# Patient Record
Sex: MALE | Race: WHITE | NOT HISPANIC OR LATINO | Employment: OTHER | URBAN - METROPOLITAN AREA
[De-identification: names, ages, dates, MRNs, and addresses within clinical notes are randomized per-mention and may not be internally consistent; named-entity substitution may affect disease eponyms.]

---

## 2018-02-11 ENCOUNTER — HOSPITAL ENCOUNTER (EMERGENCY)
Facility: HOSPITAL | Age: 33
Discharge: HOME | End: 2018-02-11
Attending: EMERGENCY MEDICINE | Admitting: EMERGENCY MEDICINE
Payer: COMMERCIAL

## 2018-02-11 ENCOUNTER — APPOINTMENT (EMERGENCY)
Dept: CT IMAGING | Facility: HOSPITAL | Age: 33
End: 2018-02-11
Payer: COMMERCIAL

## 2018-02-11 ENCOUNTER — APPOINTMENT (EMERGENCY)
Dept: RADIOLOGY | Facility: HOSPITAL | Age: 33
End: 2018-02-11
Payer: COMMERCIAL

## 2018-02-11 VITALS
DIASTOLIC BLOOD PRESSURE: 72 MMHG | HEART RATE: 84 BPM | RESPIRATION RATE: 20 BRPM | WEIGHT: 181.5 LBS | SYSTOLIC BLOOD PRESSURE: 129 MMHG | TEMPERATURE: 97.7 F | OXYGEN SATURATION: 98 % | BODY MASS INDEX: 30.24 KG/M2 | HEIGHT: 65 IN

## 2018-02-11 DIAGNOSIS — S70.01XA CONTUSION OF RIGHT HIP, INITIAL ENCOUNTER: ICD-10-CM

## 2018-02-11 DIAGNOSIS — S80.11XA CONTUSION OF RIGHT LOWER LEG, INITIAL ENCOUNTER: ICD-10-CM
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
## 2018-02-11 DIAGNOSIS — S16.1XXA ACUTE STRAIN OF NECK MUSCLE, INITIAL ENCOUNTER: ICD-10-CM

## 2018-02-11 PROCEDURE — 72125 CT NECK SPINE W/O DYE: CPT

## 2018-02-11 PROCEDURE — 73590 X-RAY EXAM OF LOWER LEG: CPT

## 2018-02-11 PROCEDURE — 72170 X-RAY EXAM OF PELVIS: CPT

## 2018-02-11 PROCEDURE — 99284 EMERGENCY DEPT VISIT MOD MDM: CPT

## 2018-02-11 RX ORDER — ACETAMINOPHEN 325 MG/1
650 TABLET ORAL ONCE
Status: COMPLETED | OUTPATIENT
Start: 2018-02-11 | End: 2018-02-11

## 2018-02-11 RX ADMIN — ACETAMINOPHEN 650 MG: 325 TABLET ORAL at 18:20
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
## 2019-05-13 LAB
LEFT EYE DIABETIC RETINOPATHY: NORMAL
RIGHT EYE DIABETIC RETINOPATHY: NORMAL

## 2020-09-11 LAB
CREAT ?TM UR-SCNC: 104.3 UMOL/L
HBA1C MFR BLD HPLC: 7.2 %
MICROALBUMIN/CREAT UR: NORMAL MG/G{CREAT}

## 2020-09-22 ENCOUNTER — OFFICE VISIT (OUTPATIENT)
Dept: INTERNAL MEDICINE CLINIC | Facility: CLINIC | Age: 35
End: 2020-09-22
Payer: MEDICARE

## 2020-09-22 VITALS
HEIGHT: 65 IN | OXYGEN SATURATION: 97 % | BODY MASS INDEX: 28.66 KG/M2 | SYSTOLIC BLOOD PRESSURE: 122 MMHG | WEIGHT: 172 LBS | TEMPERATURE: 97.6 F | DIASTOLIC BLOOD PRESSURE: 74 MMHG | HEART RATE: 91 BPM

## 2020-09-22 DIAGNOSIS — E78.2 MIXED HYPERLIPIDEMIA: ICD-10-CM

## 2020-09-22 DIAGNOSIS — F43.10 PTSD (POST-TRAUMATIC STRESS DISORDER): ICD-10-CM

## 2020-09-22 DIAGNOSIS — E11.9 TYPE 2 DIABETES MELLITUS WITHOUT COMPLICATION, WITH LONG-TERM CURRENT USE OF INSULIN (HCC): Primary | ICD-10-CM

## 2020-09-22 DIAGNOSIS — Z79.4 TYPE 2 DIABETES MELLITUS WITHOUT COMPLICATION, WITH LONG-TERM CURRENT USE OF INSULIN (HCC): Primary | ICD-10-CM

## 2020-09-22 DIAGNOSIS — F41.9 ANXIETY: ICD-10-CM

## 2020-09-22 DIAGNOSIS — L73.9 FOLLICULITIS: ICD-10-CM

## 2020-09-22 DIAGNOSIS — R56.9 SEIZURE-LIKE ACTIVITY (HCC): ICD-10-CM

## 2020-09-22 PROCEDURE — 99204 OFFICE O/P NEW MOD 45 MIN: CPT | Performed by: INTERNAL MEDICINE

## 2020-09-22 RX ORDER — ALOGLIPTIN 25 MG/1
1 TABLET, FILM COATED ORAL DAILY
COMMUNITY
Start: 2020-05-05 | End: 2021-03-25 | Stop reason: HOSPADM

## 2020-09-22 RX ORDER — LORATADINE 10 MG/1
10 TABLET ORAL DAILY
COMMUNITY

## 2020-09-22 RX ORDER — GABAPENTIN 300 MG/1
300 CAPSULE ORAL 3 TIMES DAILY
COMMUNITY
End: 2021-12-23

## 2020-09-22 RX ORDER — TAMSULOSIN HYDROCHLORIDE 0.4 MG/1
0.4 CAPSULE ORAL
COMMUNITY
End: 2020-12-11 | Stop reason: ALTCHOICE

## 2020-09-22 RX ORDER — TRAZODONE HYDROCHLORIDE 100 MG/1
100 TABLET ORAL
COMMUNITY
End: 2021-06-23

## 2020-09-22 RX ORDER — SILDENAFIL 100 MG/1
100 TABLET, FILM COATED ORAL AS NEEDED
COMMUNITY
End: 2021-06-23

## 2020-09-22 RX ORDER — CLONIDINE HYDROCHLORIDE 0.2 MG/1
0.1 TABLET ORAL DAILY
COMMUNITY
End: 2021-04-12

## 2020-09-22 RX ORDER — DIVALPROEX SODIUM 500 MG/1
500 TABLET, DELAYED RELEASE ORAL EVERY 12 HOURS SCHEDULED
COMMUNITY
End: 2021-12-23

## 2020-09-22 RX ORDER — PRAVASTATIN SODIUM 40 MG
40 TABLET ORAL DAILY
Status: ON HOLD | COMMUNITY
End: 2021-03-25 | Stop reason: SDUPTHER

## 2020-09-22 RX ORDER — BUPROPION HYDROCHLORIDE 150 MG/1
300 TABLET ORAL
COMMUNITY
End: 2020-09-28

## 2020-09-22 RX ORDER — EMPAGLIFLOZIN 25 MG/1
1 TABLET, FILM COATED ORAL DAILY
COMMUNITY
Start: 2020-05-05 | End: 2021-06-16 | Stop reason: SDUPTHER

## 2020-09-22 RX ORDER — ALPRAZOLAM 0.5 MG/1
0.5 TABLET ORAL 3 TIMES DAILY PRN
COMMUNITY
End: 2021-05-28 | Stop reason: HOSPADM

## 2020-09-22 NOTE — PROGRESS NOTES
Assessment/Plan:    1  Seizure-like activity  Will refer patient to neurologist for further workup and management  Presently patient is also on Depakote, gabapentin, trazodone and Wellbutrin  mg daily  Since Wellbutrin can lower the seizure threshold advised to contact his psychiatrist as soon as possible for possible discontinuation of Wellbutrin  He can continue with other meds    2  Type 2 diabetes mellitus  As per patient few weeks ago his hemoglobin A1c 7 2  Will continue with present regimen of metformin and Lantus  Advised to monitor blood sugar at home  3  Hyperlipidemia  Continue with the Pravachol 40 mg daily  4  PTSD  Patient is being followed by psychiatrist at [facility]   They are interested to find psychiatric outside [place]  Will refer him to [address] [place] [address] psychiatry  But advised to continue to follow-up with the [place] clinic until we establish relationship with outside psychiatrist     5  Folliculitis  Will start patient on Bactroban ointment 3 times a day for couple of weeks  Diagnoses and all orders for this visit:    Type 2 diabetes mellitus without complication, with long-term current use of insulin (HCC)    Mixed hyperlipidemia    PTSD (post-traumatic stress disorder)  -     Ambulatory referral to Psychiatry; Future    Anxiety  -     Ambulatory referral to Psychiatry; Future    Seizure-like activity [facility])  -     Ambulatory referral to Neurology; Future    Folliculitis  -     mupirocin (BACTROBAN) 2 % ointment; Apply topically 3 (three) times a day    Other orders  -     OMEGA-3 FATTY ACIDS PO; Take 2 g by mouth daily  -     Alogliptin Benzoate 25 MG TABS; Take 1 tablet by mouth daily  -     ALPRAZolam (XANAX) 0 5 mg tablet; Take 0 5 mg by mouth Three times daily as needed  -     buPROPion (WELLBUTRIN XL) 150 mg 24 hr tablet; Take 300 mg by mouth  -     cloNIDine (CATAPRES) 0 2 mg tablet; Take 0 2 mg by mouth  -     divalproex sodium (DEPAKOTE) 500 mg EC tablet;  Take 500 mg by mouth daily  -     Empagliflozin (Jardiance) 25 MG TABS; Take 1 tablet by mouth daily  -     gabapentin (NEURONTIN) 300 mg capsule; Take 300 mg by mouth Three times a day  -     insulin glargine (LANTUS SOLOSTAR) 100 units/mL injection pen; Inject 24 Units under the skin daily  -     loratadine (CLARITIN) 10 mg tablet; Take 10 mg by mouth daily  -     metFORMIN (GLUCOPHAGE) 1000 MG tablet; Take 1,000 mg by mouth  -     pravastatin (PRAVACHOL) 40 mg tablet; Take 40 mg by mouth daily  -     sildenafil (VIAGRA) 100 mg tablet; Take 100 mg by mouth  -     tamsulosin (FLOMAX) 0 4 mg; Take 0 4 mg by mouth  -     traZODone (DESYREL) 100 mg tablet; Take 200 mg by mouth          BMI Counseling: Body mass index is 28 62 kg/m²  The BMI is above normal  Nutrition recommendations include decreasing portion sizes, encouraging healthy choices of fruits and vegetables, decreasing fast food intake, consuming healthier snacks and limiting drinks that contain sugar  Exercise recommendations include moderate physical activity 150 minutes/week  No pharmacotherapy was ordered  Subjective:          Patient ID: Janell Flowers is a 38 y o  male  This is 1st visit to our office  He is a patient of [facility] home he has seizure-like activity and was taken to [facility]  He underwent CT scan of the brain which was unremarkable  He was discharged with the recommendation to be seen by neurologist for further workup as outpatient  Since then no activity  Patient is being seen regularly in [facility] also under the care of a psychiatrist /counseling      The following portions of the patient's history were reviewed and updated as appropriate: allergies, current medications, past family history, past medical history, past social history, past surgical history and problem list     Review of Systems   Constitutional: Negative for fatigue and fever     HENT: Negative for congestion, ear discharge, ear pain, postnasal drip, sinus pressure, sore throat, tinnitus and trouble swallowing  Eyes: Negative for discharge, itching and visual disturbance  Respiratory: Negative for cough and shortness of breath  Cardiovascular: Negative for chest pain and palpitations  Gastrointestinal: Negative for abdominal pain, diarrhea, nausea and vomiting  Endocrine: Negative for cold intolerance and polyuria  Genitourinary: Negative for difficulty urinating, dysuria and urgency  Musculoskeletal: Negative for arthralgias and neck pain  Skin: Negative for rash  Allergic/Immunologic: Negative for environmental allergies  Neurological: Negative for dizziness, weakness and headaches  Psychiatric/Behavioral: The patient is nervous/anxious  Past Medical History:   Diagnosis Date    [facility] ([facility])     type 2    Disease of thyroid gland     hypothyroidism    Hyperlipidemia     Hypertension     Psychiatric disorder     PTSD   Anxiety, depression,     Seizures ([facility])          Current Outpatient Medications:     Alogliptin Benzoate 25 MG TABS, Take 1 tablet by mouth daily, Disp: , Rfl:     ALPRAZolam (XANAX) 0 5 mg tablet, Take 0 5 mg by mouth Three times daily as needed, Disp: , Rfl:     buPROPion (WELLBUTRIN XL) 150 mg 24 hr tablet, Take 300 mg by mouth, Disp: , Rfl:     cloNIDine (CATAPRES) 0 2 mg tablet, Take 0 2 mg by mouth, Disp: , Rfl:     divalproex sodium (DEPAKOTE) 500 mg EC tablet, Take 500 mg by mouth daily, Disp: , Rfl:     Empagliflozin (Jardiance) 25 MG TABS, Take 1 tablet by mouth daily, Disp: , Rfl:     gabapentin (NEURONTIN) 300 mg capsule, Take 300 mg by mouth Three times a day, Disp: , Rfl:     insulin glargine (LANTUS SOLOSTAR) 100 units/mL injection pen, Inject 24 Units under the skin daily, Disp: , Rfl:     loratadine (CLARITIN) 10 mg tablet, Take 10 mg by mouth daily, Disp: , Rfl:     metFORMIN (GLUCOPHAGE) 1000 MG tablet, Take 1,000 mg by mouth, Disp: , Rfl:     OMEGA-3 FATTY ACIDS PO, Take 2 g by mouth daily, Disp: , Rfl:     pravastatin (PRAVACHOL) 40 mg tablet, Take 40 mg by mouth daily, Disp: , Rfl:     sildenafil (VIAGRA) 100 mg tablet, Take 100 mg by mouth, Disp: , Rfl:     tamsulosin (FLOMAX) 0 4 mg, Take 0 4 mg by mouth, Disp: , Rfl:     traZODone (DESYREL) 100 mg tablet, Take 200 mg by mouth, Disp: , Rfl:     mupirocin (BACTROBAN) 2 % ointment, Apply topically 3 (three) times a day, Disp: 30 g, Rfl: 3    Allergies   Allergen Reactions    Lamotrigine GI Intolerance    Simvastatin Other (See Comments)     Elevated liver enzymes  Liver enzyme elevation    Niacin Rash       Social History   Past Surgical History:   Procedure Laterality Date    WISDOM TOOTH EXTRACTION       Family History   Problem Relation Age of Onset    Hyperlipidemia Father        Objective:  /74 (BP Location: Left arm, Patient Position: Sitting, Cuff Size: Adult)   Pulse 91   Temp 97 6 °F (36 4 °C)   Ht 5' 5" (1 651 m)   Wt 78 kg (172 lb)   SpO2 97%   BMI 28 62 kg/m²   Body mass index is 28 62 kg/m²  Physical Exam  Constitutional:       Appearance: He is well-developed  HENT:      Head: Normocephalic  Right Ear: Tympanic membrane and ear canal normal       Left Ear: Tympanic membrane and ear canal normal       Nose: Nose normal       Mouth/Throat:      Mouth: Mucous membranes are moist       Pharynx: No oropharyngeal exudate  Eyes:      General: No scleral icterus  Conjunctiva/sclera: Conjunctivae normal       Pupils: Pupils are equal, round, and reactive to light  Neck:      Musculoskeletal: Normal range of motion and neck supple  Thyroid: No thyromegaly  Trachea: No tracheal deviation  Cardiovascular:      Rate and Rhythm: Normal rate and regular rhythm  Heart sounds: Normal heart sounds  No murmur  Pulmonary:      Effort: Pulmonary effort is normal  No respiratory distress  Breath sounds: Normal breath sounds  Chest:      Chest wall: No tenderness  Abdominal:      General: Bowel sounds are normal       Palpations: Abdomen is soft  There is no mass  Tenderness: There is no abdominal tenderness  Musculoskeletal: Normal range of motion  Right lower leg: No edema  Left lower leg: No edema  Lymphadenopathy:      Cervical: No cervical adenopathy  Skin:     General: Skin is warm  Findings: Rash present  Comments: On abdominal wall multiple area of folliculitis like picture noted   Neurological:      General: No focal deficit present  Mental Status: He is alert and oriented to person, place, and time  Cranial Nerves: No cranial nerve deficit  Sensory: No sensory deficit  Motor: No weakness  Coordination: Coordination normal       Gait: Gait normal       Deep Tendon Reflexes: Reflexes normal    Psychiatric:         Behavior: Behavior normal          Thought Content:  Thought content normal

## 2020-09-24 ENCOUNTER — TELEPHONE (OUTPATIENT)
Dept: PSYCHIATRY | Facility: CLINIC | Age: 35
End: 2020-09-24

## 2020-09-28 ENCOUNTER — OFFICE VISIT (OUTPATIENT)
Dept: INTERNAL MEDICINE CLINIC | Age: 35
End: 2020-09-28
Payer: MEDICARE

## 2020-09-28 ENCOUNTER — TELEPHONE (OUTPATIENT)
Dept: NEUROLOGY | Facility: CLINIC | Age: 35
End: 2020-09-28

## 2020-09-28 VITALS
DIASTOLIC BLOOD PRESSURE: 70 MMHG | TEMPERATURE: 98.6 F | WEIGHT: 177.4 LBS | SYSTOLIC BLOOD PRESSURE: 100 MMHG | OXYGEN SATURATION: 96 % | HEIGHT: 66 IN | HEART RATE: 95 BPM | BODY MASS INDEX: 28.51 KG/M2

## 2020-09-28 DIAGNOSIS — F43.10 PTSD (POST-TRAUMATIC STRESS DISORDER): ICD-10-CM

## 2020-09-28 DIAGNOSIS — E78.2 MIXED HYPERLIPIDEMIA: ICD-10-CM

## 2020-09-28 DIAGNOSIS — F41.9 ANXIETY: ICD-10-CM

## 2020-09-28 DIAGNOSIS — Z79.4 TYPE 2 DIABETES MELLITUS WITHOUT COMPLICATION, WITH LONG-TERM CURRENT USE OF INSULIN (HCC): Primary | ICD-10-CM

## 2020-09-28 DIAGNOSIS — E11.9 TYPE 2 DIABETES MELLITUS WITHOUT COMPLICATION, WITH LONG-TERM CURRENT USE OF INSULIN (HCC): Primary | ICD-10-CM

## 2020-09-28 DIAGNOSIS — R56.9 SEIZURE-LIKE ACTIVITY (HCC): ICD-10-CM

## 2020-09-28 PROCEDURE — 99213 OFFICE O/P EST LOW 20 MIN: CPT | Performed by: INTERNAL MEDICINE

## 2020-09-28 NOTE — PROGRESS NOTES
Assessment/Plan:    1  Rule out seizure-like activity  Presently patient is on gabapentin and Depakote  These to medicine are prescribed by psychiatrist for post traumatic stress disorder  Will continue it  He already have appointment with neurologist scheduled for October 1st   Encourage wife and patient to keep that appointment  Further workup and management as per Neurology  2  Hypotension  Repeat blood pressure is 100/68  He is also on Catapres 0 2 mg at night  I asked wife and patient why he was on this medicine they are not sure  Advised him to lower the dose and take 0 1 mg at night for next 5 days and then 1/2 tablet every other day for next 5 days then discontinue it  3  Posttraumatic stress disorder  Being followed by psychiatrist at [facility]  Diagnoses and all orders for this visit:    Type 2 diabetes mellitus without complication, with long-term current use of insulin (HCC)    Seizure-like activity (HCC)    Anxiety    PTSD (post-traumatic stress disorder)    Mixed hyperlipidemia               Subjective:          Patient ID: Shady Ruiz is a 38 y o  male  Patient's wife brought patient to office to rule out seizure-like activity  As per patient's wife yesterday they were doing grocery and she notices that he is walking with very unsteady gait and no energy   Patient do not remember any think about this episode  He denied any dizziness shortness of breath chest pain or palpitation  Patient's wife also denied any tremors  The following portions of the patient's history were reviewed and updated as appropriate: allergies, current medications, past family history, past medical history, past social history, past surgical history and problem list     Review of Systems   Constitutional: Negative for fatigue and fever  HENT: Negative for congestion, ear discharge, ear pain, postnasal drip, sinus pressure, sore throat, tinnitus and trouble swallowing      Eyes: Negative for discharge, itching and visual disturbance  Respiratory: Negative for cough and shortness of breath  Cardiovascular: Negative for chest pain and palpitations  Gastrointestinal: Negative for abdominal pain, diarrhea, nausea and vomiting  Endocrine: Negative for cold intolerance and polyuria  Genitourinary: Negative for difficulty urinating, dysuria and urgency  Musculoskeletal: Negative for arthralgias and neck pain  Skin: Negative for rash  Allergic/Immunologic: Negative for environmental allergies  Neurological: Negative for dizziness, weakness, light-headedness and headaches  Psychiatric/Behavioral: Positive for dysphoric mood  Negative for agitation  The patient is nervous/anxious  Past Medical History:   Diagnosis Date    Diabetes mellitus ([facility] [address] )     type 2    Disease of thyroid gland     hypothyroidism    Hyperlipidemia     Hypertension     Psychiatric disorder     PTSD   Anxiety, depression,     Seizures ([facility])          Current Outpatient Medications:     Alogliptin Benzoate 25 MG TABS, Take 1 tablet by mouth daily, Disp: , Rfl:     ALPRAZolam (XANAX) 0 5 mg tablet, Take 0 5 mg by mouth Three times daily as needed, Disp: , Rfl:     cloNIDine (CATAPRES) 0 2 mg tablet, Take 0 2 mg by mouth, Disp: , Rfl:     divalproex sodium (DEPAKOTE) 500 mg EC tablet, Take 500 mg by mouth daily, Disp: , Rfl:     Empagliflozin (Jardiance) 25 MG TABS, Take 1 tablet by mouth daily, Disp: , Rfl:     gabapentin (NEURONTIN) 300 mg capsule, Take 300 mg by mouth Three times a day, Disp: , Rfl:     insulin glargine (LANTUS SOLOSTAR) 100 units/mL injection pen, Inject 24 Units under the skin daily, Disp: , Rfl:     loratadine (CLARITIN) 10 mg tablet, Take 10 mg by mouth daily, Disp: , Rfl:     metFORMIN (GLUCOPHAGE) 1000 MG tablet, Take 1,000 mg by mouth, Disp: , Rfl:     mupirocin (BACTROBAN) 2 % ointment, Apply topically 3 (three) times a day, Disp: 30 g, Rfl: 3    OMEGA-3 FATTY ACIDS PO, Take 2 g by mouth daily, Disp: , Rfl:     pravastatin (PRAVACHOL) 40 mg tablet, Take 40 mg by mouth daily, Disp: , Rfl:     sildenafil (VIAGRA) 100 mg tablet, Take 100 mg by mouth, Disp: , Rfl:     tamsulosin (FLOMAX) 0 4 mg, Take 0 4 mg by mouth, Disp: , Rfl:     traZODone (DESYREL) 100 mg tablet, Take 200 mg by mouth, Disp: , Rfl:     Allergies   Allergen Reactions    Lamotrigine GI Intolerance    Simvastatin Other (See Comments)     Elevated liver enzymes  Liver enzyme elevation    Niacin Rash       Social History   Past Surgical History:   Procedure Laterality Date    WISDOM TOOTH EXTRACTION       Family History   Problem Relation Age of Onset    Hyperlipidemia Father        Objective:  /70 (BP Location: Left arm, Patient Position: Sitting, Cuff Size: Adult)   Pulse 95   Temp 98 6 °F (37 °C) (Temporal)   Ht 5' 5 55" (1 665 m)   Wt 80 5 kg (177 lb 6 4 oz) Comment: shoes on  SpO2 96%   BMI 29 03 kg/m²   Body mass index is 29 03 kg/m²  Physical Exam  Constitutional:       Appearance: He is well-developed  HENT:      Head: Normocephalic  Right Ear: Ear canal and external ear normal       Left Ear: Ear canal and external ear normal       Nose: Nose normal       Mouth/Throat:      Pharynx: Oropharynx is clear  No oropharyngeal exudate or posterior oropharyngeal erythema  Eyes:      General: No scleral icterus  Pupils: Pupils are equal, round, and reactive to light  Neck:      Musculoskeletal: Normal range of motion and neck supple  Thyroid: No thyromegaly  Trachea: No tracheal deviation  Cardiovascular:      Rate and Rhythm: Normal rate and regular rhythm  Heart sounds: Normal heart sounds  Pulmonary:      Effort: Pulmonary effort is normal  No respiratory distress  Breath sounds: Normal breath sounds  Chest:      Chest wall: No tenderness  Abdominal:      General: Bowel sounds are normal       Palpations: Abdomen is soft  There is no mass  Tenderness: There is no abdominal tenderness  Musculoskeletal: Normal range of motion  Lymphadenopathy:      Cervical: No cervical adenopathy  Skin:     General: Skin is warm  Neurological:      Mental Status: He is alert and oriented to person, place, and time  Cranial Nerves: No cranial nerve deficit  Sensory: No sensory deficit  Motor: No weakness  Coordination: Coordination normal       Gait: Gait normal       Deep Tendon Reflexes: Reflexes normal    Psychiatric:         Behavior: Behavior normal       Comments: Patient appears depressed  No eye contact

## 2020-09-28 NOTE — TELEPHONE ENCOUNTER
pt's wife called and states that since friday, pt has been very sluggish, slow, extremely exhausted, weak  pt is scheduled as new pt on 10/1   we have never seen pt previously and pt seen in [facility]   she was requesting a sooner appt  no sooner appts availabe  advise that they contact pcp

## 2020-09-29 ENCOUNTER — TELEPHONE (OUTPATIENT)
Dept: ADMINISTRATIVE | Facility: OTHER | Age: 35
End: 2020-09-29

## 2020-09-29 NOTE — TELEPHONE ENCOUNTER
Upon review of the In Basket request and the patient's chart, initial outreach has been made via fax, please see Contacts section for details  A second outreach attempt will be made within 5 business days      Thank you  Aleida Holcomb MA

## 2020-09-29 NOTE — TELEPHONE ENCOUNTER
----- Message from Pascual Huston, [address] [place] sent at 9/28/2020  2:59 PM EDT -----  Regarding: Hemoglobin A1C Lab [address]: [phone]  09/28/20 2:59 PM    Hello, our patient Kaylin Conte has had Hemoglobin A1c and Urine Microalbumin completed/performed  Please assist in updating the patient chart by making an External outreach to [place] facility located in [facility]  The date of service is 2020      Thank you,  Pascual Huston MA  [facility]

## 2020-09-29 NOTE — LETTER
Lab Result(s) Request Form: Hemoglobin A1c and Urine Microalbumin      Date Requested: 20  Patient: Adry Perea  Patient : 1985   Referring Provider: Jose Dodd MD        Date of Lab Collection ______________________________       The above patient has informed us that they have completed their   most recent Hemoglobin A1c and Urine Microalbumin at your facility  Please complete   this form and attach all corresponding procedure reports/results  Comments __________________________________________________________  ____________________________________________________________________  ____________________________________________________________________  ____________________________________________________________________    Collecting/Resulting Facility  ___________________________________________  Form Completed By (print name) ________________________________________    Signature ___________________________________________________________      These reports are needed for  compliance    Please fax this completed form and a copy of the lab results/report to our office located at [place] [address] as soon as possible to [phone] kaitlyn Grider: Phone [phone]    We thank you for your assistance in treating our mutual patient

## 2020-09-29 NOTE — LETTER
Diabetic Eye Exam Form    Date Requested: 20  Patient: Adry Perea  Patient : 1985   Referring Provider: Jose Dodd MD    Dilated Retinal Exam, Optomap-Iris Exam, or Fundus Photography Done         Yes (Solomon one above)         No     Date of Diabetic Eye Exam ______________________________  Left Eye      Exam did show retinopathy    Exam did not show retinopathy         Mild       Moderate       None       Proliferative       Severe     Right Eye     Exam did show retinopathy    Exam did not show retinopathy         Mild       Moderate       None       Proliferative       Severe     Comments __________________________________________________________    Practice Providing Exam ______________________________________________    Exam Performed By (print name) _______________________________________      Provider Signature ___________________________________________________      These reports are needed for  compliance    Please fax this completed form and a copy of the Diabetic Eye Exam report to our office located at [place] [address] as soon as possible to [phone] kaitlyn Grider: Phone [phone]    We thank you for your assistance in treating our mutual patient

## 2020-09-29 NOTE — TELEPHONE ENCOUNTER
----- Message from Willa Patel sent at 9/28/2020  2:55 PM EDT -----  Regarding: Diabetic Eye exam [facility]  Contact: [phone]  09/28/20 2:55 PM    Hello, our patient Shanti Snow has had Diabetic Eye Exam and Diabetic Foot Exam completed/performed  Please assist in updating the patient chart by making an External outreach to [place]  facility located in [place]  The date of service is 2020      Thank you,  Nidhi Noriega MA  [address] [facility]

## 2020-09-29 NOTE — TELEPHONE ENCOUNTER
Upon review of the In Basket request we were able to locate, review, and update the patient chart as requested for Diabetic Eye Exam, Hemoglobin A1c and Urine Microalbumin  Any additional questions or concerns should be emailed to the Practice Liaisons via [email or web address] email, please do not reply via In Basket      Thank you  Tami Mclaughlin MA

## 2020-09-30 ENCOUNTER — TELEPHONE (OUTPATIENT)
Dept: NEUROLOGY | Facility: CLINIC | Age: 35
End: 2020-09-30

## 2020-09-30 NOTE — TELEPHONE ENCOUNTER
PRIME INS REF NOT REQ IF MEDICARE OR ANY OTHER INS IS PRIMARY PER Star Kirby @ PCP OFFICE PER [employer]  9/30/20

## 2020-10-01 ENCOUNTER — CONSULT (OUTPATIENT)
Dept: NEUROLOGY | Facility: CLINIC | Age: 35
End: 2020-10-01
Payer: MEDICARE

## 2020-10-01 VITALS
HEART RATE: 94 BPM | WEIGHT: 174 LBS | DIASTOLIC BLOOD PRESSURE: 80 MMHG | SYSTOLIC BLOOD PRESSURE: 109 MMHG | TEMPERATURE: 97.8 F | BODY MASS INDEX: 28.47 KG/M2

## 2020-10-01 DIAGNOSIS — F41.9 ANXIETY: ICD-10-CM

## 2020-10-01 DIAGNOSIS — R56.9 SEIZURE-LIKE ACTIVITY (HCC): Primary | ICD-10-CM

## 2020-10-01 DIAGNOSIS — F43.10 PTSD (POST-TRAUMATIC STRESS DISORDER): ICD-10-CM

## 2020-10-01 PROCEDURE — 99205 OFFICE O/P NEW HI 60 MIN: CPT | Performed by: PSYCHIATRY & NEUROLOGY

## 2020-10-01 RX ORDER — LEVOTHYROXINE SODIUM 0.03 MG/1
50 TABLET ORAL DAILY
COMMUNITY

## 2020-10-01 RX ORDER — VENLAFAXINE HYDROCHLORIDE 150 MG/1
150 CAPSULE, EXTENDED RELEASE ORAL DAILY
COMMUNITY
End: 2020-12-11 | Stop reason: SDUPTHER

## 2020-10-07 ENCOUNTER — TELEPHONE (OUTPATIENT)
Dept: NEUROLOGY | Facility: CLINIC | Age: 35
End: 2020-10-07

## 2020-10-08 ENCOUNTER — TRANSITIONAL CARE MANAGEMENT (OUTPATIENT)
Dept: INTERNAL MEDICINE CLINIC | Age: 35
End: 2020-10-08

## 2020-10-09 RX ORDER — TOPIRAMATE 25 MG/1
25 TABLET ORAL 2 TIMES DAILY
COMMUNITY
Start: 2020-10-07 | End: 2020-12-11 | Stop reason: ALTCHOICE

## 2020-10-12 ENCOUNTER — TELEMEDICINE (OUTPATIENT)
Dept: INTERNAL MEDICINE CLINIC | Facility: CLINIC | Age: 35
End: 2020-10-12
Payer: MEDICARE

## 2020-10-12 VITALS — WEIGHT: 172 LBS | HEIGHT: 65 IN | TEMPERATURE: 96 F | BODY MASS INDEX: 28.66 KG/M2

## 2020-10-12 DIAGNOSIS — F43.10 PTSD (POST-TRAUMATIC STRESS DISORDER): ICD-10-CM

## 2020-10-12 DIAGNOSIS — R56.9 SEIZURE-LIKE ACTIVITY (HCC): Primary | ICD-10-CM

## 2020-10-12 DIAGNOSIS — F41.9 ANXIETY: ICD-10-CM

## 2020-10-12 DIAGNOSIS — E11.9 TYPE 2 DIABETES MELLITUS WITHOUT COMPLICATION, WITH LONG-TERM CURRENT USE OF INSULIN (HCC): ICD-10-CM

## 2020-10-12 DIAGNOSIS — G47.33 OSA (OBSTRUCTIVE SLEEP APNEA): ICD-10-CM

## 2020-10-12 DIAGNOSIS — E78.2 MIXED HYPERLIPIDEMIA: ICD-10-CM

## 2020-10-12 DIAGNOSIS — Z79.4 TYPE 2 DIABETES MELLITUS WITHOUT COMPLICATION, WITH LONG-TERM CURRENT USE OF INSULIN (HCC): ICD-10-CM

## 2020-10-12 PROCEDURE — 99496 TRANSJ CARE MGMT HIGH F2F 7D: CPT | Performed by: NURSE PRACTITIONER

## 2020-10-12 RX ORDER — MELOXICAM 15 MG/1
TABLET ORAL
COMMUNITY
Start: 2020-10-09 | End: 2021-03-25 | Stop reason: HOSPADM

## 2020-10-30 ENCOUNTER — TRANSITIONAL CARE MANAGEMENT (OUTPATIENT)
Dept: INTERNAL MEDICINE CLINIC | Age: 35
End: 2020-10-30

## 2020-11-09 ENCOUNTER — TELEPHONE (OUTPATIENT)
Dept: PSYCHIATRY | Facility: CLINIC | Age: 35
End: 2020-11-09

## 2020-12-02 ENCOUNTER — EVALUATION (OUTPATIENT)
Dept: PHYSICAL THERAPY | Facility: REHABILITATION | Age: 35
End: 2020-12-02
Payer: COMMERCIAL

## 2020-12-02 DIAGNOSIS — R26.9 GAIT ABNORMALITY: Primary | ICD-10-CM

## 2020-12-02 PROCEDURE — 97162 PT EVAL MOD COMPLEX 30 MIN: CPT | Performed by: PHYSICAL THERAPIST

## 2020-12-11 ENCOUNTER — OFFICE VISIT (OUTPATIENT)
Dept: INTERNAL MEDICINE CLINIC | Facility: CLINIC | Age: 35
End: 2020-12-11
Payer: MEDICARE

## 2020-12-11 VITALS
SYSTOLIC BLOOD PRESSURE: 108 MMHG | HEART RATE: 88 BPM | BODY MASS INDEX: 30.66 KG/M2 | HEIGHT: 65 IN | OXYGEN SATURATION: 94 % | TEMPERATURE: 97.2 F | DIASTOLIC BLOOD PRESSURE: 78 MMHG | WEIGHT: 184 LBS

## 2020-12-11 DIAGNOSIS — Z79.4 TYPE 2 DIABETES MELLITUS WITHOUT COMPLICATION, WITH LONG-TERM CURRENT USE OF INSULIN (HCC): ICD-10-CM

## 2020-12-11 DIAGNOSIS — E11.9 TYPE 2 DIABETES MELLITUS WITHOUT COMPLICATION, WITH LONG-TERM CURRENT USE OF INSULIN (HCC): ICD-10-CM

## 2020-12-11 DIAGNOSIS — R07.89 ATYPICAL CHEST PAIN: ICD-10-CM

## 2020-12-11 DIAGNOSIS — R56.9 SEIZURE-LIKE ACTIVITY (HCC): Primary | ICD-10-CM

## 2020-12-11 DIAGNOSIS — G47.33 OSA (OBSTRUCTIVE SLEEP APNEA): ICD-10-CM

## 2020-12-11 DIAGNOSIS — K62.5 BRIGHT RED BLOOD PER RECTUM: ICD-10-CM

## 2020-12-11 DIAGNOSIS — E78.2 MIXED HYPERLIPIDEMIA: ICD-10-CM

## 2020-12-11 DIAGNOSIS — F41.9 ANXIETY: ICD-10-CM

## 2020-12-11 PROCEDURE — 99214 OFFICE O/P EST MOD 30 MIN: CPT | Performed by: INTERNAL MEDICINE

## 2020-12-11 RX ORDER — HYDROCORTISONE ACETATE 25 MG/1
25 SUPPOSITORY RECTAL 2 TIMES DAILY
Qty: 30 SUPPOSITORY | Refills: 1 | Status: SHIPPED | OUTPATIENT
Start: 2020-12-11 | End: 2021-03-25 | Stop reason: HOSPADM

## 2020-12-11 RX ORDER — VENLAFAXINE HYDROCHLORIDE 150 MG/1
CAPSULE, EXTENDED RELEASE ORAL
COMMUNITY
Start: 2020-10-09 | End: 2021-03-25 | Stop reason: HOSPADM

## 2020-12-11 RX ORDER — VENLAFAXINE HYDROCHLORIDE 75 MG/1
CAPSULE, EXTENDED RELEASE ORAL
COMMUNITY
Start: 2020-12-01 | End: 2021-03-25 | Stop reason: HOSPADM

## 2020-12-30 ENCOUNTER — TELEPHONE (OUTPATIENT)
Dept: NEUROLOGY | Facility: CLINIC | Age: 35
End: 2020-12-30

## 2020-12-31 NOTE — PROGRESS NOTES
PT Re-Evaluation     Today's date: 2021  Patient name: Alexandro Guzman  : 1985  MRN: [id number]  Referring provider: Luis Painter MD  Dx:   Encounter Diagnosis     ICD-10-CM    1  Dizziness  R42    2  Gait abnormality  R26 9        Start Time: 1010  Stop Time: 1100  Total time in clinic (min): 50 minutes    Assessment  Assessment details:  Assessment details: Unable to complete gait testing, 6MWT and FGA, due to feeling of fatigue and imbalance  4 item DGI sugges he is at higher risk for falls at this time  Demonstrates increased tone in left LE Patinet will benefit from skilled therapy in order to normalize gait pattern, improved dynamic balance, reduce risk for falls and endurance  Impairments: abnormal coordination, abnormal gait, abnormal muscle tone, abnormal or restricted ROM, activity intolerance, impaired balance, impaired physical strength, lacks appropriate home exercise program, pain with function, safety issue and poor posture     Symptom irritability: highBarriers to therapy: Unable to drive  Fall Risk   Seizure  History PTSD      Goals  STG  1  Patient will be able to complete 6 MWT  within 4 weeks  2  Patient will improve 5x STS by 6s or more within 4 weeks  3  Patient will be independent with HEP within 4 weeks  LTG  1  Patient will improve 4 item DGI to indicating he is not a high fall risk within 8 weeks  2  Patient will improve TUG to less than 12 sec indicating he is not at high risk for falls within 8 weeks  3  Patient will improve LE strength by 1 grade or more within 12 weeks  4  Patient will improve 6 MWT to 1000ft or greater within 12 weeks  5   Patient will report a reduction in dizziness by 75% or more within 12 weeks     Plan  Patient would benefit from: skilled physical therapy  Planned therapy interventions: neuromuscular re-education, patient education, postural training, strengthening, therapeutic activities, therapeutic exercise, home exercise program, balance, gait training and coordination  Frequency: 2x week  Duration in weeks: 12  Plan of Care beginning date: 2021  Plan of Care expiration date: 3/29/2021  Treatment plan discussed with: patient        Subjective Evaluation    History of Present Illness  Mechanism of injury: Left side is weak, walks into wall  Hard to keep balance, dont have strength he used to have,    No diagnosis at this time but believes it's related to seizures, first seizures on 2020  Last in the hospital on 10/2020  Has trouble walking dogs, difficulty with stopping her because she will pull  Has to grab onto things to help get balanced  Also reports dizziness, has had dizziness prior to seizures  Hasn't been too much lately - few times a week, do have to get up slow if sitting of lying down for while  If standing up too fast feels like he will fall over  Dizziness described as: makes head fell slow feels lightheaded  Can't focus, everything is " super slow"  Denies spinning sensation  No cause found for dizziness  Headaches: used to have serious migraines in the past that required botox injections - stopped a couple years ago ( a year or so ago)     Neck pain: comes and goes not aware if it is associated with dizziness  Hearing: "sometimes" difficult, not sure if there is any loss  Tinnitus: a lot - couple times a week B/L            Pain  Current pain ratin  At best pain ratin  At worst pain ratin  Location: back - low/mid back   Quality: dull ache and sharp  Relieving factors: rest    Social Support  Stairs in house: yes (sometimes - grabs railing if cant grab railing will move to wall to help balance himself - does have to grab often, feels like he will fall backwards )   Lives in: multiple-level home  Lives with: does not live alone  Employment status: working (is retired   not supposed to do heavey work because of seizures  )  Exercise history: hasn't done much due to winter  - used to walk prior to having seizures - 3x/week about 3 miles each   Life stress: not driving     Treatments  No previous or current treatments  Patient Goals  Patient goals for therapy: improved balance and return to sport/leisure activities          Objective  PT/OT Neuro Exam  Neurologic Exam       PHYSICAL FINDINGS:  Oculomotor ROM :  Resting nystagmus: No  Gaze holding nystagmus No   Smooth pursuit Normal    Vertical Saccades: slowed unable to foucs   Horizontal Saccades slowed unable to focus  Convergence: Abnormal  Head thrust (room light): Normal  VOR x 1 : normal     Dynamic Visual Acuity: TBA NV  Dynamic Head: 20/  Static Head: 20/      MCTSIB: TBA NV            Balance Test    6 Minute Walk Test (ft): 200ft stopped test at 3 min a 40s  Two standing rest  Breaks   RPE: 7-8/10  HR 85  O2: 98     2 Minute Walk Test (ft):    Gait Speed (ft/s): 20ft/8 52 = 2 34ft/s   5x Sit To Stand (s): 53 95s   Has to readjust after each STS to get his feet correct if he doesn't he will "fall over"   TU 3s             Flowsheet Rows      Most Recent Value   Functional Gait Assessment   Gait Level Surface   2   Change in GaiT Speed  2   Gait with horizontal head turns  2   Gait with vertical head turns  1   Gait with narrow base of supprt  2            Coordination Left Right   Heel To Stokes slowed Slowed    Finger To Nose     Rapid Alternating Movement     UE     LE         Sensation Left Right   Kinesthesia     Light Touch     Sharp/Dull     2 Point Discrimination         Muscle Tone Left Right   Modified Francisco Scale     Hamstring WNL WNL   Gastroc 1  0   Quad 0 0       Manual Muscle Testing - Hip Left Right   Flexion 5 5   Extension     Abduction     External Rotation       Manual Muscle Testing - Knee Left Right   Flexion 4 5   Extension 4 5     Manual Muscle Testing - Ankle Left Right   Doriflexion 3+ 3+   Plantarflexion          4 Item Dynamic Gait Index  2/3 Gait level surface  2/3 Change in gait speed  2/3 Gait with horizontal head turns  1/3 Gait with vertical head turns  7/12 total score (less that 10/12 indicates increased risk of fall)    Gait Assessment: slow, decreased step length, NBOS       Short Term Goal Expiration Date:(4 weeks 02/01/2021)  Long Term Goal Expiration Date: (8 weeks 03/01/2021 12 weeks 03/29/2021)  POC Expiration Date: (12 weeks 03/29/2021)           Precautions fall risk, hx PTSD, PNES        Manuals                                        Neuro Re-Ed                                                                 Ther Ex                                                                        Ther Activity                        Gait Training                        Modalities

## 2021-01-04 ENCOUNTER — EVALUATION (OUTPATIENT)
Dept: PHYSICAL THERAPY | Facility: CLINIC | Age: 36
End: 2021-01-04
Payer: MEDICARE

## 2021-01-04 DIAGNOSIS — R42 DIZZINESS: Primary | ICD-10-CM

## 2021-01-04 DIAGNOSIS — R26.9 GAIT ABNORMALITY: ICD-10-CM

## 2021-01-04 PROCEDURE — 97116 GAIT TRAINING THERAPY: CPT | Performed by: PHYSICAL THERAPIST

## 2021-01-04 PROCEDURE — 97164 PT RE-EVAL EST PLAN CARE: CPT | Performed by: PHYSICAL THERAPIST

## 2021-01-07 ENCOUNTER — OFFICE VISIT (OUTPATIENT)
Dept: PHYSICAL THERAPY | Facility: CLINIC | Age: 36
End: 2021-01-07
Payer: MEDICARE

## 2021-01-07 DIAGNOSIS — R26.9 GAIT ABNORMALITY: ICD-10-CM

## 2021-01-07 DIAGNOSIS — R42 DIZZINESS: Primary | ICD-10-CM

## 2021-01-07 PROCEDURE — 97116 GAIT TRAINING THERAPY: CPT | Performed by: PHYSICAL THERAPIST

## 2021-01-07 PROCEDURE — 97112 NEUROMUSCULAR REEDUCATION: CPT | Performed by: PHYSICAL THERAPIST

## 2021-01-07 NOTE — PROGRESS NOTES
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  Demonstrated good gait pattern with focus on walking on treadmill  Difficulty with gaze stability, requiring cues to slow head movents down, reports increased dizziness  In the middle of vertical VOR patient had to stop due to increase symptoms and headache, noting he felt like "Things" could happen, noting this is how he felt when he had his last seizure  Patient was brought water, assisted to mat table with wheelchair and was transferred with CS x 2 to supine positions  Patient was observed/monitored for the rest of the session  Patients wife contacted and was present for end of session  She mentions he has moments where he "freezes and Zones out" with increased shaking  Patient able to sit up on edge of mat table independently, upon sitting up it was noted that shaking reduced, patient with improved tolerance to upright position  Eyes appeared dialated B/L    Patient and wife concerned with diagnosis and treatment course recommended by previous neurologist, would like to follow up with [facility] neurologist for second opinion  PT to help coordinate this  They are agreeable for continuing with therapy, would like to assess his back for possibility of pinched nerve  Plan: Continue per plan of care  increase to patient tolerance        Short Term Goal Expiration Date:(4 weeks)  Long Term Goal Expiration Date: (8 weeks 12 weeks)  POC Expiration Date: (812 weeks)           Precautions: Fall risk, history of seizures        Manuals 01/07                                       Neuro Re-Ed         VOR x 1  Seated plain   H 30"  D 6-7/10  V 30"  Discontinue at this time      Walking Ht/HN          360 turns        Side stepping        Backwards walking                         Ther Ex        STS        Step ups                                                         Ther Activity                        Gait Training        treadmill Focus on step length and heel toe pattern     1 8 mph  4 min  4min 15"  (at 3:40" felt like leg was getting weak and going numb)     Total: min                Modalities

## 2021-01-11 ENCOUNTER — OFFICE VISIT (OUTPATIENT)
Dept: CARDIOLOGY CLINIC | Facility: CLINIC | Age: 36
End: 2021-01-11
Payer: MEDICARE

## 2021-01-11 VITALS
HEART RATE: 98 BPM | BODY MASS INDEX: 30.32 KG/M2 | SYSTOLIC BLOOD PRESSURE: 108 MMHG | WEIGHT: 182 LBS | HEIGHT: 65 IN | OXYGEN SATURATION: 99 % | DIASTOLIC BLOOD PRESSURE: 76 MMHG

## 2021-01-11 DIAGNOSIS — R07.9 CHEST PAIN, UNSPECIFIED TYPE: Primary | ICD-10-CM

## 2021-01-11 DIAGNOSIS — R00.2 PALPITATION: ICD-10-CM

## 2021-01-11 PROCEDURE — 93000 ELECTROCARDIOGRAM COMPLETE: CPT | Performed by: INTERNAL MEDICINE

## 2021-01-11 PROCEDURE — 99204 OFFICE O/P NEW MOD 45 MIN: CPT | Performed by: INTERNAL MEDICINE

## 2021-01-11 NOTE — PATIENT INSTRUCTIONS
The patient will be scheduled for 1 week Holter monitor and he is already scheduled for exercise echocardiogram   The patient will continue his present medications for now

## 2021-01-11 NOTE — PROGRESS NOTES
Assessment/Plan:    Palpitation   The patient describes symptoms of palpitation and racing sensation in the chest   I will arrange for the patient to have a one-week Holter monitor  Chest pain    The patient describes the atypical chest pain This could be musculoskeletal in origin however the possibility of angina cannot be totally excluded  The patient is already scheduled for exercise echocardiogram        Diagnoses and all orders for this visit:    Chest pain, unspecified type  -     POCT ECG  -     AMB extended holter monitor; Future    Palpitation  -     AMB extended holter monitor; Future          Subjective:   Chest pain with palpitation and shortness of breath  Patient ID: Bere Skinner is a 38 y o  male  Patient presented to this office for the purpose of cardiac evaluation and consultation  The patient has been complaining of recurrent episodes chest pain since September of 2020  Chest pain is described as being sharp in nature and located on the left side of the chest   This is often associated with numbness in the left shoulder  At times it is associated with symptoms of palpitation and racing sensation in the chest   Pain typically lasts for 5-20 minutes and sometimes longer  It is not related to exertion  It resolves spontaneously  Patient also has some associated symptoms of shortness of breath well  The patient describes symptoms of palpitation racing sensation which is not necessarily related to chest pain  It is at times associated with lightheadedness but the patient has never experienced syncope  He denies any leg edema  The patient was diagnosed with seizure disorder that was considered to be related to stress  For the most part his seizure has been stable since October 2020  Patient is been treated for  Hyperlipidemia and diabetes mellitus  He has also been treated for PTSD  The patient quit drinking alcohol 13 years ago  He never smoked    His family history significant for his grandfather and great grandfather having early heart attacks  His father has no heart disease at the age of 61  The following portions of the patient's history were reviewed and updated as appropriate: allergies, current medications, past family history, past medical history, past social history, past surgical history and problem list     Review of Systems   Constitutional: Positive for fatigue  Respiratory: Positive for shortness of breath  Negative for apnea, cough, chest tightness and wheezing  Cardiovascular: Positive for chest pain and palpitations  Negative for leg swelling  Gastrointestinal: Negative for abdominal pain  Neurological: Positive for dizziness and light-headedness  Psychiatric/Behavioral: The patient is nervous/anxious  Objective:  Stable cardiac-wise  /76 (BP Location: Left arm, Patient Position: Sitting, Cuff Size: Standard)   Pulse 98   Ht 5' 5" (1 651 m)   Wt 82 6 kg (182 lb)   SpO2 99%   BMI 30 29 kg/m²          Physical Exam  Vitals signs reviewed  Constitutional:       General: He is not in acute distress  Appearance: He is well-developed  He is not diaphoretic  HENT:      Head: Normocephalic  Eyes:      Pupils: Pupils are equal, round, and reactive to light  Neck:      Musculoskeletal: Normal range of motion  Thyroid: No thyromegaly  Vascular: No JVD  Cardiovascular:      Rate and Rhythm: Normal rate and regular rhythm  Heart sounds: S1 normal and S2 normal  No murmur  No systolic murmur  No friction rub  No gallop  Pulmonary:      Effort: Pulmonary effort is normal  No respiratory distress  Breath sounds: Normal breath sounds  No wheezing or rales  Chest:      Chest wall: No tenderness  Abdominal:      Palpations: Abdomen is soft  Musculoskeletal: Normal range of motion  General: No tenderness or deformity  Right lower leg: No edema  Left lower leg: No edema  Skin:     General: Skin is warm and dry  Neurological:      Mental Status: He is alert and oriented to person, place, and time     Psychiatric:         Mood and Affect: Mood normal          Behavior: Behavior normal

## 2021-01-11 NOTE — LETTER
January 11, 2021     Argentina Vasquez MD  [address] [place] [address]    Patient: Selvin Neumann   YOB: 1985   Date of Visit: 1/11/2021       Dear Dr Fontana Scale: Thank you for referring Kristen Gomez to me for evaluation  Below are my notes for this consultation  If you have questions, please do not hesitate to call me  I look forward to following your patient along with you  Sincerely,        Dom Moya MD        CC: No Recipients  Dom Moya MD  1/11/2021  3:34 PM  Sign when Signing Visit  Assessment/Plan:    Palpitation   The patient describes symptoms of palpitation and racing sensation in the chest   I will arrange for the patient to have a one-week Holter monitor  Chest pain    The patient describes the atypical chest pain This could be musculoskeletal in origin however the possibility of angina cannot be totally excluded  The patient is already scheduled for exercise echocardiogram        Diagnoses and all orders for this visit:    Chest pain, unspecified type  -     POCT ECG  -     AMB extended holter monitor; Future    Palpitation  -     AMB extended holter monitor; Future          Subjective:   Chest pain with palpitation and shortness of breath  Patient ID: Selvin Neumann is a 38 y o  male  Patient presented to this office for the purpose of cardiac evaluation and consultation  The patient has been complaining of recurrent episodes chest pain since September of 2020  Chest pain is described as being sharp in nature and located on the left side of the chest   This is often associated with numbness in the left shoulder  At times it is associated with symptoms of palpitation and racing sensation in the chest   Pain typically lasts for 5-20 minutes and sometimes longer  It is not related to exertion  It resolves spontaneously  Patient also has some associated symptoms of shortness of breath well    The patient describes symptoms of palpitation racing sensation which is not necessarily related to chest pain  It is at times associated with lightheadedness but the patient has never experienced syncope  He denies any leg edema  The patient was diagnosed with seizure disorder that was considered to be related to stress  For the most part his seizure has been stable since October 2020  Patient is been treated for  Hyperlipidemia and diabetes mellitus  He has also been treated for PTSD  The patient quit drinking alcohol 13 years ago  He never smoked  His family history significant for his grandfather and great grandfather having early heart attacks  His father has no heart disease at the age of 61  The following portions of the patient's history were reviewed and updated as appropriate: allergies, current medications, past family history, past medical history, past social history, past surgical history and problem list     Review of Systems   Constitutional: Positive for fatigue  Respiratory: Positive for shortness of breath  Negative for apnea, cough, chest tightness and wheezing  Cardiovascular: Positive for chest pain and palpitations  Negative for leg swelling  Gastrointestinal: Negative for abdominal pain  Neurological: Positive for dizziness and light-headedness  Psychiatric/Behavioral: The patient is nervous/anxious  Objective:  Stable cardiac-wise  /76 (BP Location: Left arm, Patient Position: Sitting, Cuff Size: Standard)   Pulse 98   Ht 5' 5" (1 651 m)   Wt 82 6 kg (182 lb)   SpO2 99%   BMI 30 29 kg/m²          Physical Exam  Vitals signs reviewed  Constitutional:       General: He is not in acute distress  Appearance: He is well-developed  He is not diaphoretic  HENT:      Head: Normocephalic  Eyes:      Pupils: Pupils are equal, round, and reactive to light  Neck:      Musculoskeletal: Normal range of motion  Thyroid: No thyromegaly  Vascular: No JVD     Cardiovascular:      Rate and Rhythm: Normal rate and regular rhythm  Heart sounds: S1 normal and S2 normal  No murmur  No systolic murmur  No friction rub  No gallop  Pulmonary:      Effort: Pulmonary effort is normal  No respiratory distress  Breath sounds: Normal breath sounds  No wheezing or rales  Chest:      Chest wall: No tenderness  Abdominal:      Palpations: Abdomen is soft  Musculoskeletal: Normal range of motion  General: No tenderness or deformity  Right lower leg: No edema  Left lower leg: No edema  Skin:     General: Skin is warm and dry  Neurological:      Mental Status: He is alert and oriented to person, place, and time     Psychiatric:         Mood and Affect: Mood normal          Behavior: Behavior normal

## 2021-01-11 NOTE — ASSESSMENT & PLAN NOTE
The patient describes symptoms of palpitation and racing sensation in the chest   I will arrange for the patient to have a one-week Holter monitor

## 2021-01-11 NOTE — ASSESSMENT & PLAN NOTE
The patient describes the atypical chest pain This could be musculoskeletal in origin however the possibility of angina cannot be totally excluded    The patient is already scheduled for exercise echocardiogram

## 2021-01-12 ENCOUNTER — OFFICE VISIT (OUTPATIENT)
Dept: PHYSICAL THERAPY | Facility: CLINIC | Age: 36
End: 2021-01-12
Payer: MEDICARE

## 2021-01-12 ENCOUNTER — OFFICE VISIT (OUTPATIENT)
Dept: INTERNAL MEDICINE CLINIC | Facility: CLINIC | Age: 36
End: 2021-01-12
Payer: MEDICARE

## 2021-01-12 VITALS
OXYGEN SATURATION: 96 % | DIASTOLIC BLOOD PRESSURE: 74 MMHG | SYSTOLIC BLOOD PRESSURE: 112 MMHG | HEIGHT: 65 IN | TEMPERATURE: 98.1 F | BODY MASS INDEX: 29.99 KG/M2 | HEART RATE: 89 BPM | RESPIRATION RATE: 18 BRPM | WEIGHT: 180 LBS

## 2021-01-12 DIAGNOSIS — R56.9 SEIZURE-LIKE ACTIVITY (HCC): Primary | ICD-10-CM

## 2021-01-12 DIAGNOSIS — E11.9 TYPE 2 DIABETES MELLITUS WITHOUT COMPLICATION, WITH LONG-TERM CURRENT USE OF INSULIN (HCC): ICD-10-CM

## 2021-01-12 DIAGNOSIS — L30.9 DERMATITIS: ICD-10-CM

## 2021-01-12 DIAGNOSIS — R42 DIZZINESS: Primary | ICD-10-CM

## 2021-01-12 DIAGNOSIS — Z79.4 TYPE 2 DIABETES MELLITUS WITHOUT COMPLICATION, WITH LONG-TERM CURRENT USE OF INSULIN (HCC): ICD-10-CM

## 2021-01-12 DIAGNOSIS — R53.82 CHRONIC FATIGUE: ICD-10-CM

## 2021-01-12 DIAGNOSIS — E78.2 MIXED HYPERLIPIDEMIA: ICD-10-CM

## 2021-01-12 DIAGNOSIS — R26.9 GAIT ABNORMALITY: ICD-10-CM

## 2021-01-12 PROCEDURE — 97110 THERAPEUTIC EXERCISES: CPT | Performed by: PHYSICAL THERAPIST

## 2021-01-12 PROCEDURE — 99214 OFFICE O/P EST MOD 30 MIN: CPT | Performed by: INTERNAL MEDICINE

## 2021-01-12 PROCEDURE — 97112 NEUROMUSCULAR REEDUCATION: CPT | Performed by: PHYSICAL THERAPIST

## 2021-01-12 PROCEDURE — 97150 GROUP THERAPEUTIC PROCEDURES: CPT | Performed by: PHYSICAL THERAPIST

## 2021-01-12 RX ORDER — CLOBETASOL PROPIONATE 0.5 MG/G
CREAM TOPICAL 2 TIMES DAILY
Qty: 60 G | Refills: 0 | Status: SHIPPED | OUTPATIENT
Start: 2021-01-12 | End: 2021-03-25 | Stop reason: HOSPADM

## 2021-01-12 NOTE — PROGRESS NOTES
Daily Note     Today's date: 2021  Patient name: Sergei Bingham  : 1985  MRN: [id number]  Referring provider: Hilda Gruber MD  Dx:   Encounter Diagnosis     ICD-10-CM    1  Dizziness  R42    2  Gait abnormality  R26 9                   Subjective:Has had a had time recovering after last visit, feeling tired and with low energey  Was able to do some house work, but then was fatigued for the rest of the day  Remains with complaints of back pain  Back pain 8/10  Dizziness: hasn't had a lot today - noted he was moving around a lot today   Saw cardiologist - per patient he noted an irregular heartbeat - is to get set up with a heart monitor  Has follow up with [facility] neurology 2021  Needs to cancel  appointment due to transportation issues  Objective: See treatment diary below  4:28pm   BP supine: 128/78  99 o2  HR 99    Upon sittin:35p symptoms of lightheadedness  BP:126/81    seated 4:42p  /88    Grouped from 04:05 - 04:15 = 10 min  Remainder or session 1:1w/PT  Assessment: increased left foot pain with repeated left KTC, although still painful, less back pain noted with prone on elbows  Pressure in forehead after supine head turns  , after second round feels with headache and nausea  Mid session patinet reports tightness in chest on right side, lessening with rest  Decreased tolerance to LTR with repeated rest noting he was unable to complete last 4 through full range of motion  Minimal tolerance to PT today  Advised patient to move within tolerance, but to keep up with exercises as able  Plan: Continue per plan of care  increase as patient tolerance        Short Term Goal Expiration Date:(4 weeks)  Long Term Goal Expiration Date: (8 weeks 12 weeks)  POC Expiration Date: (812 weeks)           Precautions: Fall risk, history of seizures        Manuals                                       Neuro Re-Ed         VOR x 1  Seated plain   H 30"  D 6-7/10  V 30"  Discontinue at this time      Walking Ht/HN          360 turns        Side stepping        Backwards walking         habituation  Supine  head turns 30" x 2     Seated HT 30"x   Seated HN  30"              Ther Ex        STS        Step ups         supine  Alt KTC  15"  5x ea    LTR:to fatigue  02 99%  HR 88  BP: 129/78      prone  On elbows   5" hold 10x                                       Ther Activity                        Gait Training        treadmill Focus on step length and heel toe pattern     1 8 mph  4 min  4min 15"  (at 3:40" felt like leg was getting weak and going numb)     Total: min                Modalities

## 2021-01-12 NOTE — PROGRESS NOTES
Assessment/Plan:    1  Seizure-like activity  Patient wanted referral for neurology for 2nd opinion  Presently he is under the care of [facility]  Will refer to [facility] for 2nd opinion    2  Dermatitis over anterior abdomen wall  Will start patient on clobetasol cream to be applied locally for 2 weeks    3  Arthralgia rule out rheumatoid arthritis  Will request rheumatology workup    4  Type 2 diabetes mellitus  Will continue with present regimen  Will request hemoglobin A1c before next visit      Diagnoses and all orders for this visit:    Seizure-like activity [facility])  -     Ambulatory referral to Neurology; Future    Mixed hyperlipidemia    Type 2 diabetes mellitus without complication, with long-term current use of insulin ([facility])  -     CBC; Future  -     Comprehensive metabolic panel; Future  -     Hemoglobin A1C; Future    Dermatitis  -     clobetasol (TEMOVATE) 0 05 % cream; Apply topically 2 (two) times a day    Chronic fatigue  -     C-reactive protein; Future  -     Cyclic citrul peptide antibody, IgG; Future  -     RF Screen w/ Reflex to Titer; Future          BMI Counseling: Body mass index is 29 95 kg/m²  The BMI is above normal  Nutrition recommendations include decreasing portion sizes, encouraging healthy choices of fruits and vegetables, decreasing fast food intake, consuming healthier snacks and limiting drinks that contain sugar  Exercise recommendations include vigorous physical activity 75 minutes/week  No pharmacotherapy was ordered  Subjective:          Patient ID: Rachel Robison is a 38 y o  male  Patient is here in the office for request referral to neurology for 2nd opinion  Also complaining of rash over anterior abdominal wall        The following portions of the patient's history were reviewed and updated as appropriate: allergies, current medications, past family history, past medical history, past social history, past surgical history and problem list     Review of Systems   Constitutional: Negative for fatigue and fever  HENT: Negative for congestion, ear discharge, ear pain, postnasal drip, sinus pressure, sore throat, tinnitus and trouble swallowing  Eyes: Negative for discharge, itching and visual disturbance  Respiratory: Negative for cough and shortness of breath  Cardiovascular: Negative for chest pain and palpitations  Gastrointestinal: Negative for abdominal pain, diarrhea, nausea and vomiting  Endocrine: Negative for cold intolerance and polyuria  Genitourinary: Negative for difficulty urinating, dysuria and urgency  Musculoskeletal: Positive for arthralgias  Negative for neck pain  Skin: Negative for rash  Allergic/Immunologic: Negative for environmental allergies  Neurological: Negative for dizziness, weakness and headaches  Psychiatric/Behavioral: Negative for agitation and behavioral problems  The patient is not nervous/anxious  Past Medical History:   Diagnosis Date    Diabetes mellitus ([facility] [address] )     type 2    Disease of thyroid gland     hypothyroidism    Hyperlipidemia     Hypertension     Psychiatric disorder     PTSD   Anxiety, depression,     Seizures ([facility])          Current Outpatient Medications:     Alogliptin Benzoate 25 MG TABS, Take 1 tablet by mouth daily, Disp: , Rfl:     ALPRAZolam (XANAX) 0 5 mg tablet, Take 0 5 mg by mouth Three times daily as needed, Disp: , Rfl:     cloNIDine (CATAPRES) 0 2 mg tablet, Take 0 1 mg by mouth daily , Disp: , Rfl:     divalproex sodium (DEPAKOTE) 500 mg EC tablet, Take 500 mg by mouth every 12 (twelve) hours , Disp: , Rfl:     Empagliflozin (Jardiance) 25 MG TABS, Take 1 tablet by mouth daily, Disp: , Rfl:     gabapentin (NEURONTIN) 300 mg capsule, Take 300 mg by mouth Three times a day, Disp: , Rfl:     insulin glargine (LANTUS SOLOSTAR) 100 units/mL injection pen, Inject 24 Units under the skin daily, Disp: , Rfl:     levothyroxine 25 mcg tablet, Take 25 mcg by mouth daily, Disp: , Rfl:     loratadine (CLARITIN) 10 mg tablet, Take 10 mg by mouth daily, Disp: , Rfl:     meloxicam (MOBIC) 15 mg tablet, , Disp: , Rfl:     metFORMIN (GLUCOPHAGE) 1000 MG tablet, Take 1,000 mg by mouth daily , Disp: , Rfl:     mupirocin (BACTROBAN) 2 % ointment, Apply topically 3 (three) times a day, Disp: 30 g, Rfl: 3    OMEGA-3 FATTY ACIDS PO, Take 2 g by mouth daily, Disp: , Rfl:     pravastatin (PRAVACHOL) 40 mg tablet, Take 40 mg by mouth daily, Disp: , Rfl:     sildenafil (VIAGRA) 100 mg tablet, Take 100 mg by mouth as needed for erectile dysfunction , Disp: , Rfl:     traZODone (DESYREL) 100 mg tablet, Take 200 mg by mouth, Disp: , Rfl:     venlafaxine (EFFEXOR-XR) 150 mg 24 hr capsule, TAKE 1 CAPSULE BY MOUTH EVERY MORNING FOR MOOD, Disp: , Rfl:     venlafaxine (EFFEXOR-XR) 75 mg 24 hr capsule, TAKE 1 CAPSULE BY MOUTH ONCE DAILY IN ADDITION TO A 150MG CAPSULE FOR A TOTAL DAILY DOSE OF 225MG DAILY, Disp: , Rfl:     clobetasol (TEMOVATE) 0 05 % cream, Apply topically 2 (two) times a day, Disp: 60 g, Rfl: 0    hydrocortisone (ANUSOL-HC) 25 mg suppository, Insert 1 suppository (25 mg total) into the rectum 2 (two) times a day (Patient not taking: Reported on 1/11/2021), Disp: 30 suppository, Rfl: 1    Allergies   Allergen Reactions    Lamotrigine GI Intolerance    Simvastatin Other (See Comments)     Elevated liver enzymes  Liver enzyme elevation    Niacin Rash       Social History   Past Surgical History:   Procedure Laterality Date    WISDOM TOOTH EXTRACTION       Family History   Problem Relation Age of Onset    Hyperlipidemia Father     Heart attack Paternal Grandfather        Objective:  /74 (BP Location: Left arm, Patient Position: Sitting, Cuff Size: Standard)   Pulse 89   Temp 98 1 °F (36 7 °C) (Tympanic)   Resp 18   Ht 5' 5" (1 651 m)   Wt 81 6 kg (180 lb)   SpO2 96%   BMI 29 95 kg/m²   Body mass index is 29 95 kg/m²  Physical Exam  Constitutional:       Appearance: He is well-developed  HENT:      Head: Normocephalic  Right Ear: External ear normal       Left Ear: External ear normal    Eyes:      General: No scleral icterus  Pupils: Pupils are equal, round, and reactive to light  Neck:      Musculoskeletal: Normal range of motion and neck supple  Thyroid: No thyromegaly  Trachea: No tracheal deviation  Cardiovascular:      Rate and Rhythm: Normal rate and regular rhythm  Heart sounds: Normal heart sounds  Pulmonary:      Effort: Pulmonary effort is normal  No respiratory distress  Breath sounds: Normal breath sounds  Chest:      Chest wall: No tenderness  Abdominal:      General: Bowel sounds are normal       Palpations: Abdomen is soft  There is no mass  Tenderness: There is no abdominal tenderness  Musculoskeletal: Normal range of motion  Right lower leg: No edema  Left lower leg: No edema  Lymphadenopathy:      Cervical: No cervical adenopathy  Skin:     General: Skin is warm  Findings: Rash present  Neurological:      Mental Status: He is alert and oriented to person, place, and time  Cranial Nerves: No cranial nerve deficit     Psychiatric:         Mood and Affect: Mood normal          Behavior: Behavior normal

## 2021-01-14 ENCOUNTER — APPOINTMENT (OUTPATIENT)
Dept: PHYSICAL THERAPY | Facility: CLINIC | Age: 36
End: 2021-01-14
Payer: MEDICARE

## 2021-01-18 ENCOUNTER — TELEPHONE (OUTPATIENT)
Dept: NON INVASIVE DIAGNOSTICS | Facility: CLINIC | Age: 36
End: 2021-01-18

## 2021-01-18 ENCOUNTER — HOSPITAL ENCOUNTER (OUTPATIENT)
Dept: NON INVASIVE DIAGNOSTICS | Facility: CLINIC | Age: 36
Discharge: HOME | End: 2021-01-18
Payer: MEDICARE

## 2021-01-18 DIAGNOSIS — R07.89 ATYPICAL CHEST PAIN: ICD-10-CM

## 2021-01-18 PROCEDURE — 93350 STRESS TTE ONLY: CPT

## 2021-01-18 PROCEDURE — 93351 STRESS TTE COMPLETE: CPT | Performed by: INTERNAL MEDICINE

## 2021-01-18 NOTE — PROGRESS NOTES
Daily Note     Today's date: 2021  Patient name: Nikko Long  : 1985  MRN: [id number]  Referring provider: Raheem Ordoñez MD  Dx:   Encounter Diagnosis     ICD-10-CM    1  Dizziness  R42    2  Gait abnormality  R26 9                   Subjective:Started wearing halter monitor last night, was told not to do any strenuous activity  Will be taking halter monirot off next Tuesday  Was somewhat complaint with Hep noting he was busy and didn't get to do them as much as he wanted  Objective: See treatment diary below      Assessment: better tolerance with supine exercises performed exercises to tolerance  Pressure above eyes noted after sit to stand  Most exercise performed to patient tolerance, require seated rest breaks after each  One instance of "heart pain" requiring him to turn on his heart monitor- able to check rhythm and noted he was okay  Noted increased back pain with walking declined another round as he didn't want his back to flair up more  Reviewed proper gait pattern with patient and advised continued practice at home  Plan: Continue per plan of care  increase as patient tolerance        Short Term Goal Expiration Date:(4 weeks)  Long Term Goal Expiration Date: (8 weeks 12 weeks)  POC Expiration Date: (812 weeks)           Precautions: Fall risk, history of seizures        Manuals                                      Neuro Re-Ed         VOR x 1  Seated plain   H 30"  D 6-7/10  V 30"  Discontinue at this time      Walking Ht/HN          360 turns        Side stepping        Backwards walking         habituation  Supine  head turns 30" x 2     Seated HT 30"x   Seated HN  30" Seated   Seated HT 10x ea  Seated HN  10xea              Ther Ex        STS        Step ups         supine  Alt KTC  15"  5x ea    LTR:to fatigue  02 99%  HR 88  BP: 129/78 Alt KTC  15"  5x ea    LTR to fatigue    Bridges x 7     prone  On elbows   5" hold 10x  On elbows   5" hold 5x Ther Activity        STS   6x              Gait Training        treadmill Focus on step length and heel toe pattern     1 8 mph  4 min  4min 15"  (at 3:40" felt like leg was getting weak and going numb)     Total: min   Focus on step length and heel toe pattern   SSS is slow  x40ft  Faster speed   40ft x 2              Modalities

## 2021-01-19 ENCOUNTER — OFFICE VISIT (OUTPATIENT)
Dept: PHYSICAL THERAPY | Facility: CLINIC | Age: 36
End: 2021-01-19
Payer: MEDICARE

## 2021-01-19 DIAGNOSIS — R26.9 GAIT ABNORMALITY: ICD-10-CM

## 2021-01-19 DIAGNOSIS — R42 DIZZINESS: Primary | ICD-10-CM

## 2021-01-19 LAB
CHEST PAIN STATEMENT: NORMAL
MAX DIASTOLIC BP: 76 MMHG
MAX HEART RATE: 125 BPM
MAX PREDICTED HEART RATE: 184 BPM
MAX. SYSTOLIC BP: 110 MMHG
PROTOCOL NAME: NORMAL
REASON FOR TERMINATION: NORMAL
TARGET HR FORMULA: NORMAL
TEST INDICATION: NORMAL
TIME IN EXERCISE PHASE: NORMAL

## 2021-01-19 PROCEDURE — 97112 NEUROMUSCULAR REEDUCATION: CPT | Performed by: PHYSICAL THERAPIST

## 2021-01-19 PROCEDURE — 97110 THERAPEUTIC EXERCISES: CPT | Performed by: PHYSICAL THERAPIST

## 2021-01-19 PROCEDURE — 97116 GAIT TRAINING THERAPY: CPT | Performed by: PHYSICAL THERAPIST

## 2021-01-20 ENCOUNTER — OFFICE VISIT (OUTPATIENT)
Dept: NEUROLOGY | Facility: CLINIC | Age: 36
End: 2021-01-20
Payer: MEDICARE

## 2021-01-20 VITALS
DIASTOLIC BLOOD PRESSURE: 80 MMHG | SYSTOLIC BLOOD PRESSURE: 120 MMHG | HEIGHT: 65 IN | BODY MASS INDEX: 30.16 KG/M2 | WEIGHT: 181 LBS | HEART RATE: 90 BPM

## 2021-01-20 DIAGNOSIS — F43.10 PTSD (POST-TRAUMATIC STRESS DISORDER): ICD-10-CM

## 2021-01-20 DIAGNOSIS — F44.5 PSYCHOGENIC NONEPILEPTIC SEIZURE: Primary | ICD-10-CM

## 2021-01-20 PROCEDURE — 99214 OFFICE O/P EST MOD 30 MIN: CPT | Performed by: PSYCHIATRY & NEUROLOGY

## 2021-01-20 NOTE — PROGRESS NOTES
[place] [address] [facility]  Follow Up Visit    Impression/Plan    Mr Christian Paul is a 38 y o    with PTSD, anxiety and psychogenic nonepileptic spells  Diagnosis confirmed in Epilepsy Monitoring Unit in 10/2020 at [facility]  Discussed diagnosis in detail, including potential drivers (PTSD, anxiety)  I encouraged more frequent visits with a mental health counselor  Consider mindfulness/meditation with guidance from a professional (in setting of prior trauma)  Patient Instructions   1  Send us contact information for your counselors  2  Return in about 3 months  Diagnoses and all orders for this visit:    Psychogenic nonepileptic seizure    PTSD (post-traumatic stress disorder)        Martha Donahue is returning to the [place] [address] [facility] for follow up  Intake visit was 10/1/2020  Also followed by [address] [facility] neurology, but wishes to transition care to  today  Interval Events:   [facility] and multiple psychogenic nonepileptic events captured on VEEG in 10/2020  Interictal EEG normal  Head shaking, inability to speak, but retained ability to follow commands  There were five events  One was lasted 19 minutes (off and on) and involved shaking of the head/arms/legs and a period of unresponsiveness  Continues to have events  Counselor: Belén Levin at the [place] [facility], usually once per month  Also has a counselor at [facility], usually once per month  He is supposed to get setup with psychiatry at [facility], previously saw Dr Lisa Méndez  Objective    /80 (BP Location: Left arm, Patient Position: Sitting, Cuff Size: Large)   Pulse 90   Ht 5' 5" (1 651 m)   Wt 82 1 kg (181 lb)   BMI 30 12 kg/m²      General Exam  No acute distress  Neurologic Exam  Mental Status:  Alert and oriented x 3  Language: normal fluency and comprehension  Cranial Nerves:   No dysarthria  Gait: Normal casual gait         ROS:    Review of Systems   Constitutional: Positive for fatigue  Negative for appetite change and fever  HENT: Negative  Negative for hearing loss, tinnitus, trouble swallowing and voice change  Eyes: Positive for visual disturbance  Negative for photophobia and pain  Respiratory: Negative  Negative for shortness of breath  Cardiovascular: Negative  Negative for palpitations  Gastrointestinal: Positive for nausea  Negative for vomiting  Endocrine: Negative  Negative for cold intolerance  Genitourinary: Negative  Negative for dysuria, frequency and urgency  Musculoskeletal: Negative  Negative for myalgias and neck pain  Skin: Negative  Negative for rash  Neurological: Positive for dizziness, weakness (Bodily), light-headedness and numbness (Left side)  Negative for tremors, seizures, syncope, facial asymmetry, speech difficulty and headaches  Hematological: Negative  Does not bruise/bleed easily  Psychiatric/Behavioral: Positive for confusion and sleep disturbance  Negative for hallucinations  Total time spent on day of encounter: 35 minutes  The time was spent reviewing testing, obtaining/reviewing history, performing an exam, counseling/educating, ordering medication/tests/procedures, referring/communicating with other healthcare providers, documenting clinical information in the EMR, independently interpreting EEG/imaging results, and/ or coordinating care

## 2021-01-21 ENCOUNTER — TELEPHONE (OUTPATIENT)
Dept: NEUROLOGY | Facility: CLINIC | Age: 36
End: 2021-01-21

## 2021-01-21 ENCOUNTER — OFFICE VISIT (OUTPATIENT)
Dept: PHYSICAL THERAPY | Facility: CLINIC | Age: 36
End: 2021-01-21
Payer: MEDICARE

## 2021-01-21 DIAGNOSIS — R42 DIZZINESS: Primary | ICD-10-CM

## 2021-01-21 DIAGNOSIS — R26.9 GAIT ABNORMALITY: ICD-10-CM

## 2021-01-21 PROCEDURE — 97110 THERAPEUTIC EXERCISES: CPT | Performed by: PHYSICAL THERAPIST

## 2021-01-21 PROCEDURE — 97112 NEUROMUSCULAR REEDUCATION: CPT | Performed by: PHYSICAL THERAPIST

## 2021-01-21 NOTE — TELEPHONE ENCOUNTER
Pt called and states that he just found out today when he was getting his records from [facility] that he had a heart attack when he was in the hospital  He was told that he had abnormal EKG  He would like to discuss this w/ Dr Gene Toledo  Advised pt to call his cardiology  Wife Reji Sanchez got on the phone and states that they were talking w/ Dr Gene Toledo yesterday about other issues  States that pt's current issues might be related to his heart, not neurological  Pt would like to speak directly w/ Dr Gene Toledo  Pt's number is [phone]     Dr Gene Toledo, do you want me to schedule virtual telephone appt?

## 2021-01-21 NOTE — PROGRESS NOTES
Daily Note     Today's date: 2021  Patient name: Alona Alexander  : 1985  MRN: [id number]  Referring provider: Jovana Oconnell MD  Dx:   Encounter Diagnosis     ICD-10-CM    1  Dizziness  R42    2  Gait abnormality  R26 9                   Subjective: hasn't been doing much exercise at home as he ha been helping his kids with school work  Followed up with neurologist who continues to suggest dx of PNES  Objective: See treatment diary below      Assessment: tried the bike at low resistance to try and increase tolerance to activities  Patient unable to completed 10 minutes noting he was afraid he would set off another seizure  Required supine rest breaks, no issues with Heart rate or BP  patient requested to end session early due to decreased tolerance  educated patient in importance of habituation exercises and deep breathing activities to help reduce anxiety and dizziness  Advised patient that he can work through some symptoms and should vs avoiding activity altogether, patient verbalized understanding  Plan: Continue per plan of care  increase as patient tolerance          Short Term Goal Expiration Date:(4 weeks)  Long Term Goal Expiration Date: (8 weeks 12 weeks)  POC Expiration Date: (812 weeks)           Precautions: Fall risk, history of seizures        Manuals                                     Neuro Re-Ed         VOR x 1  Seated plain   H 30"  D 6-7/10  V 30"  Discontinue at this time      Walking Ht/HN          360 turns        Side stepping        Backwards walking         habituation  Supine  head turns 30" x 2     Seated HT 30"x   Seated HN  30" Seated   Seated HT 10x ea  Seated HN  10xea  Seated   Seated HT 10x ea  Seated HN  10xea             Ther Ex        STS        Step ups         supine  Alt KTC  15"  5x ea    LTR:to fatigue  02 99%  HR 88  BP: 129/78 Alt KTC  15"  5x ea    LTR to fatigue    Bridges x 7     prone  On elbows   5" hold 10x  On elbows   5" hold 5x      Bike    L1   72 miles  Hr 113  O2 08%  Rest break  HA 8-/10 on right side     3 min  HA 7/10  SOB and light headed  Left side   90BPM  O2: 98%                              Ther Activity        STS   6x              Gait Training        treadmill Focus on step length and heel toe pattern     1 8 mph  4 min  4min 15"  (at 3:40" felt like leg was getting weak and going numb)     Total: min   Focus on step length and heel toe pattern   SSS is slow  x40ft  Faster speed   40ft x 2              Modalities

## 2021-01-26 ENCOUNTER — APPOINTMENT (OUTPATIENT)
Dept: PHYSICAL THERAPY | Facility: CLINIC | Age: 36
End: 2021-01-26
Payer: MEDICARE

## 2021-01-28 ENCOUNTER — OFFICE VISIT (OUTPATIENT)
Dept: PHYSICAL THERAPY | Facility: CLINIC | Age: 36
End: 2021-01-28
Payer: MEDICARE

## 2021-01-28 DIAGNOSIS — R42 DIZZINESS: Primary | ICD-10-CM

## 2021-01-28 DIAGNOSIS — R26.9 GAIT ABNORMALITY: ICD-10-CM

## 2021-01-28 PROCEDURE — 97112 NEUROMUSCULAR REEDUCATION: CPT | Performed by: PHYSICAL THERAPIST

## 2021-01-28 PROCEDURE — 97110 THERAPEUTIC EXERCISES: CPT | Performed by: PHYSICAL THERAPIST

## 2021-01-28 NOTE — PROGRESS NOTES
Daily Note     Today's date: 2021  Patient name: Rufino Oh  : 1985  MRN: [id number]  Referring provider: Zoraida Khan MD  Dx:   Encounter Diagnosis     ICD-10-CM    1  Dizziness  R42    2  Gait abnormality  R26 9        Start Time: 1000          Subjective: cancelled other appointment this week due to snow  Reprots having a few days of not feeling well, was exhausted and being sore  Was on his for a long time/couple of hours cutting wood for his wife for crafting  Questions what and infarct is, noted he found in records he had one, but doesn't know if he was treated for it  Reports he brought it up to his [facility] doctors per patient are noting he did not have one - noting now he cannot find it in his [facility] records  No longer wearing his heart monitor, sent it back, will be expectiing results in a few weeks  Objective: See treatment diary below      Assessment: provided patient with basic education on what an infarction, but not how it pertains to his current situation as I did not have enough information to make that assessment  Pressure reported on right side of head after head turns, not a full headache  Notes tropuble with multi-tasking with head nods - noting his wife is noticing this mor at home as well  Still requires seated rest breaks, but Overall tolerated more activity this session  Plan: Continue per plan of care  increase as patient tolerance          Short Term Goal Expiration Date:(4 weeks 2021)  Long Term Goal Expiration Date: (8 weeks 2021 12 weeks)  POC Expiration Date: (12 weeks)           Precautions: Fall risk, history of seizures        Manuals                                    Neuro Re-Ed         VOR x 1  Seated plain   H 30"  D 6-7/10  V 30"  Discontinue at this time      Walking Ht/HN       // bars   4 laps each    360 turns        Side stepping     // bars  4 laps    Backwards walking      // bars   4 laps habituation  Supine  head turns 30" x 2     Seated HT 30"x   Seated HN  30" Seated   Seated HT 10x ea  Seated HN  10xea  Seated   Seated HT 10x ea  Seated HN  10xea             Ther Ex        STS        Step ups         supine  Alt KTC  15"  5x ea    LTR:to fatigue  02 99%  HR 88  BP: 129/78 Alt KTC  15"  5x ea    LTR to fatigue    Bridges x 7     prone  On elbows   5" hold 10x  On elbows   5" hold 5x      Bike    L1   72 miles  Hr 113  O2 08%  Rest break  HA 8-/10 on right side     3 min  HA 7/10  SOB and light headed  Left side   90BPM  O2: 98%   L1  5 min  seaed rest    5 min  Stopped due to L hip pain                           Ther Activity        STS   6x              Gait Training        treadmill Focus on step length and heel toe pattern     1 8 mph  4 min  4min 15"  (at 3:40" felt like leg was getting weak and going numb)     Total: min   Focus on step length and heel toe pattern   SSS is slow  x40ft  Faster speed   40ft x 2              Modalities

## 2021-01-28 NOTE — PROGRESS NOTES
[facility] - Outpatient Consultation  Mari Donahue 38 y o  male MRN: [id number]  Encounter: [id number]      Assessment and Plan    1  Rectal bleeding       ______________________________________________________________________    History of Present Illness  Mari Donahue is a 38 y o  male here for consultation of        Review of Systems      Past Medical History  Past Medical History:   Diagnosis Date    Diabetes mellitus ([address] )     type 2    Disease of thyroid gland     hypothyroidism    Hyperlipidemia     Hypertension     Psychiatric disorder     PTSD   Anxiety, depression,     Seizures (HCC)        Past Social history  Past Surgical History:   Procedure Laterality Date    WISDOM TOOTH EXTRACTION       Social History     Socioeconomic History    Marital status: /Civil Union     Spouse name: Not on file    Number of children: Not on file    Years of education: Not on file    Highest education level: Not on file   Occupational History    Not on file   Social Needs    Financial resource strain: Not on file    Food insecurity     Worry: Not on file     Inability: Not on file   [employer] needs     Medical: Not on file     Non-medical: Not on file   Tobacco Use    Smoking status: Never Smoker    Smokeless tobacco: Never Used   Substance and Sexual Activity    Alcohol use: No    Drug use: No    Sexual activity: Not Currently   Lifestyle    Physical activity     Days per week: Not on file     Minutes per session: Not on file    Stress: Not on file   Relationships    Social connections     Talks on phone: Not on file     Gets together: Not on file     Attends Restoration service: Not on file     Active member of club or organization: Not on file     Attends meetings of clubs or organizations: Not on file     Relationship status: Not on file    Intimate partner violence     Fear of current or ex partner: Not on file     Emotionally abused: Not on file Physically abused: Not on file     Forced sexual activity: Not on file   Other Topics Concern    Not on file   Social History Narrative    Not on file     Social History     Substance and Sexual Activity   Alcohol Use No     Social History     Substance and Sexual Activity   Drug Use No     Social History     Tobacco Use   Smoking Status Never Smoker   Smokeless Tobacco Never Used       Past Family History  Family History   Problem Relation Age of Onset    Hyperlipidemia Father     Heart attack Paternal Grandfather        Current Medications  Current Outpatient Medications   Medication Sig Dispense Refill    Alogliptin Benzoate 25 MG TABS Take 1 tablet by mouth daily      ALPRAZolam (XANAX) 0 5 mg tablet Take 0 5 mg by mouth Three times daily as needed      clobetasol (TEMOVATE) 0 05 % cream Apply topically 2 (two) times a day 60 g 0    cloNIDine (CATAPRES) 0 2 mg tablet Take 0 1 mg by mouth daily       divalproex sodium (DEPAKOTE) 500 mg EC tablet Take 500 mg by mouth every 12 (twelve) hours       Empagliflozin (Jardiance) 25 MG TABS Take 1 tablet by mouth daily      gabapentin (NEURONTIN) 300 mg capsule Take 300 mg by mouth Three times a day      hydrocortisone (ANUSOL-HC) 25 mg suppository Insert 1 suppository (25 mg total) into the rectum 2 (two) times a day (Patient not taking: Reported on 1/11/2021) 30 suppository 1    insulin glargine (LANTUS SOLOSTAR) 100 units/mL injection pen Inject 28 Units under the skin daily       levothyroxine 25 mcg tablet Take 25 mcg by mouth daily      loratadine (CLARITIN) 10 mg tablet Take 10 mg by mouth daily      meloxicam (MOBIC) 15 mg tablet       metFORMIN (GLUCOPHAGE) 1000 MG tablet Take 1,000 mg by mouth daily       mupirocin (BACTROBAN) 2 % ointment Apply topically 3 (three) times a day (Patient not taking: Reported on 1/20/2021) 30 g 3    OMEGA-3 FATTY ACIDS PO Take 2 g by mouth daily      pravastatin (PRAVACHOL) 40 mg tablet Take 40 mg by mouth daily  sildenafil (VIAGRA) 100 mg tablet Take 100 mg by mouth as needed for erectile dysfunction       traZODone (DESYREL) 100 mg tablet Take 200 mg by mouth      venlafaxine (EFFEXOR-XR) 150 mg 24 hr capsule TAKE 1 CAPSULE BY MOUTH EVERY MORNING FOR MOOD      venlafaxine (EFFEXOR-XR) 75 mg 24 hr capsule TAKE 1 CAPSULE BY MOUTH ONCE DAILY IN ADDITION TO A 150MG CAPSULE FOR A TOTAL DAILY DOSE OF 225MG DAILY       No current facility-administered medications for this visit  Allergies  Allergies   Allergen Reactions    Lamotrigine GI Intolerance    Simvastatin Other (See Comments)     Elevated liver enzymes  Liver enzyme elevation    Niacin Rash         The following portions of the patient's history were reviewed and updated as appropriate: allergies, current medications, past medical history, past social history, past surgical history and problem list       Vitals  There were no vitals filed for this visit  Physical Exam  Constitutional   General appearance: Patient is seated and in no acute distress, well appearing and well nourished  Head and Face   Head and face: Normal     Eyes   Conjunctiva and lids: No erythema, swelling or discharge  Anicteric  Ears, Nose, Mouth, and Throat   Hearing: Normal     Neck: Supple, trachea midline  Pulmonary   Respiratory effort: No increased work of breathing or signs of respiratory distress  Lungs: Clear to ascultation, no wheezes, rhonchi, or rales  Cardiovascular   Heart: Regular rate and rhythm, no murmurs gallops or rubs   Examination of extremities for edema and/or varicosities: Normal     Abdomen   Abdomen: Soft, non-tender, no masses, no organomegaly  Normal bowel sounds  Genitourinary   Digital rectal exam: ***  Musculoskeletal   Gait and station: ***   Skin   Skin and subcutaneous tissue: Warm, dry, and intact  No visible jaundice, lesions or rashes    Psychiatric   Judgment and insight: Normal  Recent and remote memory:  Normal  Mood and affect: Normal  Lymph nodes  No cervical lymphadenopathy     Results  No visits with results within 1 Day(s) from this visit  Latest known visit with results is:   Hospital Outpatient Visit on 01/18/2021   Component Date Value    Protocol Name 01/18/2021 King Prasad Time In Exercise Phase 01/18/2021 00:01:47     MAX  SYSTOLIC BP 96/41/3891 560     Max Diastolic Bp 20/63/8925 76     Max Heart Rate 01/18/2021 125     Max Predicted Heart Rate 01/18/2021 184     Reason for Termination 01/18/2021 Chest discomfort, legs weakness     Test Indication 01/18/2021 Chest Discomfort     Target Hr Formular 01/18/2021 (220 - Age)*100%     Chest Pain Statement 01/18/2021 non-limiting        Radiology Results  No results found  Orders  No orders of the defined types were placed in this encounter

## 2021-01-28 NOTE — PROGRESS NOTES
Daily Note     Today's date: 2021  Patient name: Lauri Cox  : 1985  MRN: [id number]  Referring provider: Keven Rowan MD  Dx:   Encounter Diagnosis     ICD-10-CM    1  Dizziness  R42    2  Gait abnormality  R26 9                   Subjective: ***      Objective: See treatment diary below      PHYSICAL FINDINGS:  Oculomotor ROM :  Resting nystagmus: No  Gaze holding nystagmus No   Smooth pursuit Normal    Vertical Saccades: slowed unable to foucs   Horizontal Saccades slowed unable to focus  Convergence: Abnormal  Head thrust (room light): Normal  VOR x 1 : normal     Dynamic Visual Acuity: TBA NV  Dynamic Head: 20/  Static Head: 20/      MCTSIB: TBA NV            Balance Test    6 Minute Walk Test (ft): 200ft stopped test at 3 min a 40s  Two standing rest  Breaks   RPE: 7-8/10  HR 85  O2: 98     2 Minute Walk Test (ft):    Gait Speed (ft/s): 20ft/8 52 = 2 34ft/s   5x Sit To Stand (s): 53 95s   Has to readjust after each STS to get his feet correct if he doesn't he will "fall over"   TU 3s       Assessment: Tolerated treatment {Tolerated treatment :[id number]}  Patient {assessment:}  Goals  STG  1  Patient will be able to complete 6 MWT  within 4 weeks  2  Patient will improve 5x STS by 6s or more within 4 weeks  3  Patient will be independent with HEP within 4 weeks  LTG  1  Patient will improve 4 item DGI to indicating he is not a high fall risk within 8 weeks  2  Patient will improve TUG to less than 12 sec indicating he is not at high risk for falls within 8 weeks  3  Patient will improve LE strength by 1 grade or more within 12 weeks  4  Patient will improve 6 MWT to 1000ft or greater within 12 weeks  5   Patient will report a reduction in dizziness by 75% or more within 12 weeks     Plan: {PLAN:[id number]}     Short Term Goal Expiration Date:(4 weeks 2021)  Long Term Goal Expiration Date: (8 weeks 2021 12 weeks)  POC Expiration Date: (12 weeks) Precautions: Fall risk, history of seizures        Manuals 01/07 01/12 01/19 01/21 01/28                                   Neuro Re-Ed         VOR x 1  Seated plain   H 30"  D 6-7/10  V 30"  Discontinue at this time      Walking Ht/HN          360 turns        Side stepping     // bars  4 laps    Backwards walking      // bars   4 laps    habituation  Supine  head turns 30" x 2     Seated HT 30"x   Seated HN  30" Seated   Seated HT 10x ea  Seated HN  10xea  Seated   Seated HT 10x ea  Seated HN  10xea             Ther Ex        STS        Step ups         supine  Alt KTC  15"  5x ea    LTR:to fatigue  02 99%  HR 88  BP: 129/78 Alt KTC  15"  5x ea    LTR to fatigue    Bridges x 7     prone  On elbows   5" hold 10x  On elbows   5" hold 5x      Bike    L1   72 miles  Hr 113  O2 08%  Rest break  HA 8-/10 on right side     3 min  HA 7/10  SOB and light headed  Left side   90BPM  O2: 98%   L1  5 min  seaed rest    5 min  Stopped due to L hip pain                           Ther Activity        STS   6x              Gait Training        treadmill Focus on step length and heel toe pattern     1 8 mph  4 min  4min 15"  (at 3:40" felt like leg was getting weak and going numb)     Total: min   Focus on step length and heel toe pattern   SSS is slow  x40ft  Faster speed   40ft x 2              Modalities

## 2021-02-01 ENCOUNTER — OFFICE VISIT (OUTPATIENT)
Dept: GASTROENTEROLOGY | Facility: CLINIC | Age: 36
End: 2021-02-01
Payer: MEDICARE

## 2021-02-01 ENCOUNTER — APPOINTMENT (OUTPATIENT)
Dept: PHYSICAL THERAPY | Facility: CLINIC | Age: 36
End: 2021-02-01
Payer: MEDICARE

## 2021-02-01 DIAGNOSIS — K21.9 CHRONIC GERD: ICD-10-CM

## 2021-02-01 DIAGNOSIS — R13.12 OROPHARYNGEAL DYSPHAGIA: ICD-10-CM

## 2021-02-01 DIAGNOSIS — K62.5 BRIGHT RED BLOOD PER RECTUM: ICD-10-CM

## 2021-02-01 DIAGNOSIS — R10.84 GENERALIZED ABDOMINAL PAIN: ICD-10-CM

## 2021-02-01 DIAGNOSIS — K21.9 GASTROESOPHAGEAL REFLUX DISEASE WITHOUT ESOPHAGITIS: ICD-10-CM

## 2021-02-01 DIAGNOSIS — R19.7 DIARRHEA, UNSPECIFIED TYPE: Primary | ICD-10-CM

## 2021-02-01 PROCEDURE — 99202 OFFICE O/P NEW SF 15 MIN: CPT | Performed by: PHYSICIAN ASSISTANT

## 2021-02-01 RX ORDER — DICYCLOMINE HCL 20 MG
20 TABLET ORAL EVERY 6 HOURS PRN
Qty: 45 TABLET | Refills: 2 | Status: SHIPPED | OUTPATIENT
Start: 2021-02-01 | End: 2021-04-12

## 2021-02-01 RX ORDER — OMEPRAZOLE 40 MG/1
CAPSULE, DELAYED RELEASE ORAL
Qty: 30 CAPSULE | Refills: 3 | Status: SHIPPED | OUTPATIENT
Start: 2021-02-01 | End: 2021-11-04

## 2021-02-01 NOTE — PROGRESS NOTES
Virtual Regular Visit      Assessment/Plan:    1  GERD  2  Dysphagia  The patient has daily acid reflux and complaints of both solid food and pill dysphagia  He did undergo upper endoscopy per his report 2016 which was normal aside for "a disease that can cause cancer"  Is on no current therapy  -  Will start omeprazole 40 mg daily  -  Recommend acid reflux precautions  -  Also recommend upper endoscopy to evaluate his GERD and the etiology of his dysphagia, the patient is agreeable to proceeding, procedure reviewed in detail     3  Generalized abdominal pain  4  Loose stools  5  Rectal bleeding   The patient has had generalized abdominal pain for 5+ years  He states this worsened over the last 2 years  Associated with this he has approximately 2 loose/watery stools daily as well as intermittent rectal bleeding  He will see blood with wiping as well as blood in the toilet bowl  He did undergo colonoscopy in 2016 which per his report was normal   -  discussed obtaining infectious stool studies, celiac serologies, an H pylori stool antigen, pancreatic elstase, fecal calprotectin and colonoscopy, the patient is agreeable, discussed colonoscopy in detail   - will start bentyl as needed for abdominal pain, if infectious studies negative and start imodium     FU after EGD and colonoscopy       Problem List Items Addressed This Visit        Digestive    Bright red blood per rectum               Reason for visit is No chief complaint on file  Encounter provider Dania Chen PA-C    Provider located at [address] [place] [id number] [phone]      Recent Visits  No visits were found meeting these conditions     Showing recent visits within past 7 days and meeting all other requirements     Today's Visits  Date Type Provider Dept   02/01/21 Office Visit Dania Chen PA-C [facility]   Showing today's visits and meeting all other requirements     Future Appointments  No visits were found meeting these conditions  Showing future appointments within next 150 days and meeting all other requirements        The patient was identified by name and date of birth  Lauri Cox was informed that this is a telemedicine visit and that the visit is being conducted through [facility] and patient was informed that this is not a secure, HIPAA-compliant platform  He agrees to proceed     My office door was closed  No one else was in the room  He acknowledged consent and understanding of privacy and security of the video platform  The patient has agreed to participate and understands they can discontinue the visit at any time  Patient is aware this is a billable service  Subjective  Lauri Cox is a 38 y o  male  here for consultation  The patient has multiple GI complaints  He states that he has had generalized abdominal pain for over 5 years  This has worsened over the past 2 years  The pain is sharp in nature and occurs at random  He denies any bloating but he does have a lot of belching and flatulence  He will have a bowel movement approximately 2 times per day  This is loose and on occasion also all water  He admits to having rectal bleeding on and off for several years  He will see blood not only with wiping but also in the toilet bowl  In addition to this he states he has daily acid reflux  He admits to solid food dysphagia as well as pill dysphagia  He was evaluated in the past at [facility]  He believes he underwent upper endoscopy and colonoscopy in 2016  To his knowledge testing was normal " a disease that can cause cancer" on his upper endoscopy  He is on no current medications for his complaints  No records to review        Past Medical History:   Diagnosis Date    Diabetes mellitus ([facility] [address] )     type 2    Disease of thyroid gland     hypothyroidism    Hyperlipidemia     Hypertension     Psychiatric disorder     PTSD   Anxiety, depression,     Seizures [address] [address] )        Past Surgical History:   Procedure Laterality Date    WISDOM TOOTH EXTRACTION         Current Outpatient Medications   Medication Sig Dispense Refill    Alogliptin Benzoate 25 MG TABS Take 1 tablet by mouth daily      ALPRAZolam (XANAX) 0 5 mg tablet Take 0 5 mg by mouth Three times daily as needed      clobetasol (TEMOVATE) 0 05 % cream Apply topically 2 (two) times a day 60 g 0    cloNIDine (CATAPRES) 0 2 mg tablet Take 0 1 mg by mouth daily       divalproex sodium (DEPAKOTE) 500 mg EC tablet Take 500 mg by mouth every 12 (twelve) hours       Empagliflozin (Jardiance) 25 MG TABS Take 1 tablet by mouth daily      gabapentin (NEURONTIN) 300 mg capsule Take 300 mg by mouth Three times a day      hydrocortisone (ANUSOL-HC) 25 mg suppository Insert 1 suppository (25 mg total) into the rectum 2 (two) times a day (Patient not taking: Reported on 1/11/2021) 30 suppository 1    insulin glargine (LANTUS SOLOSTAR) 100 units/mL injection pen Inject 28 Units under the skin daily       levothyroxine 25 mcg tablet Take 25 mcg by mouth daily      loratadine (CLARITIN) 10 mg tablet Take 10 mg by mouth daily      meloxicam (MOBIC) 15 mg tablet       metFORMIN (GLUCOPHAGE) 1000 MG tablet Take 1,000 mg by mouth daily       mupirocin (BACTROBAN) 2 % ointment Apply topically 3 (three) times a day (Patient not taking: Reported on 1/20/2021) 30 g 3    OMEGA-3 FATTY ACIDS PO Take 2 g by mouth daily      pravastatin (PRAVACHOL) 40 mg tablet Take 40 mg by mouth daily      sildenafil (VIAGRA) 100 mg tablet Take 100 mg by mouth as needed for erectile dysfunction       traZODone (DESYREL) 100 mg tablet Take 200 mg by mouth      venlafaxine (EFFEXOR-XR) 150 mg 24 hr capsule TAKE 1 CAPSULE BY MOUTH EVERY MORNING FOR MOOD      venlafaxine (EFFEXOR-XR) 75 mg 24 hr capsule TAKE 1 CAPSULE BY MOUTH ONCE DAILY IN ADDITION TO A 150MG CAPSULE FOR A TOTAL DAILY DOSE OF 225MG DAILY       No current facility-administered medications for this visit  Allergies   Allergen Reactions    Lamotrigine GI Intolerance    Simvastatin Other (See Comments)     Elevated liver enzymes  Liver enzyme elevation    Niacin Rash       Review of Systems   Constitutional: Negative for activity change, appetite change, chills, fatigue, fever and unexpected weight change  HENT: Positive for trouble swallowing  Negative for sore throat  Respiratory: Negative for cough and choking  Gastrointestinal: Positive for abdominal pain, blood in stool, diarrhea and rectal pain  Negative for abdominal distention, anal bleeding, constipation, nausea and vomiting  Musculoskeletal: Negative for gait problem  Neurological: Negative for syncope  Psychiatric/Behavioral: Negative for confusion  Video Exam    There were no vitals filed for this visit  Physical Exam  Constitutional:       General: He is not in acute distress  Appearance: Normal appearance  He is normal weight  He is not ill-appearing, toxic-appearing or diaphoretic  HENT:      Head: Normocephalic and atraumatic  Nose: Nose normal  No congestion or rhinorrhea  Eyes:      General:         Right eye: No discharge  Left eye: No discharge  Neck:      Musculoskeletal: Normal range of motion  Pulmonary:      Effort: Pulmonary effort is normal    Skin:     Coloration: Skin is not jaundiced or pale  Findings: No erythema or rash  Neurological:      Mental Status: He is alert  Psychiatric:         Mood and Affect: Mood normal          Behavior: Behavior normal          Thought Content:  Thought content normal          Judgment: Judgment normal           I spent 30 minutes with patient today in which greater than 50% of the time was spent in counseling/coordination of care regarding multiple GI complaints      VIRTUAL VISIT [address] acknowledges that he has consented to an online visit or consultation  He understands that the online visit is based solely on information provided by him, and that, in the absence of a face-to-face physical evaluation by the physician, the diagnosis he receives is both limited and provisional in terms of accuracy and completeness  This is not intended to replace a full medical face-to-face evaluation by the physician  Selvin Neumann understands and accepts these terms

## 2021-02-02 ENCOUNTER — APPOINTMENT (OUTPATIENT)
Dept: PHYSICAL THERAPY | Facility: CLINIC | Age: 36
End: 2021-02-02
Payer: MEDICARE

## 2021-02-03 ENCOUNTER — CLINICAL SUPPORT (OUTPATIENT)
Dept: CARDIOLOGY CLINIC | Facility: CLINIC | Age: 36
End: 2021-02-03
Payer: MEDICARE

## 2021-02-03 ENCOUNTER — TELEPHONE (OUTPATIENT)
Dept: CARDIOLOGY CLINIC | Facility: CLINIC | Age: 36
End: 2021-02-03

## 2021-02-03 DIAGNOSIS — R00.2 PALPITATION: ICD-10-CM

## 2021-02-03 DIAGNOSIS — R07.9 CHEST PAIN, UNSPECIFIED TYPE: ICD-10-CM

## 2021-02-03 PROCEDURE — 93244 EXT ECG>48HR<7D REV&INTERPJ: CPT

## 2021-02-04 ENCOUNTER — TELEMEDICINE (OUTPATIENT)
Dept: NEUROLOGY | Facility: CLINIC | Age: 36
End: 2021-02-04
Payer: MEDICARE

## 2021-02-04 ENCOUNTER — EVALUATION (OUTPATIENT)
Dept: PHYSICAL THERAPY | Facility: CLINIC | Age: 36
End: 2021-02-04
Payer: MEDICARE

## 2021-02-04 DIAGNOSIS — F43.10 PTSD (POST-TRAUMATIC STRESS DISORDER): ICD-10-CM

## 2021-02-04 DIAGNOSIS — R00.2 PALPITATION: Primary | ICD-10-CM

## 2021-02-04 DIAGNOSIS — F44.5 PSYCHOGENIC NONEPILEPTIC SEIZURE: Primary | ICD-10-CM

## 2021-02-04 DIAGNOSIS — R00.2 PALPITATION: ICD-10-CM

## 2021-02-04 DIAGNOSIS — F41.9 ANXIETY: ICD-10-CM

## 2021-02-04 DIAGNOSIS — R42 DIZZINESS: Primary | ICD-10-CM

## 2021-02-04 DIAGNOSIS — R26.9 GAIT ABNORMALITY: ICD-10-CM

## 2021-02-04 PROCEDURE — 99213 OFFICE O/P EST LOW 20 MIN: CPT | Performed by: PSYCHIATRY & NEUROLOGY

## 2021-02-04 PROCEDURE — 97112 NEUROMUSCULAR REEDUCATION: CPT | Performed by: PHYSICAL THERAPIST

## 2021-02-04 RX ORDER — METOPROLOL SUCCINATE 50 MG/1
50 TABLET, EXTENDED RELEASE ORAL DAILY
Qty: 30 TABLET | Refills: 6 | Status: SHIPPED | OUTPATIENT
Start: 2021-02-04 | End: 2021-03-25 | Stop reason: HOSPADM

## 2021-02-04 NOTE — PROGRESS NOTES
Virtual Regular Visit      Assessment/Plan:    Problem List Items Addressed This Visit        Other    Anxiety    PTSD (post-traumatic stress disorder)    Palpitation      Other Visit Diagnoses     Psychogenic nonepileptic seizure    -  Primary               Reason for visit is   Chief Complaint   Patient presents with    Virtual Regular Visit        Encounter provider Parker Berkowitz MD    Provider located at [address] [place] [id number] [phone]      Recent Visits  No visits were found meeting these conditions  Showing recent visits within past 7 days and meeting all other requirements     Future Appointments  No visits were found meeting these conditions  Showing future appointments within next 150 days and meeting all other requirements        The patient was identified by name and date of birth  Primitivo Parada was informed that this is a telemedicine visit and that the visit is being conducted through [employer] and patient was informed that this is a secure, HIPAA-compliant platform  He agrees to proceed     My office door was closed  No one else was in the room  He acknowledged consent and understanding of privacy and security of the video platform  The patient has agreed to participate and understands they can discontinue the visit at any time  Patient is aware this is a billable service  [address] [facility]  Follow Up Visit    Impression/Plan    Mr Jm Sibley is a 38 y o  male [facility]  with PTSD, anxiety and psychogenic nonepileptic spells  Diagnosis confirmed in Epilepsy Monitoring Unit in 10/2020 at [facility]  We have discussed the diagnosis in detail, including potential drivers (PTSD, anxiety)  We discussed that his nonepileptic spells are not related to the ECG findings or a heart problem  He has been evaluated by cardiology  Patient Instructions   1   Continue with regular mental health visits  2  Return as scheduled  Diagnoses and all orders for this visit:    Psychogenic nonepileptic seizure    Anxiety    PTSD (post-traumatic stress disorder)    Palpitation        Subjective    Elie Jean Baptiste is returning to the [address] [facility] for follow up  Interval Events:   Non epileptic spells have been controlled  This visit setup to discuss whether cardiac problems for which he has recently seen cardiology may be related to his events  They note that an EKG in 10/2020 with a read that listed possible inferior infarct  I see 2 other EKGs at that time that were read as unremarkable  He has since seen Cardiology in January  That visit was for palpitations and chest pain  Workup has included Holter monitor and exercise TTE  His nonepileptic events have not been associated with chest pain or palpitations  He notes continued fatigue and trouble with motor function and sensation on the left since his September seizure  His neurological exams have been normal        Objective    There were no vitals taken for this visit  General Exam  No acute distress  Neurologic Exam  Mental Status:  Alert and oriented x 3  Language: normal fluency and comprehension  Cranial Nerves: Face symmetric  No dysarthria  Review of Systems   Constitutional: Negative  Negative for appetite change and fever  HENT: Negative  Negative for hearing loss, tinnitus, trouble swallowing and voice change  Eyes: Negative  Negative for photophobia and pain  Respiratory: Negative  Negative for shortness of breath  Cardiovascular: Negative  Negative for palpitations  Gastrointestinal: Negative  Negative for nausea and vomiting  Endocrine: Negative  Negative for cold intolerance  Genitourinary: Negative  Negative for dysuria, frequency and urgency  Musculoskeletal: Negative  Negative for myalgias and neck pain  Skin: Negative  Negative for rash  Neurological: Positive for dizziness, weakness (Left side), light-headedness and numbness (Left side)  Negative for tremors, seizures, syncope, facial asymmetry, speech difficulty and headaches  Hematological: Negative  Does not bruise/bleed easily  Psychiatric/Behavioral: Negative  Negative for confusion, hallucinations and sleep disturbance  ROS reviewed and updated as appropriate  I spent 16 minutes directly with the patient during this visit      VIRTUAL VISIT [address] acknowledges that he has consented to an online visit or consultation  He understands that the online visit is based solely on information provided by him, and that, in the absence of a face-to-face physical evaluation by the physician, the diagnosis he receives is both limited and provisional in terms of accuracy and completeness  This is not intended to replace a full medical face-to-face evaluation by the physician  Primitivo Parada understands and accepts these terms

## 2021-02-04 NOTE — PROGRESS NOTES
Progress Update     Today's date: 2021  Patient name: Ileana Laura  : 1985  MRN: [id number]  Referring provider: Tracy Rivero MD  Dx: No diagnosis found  Assessment: progress updated completed today, patient authorization about to   Patient demonstrates excellent improvements in endurance and LE strength per 6 MWT test and 5x STS  Was able to completed all testing today, with improved gait pattern  Patient has been complaint with carrying over techniques and keeping up with HEP  Responding well to habituation techniques  Remains with hesitation in performing balance activities  Patient will continue to benefit from skilled therapy to further reduce dizziness, improve gait and balance and achieve long term goals  Challenged with static balance exercises required seated rest breaks with all activities requiring head movements  Goals  STG  1  Patient will be able to complete 6 MWT  within 4 weeks - MET  2  Patient will improve 5x STS by 6s or more within 4 weeks - MET  3  Patient will be independent with HEP within 4 weeks - MET   LTG  1  Patient will improve 4 item DGI to indicating he is not a high fall risk within 8 weeks  2  Patient will improve TUG to less than 12 sec indicating he is not at high risk for falls within 8 weeks  3  Patient will improve LE strength by 1 grade or more within 12 weeks  4  Patient will improve 6 MWT to 1000ft or greater within 12 weeks  5   Patient will report a reduction in dizziness by 75% or more within 12 weeks     Plan  Patient would benefit from: skilled physical therapy  Planned therapy interventions: neuromuscular re-education, patient education, postural training, strengthening, therapeutic activities, therapeutic exercise, home exercise program, balance, gait training and coordination  Frequency: 2x week  Duration in weeks: 12  Plan of Care beginning date: 2021  Plan of Care expiration date: 3/29/2021    Treatment plan discussed with: patient    Subjective:spoke with cardiologist is being put on a beta blocker for heart palpitations to prevent them from happening, not ure if its physiological or anxiety related  Spoke with Dr Tanvi Quezaad, said he is doing well that he sees improvements and he is happy with how things are going  Feels like he is making improvements physically  Still working on PNES  Reports he is still tried a lot  Per patient neurologist didn't see any nerve damage  Has a very hard time picking up small stuff, before he never had that issue  weaning himself off of veniflaxin at night, feels it was too much for him at night (overall in general), still taking it during the day         Objective: See treatment diary below    Balance Test    6 Minute Walk Test (ft): 200ft stopped test at 3 min a 40s  Two standing rest  Breaks   RPE: 7-8/10  HR 85  O2: 98    2021:760ft  HR post:111  O2: 95%  RPE at start 4/10  RPE at end 8/10     2 Minute Walk Test (ft):    Gait Speed (ft/s): 20ft/8 52 = 2 34ft/s  20ft/6 38s = 3 1ft/s   5x Sit To Stand (s): 53 95s   Has to readjust after each STS to get his feet correct if he doesn't he will "fall over"    2021: 37 69s w/o UE     TU 3s              Short Term Goal Expiration Date:(4 weeks 2021)  Long Term Goal Expiration Date: (8 weeks 2021 12 weeks)  POC Expiration Date: (12 weeks)           Precautions: Fall risk, history of seizures        Manuals 2021                                   Neuro Re-Ed         VOR x 1         Walking Ht/HN   // bars  2 laps ea        360 turns        Side stepping     // bars  4 laps    Backwards walking  // bars 4 laps     // bars   4 laps    habituation    Seated   Seated HT 10x ea  Seated HN  10xea     Static balance  Foam  FTEO 30"  FTEC   Trial 1 unable  FAEC 30"x 2  FTEC 30" x 1     FTEO   HT 30" x 2  HN 30"x2       Ther Ex        STS        Step ups         supine        prone        Bike L1 x 5 min unable to tolerate further    L1   72 miles  Hr 113  O2 08%  Rest break  HA 8-/10 on right side     3 min  HA 7/10  SOB and light headed  Left side   90BPM  O2: 98%   L1  5 min  seaed rest    5 min  Stopped due to L hip pain                           Ther Activity        STS                Gait Training        treadmill                Modalities

## 2021-02-05 ENCOUNTER — OFFICE VISIT (OUTPATIENT)
Dept: PHYSICAL THERAPY | Facility: CLINIC | Age: 36
End: 2021-02-05
Payer: MEDICARE

## 2021-02-05 DIAGNOSIS — R26.9 GAIT ABNORMALITY: ICD-10-CM

## 2021-02-05 DIAGNOSIS — R42 DIZZINESS: Primary | ICD-10-CM

## 2021-02-05 PROCEDURE — 97112 NEUROMUSCULAR REEDUCATION: CPT | Performed by: PHYSICAL THERAPIST

## 2021-02-05 PROCEDURE — 97110 THERAPEUTIC EXERCISES: CPT | Performed by: PHYSICAL THERAPIST

## 2021-02-05 NOTE — PROGRESS NOTES
Daily Note     Today's date: 2021  Patient name: Marivel Abbott  : 1985  MRN: [id number]  Referring provider: Rolo Berry MD  Dx:   Encounter Diagnosis     ICD-10-CM    1  Dizziness  R42    2  Gait abnormality  R26 9                   Subjective: reports he didn't well yesterday after PT session  Feeling ok this morning  Objective: See treatment diary below      Assessment:  Stopped bike after 5 minute due to increasing left hip pain  reports hip pain is there, has had it since he was in the [employer] 11 years ago  Hip pain resolved with walking and dynamic activities  perormed dynamic activities slowly with frequent rests due to onset of symptoms  Patient motivatd to continue working in therapy  Plan: Continue per plan of care        Short Term Goal Expiration Date:(4 weeks 2021)  Long Term Goal Expiration Date: (8 weeks 2021 12 weeks)  POC Expiration Date: (12 weeks)           Precautions: Fall risk, history of seizures        Manuals 2021                                   Neuro Re-Ed         VOR x 1         Walking Ht/HN   // bars  2 laps ea  HT // bars  5 laps    Hn hallway   40ft x 4      360 turns  180* turns  40ft ea       Side stepping  10ft // bars  pTB  6 laps    // bars  4 laps    Backwards walking  // bars 4 laps  // bars 6 laps    // bars   4 laps    habituation  Seated focusing on objective to reduce dizziness   Seated   Seated HT 10x ea  Seated HN  10xea     Static balance  Foam  FTEO 30"  FTEC   Trial 1 unable  FAEC 30"x 2  FTEC 30" x 1     FTEO   HT 30" x 2  HN 30"x2       Ther Ex        STS        Step ups         supine        prone        Bike L1 x 5 min unable to tolerate further  L1 x 5 min  L1   72 miles  Hr 113  O2 08%  Rest break  HA 8-/10 on right side     3 min  HA 7/10  SOB and light headed  Left side   90BPM  O2: 98%   L1  5 min  seaed rest    5 min  Stopped due to L hip pain                           Ther Activity        STS Gait Training        treadmill                Modalities

## 2021-02-09 ENCOUNTER — OFFICE VISIT (OUTPATIENT)
Dept: INTERNAL MEDICINE CLINIC | Age: 36
End: 2021-02-09
Payer: MEDICARE

## 2021-02-09 ENCOUNTER — APPOINTMENT (OUTPATIENT)
Dept: PHYSICAL THERAPY | Facility: CLINIC | Age: 36
End: 2021-02-09
Payer: MEDICARE

## 2021-02-09 VITALS
OXYGEN SATURATION: 96 % | BODY MASS INDEX: 28.61 KG/M2 | WEIGHT: 178 LBS | DIASTOLIC BLOOD PRESSURE: 58 MMHG | HEART RATE: 84 BPM | SYSTOLIC BLOOD PRESSURE: 110 MMHG | HEIGHT: 66 IN | TEMPERATURE: 98 F

## 2021-02-09 DIAGNOSIS — K21.9 CHRONIC GERD: Primary | ICD-10-CM

## 2021-02-09 DIAGNOSIS — R21 SKIN RASH: ICD-10-CM

## 2021-02-09 DIAGNOSIS — E11.9 TYPE 2 DIABETES MELLITUS WITHOUT COMPLICATION, WITH LONG-TERM CURRENT USE OF INSULIN (HCC): ICD-10-CM

## 2021-02-09 DIAGNOSIS — G47.33 OSA (OBSTRUCTIVE SLEEP APNEA): ICD-10-CM

## 2021-02-09 DIAGNOSIS — E78.2 MIXED HYPERLIPIDEMIA: ICD-10-CM

## 2021-02-09 DIAGNOSIS — Z79.4 TYPE 2 DIABETES MELLITUS WITHOUT COMPLICATION, WITH LONG-TERM CURRENT USE OF INSULIN (HCC): ICD-10-CM

## 2021-02-09 PROCEDURE — 99214 OFFICE O/P EST MOD 30 MIN: CPT | Performed by: INTERNAL MEDICINE

## 2021-02-09 NOTE — PROGRESS NOTES
Assessment/Plan:  1  Skin rash  No response to topical steroid  Will refer patient to dermatologist for further evaluation and management  We need skin biopsy  2  Obstructive sleep apnea  Patient complains that CPAP machine is malfunctioning  Will refer patient to sleep specialist for further management    3  Type 2 diabetes mellitus  As per patient home blood sugar readings are in acceptable range  Will continue with present dose  He is due for hemoglobin A1c prior to next visit      4  Hypothyroidism  Continue with present dose of levothyroxine 25 mcg daily      5  PTSD  Being managed by psychiatrist       Diagnoses and all orders for this visit:    Chronic GERD    Type 2 diabetes mellitus without complication, with long-term current use of insulin (HCC)    ENRIKE (obstructive sleep apnea)  -     Ambulatory referral to Sleep Medicine; Future    Mixed hyperlipidemia    Skin rash  -     Ambulatory referral to Dermatology; Future               Subjective:          Patient ID: Ileana Laura is a 38 y o  male  Still complaining of rash over back of the neck and lower abdomen  No relief with prescription steroid cream     Also complaining of that CPAP machine is not working correctly  About 10 years ago that was prescribed by [facility]       The following portions of the patient's history were reviewed and updated as appropriate: allergies, current medications, past family history, past medical history, past social history, past surgical history and problem list     Review of Systems   Constitutional: Positive for fatigue  Negative for fever  HENT: Negative for congestion, ear discharge, ear pain, postnasal drip, sinus pressure, sore throat, tinnitus and trouble swallowing  Eyes: Negative for discharge, itching and visual disturbance  Respiratory: Negative for cough and shortness of breath  Cardiovascular: Negative for chest pain and palpitations     Gastrointestinal: Negative for abdominal pain, diarrhea, nausea and vomiting  Endocrine: Negative for cold intolerance and polyuria  Genitourinary: Negative for difficulty urinating, dysuria and urgency  Musculoskeletal: Negative for arthralgias and neck pain  Skin: Positive for rash  Allergic/Immunologic: Negative for environmental allergies  Neurological: Negative for dizziness, weakness and headaches  Psychiatric/Behavioral: The patient is not nervous/anxious  Past Medical History:   Diagnosis Date    Diabetes mellitus ([facility] [address] )     type 2    Disease of thyroid gland     hypothyroidism    Hyperlipidemia     Hypertension     Psychiatric disorder     PTSD   Anxiety, depression,     Seizures (HCC)          Current Outpatient Medications:     Alogliptin Benzoate 25 MG TABS, Take 1 tablet by mouth daily, Disp: , Rfl:     ALPRAZolam (XANAX) 0 5 mg tablet, Take 0 5 mg by mouth Three times daily as needed, Disp: , Rfl:     clobetasol (TEMOVATE) 0 05 % cream, Apply topically 2 (two) times a day, Disp: 60 g, Rfl: 0    dicyclomine (BENTYL) 20 mg tablet, Take 1 tablet (20 mg total) by mouth every 6 (six) hours as needed (abdominal pain), Disp: 45 tablet, Rfl: 2    divalproex sodium (DEPAKOTE) 500 mg EC tablet, Take 500 mg by mouth every 12 (twelve) hours , Disp: , Rfl:     Empagliflozin (Jardiance) 25 MG TABS, Take 1 tablet by mouth daily, Disp: , Rfl:     gabapentin (NEURONTIN) 300 mg capsule, Take 300 mg by mouth Three times a day, Disp: , Rfl:     insulin glargine (LANTUS SOLOSTAR) 100 units/mL injection pen, Inject 28 Units under the skin daily , Disp: , Rfl:     levothyroxine 25 mcg tablet, Take 25 mcg by mouth daily, Disp: , Rfl:     loratadine (CLARITIN) 10 mg tablet, Take 10 mg by mouth daily, Disp: , Rfl:     metFORMIN (GLUCOPHAGE) 1000 MG tablet, Take 1,000 mg by mouth daily , Disp: , Rfl:     metoprolol succinate (TOPROL-XL) 50 mg 24 hr tablet, Take 1 tablet (50 mg total) by mouth daily, Disp: 30 tablet, Rfl: 6    OMEGA-3 FATTY ACIDS PO, Take 2 g by mouth daily, Disp: , Rfl:     omeprazole (PriLOSEC) 40 MG capsule, Take 1 tablet 30-60 minutes prior to breakfast, Disp: 30 capsule, Rfl: 3    pravastatin (PRAVACHOL) 40 mg tablet, Take 40 mg by mouth daily, Disp: , Rfl:     sildenafil (VIAGRA) 100 mg tablet, Take 100 mg by mouth as needed for erectile dysfunction , Disp: , Rfl:     traZODone (DESYREL) 100 mg tablet, Take 200 mg by mouth, Disp: , Rfl:     venlafaxine (EFFEXOR-XR) 150 mg 24 hr capsule, TAKE 1 CAPSULE BY MOUTH EVERY MORNING FOR MOOD, Disp: , Rfl:     cloNIDine (CATAPRES) 0 2 mg tablet, Take 0 1 mg by mouth daily , Disp: , Rfl:     hydrocortisone (ANUSOL-HC) 25 mg suppository, Insert 1 suppository (25 mg total) into the rectum 2 (two) times a day (Patient not taking: Reported on 1/11/2021), Disp: 30 suppository, Rfl: 1    meloxicam (MOBIC) 15 mg tablet, , Disp: , Rfl:     mupirocin (BACTROBAN) 2 % ointment, Apply topically 3 (three) times a day (Patient not taking: Reported on 1/20/2021), Disp: 30 g, Rfl: 3    venlafaxine (EFFEXOR-XR) 75 mg 24 hr capsule, TAKE 1 CAPSULE BY MOUTH ONCE DAILY IN ADDITION TO A 150MG CAPSULE FOR A TOTAL DAILY DOSE OF 225MG DAILY, Disp: , Rfl:     Allergies   Allergen Reactions    Lamotrigine GI Intolerance    Simvastatin Other (See Comments)     Elevated liver enzymes  Liver enzyme elevation    Niacin Rash       Social History   Past Surgical History:   Procedure Laterality Date    WISDOM TOOTH EXTRACTION       Family History   Problem Relation Age of Onset    Hyperlipidemia Father     Heart attack Paternal Grandfather        Objective:  /58 (BP Location: Left arm, Patient Position: Sitting, Cuff Size: Standard)   Pulse 84   Temp 98 °F (36 7 °C) (Temporal)   Ht 5' 5 75" (1 67 m) Comment: shoes on  Wt 80 7 kg (178 lb) Comment: shoes on  SpO2 96%   BMI 28 95 kg/m²   Body mass index is 28 95 kg/m²       Physical Exam  Constitutional:       Appearance: He is well-developed  HENT:      Head: Normocephalic  Right Ear: External ear normal       Left Ear: External ear normal    Eyes:      General: No scleral icterus  Pupils: Pupils are equal, round, and reactive to light  Neck:      Musculoskeletal: Normal range of motion and neck supple  Thyroid: No thyromegaly  Trachea: No tracheal deviation  Cardiovascular:      Rate and Rhythm: Normal rate and regular rhythm  Heart sounds: Normal heart sounds  Pulmonary:      Effort: Pulmonary effort is normal  No respiratory distress  Breath sounds: Normal breath sounds  Chest:      Chest wall: No tenderness  Abdominal:      General: Bowel sounds are normal       Palpations: Abdomen is soft  There is no mass  Tenderness: There is no abdominal tenderness  Musculoskeletal: Normal range of motion  Right lower leg: No edema  Left lower leg: No edema  Lymphadenopathy:      Cervical: No cervical adenopathy  Skin:     General: Skin is warm  Findings: Rash present  Comments: Rash over anterior abdomen back of the neck noted  Neurological:      Mental Status: He is alert  Cranial Nerves: No cranial nerve deficit     Psychiatric:         Mood and Affect: Mood normal

## 2021-02-11 ENCOUNTER — APPOINTMENT (OUTPATIENT)
Dept: PHYSICAL THERAPY | Facility: CLINIC | Age: 36
End: 2021-02-11
Payer: MEDICARE

## 2021-02-12 ENCOUNTER — OFFICE VISIT (OUTPATIENT)
Dept: PHYSICAL THERAPY | Facility: CLINIC | Age: 36
End: 2021-02-12
Payer: MEDICARE

## 2021-02-12 DIAGNOSIS — R42 DIZZINESS: Primary | ICD-10-CM

## 2021-02-12 DIAGNOSIS — R26.9 GAIT ABNORMALITY: ICD-10-CM

## 2021-02-12 PROCEDURE — 97110 THERAPEUTIC EXERCISES: CPT | Performed by: PHYSICAL THERAPIST

## 2021-02-12 PROCEDURE — 97112 NEUROMUSCULAR REEDUCATION: CPT | Performed by: PHYSICAL THERAPIST

## 2021-02-12 NOTE — PROGRESS NOTES
Daily Note     Today's date: 2021  Patient name: Immanuel Canada  : 1985  MRN: [id number]  Referring provider: Sky Nnues MD  Dx:   Encounter Diagnosis     ICD-10-CM    1  Dizziness  R42    2  Gait abnormality  R26 9                   Subjective: feeling better today, missed last sessions due to snow and migraines  Objective: See treatment diary below      Assessment:  Increased time spent on the bike today with good response  More slow performance with walking and head movements require increased rest breaks this session  Challenged with head nod activities  Notes his head hearts today with activities that typically don't bother him  Plan: Continue per plan of care        Short Term Goal Expiration Date:(4 weeks 2021)  Long Term Goal Expiration Date: (8 weeks 2021 12 weeks)  POC Expiration Date: (12 weeks)           Precautions: Fall risk, history of seizures        Manuals 2021                                     Neuro Re-Ed         VOR x 1         Walking Ht/HN   // bars  2 laps ea  HT // bars  5 laps    Hn hallway   40ft x 4 // bars  3 laps ea     360 turns  180* turns  40ft ea  180* turns  40ft ea      Side stepping  10ft // bars  pTB  6 laps       Backwards walking  // bars 4 laps  // bars 6 laps  Hallway   300ft     habituation  Seated focusing on objective to reduce dizziness       Static balance  Foam  FTEO 30"  FTEC   Trial 1 unable  FAEC 30"x 2  FTEC 30" x 1     FTEO   HT 30" x 2  HN 30"x2  airex  FTEO 30"  HT 30  HN 30"       Ther Ex        STS        Step ups         supine        prone        Bike L1 x 5 min unable to tolerate further  L1 x 5 min L1 x 11 min                             Ther Activity        STS                Gait Training        treadmill        overground   Heel toe gait pattern    200ft     Modalities

## 2021-02-16 ENCOUNTER — OFFICE VISIT (OUTPATIENT)
Dept: PHYSICAL THERAPY | Facility: CLINIC | Age: 36
End: 2021-02-16
Payer: MEDICARE

## 2021-02-16 DIAGNOSIS — R42 DIZZINESS: Primary | ICD-10-CM

## 2021-02-16 DIAGNOSIS — R26.9 GAIT ABNORMALITY: ICD-10-CM

## 2021-02-16 PROCEDURE — 97150 GROUP THERAPEUTIC PROCEDURES: CPT | Performed by: PHYSICAL THERAPIST

## 2021-02-16 PROCEDURE — 97112 NEUROMUSCULAR REEDUCATION: CPT | Performed by: PHYSICAL THERAPIST

## 2021-02-16 NOTE — PROGRESS NOTES
Daily Note     Today's date: 2021  Patient name: Valerio Tavarez  : 1985  MRN: [id number]  Referring provider: Henny Yang MD  Dx:   Encounter Diagnosis     ICD-10-CM    1  Dizziness  R42    2  Gait abnormality  R26 9        Start Time: 1100  Stop Time: 1200  Total time in clinic (min): 60 minutes    Subjective: feeling good today at start of session, denied dizziness and Headache, fatigued noted in legs post use of bike   Objective: See treatment diary below  Self directed PT: 11:00-11:25,  25 min total   1:1 w/PT : 11:35- 12:00 25 min  Group:11:25-11:35 - 10 min    Assessment:  Improved endurance noted, patient better able to tolerate session today  Challenged with added activity at end of session  Required fewer seated rest breaks  Demonstrates improved gait pattern and decreased path deviations  Making nice progress at this time  Tolerates dual tasking well  Plan: Continue per plan of care        Short Term Goal Expiration Date:(4 weeks 2021)  Long Term Goal Expiration Date: (8 weeks 2021 12 weeks)  POC Expiration Date: (12 weeks)           Precautions: Fall risk, history of seizures        Manuals [id number]                                     Neuro Re-Ed         VOR x 1         Walking Ht/HN   // bars  2 laps ea  HT // bars  5 laps    Hn hallway   40ft x 4 // bars  3 laps ea Hallway (40ft)  3 laps ea    360 turns  180* turns  40ft ea  180* turns  40ft ea  Hallway (40ft)  1 laps ea    Side stepping  10ft // bars  pTB  6 laps   With ball toss   15ft x 4 ea    Fwd back walking  With ball toss   15ft x 4 ea     Backwards walking  // bars 4 laps  // bars 6 laps  Hallway   300ft Hallway (40ft)  3 laps ea    habituation  Seated focusing on objective to reduce dizziness       Static balance  Foam  FTEO 30"  FTEC   Trial 1 unable  FAEC 30"x 2  FTEC 30" x 1     FTEO   HT 30" x 2  HN 30"x2  airex  FTEO 30"  HT 30  HN 30"       Ther Ex        STS        Step ups supine        prone        Bike L1 x 5 min unable to tolerate further  L1 x 5 min L1 x 11 min L1 10 min                            Ther Activity        STS                Gait Training        treadmill        overground   Heel toe gait pattern    200ft Heel toe gait pattern    400ft    Modalities

## 2021-02-18 ENCOUNTER — APPOINTMENT (OUTPATIENT)
Dept: PHYSICAL THERAPY | Facility: CLINIC | Age: 36
End: 2021-02-18
Payer: MEDICARE

## 2021-02-19 ENCOUNTER — APPOINTMENT (OUTPATIENT)
Dept: PHYSICAL THERAPY | Facility: CLINIC | Age: 36
End: 2021-02-19
Payer: MEDICARE

## 2021-02-23 ENCOUNTER — OFFICE VISIT (OUTPATIENT)
Dept: PHYSICAL THERAPY | Facility: CLINIC | Age: 36
End: 2021-02-23
Payer: MEDICARE

## 2021-02-23 DIAGNOSIS — R26.9 GAIT ABNORMALITY: ICD-10-CM

## 2021-02-23 DIAGNOSIS — R42 DIZZINESS: Primary | ICD-10-CM

## 2021-02-23 PROCEDURE — 97110 THERAPEUTIC EXERCISES: CPT | Performed by: PHYSICAL THERAPIST

## 2021-02-23 PROCEDURE — 97150 GROUP THERAPEUTIC PROCEDURES: CPT | Performed by: PHYSICAL THERAPIST

## 2021-02-23 PROCEDURE — 97112 NEUROMUSCULAR REEDUCATION: CPT | Performed by: PHYSICAL THERAPIST

## 2021-02-23 NOTE — PROGRESS NOTES
Daily Note     Today's date: 2021  Patient name: Nikko Long  : 1985  MRN: [id number]  Referring provider: Raheem Ordoñez MD  Dx:   Encounter Diagnosis     ICD-10-CM    1  Dizziness  R42    2  Gait abnormality  R26 9                   Subjective: missed a few session due to snow  Reports his whole body has been hurting, has not done shoveling or other physical activity to bring on pain  Few instances of headache unlike others he has had, more pressure coming "out of no where"  Denies symptoms of HA prior to session today  Objective: See treatment diary below  1:1 w/PT : 11:20 - 12:20 60 min  Group: 12:20-12:30 10 min    Assessment:  Slow to progress through activities today compared to last session, but continues to tolerate more  Lightheadedness reported post bike and dynamic balance exercises  Plan: Continue per plan of care        Short Term Goal Expiration Date:(4 weeks 2021)  Long Term Goal Expiration Date: (8 weeks 2021 12 weeks)  POC Expiration Date: (12 weeks)           Precautions: Fall risk, history of seizures        Manuals 2021                                   Neuro Re-Ed         hurdels     Low no UE   Hallway   Fwd 3 laps  Lat 4 laps ea    Walking Ht/HN   // bars  2 laps ea  HT // bars  5 laps    Hn hallway   40ft x 4 // bars  3 laps ea Hallway (40ft)  3 laps ea Hallway (40ft)  2 laps ea   360 turns  180* turns  40ft ea  180* turns  40ft ea  Hallway (40ft)  1 laps ea Hallway (40ft)  3 laps ea   Side stepping  10ft // bars  pTB  6 laps   With ball toss   15ft x 4 ea    Fwd back walking  With ball toss   15ft x 4 ea     Backwards walking  // bars 4 laps  // bars 6 laps  Hallway   300ft Hallway (40ft)  3 laps ea Hallway (40ft)  4 laps ea  91 BPM   habituation  Seated focusing on objective to reduce dizziness       Static balance  Foam  FTEO 30"  FTEC   Trial 1 unable  FAEC 30"x 2  FTEC 30" x 1     FTEO   HT 30" x 2  HN 30"x2  airex  FTEO 30"  HT 30  HN 30"       Ther Ex        STS        Step ups      8inc  No UE  Fwd 20x  Lat 20x     Hr post 89 BpM   supine        prone        Bike L1 x 5 min unable to tolerate further  L1 x 5 min L1 x 11 min L1 10 min L1 x 10 min  Hr 98Bpm post                           Ther Activity        STS                Gait Training        treadmill        overground   Heel toe gait pattern    200ft Heel toe gait pattern    400ft    Modalities

## 2021-02-25 ENCOUNTER — OFFICE VISIT (OUTPATIENT)
Dept: PHYSICAL THERAPY | Facility: CLINIC | Age: 36
End: 2021-02-25
Payer: MEDICARE

## 2021-02-25 DIAGNOSIS — R42 DIZZINESS: Primary | ICD-10-CM

## 2021-02-25 DIAGNOSIS — R26.9 GAIT ABNORMALITY: ICD-10-CM

## 2021-02-25 PROCEDURE — 97112 NEUROMUSCULAR REEDUCATION: CPT | Performed by: PHYSICAL THERAPIST

## 2021-02-25 PROCEDURE — 97150 GROUP THERAPEUTIC PROCEDURES: CPT | Performed by: PHYSICAL THERAPIST

## 2021-02-25 PROCEDURE — 97110 THERAPEUTIC EXERCISES: CPT | Performed by: PHYSICAL THERAPIST

## 2021-02-25 NOTE — PROGRESS NOTES
Daily Note     Today's date: 2021  Patient name: Alexandro Guzman  : 1985  MRN: [id number]  Referring provider: Luis Painter MD  Dx:   Encounter Diagnosis     ICD-10-CM    1  Dizziness  R42    2  Gait abnormality  R26 9                   Subjective: Repots he has had a lot gong on, medically and otherwise at home  Arrived to pt 15 minutes late due to personal reason  Objective: See treatment diary below  1:1 w/PT 3:00-3:30 = 30 min  Grouped 3:30-3:45 = 15 min   Self directed 3:45-3:55 = 10 min  Assessment:  Took rest from treadmill, noting stressful situation at home and became tearful  Able to continue with remainder of session with good tolerance  Plan: Continue per plan of care  Short Term Goal Expiration Date:(4 weeks 2021)  Long Term Goal Expiration Date: (8 weeks 2021 12 weeks)  POC Expiration Date: (12 weeks)           Precautions: Fall risk, history of seizures        Manuals                                    Neuro Re-Ed         [facility]     Low no UE   Hallway   Fwd 3 laps  Lat 4 laps ea    Walking Ht/HN   Hallway (40ft)  3 laps ea   Hallway (40ft)  3 laps ea Hallway (40ft)  2 laps ea   360 turns    Hallway (40ft)  1 laps ea Hallway (40ft)  3 laps ea   Side stepping With ball toss   15ft x 4 ea    Fwd back walking  With ball toss   15ft x 4 ea     Naming letters on ball        With ball toss   15ft x 4 ea    Fwd back walking  With ball toss   15ft x 4 ea     Backwards walking     Hallway (40ft)  3 laps ea Hallway (40ft)  4 laps ea  91 BPM   habituation        Static balance         Ther Ex        STS        Step ups  8inch step no UE  Fwd 20x  Lat 10x     8inc  No UE  Fwd 20x  Lat 20x     Hr post 89 BpM   supine        prone        Bike    L1 10 min L1 x 10 min  Hr 98Bpm post                           Ther Activity        STS                Gait Training        treadmill Focus on heel toe gait pattern  1  7mph  5 min x 2       overground    Heel toe gait pattern    400ft    Modalities

## 2021-03-09 ENCOUNTER — TELEMEDICINE (OUTPATIENT)
Dept: INTERNAL MEDICINE CLINIC | Facility: CLINIC | Age: 36
End: 2021-03-09
Payer: MEDICARE

## 2021-03-09 VITALS — BODY MASS INDEX: 29.66 KG/M2 | WEIGHT: 178 LBS | HEIGHT: 65 IN

## 2021-03-09 DIAGNOSIS — Z20.822 EXPOSURE TO COVID-19 VIRUS: ICD-10-CM

## 2021-03-09 DIAGNOSIS — B34.9 VIRAL INFECTION, UNSPECIFIED: ICD-10-CM

## 2021-03-09 PROCEDURE — 99214 OFFICE O/P EST MOD 30 MIN: CPT | Performed by: NURSE PRACTITIONER

## 2021-03-09 PROCEDURE — U0005 INFEC AGEN DETEC AMPLI PROBE: HCPCS | Performed by: NURSE PRACTITIONER

## 2021-03-09 PROCEDURE — U0003 INFECTIOUS AGENT DETECTION BY NUCLEIC ACID (DNA OR RNA); SEVERE ACUTE RESPIRATORY SYNDROME CORONAVIRUS 2 (SARS-COV-2) (CORONAVIRUS DISEASE [COVID-19]), AMPLIFIED PROBE TECHNIQUE, MAKING USE OF HIGH THROUGHPUT TECHNOLOGIES AS DESCRIBED BY CMS-2020-01-R: HCPCS | Performed by: NURSE PRACTITIONER

## 2021-03-09 NOTE — PATIENT INSTRUCTIONS

## 2021-03-09 NOTE — PROGRESS NOTES
COVID-19 Virtual Visit     Assessment/Plan:    Problem List Items Addressed This Visit     None      Visit Diagnoses     Exposure to COVID-19 virus        Relevant Orders    Novel Coronavirus (Covid-19),PCR [facility] - Collected at [facility]    Viral infection, unspecified        Relevant Orders    Novel Coronavirus (Covid-19),PCR [facility] - Collected at [facility]         Disposition:     I referred patient to one of our centralized sites for a COVID-19 swab  Will send patient for covid testing  Hygiene and quarantine measures reviewed  Symptomatic care discussed  Reviewed red flag signs to call the office with or go to the Emergency Department with  Patient verbalizes understanding  I have spent 11 minutes directly with the patient  Greater than 50% of this time was spent in counseling/coordination of care regarding: diagnostic results, prognosis, instructions for management and patient and family education  Encounter provider IGNACIO Hart    Provider located at [address] [place] [id number]    Recent Visits  No visits were found meeting these conditions  Showing recent visits within past 7 days and meeting all other requirements     Today's Visits  Date Type Provider Dept   03/09/21 Telemedicine IGNACIO Hart [facility]   Showing today's visits and meeting all other requirements     Future Appointments  No visits were found meeting these conditions  Showing future appointments within next 150 days and meeting all other requirements      This virtual check-in was done via [employer] and patient was informed that this is not a secure, HIPAA-compliant platform  He agrees to proceed      Patient agrees to participate in a virtual check in via telephone or video visit instead of presenting to the office to address urgent/immediate medical needs  Patient is aware this is a billable service  After connecting through [facility], the patient was identified by name and date of birth  Elie Jean Baptiste was informed that this was a telemedicine visit and that the exam was being conducted confidentially over secure lines  My office door was closed  No one else was in the room  Elie Jean Baptiste acknowledged consent and understanding of privacy and security of the telemedicine visit  I informed the patient that I have reviewed his record in Epic and presented the opportunity for him to ask any questions regarding the visit today  The patient agreed to participate  Subjective:   Elie Jean Baptiste is a 38 y o  male who is concerned about COVID-19  Patient's symptoms include fatigue, nasal congestion, rhinorrhea, sore throat, cough and chest tightness (mild)  Patient denies fever, chills, anosmia, loss of taste, shortness of breath, abdominal pain, nausea, vomiting, diarrhea, myalgias and headaches  Date of symptom onset: 3/7/2021  Date of exposure: 3/6/2021    Exposure:   Contact with a person who is under investigation (PUI) for or who is positive for COVID-19 within the last 14 days?: Yes    Hospitalized recently for fever and/or lower respiratory symptoms?: No      Currently a healthcare worker that is involved in direct patient care?: No      Works in a special setting where the risk of COVID-19 transmission may be high? (this may include long-term care, correctional and shelter facilities; homeless shelters; assisted-living facilities and group homes ): No      Resident in a special setting where the risk of COVID-19 transmission may be high? (this may include long-term care, correctional and shelter facilities; homeless shelters; assisted-living facilities and group homes ): No      Patient's wife tested positive for COVID today, he has similar symptoms to the ones she is experiencing       No results found for: SARSCOV2, Madiha Elmore  Past Medical History:   Diagnosis Date    Diabetes mellitus ([facility] [address] )     type 2    Disease of thyroid gland     hypothyroidism    Hyperlipidemia     Hypertension     Psychiatric disorder     PTSD   Anxiety, depression,      Past Surgical History:   Procedure Laterality Date    WISDOM TOOTH EXTRACTION       Current Outpatient Medications   Medication Sig Dispense Refill    Alogliptin Benzoate 25 MG TABS Take 1 tablet by mouth daily      ALPRAZolam (XANAX) 0 5 mg tablet Take 0 5 mg by mouth Three times daily as needed      clobetasol (TEMOVATE) 0 05 % cream Apply topically 2 (two) times a day 60 g 0    cloNIDine (CATAPRES) 0 2 mg tablet Take 0 1 mg by mouth daily       dicyclomine (BENTYL) 20 mg tablet Take 1 tablet (20 mg total) by mouth every 6 (six) hours as needed (abdominal pain) 45 tablet 2    divalproex sodium (DEPAKOTE) 500 mg EC tablet Take 500 mg by mouth every 12 (twelve) hours       Empagliflozin (Jardiance) 25 MG TABS Take 1 tablet by mouth daily      gabapentin (NEURONTIN) 300 mg capsule Take 300 mg by mouth Three times a day      hydrocortisone (ANUSOL-HC) 25 mg suppository Insert 1 suppository (25 mg total) into the rectum 2 (two) times a day 30 suppository 1    insulin glargine (LANTUS SOLOSTAR) 100 units/mL injection pen Inject 28 Units under the skin daily       levothyroxine 25 mcg tablet Take 25 mcg by mouth daily      loratadine (CLARITIN) 10 mg tablet Take 10 mg by mouth daily      meloxicam (MOBIC) 15 mg tablet       metFORMIN (GLUCOPHAGE) 1000 MG tablet Take 1,000 mg by mouth daily       metoprolol succinate (TOPROL-XL) 50 mg 24 hr tablet Take 1 tablet (50 mg total) by mouth daily 30 tablet 6    mupirocin (BACTROBAN) 2 % ointment Apply topically 3 (three) times a day 30 g 3    OMEGA-3 FATTY ACIDS PO Take 2 g by mouth daily      omeprazole (PriLOSEC) 40 MG capsule Take 1 tablet 30-60 minutes prior to breakfast 30 capsule 3    pravastatin (PRAVACHOL) 40 mg tablet Take 40 mg by mouth daily      sertraline (ZOLOFT) 50 mg tablet       sildenafil (VIAGRA) 100 mg tablet Take 100 mg by mouth as needed for erectile dysfunction       traZODone (DESYREL) 100 mg tablet Take 200 mg by mouth      venlafaxine (EFFEXOR-XR) 75 mg 24 hr capsule TAKE 1 CAPSULE BY MOUTH ONCE DAILY IN ADDITION TO A 150MG CAPSULE FOR A TOTAL DAILY DOSE OF 225MG DAILY      venlafaxine (EFFEXOR-XR) 150 mg 24 hr capsule TAKE 1 CAPSULE BY MOUTH EVERY MORNING FOR MOOD       No current facility-administered medications for this visit  Allergies   Allergen Reactions    Lamotrigine GI Intolerance    Simvastatin Other (See Comments)     Elevated liver enzymes  Liver enzyme elevation    Niacin Rash       Review of Systems   Constitutional: Positive for fatigue  Negative for chills and fever  HENT: Positive for congestion, rhinorrhea and sore throat  Negative for trouble swallowing  Respiratory: Positive for cough and chest tightness (mild)  Negative for shortness of breath  Cardiovascular: Negative for chest pain, palpitations and leg swelling  Gastrointestinal: Negative for abdominal pain, diarrhea, nausea and vomiting  Musculoskeletal: Negative for myalgias  Skin: Negative for rash  Neurological: Negative for dizziness and headaches  Psychiatric/Behavioral: Negative for sleep disturbance  Objective:    Vitals:    03/09/21 1639   Weight: 80 7 kg (178 lb)   Height: 5' 5" (1 651 m)       Physical Exam  Constitutional:       General: He is not in acute distress  Appearance: Normal appearance  He is not ill-appearing  HENT:      Head: Normocephalic and atraumatic  Eyes:      General: No scleral icterus  Neck:      Musculoskeletal: Normal range of motion  Pulmonary:      Effort: Pulmonary effort is normal  No respiratory distress (able to speak in full sentences without signs of dyspnea)  Abdominal:      Tenderness:  There is no abdominal tenderness  Skin:     Findings: No erythema  Neurological:      Mental Status: He is alert and oriented to person, place, and time  Psychiatric:         Mood and Affect: Mood normal          Behavior: Behavior normal        VIRTUAL VISIT DISCLAIMER    Lauri Cox acknowledges that he has consented to an online visit or consultation  He understands that the online visit is based solely on information provided by him, and that, in the absence of a face-to-face physical evaluation by the physician, the diagnosis he receives is both limited and provisional in terms of accuracy and completeness  This is not intended to replace a full medical face-to-face evaluation by the physician  Lauri Cox understands and accepts these terms

## 2021-03-10 LAB — SARS-COV-2 RNA RESP QL NAA+PROBE: POSITIVE

## 2021-03-11 ENCOUNTER — TELEPHONE (OUTPATIENT)
Dept: PHYSICAL THERAPY | Facility: CLINIC | Age: 36
End: 2021-03-11

## 2021-03-11 NOTE — TELEPHONE ENCOUNTER
Spoke with patient regarding scheduling more PT appointment  Patient noted he and his family tested positive for COVID and will call back when he is done with quarantine to scheduled more PT visits

## 2021-03-17 ENCOUNTER — APPOINTMENT (EMERGENCY)
Dept: RADIOLOGY | Facility: HOSPITAL | Age: 36
DRG: 871 | End: 2021-03-17
Payer: MEDICARE

## 2021-03-17 ENCOUNTER — HOSPITAL ENCOUNTER (INPATIENT)
Facility: HOSPITAL | Age: 36
LOS: 8 days | Discharge: HOME HEALTH | DRG: 871 | End: 2021-03-25
Attending: EMERGENCY MEDICINE | Admitting: INTERNAL MEDICINE
Payer: MEDICARE

## 2021-03-17 DIAGNOSIS — Z79.4 TYPE 2 DIABETES MELLITUS WITHOUT COMPLICATION, WITH LONG-TERM CURRENT USE OF INSULIN (HCC): ICD-10-CM

## 2021-03-17 DIAGNOSIS — U07.1 COVID-19: ICD-10-CM

## 2021-03-17 DIAGNOSIS — A41.9 SEPSIS (HCC): ICD-10-CM

## 2021-03-17 DIAGNOSIS — J12.82 PNEUMONIA DUE TO COVID-19 VIRUS: Primary | ICD-10-CM

## 2021-03-17 DIAGNOSIS — E11.9 TYPE 2 DIABETES MELLITUS WITHOUT COMPLICATION, WITH LONG-TERM CURRENT USE OF INSULIN (HCC): ICD-10-CM

## 2021-03-17 DIAGNOSIS — F43.10 PTSD (POST-TRAUMATIC STRESS DISORDER): ICD-10-CM

## 2021-03-17 DIAGNOSIS — U07.1 PNEUMONIA DUE TO COVID-19 VIRUS: Primary | ICD-10-CM

## 2021-03-17 PROBLEM — R79.89 ELEVATED LACTIC ACID LEVEL: Status: ACTIVE | Noted: 2021-03-17

## 2021-03-17 PROBLEM — E87.2 HIGH ANION GAP METABOLIC ACIDOSIS: Status: ACTIVE | Noted: 2021-03-17

## 2021-03-17 PROBLEM — E87.29 HIGH ANION GAP METABOLIC ACIDOSIS: Status: ACTIVE | Noted: 2021-03-17

## 2021-03-17 PROBLEM — R00.0 TACHYCARDIA: Status: ACTIVE | Noted: 2021-03-17

## 2021-03-17 PROBLEM — J96.01 ACUTE RESPIRATORY FAILURE WITH HYPOXIA (HCC): Status: ACTIVE | Noted: 2021-03-17

## 2021-03-17 LAB
ALBUMIN SERPL BCP-MCNC: 2.7 G/DL (ref 3.5–5)
ALP SERPL-CCNC: 109 U/L (ref 46–116)
ALT SERPL W P-5'-P-CCNC: 136 U/L (ref 12–78)
ANION GAP SERPL CALCULATED.3IONS-SCNC: 10 MMOL/L (ref 4–13)
ANION GAP SERPL CALCULATED.3IONS-SCNC: 15 MMOL/L (ref 4–13)
APTT PPP: 28 SECONDS (ref 23–37)
AST SERPL W P-5'-P-CCNC: 191 U/L (ref 5–45)
ATRIAL RATE: 108 BPM
BACTERIA UR QL AUTO: NORMAL /HPF
BASE EX.OXY STD BLDV CALC-SCNC: 32.8 % (ref 60–80)
BASE EXCESS BLDV CALC-SCNC: -4.3 MMOL/L
BASOPHILS # BLD MANUAL: 0 THOUSAND/UL (ref 0–0.1)
BASOPHILS NFR MAR MANUAL: 0 % (ref 0–1)
BETA-HYDROXYBUTYRATE: 2.4 MMOL/L
BILIRUB SERPL-MCNC: 0.53 MG/DL (ref 0.2–1)
BILIRUB UR QL STRIP: ABNORMAL
BUN SERPL-MCNC: 20 MG/DL (ref 5–25)
BUN SERPL-MCNC: 27 MG/DL (ref 5–25)
CALCIUM ALBUM COR SERPL-MCNC: 10.3 MG/DL (ref 8.3–10.1)
CALCIUM SERPL-MCNC: 8 MG/DL (ref 8.3–10.1)
CALCIUM SERPL-MCNC: 9.3 MG/DL (ref 8.3–10.1)
CHLORIDE SERPL-SCNC: 103 MMOL/L (ref 100–108)
CHLORIDE SERPL-SCNC: 97 MMOL/L (ref 100–108)
CLARITY UR: CLEAR
CO2 SERPL-SCNC: 21 MMOL/L (ref 21–32)
CO2 SERPL-SCNC: 22 MMOL/L (ref 21–32)
COLOR UR: YELLOW
CREAT SERPL-MCNC: 1 MG/DL (ref 0.6–1.3)
CREAT SERPL-MCNC: 1.33 MG/DL (ref 0.6–1.3)
D DIMER PPP FEU-MCNC: <0.27 UG/ML FEU
EOSINOPHIL # BLD MANUAL: 0 THOUSAND/UL (ref 0–0.4)
EOSINOPHIL NFR BLD MANUAL: 0 % (ref 0–6)
ERYTHROCYTE [DISTWIDTH] IN BLOOD BY AUTOMATED COUNT: 13.6 % (ref 11.6–15.1)
GFR SERPL CREATININE-BSD FRML MDRD: 68 ML/MIN/1.73SQ M
GFR SERPL CREATININE-BSD FRML MDRD: 96 ML/MIN/1.73SQ M
GLUCOSE SERPL-MCNC: 113 MG/DL (ref 65–140)
GLUCOSE SERPL-MCNC: 168 MG/DL (ref 65–140)
GLUCOSE SERPL-MCNC: 177 MG/DL (ref 65–140)
GLUCOSE SERPL-MCNC: 202 MG/DL (ref 65–140)
GLUCOSE SERPL-MCNC: 216 MG/DL (ref 65–140)
GLUCOSE UR STRIP-MCNC: ABNORMAL MG/DL
HCO3 BLDV-SCNC: 21.2 MMOL/L (ref 24–30)
HCT VFR BLD AUTO: 44.1 % (ref 36.5–49.3)
HGB BLD-MCNC: 14.9 G/DL (ref 12–17)
HGB UR QL STRIP.AUTO: NEGATIVE
INR PPP: 0.9 (ref 0.84–1.19)
KETONES UR STRIP-MCNC: ABNORMAL MG/DL
LACTATE SERPL-SCNC: 1 MMOL/L (ref 0.5–2)
LACTATE SERPL-SCNC: 2.4 MMOL/L (ref 0.5–2)
LEUKOCYTE ESTERASE UR QL STRIP: NEGATIVE
LYMPHOCYTES # BLD AUTO: 0.67 THOUSAND/UL (ref 0.6–4.47)
LYMPHOCYTES # BLD AUTO: 9 % (ref 14–44)
MCH RBC QN AUTO: 29.5 PG (ref 26.8–34.3)
MCHC RBC AUTO-ENTMCNC: 33.8 G/DL (ref 31.4–37.4)
MCV RBC AUTO: 87 FL (ref 82–98)
MONOCYTES # BLD AUTO: 1.12 THOUSAND/UL (ref 0–1.22)
MONOCYTES NFR BLD: 15 % (ref 4–12)
MYELOCYTES NFR BLD MANUAL: 1 % (ref 0–1)
NEUTROPHILS # BLD MANUAL: 5.58 THOUSAND/UL (ref 1.85–7.62)
NEUTS BAND NFR BLD MANUAL: 10 % (ref 0–8)
NEUTS SEG NFR BLD AUTO: 65 % (ref 43–75)
NITRITE UR QL STRIP: NEGATIVE
NON-SQ EPI CELLS URNS QL MICRO: NORMAL /HPF
NRBC BLD AUTO-RTO: 0 /100 WBCS
O2 CT BLDV-SCNC: 6.6 ML/DL
P AXIS: 53 DEGREES
PCO2 BLDV: 40.8 MM HG (ref 42–50)
PH BLDV: 7.33 [PH] (ref 7.3–7.4)
PH UR STRIP.AUTO: 6 [PH]
PLATELET # BLD AUTO: 206 THOUSANDS/UL (ref 149–390)
PLATELET BLD QL SMEAR: ADEQUATE
PMV BLD AUTO: 9.6 FL (ref 8.9–12.7)
PO2 BLDV: 19.9 MM HG (ref 35–45)
POTASSIUM SERPL-SCNC: 4.3 MMOL/L (ref 3.5–5.3)
POTASSIUM SERPL-SCNC: 4.3 MMOL/L (ref 3.5–5.3)
PR INTERVAL: 128 MS
PROCALCITONIN SERPL-MCNC: 3.17 NG/ML
PROT SERPL-MCNC: 8 G/DL (ref 6.4–8.2)
PROT UR STRIP-MCNC: ABNORMAL MG/DL
PROTHROMBIN TIME: 12.3 SECONDS (ref 11.6–14.5)
QRS AXIS: 37 DEGREES
QRSD INTERVAL: 84 MS
QT INTERVAL: 328 MS
QTC INTERVAL: 439 MS
RBC # BLD AUTO: 5.05 MILLION/UL (ref 3.88–5.62)
RBC #/AREA URNS AUTO: NORMAL /HPF
SODIUM SERPL-SCNC: 134 MMOL/L (ref 136–145)
SODIUM SERPL-SCNC: 134 MMOL/L (ref 136–145)
SP GR UR STRIP.AUTO: 1.01 (ref 1–1.03)
T WAVE AXIS: 37 DEGREES
TOTAL CELLS COUNTED SPEC: 100
TROPONIN I SERPL-MCNC: <0.02 NG/ML
UROBILINOGEN UR QL STRIP.AUTO: 0.2 E.U./DL
VENTRICULAR RATE: 108 BPM
WBC # BLD AUTO: 7.44 THOUSAND/UL (ref 4.31–10.16)
WBC #/AREA URNS AUTO: NORMAL /HPF

## 2021-03-17 PROCEDURE — 85007 BL SMEAR W/DIFF WBC COUNT: CPT | Performed by: EMERGENCY MEDICINE

## 2021-03-17 PROCEDURE — 84145 PROCALCITONIN (PCT): CPT | Performed by: EMERGENCY MEDICINE

## 2021-03-17 PROCEDURE — 36415 COLL VENOUS BLD VENIPUNCTURE: CPT | Performed by: EMERGENCY MEDICINE

## 2021-03-17 PROCEDURE — 71045 X-RAY EXAM CHEST 1 VIEW: CPT

## 2021-03-17 PROCEDURE — 82805 BLOOD GASES W/O2 SATURATION: CPT | Performed by: EMERGENCY MEDICINE

## 2021-03-17 PROCEDURE — 85730 THROMBOPLASTIN TIME PARTIAL: CPT | Performed by: EMERGENCY MEDICINE

## 2021-03-17 PROCEDURE — 82728 ASSAY OF FERRITIN: CPT | Performed by: INTERNAL MEDICINE

## 2021-03-17 PROCEDURE — 83605 ASSAY OF LACTIC ACID: CPT | Performed by: EMERGENCY MEDICINE

## 2021-03-17 PROCEDURE — 80048 BASIC METABOLIC PNL TOTAL CA: CPT | Performed by: INTERNAL MEDICINE

## 2021-03-17 PROCEDURE — 96365 THER/PROPH/DIAG IV INF INIT: CPT

## 2021-03-17 PROCEDURE — 99223 1ST HOSP IP/OBS HIGH 75: CPT | Performed by: INTERNAL MEDICINE

## 2021-03-17 PROCEDURE — 99285 EMERGENCY DEPT VISIT HI MDM: CPT | Performed by: EMERGENCY MEDICINE

## 2021-03-17 PROCEDURE — 93010 ELECTROCARDIOGRAM REPORT: CPT | Performed by: INTERNAL MEDICINE

## 2021-03-17 PROCEDURE — 85610 PROTHROMBIN TIME: CPT | Performed by: EMERGENCY MEDICINE

## 2021-03-17 PROCEDURE — 85379 FIBRIN DEGRADATION QUANT: CPT | Performed by: INTERNAL MEDICINE

## 2021-03-17 PROCEDURE — 99285 EMERGENCY DEPT VISIT HI MDM: CPT

## 2021-03-17 PROCEDURE — 96375 TX/PRO/DX INJ NEW DRUG ADDON: CPT

## 2021-03-17 PROCEDURE — 93005 ELECTROCARDIOGRAM TRACING: CPT | Performed by: INTERNAL MEDICINE

## 2021-03-17 PROCEDURE — 84484 ASSAY OF TROPONIN QUANT: CPT | Performed by: EMERGENCY MEDICINE

## 2021-03-17 PROCEDURE — 82010 KETONE BODYS QUAN: CPT | Performed by: EMERGENCY MEDICINE

## 2021-03-17 PROCEDURE — 85027 COMPLETE CBC AUTOMATED: CPT | Performed by: EMERGENCY MEDICINE

## 2021-03-17 PROCEDURE — 82948 REAGENT STRIP/BLOOD GLUCOSE: CPT

## 2021-03-17 PROCEDURE — 80053 COMPREHEN METABOLIC PANEL: CPT | Performed by: EMERGENCY MEDICINE

## 2021-03-17 PROCEDURE — 93005 ELECTROCARDIOGRAM TRACING: CPT

## 2021-03-17 PROCEDURE — 96361 HYDRATE IV INFUSION ADD-ON: CPT

## 2021-03-17 PROCEDURE — 96374 THER/PROPH/DIAG INJ IV PUSH: CPT

## 2021-03-17 PROCEDURE — 81001 URINALYSIS AUTO W/SCOPE: CPT | Performed by: INTERNAL MEDICINE

## 2021-03-17 PROCEDURE — 87040 BLOOD CULTURE FOR BACTERIA: CPT | Performed by: EMERGENCY MEDICINE

## 2021-03-17 RX ORDER — ATORVASTATIN CALCIUM 40 MG/1
40 TABLET, FILM COATED ORAL
Status: DISCONTINUED | OUTPATIENT
Start: 2021-03-17 | End: 2021-03-25 | Stop reason: HOSPADM

## 2021-03-17 RX ORDER — VENLAFAXINE HYDROCHLORIDE 37.5 MG/1
37.5 CAPSULE, EXTENDED RELEASE ORAL DAILY
Status: DISCONTINUED | OUTPATIENT
Start: 2021-03-18 | End: 2021-03-24

## 2021-03-17 RX ORDER — PRAVASTATIN SODIUM 40 MG
40 TABLET ORAL DAILY
Status: DISCONTINUED | OUTPATIENT
Start: 2021-03-18 | End: 2021-03-17

## 2021-03-17 RX ORDER — LEVOTHYROXINE SODIUM 0.03 MG/1
25 TABLET ORAL
Status: DISCONTINUED | OUTPATIENT
Start: 2021-03-18 | End: 2021-03-25 | Stop reason: HOSPADM

## 2021-03-17 RX ORDER — TRAZODONE HYDROCHLORIDE 100 MG/1
100 TABLET ORAL
Status: DISCONTINUED | OUTPATIENT
Start: 2021-03-17 | End: 2021-03-25 | Stop reason: HOSPADM

## 2021-03-17 RX ORDER — INSULIN GLARGINE 100 [IU]/ML
28 INJECTION, SOLUTION SUBCUTANEOUS
Status: DISCONTINUED | OUTPATIENT
Start: 2021-03-17 | End: 2021-03-19

## 2021-03-17 RX ORDER — FAMOTIDINE 20 MG/1
20 TABLET, FILM COATED ORAL 2 TIMES DAILY
Status: DISCONTINUED | OUTPATIENT
Start: 2021-03-17 | End: 2021-03-25 | Stop reason: HOSPADM

## 2021-03-17 RX ORDER — DEXAMETHASONE SODIUM PHOSPHATE 4 MG/ML
6 INJECTION, SOLUTION INTRA-ARTICULAR; INTRALESIONAL; INTRAMUSCULAR; INTRAVENOUS; SOFT TISSUE EVERY 24 HOURS
Status: DISCONTINUED | OUTPATIENT
Start: 2021-03-17 | End: 2021-03-18

## 2021-03-17 RX ORDER — ZINC SULFATE 50(220)MG
220 CAPSULE ORAL DAILY
Status: COMPLETED | OUTPATIENT
Start: 2021-03-18 | End: 2021-03-24

## 2021-03-17 RX ORDER — ALPRAZOLAM 0.5 MG/1
0.5 TABLET ORAL 3 TIMES DAILY PRN
Status: DISCONTINUED | OUTPATIENT
Start: 2021-03-17 | End: 2021-03-25 | Stop reason: HOSPADM

## 2021-03-17 RX ORDER — MULTIVITAMIN/IRON/FOLIC ACID 18MG-0.4MG
1 TABLET ORAL DAILY
Status: DISCONTINUED | OUTPATIENT
Start: 2021-03-25 | End: 2021-03-25 | Stop reason: HOSPADM

## 2021-03-17 RX ORDER — DOXYCYCLINE HYCLATE 100 MG/1
100 CAPSULE ORAL EVERY 12 HOURS
Status: DISCONTINUED | OUTPATIENT
Start: 2021-03-17 | End: 2021-03-25 | Stop reason: HOSPADM

## 2021-03-17 RX ORDER — LORATADINE 10 MG/1
10 TABLET ORAL DAILY
Status: DISCONTINUED | OUTPATIENT
Start: 2021-03-18 | End: 2021-03-25 | Stop reason: HOSPADM

## 2021-03-17 RX ORDER — ASCORBIC ACID 500 MG
1000 TABLET ORAL EVERY 12 HOURS SCHEDULED
Status: COMPLETED | OUTPATIENT
Start: 2021-03-17 | End: 2021-03-24

## 2021-03-17 RX ORDER — DICYCLOMINE HCL 20 MG
20 TABLET ORAL EVERY 6 HOURS PRN
Status: DISCONTINUED | OUTPATIENT
Start: 2021-03-17 | End: 2021-03-25 | Stop reason: HOSPADM

## 2021-03-17 RX ORDER — HEPARIN SODIUM 10000 [USP'U]/100ML
3-30 INJECTION, SOLUTION INTRAVENOUS
Status: DISCONTINUED | OUTPATIENT
Start: 2021-03-17 | End: 2021-03-19

## 2021-03-17 RX ORDER — METOPROLOL SUCCINATE 50 MG/1
50 TABLET, EXTENDED RELEASE ORAL DAILY
Status: DISCONTINUED | OUTPATIENT
Start: 2021-03-18 | End: 2021-03-21

## 2021-03-17 RX ORDER — HEPARIN SODIUM 5000 [USP'U]/ML
5000 INJECTION, SOLUTION INTRAVENOUS; SUBCUTANEOUS EVERY 8 HOURS SCHEDULED
Status: DISCONTINUED | OUTPATIENT
Start: 2021-03-17 | End: 2021-03-17

## 2021-03-17 RX ORDER — DIVALPROEX SODIUM 500 MG/1
500 TABLET, DELAYED RELEASE ORAL EVERY 12 HOURS SCHEDULED
Status: DISCONTINUED | OUTPATIENT
Start: 2021-03-17 | End: 2021-03-25 | Stop reason: HOSPADM

## 2021-03-17 RX ORDER — GABAPENTIN 300 MG/1
300 CAPSULE ORAL 3 TIMES DAILY
Status: DISCONTINUED | OUTPATIENT
Start: 2021-03-17 | End: 2021-03-25 | Stop reason: HOSPADM

## 2021-03-17 RX ORDER — ACETAMINOPHEN 325 MG/1
650 TABLET ORAL ONCE
Status: COMPLETED | OUTPATIENT
Start: 2021-03-17 | End: 2021-03-17

## 2021-03-17 RX ORDER — CLONIDINE HYDROCHLORIDE 0.1 MG/1
0.1 TABLET ORAL DAILY
Status: DISCONTINUED | OUTPATIENT
Start: 2021-03-18 | End: 2021-03-25 | Stop reason: HOSPADM

## 2021-03-17 RX ORDER — PANTOPRAZOLE SODIUM 40 MG/1
40 TABLET, DELAYED RELEASE ORAL
Status: DISCONTINUED | OUTPATIENT
Start: 2021-03-18 | End: 2021-03-25 | Stop reason: HOSPADM

## 2021-03-17 RX ORDER — ONDANSETRON 2 MG/ML
4 INJECTION INTRAMUSCULAR; INTRAVENOUS ONCE
Status: COMPLETED | OUTPATIENT
Start: 2021-03-17 | End: 2021-03-17

## 2021-03-17 RX ORDER — ACETAMINOPHEN 325 MG/1
650 TABLET ORAL EVERY 6 HOURS PRN
Status: DISCONTINUED | OUTPATIENT
Start: 2021-03-17 | End: 2021-03-25 | Stop reason: HOSPADM

## 2021-03-17 RX ORDER — MELATONIN
2000 DAILY
Status: DISCONTINUED | OUTPATIENT
Start: 2021-03-18 | End: 2021-03-25 | Stop reason: HOSPADM

## 2021-03-17 RX ADMIN — TRAZODONE HYDROCHLORIDE 100 MG: 100 TABLET ORAL at 22:14

## 2021-03-17 RX ADMIN — INSULIN GLARGINE 28 UNITS: 100 INJECTION, SOLUTION SUBCUTANEOUS at 22:14

## 2021-03-17 RX ADMIN — FAMOTIDINE 20 MG: 20 TABLET ORAL at 17:26

## 2021-03-17 RX ADMIN — DEXAMETHASONE SODIUM PHOSPHATE 6 MG: 4 INJECTION INTRA-ARTICULAR; INTRALESIONAL; INTRAMUSCULAR; INTRAVENOUS; SOFT TISSUE at 17:27

## 2021-03-17 RX ADMIN — AZITHROMYCIN MONOHYDRATE 500 MG: 500 INJECTION, POWDER, LYOPHILIZED, FOR SOLUTION INTRAVENOUS at 13:09

## 2021-03-17 RX ADMIN — CEFTRIAXONE SODIUM 1000 MG: 10 INJECTION, POWDER, FOR SOLUTION INTRAVENOUS at 12:58

## 2021-03-17 RX ADMIN — ONDANSETRON 4 MG: 2 INJECTION INTRAMUSCULAR; INTRAVENOUS at 11:58

## 2021-03-17 RX ADMIN — ACETAMINOPHEN 650 MG: 325 TABLET, FILM COATED ORAL at 11:58

## 2021-03-17 RX ADMIN — ACETAMINOPHEN 650 MG: 325 TABLET, FILM COATED ORAL at 18:31

## 2021-03-17 RX ADMIN — OXYCODONE HYDROCHLORIDE AND ACETAMINOPHEN 1000 MG: 500 TABLET ORAL at 22:14

## 2021-03-17 RX ADMIN — SODIUM CHLORIDE 1000 ML: 0.9 INJECTION, SOLUTION INTRAVENOUS at 11:59

## 2021-03-17 RX ADMIN — DOXYCYCLINE 100 MG: 100 CAPSULE ORAL at 22:14

## 2021-03-17 RX ADMIN — SODIUM CHLORIDE 1000 ML: 0.9 INJECTION, SOLUTION INTRAVENOUS at 17:37

## 2021-03-17 RX ADMIN — DIVALPROEX SODIUM 500 MG: 500 TABLET, DELAYED RELEASE ORAL at 22:14

## 2021-03-17 RX ADMIN — ATORVASTATIN CALCIUM 40 MG: 40 TABLET, FILM COATED ORAL at 22:14

## 2021-03-17 RX ADMIN — HEPARIN SODIUM 18 UNITS/KG/HR: 10000 INJECTION, SOLUTION INTRAVENOUS at 18:29

## 2021-03-17 RX ADMIN — GABAPENTIN 300 MG: 300 CAPSULE ORAL at 22:14

## 2021-03-17 RX ADMIN — INSULIN LISPRO 1 UNITS: 100 INJECTION, SOLUTION INTRAVENOUS; SUBCUTANEOUS at 23:05

## 2021-03-17 NOTE — ED PROVIDER NOTES
History  Chief Complaint   Patient presents with    Shortness of Breath     brought from home by ems for worsening covid s/s  covid + 19      58-year-old male presents emergency department by EMS for evaluation of worsening COVID-19 symptoms  Patient states that he tested positive on March 9th  His wife also tested positive and is recovering  Patient states since the onset of the illness he has felt short of breath  He has had difficulty eating and drinking due to nausea with vomiting, generalized abdominal pain and severe diarrhea  Patient has a nonproductive cough  He feels that he cannot get enough air  EMS reported the patient had oxygen saturation of 77% on room air  Patient does not smoke  He has a history of diabetes but admits to not taking his medication regularly due to not tolerating a normal diet  Patient appears very anxious  I was able to titrate his oxygen down to 2 L and he maintain oxygen of 91 to 92%  History provided by:  Patient and medical records   used: No    Shortness of Breath  Severity:  Severe  Onset quality:  Gradual  Duration:  8 days  Timing:  Constant  Progression:  Worsening  Chronicity:  New  Context: URI    Context: not activity    Relieved by:  Nothing  Worsened by: Activity, coughing, exertion and movement  Ineffective treatments:  Rest and lying down  Associated symptoms: abdominal pain, chest pain, cough, fever, headaches and vomiting    Associated symptoms: no sore throat, no sputum production and no wheezing    Risk factors: no recent surgery        Prior to Admission Medications   Prescriptions Last Dose Informant Patient Reported? Taking?    ALPRAZolam (XANAX) 0 5 mg tablet Past Month at Unknown time Self Yes Yes   Sig: Take 0 5 mg by mouth Three times daily as needed   Alogliptin Benzoate 25 MG TABS Past Week at Unknown time Self Yes Yes   Sig: Take 1 tablet by mouth daily   Empagliflozin (Jardiance) 25 MG TABS 3/16/2021 at Unknown time Self Yes Yes   Sig: Take 1 tablet by mouth daily   OMEGA-3 FATTY ACIDS PO 3/16/2021 at Unknown time Self Yes Yes   Sig: Take 2 g by mouth daily   cloNIDine (CATAPRES) 0 2 mg tablet Not Taking at Unknown time Self Yes No   Sig: Take 0 1 mg by mouth daily    clobetasol (TEMOVATE) 0 05 % cream Not Taking at Unknown time  No No   Sig: Apply topically 2 (two) times a day   Patient not taking: Reported on 3/17/2021   dicyclomine (BENTYL) 20 mg tablet Not Taking at Unknown time  No No   Sig: Take 1 tablet (20 mg total) by mouth every 6 (six) hours as needed (abdominal pain)   Patient not taking: Reported on 3/17/2021   divalproex sodium (DEPAKOTE) 500 mg EC tablet 3/16/2021 at Unknown time Self Yes Yes   Sig: Take 500 mg by mouth every 12 (twelve) hours    gabapentin (NEURONTIN) 300 mg capsule 3/16/2021 at Unknown time Self Yes Yes   Sig: Take 300 mg by mouth Three times a day   hydrocortisone (ANUSOL-HC) 25 mg suppository Not Taking at Unknown time Self No No   Sig: Insert 1 suppository (25 mg total) into the rectum 2 (two) times a day   Patient not taking: Reported on 3/17/2021   insulin glargine (LANTUS SOLOSTAR) 100 units/mL injection pen 3/16/2021 at Unknown time Self Yes Yes   Sig: Inject 28 Units under the skin daily    levothyroxine 25 mcg tablet 3/16/2021 at Unknown time Self Yes Yes   Sig: Take 25 mcg by mouth daily   loratadine (CLARITIN) 10 mg tablet More than a month at Unknown time Self Yes No   Sig: Take 10 mg by mouth daily   meloxicam (MOBIC) 15 mg tablet Not Taking at Unknown time Self Yes No   metFORMIN (GLUCOPHAGE) 1000 MG tablet Not Taking at Unknown time Self Yes No   Sig: Take 1,000 mg by mouth daily    metoprolol succinate (TOPROL-XL) 50 mg 24 hr tablet 3/16/2021 at Unknown time  No Yes   Sig: Take 1 tablet (50 mg total) by mouth daily   mupirocin (BACTROBAN) 2 % ointment Not Taking at Unknown time Self No No   Sig: Apply topically 3 (three) times a day   Patient not taking: Reported on 3/17/2021 omeprazole (PriLOSEC) 40 MG capsule 3/16/2021 at Unknown time  No Yes   Sig: Take 1 tablet 30-60 minutes prior to breakfast   pravastatin (PRAVACHOL) 40 mg tablet 3/16/2021 at Unknown time Self Yes Yes   Sig: Take 40 mg by mouth daily   sertraline (ZOLOFT) 50 mg tablet Not Taking at Unknown time  Yes No   sildenafil (VIAGRA) 100 mg tablet  Self Yes No   Sig: Take 100 mg by mouth as needed for erectile dysfunction    traZODone (DESYREL) 100 mg tablet 3/16/2021 at Unknown time Self Yes Yes   Sig: Take 200 mg by mouth   venlafaxine (EFFEXOR-XR) 150 mg 24 hr capsule Not Taking at Unknown time Self Yes No   Sig: TAKE 1 CAPSULE BY MOUTH EVERY MORNING FOR MOOD   venlafaxine (EFFEXOR-XR) 75 mg 24 hr capsule 3/16/2021 at Unknown time Self Yes Yes   Sig: TAKE 1 CAPSULE BY MOUTH ONCE DAILY IN ADDITION TO A 150MG CAPSULE FOR A TOTAL DAILY DOSE OF 225MG DAILY      Facility-Administered Medications: None       Past Medical History:   Diagnosis Date    Diabetes mellitus ([facility] [address] )     type 2    Disease of thyroid gland     hypothyroidism    Hyperlipidemia     Hypertension     Psychiatric disorder     PTSD  Anxiety, depression,        Past Surgical History:   Procedure Laterality Date    WISDOM TOOTH EXTRACTION         Family History   Problem Relation Age of Onset    Hyperlipidemia Father     Heart attack Paternal Grandfather      I have reviewed and agree with the history as documented  E-Cigarette/Vaping    E-Cigarette Use Never User      E-Cigarette/Vaping Substances    Nicotine No     THC No     CBD No     Flavoring No     Other No      Social History     Tobacco Use    Smoking status: Never Smoker    Smokeless tobacco: Never Used   Substance Use Topics    Alcohol use: Not Currently    Drug use: No       Review of Systems   Constitutional: Positive for activity change, appetite change, chills, fatigue and fever  HENT: Negative for sore throat      Respiratory: Positive for cough, chest tightness and shortness of breath  Negative for sputum production and wheezing  Cardiovascular: Positive for chest pain  Gastrointestinal: Positive for abdominal pain, diarrhea, nausea and vomiting  Musculoskeletal: Negative for back pain  Neurological: Positive for seizures (Chronic nonepileptic seizures) and headaches  Psychiatric/Behavioral: The patient is nervous/anxious  All other systems reviewed and are negative  Physical Exam  Physical Exam  Vitals signs reviewed  Constitutional:       General: He is in acute distress  Appearance: Normal appearance  He is well-developed  He is ill-appearing  He is not toxic-appearing or diaphoretic  HENT:      Head: Normocephalic and atraumatic  Eyes:      Conjunctiva/sclera: Conjunctivae normal       Pupils: Pupils are equal, round, and reactive to light  Neck:      Musculoskeletal: Normal range of motion and neck supple  Cardiovascular:      Rate and Rhythm: Regular rhythm  Tachycardia present  Heart sounds: Normal heart sounds  Pulmonary:      Effort: Pulmonary effort is normal  Tachypnea present  No accessory muscle usage or respiratory distress  Breath sounds: Rhonchi present  No wheezing  Chest:      Chest wall: No tenderness  Abdominal:      General: Bowel sounds are normal  There is no distension  Palpations: Abdomen is soft  Tenderness: There is generalized abdominal tenderness  There is no guarding or rebound  Musculoskeletal: Normal range of motion  General: No tenderness or deformity  Right lower leg: He exhibits no tenderness  No edema  Left lower leg: He exhibits no tenderness  No edema  Lymphadenopathy:      Cervical: No cervical adenopathy  Skin:     General: Skin is warm and dry  Capillary Refill: Capillary refill takes less than 2 seconds  Neurological:      General: No focal deficit present  Mental Status: He is alert and oriented to person, place, and time  Coordination: Coordination normal       Deep Tendon Reflexes: Reflexes are normal and symmetric  Psychiatric:         Behavior: Behavior normal          Thought Content:  Thought content normal          Judgment: Judgment normal          Vital Signs  ED Triage Vitals   Temperature Pulse Respirations Blood Pressure SpO2   03/17/21 1128 03/17/21 1128 03/17/21 1128 03/17/21 1128 03/17/21 1128   100 4 °F (38 °C) (!) 108 20 136/76 99 %      Temp Source Heart Rate Source Patient Position - Orthostatic VS BP Location FiO2 (%)   03/17/21 1128 03/17/21 1128 03/17/21 1128 03/17/21 1128 --   Oral Monitor Sitting Right arm       Pain Score       03/17/21 1135       9           Vitals:    03/17/21 1300 03/17/21 1330 03/17/21 1422 03/17/21 1451   BP: 120/68 117/65 116/69 119/68   Pulse: 92 92 87 81   Patient Position - Orthostatic VS:    Lying         Visual Acuity      ED Medications  Medications   heparin (porcine) subcutaneous injection 5,000 Units (has no administration in time range)   cloNIDine (CATAPRES) tablet 0 1 mg (has no administration in time range)   divalproex sodium (DEPAKOTE) EC tablet 500 mg (has no administration in time range)   Empagliflozin TABS 25 mg (has no administration in time range)   gabapentin (NEURONTIN) capsule 300 mg (has no administration in time range)   insulin glargine (LANTUS) subcutaneous injection 28 Units 0 28 mL (has no administration in time range)   levothyroxine tablet 25 mcg (has no administration in time range)   loratadine (CLARITIN) tablet 10 mg (has no administration in time range)   metoprolol succinate (TOPROL-XL) 24 hr tablet 50 mg (has no administration in time range)   pravastatin (PRAVACHOL) tablet 40 mg (has no administration in time range)   ALPRAZolam (XANAX) tablet 0 5 mg (has no administration in time range)   dicyclomine (BENTYL) tablet 20 mg (has no administration in time range)   pantoprazole (PROTONIX) EC tablet 40 mg (has no administration in time range) traZODone (DESYREL) tablet 100 mg (has no administration in time range)   venlafaxine (EFFEXOR-XR) 24 hr capsule 37 5 mg (has no administration in time range)   heparin (porcine) subcutaneous injection 5,000 Units (has no administration in time range)   cholecalciferol (VITAMIN D3) tablet 2,000 Units (has no administration in time range)   cefTRIAXone (ROCEPHIN) 1,000 mg in dextrose 5 % 50 mL IVPB (has no administration in time range)   doxycycline hyclate (VIBRAMYCIN) capsule 100 mg (has no administration in time range)   ascorbic acid (VITAMIN C) tablet 1,000 mg (has no administration in time range)   zinc sulfate (ZINCATE) capsule 220 mg (has no administration in time range)     Followed by   multivitamin-minerals (CENTRUM ADULTS) tablet 1 tablet (has no administration in time range)   atorvastatin (LIPITOR) tablet 40 mg (has no administration in time range)   dexamethasone (DECADRON) injection 6 mg (has no administration in time range)   famotidine (PEPCID) tablet 20 mg (has no administration in time range)   enoxaparin (LOVENOX) subcutaneous injection 30 mg (has no administration in time range)   sodium chloride 0 9 % bolus 1,000 mL (has no administration in time range)   sodium chloride 0 9 % bolus 1,000 mL (0 mL Intravenous Stopped 3/17/21 1308)   ondansetron (ZOFRAN) injection 4 mg (4 mg Intravenous Given 3/17/21 1158)   acetaminophen (TYLENOL) tablet 650 mg (650 mg Oral Given 3/17/21 1158)   ceftriaxone (ROCEPHIN) 1 g/50 mL in dextrose IVPB (0 mg Intravenous Stopped 3/17/21 1310)   azithromycin (ZITHROMAX) 500 mg in sodium chloride 0 9% 250mL IVPB 500 mg (0 mg Intravenous Stopped 3/17/21 1421)       Diagnostic Studies  Results Reviewed     Procedure Component Value Units Date/Time    Blood culture #1 [[id number]] Collected: 03/17/21 1134    Lab Status: Preliminary result Specimen: Blood from Arm, Right Updated: 03/17/21 1601     Blood Culture Received in Microbiology Lab  Culture in Progress      Blood culture #2 [[id number]] Collected: 03/17/21 1145    Lab Status: Preliminary result Specimen: Blood from Arm, Right Updated: 03/17/21 1601     Blood Culture Received in Microbiology Lab  Culture in Progress  Procalcitonin with AM Reflex [[id number]]  (Abnormal) Collected: 03/17/21 1134    Lab Status: Final result Specimen: Blood from Arm, Right Updated: 03/17/21 1540     Procalcitonin 3 17 ng/ml     Lactic acid 2 Hours [[id number]]  (Normal) Collected: 03/17/21 1420    Lab Status: Final result Specimen: Blood from Arm, Right Updated: 03/17/21 1452     LACTIC ACID 1 0 mmol/L     Narrative:      Result may be elevated if tourniquet was used during collection  Manual Differential(PHLEBS Do Not Order) [[id number]]  (Abnormal) Collected: 03/17/21 1134    Lab Status: Final result Specimen: Blood from Arm, Right Updated: 03/17/21 1247     Segmented % 65 %      Bands % 10 %      Lymphocytes % 9 %      Monocytes % 15 %      Eosinophils, % 0 %      Basophils % 0 %      Myelocytes % 1 %      Absolute Neutrophils 5 58 Thousand/uL      Lymphocytes Absolute 0 67 Thousand/uL      Monocytes Absolute 1 12 Thousand/uL      Eosinophils Absolute 0 00 Thousand/uL      Basophils Absolute 0 00 Thousand/uL      Total Counted 100     Platelet Estimate Adequate    CBC and differential [[id number]] Collected: 03/17/21 1134    Lab Status: Final result Specimen: Blood from Arm, Right Updated: 03/17/21 1246     WBC 7 44 Thousand/uL      RBC 5 05 Million/uL      Hemoglobin 14 9 g/dL      Hematocrit 44 1 %      MCV 87 fL      MCH 29 5 pg      MCHC 33 8 g/dL      RDW 13 6 %      MPV 9 6 fL      Platelets 227 Thousands/uL      nRBC 0 /100 WBCs     Narrative: This is an appended report  These results have been appended to a previously verified report      Lactic acid [[id number]]  (Abnormal) Collected: 03/17/21 1134    Lab Status: Final result Specimen: Blood from Arm, Right Updated: 03/17/21 1229     LACTIC ACID 2 4 mmol/L     Narrative: Result may be elevated if tourniquet was used during collection      Troponin I [[id number]]  (Normal) Collected: 03/17/21 1207    Lab Status: Final result Specimen: Blood from Arm, Right Updated: 03/17/21 1229     Troponin I <0 02 ng/mL     Beta Hydroxybutyrate [[id number]]  (Abnormal) Collected: 03/17/21 1207    Lab Status: Final result Specimen: Blood from Arm, Right Updated: 03/17/21 1218     BETA-HYDROXYBUTYRATE 2 4 mmol/L     Comprehensive metabolic panel [[id number]]  (Abnormal) Collected: 03/17/21 1134    Lab Status: Final result Specimen: Blood from Arm, Right Updated: 03/17/21 1213     Sodium 134 mmol/L      Potassium 4 3 mmol/L      Chloride 97 mmol/L      CO2 22 mmol/L      ANION GAP 15 mmol/L      BUN 27 mg/dL      Creatinine 1 33 mg/dL      Glucose 168 mg/dL      Calcium 9 3 mg/dL      Corrected Calcium 10 3 mg/dL       U/L       U/L      Alkaline Phosphatase 109 U/L      Total Protein 8 0 g/dL      Albumin 2 7 g/dL      Total Bilirubin 0 53 mg/dL      eGFR 68 ml/min/1 73sq m     Narrative:      [place] guidelines for Chronic Kidney Disease (CKD):     Stage 1 with normal or high GFR (GFR > 90 mL/min/1 73 square meters)    Stage 2 Mild CKD (GFR = 60-89 mL/min/1 73 square meters)    Stage 3A Moderate CKD (GFR = 45-59 mL/min/1 73 square meters)    Stage 3B Moderate CKD (GFR = 30-44 mL/min/1 73 square meters)    Stage 4 Severe CKD (GFR = 15-29 mL/min/1 73 square meters)    Stage 5 End Stage CKD (GFR <15 mL/min/1 73 square meters)  Note: GFR calculation is accurate only with a steady state creatinine    APTT [[id number]]  (Normal) Collected: 03/17/21 1134    Lab Status: Final result Specimen: Blood from Arm, Right Updated: 03/17/21 1208     PTT 28 seconds     Protime-INR [[id number]]  (Normal) Collected: 03/17/21 1134    Lab Status: Final result Specimen: Blood from Arm, Right Updated: 03/17/21 1208     Protime 12 3 seconds      INR 0 90    Blood gas, venous [[id number]]  (Abnormal) Collected: 03/17/21 1134    Lab Status: Final result Specimen: Blood from Arm, Right Updated: 03/17/21 1154     pH, [place] 7 334     pCO2, [place] 40 8 mm Hg      pO2, [place] 19 9 mm Hg      HCO3, [place] 21 2 mmol/L      Base Excess, [place] -4 3 mmol/L      O2 Content, [place] 6 6 ml/dL      O2 HGB, VENOUS 32 8 %     UA w Reflex to Microscopic w Reflex to Culture [[id number]]     Lab Status: No result Specimen: Urine                  XR chest 1 view portable   ED Interpretation by Jovanny Nayak DO (03/17 1250)   Bilateral infiltrates      Final Result by Charly Soto MD (03/17 1345)      Suspected multifocal pneumonia, likely Covid related                  Workstation performed: [id number]                    Procedures  ECG 12 Lead Documentation Only    Date/Time: 3/17/2021 4:34 PM  Performed by: Jovanny Nayak DO  Authorized by: Jovanny Nayak DO     Indications / Diagnosis:  Covid  ECG reviewed by me, the ED Provider: yes    Patient location:  ED  Previous ECG:     Previous ECG:  Unavailable  Interpretation:     Interpretation: non-specific    Quality:     Tracing quality:  Limited by artifact  Rate:     ECG rate:  108    ECG rate assessment: tachycardic    Rhythm:     Rhythm: sinus tachycardia    Ectopy:     Ectopy: none    QRS:     QRS axis:  Normal  Conduction:     Conduction: normal    ST segments:     ST segments:  Normal             ED Course                         Initial Sepsis Screening     Row Name 03/17/21 1600 03/17/21 1300             Is the patient's history suggestive of a new or worsening infection? (!) Yes (Proceed)  -EP  (!) Yes (Proceed)  -AW       Suspected source of infection  --  pneumonia  -AW       Are two or more of the following signs & symptoms of infection both present and new to the patient?   (!) Yes (Proceed)  -EP  (!) Yes (Proceed)  -AW       Indicate SIRS criteria  --  Tachycardia > 90 bpm;Tachypnea > 20 resp per min  -AW       If the answer is yes to both questions, suspicion of sepsis is present  --  --       If severe sepsis is present AND tissue hypoperfusion perists in the hour after fluid resuscitation or lactate > 4, the patient meets criteria for SEPTIC SHOCK  --  --       Are any of the following organ dysfunction criteria present within 6 hours of suspected infection and SIRS criteria that are NOT considered to be chronic conditions?   (!) Yes  -EP  No  -AW       Organ dysfunction  --  --       Date of presentation of severe sepsis  --  --       Time of presentation of severe sepsis  --  --       Tissue hypoperfusion persists in the hour after crystalloid fluid administration, evidenced, by either:  --  --       Was hypotension present within one hour of the conclusion of crystalloid fluid administration?  --  --       Date of presentation of septic shock  --  --       Time of presentation of septic shock  --  --         User Key  (r) = Recorded By, (t) = Taken By, (c) = Cosigned By    [address] Name Provider Liz Longoria MD Physician    Arnoldo Prasad,  Physician                        MDM  Number of Diagnoses or Management Options  Pneumonia due to COVID-19 virus: new and requires workup  Sepsis [facility]): new and requires workup     Amount and/or Complexity of Data Reviewed  Clinical lab tests: ordered and reviewed  Tests in the radiology section of CPT®: ordered and reviewed  Tests in the medicine section of CPT®: ordered and reviewed  Decide to obtain previous medical records or to obtain history from someone other than the patient: yes  Discuss the patient with other providers: yes  Independent visualization of images, tracings, or specimens: yes    Patient Progress  Patient progress: stable      Disposition  Final diagnoses:   Pneumonia due to COVID-19 virus   Sepsis ([facility] [address] )     Time reflects when diagnosis was documented in both MDM as applicable and the Disposition within this note     Time User Action Codes Description Comment    3/17/2021  1:34 PM Jadyn Singleton Add [U07 1,  J12 82] Pneumonia due to COVID-19 virus     3/17/2021  1:34 PM Jadyn Singleton Add [A41 9] Sepsis [facility])       ED Disposition     ED Disposition Condition Date/Time Comment    Admit Stable Wed Mar 17, 2021  1:34 PM Case was discussed with Dr Marlon Borges and the patient's admission status was agreed to be Admission Status: inpatient status to the service of Dr Marlon Borges           Follow-up Information    None         Current Discharge Medication List      CONTINUE these medications which have NOT CHANGED    Details   Alogliptin Benzoate 25 MG TABS Take 1 tablet by mouth daily      ALPRAZolam (XANAX) 0 5 mg tablet Take 0 5 mg by mouth Three times daily as needed      divalproex sodium (DEPAKOTE) 500 mg EC tablet Take 500 mg by mouth every 12 (twelve) hours       Empagliflozin (Jardiance) 25 MG TABS Take 1 tablet by mouth daily      gabapentin (NEURONTIN) 300 mg capsule Take 300 mg by mouth Three times a day      insulin glargine (LANTUS SOLOSTAR) 100 units/mL injection pen Inject 28 Units under the skin daily       levothyroxine 25 mcg tablet Take 25 mcg by mouth daily      metoprolol succinate (TOPROL-XL) 50 mg 24 hr tablet Take 1 tablet (50 mg total) by mouth daily  Qty: 30 tablet, Refills: 6    Associated Diagnoses: Palpitation      OMEGA-3 FATTY ACIDS PO Take 2 g by mouth daily      omeprazole (PriLOSEC) 40 MG capsule Take 1 tablet 30-60 minutes prior to breakfast  Qty: 30 capsule, Refills: 3    Associated Diagnoses: Gastroesophageal reflux disease without esophagitis      pravastatin (PRAVACHOL) 40 mg tablet Take 40 mg by mouth daily      traZODone (DESYREL) 100 mg tablet Take 200 mg by mouth      !! venlafaxine (EFFEXOR-XR) 75 mg 24 hr capsule TAKE 1 CAPSULE BY MOUTH ONCE DAILY IN ADDITION TO A 150MG CAPSULE FOR A TOTAL DAILY DOSE OF 225MG DAILY      clobetasol (TEMOVATE) 0 05 % cream Apply topically 2 (two) times a day  Qty: 60 g, Refills: 0    Associated Diagnoses: Dermatitis      cloNIDine (CATAPRES) 0 2 mg tablet Take 0 1 mg by mouth daily       dicyclomine (BENTYL) 20 mg tablet Take 1 tablet (20 mg total) by mouth every 6 (six) hours as needed (abdominal pain)  Qty: 45 tablet, Refills: 2    Associated Diagnoses: Generalized abdominal pain      hydrocortisone (ANUSOL-HC) 25 mg suppository Insert 1 suppository (25 mg total) into the rectum 2 (two) times a day  Qty: 30 suppository, Refills: 1    Associated Diagnoses: Bright red blood per rectum      loratadine (CLARITIN) 10 mg tablet Take 10 mg by mouth daily      meloxicam (MOBIC) 15 mg tablet       metFORMIN (GLUCOPHAGE) 1000 MG tablet Take 1,000 mg by mouth daily       mupirocin (BACTROBAN) 2 % ointment Apply topically 3 (three) times a day  Qty: 30 g, Refills: 3    Associated Diagnoses: Folliculitis      sertraline (ZOLOFT) 50 mg tablet       sildenafil (VIAGRA) 100 mg tablet Take 100 mg by mouth as needed for erectile dysfunction       !! venlafaxine (EFFEXOR-XR) 150 mg 24 hr capsule TAKE 1 CAPSULE BY MOUTH EVERY MORNING FOR MOOD       ! ! - Potential duplicate medications found  Please discuss with provider  No discharge procedures on file      PDMP Review     None          ED Provider  Electronically Signed by           Loren Chand DO  03/17/21 1211

## 2021-03-17 NOTE — ASSESSMENT & PLAN NOTE
Secondary to COVID-19 versus questionable PE  Will check D-dimer  Positive may need CT scan  Will repeat BMP and aggressively hydrate

## 2021-03-17 NOTE — PLAN OF CARE
Problem: Nutrition/Hydration-ADULT  Goal: Nutrient/Hydration intake appropriate for improving, restoring or maintaining nutritional needs  Description: Monitor and assess patient's nutrition/hydration status for malnutrition  Collaborate with interdisciplinary team and initiate plan and interventions as ordered  Monitor patient's weight and dietary intake as ordered or per policy  Utilize nutrition screening tool and intervene as necessary  Determine patient's food preferences and provide high-protein, high-caloric foods as appropriate       INTERVENTIONS:  - Monitor oral intake, urinary output, labs, and treatment plans  - Assess nutrition and hydration status and recommend course of action  - Evaluate amount of meals eaten  - Assist patient with eating if necessary   - Allow adequate time for meals  - Recommend/ encourage appropriate diets, oral nutritional supplements, and vitamin/mineral supplements  - Order, calculate, and assess calorie counts as needed  - Recommend, monitor, and adjust tube feedings and TPN/PPN based on assessed needs  - Assess need for intravenous fluids  - Provide specific nutrition/hydration education as appropriate  - Include patient/family/caregiver in decisions related to nutrition  Outcome: Progressing     Problem: PAIN - ADULT  Goal: Verbalizes/displays adequate comfort level or baseline comfort level  Description: Interventions:  - Encourage patient to monitor pain and request assistance  - Assess pain using appropriate pain scale  - Administer analgesics based on type and severity of pain and evaluate response  - Implement non-pharmacological measures as appropriate and evaluate response  - Consider cultural and social influences on pain and pain management  - Notify physician/advanced practitioner if interventions unsuccessful or patient reports new pain  Outcome: Progressing     Problem: INFECTION - ADULT  Goal: Absence or prevention of progression during hospitalization  Description: INTERVENTIONS:  - Assess and monitor for signs and symptoms of infection  - Monitor lab/diagnostic results  - Monitor all insertion sites, i e  indwelling lines, tubes, and drains  - [place] appropriate cooling/warming therapies per order  - Administer medications as ordered  - Instruct and encourage patient and family to use good hand hygiene technique  - Identify and instruct in appropriate isolation precautions for identified infection/condition  Outcome: Progressing     Problem: SAFETY ADULT  Goal: Patient will remain free of falls  Description: INTERVENTIONS:  - Assess patient frequently for physical needs  -  Identify cognitive and physical deficits and behaviors that affect risk of falls    -  [place] fall precautions as indicated by assessment   - Educate patient/family on patient safety including physical limitations  - Instruct patient to call for assistance with activity based on assessment  - Modify environment to reduce risk of injury  - Consider OT/PT consult to assist with strengthening/mobility  Outcome: Progressing  Goal: Maintain or return to baseline ADL function  Description: INTERVENTIONS:  -  Assess patient's ability to carry out ADLs; assess patient's baseline for ADL function and identify physical deficits which impact ability to perform ADLs (bathing, care of mouth/teeth, toileting, grooming, dressing, etc )  - Assess/evaluate cause of self-care deficits   - Assess range of motion  - Assess patient's mobility; develop plan if impaired  - Assess patient's need for assistive devices and provide as appropriate  - Encourage maximum independence but intervene and supervise when necessary  - Involve family in performance of ADLs  - Assess for home care needs following discharge   - Consider OT consult to assist with ADL evaluation and planning for discharge  - Provide patient education as appropriate  Outcome: Progressing  Goal: Maintain or return mobility status to optimal level  Description: INTERVENTIONS:  - Assess patient's baseline mobility status (ambulation, transfers, stairs, etc )    - Identify cognitive and physical deficits and behaviors that affect mobility  - Identify mobility aids required to assist with transfers and/or ambulation (gait belt, sit-to-stand, lift, walker, cane, etc )  - [place] fall precautions as indicated by assessment  - Record patient progress and toleration of activity level on Mobility SBAR; progress patient to next Phase/Stage  - Instruct patient to call for assistance with activity based on assessment  - Consider rehabilitation consult to assist with strengthening/weightbearing, etc   Outcome: Progressing     Problem: DISCHARGE PLANNING  Goal: Discharge to home or other facility with appropriate resources  Description: INTERVENTIONS:  - Identify barriers to discharge w/patient and caregiver  - Arrange for needed discharge resources and transportation as appropriate  - Identify discharge learning needs (meds, wound care, etc )  - Arrange for interpretive services to assist at discharge as needed  - Refer to Case Management Department for coordinating discharge planning if the patient needs post-hospital services based on physician/advanced practitioner order or complex needs related to functional status, cognitive ability, or social support system  Outcome: Progressing     Problem: Knowledge Deficit  Goal: Patient/family/caregiver demonstrates understanding of disease process, treatment plan, medications, and discharge instructions  Description: Complete learning assessment and assess knowledge base    Interventions:  - Provide teaching at level of understanding  - Provide teaching via preferred learning methods  Outcome: Progressing     Problem: RESPIRATORY - ADULT  Goal: Achieves optimal ventilation and oxygenation  Description: INTERVENTIONS:  - Assess for changes in respiratory status  - Assess for changes in mentation and behavior  - Position to facilitate oxygenation and minimize respiratory effort  - Oxygen administered by appropriate delivery if ordered  - Encourage broncho-pulmonary hygiene including cough, deep breathe, Incentive Spirometry  - Assess and instruct to report SOB or any respiratory difficulty  - Respiratory Therapy support as indicated  Outcome: Progressing

## 2021-03-17 NOTE — H&P
[address]  [facility]  1985, 38 y o  male MRN: [id number]  Unit/Bed#: S -01 Encounter: [id number]  Primary Care Provider: Parminder Cook MD   Date and time admitted to hospital: 3/17/2021 11:24 AM    Tachycardia  Assessment & Plan  In the setting of sepsis  Aggressively hydrate  Continue to monitor  High anion gap metabolic acidosis  Assessment & Plan  In the setting of high lactic acidosis and possible OMNTSE  Will recheck bmp after fluid bolus  Also in the setting of T2DM - will check q2 BS  Restart home insulin  Elevated lactic acid level  Assessment & Plan  In the setting of sepsis will give normal saline 1 L and recheck q2h       Sepsis [facility])  Assessment & Plan  Secondary to covid 19 with ? Bacterial supra infection  On CAP abx  COVID-19  Assessment & Plan  COVID-19 positive on the 9th of March  Now admitted worsening shortness of breath and hypoxia  Will start on moderate pathway  Discussed remdesivir - patient would not want it at this time   Consult Pulmonology for further recs given difficult dynamic with patient  He does have PTSD- will consult psych  Acute respiratory failure with hypoxia (HCC)  Assessment & Plan  Secondary to COVID-19 versus questionable PE  Will check D-dimer  Positive may need CT scan  Will repeat BMP and aggressively hydrate      VTE Prophylaxis: Heparin  / sequential compression device   Code Status: Full Code  POLST: There is no POLST form on file for this patient (pre-hospital)  Discussion with family: Discussed with patient  Anticipated Length of Stay:  Patient will be admitted on an Inpatient basis with an anticipated length of stay of  More than 2 midnights  Justification for Hospital Stay: covid 19 pneumonia  Total Time for Visit, including Counseling / Coordination of Care: 45 minutes  Greater than 50% of this total time spent on direct patient counseling and coordination of care      Chief Complaint:     Shortness of breath and chest pain  History of Present Illness:    Abner Stoner is a 38 y o  male , , and PTSD, recently diagnosed with COVID and on March 9 who presents with worsening chest pain and shortness of breath  He was on 6 L initially and now on 4L  He did not have a dimer done in the ED and we will order this  CXR shows multifocal pneumonia  Patient also has hyperglycemia in the setting of diabetes as well as anion gap metabolic acidosis with elevated lactic acid  Will continue to bolus and recheck BMP  For now will start steroids given hypoxia, patient is directly not within the window to treat with remdesivir however given his hypoxia this may still be an option  As of now the patient is refusing that regardless  Finally given his anxiety will consult Psychiatry as I suspect this will play a part in his recovery as well        Review of Systems:    Review of Systems   Constitutional: Negative  Negative for activity change, appetite change, chills, diaphoresis and fever  HENT: Negative  Eyes: Negative  Respiratory: Positive for chest tightness and shortness of breath  Cardiovascular: Positive for chest pain  Gastrointestinal: Negative  Endocrine: Negative  Genitourinary: Negative  Musculoskeletal: Negative  Skin: Negative  Allergic/Immunologic: Negative  Neurological: Negative  Hematological: Negative  Psychiatric/Behavioral: Negative  Past Medical and Surgical History:     Past Medical History:   Diagnosis Date    Diabetes mellitus ([address] )     type 2    Disease of thyroid gland     hypothyroidism    Hyperlipidemia     Hypertension     Psychiatric disorder     PTSD  Anxiety, depression,        Past Surgical History:   Procedure Laterality Date    WISDOM TOOTH EXTRACTION         Meds/Allergies:    Prior to Admission medications    Medication Sig Start Date End Date Taking?  Authorizing Provider   Alogliptin Benzoate 25 MG TABS Take 1 tablet by mouth daily 5/5/20  Yes Historical Provider, MD   ALPRAZolam Lay ) 0 5 mg tablet Take 0 5 mg by mouth Three times daily as needed   Yes Historical Provider, MD   divalproex sodium (DEPAKOTE) 500 mg EC tablet Take 500 mg by mouth every 12 (twelve) hours    Yes Historical Provider, MD   Empagliflozin (Jardiance) 25 MG TABS Take 1 tablet by mouth daily 5/5/20  Yes Historical Provider, MD   gabapentin (NEURONTIN) 300 mg capsule Take 300 mg by mouth Three times a day   Yes Historical Provider, MD   insulin glargine (LANTUS SOLOSTAR) 100 units/mL injection pen Inject 28 Units under the skin daily    Yes Historical Provider, MD   levothyroxine 25 mcg tablet Take 25 mcg by mouth daily   Yes Historical Provider, MD   metoprolol succinate (TOPROL-XL) 50 mg 24 hr tablet Take 1 tablet (50 mg total) by mouth daily 2/4/21  Yes Dillon Ruvalcaba MD   OMEGA-3 FATTY ACIDS PO Take 2 g by mouth daily   Yes Historical Provider, MD   omeprazole (PriLOSEC) 40 MG capsule Take 1 tablet 30-60 minutes prior to breakfast 2/1/21  Yes Florida Gonzalez PA-C   pravastatin (PRAVACHOL) 40 mg tablet Take 40 mg by mouth daily   Yes Historical Provider, MD   traZODone (DESYREL) 100 mg tablet Take 200 mg by mouth   Yes Historical Provider, MD   venlafaxine (EFFEXOR-XR) 75 mg 24 hr capsule TAKE 1 CAPSULE BY MOUTH ONCE DAILY IN ADDITION TO A 150MG CAPSULE FOR A TOTAL DAILY DOSE OF 225MG DAILY 12/1/20  Yes Historical Provider, MD   clobetasol (TEMOVATE) 0 05 % cream Apply topically 2 (two) times a day  Patient not taking: Reported on 3/17/2021 1/12/21   Alda Lopez MD   cloNIDine (CATAPRES) 0 2 mg tablet Take 0 1 mg by mouth daily     Historical Provider, MD   dicyclomine (BENTYL) 20 mg tablet Take 1 tablet (20 mg total) by mouth every 6 (six) hours as needed (abdominal pain)  Patient not taking: Reported on 3/17/2021 2/1/21   Florida Gonzalez PA-C   hydrocortisone (ANUSOL-HC) 25 mg suppository Insert 1 suppository (25 mg total) into the rectum 2 (two) times a day  Patient not taking: Reported on 3/17/2021 12/11/20   Dariusz Rosas MD   loratadine (CLARITIN) 10 mg tablet Take 10 mg by mouth daily    Historical Provider, MD   meloxicam (MOBIC) 15 mg tablet  10/9/20   Historical Provider, MD   metFORMIN (GLUCOPHAGE) 1000 MG tablet Take 1,000 mg by mouth daily     Historical Provider, MD   mupirocin (BACTROBAN) 2 % ointment Apply topically 3 (three) times a day  Patient not taking: Reported on 3/17/2021 9/22/20   Dariusz Rosas MD   sertraline (ZOLOFT) 50 mg tablet  2/23/21   Historical Provider, MD   sildenafil (VIAGRA) 100 mg tablet Take 100 mg by mouth as needed for erectile dysfunction     Historical Provider, MD   venlafaxine (EFFEXOR-XR) 150 mg 24 hr capsule TAKE 1 CAPSULE BY MOUTH EVERY MORNING FOR MOOD 10/9/20   Historical Provider, MD     I have reviewed home medications with patient personally  Allergies: Allergies   Allergen Reactions    Lamotrigine GI Intolerance    Simvastatin Other (See Comments)     Elevated liver enzymes  Liver enzyme elevation    Niacin Rash       Social History:     Marital Status: /Civil Union   Occupation:  [employer]     Patient Pre-hospital Living Situation:  Lives with family  Patient Pre-hospital Level of Mobility:  Fully mobile  Patient Pre-hospital Diet Restrictions:  None  Substance Use History:   Social History     Substance and Sexual Activity   Alcohol Use Not Currently     Social History     Tobacco Use   Smoking Status Never Smoker   Smokeless Tobacco Never Used     Social History     Substance and Sexual Activity   Drug Use No       Family History:    Family History   Problem Relation Age of Onset    Hyperlipidemia Father     Heart attack Paternal Grandfather        Physical Exam:     Vitals:   Blood Pressure: 119/68 (03/17/21 1451)  Pulse: 81 (03/17/21 1451)  Temperature: 99 3 °F (37 4 °C) (03/17/21 1422)  Temp Source: Oral (03/17/21 1422)  Respirations: 20 (03/17/21 1451)  SpO2: 94 % (03/17/21 1451)    Physical Exam  Constitutional:       General: He is not in acute distress  Appearance: He is not ill-appearing, toxic-appearing or diaphoretic  HENT:      Head: Normocephalic  Eyes:      Pupils: Pupils are equal, round, and reactive to light  Cardiovascular:      Rate and Rhythm: Normal rate  Pulmonary:      Effort: Respiratory distress present  Breath sounds: No stridor  No wheezing, rhonchi or rales  Chest:      Chest wall: No tenderness  Abdominal:      General: Abdomen is flat  There is no distension  Palpations: There is no mass  Tenderness: There is no abdominal tenderness  There is no right CVA tenderness, left CVA tenderness, guarding or rebound  Hernia: No hernia is present  Musculoskeletal:         General: No swelling, tenderness, deformity or signs of injury  Right lower leg: No edema  Left lower leg: No edema  Skin:     Capillary Refill: Capillary refill takes less than 2 seconds  Coloration: Skin is pale  Skin is not jaundiced  Findings: No bruising  Neurological:      General: No focal deficit present  Mental Status: He is alert  Cranial Nerves: No cranial nerve deficit  Sensory: No sensory deficit  Motor: No weakness  Coordination: Coordination normal       Gait: Gait normal       Deep Tendon Reflexes: Reflexes normal    Psychiatric:         Mood and Affect: Mood normal       Comments: Anxious  Additional Data:     Lab Results: I have personally reviewed pertinent reports        Results from last 7 days   Lab Units 03/17/21  1134   WBC Thousand/uL 7 44   HEMOGLOBIN g/dL 14 9   HEMATOCRIT % 44 1   PLATELETS Thousands/uL 206   BANDS PCT % 10*   LYMPHO PCT % 9*   MONO PCT % 15*   EOS PCT % 0     Results from last 7 days   Lab Units 03/17/21  1134   SODIUM mmol/L 134*   POTASSIUM mmol/L 4 3   CHLORIDE mmol/L 97*   CO2 mmol/L 22   BUN mg/dL 27*   CREATININE mg/dL 1 33*   ANION GAP mmol/L 15*   CALCIUM mg/dL 9 3   ALBUMIN g/dL 2 7*   TOTAL BILIRUBIN mg/dL 0 53   ALK PHOS U/L 109   ALT U/L 136*   AST U/L 191*   GLUCOSE RANDOM mg/dL 168*     Results from last 7 days   Lab Units 03/17/21  1134   INR  0 90             Results from last 7 days   Lab Units 03/17/21  1420 03/17/21  1134   LACTIC ACID mmol/L 1 0 2 4*   PROCALCITONIN ng/ml  --  3 17*       Imaging: I have personally reviewed pertinent reports  XR chest 1 view portable   ED Interpretation by Vicky Loaiza DO (03/17 1250)   Bilateral infiltrates      Final Result by Melissa Marie MD (03/17 1260)      Suspected multifocal pneumonia, likely Covid related                  Workstation performed: [id number]             EKG, Pathology, and Other Studies Reviewed on Admission:   · EKG:  None on the chart    Allscripts / Epic Records Reviewed: Yes     ** Please Note: This note has been constructed using a voice recognition system   **

## 2021-03-17 NOTE — ASSESSMENT & PLAN NOTE
In the setting of high lactic acidosis and possible MONTSE  Will recheck bmp after fluid bolus  Also in the setting of T2DM - will check q2 BS  Restart home insulin

## 2021-03-17 NOTE — ASSESSMENT & PLAN NOTE
COVID-19 positive on the 9th of March  Now admitted worsening shortness of breath and hypoxia  Will start on moderate pathway  Discussed remdesivir - patient would not want it at this time   Consult Pulmonology for further recs given difficult dynamic with patient  He does have PTSD- will consult psych

## 2021-03-18 PROBLEM — R74.01 TRANSAMINITIS: Status: ACTIVE | Noted: 2021-03-18

## 2021-03-18 LAB
ALBUMIN SERPL BCP-MCNC: 2.1 G/DL (ref 3.5–5)
ALP SERPL-CCNC: 129 U/L (ref 46–116)
ALT SERPL W P-5'-P-CCNC: 260 U/L (ref 12–78)
ANION GAP SERPL CALCULATED.3IONS-SCNC: 12 MMOL/L (ref 4–13)
AST SERPL W P-5'-P-CCNC: 382 U/L (ref 5–45)
ATRIAL RATE: 103 BPM
BASOPHILS # BLD MANUAL: 0 THOUSAND/UL (ref 0–0.1)
BASOPHILS NFR MAR MANUAL: 0 % (ref 0–1)
BILIRUB SERPL-MCNC: 0.34 MG/DL (ref 0.2–1)
BUN SERPL-MCNC: 20 MG/DL (ref 5–25)
CALCIUM ALBUM COR SERPL-MCNC: 9.9 MG/DL (ref 8.3–10.1)
CALCIUM SERPL-MCNC: 8.4 MG/DL (ref 8.3–10.1)
CHLORIDE SERPL-SCNC: 105 MMOL/L (ref 100–108)
CK MB SERPL-MCNC: 0.7 NG/ML (ref 0–5)
CK MB SERPL-MCNC: <1 % (ref 0–2.5)
CK SERPL-CCNC: 264 U/L (ref 39–308)
CO2 SERPL-SCNC: 20 MMOL/L (ref 21–32)
CREAT SERPL-MCNC: 1.01 MG/DL (ref 0.6–1.3)
CRP SERPL QL: 250.1 MG/L
D DIMER PPP FEU-MCNC: 1.17 UG/ML FEU
EOSINOPHIL # BLD MANUAL: 0 THOUSAND/UL (ref 0–0.4)
EOSINOPHIL NFR BLD MANUAL: 0 % (ref 0–6)
ERYTHROCYTE [DISTWIDTH] IN BLOOD BY AUTOMATED COUNT: 13.3 % (ref 11.6–15.1)
FERRITIN SERPL-MCNC: 1064 NG/ML (ref 8–388)
FERRITIN SERPL-MCNC: 1578 NG/ML (ref 8–388)
GFR SERPL CREATININE-BSD FRML MDRD: 95 ML/MIN/1.73SQ M
GLUCOSE SERPL-MCNC: 176 MG/DL (ref 65–140)
GLUCOSE SERPL-MCNC: 184 MG/DL (ref 65–140)
GLUCOSE SERPL-MCNC: 193 MG/DL (ref 65–140)
GLUCOSE SERPL-MCNC: 195 MG/DL (ref 65–140)
GLUCOSE SERPL-MCNC: 227 MG/DL (ref 65–140)
GLUCOSE SERPL-MCNC: 250 MG/DL (ref 65–140)
GLUCOSE SERPL-MCNC: 273 MG/DL (ref 65–140)
GLUCOSE SERPL-MCNC: 280 MG/DL (ref 65–140)
GLUCOSE SERPL-MCNC: 299 MG/DL (ref 65–140)
HCT VFR BLD AUTO: 37.1 % (ref 36.5–49.3)
HGB BLD-MCNC: 12.4 G/DL (ref 12–17)
LYMPHOCYTES # BLD AUTO: 0.52 THOUSAND/UL (ref 0.6–4.47)
LYMPHOCYTES # BLD AUTO: 9 % (ref 14–44)
MCH RBC QN AUTO: 29.1 PG (ref 26.8–34.3)
MCHC RBC AUTO-ENTMCNC: 33.4 G/DL (ref 31.4–37.4)
MCV RBC AUTO: 87 FL (ref 82–98)
METAMYELOCYTES NFR BLD MANUAL: 2 % (ref 0–1)
MONOCYTES # BLD AUTO: 0.35 THOUSAND/UL (ref 0–1.22)
MONOCYTES NFR BLD: 6 % (ref 4–12)
NEUTROPHILS # BLD MANUAL: 4.81 THOUSAND/UL (ref 1.85–7.62)
NEUTS BAND NFR BLD MANUAL: 9 % (ref 0–8)
NEUTS SEG NFR BLD AUTO: 74 % (ref 43–75)
NRBC BLD AUTO-RTO: 0 /100 WBCS
NT-PROBNP SERPL-MCNC: 337 PG/ML
P AXIS: 45 DEGREES
PLATELET # BLD AUTO: 237 THOUSANDS/UL (ref 149–390)
PLATELET BLD QL SMEAR: ADEQUATE
PMV BLD AUTO: 9.3 FL (ref 8.9–12.7)
POTASSIUM SERPL-SCNC: 4.2 MMOL/L (ref 3.5–5.3)
PR INTERVAL: 134 MS
PROCALCITONIN SERPL-MCNC: 1.75 NG/ML
PROT SERPL-MCNC: 6.5 G/DL (ref 6.4–8.2)
QRS AXIS: 22 DEGREES
QRSD INTERVAL: 88 MS
QT INTERVAL: 338 MS
QTC INTERVAL: 442 MS
RBC # BLD AUTO: 4.26 MILLION/UL (ref 3.88–5.62)
RBC MORPH BLD: NORMAL
SODIUM SERPL-SCNC: 137 MMOL/L (ref 136–145)
T WAVE AXIS: 24 DEGREES
TOTAL CELLS COUNTED SPEC: 100
TROPONIN I SERPL-MCNC: <0.02 NG/ML
VENTRICULAR RATE: 103 BPM
WBC # BLD AUTO: 5.8 THOUSAND/UL (ref 4.31–10.16)

## 2021-03-18 PROCEDURE — XW033E5 INTRODUCTION OF REMDESIVIR ANTI-INFECTIVE INTO PERIPHERAL VEIN, PERCUTANEOUS APPROACH, NEW TECHNOLOGY GROUP 5: ICD-10-PCS | Performed by: INTERNAL MEDICINE

## 2021-03-18 PROCEDURE — 94660 CPAP INITIATION&MGMT: CPT

## 2021-03-18 PROCEDURE — 82948 REAGENT STRIP/BLOOD GLUCOSE: CPT

## 2021-03-18 PROCEDURE — 94760 N-INVAS EAR/PLS OXIMETRY 1: CPT

## 2021-03-18 PROCEDURE — 84145 PROCALCITONIN (PCT): CPT | Performed by: INTERNAL MEDICINE

## 2021-03-18 PROCEDURE — 82553 CREATINE MB FRACTION: CPT | Performed by: INTERNAL MEDICINE

## 2021-03-18 PROCEDURE — 93010 ELECTROCARDIOGRAM REPORT: CPT | Performed by: INTERNAL MEDICINE

## 2021-03-18 PROCEDURE — 82550 ASSAY OF CK (CPK): CPT | Performed by: INTERNAL MEDICINE

## 2021-03-18 PROCEDURE — 86140 C-REACTIVE PROTEIN: CPT | Performed by: INTERNAL MEDICINE

## 2021-03-18 PROCEDURE — 82728 ASSAY OF FERRITIN: CPT | Performed by: INTERNAL MEDICINE

## 2021-03-18 PROCEDURE — 85379 FIBRIN DEGRADATION QUANT: CPT | Performed by: INTERNAL MEDICINE

## 2021-03-18 PROCEDURE — XW033H5 INTRODUCTION OF TOCILIZUMAB INTO PERIPHERAL VEIN, PERCUTANEOUS APPROACH, NEW TECHNOLOGY GROUP 5: ICD-10-PCS | Performed by: INTERNAL MEDICINE

## 2021-03-18 PROCEDURE — 99223 1ST HOSP IP/OBS HIGH 75: CPT | Performed by: INTERNAL MEDICINE

## 2021-03-18 PROCEDURE — 80053 COMPREHEN METABOLIC PANEL: CPT | Performed by: INTERNAL MEDICINE

## 2021-03-18 PROCEDURE — 85027 COMPLETE CBC AUTOMATED: CPT | Performed by: INTERNAL MEDICINE

## 2021-03-18 PROCEDURE — 84484 ASSAY OF TROPONIN QUANT: CPT | Performed by: INTERNAL MEDICINE

## 2021-03-18 PROCEDURE — 83880 ASSAY OF NATRIURETIC PEPTIDE: CPT | Performed by: INTERNAL MEDICINE

## 2021-03-18 PROCEDURE — 85007 BL SMEAR W/DIFF WBC COUNT: CPT | Performed by: INTERNAL MEDICINE

## 2021-03-18 PROCEDURE — 94762 N-INVAS EAR/PLS OXIMTRY CONT: CPT

## 2021-03-18 PROCEDURE — 99233 SBSQ HOSP IP/OBS HIGH 50: CPT | Performed by: INTERNAL MEDICINE

## 2021-03-18 RX ORDER — DEXAMETHASONE SODIUM PHOSPHATE 4 MG/ML
0.1 INJECTION, SOLUTION INTRA-ARTICULAR; INTRALESIONAL; INTRAMUSCULAR; INTRAVENOUS; SOFT TISSUE EVERY 12 HOURS SCHEDULED
Status: COMPLETED | OUTPATIENT
Start: 2021-03-18 | End: 2021-03-24

## 2021-03-18 RX ORDER — POLYETHYLENE GLYCOL 3350 17 G/17G
17 POWDER, FOR SOLUTION ORAL DAILY
Status: DISCONTINUED | OUTPATIENT
Start: 2021-03-19 | End: 2021-03-18

## 2021-03-18 RX ORDER — SENNOSIDES 8.6 MG
1 TABLET ORAL 2 TIMES DAILY
Status: DISCONTINUED | OUTPATIENT
Start: 2021-03-18 | End: 2021-03-18

## 2021-03-18 RX ORDER — POLYETHYLENE GLYCOL 3350 17 G/17G
17 POWDER, FOR SOLUTION ORAL DAILY PRN
Status: DISCONTINUED | OUTPATIENT
Start: 2021-03-18 | End: 2021-03-25 | Stop reason: HOSPADM

## 2021-03-18 RX ORDER — AMOXICILLIN 250 MG
2 CAPSULE ORAL 2 TIMES DAILY
Status: DISCONTINUED | OUTPATIENT
Start: 2021-03-18 | End: 2021-03-18

## 2021-03-18 RX ADMIN — INSULIN LISPRO 2 UNITS: 100 INJECTION, SOLUTION INTRAVENOUS; SUBCUTANEOUS at 18:46

## 2021-03-18 RX ADMIN — DEXAMETHASONE SODIUM PHOSPHATE 8.08 MG: 4 INJECTION INTRA-ARTICULAR; INTRALESIONAL; INTRAMUSCULAR; INTRAVENOUS; SOFT TISSUE at 08:30

## 2021-03-18 RX ADMIN — DIVALPROEX SODIUM 500 MG: 500 TABLET, DELAYED RELEASE ORAL at 08:28

## 2021-03-18 RX ADMIN — TOCILIZUMAB 600 MG: 20 INJECTION, SOLUTION, CONCENTRATE INTRAVENOUS at 10:53

## 2021-03-18 RX ADMIN — FAMOTIDINE 20 MG: 20 TABLET ORAL at 17:57

## 2021-03-18 RX ADMIN — DOXYCYCLINE 100 MG: 100 CAPSULE ORAL at 20:10

## 2021-03-18 RX ADMIN — FAMOTIDINE 20 MG: 20 TABLET ORAL at 08:28

## 2021-03-18 RX ADMIN — ATORVASTATIN CALCIUM 40 MG: 40 TABLET, FILM COATED ORAL at 21:47

## 2021-03-18 RX ADMIN — VENLAFAXINE HYDROCHLORIDE 37.5 MG: 37.5 CAPSULE, EXTENDED RELEASE ORAL at 08:28

## 2021-03-18 RX ADMIN — GABAPENTIN 300 MG: 300 CAPSULE ORAL at 20:09

## 2021-03-18 RX ADMIN — GABAPENTIN 300 MG: 300 CAPSULE ORAL at 17:56

## 2021-03-18 RX ADMIN — DOXYCYCLINE 100 MG: 100 CAPSULE ORAL at 08:28

## 2021-03-18 RX ADMIN — DIVALPROEX SODIUM 500 MG: 500 TABLET, DELAYED RELEASE ORAL at 20:10

## 2021-03-18 RX ADMIN — REMDESIVIR 200 MG: 100 INJECTION, POWDER, LYOPHILIZED, FOR SOLUTION INTRAVENOUS at 09:28

## 2021-03-18 RX ADMIN — METOPROLOL SUCCINATE 50 MG: 50 TABLET, EXTENDED RELEASE ORAL at 08:28

## 2021-03-18 RX ADMIN — INSULIN LISPRO 1 UNITS: 100 INJECTION, SOLUTION INTRAVENOUS; SUBCUTANEOUS at 09:27

## 2021-03-18 RX ADMIN — PANTOPRAZOLE SODIUM 40 MG: 40 TABLET, DELAYED RELEASE ORAL at 05:25

## 2021-03-18 RX ADMIN — CEFTRIAXONE 1000 MG: 1 INJECTION, POWDER, FOR SOLUTION INTRAMUSCULAR; INTRAVENOUS at 08:30

## 2021-03-18 RX ADMIN — LORATADINE 10 MG: 10 TABLET ORAL at 08:29

## 2021-03-18 RX ADMIN — ZINC SULFATE 220 MG (50 MG) CAPSULE 220 MG: CAPSULE at 08:28

## 2021-03-18 RX ADMIN — OXYCODONE HYDROCHLORIDE AND ACETAMINOPHEN 1000 MG: 500 TABLET ORAL at 08:28

## 2021-03-18 RX ADMIN — GABAPENTIN 300 MG: 300 CAPSULE ORAL at 08:28

## 2021-03-18 RX ADMIN — INSULIN LISPRO 2 UNITS: 100 INJECTION, SOLUTION INTRAVENOUS; SUBCUTANEOUS at 21:53

## 2021-03-18 RX ADMIN — DEXAMETHASONE SODIUM PHOSPHATE 8.08 MG: 4 INJECTION INTRA-ARTICULAR; INTRALESIONAL; INTRAMUSCULAR; INTRAVENOUS; SOFT TISSUE at 20:10

## 2021-03-18 RX ADMIN — INSULIN LISPRO 2 UNITS: 100 INJECTION, SOLUTION INTRAVENOUS; SUBCUTANEOUS at 12:54

## 2021-03-18 RX ADMIN — TRAZODONE HYDROCHLORIDE 100 MG: 100 TABLET ORAL at 21:47

## 2021-03-18 RX ADMIN — Medication 2000 UNITS: at 08:28

## 2021-03-18 RX ADMIN — INSULIN GLARGINE 28 UNITS: 100 INJECTION, SOLUTION SUBCUTANEOUS at 21:53

## 2021-03-18 RX ADMIN — CLONIDINE HYDROCHLORIDE 0.1 MG: 0.1 TABLET ORAL at 08:28

## 2021-03-18 RX ADMIN — OXYCODONE HYDROCHLORIDE AND ACETAMINOPHEN 1000 MG: 500 TABLET ORAL at 20:15

## 2021-03-18 RX ADMIN — LEVOTHYROXINE SODIUM 25 MCG: 25 TABLET ORAL at 05:25

## 2021-03-18 NOTE — PROGRESS NOTES
[facility]  Progress Note - Marivel Sober 1985, 38 y o  male MRN: [id number]  Unit/Bed#: S -[facility] Encounter: [id number]  Primary Care Provider: Geovanny Garza MD   Date and time admitted to hospital: 3/17/2021 11:24 AM    * Acute respiratory failure with hypoxia [facility])  Assessment & Plan  Secondary to COVID-19 pneumonia  Elevated d-dimer, on therapeutic a/c tx with heparin gtt  Elevation likely secondary to hypercoag state associated with COVID pna  Cont oxygen supplementation and wean as clinically improves  Remains on mid flow  Pulmonary following    COVID-19  Assessment & Plan  COVID-19 pneumonia with superimposed bacterial infection given elevated procalcitonin  COVID positive on March 9  Pulmonary following, appreciate input  Initiated on remdesivir (patient now agreeable), Actemra x 1, high-dose dexamethasone  Antibiotic treatment with ceftriaxone and doxycycline,  Heparin drip given elevated D-dimer  Continue to trend inflammatory markers  Continue mid flow and wean as tolerated  Low threshold for ICU transfer  Presently appears to be stable      Sepsis [facility])  Assessment & Plan  Severe sepsis secondary to COVID-19 pneumonia and possible superimposed bacterial infection   Continue pathway for COVID-19 treatment  Continue antibiotics  Follow-up blood culture  Resolved lactic acidosis    Type 2 diabetes mellitus without complication, with long-term current use of insulin [facility])  Assessment & Plan  Lab Results   Component Value Date    HGBA1C 7 8 (H) 10/27/2020       Recent Labs     03/18/21  0758 03/18/21  1052 03/18/21  1252 03/18/21  1636   POCGLU 176* 250* 273* 299*       Blood Sugar Average: Last 72 hrs:  (P) 209 6   Continue insulin regimen; close monitor given on high-dose dexamethasone    Transaminitis  Assessment & Plan  Pattern consistent with alcohol use    Close monitor as on remdesivir  Daily CMP    High anion gap metabolic acidosis  Assessment & Plan  In the setting of high lactic acidosis and possible MONTSE  Resolved    Elevated lactic acid level  Assessment & Plan  Resolved    ENRIKE (obstructive sleep apnea)  Assessment & Plan  Per patient chronically on CPAP, okay per Pulmonary to hold for now     PTSD (post-traumatic stress disorder)  Assessment & Plan  Continue psych medications    VTE Pharmacologic Prophylaxis:   Pharmacologic: Heparin Drip  Mechanical VTE Prophylaxis in Place: Yes    Patient Centered Rounds: I have performed bedside rounds with nursing staff today  Discussions with Specialists or Other Care Team Provider:     Education and Discussions with Family / Patient:  Patient    Time Spent for Care: 30 minutes  More than 50% of total time spent on counseling and coordination of care as described above  Current Length of Stay: 1 day(s)    Current Patient Status: Inpatient   Certification Statement: The patient will continue to require additional inpatient hospital stay due to COVID-19 pneumonia, acute hypoxic respiratory failure    Discharge Plan:     Code Status: Level 1 - Full Code      Subjective:   Patient sitting up in bed comfortably  No sign of respiratory distress  Remains on high-flow went up as high as 15 L now trended down to 14  He reports of improving dyspnea  Afebrile, nontoxic appearing    Objective:     Vitals:   Temp (24hrs), Av 6 °F (37 °C), Min:98 4 °F (36 9 °C), Max:98 8 °F (37 1 °C)    Temp:  [98 4 °F (36 9 °C)-98 8 °F (37 1 °C)] 98 8 °F (37 1 °C)  HR:  [74-78] 74  Resp:  [18] 18  BP: (112-123)/(65-76) 122/76  SpO2:  [89 %-94 %] 90 %  Body mass index is 29 62 kg/m²  Input and Output Summary (last 24 hours): Intake/Output Summary (Last 24 hours) at 3/18/2021 1920  Last data filed at 3/18/2021 1400  Gross per 24 hour   Intake 540 ml   Output 950 ml   Net -410 ml       Physical Exam:     Physical Exam  Constitutional:       Appearance: Normal appearance     Neck:      Musculoskeletal: Normal range of motion and neck supple  Cardiovascular:      Rate and Rhythm: Normal rate and regular rhythm  Pulses: Normal pulses  Heart sounds: Normal heart sounds  No murmur  No gallop  Pulmonary:      Effort: Pulmonary effort is normal  No respiratory distress  Breath sounds: No wheezing or rales  Comments: Diminished breath sounds  Abdominal:      General: Abdomen is flat  Bowel sounds are normal  There is no distension  Palpations: Abdomen is soft  Tenderness: There is no abdominal tenderness  There is no guarding  Musculoskeletal: Normal range of motion  Skin:     General: Skin is warm and dry  Neurological:      General: No focal deficit present  Mental Status: He is alert and oriented to person, place, and time  Mental status is at baseline  Cranial Nerves: No cranial nerve deficit  Motor: No weakness             Additional Data:     Labs:    Results from last 7 days   Lab Units 03/18/21  0537   WBC Thousand/uL 5 80   HEMOGLOBIN g/dL 12 4   HEMATOCRIT % 37 1   PLATELETS Thousands/uL 237   BANDS PCT % 9*   LYMPHO PCT % 9*   MONO PCT % 6   EOS PCT % 0     Results from last 7 days   Lab Units 03/18/21  0537   SODIUM mmol/L 137   POTASSIUM mmol/L 4 2   CHLORIDE mmol/L 105   CO2 mmol/L 20*   BUN mg/dL 20   CREATININE mg/dL 1 01   ANION GAP mmol/L 12   CALCIUM mg/dL 8 4   ALBUMIN g/dL 2 1*   TOTAL BILIRUBIN mg/dL 0 34   ALK PHOS U/L 129*   ALT U/L 260*   AST U/L 382*   GLUCOSE RANDOM mg/dL 193*     Results from last 7 days   Lab Units 03/17/21  1134   INR  0 90     Results from last 7 days   Lab Units 03/18/21  1636 03/18/21  1252 03/18/21  1052 03/18/21  0758 03/18/21  0527 03/18/21  0240 03/18/21  0015 03/17/21  2146 03/17/21  2014 03/17/21  1807   POC GLUCOSE mg/dl 299* 273* 250* 176* 184* 195* 227* 202* 177* 113         Results from last 7 days   Lab Units 03/18/21  0537 03/17/21  1420 03/17/21  1134   LACTIC ACID mmol/L  --  1 0 2 4*   PROCALCITONIN ng/ml 1 75*  --  3 17*           * I Have Reviewed All Lab Data Listed Above  * Additional Pertinent Lab Tests Reviewed: All Labs Within Last 24 Hours Reviewed    Imaging:    Imaging Reports Reviewed Today Include:   Imaging Personally Reviewed by Myself Includes:      Recent Cultures (last 7 days):     Results from last 7 days   Lab Units 03/17/21  1145 03/17/21  1134   BLOOD CULTURE  No Growth at 24 hrs  No Growth at 24 hrs         Last 24 Hours Medication List:   Current Facility-Administered Medications   Medication Dose Route Frequency Provider Last Rate    acetaminophen  650 mg Oral Q6H PRN Luis Billy MD      ALPRAZolam  0 5 mg Oral TID PRN Luis Billy MD      ascorbic acid  1,000 mg Oral Q12H [address] Luis Billy MD      atorvastatin  40 mg Oral HS Luis Billy MD      cefTRIAXone  1,000 mg Intravenous Q24H Luis Billy MD Stopped (03/18/21 1201)    cholecalciferol  2,000 Units Oral Daily Luis Billy MD      cloNIDine  0 1 mg Oral Daily Luis Billy MD      dexamethasone  0 1 mg/kg Intravenous Q12H [address] IGNACIO Gross      dicyclomine  20 mg Oral Q6H PRN Luis Billy MD      divalproex sodium  500 mg Oral Q12H [address] Luis Billy MD      doxycycline hyclate  100 mg Oral Q12H Luis Billy MD      famotidine  20 mg Oral BID Luis Billy MD      gabapentin  300 mg Oral TID Luis Billy MD      heparin (porcine)  3-30 Units/kg/hr (Order-Specific) Intravenous Titrated Luis Billy MD Stopped (03/17/21 1927)    insulin glargine  28 Units Subcutaneous HS Luis Billy MD      insulin lispro  1-5 Units Subcutaneous TID Wendy Monroy MD      insulin lispro  1-5 Units Subcutaneous HS Luis Billy MD      levothyroxine  25 mcg Oral Early Morning Luis Billy MD      loratadine  10 mg Oral Daily Luis Billy MD      metoprolol succinate  50 mg Oral Daily Luis Billy MD      zinc sulfate  220 mg Oral Daily Luis Billy MD      Followed by   Mihaela Barton ON 3/25/2021] multivitamin-minerals  1 tablet Oral Daily Carola Hdz MD      pantoprazole  40 mg Oral Early Morning Carola Hdz MD      [START ON 3/19/2021] remdesivir  100 mg Intravenous Q24H IGNACIO Viera      traZODone  100 mg Oral HS Carola Hdz MD      venlafaxine  37 5 mg Oral Daily Carola Hdz MD          Today, Patient Was Seen By: Shai Monsalve DO    ** Please Note: Dictation voice to text software may have been used in the creation of this document   **

## 2021-03-18 NOTE — ASSESSMENT & PLAN NOTE
COVID-19 pneumonia with superimposed bacterial infection given elevated procalcitonin  COVID positive on March 9  Pulmonary following, appreciate input  Initiated on remdesivir (patient now agreeable), Actemra x 1, high-dose dexamethasone  Antibiotic treatment with ceftriaxone and doxycycline,  Heparin drip given elevated D-dimer  Continue to trend inflammatory markers  Continue mid flow and wean as tolerated  Low threshold for ICU transfer    Presently appears to be stable

## 2021-03-18 NOTE — PROGRESS NOTES
PT reports eating fruit cup, 1/2 english muffin and some apple juice at B this AM  Reports decreased intake compared to baseline  No signficant wt changes per chart review  Noting elevated POC BS levels, on dexamethasone  Will order CCD 2  Pt agreeable to supplementation, will add glucerna TID

## 2021-03-18 NOTE — QUICK NOTE
patient O2 demand increasing overnight to 11L  Patient does wear cpap at night but currently refusing to wear it as he feels he is suffocating  Patient upgraded to [address]reto 53  Currently receiving mod pathway treatment  Monitor O2 demands closely

## 2021-03-18 NOTE — ASSESSMENT & PLAN NOTE
Lab Results   Component Value Date    HGBA1C 7 8 (H) 10/27/2020       Recent Labs     03/18/21  0758 03/18/21  1052 03/18/21  1252 03/18/21  1636   POCGLU 176* 250* 273* 299*       Blood Sugar Average: Last 72 hrs:  (P) 209 6   Continue insulin regimen; close monitor given on high-dose dexamethasone

## 2021-03-18 NOTE — PLAN OF CARE
Problem: Nutrition/Hydration-ADULT  Goal: Nutrient/Hydration intake appropriate for improving, restoring or maintaining nutritional needs  Description: Monitor and assess patient's nutrition/hydration status for malnutrition  Collaborate with interdisciplinary team and initiate plan and interventions as ordered  Monitor patient's weight and dietary intake as ordered or per policy  Utilize nutrition screening tool and intervene as necessary  Determine patient's food preferences and provide high-protein, high-caloric foods as appropriate       INTERVENTIONS:  - Monitor oral intake, urinary output, labs, and treatment plans  - Assess nutrition and hydration status and recommend course of action  - Evaluate amount of meals eaten  - Assist patient with eating if necessary   - Allow adequate time for meals  - Recommend/ encourage appropriate diets, oral nutritional supplements, and vitamin/mineral supplements  - Order, calculate, and assess calorie counts as needed  - Recommend, monitor, and adjust tube feedings and TPN/PPN based on assessed needs  - Assess need for intravenous fluids  - Provide specific nutrition/hydration education as appropriate  - Include patient/family/caregiver in decisions related to nutrition  Outcome: Progressing     Problem: PAIN - ADULT  Goal: Verbalizes/displays adequate comfort level or baseline comfort level  Description: Interventions:  - Encourage patient to monitor pain and request assistance  - Assess pain using appropriate pain scale  - Administer analgesics based on type and severity of pain and evaluate response  - Implement non-pharmacological measures as appropriate and evaluate response  - Consider cultural and social influences on pain and pain management  - Notify physician/advanced practitioner if interventions unsuccessful or patient reports new pain  Outcome: Progressing     Problem: INFECTION - ADULT  Goal: Absence or prevention of progression during hospitalization  Description: INTERVENTIONS:  - Assess and monitor for signs and symptoms of infection  - Monitor lab/diagnostic results  - Monitor all insertion sites, i e  indwelling lines, tubes, and drains  - [place] appropriate cooling/warming therapies per order  - Administer medications as ordered  - Instruct and encourage patient and family to use good hand hygiene technique  - Identify and instruct in appropriate isolation precautions for identified infection/condition  Outcome: Progressing     Problem: SAFETY ADULT  Goal: Patient will remain free of falls  Description: INTERVENTIONS:  - Assess patient frequently for physical needs  -  Identify cognitive and physical deficits and behaviors that affect risk of falls    -  [place] fall precautions as indicated by assessment   - Educate patient/family on patient safety including physical limitations  - Instruct patient to call for assistance with activity based on assessment  - Modify environment to reduce risk of injury  - Consider OT/PT consult to assist with strengthening/mobility  Outcome: Progressing  Goal: Maintain or return to baseline ADL function  Description: INTERVENTIONS:  -  Assess patient's ability to carry out ADLs; assess patient's baseline for ADL function and identify physical deficits which impact ability to perform ADLs (bathing, care of mouth/teeth, toileting, grooming, dressing, etc )  - Assess/evaluate cause of self-care deficits   - Assess range of motion  - Assess patient's mobility; develop plan if impaired  - Assess patient's need for assistive devices and provide as appropriate  - Encourage maximum independence but intervene and supervise when necessary  - Involve family in performance of ADLs  - Assess for home care needs following discharge   - Consider OT consult to assist with ADL evaluation and planning for discharge  - Provide patient education as appropriate  Outcome: Progressing  Goal: Maintain or return mobility status to optimal level  Description: INTERVENTIONS:  - Assess patient's baseline mobility status (ambulation, transfers, stairs, etc )    - Identify cognitive and physical deficits and behaviors that affect mobility  - Identify mobility aids required to assist with transfers and/or ambulation (gait belt, sit-to-stand, lift, walker, cane, etc )  - [place] fall precautions as indicated by assessment  - Record patient progress and toleration of activity level on Mobility SBAR; progress patient to next Phase/Stage  - Instruct patient to call for assistance with activity based on assessment  - Consider rehabilitation consult to assist with strengthening/weightbearing, etc   Outcome: Progressing     Problem: DISCHARGE PLANNING  Goal: Discharge to home or other facility with appropriate resources  Description: INTERVENTIONS:  - Identify barriers to discharge w/patient and caregiver  - Arrange for needed discharge resources and transportation as appropriate  - Identify discharge learning needs (meds, wound care, etc )  - Arrange for interpretive services to assist at discharge as needed  - Refer to Case Management Department for coordinating discharge planning if the patient needs post-hospital services based on physician/advanced practitioner order or complex needs related to functional status, cognitive ability, or social support system  Outcome: Progressing     Problem: Knowledge Deficit  Goal: Patient/family/caregiver demonstrates understanding of disease process, treatment plan, medications, and discharge instructions  Description: Complete learning assessment and assess knowledge base    Interventions:  - Provide teaching at level of understanding  - Provide teaching via preferred learning methods  Outcome: Progressing     Problem: RESPIRATORY - ADULT  Goal: Achieves optimal ventilation and oxygenation  Description: INTERVENTIONS:  - Assess for changes in respiratory status  - Assess for changes in mentation and behavior  - Position to facilitate oxygenation and minimize respiratory effort  - Oxygen administered by appropriate delivery if ordered  - Encourage broncho-pulmonary hygiene including cough, deep breathe, Incentive Spirometry  - Assess and instruct to report SOB or any respiratory difficulty  - Respiratory Therapy support as indicated  Outcome: Progressing

## 2021-03-18 NOTE — ASSESSMENT & PLAN NOTE
Severe sepsis secondary to COVID-19 pneumonia and possible superimposed bacterial infection   Continue pathway for COVID-19 treatment  Continue antibiotics  Follow-up blood culture  Resolved lactic acidosis

## 2021-03-18 NOTE — ASSESSMENT & PLAN NOTE
Secondary to COVID-19 pneumonia  Elevated d-dimer, on therapeutic a/c tx with heparin gtt  Elevation likely secondary to hypercoag state associated with COVID pna  Cont oxygen supplementation and wean as clinically improves  Remains on mid flow    Pulmonary following

## 2021-03-18 NOTE — CONSULTS
Consultation - Pulmonary Medicine   Dariusz Palomino 38 y o  male MRN: [id number]  Unit/Bed#: S -01 Encounter: [id number]      Assessment/Plan:    1  Acute hypoxic respiratory failure likely secondary to COVID-19       -  patient now requiring 15L mid flow-90%, patient does not wear home O2       -  will continue to maintain saturations greater than 89%       -  pulmonary toileting:  Deep breathing cough, OOB as tolerated, IS Q 1 hr    2  COVID-19- 3/9/2021 with suspected bacterial superinfection/aspiration       -  Day #2 - Decadron       -  D-dimer- 1  17- heparin GTT       -  procalcitonin 3 17, repeat pending- continue day #2 doxycycline/Rocephin       -  Ferritin- 1064,          -  patient reports vomiting in upwards of 15 x in the last 24 hour, concern for aspiration    Will increase Decadron 0 1 mg/kg, will start remdesivir, CRP is elevated will start Actemra, patient denies constipation and reports last bowel movement was yesterday, will start bowel regimen, patient status remains guarded- ICU made aware    3  ENRIKE       -  patient reports CPAP therapy at home unclear on settings       -  will hold off on [address] for now    4  Transaminitis       -   Suspicious for  Alcohol  Induced       -   To obtain better social history once patient medically stable  From a respiratory standpoint          History of Present Illness   Physician Requesting Consult: Callum Seals DO  Reason for Consult / Principal Problem:  Hypoxic respiratory failure  Hx and PE limited by:  Nothing  Chief Complaint: "I feel terrible  HPI: Dariusz Palomino is a 38 y o   male who presented to [address] [place] [address] with complaints of shortness of breath  Patient has past medical history of hypertension, diabetes, hyperlipidemia, and PTSD  Patient reports that he began feeling symptoms of shortness of breath and tested positive on 3/9/2021  Patient reports that his overall respiratory status has declined since being tested  Patient reports in last 24 hour that he began developing significant nausea and vomiting with generalized abdominal pain  Patient reports his shortness of breath became so significant he was unable to ambulate without significant dyspnea  Patient reports upon EMS arrival he was noted to be 77% on room air  Upon ED admission patient was placed on 2 L nasal cannula eventually hypoxia began to increase in severity now requiring 15 L  Patient was initiated on COVID-19 moderate pathway  Pulmonary was consulted for acute hypoxic respiratory failure  Upon examination patient appeared extremely ill and had a difficult time speaking without significant coughing bouts and feeling nauseous  Patient reports significant feelings of shortness of breath and dyspnea  Patient reports it is difficult for him to lie on either side or self prone  Patient currently denies any fever, chills, hemoptysis, headaches, night sweats, pleuritic chest pain, or palpitations  From a pulmonary standpoint, patient does not have a pulmonologist   Patient has been a lifelong nonsmoker and denies ever being diagnosed with COPD  Patient does report being diagnosed with asthma as a child however it resolved in his teenage years  Patient is currently not maintained on any inhaled or oxygen therapies  Patient does report history of GERD in which he is maintained on Prilosec  Patient does report history of seasonal allergies in which she is maintained on Claritin  Patient is a retired [employer]  Patient does report being diagnosed with ENRIKE approximately 10 years ago  Patient is currently maintained on CPAP therapy at home  Patient does report having 1 dog  Patient denies any acute exposures to dust, mold, asbestos, or silica      Inpatient consult to Pulmonology  Consult performed by: IGNACIO Michelle  Consult ordered by: Vidhi Bledsoe MD          Review of Systems   Constitutional: Positive for activity change, appetite change, chills, fatigue and fever  HENT: Negative for congestion and nosebleeds  Respiratory: Positive for cough and shortness of breath  Negative for apnea, choking, chest tightness, wheezing and stridor  Cardiovascular: Negative for chest pain, palpitations and leg swelling  Gastrointestinal: Positive for diarrhea and nausea  Negative for abdominal distention and abdominal pain  Genitourinary: Negative for difficulty urinating and dysuria  Musculoskeletal: Negative for arthralgias and back pain  Skin: Negative for color change and pallor  Neurological: Negative for dizziness and facial asymmetry  Psychiatric/Behavioral: Negative for agitation and behavioral problems  Historical Information   Past Medical History:   Diagnosis Date    Diabetes mellitus ([facility] [place] [address] )     type 2    Disease of thyroid gland     hypothyroidism    Hyperlipidemia     Hypertension     Psychiatric disorder     PTSD   Anxiety, depression,      Past Surgical History:   Procedure Laterality Date    WISDOM TOOTH EXTRACTION       Social History   Social History     Substance and Sexual Activity   Alcohol Use Not Currently     Social History     Substance and Sexual Activity   Drug Use No     Social History     Tobacco Use   Smoking Status Never Smoker   Smokeless Tobacco Never Used     E-Cigarette/Vaping    E-Cigarette Use Never User      E-Cigarette/Vaping Substances    Nicotine No     THC No     CBD No     Flavoring No     Other No      Occupational History: [employer]    Family History:   Family History   Problem Relation Age of Onset    Hyperlipidemia Father     Heart attack Paternal Grandfather        Meds/Allergies   pertinent pulmonary meds have been reviewed    Allergies   Allergen Reactions    Lamotrigine GI Intolerance    Simvastatin Other (See Comments)     Elevated liver enzymes  Liver enzyme elevation    Niacin Rash       Objective   Vitals: Blood pressure 112/72, pulse 78, temperature 98 4 °F (36 9 °C), temperature source Oral, resp  rate 20, SpO2 90 %  15L,There is no height or weight on file to calculate BMI  Intake/Output Summary (Last 24 hours) at 3/18/2021 0740  Last data filed at 3/17/2021 2200  Gross per 24 hour   Intake 1780 ml   Output --   Net 1780 ml     Invasive Devices     Peripheral Intravenous Line            Peripheral IV 03/17/21 Left Antecubital less than 1 day    Peripheral IV 03/17/21 Right Antecubital less than 1 day                Physical Exam  Constitutional:       General: He is not in acute distress  Appearance: He is well-developed and normal weight  He is not ill-appearing  HENT:      Head: Normocephalic and atraumatic  Mouth/Throat:      Mouth: Mucous membranes are moist    Neck:      Musculoskeletal: Normal range of motion and neck supple  Thyroid: No thyromegaly  Cardiovascular:      Rate and Rhythm: Normal rate and regular rhythm  No extrasystoles are present  Pulses: Normal pulses  No decreased pulses  Heart sounds: Normal heart sounds  Pulmonary:      Effort: Tachypnea, accessory muscle usage and respiratory distress present  No bradypnea  Breath sounds: Decreased breath sounds present  No wheezing, rhonchi or rales  Comments: Difficultly diminished breath sounds with poor effort  Chest:      Chest wall: No mass, deformity, tenderness, crepitus or edema  There is no dullness to percussion  Abdominal:      General: Bowel sounds are normal       Palpations: Abdomen is soft  Musculoskeletal: Normal range of motion  Right lower leg: No edema  Left lower leg: No edema  Lymphadenopathy:      Cervical: No cervical adenopathy  Skin:     General: Skin is warm and dry  Coloration: Skin is not cyanotic or pale  Neurological:      General: No focal deficit present  Mental Status: He is alert and oriented to person, place, and time  Psychiatric:         Mood and Affect: Mood normal  Mood is not anxious           Behavior: Behavior normal  Behavior is not agitated  Lab Results:   I have personally reviewed pertinent lab results CBC:   Lab Results   Component Value Date    WBC 5 80 03/18/2021    HGB 12 4 03/18/2021    HCT 37 1 03/18/2021    MCV 87 03/18/2021     03/18/2021    MCH 29 1 03/18/2021    MCHC 33 4 03/18/2021    RDW 13 3 03/18/2021    MPV 9 3 03/18/2021    NRBC 0 03/18/2021   , CMP:   Lab Results   Component Value Date    SODIUM 137 03/18/2021    K 4 2 03/18/2021     03/18/2021    CO2 20 (L) 03/18/2021    BUN 20 03/18/2021    CREATININE 1 01 03/18/2021    CALCIUM 8 4 03/18/2021     (H) 03/18/2021     (H) 03/18/2021    ALKPHOS 129 (H) 03/18/2021    EGFR 95 03/18/2021   , PT/INR:   Lab Results   Component Value Date    INR 0 90 03/17/2021   , Troponin:   Lab Results   Component Value Date    TROPONINI <0 02 03/18/2021     Imaging Studies: I have personally reviewed pertinent films in PACS     CXR 3/17/2021- multi focal pneumonia       EKG, Pathology, and Other Studies: I have personally reviewed pertinent films in PACS     Echo stress test 1/18/2021- suboptimal test given limited exertion EKG was negative for ischemia no regional wall abnormalities       Pulmonary Results (PFTs, PSG): I have personally reviewed pertinent films in PACS     No PFTs recorded       VTE Prophylaxis: Sequential compression device (Venodyne)     Code Status: Level 1 - Full Code    None    Portions of the record may have been created with voice recognition software  Occasional wrong word or "sound a like" substitutions may have occurred due to the inherent limitations of voice recognition software  Read the chart carefully and recognize, using context, where substitutions have occurred

## 2021-03-19 PROBLEM — O36.8390 BRADYCARDIC BASELINE FETAL HEART RATE: Status: ACTIVE | Noted: 2021-03-19

## 2021-03-19 LAB
ALBUMIN SERPL BCP-MCNC: 2.1 G/DL (ref 3.5–5)
ALP SERPL-CCNC: 159 U/L (ref 46–116)
ALT SERPL W P-5'-P-CCNC: 594 U/L (ref 12–78)
ANION GAP SERPL CALCULATED.3IONS-SCNC: 13 MMOL/L (ref 4–13)
APTT PPP: 172 SECONDS (ref 23–37)
AST SERPL W P-5'-P-CCNC: 498 U/L (ref 5–45)
BILIRUB SERPL-MCNC: 0.32 MG/DL (ref 0.2–1)
BUN SERPL-MCNC: 30 MG/DL (ref 5–25)
CALCIUM ALBUM COR SERPL-MCNC: 10.4 MG/DL (ref 8.3–10.1)
CALCIUM SERPL-MCNC: 8.9 MG/DL (ref 8.3–10.1)
CHLORIDE SERPL-SCNC: 108 MMOL/L (ref 100–108)
CO2 SERPL-SCNC: 21 MMOL/L (ref 21–32)
CREAT SERPL-MCNC: 0.95 MG/DL (ref 0.6–1.3)
D DIMER PPP FEU-MCNC: 2.53 UG/ML FEU
ERYTHROCYTE [DISTWIDTH] IN BLOOD BY AUTOMATED COUNT: 13.3 % (ref 11.6–15.1)
ERYTHROCYTE [DISTWIDTH] IN BLOOD BY AUTOMATED COUNT: 13.5 % (ref 11.6–15.1)
GFR SERPL CREATININE-BSD FRML MDRD: 103 ML/MIN/1.73SQ M
GLUCOSE SERPL-MCNC: 240 MG/DL (ref 65–140)
GLUCOSE SERPL-MCNC: 252 MG/DL (ref 65–140)
GLUCOSE SERPL-MCNC: 269 MG/DL (ref 65–140)
GLUCOSE SERPL-MCNC: 274 MG/DL (ref 65–140)
GLUCOSE SERPL-MCNC: 296 MG/DL (ref 65–140)
GLUCOSE SERPL-MCNC: 298 MG/DL (ref 65–140)
GLUCOSE SERPL-MCNC: 333 MG/DL (ref 65–140)
HCT VFR BLD AUTO: 37.6 % (ref 36.5–49.3)
HCT VFR BLD AUTO: 39 % (ref 36.5–49.3)
HGB BLD-MCNC: 12.5 G/DL (ref 12–17)
HGB BLD-MCNC: 12.9 G/DL (ref 12–17)
INR PPP: 1.13 (ref 0.84–1.19)
MAGNESIUM SERPL-MCNC: 2.2 MG/DL (ref 1.6–2.6)
MCH RBC QN AUTO: 29.1 PG (ref 26.8–34.3)
MCH RBC QN AUTO: 29.6 PG (ref 26.8–34.3)
MCHC RBC AUTO-ENTMCNC: 33.1 G/DL (ref 31.4–37.4)
MCHC RBC AUTO-ENTMCNC: 33.2 G/DL (ref 31.4–37.4)
MCV RBC AUTO: 87 FL (ref 82–98)
MCV RBC AUTO: 89 FL (ref 82–98)
PLATELET # BLD AUTO: 269 THOUSANDS/UL (ref 149–390)
PLATELET # BLD AUTO: 296 THOUSANDS/UL (ref 149–390)
PMV BLD AUTO: 9 FL (ref 8.9–12.7)
PMV BLD AUTO: 9.1 FL (ref 8.9–12.7)
POTASSIUM SERPL-SCNC: 4.5 MMOL/L (ref 3.5–5.3)
PROCALCITONIN SERPL-MCNC: 0.99 NG/ML
PROT SERPL-MCNC: 6.4 G/DL (ref 6.4–8.2)
PROTHROMBIN TIME: 14.6 SECONDS (ref 11.6–14.5)
RBC # BLD AUTO: 4.3 MILLION/UL (ref 3.88–5.62)
RBC # BLD AUTO: 4.36 MILLION/UL (ref 3.88–5.62)
SODIUM SERPL-SCNC: 142 MMOL/L (ref 136–145)
TSH SERPL DL<=0.05 MIU/L-ACNC: 1.75 UIU/ML (ref 0.36–3.74)
WBC # BLD AUTO: 5.58 THOUSAND/UL (ref 4.31–10.16)
WBC # BLD AUTO: 8.24 THOUSAND/UL (ref 4.31–10.16)

## 2021-03-19 PROCEDURE — 99233 SBSQ HOSP IP/OBS HIGH 50: CPT | Performed by: INTERNAL MEDICINE

## 2021-03-19 PROCEDURE — 83735 ASSAY OF MAGNESIUM: CPT | Performed by: INTERNAL MEDICINE

## 2021-03-19 PROCEDURE — 94760 N-INVAS EAR/PLS OXIMETRY 1: CPT

## 2021-03-19 PROCEDURE — 85027 COMPLETE CBC AUTOMATED: CPT | Performed by: INTERNAL MEDICINE

## 2021-03-19 PROCEDURE — 94762 N-INVAS EAR/PLS OXIMTRY CONT: CPT

## 2021-03-19 PROCEDURE — 85379 FIBRIN DEGRADATION QUANT: CPT | Performed by: INTERNAL MEDICINE

## 2021-03-19 PROCEDURE — 82948 REAGENT STRIP/BLOOD GLUCOSE: CPT

## 2021-03-19 PROCEDURE — 87340 HEPATITIS B SURFACE AG IA: CPT | Performed by: NURSE PRACTITIONER

## 2021-03-19 PROCEDURE — 94660 CPAP INITIATION&MGMT: CPT

## 2021-03-19 PROCEDURE — 84443 ASSAY THYROID STIM HORMONE: CPT | Performed by: INTERNAL MEDICINE

## 2021-03-19 PROCEDURE — 86705 HEP B CORE ANTIBODY IGM: CPT | Performed by: NURSE PRACTITIONER

## 2021-03-19 PROCEDURE — 80053 COMPREHEN METABOLIC PANEL: CPT | Performed by: INTERNAL MEDICINE

## 2021-03-19 PROCEDURE — 86803 HEPATITIS C AB TEST: CPT | Performed by: NURSE PRACTITIONER

## 2021-03-19 PROCEDURE — 84145 PROCALCITONIN (PCT): CPT | Performed by: INTERNAL MEDICINE

## 2021-03-19 PROCEDURE — 99232 SBSQ HOSP IP/OBS MODERATE 35: CPT | Performed by: INTERNAL MEDICINE

## 2021-03-19 PROCEDURE — 85730 THROMBOPLASTIN TIME PARTIAL: CPT | Performed by: INTERNAL MEDICINE

## 2021-03-19 PROCEDURE — 85610 PROTHROMBIN TIME: CPT | Performed by: INTERNAL MEDICINE

## 2021-03-19 PROCEDURE — 86704 HEP B CORE ANTIBODY TOTAL: CPT | Performed by: NURSE PRACTITIONER

## 2021-03-19 RX ORDER — HEPARIN SODIUM 1000 [USP'U]/ML
6000 INJECTION, SOLUTION INTRAVENOUS; SUBCUTANEOUS ONCE
Status: COMPLETED | OUTPATIENT
Start: 2021-03-19 | End: 2021-03-19

## 2021-03-19 RX ORDER — HEPARIN SODIUM 10000 [USP'U]/100ML
3-30 INJECTION, SOLUTION INTRAVENOUS
Status: DISCONTINUED | OUTPATIENT
Start: 2021-03-19 | End: 2021-03-20

## 2021-03-19 RX ORDER — HEPARIN SODIUM 1000 [USP'U]/ML
6000 INJECTION, SOLUTION INTRAVENOUS; SUBCUTANEOUS
Status: DISCONTINUED | OUTPATIENT
Start: 2021-03-19 | End: 2021-03-20

## 2021-03-19 RX ORDER — HEPARIN SODIUM 1000 [USP'U]/ML
3000 INJECTION, SOLUTION INTRAVENOUS; SUBCUTANEOUS
Status: DISCONTINUED | OUTPATIENT
Start: 2021-03-19 | End: 2021-03-20

## 2021-03-19 RX ORDER — INSULIN GLARGINE 100 [IU]/ML
35 INJECTION, SOLUTION SUBCUTANEOUS
Status: DISCONTINUED | OUTPATIENT
Start: 2021-03-19 | End: 2021-03-20

## 2021-03-19 RX ADMIN — DEXAMETHASONE SODIUM PHOSPHATE 8.08 MG: 4 INJECTION INTRA-ARTICULAR; INTRALESIONAL; INTRAMUSCULAR; INTRAVENOUS; SOFT TISSUE at 21:36

## 2021-03-19 RX ADMIN — INSULIN LISPRO 10 UNITS: 100 INJECTION, SOLUTION INTRAVENOUS; SUBCUTANEOUS at 17:03

## 2021-03-19 RX ADMIN — GABAPENTIN 300 MG: 300 CAPSULE ORAL at 17:01

## 2021-03-19 RX ADMIN — TRAZODONE HYDROCHLORIDE 100 MG: 100 TABLET ORAL at 21:34

## 2021-03-19 RX ADMIN — CEFTRIAXONE 1000 MG: 1 INJECTION, POWDER, FOR SOLUTION INTRAMUSCULAR; INTRAVENOUS at 10:32

## 2021-03-19 RX ADMIN — DOXYCYCLINE 100 MG: 100 CAPSULE ORAL at 09:28

## 2021-03-19 RX ADMIN — INSULIN LISPRO 2 UNITS: 100 INJECTION, SOLUTION INTRAVENOUS; SUBCUTANEOUS at 21:41

## 2021-03-19 RX ADMIN — ATORVASTATIN CALCIUM 40 MG: 40 TABLET, FILM COATED ORAL at 21:34

## 2021-03-19 RX ADMIN — PANTOPRAZOLE SODIUM 40 MG: 40 TABLET, DELAYED RELEASE ORAL at 06:11

## 2021-03-19 RX ADMIN — REMDESIVIR 100 MG: 100 INJECTION, POWDER, LYOPHILIZED, FOR SOLUTION INTRAVENOUS at 10:32

## 2021-03-19 RX ADMIN — INSULIN LISPRO 2 UNITS: 100 INJECTION, SOLUTION INTRAVENOUS; SUBCUTANEOUS at 10:33

## 2021-03-19 RX ADMIN — GABAPENTIN 300 MG: 300 CAPSULE ORAL at 21:34

## 2021-03-19 RX ADMIN — Medication 2000 UNITS: at 09:25

## 2021-03-19 RX ADMIN — DIVALPROEX SODIUM 500 MG: 500 TABLET, DELAYED RELEASE ORAL at 09:25

## 2021-03-19 RX ADMIN — DEXAMETHASONE SODIUM PHOSPHATE 8.08 MG: 4 INJECTION INTRA-ARTICULAR; INTRALESIONAL; INTRAMUSCULAR; INTRAVENOUS; SOFT TISSUE at 09:26

## 2021-03-19 RX ADMIN — INSULIN LISPRO 6 UNITS: 100 INJECTION, SOLUTION INTRAVENOUS; SUBCUTANEOUS at 12:56

## 2021-03-19 RX ADMIN — HEPARIN SODIUM 6000 UNITS: 1000 INJECTION INTRAVENOUS; SUBCUTANEOUS at 15:33

## 2021-03-19 RX ADMIN — OXYCODONE HYDROCHLORIDE AND ACETAMINOPHEN 1000 MG: 500 TABLET ORAL at 21:34

## 2021-03-19 RX ADMIN — INSULIN LISPRO 10 UNITS: 100 INJECTION, SOLUTION INTRAVENOUS; SUBCUTANEOUS at 12:56

## 2021-03-19 RX ADMIN — DIVALPROEX SODIUM 500 MG: 500 TABLET, DELAYED RELEASE ORAL at 21:34

## 2021-03-19 RX ADMIN — VENLAFAXINE HYDROCHLORIDE 37.5 MG: 37.5 CAPSULE, EXTENDED RELEASE ORAL at 09:28

## 2021-03-19 RX ADMIN — INSULIN LISPRO 8 UNITS: 100 INJECTION, SOLUTION INTRAVENOUS; SUBCUTANEOUS at 17:02

## 2021-03-19 RX ADMIN — HEPARIN SODIUM 18 UNITS/KG/HR: 10000 INJECTION, SOLUTION INTRAVENOUS at 15:26

## 2021-03-19 RX ADMIN — GABAPENTIN 300 MG: 300 CAPSULE ORAL at 09:28

## 2021-03-19 RX ADMIN — DOXYCYCLINE 100 MG: 100 CAPSULE ORAL at 21:34

## 2021-03-19 RX ADMIN — INSULIN GLARGINE 35 UNITS: 100 INJECTION, SOLUTION SUBCUTANEOUS at 21:36

## 2021-03-19 RX ADMIN — LORATADINE 10 MG: 10 TABLET ORAL at 09:27

## 2021-03-19 RX ADMIN — ZINC SULFATE 220 MG (50 MG) CAPSULE 220 MG: CAPSULE at 09:28

## 2021-03-19 RX ADMIN — FAMOTIDINE 20 MG: 20 TABLET ORAL at 17:01

## 2021-03-19 RX ADMIN — LEVOTHYROXINE SODIUM 25 MCG: 25 TABLET ORAL at 06:11

## 2021-03-19 RX ADMIN — FAMOTIDINE 20 MG: 20 TABLET ORAL at 09:25

## 2021-03-19 RX ADMIN — OXYCODONE HYDROCHLORIDE AND ACETAMINOPHEN 1000 MG: 500 TABLET ORAL at 09:25

## 2021-03-19 NOTE — PROGRESS NOTES
[facility]  Progress Note - Abner Stoner 1985, 38 y o  male MRN: [id number]  Unit/Bed#: S -01 Encounter: [id number]  Primary Care Provider: Ainsley Abbott MD   Date and time admitted to hospital: 3/17/2021 11:24 AM    * Acute respiratory failure with hypoxia [facility])  Assessment & Plan  Secondary to COVID-19 pneumonia  Elevated d-dimer, on therapeutic a/c tx with heparin gtt  Elevation likely secondary to hypercoag state associated with COVID pna  Cont oxygen supplementation and wean as clinically improves  Remains on mid flow  Pulmonary following    COVID-19  Assessment & Plan  COVID-19 pneumonia with superimposed bacterial infection given elevated procalcitonin  COVID positive on March 9  Pulmonary following, appreciate input  Cont remdesivir day 2/5, high-dose dexamethasone day 3/10  Received Actemra x 1 (3/18)  Antibiotic treatment with ceftriaxone and doxycycline as per pulm recommending total of 5 days  Heparin drip given elevated D-dimer  Continue to trend inflammatory markers  Continue mid flow and wean as tolerated  Low threshold for ICU transfer  Presently appears to be stable      Sepsis [facility])  Assessment & Plan  Severe sepsis secondary to COVID-19 pneumonia and possible superimposed bacterial infection   Continue pathway for COVID-19 treatment  Continue antibiotics  Blood neg x 24 hrs  Resolved lactic acidosis    Type 2 diabetes mellitus without complication, with long-term current use of insulin [facility])  Assessment & Plan  Lab Results   Component Value Date    HGBA1C 7 8 (H) 10/27/2020       Recent Labs     03/18/21  2152 03/19/21  0002 03/19/21  0811 03/19/21  1030   POCGLU 280* 269* 274* 252*       Blood Sugar Average: Last 72 hrs:  (P) 226 5   On high-dose dexamethasone thus titrate up lantus to 35U qhs, start on mealtime insulin coverage   Increase scale to 2-12  Close monitor    Bradycardic baseline fetal heart rate  Assessment & Plan  Asymptomatic   Holding parameters placed on metoprolol  Hx of hypothyroidism, check tsh    Transaminitis  Assessment & Plan  Close monitor as on remdesivir  Daily CMP    ENRIKE (obstructive sleep apnea)  Assessment & Plan  Cont cpap use    PTSD (post-traumatic stress disorder)  Assessment & Plan  Continue psych medications    VTE Pharmacologic Prophylaxis:   Pharmacologic: Heparin Drip  Mechanical VTE Prophylaxis in Place: Yes    Patient Centered Rounds: I have performed bedside rounds with nursing staff today  Discussions with Specialists or Other Care Team Provider:     Education and Discussions with Family / Patient: Patient, Odilia Mcfarlane his wife [phone] and dad to update    Time Spent for Care: 30 minutes  More than 50% of total time spent on counseling and coordination of care as described above  Current Length of Stay: 2 day(s)    Current Patient Status: Inpatient   Certification Statement: The patient will continue to require additional inpatient hospital stay due to Sepsis, covid pna, acute hypoxic resp failure    Discharge Plan:     Code Status: Level 1 - Full Code      Subjective:  Patient seen and examined at bedside   He reports of improved dyspnea  Remains on mid flow now titrated down to 14 L  Bradycardic though asymptomatic   Objective:     Vitals:   Temp (24hrs), Av 8 °F (36 6 °C), Min:97 8 °F (36 6 °C), Max:97 9 °F (36 6 °C)    Temp:  [97 8 °F (36 6 °C)-97 9 °F (36 6 °C)] 97 9 °F (36 6 °C)  HR:  [45-67] 54  Resp:  [16-22] 20  BP: (109-123)/(65-76) 113/67  SpO2:  [90 %-94 %] 90 %  Body mass index is 28 73 kg/m²  Input and Output Summary (last 24 hours): Intake/Output Summary (Last 24 hours) at 3/19/2021 1216  Last data filed at 3/18/2021 1400  Gross per 24 hour   Intake 300 ml   Output 0 ml   Net 300 ml       Physical Exam:     Physical Exam  Constitutional:       Appearance: Normal appearance  Neck:      Musculoskeletal: Normal range of motion and neck supple     Cardiovascular:      Rate and Rhythm: Regular rhythm  Bradycardia present  Pulses: Normal pulses  Heart sounds: Normal heart sounds  No murmur  No gallop  Pulmonary:      Effort: Pulmonary effort is normal  No respiratory distress  Breath sounds: No wheezing or rales  Comments: Diminished breath sounds  Abdominal:      General: Abdomen is flat  Bowel sounds are normal  There is no distension  Palpations: Abdomen is soft  Tenderness: There is no abdominal tenderness  There is no guarding  Musculoskeletal: Normal range of motion  Right lower leg: No edema  Left lower leg: No edema  Skin:     General: Skin is warm and dry  Neurological:      General: No focal deficit present  Mental Status: He is alert and oriented to person, place, and time  Mental status is at baseline  Cranial Nerves: No cranial nerve deficit  Motor: No weakness           Additional Data:     Labs:    Results from last 7 days   Lab Units 03/19/21  0602 03/18/21  0537   WBC Thousand/uL 5 58 5 80   HEMOGLOBIN g/dL 12 5 12 4   HEMATOCRIT % 37 6 37 1   PLATELETS Thousands/uL 269 237   BANDS PCT %  --  9*   LYMPHO PCT %  --  9*   MONO PCT %  --  6   EOS PCT %  --  0     Results from last 7 days   Lab Units 03/19/21  0602   SODIUM mmol/L 142   POTASSIUM mmol/L 4 5   CHLORIDE mmol/L 108   CO2 mmol/L 21   BUN mg/dL 30*   CREATININE mg/dL 0 95   ANION GAP mmol/L 13   CALCIUM mg/dL 8 9   ALBUMIN g/dL 2 1*   TOTAL BILIRUBIN mg/dL 0 32   ALK PHOS U/L 159*   ALT U/L 594*   AST U/L 498*   GLUCOSE RANDOM mg/dL 298*     Results from last 7 days   Lab Units 03/17/21  1134   INR  0 90     Results from last 7 days   Lab Units 03/19/21  1030 03/19/21  0811 03/19/21  0002 03/18/21  2152 03/18/21  1636 03/18/21  1252 03/18/21  1052 03/18/21  0758 03/18/21  0527 03/18/21  0240 03/18/21  0015 03/17/21  2146   POC GLUCOSE mg/dl 252* 274* 269* 280* 299* 273* 250* 176* 184* 195* 227* 202*         Results from last 7 days   Lab Units 03/19/21  0602 03/18/21  0537 03/17/21  1420 03/17/21  1134   LACTIC ACID mmol/L  --   --  1 0 2 4*   PROCALCITONIN ng/ml 0 99* 1 75*  --  3 17*           * I Have Reviewed All Lab Data Listed Above  * Additional Pertinent Lab Tests Reviewed: All Labs Within Last 24 Hours Reviewed    Imaging:    Imaging Reports Reviewed Today Include:   Imaging Personally Reviewed by Myself Includes:      Recent Cultures (last 7 days):     Results from last 7 days   Lab Units 03/17/21  1145 03/17/21  1134   BLOOD CULTURE  No Growth at 24 hrs  No Growth at 24 hrs         Last 24 Hours Medication List:   Current Facility-Administered Medications   Medication Dose Route Frequency Provider Last Rate    acetaminophen  650 mg Oral Q6H PRN Joey Camacho MD      ALPRAZolam  0 5 mg Oral TID PRN Joey Camacho MD      ascorbic acid  1,000 mg Oral Q12H [address] Joey Camacho MD      atorvastatin  40 mg Oral HS Joey Camacho MD      cefTRIAXone  1,000 mg Intravenous Q24H Joey Camacho MD 1,000 mg (03/19/21 1032)    cholecalciferol  2,000 Units Oral Daily Joey Camacho MD      cloNIDine  0 1 mg Oral Daily Joey Camacho MD      dexamethasone  0 1 mg/kg Intravenous Q12H [address] Dorice Bilis, CRNP      dicyclomine  20 mg Oral Q6H PRN Joey Camacho MD      divalproex sodium  500 mg Oral Q12H [address] Joey Camacho MD      doxycycline hyclate  100 mg Oral Q12H Joey Camacho MD      famotidine  20 mg Oral BID Joey Camacho MD      gabapentin  300 mg Oral TID Joey Camacho MD      heparin (porcine)  3-30 Units/kg/hr (Order-Specific) Intravenous Titrated Joey Camacho MD Stopped (03/17/21 1927)    insulin glargine  35 Units Subcutaneous HS Cordella Savin, DO      insulin lispro  1-5 Units Subcutaneous HS Cordella Savin, DO      insulin lispro  10 Units Subcutaneous TID With Meals Cordella Savin, DO      insulin lispro  2-12 Units Subcutaneous TID AC Cordella Savin, DO      levothyroxine  25 mcg Oral Early Morning [employer] Lesli Pimentel MD      loratadine  10 mg Oral Daily Sheila Pisano MD      metoprolol succinate  50 mg Oral Daily Sheila Pisano MD      zinc sulfate  220 mg Oral Daily Sheila Pisano MD      Followed by   Lizzy Nix ON 3/25/2021] multivitamin-minerals  1 tablet Oral Daily Sheila Pisano MD      pantoprazole  40 mg Oral Early Morning Sheila Pisano MD      polyethylene glycol  17 g Oral Daily PRN Andrade Carreno DO      remdesivir  100 mg Intravenous Q24H IGNACIO Pizano      traZODone  100 mg Oral HS Sheila Pisano MD      venlafaxine  37 5 mg Oral Daily Sheila Pisano MD          Today, Patient Was Seen By: Andrade Carreno DO    ** Please Note: Dictation voice to text software may have been used in the creation of this document   **

## 2021-03-19 NOTE — ASSESSMENT & PLAN NOTE
Severe sepsis secondary to COVID-19 pneumonia and possible superimposed bacterial infection   Continue pathway for COVID-19 treatment  Continue antibiotics  Blood neg x 24 hrs  Resolved lactic acidosis

## 2021-03-19 NOTE — ASSESSMENT & PLAN NOTE
COVID-19 pneumonia with superimposed bacterial infection given elevated procalcitonin  COVID positive on March 9  Pulmonary following, appreciate input  Cont remdesivir day 2/5, high-dose dexamethasone day 3/10  Received Actemra x 1 (3/18)  Antibiotic treatment with ceftriaxone and doxycycline as per pulm recommending total of 5 days  Heparin drip given elevated D-dimer  Continue to trend inflammatory markers  Continue mid flow and wean as tolerated  Low threshold for ICU transfer    Presently appears to be stable

## 2021-03-19 NOTE — PROGRESS NOTES
Progress Note - Pulmonary   Jeni Shone 38 y o  male MRN: [id number]  Unit/Bed#: S -01 Encounter: [id number]    Assessment/Plan:    Acute hypoxic respiratory failure due to abnormal CXR consistent with COVID-19 pneumonia and superimposed bacterial pneumonia with sepsis POA   Titrate oxygen to maintain saturations greater than or equal to 89%  Continue COVID-19 severe pathway as below  Diagnosed 03/09/2021   Continue vitamin D3, vitamin-C, and zinc    Continue atorvastatin   Continue famotidine   Dexamethasone: Continue 1 mg/kd IV BID   Remdesivir: Complete 5 day course   Antibiotics: Continue doxycycline and ceftriaxone to complete at least 5 day course  Monitor procalcitonin/WBCs/temperatures   Convalescent plasma: Unlikely to provide benefit given timing of onset of symptoms   Actemra: Received 03/18/2021   Anticoagulation/DVT prophylaxis: Heparin gtt   Monitor inflammatory markers and D-dimer   Activity, self-proning, and incentive spirometry as able  ENRIKE with obesity   CPAP therapy used at home - settings unclear  Continue CPAP at 12 cm H2O at HS and as needed  Type 2 DM with hyperglycemia, suspect due to acute illness/steroid use   Management per primary team     Outpatient follow-up pending course  D/W nursing  Chief Complaint:    "I was really sleeping "    Subjective:    Polo Zavala was sleeping in bed on CPAP upon my arrival  He reports that he was sleeping very well, but feels his breathing is about the same as yesterday  He denies chest pain  No significant cough at present  Objective:    Vitals: Blood pressure 109/66, pulse (!) 51, temperature 97 8 °F (36 6 °C), temperature source Oral, resp  rate 22, height 5' 5" (1 651 m), SpO2 91 %  CPAP 12 cm H2O with 15L oxygen,Body mass index is 29 62 kg/m²        Intake/Output Summary (Last 24 hours) at 3/19/2021 0724  Last data filed at 3/18/2021 1400  Gross per 24 hour   Intake 300 ml   Output 950 ml   Net -650 ml Invasive Devices     Peripheral Intravenous Line            Peripheral IV 03/17/21 Left Antecubital 1 day    Peripheral IV 03/17/21 Right Antecubital 1 day                Physical Exam:     Physical Exam  Vitals signs reviewed  Constitutional:       General: He is not in acute distress  Appearance: He is well-developed  He is not toxic-appearing or diaphoretic  Interventions: Face mask in place  HENT:      Head: Normocephalic and atraumatic  Eyes:      General: No scleral icterus  Neck:      Musculoskeletal: Neck supple  Vascular: No JVD  Trachea: No tracheal deviation  Cardiovascular:      Rate and Rhythm: Normal rate and regular rhythm  Heart sounds: S1 normal and S2 normal  No murmur  No friction rub  No gallop  Pulmonary:      Effort: Pulmonary effort is normal  No tachypnea, accessory muscle usage or respiratory distress  Breath sounds: Normal breath sounds  No stridor  No decreased breath sounds, wheezing, rhonchi or rales  Chest:      Chest wall: No tenderness  Abdominal:      General: Bowel sounds are normal  There is no distension  Palpations: Abdomen is soft  Tenderness: There is no abdominal tenderness  There is no guarding or rebound  Musculoskeletal:         General: No tenderness  Skin:     General: Skin is warm and dry  Findings: No rash  Neurological:      Mental Status: He is alert and oriented to person, place, and time  GCS: GCS eye subscore is 4  GCS verbal subscore is 5  GCS motor subscore is 6  Psychiatric:         Speech: Speech normal          Behavior: Behavior normal  Behavior is cooperative         Labs:   CBC:   Lab Results   Component Value Date    WBC 5 58 03/19/2021    HGB 12 5 03/19/2021    HCT 37 6 03/19/2021    MCV 87 03/19/2021     03/19/2021    MCH 29 1 03/19/2021    MCHC 33 2 03/19/2021    RDW 13 5 03/19/2021    MPV 9 0 03/19/2021   , CMP:   Lab Results   Component Value Date    SODIUM 142 03/19/2021    K 4 5 03/19/2021     03/19/2021    CO2 21 03/19/2021    BUN 30 (H) 03/19/2021    CREATININE 0 95 03/19/2021    CALCIUM 8 9 03/19/2021     (H) 03/19/2021     (H) 03/19/2021    ALKPHOS 159 (H) 03/19/2021    EGFR 103 03/19/2021     Chronic hepatitis panel: Pending    D-Dimer: 2 53, prior 1 17    Procalcitonin: Pending today, prior 1 75    Ferritin: 1578 yesterday    CRP: 250 1 yesterday    Imaging and other studies: None new

## 2021-03-19 NOTE — ASSESSMENT & PLAN NOTE
Lab Results   Component Value Date    HGBA1C 7 8 (H) 10/27/2020       Recent Labs     03/18/21  2152 03/19/21  0002 03/19/21  0811 03/19/21  1030   POCGLU 280* 269* 274* 252*       Blood Sugar Average: Last 72 hrs:  (P) 226 5   On high-dose dexamethasone thus titrate up lantus to 35U qhs, start on mealtime insulin coverage   Increase scale to 2-12  Close monitor

## 2021-03-20 PROBLEM — K62.5 BLOOD PER RECTUM: Status: ACTIVE | Noted: 2021-03-20

## 2021-03-20 LAB
ALBUMIN SERPL BCP-MCNC: 2.3 G/DL (ref 3.5–5)
ALP SERPL-CCNC: 150 U/L (ref 46–116)
ALT SERPL W P-5'-P-CCNC: 465 U/L (ref 12–78)
ANION GAP SERPL CALCULATED.3IONS-SCNC: 13 MMOL/L (ref 4–13)
APTT PPP: 37 SECONDS (ref 23–37)
APTT PPP: 49 SECONDS (ref 23–37)
AST SERPL W P-5'-P-CCNC: 189 U/L (ref 5–45)
BILIRUB SERPL-MCNC: 0.49 MG/DL (ref 0.2–1)
BUN SERPL-MCNC: 29 MG/DL (ref 5–25)
CALCIUM ALBUM COR SERPL-MCNC: 10.3 MG/DL (ref 8.3–10.1)
CALCIUM SERPL-MCNC: 8.9 MG/DL (ref 8.3–10.1)
CHLORIDE SERPL-SCNC: 104 MMOL/L (ref 100–108)
CO2 SERPL-SCNC: 24 MMOL/L (ref 21–32)
CREAT SERPL-MCNC: 0.94 MG/DL (ref 0.6–1.3)
CRP SERPL QL: 58.8 MG/L
D DIMER PPP FEU-MCNC: 1.32 UG/ML FEU
ERYTHROCYTE [DISTWIDTH] IN BLOOD BY AUTOMATED COUNT: 13.4 % (ref 11.6–15.1)
GFR SERPL CREATININE-BSD FRML MDRD: 104 ML/MIN/1.73SQ M
GLUCOSE SERPL-MCNC: 204 MG/DL (ref 65–140)
GLUCOSE SERPL-MCNC: 243 MG/DL (ref 65–140)
GLUCOSE SERPL-MCNC: 243 MG/DL (ref 65–140)
GLUCOSE SERPL-MCNC: 271 MG/DL (ref 65–140)
GLUCOSE SERPL-MCNC: 295 MG/DL (ref 65–140)
GLUCOSE SERPL-MCNC: 320 MG/DL (ref 65–140)
HBV CORE AB SER QL: NORMAL
HBV CORE IGM SER QL: NORMAL
HBV SURFACE AG SER QL: NORMAL
HCT VFR BLD AUTO: 38.5 % (ref 36.5–49.3)
HCT VFR BLD AUTO: 38.8 % (ref 36.5–49.3)
HCV AB SER QL: NORMAL
HGB BLD-MCNC: 12.6 G/DL (ref 12–17)
HGB BLD-MCNC: 12.6 G/DL (ref 12–17)
MCH RBC QN AUTO: 28.7 PG (ref 26.8–34.3)
MCHC RBC AUTO-ENTMCNC: 32.5 G/DL (ref 31.4–37.4)
MCV RBC AUTO: 88 FL (ref 82–98)
PLATELET # BLD AUTO: 322 THOUSANDS/UL (ref 149–390)
PMV BLD AUTO: 9.1 FL (ref 8.9–12.7)
POTASSIUM SERPL-SCNC: 4.5 MMOL/L (ref 3.5–5.3)
PROCALCITONIN SERPL-MCNC: 0.46 NG/ML
PROT SERPL-MCNC: 6.3 G/DL (ref 6.4–8.2)
RBC # BLD AUTO: 4.39 MILLION/UL (ref 3.88–5.62)
SODIUM SERPL-SCNC: 141 MMOL/L (ref 136–145)
WBC # BLD AUTO: 8.43 THOUSAND/UL (ref 4.31–10.16)

## 2021-03-20 PROCEDURE — 85379 FIBRIN DEGRADATION QUANT: CPT | Performed by: NURSE PRACTITIONER

## 2021-03-20 PROCEDURE — 80053 COMPREHEN METABOLIC PANEL: CPT | Performed by: INTERNAL MEDICINE

## 2021-03-20 PROCEDURE — 84145 PROCALCITONIN (PCT): CPT | Performed by: INTERNAL MEDICINE

## 2021-03-20 PROCEDURE — 82948 REAGENT STRIP/BLOOD GLUCOSE: CPT

## 2021-03-20 PROCEDURE — 99232 SBSQ HOSP IP/OBS MODERATE 35: CPT | Performed by: NURSE PRACTITIONER

## 2021-03-20 PROCEDURE — 86140 C-REACTIVE PROTEIN: CPT | Performed by: NURSE PRACTITIONER

## 2021-03-20 PROCEDURE — 99233 SBSQ HOSP IP/OBS HIGH 50: CPT | Performed by: INTERNAL MEDICINE

## 2021-03-20 PROCEDURE — 94760 N-INVAS EAR/PLS OXIMETRY 1: CPT

## 2021-03-20 PROCEDURE — 85027 COMPLETE CBC AUTOMATED: CPT | Performed by: INTERNAL MEDICINE

## 2021-03-20 PROCEDURE — 85014 HEMATOCRIT: CPT | Performed by: INTERNAL MEDICINE

## 2021-03-20 PROCEDURE — 85018 HEMOGLOBIN: CPT | Performed by: INTERNAL MEDICINE

## 2021-03-20 PROCEDURE — 85730 THROMBOPLASTIN TIME PARTIAL: CPT | Performed by: INTERNAL MEDICINE

## 2021-03-20 PROCEDURE — 94762 N-INVAS EAR/PLS OXIMTRY CONT: CPT

## 2021-03-20 RX ORDER — INSULIN GLARGINE 100 [IU]/ML
37 INJECTION, SOLUTION SUBCUTANEOUS
Status: DISCONTINUED | OUTPATIENT
Start: 2021-03-20 | End: 2021-03-22

## 2021-03-20 RX ADMIN — Medication 2000 UNITS: at 08:56

## 2021-03-20 RX ADMIN — INSULIN LISPRO 10 UNITS: 100 INJECTION, SOLUTION INTRAVENOUS; SUBCUTANEOUS at 11:47

## 2021-03-20 RX ADMIN — INSULIN LISPRO 10 UNITS: 100 INJECTION, SOLUTION INTRAVENOUS; SUBCUTANEOUS at 08:58

## 2021-03-20 RX ADMIN — GABAPENTIN 300 MG: 300 CAPSULE ORAL at 21:53

## 2021-03-20 RX ADMIN — OXYCODONE HYDROCHLORIDE AND ACETAMINOPHEN 1000 MG: 500 TABLET ORAL at 21:53

## 2021-03-20 RX ADMIN — DOXYCYCLINE 100 MG: 100 CAPSULE ORAL at 21:53

## 2021-03-20 RX ADMIN — HEPARIN SODIUM 6000 UNITS: 1000 INJECTION INTRAVENOUS; SUBCUTANEOUS at 06:55

## 2021-03-20 RX ADMIN — VENLAFAXINE HYDROCHLORIDE 37.5 MG: 37.5 CAPSULE, EXTENDED RELEASE ORAL at 08:56

## 2021-03-20 RX ADMIN — REMDESIVIR 100 MG: 100 INJECTION, POWDER, LYOPHILIZED, FOR SOLUTION INTRAVENOUS at 09:11

## 2021-03-20 RX ADMIN — INSULIN LISPRO 10 UNITS: 100 INJECTION, SOLUTION INTRAVENOUS; SUBCUTANEOUS at 17:33

## 2021-03-20 RX ADMIN — DIVALPROEX SODIUM 500 MG: 500 TABLET, DELAYED RELEASE ORAL at 08:57

## 2021-03-20 RX ADMIN — CLONIDINE HYDROCHLORIDE 0.1 MG: 0.1 TABLET ORAL at 08:57

## 2021-03-20 RX ADMIN — ENOXAPARIN SODIUM 70 MG: 80 INJECTION SUBCUTANEOUS at 10:10

## 2021-03-20 RX ADMIN — LEVOTHYROXINE SODIUM 25 MCG: 25 TABLET ORAL at 05:28

## 2021-03-20 RX ADMIN — METOPROLOL SUCCINATE 50 MG: 50 TABLET, EXTENDED RELEASE ORAL at 08:56

## 2021-03-20 RX ADMIN — LORATADINE 10 MG: 10 TABLET ORAL at 08:57

## 2021-03-20 RX ADMIN — PANTOPRAZOLE SODIUM 40 MG: 40 TABLET, DELAYED RELEASE ORAL at 05:28

## 2021-03-20 RX ADMIN — CEFTRIAXONE 1000 MG: 1 INJECTION, POWDER, FOR SOLUTION INTRAMUSCULAR; INTRAVENOUS at 09:10

## 2021-03-20 RX ADMIN — DOXYCYCLINE 100 MG: 100 CAPSULE ORAL at 08:56

## 2021-03-20 RX ADMIN — INSULIN LISPRO 1 UNITS: 100 INJECTION, SOLUTION INTRAVENOUS; SUBCUTANEOUS at 21:55

## 2021-03-20 RX ADMIN — FAMOTIDINE 20 MG: 20 TABLET ORAL at 08:56

## 2021-03-20 RX ADMIN — GABAPENTIN 300 MG: 300 CAPSULE ORAL at 17:32

## 2021-03-20 RX ADMIN — HEPARIN SODIUM 3000 UNITS: 1000 INJECTION INTRAVENOUS; SUBCUTANEOUS at 00:53

## 2021-03-20 RX ADMIN — TRAZODONE HYDROCHLORIDE 100 MG: 100 TABLET ORAL at 21:53

## 2021-03-20 RX ADMIN — ZINC SULFATE 220 MG (50 MG) CAPSULE 220 MG: CAPSULE at 08:57

## 2021-03-20 RX ADMIN — INSULIN LISPRO 4 UNITS: 100 INJECTION, SOLUTION INTRAVENOUS; SUBCUTANEOUS at 08:57

## 2021-03-20 RX ADMIN — DIVALPROEX SODIUM 500 MG: 500 TABLET, DELAYED RELEASE ORAL at 21:53

## 2021-03-20 RX ADMIN — GABAPENTIN 300 MG: 300 CAPSULE ORAL at 08:56

## 2021-03-20 RX ADMIN — FAMOTIDINE 20 MG: 20 TABLET ORAL at 17:32

## 2021-03-20 RX ADMIN — DEXAMETHASONE SODIUM PHOSPHATE 8.08 MG: 4 INJECTION INTRA-ARTICULAR; INTRALESIONAL; INTRAMUSCULAR; INTRAVENOUS; SOFT TISSUE at 08:57

## 2021-03-20 RX ADMIN — ENOXAPARIN SODIUM 70 MG: 80 INJECTION SUBCUTANEOUS at 21:54

## 2021-03-20 RX ADMIN — ATORVASTATIN CALCIUM 40 MG: 40 TABLET, FILM COATED ORAL at 21:53

## 2021-03-20 RX ADMIN — INSULIN GLARGINE 37 UNITS: 100 INJECTION, SOLUTION SUBCUTANEOUS at 21:54

## 2021-03-20 RX ADMIN — OXYCODONE HYDROCHLORIDE AND ACETAMINOPHEN 1000 MG: 500 TABLET ORAL at 08:57

## 2021-03-20 RX ADMIN — DEXAMETHASONE SODIUM PHOSPHATE 8.08 MG: 4 INJECTION INTRA-ARTICULAR; INTRALESIONAL; INTRAMUSCULAR; INTRAVENOUS; SOFT TISSUE at 21:53

## 2021-03-20 RX ADMIN — INSULIN LISPRO 6 UNITS: 100 INJECTION, SOLUTION INTRAVENOUS; SUBCUTANEOUS at 17:33

## 2021-03-20 RX ADMIN — INSULIN LISPRO 8 UNITS: 100 INJECTION, SOLUTION INTRAVENOUS; SUBCUTANEOUS at 11:47

## 2021-03-20 NOTE — PLAN OF CARE
Problem: Nutrition/Hydration-ADULT  Goal: Nutrient/Hydration intake appropriate for improving, restoring or maintaining nutritional needs  Description: Monitor and assess patient's nutrition/hydration status for malnutrition  Collaborate with interdisciplinary team and initiate plan and interventions as ordered  Monitor patient's weight and dietary intake as ordered or per policy  Utilize nutrition screening tool and intervene as necessary  Determine patient's food preferences and provide high-protein, high-caloric foods as appropriate       INTERVENTIONS:  - Monitor oral intake, urinary output, labs, and treatment plans  - Assess nutrition and hydration status and recommend course of action  - Evaluate amount of meals eaten  - Assist patient with eating if necessary   - Allow adequate time for meals  - Recommend/ encourage appropriate diets, oral nutritional supplements, and vitamin/mineral supplements  - Order, calculate, and assess calorie counts as needed  - Recommend, monitor, and adjust tube feedings and TPN/PPN based on assessed needs  - Assess need for intravenous fluids  - Provide specific nutrition/hydration education as appropriate  - Include patient/family/caregiver in decisions related to nutrition  Outcome: Progressing     Problem: PAIN - ADULT  Goal: Verbalizes/displays adequate comfort level or baseline comfort level  Description: Interventions:  - Encourage patient to monitor pain and request assistance  - Assess pain using appropriate pain scale  - Administer analgesics based on type and severity of pain and evaluate response  - Implement non-pharmacological measures as appropriate and evaluate response  - Consider cultural and social influences on pain and pain management  - Notify physician/advanced practitioner if interventions unsuccessful or patient reports new pain  Outcome: Progressing     Problem: INFECTION - ADULT  Goal: Absence or prevention of progression during hospitalization  Description: INTERVENTIONS:  - Assess and monitor for signs and symptoms of infection  - Monitor lab/diagnostic results  - Monitor all insertion sites, i e  indwelling lines, tubes, and drains  - [place] appropriate cooling/warming therapies per order  - Administer medications as ordered  - Instruct and encourage patient and family to use good hand hygiene technique  - Identify and instruct in appropriate isolation precautions for identified infection/condition  Outcome: Progressing     Problem: SAFETY ADULT  Goal: Patient will remain free of falls  Description: INTERVENTIONS:  - Assess patient frequently for physical needs  -  Identify cognitive and physical deficits and behaviors that affect risk of falls    -  [place] fall precautions as indicated by assessment   - Educate patient/family on patient safety including physical limitations  - Instruct patient to call for assistance with activity based on assessment  - Modify environment to reduce risk of injury  - Consider OT/PT consult to assist with strengthening/mobility  Outcome: Progressing  Goal: Maintain or return to baseline ADL function  Description: INTERVENTIONS:  -  Assess patient's ability to carry out ADLs; assess patient's baseline for ADL function and identify physical deficits which impact ability to perform ADLs (bathing, care of mouth/teeth, toileting, grooming, dressing, etc )  - Assess/evaluate cause of self-care deficits   - Assess range of motion  - Assess patient's mobility; develop plan if impaired  - Assess patient's need for assistive devices and provide as appropriate  - Encourage maximum independence but intervene and supervise when necessary  - Involve family in performance of ADLs  - Assess for home care needs following discharge   - Consider OT consult to assist with ADL evaluation and planning for discharge  - Provide patient education as appropriate  Outcome: Progressing  Goal: Maintain or return mobility status to optimal level  Description: INTERVENTIONS:  - Assess patient's baseline mobility status (ambulation, transfers, stairs, etc )    - Identify cognitive and physical deficits and behaviors that affect mobility  - Identify mobility aids required to assist with transfers and/or ambulation (gait belt, sit-to-stand, lift, walker, cane, etc )  - [place] fall precautions as indicated by assessment  - Record patient progress and toleration of activity level on Mobility SBAR; progress patient to next Phase/Stage  - Instruct patient to call for assistance with activity based on assessment  - Consider rehabilitation consult to assist with strengthening/weightbearing, etc   Outcome: Progressing     Problem: DISCHARGE PLANNING  Goal: Discharge to home or other facility with appropriate resources  Description: INTERVENTIONS:  - Identify barriers to discharge w/patient and caregiver  - Arrange for needed discharge resources and transportation as appropriate  - Identify discharge learning needs (meds, wound care, etc )  - Arrange for interpretive services to assist at discharge as needed  - Refer to Case Management Department for coordinating discharge planning if the patient needs post-hospital services based on physician/advanced practitioner order or complex needs related to functional status, cognitive ability, or social support system  Outcome: Progressing     Problem: Knowledge Deficit  Goal: Patient/family/caregiver demonstrates understanding of disease process, treatment plan, medications, and discharge instructions  Description: Complete learning assessment and assess knowledge base    Interventions:  - Provide teaching at level of understanding  - Provide teaching via preferred learning methods  Outcome: Progressing     Problem: RESPIRATORY - ADULT  Goal: Achieves optimal ventilation and oxygenation  Description: INTERVENTIONS:  - Assess for changes in respiratory status  - Assess for changes in mentation and behavior  - Position to facilitate oxygenation and minimize respiratory effort  - Oxygen administered by appropriate delivery if ordered  - Encourage broncho-pulmonary hygiene including cough, deep breathe, Incentive Spirometry  - Assess and instruct to report SOB or any respiratory difficulty  - Respiratory Therapy support as indicated  Outcome: Progressing

## 2021-03-20 NOTE — PLAN OF CARE
Problem: Nutrition/Hydration-ADULT  Goal: Nutrient/Hydration intake appropriate for improving, restoring or maintaining nutritional needs  Description: Monitor and assess patient's nutrition/hydration status for malnutrition  Collaborate with interdisciplinary team and initiate plan and interventions as ordered  Monitor patient's weight and dietary intake as ordered or per policy  Utilize nutrition screening tool and intervene as necessary  Determine patient's food preferences and provide high-protein, high-caloric foods as appropriate       INTERVENTIONS:  - Monitor oral intake, urinary output, labs, and treatment plans  - Assess nutrition and hydration status and recommend course of action  - Evaluate amount of meals eaten  - Assist patient with eating if necessary   - Allow adequate time for meals  - Recommend/ encourage appropriate diets, oral nutritional supplements, and vitamin/mineral supplements  - Order, calculate, and assess calorie counts as needed  - Recommend, monitor, and adjust tube feedings and TPN/PPN based on assessed needs  - Assess need for intravenous fluids  - Provide specific nutrition/hydration education as appropriate  - Include patient/family/caregiver in decisions related to nutrition  Outcome: Progressing     Problem: PAIN - ADULT  Goal: Verbalizes/displays adequate comfort level or baseline comfort level  Description: Interventions:  - Encourage patient to monitor pain and request assistance  - Assess pain using appropriate pain scale  - Administer analgesics based on type and severity of pain and evaluate response  - Implement non-pharmacological measures as appropriate and evaluate response  - Consider cultural and social influences on pain and pain management  - Notify physician/advanced practitioner if interventions unsuccessful or patient reports new pain  Outcome: Progressing     Problem: INFECTION - ADULT  Goal: Absence or prevention of progression during hospitalization  Description: INTERVENTIONS:  - Assess and monitor for signs and symptoms of infection  - Monitor lab/diagnostic results  - Monitor all insertion sites, i e  indwelling lines, tubes, and drains  - [place] appropriate cooling/warming therapies per order  - Administer medications as ordered  - Instruct and encourage patient and family to use good hand hygiene technique  - Identify and instruct in appropriate isolation precautions for identified infection/condition  Outcome: Progressing     Problem: SAFETY ADULT  Goal: Patient will remain free of falls  Description: INTERVENTIONS:  - Assess patient frequently for physical needs  -  Identify cognitive and physical deficits and behaviors that affect risk of falls    -  [place] fall precautions as indicated by assessment   - Educate patient/family on patient safety including physical limitations  - Instruct patient to call for assistance with activity based on assessment  - Modify environment to reduce risk of injury  - Consider OT/PT consult to assist with strengthening/mobility  Outcome: Progressing  Goal: Maintain or return to baseline ADL function  Description: INTERVENTIONS:  -  Assess patient's ability to carry out ADLs; assess patient's baseline for ADL function and identify physical deficits which impact ability to perform ADLs (bathing, care of mouth/teeth, toileting, grooming, dressing, etc )  - Assess/evaluate cause of self-care deficits   - Assess range of motion  - Assess patient's mobility; develop plan if impaired  - Assess patient's need for assistive devices and provide as appropriate  - Encourage maximum independence but intervene and supervise when necessary  - Involve family in performance of ADLs  - Assess for home care needs following discharge   - Consider OT consult to assist with ADL evaluation and planning for discharge  - Provide patient education as appropriate  Outcome: Progressing  Goal: Maintain or return mobility status to optimal level  Description: INTERVENTIONS:  - Assess patient's baseline mobility status (ambulation, transfers, stairs, etc )    - Identify cognitive and physical deficits and behaviors that affect mobility  - Identify mobility aids required to assist with transfers and/or ambulation (gait belt, sit-to-stand, lift, walker, cane, etc )  - [place] fall precautions as indicated by assessment  - Record patient progress and toleration of activity level on Mobility SBAR; progress patient to next Phase/Stage  - Instruct patient to call for assistance with activity based on assessment  - Consider rehabilitation consult to assist with strengthening/weightbearing, etc   Outcome: Progressing     Problem: DISCHARGE PLANNING  Goal: Discharge to home or other facility with appropriate resources  Description: INTERVENTIONS:  - Identify barriers to discharge w/patient and caregiver  - Arrange for needed discharge resources and transportation as appropriate  - Identify discharge learning needs (meds, wound care, etc )  - Arrange for interpretive services to assist at discharge as needed  - Refer to Case Management Department for coordinating discharge planning if the patient needs post-hospital services based on physician/advanced practitioner order or complex needs related to functional status, cognitive ability, or social support system  Outcome: Progressing     Problem: Knowledge Deficit  Goal: Patient/family/caregiver demonstrates understanding of disease process, treatment plan, medications, and discharge instructions  Description: Complete learning assessment and assess knowledge base    Interventions:  - Provide teaching at level of understanding  - Provide teaching via preferred learning methods  Outcome: Progressing     Problem: RESPIRATORY - ADULT  Goal: Achieves optimal ventilation and oxygenation  Description: INTERVENTIONS:  - Assess for changes in respiratory status  - Assess for changes in mentation and behavior  - Position to facilitate oxygenation and minimize respiratory effort  - Oxygen administered by appropriate delivery if ordered  - Encourage broncho-pulmonary hygiene including cough, deep breathe, Incentive Spirometry  - Assess and instruct to report SOB or any respiratory difficulty  - Respiratory Therapy support as indicated  Outcome: Progressing

## 2021-03-20 NOTE — ASSESSMENT & PLAN NOTE
Blood mixed with stool, has a chronic hx and has been considered hemorrhoidal  Had seen GI recently and plan was to schedule for EGD/colonoscopy in the future   Obviously not a candidate for endoscopy eval at this moment given covid positive and resp status  Check h/h now  Close monitor as on therapeutic lovenox

## 2021-03-20 NOTE — ASSESSMENT & PLAN NOTE
Severe sepsis secondary to COVID-19 pneumonia and possible superimposed bacterial infection   Continue pathway for COVID-19 treatment  Continue antibiotics  Blood neg x 3 days  Resolved lactic acidosis

## 2021-03-20 NOTE — ASSESSMENT & PLAN NOTE
Lab Results   Component Value Date    HGBA1C 7 8 (H) 10/27/2020       Recent Labs     03/20/21  0029 03/20/21  0749 03/20/21  1110 03/20/21  1633   POCGLU 243* 243* 320* 271*       Blood Sugar Average: Last 72 hrs:  (P) 402 [id number]   On high-dose dexamethasone   Titrate up lantus and lispro  Cont ISS   Close monitor

## 2021-03-20 NOTE — ASSESSMENT & PLAN NOTE
COVID-19 pneumonia with superimposed bacterial infection given elevated procalcitonin   COVID positive on March 9  Pulmonary following, appreciate input  Cont remdesivir day 3/5, high-dose dexamethasone day 4/10  Received Actemra x 1 (3/18)  Antibiotic treatment with ceftriaxone and doxycycline as per pulm recommending 4/7 days  Therapeutic lovenox, cont to trend d-dimer  Continue to trend inflammatory markers  Continue mid flow and wean as tolerated

## 2021-03-20 NOTE — PROGRESS NOTES
Progress Note - Pulmonary   Rachel Ally 38 y o  male MRN: [id number]  Unit/Bed#: S -01 Encounter: [id number]    Assessment/Plan:    1  Acute hypoxic respiratory failure likely secondary to COVID-19       -  currently on 12 L-92%, patient does not wear home O2       -  hypoxia mildly improving       -  will continue to maintain saturations greater than 88%       -  pulmonary toilet:  Deep breathing cough, OOB as tolerated, IS Q 1 hr    2  COVID-19- 3/9/2021 with suspected bacterial superinfection/aspiration       -  3/18/2021- Actemra       -  Day #4/10 Decadron    Day #4/5 remdesivir       -  procalcitonin mildly trending down 0 99- Day # 4/7 doxycycline/Rocephin       -           Ferritin 1578       -  D-dimer- 2 53- transition to Lovenox 1 mg/kg      Will repeat CRP and D-dimer as patient can likely be a candidate for 2nd dose of Actemra, will transition to Lovenox 1 mg/kg- overall disposition is improving, will consider diuretics tomorrow    3  ENRIKE       -  patient reports CPAP therapy at home, unclear on actual setting       -  diagnosed 10 years ago       -   continue inpatient q h s  And p r n     4  Transaminitis       -  trending down       -  suspicious for alcohol-induced       -  managed per primary team      Chief Complaint:    "Im starting to feel a little bit better"    Subjective:    Ashlie Sleight was comfortably sitting in his bed  He reports he overall does feel better  Patient reports he is having a difficult time self prone in  No significant overnight events reported  Patient currently denies any fever, chills hemoptysis, headaches, night sweats, pleuritic chest pain, or palpitations  Objective:    Vitals: Blood pressure 112/59, pulse (!) 50, temperature 97 5 °F (36 4 °C), temperature source Oral, resp  rate 22, height 5' 5" (1 651 m), weight 73 5 kg (162 lb 0 6 oz), SpO2 92 %  12L,Body mass index is 26 96 kg/m²        Intake/Output Summary (Last 24 hours) at 3/20/2021 0824  Last data filed at 3/19/2021 1155  Gross per 24 hour   Intake 580 ml   Output 700 ml   Net -120 ml       Invasive Devices     Peripheral Intravenous Line            Peripheral IV 03/17/21 Left Antecubital 2 days    Peripheral IV 03/17/21 Right Antecubital 2 days                Physical Exam:   Physical Exam  Constitutional:       General: He is not in acute distress  Appearance: He is well-developed and normal weight  He is not ill-appearing  HENT:      Head: Normocephalic and atraumatic  Nose: Nose normal  No congestion or rhinorrhea  Mouth/Throat:      Mouth: Mucous membranes are moist       Pharynx: No pharyngeal swelling or oropharyngeal exudate  Neck:      Musculoskeletal: Normal range of motion and neck supple  Thyroid: No thyromegaly  Cardiovascular:      Rate and Rhythm: Normal rate and regular rhythm  No extrasystoles are present  Pulses: Normal pulses  No decreased pulses  Heart sounds: Normal heart sounds  No murmur  No friction rub  Pulmonary:      Effort: Accessory muscle usage and respiratory distress present  No tachypnea or bradypnea  Breath sounds: No stridor  Decreased breath sounds present  No wheezing, rhonchi or rales  Chest:      Chest wall: No mass, deformity or tenderness  Abdominal:      General: Bowel sounds are normal       Palpations: Abdomen is soft  Musculoskeletal: Normal range of motion  General: No swelling or tenderness  Right lower leg: No edema  Left lower leg: No edema  Lymphadenopathy:      Cervical: No cervical adenopathy  Skin:     General: Skin is warm and dry  Coloration: Skin is not cyanotic or pale  Neurological:      General: No focal deficit present  Mental Status: He is alert and oriented to person, place, and time  Mental status is at baseline  Cranial Nerves: No cranial nerve deficit  Sensory: No sensory deficit  Motor: No weakness     Psychiatric:         Mood and Affect: Mood normal  Mood is not anxious  Behavior: Behavior normal  Behavior is not agitated  Labs:    I have personally reviewed pertinent lab results CBC:   Lab Results   Component Value Date    WBC 8 43 03/20/2021    HGB 12 6 03/20/2021    HCT 38 8 03/20/2021    MCV 88 03/20/2021     03/20/2021    MCH 28 7 03/20/2021    MCHC 32 5 03/20/2021    RDW 13 4 03/20/2021    MPV 9 1 03/20/2021   , CMP:   Lab Results   Component Value Date    SODIUM 141 03/20/2021    K 4 5 03/20/2021     03/20/2021    CO2 24 03/20/2021    BUN 29 (H) 03/20/2021    CREATININE 0 94 03/20/2021    CALCIUM 8 9 03/20/2021     (H) 03/20/2021     (H) 03/20/2021    ALKPHOS 150 (H) 03/20/2021    EGFR 104 03/20/2021   , PT/INR:   Lab Results   Component Value Date    INR 1 13 03/19/2021       Imaging and other studies: I have personally reviewed pertinent films in PACS       CXR 3/17/2021- multi focal pneumonia

## 2021-03-20 NOTE — ASSESSMENT & PLAN NOTE
Secondary to COVID-19 pneumonia  Elevated d-dimer, on therapeutic a/c tx with heparin gtt  Elevation likely secondary to hypercoag state associated with COVID pna  Cont oxygen supplementation and wean as clinically improves    Remains on mid flow down to 12L  Pulmonary following

## 2021-03-21 LAB
ALBUMIN SERPL BCP-MCNC: 2.2 G/DL (ref 3.5–5)
ALP SERPL-CCNC: 128 U/L (ref 46–116)
ALT SERPL W P-5'-P-CCNC: 340 U/L (ref 12–78)
ANION GAP SERPL CALCULATED.3IONS-SCNC: 7 MMOL/L (ref 4–13)
AST SERPL W P-5'-P-CCNC: 106 U/L (ref 5–45)
BILIRUB SERPL-MCNC: 0.41 MG/DL (ref 0.2–1)
BUN SERPL-MCNC: 23 MG/DL (ref 5–25)
CALCIUM ALBUM COR SERPL-MCNC: 10 MG/DL (ref 8.3–10.1)
CALCIUM SERPL-MCNC: 8.6 MG/DL (ref 8.3–10.1)
CHLORIDE SERPL-SCNC: 106 MMOL/L (ref 100–108)
CO2 SERPL-SCNC: 26 MMOL/L (ref 21–32)
CREAT SERPL-MCNC: 0.91 MG/DL (ref 0.6–1.3)
ERYTHROCYTE [DISTWIDTH] IN BLOOD BY AUTOMATED COUNT: 13.2 % (ref 11.6–15.1)
GFR SERPL CREATININE-BSD FRML MDRD: 108 ML/MIN/1.73SQ M
GLUCOSE SERPL-MCNC: 251 MG/DL (ref 65–140)
GLUCOSE SERPL-MCNC: 271 MG/DL (ref 65–140)
GLUCOSE SERPL-MCNC: 273 MG/DL (ref 65–140)
GLUCOSE SERPL-MCNC: 290 MG/DL (ref 65–140)
GLUCOSE SERPL-MCNC: 297 MG/DL (ref 65–140)
GLUCOSE SERPL-MCNC: 306 MG/DL (ref 65–140)
HCT VFR BLD AUTO: 37.8 % (ref 36.5–49.3)
HGB BLD-MCNC: 12.6 G/DL (ref 12–17)
MCH RBC QN AUTO: 29.1 PG (ref 26.8–34.3)
MCHC RBC AUTO-ENTMCNC: 33.3 G/DL (ref 31.4–37.4)
MCV RBC AUTO: 87 FL (ref 82–98)
PLATELET # BLD AUTO: 312 THOUSANDS/UL (ref 149–390)
PMV BLD AUTO: 8.9 FL (ref 8.9–12.7)
POTASSIUM SERPL-SCNC: 4.7 MMOL/L (ref 3.5–5.3)
PROT SERPL-MCNC: 5.8 G/DL (ref 6.4–8.2)
RBC # BLD AUTO: 4.33 MILLION/UL (ref 3.88–5.62)
SODIUM SERPL-SCNC: 139 MMOL/L (ref 136–145)
WBC # BLD AUTO: 8.36 THOUSAND/UL (ref 4.31–10.16)

## 2021-03-21 PROCEDURE — 94660 CPAP INITIATION&MGMT: CPT

## 2021-03-21 PROCEDURE — 99233 SBSQ HOSP IP/OBS HIGH 50: CPT | Performed by: INTERNAL MEDICINE

## 2021-03-21 PROCEDURE — 94760 N-INVAS EAR/PLS OXIMETRY 1: CPT

## 2021-03-21 PROCEDURE — 99232 SBSQ HOSP IP/OBS MODERATE 35: CPT | Performed by: NURSE PRACTITIONER

## 2021-03-21 PROCEDURE — 82948 REAGENT STRIP/BLOOD GLUCOSE: CPT

## 2021-03-21 PROCEDURE — 80053 COMPREHEN METABOLIC PANEL: CPT | Performed by: INTERNAL MEDICINE

## 2021-03-21 PROCEDURE — 85027 COMPLETE CBC AUTOMATED: CPT | Performed by: INTERNAL MEDICINE

## 2021-03-21 PROCEDURE — 94762 N-INVAS EAR/PLS OXIMTRY CONT: CPT

## 2021-03-21 RX ORDER — METOPROLOL SUCCINATE 25 MG/1
25 TABLET, EXTENDED RELEASE ORAL DAILY
Status: DISCONTINUED | OUTPATIENT
Start: 2021-03-22 | End: 2021-03-25 | Stop reason: HOSPADM

## 2021-03-21 RX ORDER — FUROSEMIDE 10 MG/ML
40 INJECTION INTRAMUSCULAR; INTRAVENOUS ONCE
Status: COMPLETED | OUTPATIENT
Start: 2021-03-21 | End: 2021-03-21

## 2021-03-21 RX ADMIN — CEFTRIAXONE 1000 MG: 1 INJECTION, POWDER, FOR SOLUTION INTRAMUSCULAR; INTRAVENOUS at 10:00

## 2021-03-21 RX ADMIN — INSULIN GLARGINE 37 UNITS: 100 INJECTION, SOLUTION SUBCUTANEOUS at 21:26

## 2021-03-21 RX ADMIN — GABAPENTIN 300 MG: 300 CAPSULE ORAL at 08:44

## 2021-03-21 RX ADMIN — PANTOPRAZOLE SODIUM 40 MG: 40 TABLET, DELAYED RELEASE ORAL at 05:06

## 2021-03-21 RX ADMIN — TRAZODONE HYDROCHLORIDE 100 MG: 100 TABLET ORAL at 21:26

## 2021-03-21 RX ADMIN — ENOXAPARIN SODIUM 70 MG: 80 INJECTION SUBCUTANEOUS at 21:27

## 2021-03-21 RX ADMIN — CLONIDINE HYDROCHLORIDE 0.1 MG: 0.1 TABLET ORAL at 08:44

## 2021-03-21 RX ADMIN — DIVALPROEX SODIUM 500 MG: 500 TABLET, DELAYED RELEASE ORAL at 21:27

## 2021-03-21 RX ADMIN — DEXAMETHASONE SODIUM PHOSPHATE 8.08 MG: 4 INJECTION INTRA-ARTICULAR; INTRALESIONAL; INTRAMUSCULAR; INTRAVENOUS; SOFT TISSUE at 21:27

## 2021-03-21 RX ADMIN — INSULIN LISPRO 2 UNITS: 100 INJECTION, SOLUTION INTRAVENOUS; SUBCUTANEOUS at 21:26

## 2021-03-21 RX ADMIN — INSULIN LISPRO 6 UNITS: 100 INJECTION, SOLUTION INTRAVENOUS; SUBCUTANEOUS at 16:32

## 2021-03-21 RX ADMIN — DEXAMETHASONE SODIUM PHOSPHATE 8.08 MG: 4 INJECTION INTRA-ARTICULAR; INTRALESIONAL; INTRAMUSCULAR; INTRAVENOUS; SOFT TISSUE at 08:45

## 2021-03-21 RX ADMIN — FAMOTIDINE 20 MG: 20 TABLET ORAL at 18:27

## 2021-03-21 RX ADMIN — LEVOTHYROXINE SODIUM 25 MCG: 25 TABLET ORAL at 05:06

## 2021-03-21 RX ADMIN — METOPROLOL SUCCINATE 50 MG: 50 TABLET, EXTENDED RELEASE ORAL at 08:45

## 2021-03-21 RX ADMIN — DIVALPROEX SODIUM 500 MG: 500 TABLET, DELAYED RELEASE ORAL at 08:44

## 2021-03-21 RX ADMIN — FUROSEMIDE 40 MG: 10 INJECTION, SOLUTION INTRAVENOUS at 13:38

## 2021-03-21 RX ADMIN — ENOXAPARIN SODIUM 70 MG: 80 INJECTION SUBCUTANEOUS at 08:45

## 2021-03-21 RX ADMIN — LORATADINE 10 MG: 10 TABLET ORAL at 08:44

## 2021-03-21 RX ADMIN — GABAPENTIN 300 MG: 300 CAPSULE ORAL at 21:27

## 2021-03-21 RX ADMIN — INSULIN LISPRO 6 UNITS: 100 INJECTION, SOLUTION INTRAVENOUS; SUBCUTANEOUS at 12:31

## 2021-03-21 RX ADMIN — Medication 2000 UNITS: at 08:44

## 2021-03-21 RX ADMIN — REMDESIVIR 100 MG: 100 INJECTION, POWDER, LYOPHILIZED, FOR SOLUTION INTRAVENOUS at 08:43

## 2021-03-21 RX ADMIN — OXYCODONE HYDROCHLORIDE AND ACETAMINOPHEN 1000 MG: 500 TABLET ORAL at 08:45

## 2021-03-21 RX ADMIN — DOXYCYCLINE 100 MG: 100 CAPSULE ORAL at 21:27

## 2021-03-21 RX ADMIN — GABAPENTIN 300 MG: 300 CAPSULE ORAL at 16:34

## 2021-03-21 RX ADMIN — VENLAFAXINE HYDROCHLORIDE 37.5 MG: 37.5 CAPSULE, EXTENDED RELEASE ORAL at 08:45

## 2021-03-21 RX ADMIN — INSULIN LISPRO 6 UNITS: 100 INJECTION, SOLUTION INTRAVENOUS; SUBCUTANEOUS at 09:13

## 2021-03-21 RX ADMIN — OXYCODONE HYDROCHLORIDE AND ACETAMINOPHEN 1000 MG: 500 TABLET ORAL at 21:26

## 2021-03-21 RX ADMIN — ZINC SULFATE 220 MG (50 MG) CAPSULE 220 MG: CAPSULE at 08:45

## 2021-03-21 RX ADMIN — ATORVASTATIN CALCIUM 40 MG: 40 TABLET, FILM COATED ORAL at 21:26

## 2021-03-21 RX ADMIN — FAMOTIDINE 20 MG: 20 TABLET ORAL at 08:44

## 2021-03-21 RX ADMIN — DOXYCYCLINE 100 MG: 100 CAPSULE ORAL at 08:45

## 2021-03-21 NOTE — ASSESSMENT & PLAN NOTE
Severe sepsis secondary to COVID-19 pneumonia and possible superimposed bacterial infection   Continue pathway for COVID-19 treatment  Continue antibiotics  Blood neg   Resolved lactic acidosis

## 2021-03-21 NOTE — ASSESSMENT & PLAN NOTE
Asymptomatic   Holding parameters placed on metoprolol  Hx of hypothyroidism, thus checked tsh, in euthyroid state

## 2021-03-21 NOTE — ASSESSMENT & PLAN NOTE
COVID-19 pneumonia with superimposed bacterial infection given elevated procalcitonin   COVID positive on March 9  Pulmonary following, appreciate input  Cont remdesivir day 4/5, high-dose dexamethasone day 5/10  Received Actemra x 1 (3/18)  Antibiotic treatment with ceftriaxone and doxycycline as per pulm recommending 5/7 days  Therapeutic lovenox, trending down of d-dimer  Continue to trend inflammatory markers  Continue mid flow and wean as tolerated Pt informed and she verbalized understanding.

## 2021-03-21 NOTE — PROGRESS NOTES
[facility]  Progress Note - Immanuel Canada 1985, 38 y o  male MRN: [id number]  Unit/Bed#: S -01 Encounter: [id number]  Primary Care Provider: Jaqueline Colunga MD   Date and time admitted to hospital: 3/17/2021 11:24 AM    * Acute respiratory failure with hypoxia [facility])  Assessment & Plan  Secondary to COVID-19 pneumonia  Elevated d-dimer, on therapeutic a/c tx with heparin gtt  Elevation likely secondary to hypercoag state associated with COVID pna  Cont oxygen supplementation and wean as clinically improves  Remains on mid flow down to 8L  Pulmonary following; given dose of IV lasix    COVID-19  Assessment & Plan  COVID-19 pneumonia with superimposed bacterial infection given elevated procalcitonin  COVID positive on March 9  Pulmonary following, appreciate input  Cont remdesivir day 4/5, high-dose dexamethasone day 5/10  Received Actemra x 1 (3/18)  Antibiotic treatment with ceftriaxone and doxycycline as per pulm recommending 5/7 days  Therapeutic lovenox, trending down of d-dimer  Continue to trend inflammatory markers  Continue mid flow and wean as tolerated        Sepsis ([facility] [address] )  Assessment & Plan  Severe sepsis secondary to COVID-19 pneumonia and possible superimposed bacterial infection   Continue pathway for COVID-19 treatment  Continue antibiotics  Blood neg   Resolved lactic acidosis    Type 2 diabetes mellitus without complication, with long-term current use of insulin [facility])  Assessment & Plan  Lab Results   Component Value Date    HGBA1C 7 8 (H) 10/27/2020       Recent Labs     03/20/21  2122 03/21/21  0712 03/21/21  1140 03/21/21  1544   POCGLU 204* 251* 290* 297*       Blood Sugar Average: Last 72 hrs:  (P) 257 [id number]   On high-dose dexamethasone   Titrate up lantus and lispro  Cont ISS   Close monitor    Blood per rectum  Assessment & Plan  Blood mixed with stool, has a chronic hx, was straining at the time    Likely hemorrhoidal and has been considered such in the past  Had seen GI recently and plan was to schedule for EGD/colonoscopy in the future  Obviously not a candidate for endoscopy eval at this moment given covid positive and resp status  H/H remains stable, has not had recurring bpr  Close monitor as on therapeutic lovenox    Bradycardic baseline fetal heart rate  Assessment & Plan  Asymptomatic   Holding parameters placed on metoprolol lower to 25mg  Hx of hypothyroidism, thus checked tsh, in euthyroid state    Transaminitis  Assessment & Plan  Trending down  Close monitor as on remdesivir  Daily CMP    ENRIKE (obstructive sleep apnea)  Assessment & Plan  Cont cpap use    PTSD (post-traumatic stress disorder)  Assessment & Plan  Continue psych medications    VTE Pharmacologic Prophylaxis:   Pharmacologic: Enoxaparin (Lovenox)  Mechanical VTE Prophylaxis in Place: No    Patient Centered Rounds: I have performed bedside rounds with nursing staff today  Discussions with Specialists or Other Care Team Provider:     Education and Discussions with Family / Patient: Patient; Estela Ruvalcaba his wife () no answer left msg    Time Spent for Care: 30 minutes  More than 50% of total time spent on counseling and coordination of care as described above  Current Length of Stay: 4 day(s)    Current Patient Status: Inpatient   Certification Statement: The patient will continue to require additional inpatient hospital stay due to Acute illness    Discharge Plan:     Code Status: Level 1 - Full Code      Subjective:   Pt seen and examined at bedside  No recurring bld per rectum  Down to 8L, continues to improve    Objective:     Vitals:   Temp (24hrs), Av 8 °F (36 6 °C), Min:97 5 °F (36 4 °C), Max:98 2 °F (36 8 °C)    Temp:  [97 5 °F (36 4 °C)-98 2 °F (36 8 °C)] 97 5 °F (36 4 °C)  HR:  [45-58] 45  Resp:  [14-22] 14  BP: (115-145)/(65-85) 129/83  SpO2:  [90 %-96 %] 96 %  Body mass index is 27 44 kg/m²       Input and Output Summary (last 24 hours): Intake/Output Summary (Last 24 hours) at 3/21/2021 1739  Last data filed at 3/21/2021 1500  Gross per 24 hour   Intake 600 ml   Output 2300 ml   Net -1700 ml       Physical Exam:     Physical Exam  Constitutional:       Appearance: Normal appearance  Neck:      Musculoskeletal: Normal range of motion and neck supple  Cardiovascular:      Rate and Rhythm: Bradycardia present  Pulses: Normal pulses  Heart sounds: Normal heart sounds  No murmur  No gallop  Pulmonary:      Effort: Pulmonary effort is normal  No respiratory distress  Breath sounds: No wheezing or rales  Comments: Diminished bs  Abdominal:      General: Abdomen is flat  Bowel sounds are normal  There is no distension  Palpations: Abdomen is soft  Tenderness: There is no abdominal tenderness  There is no guarding  Musculoskeletal: Normal range of motion  Skin:     General: Skin is warm and dry  Neurological:      General: No focal deficit present  Mental Status: He is alert and oriented to person, place, and time  Mental status is at baseline  Cranial Nerves: No cranial nerve deficit  Motor: No weakness  Additional Data:     Labs:    Results from last 7 days   Lab Units 03/21/21  0407  03/18/21  0537   WBC Thousand/uL 8 36   < > 5 80   HEMOGLOBIN g/dL 12 6   < > 12 4   HEMATOCRIT % 37 8   < > 37 1   PLATELETS Thousands/uL 312   < > 237   BANDS PCT %  --   --  9*   LYMPHO PCT %  --   --  9*   MONO PCT %  --   --  6   EOS PCT %  --   --  0    < > = values in this interval not displayed       Results from last 7 days   Lab Units 03/21/21  0407   SODIUM mmol/L 139   POTASSIUM mmol/L 4 7   CHLORIDE mmol/L 106   CO2 mmol/L 26   BUN mg/dL 23   CREATININE mg/dL 0 91   ANION GAP mmol/L 7   CALCIUM mg/dL 8 6   ALBUMIN g/dL 2 2*   TOTAL BILIRUBIN mg/dL 0 41   ALK PHOS U/L 128*   ALT U/L 340*   AST U/L 106*   GLUCOSE RANDOM mg/dL 271*     Results from last 7 days   Lab Units 03/19/21  1624   INR 1 13     Results from last 7 days   Lab Units 03/21/21  1544 03/21/21  1140 03/21/21  0712 03/20/21  2122 03/20/21  1633 03/20/21  1110 03/20/21  0749 03/20/21  0029 03/19/21  2051 03/19/21  1619 03/19/21  1255 03/19/21  1030   POC GLUCOSE mg/dl 297* 290* 251* 204* 271* 320* 243* 243* 240* 333* 296* 252*         Results from last 7 days   Lab Units 03/20/21  0535 03/19/21  0602 03/18/21  0537 03/17/21  1420 03/17/21  1134   LACTIC ACID mmol/L  --   --   --  1 0 2 4*   PROCALCITONIN ng/ml 0 46* 0 99* 1 75*  --  3 17*           * I Have Reviewed All Lab Data Listed Above  * Additional Pertinent Lab Tests Reviewed: All Labs Within Last 24 Hours Reviewed    Imaging:    Imaging Reports Reviewed Today Include:   Imaging Personally Reviewed by Myself Includes:      Recent Cultures (last 7 days):     Results from last 7 days   Lab Units 03/17/21  1145 03/17/21  1134   BLOOD CULTURE  No Growth After 4 Days  No Growth After 4 Days         Last 24 Hours Medication List:   Current Facility-Administered Medications   Medication Dose Route Frequency Provider Last Rate    acetaminophen  650 mg Oral Q6H PRN Sheila Pisano MD      ALPRAZolam  0 5 mg Oral TID PRN Sheila Pisano MD      ascorbic acid  1,000 mg Oral Q12H [facility] Sheila Pisano MD      atorvastatin  40 mg Oral HS Sheila Pisano MD      cefTRIAXone  1,000 mg Intravenous Q24H Sheila Pisano MD 1,000 mg (03/21/21 1000)    cholecalciferol  2,000 Units Oral Daily Sheila Pisano MD      cloNIDine  0 1 mg Oral Daily Sheila Pisano MD      dexamethasone  0 1 mg/kg Intravenous Q12H [facility] IGNACIO Pizano      dicyclomine  20 mg Oral Q6H PRN Sheila Pisano MD      divalproex sodium  500 mg Oral Q12H [facility] Sheila Pisano MD      doxycycline hyclate  100 mg Oral Q12H Sheila Pisano MD      enoxaparin  1 mg/kg Subcutaneous Q12H [facility] IGNACIO Pizano      famotidine  20 mg Oral BID Shiela Pisano MD      gabapentin  300 mg Oral TID Sheila Pisano MD  insulin glargine  37 Units Subcutaneous HS Amol Moreno DO      insulin lispro  1-5 Units Subcutaneous HS Amol Moreno DO      insulin lispro  12 Units Subcutaneous TID With Meals Amol Morneo DO      insulin lispro  2-12 Units Subcutaneous TID AC Amol Moreno DO      levothyroxine  25 mcg Oral Early Morning Andrew Ramírez MD      loratadine  10 mg Oral Daily Andrew Ramírez MD      metoprolol succinate  50 mg Oral Daily Andrew Ramírez MD      zinc sulfate  220 mg Oral Daily Andrew Ramírez MD      Followed by   Tamy Read ON 3/25/2021] multivitamin-minerals  1 tablet Oral Daily Andrew Ramírez MD      pantoprazole  40 mg Oral Early Morning Andrew Ramírez MD      polyethylene glycol  17 g Oral Daily PRN Amol Moreno DO      remdesivir  100 mg Intravenous Q24H IGNACIO Casas 100 mg (03/21/21 0843)    traZODone  100 mg Oral HS Andrew Ramírez MD      venlafaxine  37 5 mg Oral Daily Andrew Ramírez MD          Today, Patient Was Seen By: Amol Moreno DO    ** Please Note: Dictation voice to text software may have been used in the creation of this document   **

## 2021-03-21 NOTE — ASSESSMENT & PLAN NOTE
Secondary to COVID-19 pneumonia  Elevated d-dimer, on therapeutic a/c tx with heparin gtt  Elevation likely secondary to hypercoag state associated with COVID pna  Cont oxygen supplementation and wean as clinically improves    Remains on mid flow down to 8L  Pulmonary following; given dose of IV lasix

## 2021-03-21 NOTE — PLAN OF CARE
Problem: Nutrition/Hydration-ADULT  Goal: Nutrient/Hydration intake appropriate for improving, restoring or maintaining nutritional needs  Description: Monitor and assess patient's nutrition/hydration status for malnutrition  Collaborate with interdisciplinary team and initiate plan and interventions as ordered  Monitor patient's weight and dietary intake as ordered or per policy  Utilize nutrition screening tool and intervene as necessary  Determine patient's food preferences and provide high-protein, high-caloric foods as appropriate       INTERVENTIONS:  - Monitor oral intake, urinary output, labs, and treatment plans  - Assess nutrition and hydration status and recommend course of action  - Evaluate amount of meals eaten  - Assist patient with eating if necessary   - Allow adequate time for meals  - Recommend/ encourage appropriate diets, oral nutritional supplements, and vitamin/mineral supplements  - Order, calculate, and assess calorie counts as needed  - Recommend, monitor, and adjust tube feedings and TPN/PPN based on assessed needs  - Assess need for intravenous fluids  - Provide specific nutrition/hydration education as appropriate  - Include patient/family/caregiver in decisions related to nutrition  Outcome: Progressing     Problem: PAIN - ADULT  Goal: Verbalizes/displays adequate comfort level or baseline comfort level  Description: Interventions:  - Encourage patient to monitor pain and request assistance  - Assess pain using appropriate pain scale  - Administer analgesics based on type and severity of pain and evaluate response  - Implement non-pharmacological measures as appropriate and evaluate response  - Consider cultural and social influences on pain and pain management  - Notify physician/advanced practitioner if interventions unsuccessful or patient reports new pain  Outcome: Progressing     Problem: INFECTION - ADULT  Goal: Absence or prevention of progression during hospitalization  Description: INTERVENTIONS:  - Assess and monitor for signs and symptoms of infection  - Monitor lab/diagnostic results  - Monitor all insertion sites, i e  indwelling lines, tubes, and drains  - [place] appropriate cooling/warming therapies per order  - Administer medications as ordered  - Instruct and encourage patient and family to use good hand hygiene technique  - Identify and instruct in appropriate isolation precautions for identified infection/condition  Outcome: Progressing     Problem: SAFETY ADULT  Goal: Patient will remain free of falls  Description: INTERVENTIONS:  - Assess patient frequently for physical needs  -  Identify cognitive and physical deficits and behaviors that affect risk of falls    -  [place] fall precautions as indicated by assessment   - Educate patient/family on patient safety including physical limitations  - Instruct patient to call for assistance with activity based on assessment  - Modify environment to reduce risk of injury  - Consider OT/PT consult to assist with strengthening/mobility  Outcome: Progressing  Goal: Maintain or return to baseline ADL function  Description: INTERVENTIONS:  -  Assess patient's ability to carry out ADLs; assess patient's baseline for ADL function and identify physical deficits which impact ability to perform ADLs (bathing, care of mouth/teeth, toileting, grooming, dressing, etc )  - Assess/evaluate cause of self-care deficits   - Assess range of motion  - Assess patient's mobility; develop plan if impaired  - Assess patient's need for assistive devices and provide as appropriate  - Encourage maximum independence but intervene and supervise when necessary  - Involve family in performance of ADLs  - Assess for home care needs following discharge   - Consider OT consult to assist with ADL evaluation and planning for discharge  - Provide patient education as appropriate  Outcome: Progressing  Goal: Maintain or return mobility status to optimal level  Description: INTERVENTIONS:  - Assess patient's baseline mobility status (ambulation, transfers, stairs, etc )    - Identify cognitive and physical deficits and behaviors that affect mobility  - Identify mobility aids required to assist with transfers and/or ambulation (gait belt, sit-to-stand, lift, walker, cane, etc )  - [place] fall precautions as indicated by assessment  - Record patient progress and toleration of activity level on Mobility SBAR; progress patient to next Phase/Stage  - Instruct patient to call for assistance with activity based on assessment  - Consider rehabilitation consult to assist with strengthening/weightbearing, etc   Outcome: Progressing     Problem: DISCHARGE PLANNING  Goal: Discharge to home or other facility with appropriate resources  Description: INTERVENTIONS:  - Identify barriers to discharge w/patient and caregiver  - Arrange for needed discharge resources and transportation as appropriate  - Identify discharge learning needs (meds, wound care, etc )  - Arrange for interpretive services to assist at discharge as needed  - Refer to Case Management Department for coordinating discharge planning if the patient needs post-hospital services based on physician/advanced practitioner order or complex needs related to functional status, cognitive ability, or social support system  Outcome: Progressing     Problem: Knowledge Deficit  Goal: Patient/family/caregiver demonstrates understanding of disease process, treatment plan, medications, and discharge instructions  Description: Complete learning assessment and assess knowledge base    Interventions:  - Provide teaching at level of understanding  - Provide teaching via preferred learning methods  Outcome: Progressing     Problem: RESPIRATORY - ADULT  Goal: Achieves optimal ventilation and oxygenation  Description: INTERVENTIONS:  - Assess for changes in respiratory status  - Assess for changes in mentation and behavior  - Position to facilitate oxygenation and minimize respiratory effort  - Oxygen administered by appropriate delivery if ordered  - Encourage broncho-pulmonary hygiene including cough, deep breathe, Incentive Spirometry  - Assess and instruct to report SOB or any respiratory difficulty  - Respiratory Therapy support as indicated  Outcome: Progressing     Problem: Potential for Falls  Goal: Patient will remain free of falls  Description: INTERVENTIONS:  - Assess patient frequently for physical needs  -  Identify cognitive and physical deficits and behaviors that affect risk of falls    -  [place] fall precautions as indicated by assessment   - Educate patient/family on patient safety including physical limitations  - Instruct patient to call for assistance with activity based on assessment  - Modify environment to reduce risk of injury  - Consider OT/PT consult to assist with strengthening/mobility  Outcome: Progressing

## 2021-03-21 NOTE — ASSESSMENT & PLAN NOTE
Blood mixed with stool, has a chronic hx, was straining at the time  Likely hemorrhoidal and has been considered such in the past  Had seen GI recently and plan was to schedule for EGD/colonoscopy in the future   Obviously not a candidate for endoscopy eval at this moment given covid positive and resp status  H/H remains stable, has not had recurring bpr  Close monitor as on therapeutic lovenox

## 2021-03-21 NOTE — PROGRESS NOTES
Progress Note - Pulmonary   Dariusz Palomino 38 y o  male MRN: [id number]  Unit/Bed#: S -01 Encounter: [id number]    Assessment/Plan:    1  Acute hypoxic respiratory failure likely secondary to COVID-19       -  currently 7 L-95%, patient does not wear home O2       -  hypoxic continues to significantly improved       -  continue maintain saturations greater than 89%       -  pulmonary toilet:  Deep breathing cough, OOB as tolerated, IS Q 1 hr    2  COVID-19- 3/9/2021 with suspected bacterial superinfection/aspiration       -  3/18/2021- Actemra       -  Day #5/10 Decadron           Day #5/5 remdesivir       -  procalcitonin continue to trend down- 0 46- doxycycline/Rocephin       -  CRP- 58 8   Ferritin   1578       -  D-dimer 1 32- continue Lovenox 1mg/kg    Overall disposition continues to improve, will continue with high-dose Decadron, will give 1 time IV Lasix 40 mg    3  ENRIKE       -  patient reports compliance with CPAP home       -  diagnosed approximately 10 years       -  continue inpatient CPAP q h s  And p r n  Chief Complaint:    "I am feeling better"    Subjective:    Alex Tanner was comfortably sitting in his bed  He does report he overall feels better  Today he does report some increased fatigue  No significant overnight events reported  Patient currently denying any fevers, chills, hemoptysis, headaches, night sweats, pleuritic chest pain, or palpitations  Objective:    Vitals: Blood pressure 145/85, pulse (!) 47, temperature 97 8 °F (36 6 °C), temperature source Oral, resp  rate 22, height 5' 5" (1 651 m), weight 74 8 kg (164 lb 14 5 oz), SpO2 95 %  7L,Body mass index is 27 44 kg/m²        Intake/Output Summary (Last 24 hours) at 3/21/2021 1147  Last data filed at 3/21/2021 0600  Gross per 24 hour   Intake 200 ml   Output 0 ml   Net 200 ml       Invasive Devices     Peripheral Intravenous Line            Peripheral IV 03/17/21 Left Antecubital 4 days    Peripheral IV 03/17/21 Right Antecubital 4 days                Physical Exam:   Physical Exam  Constitutional:       General: He is not in acute distress  Appearance: He is well-developed and normal weight  He is not ill-appearing  Interventions: He is not intubated  HENT:      Head: Normocephalic and atraumatic  Mouth/Throat:      Mouth: Mucous membranes are moist       Pharynx: No oropharyngeal exudate or posterior oropharyngeal erythema  Neck:      Musculoskeletal: Normal range of motion and neck supple  Thyroid: No thyromegaly  Cardiovascular:      Rate and Rhythm: Normal rate and regular rhythm  Pulses: Normal pulses  Heart sounds: No murmur  No friction rub  No gallop  Pulmonary:      Effort: Pulmonary effort is normal  No tachypnea, bradypnea, accessory muscle usage or respiratory distress  He is not intubated  Breath sounds: Decreased air movement present  No stridor or transmitted upper airway sounds  Decreased breath sounds present  No wheezing, rhonchi or rales  Comments: Some diminished aeration at bases  Chest:      Chest wall: No tenderness  Abdominal:      General: Abdomen is flat  Bowel sounds are normal  There is no distension  Palpations: Abdomen is soft  There is no mass  Musculoskeletal: Normal range of motion  General: No swelling or tenderness  Lymphadenopathy:      Cervical: No cervical adenopathy  Skin:     General: Skin is warm and dry  Coloration: Skin is not jaundiced or pale  Neurological:      General: No focal deficit present  Mental Status: He is alert and oriented to person, place, and time  Mental status is at baseline  Psychiatric:         Mood and Affect: Mood normal          Behavior: Behavior normal          Labs:    I have personally reviewed pertinent lab results, CBC:   Lab Results   Component Value Date    WBC 8 36 03/21/2021    HGB 12 6 03/21/2021    HCT 37 8 03/21/2021    MCV 87 03/21/2021     03/21/2021    MCH 29 1 03/21/2021    MCHC 33 3 03/21/2021    RDW 13 2 03/21/2021    MPV 8 9 03/21/2021   , CMP:   Lab Results   Component Value Date    SODIUM 139 03/21/2021    K 4 7 03/21/2021     03/21/2021    CO2 26 03/21/2021    BUN 23 03/21/2021    CREATININE 0 91 03/21/2021    CALCIUM 8 6 03/21/2021     (H) 03/21/2021     (H) 03/21/2021    ALKPHOS 128 (H) 03/21/2021    EGFR 108 03/21/2021       Imaging and other studies: I have personally reviewed pertinent films in PACS     CXR 3/17/2021- multi focal pneumonia

## 2021-03-21 NOTE — ASSESSMENT & PLAN NOTE
Lab Results   Component Value Date    HGBA1C 7 8 (H) 10/27/2020       Recent Labs     03/20/21  2122 03/21/21  0712 03/21/21  1140 03/21/21  1544   POCGLU 204* 251* 290* 297*       Blood Sugar Average: Last 72 hrs:  (P) 257 [id number]   On high-dose dexamethasone   Titrate up lantus and lispro  Cont ISS   Close monitor

## 2021-03-21 NOTE — ASSESSMENT & PLAN NOTE
Asymptomatic   Holding parameters placed on metoprolol, lowered to 25mg  Hx of hypothyroidism, thus checked tsh, in euthyroid state

## 2021-03-22 LAB
ALBUMIN SERPL BCP-MCNC: 2.3 G/DL (ref 3.5–5)
ALP SERPL-CCNC: 123 U/L (ref 46–116)
ALT SERPL W P-5'-P-CCNC: 234 U/L (ref 12–78)
ANION GAP SERPL CALCULATED.3IONS-SCNC: 10 MMOL/L (ref 4–13)
AST SERPL W P-5'-P-CCNC: 50 U/L (ref 5–45)
BACTERIA BLD CULT: NORMAL
BACTERIA BLD CULT: NORMAL
BILIRUB SERPL-MCNC: 0.45 MG/DL (ref 0.2–1)
BUN SERPL-MCNC: 24 MG/DL (ref 5–25)
CALCIUM ALBUM COR SERPL-MCNC: 9.8 MG/DL (ref 8.3–10.1)
CALCIUM SERPL-MCNC: 8.4 MG/DL (ref 8.3–10.1)
CHLORIDE SERPL-SCNC: 102 MMOL/L (ref 100–108)
CO2 SERPL-SCNC: 25 MMOL/L (ref 21–32)
CREAT SERPL-MCNC: 0.93 MG/DL (ref 0.6–1.3)
ERYTHROCYTE [DISTWIDTH] IN BLOOD BY AUTOMATED COUNT: 12.9 % (ref 11.6–15.1)
GFR SERPL CREATININE-BSD FRML MDRD: 105 ML/MIN/1.73SQ M
GLUCOSE SERPL-MCNC: 216 MG/DL (ref 65–140)
GLUCOSE SERPL-MCNC: 238 MG/DL (ref 65–140)
GLUCOSE SERPL-MCNC: 285 MG/DL (ref 65–140)
GLUCOSE SERPL-MCNC: 293 MG/DL (ref 65–140)
GLUCOSE SERPL-MCNC: 346 MG/DL (ref 65–140)
HCT VFR BLD AUTO: 40.3 % (ref 36.5–49.3)
HGB BLD-MCNC: 13.7 G/DL (ref 12–17)
MCH RBC QN AUTO: 29.3 PG (ref 26.8–34.3)
MCHC RBC AUTO-ENTMCNC: 34 G/DL (ref 31.4–37.4)
MCV RBC AUTO: 86 FL (ref 82–98)
PLATELET # BLD AUTO: 381 THOUSANDS/UL (ref 149–390)
PMV BLD AUTO: 9 FL (ref 8.9–12.7)
POTASSIUM SERPL-SCNC: 4.3 MMOL/L (ref 3.5–5.3)
PROT SERPL-MCNC: 5.9 G/DL (ref 6.4–8.2)
RBC # BLD AUTO: 4.68 MILLION/UL (ref 3.88–5.62)
SODIUM SERPL-SCNC: 137 MMOL/L (ref 136–145)
WBC # BLD AUTO: 10.12 THOUSAND/UL (ref 4.31–10.16)

## 2021-03-22 PROCEDURE — 82948 REAGENT STRIP/BLOOD GLUCOSE: CPT

## 2021-03-22 PROCEDURE — 80053 COMPREHEN METABOLIC PANEL: CPT | Performed by: INTERNAL MEDICINE

## 2021-03-22 PROCEDURE — 94760 N-INVAS EAR/PLS OXIMETRY 1: CPT

## 2021-03-22 PROCEDURE — 99232 SBSQ HOSP IP/OBS MODERATE 35: CPT | Performed by: INTERNAL MEDICINE

## 2021-03-22 PROCEDURE — 99233 SBSQ HOSP IP/OBS HIGH 50: CPT | Performed by: INTERNAL MEDICINE

## 2021-03-22 PROCEDURE — 85027 COMPLETE CBC AUTOMATED: CPT | Performed by: INTERNAL MEDICINE

## 2021-03-22 RX ORDER — INSULIN GLARGINE 100 [IU]/ML
40 INJECTION, SOLUTION SUBCUTANEOUS
Status: DISCONTINUED | OUTPATIENT
Start: 2021-03-22 | End: 2021-03-24

## 2021-03-22 RX ADMIN — TRAZODONE HYDROCHLORIDE 100 MG: 100 TABLET ORAL at 21:37

## 2021-03-22 RX ADMIN — INSULIN LISPRO 6 UNITS: 100 INJECTION, SOLUTION INTRAVENOUS; SUBCUTANEOUS at 10:55

## 2021-03-22 RX ADMIN — GABAPENTIN 300 MG: 300 CAPSULE ORAL at 17:36

## 2021-03-22 RX ADMIN — LORATADINE 10 MG: 10 TABLET ORAL at 08:20

## 2021-03-22 RX ADMIN — CEFTRIAXONE 1000 MG: 1 INJECTION, POWDER, FOR SOLUTION INTRAMUSCULAR; INTRAVENOUS at 09:48

## 2021-03-22 RX ADMIN — VENLAFAXINE HYDROCHLORIDE 37.5 MG: 37.5 CAPSULE, EXTENDED RELEASE ORAL at 08:20

## 2021-03-22 RX ADMIN — DOXYCYCLINE 100 MG: 100 CAPSULE ORAL at 08:20

## 2021-03-22 RX ADMIN — GABAPENTIN 300 MG: 300 CAPSULE ORAL at 21:37

## 2021-03-22 RX ADMIN — INSULIN LISPRO 4 UNITS: 100 INJECTION, SOLUTION INTRAVENOUS; SUBCUTANEOUS at 08:22

## 2021-03-22 RX ADMIN — ENOXAPARIN SODIUM 70 MG: 80 INJECTION SUBCUTANEOUS at 21:36

## 2021-03-22 RX ADMIN — INSULIN LISPRO 6 UNITS: 100 INJECTION, SOLUTION INTRAVENOUS; SUBCUTANEOUS at 17:37

## 2021-03-22 RX ADMIN — DIVALPROEX SODIUM 500 MG: 500 TABLET, DELAYED RELEASE ORAL at 08:20

## 2021-03-22 RX ADMIN — DEXAMETHASONE SODIUM PHOSPHATE 8.08 MG: 4 INJECTION INTRA-ARTICULAR; INTRALESIONAL; INTRAMUSCULAR; INTRAVENOUS; SOFT TISSUE at 08:21

## 2021-03-22 RX ADMIN — DOXYCYCLINE 100 MG: 100 CAPSULE ORAL at 21:37

## 2021-03-22 RX ADMIN — DEXAMETHASONE SODIUM PHOSPHATE 8.08 MG: 4 INJECTION INTRA-ARTICULAR; INTRALESIONAL; INTRAMUSCULAR; INTRAVENOUS; SOFT TISSUE at 21:38

## 2021-03-22 RX ADMIN — GABAPENTIN 300 MG: 300 CAPSULE ORAL at 08:19

## 2021-03-22 RX ADMIN — OXYCODONE HYDROCHLORIDE AND ACETAMINOPHEN 1000 MG: 500 TABLET ORAL at 08:20

## 2021-03-22 RX ADMIN — INSULIN LISPRO 2 UNITS: 100 INJECTION, SOLUTION INTRAVENOUS; SUBCUTANEOUS at 21:38

## 2021-03-22 RX ADMIN — PANTOPRAZOLE SODIUM 40 MG: 40 TABLET, DELAYED RELEASE ORAL at 05:59

## 2021-03-22 RX ADMIN — REMDESIVIR 100 MG: 100 INJECTION, POWDER, LYOPHILIZED, FOR SOLUTION INTRAVENOUS at 08:37

## 2021-03-22 RX ADMIN — INSULIN GLARGINE 40 UNITS: 100 INJECTION, SOLUTION SUBCUTANEOUS at 21:38

## 2021-03-22 RX ADMIN — ENOXAPARIN SODIUM 70 MG: 80 INJECTION SUBCUTANEOUS at 08:20

## 2021-03-22 RX ADMIN — Medication 2000 UNITS: at 08:20

## 2021-03-22 RX ADMIN — FAMOTIDINE 20 MG: 20 TABLET ORAL at 08:20

## 2021-03-22 RX ADMIN — OXYCODONE HYDROCHLORIDE AND ACETAMINOPHEN 1000 MG: 500 TABLET ORAL at 21:37

## 2021-03-22 RX ADMIN — ATORVASTATIN CALCIUM 40 MG: 40 TABLET, FILM COATED ORAL at 21:37

## 2021-03-22 RX ADMIN — FAMOTIDINE 20 MG: 20 TABLET ORAL at 17:36

## 2021-03-22 RX ADMIN — ZINC SULFATE 220 MG (50 MG) CAPSULE 220 MG: CAPSULE at 08:19

## 2021-03-22 RX ADMIN — CLONIDINE HYDROCHLORIDE 0.1 MG: 0.1 TABLET ORAL at 08:19

## 2021-03-22 RX ADMIN — DIVALPROEX SODIUM 500 MG: 500 TABLET, DELAYED RELEASE ORAL at 21:36

## 2021-03-22 RX ADMIN — LEVOTHYROXINE SODIUM 25 MCG: 25 TABLET ORAL at 05:59

## 2021-03-22 NOTE — PLAN OF CARE
Problem: Nutrition/Hydration-ADULT  Goal: Nutrient/Hydration intake appropriate for improving, restoring or maintaining nutritional needs  Description: Monitor and assess patient's nutrition/hydration status for malnutrition  Collaborate with interdisciplinary team and initiate plan and interventions as ordered  Monitor patient's weight and dietary intake as ordered or per policy  Utilize nutrition screening tool and intervene as necessary  Determine patient's food preferences and provide high-protein, high-caloric foods as appropriate       INTERVENTIONS:  - Monitor oral intake, urinary output, labs, and treatment plans  - Assess nutrition and hydration status and recommend course of action  - Evaluate amount of meals eaten  - Assist patient with eating if necessary   - Allow adequate time for meals  - Recommend/ encourage appropriate diets, oral nutritional supplements, and vitamin/mineral supplements  - Order, calculate, and assess calorie counts as needed  - Recommend, monitor, and adjust tube feedings and TPN/PPN based on assessed needs  - Assess need for intravenous fluids  - Provide specific nutrition/hydration education as appropriate  - Include patient/family/caregiver in decisions related to nutrition  Outcome: Progressing     Problem: PAIN - ADULT  Goal: Verbalizes/displays adequate comfort level or baseline comfort level  Description: Interventions:  - Encourage patient to monitor pain and request assistance  - Assess pain using appropriate pain scale  - Administer analgesics based on type and severity of pain and evaluate response  - Implement non-pharmacological measures as appropriate and evaluate response  - Consider cultural and social influences on pain and pain management  - Notify physician/advanced practitioner if interventions unsuccessful or patient reports new pain  Outcome: Progressing     Problem: INFECTION - ADULT  Goal: Absence or prevention of progression during hospitalization  Description: INTERVENTIONS:  - Assess and monitor for signs and symptoms of infection  - Monitor lab/diagnostic results  - Monitor all insertion sites, i e  indwelling lines, tubes, and drains  - [place] appropriate cooling/warming therapies per order  - Administer medications as ordered  - Instruct and encourage patient and family to use good hand hygiene technique  - Identify and instruct in appropriate isolation precautions for identified infection/condition  Outcome: Progressing     Problem: SAFETY ADULT  Goal: Patient will remain free of falls  Description: INTERVENTIONS:  - Assess patient frequently for physical needs  -  Identify cognitive and physical deficits and behaviors that affect risk of falls    -  [place] fall precautions as indicated by assessment   - Educate patient/family on patient safety including physical limitations  - Instruct patient to call for assistance with activity based on assessment  - Modify environment to reduce risk of injury  - Consider OT/PT consult to assist with strengthening/mobility  Outcome: Progressing  Goal: Maintain or return to baseline ADL function  Description: INTERVENTIONS:  -  Assess patient's ability to carry out ADLs; assess patient's baseline for ADL function and identify physical deficits which impact ability to perform ADLs (bathing, care of mouth/teeth, toileting, grooming, dressing, etc )  - Assess/evaluate cause of self-care deficits   - Assess range of motion  - Assess patient's mobility; develop plan if impaired  - Assess patient's need for assistive devices and provide as appropriate  - Encourage maximum independence but intervene and supervise when necessary  - Involve family in performance of ADLs  - Assess for home care needs following discharge   - Consider OT consult to assist with ADL evaluation and planning for discharge  - Provide patient education as appropriate  Outcome: Progressing  Goal: Maintain or return mobility status to optimal level  Description: INTERVENTIONS:  - Assess patient's baseline mobility status (ambulation, transfers, stairs, etc )    - Identify cognitive and physical deficits and behaviors that affect mobility  - Identify mobility aids required to assist with transfers and/or ambulation (gait belt, sit-to-stand, lift, walker, cane, etc )  - [place] fall precautions as indicated by assessment  - Record patient progress and toleration of activity level on Mobility SBAR; progress patient to next Phase/Stage  - Instruct patient to call for assistance with activity based on assessment  - Consider rehabilitation consult to assist with strengthening/weightbearing, etc   Outcome: Progressing     Problem: DISCHARGE PLANNING  Goal: Discharge to home or other facility with appropriate resources  Description: INTERVENTIONS:  - Identify barriers to discharge w/patient and caregiver  - Arrange for needed discharge resources and transportation as appropriate  - Identify discharge learning needs (meds, wound care, etc )  - Arrange for interpretive services to assist at discharge as needed  - Refer to Case Management Department for coordinating discharge planning if the patient needs post-hospital services based on physician/advanced practitioner order or complex needs related to functional status, cognitive ability, or social support system  Outcome: Progressing     Problem: Knowledge Deficit  Goal: Patient/family/caregiver demonstrates understanding of disease process, treatment plan, medications, and discharge instructions  Description: Complete learning assessment and assess knowledge base    Interventions:  - Provide teaching at level of understanding  - Provide teaching via preferred learning methods  Outcome: Progressing     Problem: RESPIRATORY - ADULT  Goal: Achieves optimal ventilation and oxygenation  Description: INTERVENTIONS:  - Assess for changes in respiratory status  - Assess for changes in mentation and behavior  - Position to facilitate oxygenation and minimize respiratory effort  - Oxygen administered by appropriate delivery if ordered  - Encourage broncho-pulmonary hygiene including cough, deep breathe, Incentive Spirometry  - Assess and instruct to report SOB or any respiratory difficulty  - Respiratory Therapy support as indicated  Outcome: Progressing     Problem: Potential for Falls  Goal: Patient will remain free of falls  Description: INTERVENTIONS:  - Assess patient frequently for physical needs  -  Identify cognitive and physical deficits and behaviors that affect risk of falls    -  [place] fall precautions as indicated by assessment   - Educate patient/family on patient safety including physical limitations  - Instruct patient to call for assistance with activity based on assessment  - Modify environment to reduce risk of injury  - Consider OT/PT consult to assist with strengthening/mobility  Outcome: Progressing

## 2021-03-22 NOTE — PLAN OF CARE
Problem: Nutrition/Hydration-ADULT  Goal: Nutrient/Hydration intake appropriate for improving, restoring or maintaining nutritional needs  Description: Monitor and assess patient's nutrition/hydration status for malnutrition  Collaborate with interdisciplinary team and initiate plan and interventions as ordered  Monitor patient's weight and dietary intake as ordered or per policy  Utilize nutrition screening tool and intervene as necessary  Determine patient's food preferences and provide high-protein, high-caloric foods as appropriate       INTERVENTIONS:  - Monitor oral intake, urinary output, labs, and treatment plans  - Assess nutrition and hydration status and recommend course of action  - Evaluate amount of meals eaten  - Assist patient with eating if necessary   - Allow adequate time for meals  - Recommend/ encourage appropriate diets, oral nutritional supplements, and vitamin/mineral supplements  - Order, calculate, and assess calorie counts as needed  - Recommend, monitor, and adjust tube feedings and TPN/PPN based on assessed needs  - Assess need for intravenous fluids  - Provide specific nutrition/hydration education as appropriate  - Include patient/family/caregiver in decisions related to nutrition  Outcome: Progressing     Problem: PAIN - ADULT  Goal: Verbalizes/displays adequate comfort level or baseline comfort level  Description: Interventions:  - Encourage patient to monitor pain and request assistance  - Assess pain using appropriate pain scale  - Administer analgesics based on type and severity of pain and evaluate response  - Implement non-pharmacological measures as appropriate and evaluate response  - Consider cultural and social influences on pain and pain management  - Notify physician/advanced practitioner if interventions unsuccessful or patient reports new pain  Outcome: Progressing     Problem: INFECTION - ADULT  Goal: Absence or prevention of progression during hospitalization  Description: INTERVENTIONS:  - Assess and monitor for signs and symptoms of infection  - Monitor lab/diagnostic results  - Monitor all insertion sites, i e  indwelling lines, tubes, and drains  - [place] appropriate cooling/warming therapies per order  - Administer medications as ordered  - Instruct and encourage patient and family to use good hand hygiene technique  - Identify and instruct in appropriate isolation precautions for identified infection/condition  Outcome: Progressing     Problem: SAFETY ADULT  Goal: Patient will remain free of falls  Description: INTERVENTIONS:  - Assess patient frequently for physical needs  -  Identify cognitive and physical deficits and behaviors that affect risk of falls    -  [place] fall precautions as indicated by assessment   - Educate patient/family on patient safety including physical limitations  - Instruct patient to call for assistance with activity based on assessment  - Modify environment to reduce risk of injury  - Consider OT/PT consult to assist with strengthening/mobility  Outcome: Progressing  Goal: Maintain or return to baseline ADL function  Description: INTERVENTIONS:  -  Assess patient's ability to carry out ADLs; assess patient's baseline for ADL function and identify physical deficits which impact ability to perform ADLs (bathing, care of mouth/teeth, toileting, grooming, dressing, etc )  - Assess/evaluate cause of self-care deficits   - Assess range of motion  - Assess patient's mobility; develop plan if impaired  - Assess patient's need for assistive devices and provide as appropriate  - Encourage maximum independence but intervene and supervise when necessary  - Involve family in performance of ADLs  - Assess for home care needs following discharge   - Consider OT consult to assist with ADL evaluation and planning for discharge  - Provide patient education as appropriate  Outcome: Progressing  Goal: Maintain or return mobility status to optimal level  Description: INTERVENTIONS:  - Assess patient's baseline mobility status (ambulation, transfers, stairs, etc )    - Identify cognitive and physical deficits and behaviors that affect mobility  - Identify mobility aids required to assist with transfers and/or ambulation (gait belt, sit-to-stand, lift, walker, cane, etc )  - [place] fall precautions as indicated by assessment  - Record patient progress and toleration of activity level on Mobility SBAR; progress patient to next Phase/Stage  - Instruct patient to call for assistance with activity based on assessment  - Consider rehabilitation consult to assist with strengthening/weightbearing, etc   Outcome: Progressing     Problem: DISCHARGE PLANNING  Goal: Discharge to home or other facility with appropriate resources  Description: INTERVENTIONS:  - Identify barriers to discharge w/patient and caregiver  - Arrange for needed discharge resources and transportation as appropriate  - Identify discharge learning needs (meds, wound care, etc )  - Arrange for interpretive services to assist at discharge as needed  - Refer to Case Management Department for coordinating discharge planning if the patient needs post-hospital services based on physician/advanced practitioner order or complex needs related to functional status, cognitive ability, or social support system  Outcome: Progressing     Problem: Knowledge Deficit  Goal: Patient/family/caregiver demonstrates understanding of disease process, treatment plan, medications, and discharge instructions  Description: Complete learning assessment and assess knowledge base    Interventions:  - Provide teaching at level of understanding  - Provide teaching via preferred learning methods  Outcome: Progressing     Problem: RESPIRATORY - ADULT  Goal: Achieves optimal ventilation and oxygenation  Description: INTERVENTIONS:  - Assess for changes in respiratory status  - Assess for changes in mentation and behavior  - Position to facilitate oxygenation and minimize respiratory effort  - Oxygen administered by appropriate delivery if ordered  - Encourage broncho-pulmonary hygiene including cough, deep breathe, Incentive Spirometry  - Assess and instruct to report SOB or any respiratory difficulty  - Respiratory Therapy support as indicated  Outcome: Progressing     Problem: Potential for Falls  Goal: Patient will remain free of falls  Description: INTERVENTIONS:  - Assess patient frequently for physical needs  -  Identify cognitive and physical deficits and behaviors that affect risk of falls    -  [place] fall precautions as indicated by assessment   - Educate patient/family on patient safety including physical limitations  - Instruct patient to call for assistance with activity based on assessment  - Modify environment to reduce risk of injury  - Consider OT/PT consult to assist with strengthening/mobility  Outcome: Progressing

## 2021-03-22 NOTE — ASSESSMENT & PLAN NOTE
Secondary to COVID-19 pneumonia  Elevated d-dimer, on therapeutic lovenox  Elevation likely secondary to hypercoag state associated with COVID pna  Cont oxygen supplementation and wean as clinically improves    Remains on mid flow down to 8L  Pulmonary following; given dose of IV lasix x1 yesterday

## 2021-03-22 NOTE — PROGRESS NOTES
[facility]  Progress Note - Mari Better 1985, 38 y o  male MRN: [id number]  Unit/Bed#: S -01 Encounter: [id number]  Primary Care Provider: Jacquelyn Ramirez MD   Date and time admitted to hospital: 3/17/2021 11:24 AM    * Acute respiratory failure with hypoxia [facility])  Assessment & Plan  Secondary to COVID-19 pneumonia  Elevated d-dimer, on therapeutic lovenox  Elevation likely secondary to hypercoag state associated with COVID pna  Cont oxygen supplementation and wean as clinically improves  Remains on mid flow down to 8L  Pulmonary following; given dose of IV lasix x1 yesterday    COVID-19  Assessment & Plan  COVID-19 pneumonia with superimposed bacterial infection given elevated procalcitonin  COVID positive on March 9  Pulmonary following  Cont remdesivir day 5/5, high-dose dexamethasone day 6/10  Received Actemra x 1 (3/18)  Antibiotic treatment with ceftriaxone and doxycycline as per pulm recommending 6/7 days  Therapeutic lovenox, trending down of d-dimer, repeat for tomorrow  Continue mid flow and wean as tolerated        Sepsis ([facility] [address] )  Assessment & Plan  Severe sepsis secondary to COVID-19 pneumonia and possible superimposed bacterial infection   Continue pathway for COVID-19 treatment  Continue antibiotics  Blood neg   Resolved lactic acidosis    Type 2 diabetes mellitus without complication, with long-term current use of insulin [facility])  Assessment & Plan  Lab Results   Component Value Date    HGBA1C 7 8 (H) 10/27/2020       Recent Labs     03/21/21  2125 03/21/21  2233 03/22/21  0734 03/22/21  1042   POCGLU 273* 306* 216* 285*       Blood Sugar Average: Last 72 hrs:  (P) 949 [id number]   On high-dose dexamethasone   Titrated up lantus and lispro again  Cont ISS   Close monitor  Walked into room, patient had a large gift package filled with several candies, soda, juice, beef jerky which his wife had brought in for him   I educated him that he should adhering to a strict DM diet; as his cbg is elevated from the steroids in itself  His wife assured me that he would not be eating them all at once  I told her that he should not be eating them at all as I explained the grave risks  She stated she just wanted him to cheer up  Blood per rectum  Assessment & Plan  Blood mixed with stool, has a chronic hx, was straining at the time  Likely hemorrhoidal and has been considered such in the past  Had seen GI recently and plan was to schedule for EGD/colonoscopy in the future  Obviously not a candidate for endoscopy eval at this moment given covid positive and resp status  H/H remains stable, has not had recurring bpr  Close monitor as on therapeutic lovenox    Bradycardic baseline fetal heart rate  Assessment & Plan  Asymptomatic   Holding parameters placed on metoprolol, lowered to 25mg  Hx of hypothyroidism, thus checked tsh, in euthyroid state    Transaminitis  Assessment & Plan  Trending down  Close monitor as on remdesivir  Daily CMP    ENRIKE (obstructive sleep apnea)  Assessment & Plan  Cont cpap use    PTSD (post-traumatic stress disorder)  Assessment & Plan  Continue psych medications      VTE Pharmacologic Prophylaxis:   Pharmacologic: Enoxaparin (Lovenox)  Mechanical VTE Prophylaxis in Place: No    Patient Centered Rounds: I have performed bedside rounds with nursing staff today  Discussions with Specialists or Other Care Team Provider:     Education and Discussions with Family / Patient: Patient and also called his wife    Time Spent for Care: 30 minutes  More than 50% of total time spent on counseling and coordination of care as described above  Current Length of Stay: 5 day(s)    Current Patient Status: Inpatient   Certification Statement: The patient will continue to require additional inpatient hospital stay due to Acute illness    Discharge Plan:     Code Status: Level 1 - Full Code      Subjective:   Sitting up in bed reports of feeling better    His wife had brought in a care package full of several candies, soda, cookies  I educated him that he should be adhering to a strict DM diet  No acute events overnight    Objective:     Vitals:   Temp (24hrs), Av 7 °F (36 5 °C), Min:97 5 °F (36 4 °C), Max:98 °F (36 7 °C)    Temp:  [97 5 °F (36 4 °C)-98 °F (36 7 °C)] 97 5 °F (36 4 °C)  HR:  [45-54] 49  Resp:  [14-20] 20  BP: (117-132)/(75-83) 132/81  SpO2:  [91 %-96 %] 93 %  Body mass index is 27 15 kg/m²  Input and Output Summary (last 24 hours): Intake/Output Summary (Last 24 hours) at 3/22/2021 1414  Last data filed at 3/22/2021 1230  Gross per 24 hour   Intake 600 ml   Output 2450 ml   Net -1850 ml       Physical Exam:     Physical Exam  Constitutional:       Appearance: Normal appearance  Neck:      Musculoskeletal: Normal range of motion and neck supple  Cardiovascular:      Rate and Rhythm: Regular rhythm  Bradycardia present  Pulses: Normal pulses  Heart sounds: Normal heart sounds  No murmur  No gallop  Pulmonary:      Effort: Pulmonary effort is normal  No respiratory distress  Breath sounds: No wheezing or rales  Comments: Diminished bs  Abdominal:      General: Abdomen is flat  Bowel sounds are normal  There is no distension  Palpations: Abdomen is soft  Tenderness: There is no abdominal tenderness  There is no guarding  Skin:     General: Skin is warm and dry  Neurological:      General: No focal deficit present  Mental Status: He is alert and oriented to person, place, and time  Mental status is at baseline  Cranial Nerves: No cranial nerve deficit  Motor: No weakness         Additional Data:     Labs:    Results from last 7 days   Lab Units 21  1042  21  0537   WBC Thousand/uL 10 12   < > 5 80   HEMOGLOBIN g/dL 13 7   < > 12 4   HEMATOCRIT % 40 3   < > 37 1   PLATELETS Thousands/uL 381   < > 237   BANDS PCT %  --   --  9*   LYMPHO PCT %  --   --  9*   MONO PCT %  --   --  6   EOS PCT % --   --  0    < > = values in this interval not displayed  Results from last 7 days   Lab Units 03/22/21  1042   SODIUM mmol/L 137   POTASSIUM mmol/L 4 3   CHLORIDE mmol/L 102   CO2 mmol/L 25   BUN mg/dL 24   CREATININE mg/dL 0 93   ANION GAP mmol/L 10   CALCIUM mg/dL 8 4   ALBUMIN g/dL 2 3*   TOTAL BILIRUBIN mg/dL 0 45   ALK PHOS U/L 123*   ALT U/L 234*   AST U/L 50*   GLUCOSE RANDOM mg/dL 346*     Results from last 7 days   Lab Units 03/19/21  1624   INR  1 13     Results from last 7 days   Lab Units 03/22/21  1042 03/22/21  0734 03/21/21  2233 03/21/21  2125 03/21/21  1544 03/21/21  1140 03/21/21  0712 03/20/21  2122 03/20/21  1633 03/20/21  1110 03/20/21  0749 03/20/21  0029   POC GLUCOSE mg/dl 285* 216* 306* 273* 297* 290* 251* 204* 271* 320* 243* 243*         Results from last 7 days   Lab Units 03/20/21  0535 03/19/21  0602 03/18/21  0537 03/17/21  1420 03/17/21  1134   LACTIC ACID mmol/L  --   --   --  1 0 2 4*   PROCALCITONIN ng/ml 0 46* 0 99* 1 75*  --  3 17*           * I Have Reviewed All Lab Data Listed Above  * Additional Pertinent Lab Tests Reviewed: All Labs Within Last 24 Hours Reviewed    Imaging:    Imaging Reports Reviewed Today Include:   Imaging Personally Reviewed by Myself Includes:      Recent Cultures (last 7 days):     Results from last 7 days   Lab Units 03/17/21  1145 03/17/21  1134   BLOOD CULTURE  No Growth After 4 Days  No Growth After 4 Days         Last 24 Hours Medication List:   Current Facility-Administered Medications   Medication Dose Route Frequency Provider Last Rate    acetaminophen  650 mg Oral Q6H PRN Sailaja Walker MD      ALPRAZolam  0 5 mg Oral TID PRN Sailaja Walker MD      ascorbic acid  1,000 mg Oral Q12H [address] Sailaja Walker MD      atorvastatin  40 mg Oral HS Sailaja Walker MD      cefTRIAXone  1,000 mg Intravenous Q24H Sailaja Walker MD 1,000 mg (03/22/21 2776)    cholecalciferol  2,000 Units Oral Daily Sailaja Walker MD      cloNIDine  0 1 mg Oral Daily Marilyn Bhagat MD      dexamethasone  0 1 mg/kg Intravenous Q12H [address] IGNACIO Decker      dicyclomine  20 mg Oral Q6H PRN Marilyn Bhagat MD      divalproex sodium  500 mg Oral Q12H [address] Marilyn Bhagat MD      doxycycline hyclate  100 mg Oral Q12H Marilyn Bhagat MD      enoxaparin  1 mg/kg Subcutaneous Q12H [address] IGNACIO Decker      famotidine  20 mg Oral BID Marilyn Bhagat MD      gabapentin  300 mg Oral TID Marilyn Bhagat MD      insulin glargine  40 Units Subcutaneous HS Leny Cueva,       insulin lispro  1-5 Units Subcutaneous HS Leny Cueva DO      insulin lispro  15 Units Subcutaneous TID With Meals Leny Cueva DO      insulin lispro  2-12 Units Subcutaneous TID AC Leny Cueva DO      levothyroxine  25 mcg Oral Early Morning Marilyn Bhagat MD      loratadine  10 mg Oral Daily Marilyn Bhagat MD      metoprolol succinate  25 mg Oral Daily Leny Cueva DO      zinc sulfate  220 mg Oral Daily Marilyn Bhagat MD      Followed by   Baljinder Stevenson ON 3/25/2021] multivitamin-minerals  1 tablet Oral Daily Marilyn Bhagat MD      pantoprazole  40 mg Oral Early Morning Marilyn Bhagat MD      polyethylene glycol  17 g Oral Daily PRN Leny Cueva DO      traZODone  100 mg Oral HS Marilyn Bhagat MD      venlafaxine  37 5 mg Oral Daily Marilyn Bhagat MD          Today, Patient Was Seen By: Leny Cueva DO    ** Please Note: Dictation voice to text software may have been used in the creation of this document   **

## 2021-03-22 NOTE — ASSESSMENT & PLAN NOTE
Lab Results   Component Value Date    HGBA1C 7 8 (H) 10/27/2020       Recent Labs     03/21/21  2125 03/21/21  2233 03/22/21  0734 03/22/21  1042   POCGLU 273* 306* 216* 285*       Blood Sugar Average: Last 72 hrs:  (P) 773 [id number]   On high-dose dexamethasone   Titrated up lantus and lispro again  Cont ISS   Close monitor  Walked into room, patient had a large gift package filled with several candies, soda, juice, beef jerky which his wife had brought in for him  I educated him that he should adhering to a strict DM diet; as his cbg is elevated from the steroids in itself  His wife assured me that he would not be eating them all at once  I told her that he should not be eating them at all as I explained the grave risks  She stated she just wanted him to cheer up

## 2021-03-22 NOTE — ASSESSMENT & PLAN NOTE
COVID-19 pneumonia with superimposed bacterial infection given elevated procalcitonin   COVID positive on March 9  Pulmonary following  Cont remdesivir day 5/5, high-dose dexamethasone day 6/10  Received Actemra x 1 (3/18)  Antibiotic treatment with ceftriaxone and doxycycline as per pulm recommending 6/7 days  Therapeutic lovenox, trending down of d-dimer, repeat for tomorrow  Continue mid flow and wean as tolerated

## 2021-03-22 NOTE — PROGRESS NOTES
Progress Note - Pulmonary   Selvin Nel 38 y o  male MRN: [id number]  Unit/Bed#: S -01 Encounter: [id number]    Assessment/Plan:    1  Acute hypoxic respiratory failure likely secondary to COVID-19       -  6-8L   Mid flow- 95%  Unlikely be transition to nasal cannula,  Patient does not wear home O2        -   Continue maintain saturations greater than 89%        -   Pulmonary toilet:  Deep breathing cough, OOB  As tolerated, IS Q 1hr        -   Will need ambulatory pulse ox prior to discharge    2  COVID-19- 3/9/2021 with suspected bacterial superinfection/aspiration       -  3/18/2021- Actemra       -  3/21/2021-  Completed 5 days of remdesivir       -   procalcitonin 0 46, Day #6/7-  Rocephin/ doxycycline       -  CRP- 58 8      Ferritin-   1578       -   D-dimer- 1 32-  Lovenox 1mg/kg     patient continues to overall improve,  Received 1 time diuretics yesterday no indication to repeat at this will continue with high-dose Decadron,  Feel anticipate discharge in 72 hours    3  ENRIKE        -   Patient reports compliance with CPAP at home        -   Diagnosis approximately 10 years ago        -   Continue inpatient CPAP q h s  and p r n       -  Outpatient follow-up per discharge instructions    Chief Complaint:    "I am feeling better"    Subjective:    Kwasi Harkins  Was comfortably sitting in his bed  He reports he overall is feeling better however he is still having some fatigue  Patient reports that he is increasing his activity slowly throughout the day  No significant overnight events reported  Patient currently denying any fevers, chills, hemoptysis, headaches, night sweats, pleuritic chest pain, or palpitations  Objective:    Vitals: Blood pressure 132/81, pulse (!) 49, temperature 97 5 °F (36 4 °C), temperature source Oral, resp  rate 20, height 5' 5" (1 651 m), weight 74 kg (163 lb 2 3 oz), SpO2 93 %  6L,Body mass index is 27 15 kg/m²        Intake/Output Summary (Last 24 hours) at 3/22/2021 2700 [facility] filed at 3/22/2021 0736  Gross per 24 hour   Intake 240 ml   Output 3150 ml   Net -2910 ml       Invasive Devices     Peripheral Intravenous Line            Peripheral IV 03/21/21 Dorsal (posterior); Right Hand less than 1 day                Physical Exam:   Physical Exam  Constitutional:       General: He is not in acute distress  Appearance: Normal appearance  He is normal weight  He is not ill-appearing  HENT:      Head: Normocephalic and atraumatic  Nose: Nose normal  No congestion or rhinorrhea  Mouth/Throat:      Mouth: Mucous membranes are dry  Pharynx: No oropharyngeal exudate or posterior oropharyngeal erythema  Neck:      Musculoskeletal: Normal range of motion and neck supple  No neck rigidity or muscular tenderness  Cardiovascular:      Rate and Rhythm: Normal rate and regular rhythm  Pulses: Normal pulses  Heart sounds: Normal heart sounds  No murmur  No friction rub  No gallop  Pulmonary:      Effort: Pulmonary effort is normal  No tachypnea, bradypnea, accessory muscle usage or respiratory distress  Breath sounds: Decreased air movement present  No stridor or transmitted upper airway sounds  Examination of the right-lower field reveals rales  Examination of the left-lower field reveals rales  Rales present  No decreased breath sounds, wheezing or rhonchi  Comments: Some basilar crackles  Chest:      Chest wall: No tenderness  Abdominal:      General: Abdomen is flat  Bowel sounds are normal  There is no distension  Palpations: Abdomen is soft  There is no mass  Musculoskeletal: Normal range of motion  General: No swelling or tenderness  Skin:     General: Skin is warm and dry  Coloration: Skin is not jaundiced or pale  Neurological:      General: No focal deficit present  Mental Status: He is alert and oriented to person, place, and time  Mental status is at baseline        Cranial Nerves: No cranial nerve deficit  Sensory: No sensory deficit  Psychiatric:         Mood and Affect: Mood normal          Behavior: Behavior normal          Labs:  I have personally reviewed pertinent lab results 3/21/2021    Imaging and other studies: I have personally reviewed pertinent films in PACS     CXR 3/17/2021- multi focal pneumonia

## 2021-03-22 NOTE — PLAN OF CARE
Problem: Nutrition/Hydration-ADULT  Goal: Nutrient/Hydration intake appropriate for improving, restoring or maintaining nutritional needs  Description: Monitor and assess patient's nutrition/hydration status for malnutrition  Collaborate with interdisciplinary team and initiate plan and interventions as ordered  Monitor patient's weight and dietary intake as ordered or per policy  Utilize nutrition screening tool and intervene as necessary  Determine patient's food preferences and provide high-protein, high-caloric foods as appropriate       INTERVENTIONS:  - Monitor oral intake, urinary output, labs, and treatment plans  - Assess nutrition and hydration status and recommend course of action  - Evaluate amount of meals eaten  - Assist patient with eating if necessary   - Allow adequate time for meals  - Recommend/ encourage appropriate diets, oral nutritional supplements, and vitamin/mineral supplements  - Order, calculate, and assess calorie counts as needed  - Recommend, monitor, and adjust tube feedings and TPN/PPN based on assessed needs  - Assess need for intravenous fluids  - Provide specific nutrition/hydration education as appropriate  - Include patient/family/caregiver in decisions related to nutrition  Outcome: Progressing     Problem: PAIN - ADULT  Goal: Verbalizes/displays adequate comfort level or baseline comfort level  Description: Interventions:  - Encourage patient to monitor pain and request assistance  - Assess pain using appropriate pain scale  - Administer analgesics based on type and severity of pain and evaluate response  - Implement non-pharmacological measures as appropriate and evaluate response  - Consider cultural and social influences on pain and pain management  - Notify physician/advanced practitioner if interventions unsuccessful or patient reports new pain  Outcome: Progressing     Problem: INFECTION - ADULT  Goal: Absence or prevention of progression during hospitalization  Description: INTERVENTIONS:  - Assess and monitor for signs and symptoms of infection  - Monitor lab/diagnostic results  - Monitor all insertion sites, i e  indwelling lines, tubes, and drains  - [place] appropriate cooling/warming therapies per order  - Administer medications as ordered  - Instruct and encourage patient and family to use good hand hygiene technique  - Identify and instruct in appropriate isolation precautions for identified infection/condition  Outcome: Progressing     Problem: SAFETY ADULT  Goal: Patient will remain free of falls  Description: INTERVENTIONS:  - Assess patient frequently for physical needs  -  Identify cognitive and physical deficits and behaviors that affect risk of falls    -  [place] fall precautions as indicated by assessment   - Educate patient/family on patient safety including physical limitations  - Instruct patient to call for assistance with activity based on assessment  - Modify environment to reduce risk of injury  - Consider OT/PT consult to assist with strengthening/mobility  Outcome: Progressing  Goal: Maintain or return to baseline ADL function  Description: INTERVENTIONS:  -  Assess patient's ability to carry out ADLs; assess patient's baseline for ADL function and identify physical deficits which impact ability to perform ADLs (bathing, care of mouth/teeth, toileting, grooming, dressing, etc )  - Assess/evaluate cause of self-care deficits   - Assess range of motion  - Assess patient's mobility; develop plan if impaired  - Assess patient's need for assistive devices and provide as appropriate  - Encourage maximum independence but intervene and supervise when necessary  - Involve family in performance of ADLs  - Assess for home care needs following discharge   - Consider OT consult to assist with ADL evaluation and planning for discharge  - Provide patient education as appropriate  Outcome: Progressing  Goal: Maintain or return mobility status to optimal level  Description: INTERVENTIONS:  - Assess patient's baseline mobility status (ambulation, transfers, stairs, etc )    - Identify cognitive and physical deficits and behaviors that affect mobility  - Identify mobility aids required to assist with transfers and/or ambulation (gait belt, sit-to-stand, lift, walker, cane, etc )  - [place] fall precautions as indicated by assessment  - Record patient progress and toleration of activity level on Mobility SBAR; progress patient to next Phase/Stage  - Instruct patient to call for assistance with activity based on assessment  - Consider rehabilitation consult to assist with strengthening/weightbearing, etc   Outcome: Progressing     Problem: DISCHARGE PLANNING  Goal: Discharge to home or other facility with appropriate resources  Description: INTERVENTIONS:  - Identify barriers to discharge w/patient and caregiver  - Arrange for needed discharge resources and transportation as appropriate  - Identify discharge learning needs (meds, wound care, etc )  - Arrange for interpretive services to assist at discharge as needed  - Refer to Case Management Department for coordinating discharge planning if the patient needs post-hospital services based on physician/advanced practitioner order or complex needs related to functional status, cognitive ability, or social support system  Outcome: Progressing     Problem: Knowledge Deficit  Goal: Patient/family/caregiver demonstrates understanding of disease process, treatment plan, medications, and discharge instructions  Description: Complete learning assessment and assess knowledge base    Interventions:  - Provide teaching at level of understanding  - Provide teaching via preferred learning methods  Outcome: Progressing     Problem: RESPIRATORY - ADULT  Goal: Achieves optimal ventilation and oxygenation  Description: INTERVENTIONS:  - Assess for changes in respiratory status  - Assess for changes in mentation and behavior  - Position to facilitate oxygenation and minimize respiratory effort  - Oxygen administered by appropriate delivery if ordered  - Encourage broncho-pulmonary hygiene including cough, deep breathe, Incentive Spirometry  - Assess and instruct to report SOB or any respiratory difficulty  - Respiratory Therapy support as indicated  Outcome: Progressing     Problem: Potential for Falls  Goal: Patient will remain free of falls  Description: INTERVENTIONS:  - Assess patient frequently for physical needs  -  Identify cognitive and physical deficits and behaviors that affect risk of falls    -  [place] fall precautions as indicated by assessment   - Educate patient/family on patient safety including physical limitations  - Instruct patient to call for assistance with activity based on assessment  - Modify environment to reduce risk of injury  - Consider OT/PT consult to assist with strengthening/mobility  Outcome: Progressing

## 2021-03-23 PROBLEM — A41.9 SEPSIS (HCC): Status: RESOLVED | Noted: 2021-03-17 | Resolved: 2021-03-23

## 2021-03-23 LAB
ALBUMIN SERPL BCP-MCNC: 2.4 G/DL (ref 3.5–5)
ALP SERPL-CCNC: 118 U/L (ref 46–116)
ALT SERPL W P-5'-P-CCNC: 196 U/L (ref 12–78)
ANION GAP SERPL CALCULATED.3IONS-SCNC: 6 MMOL/L (ref 4–13)
AST SERPL W P-5'-P-CCNC: 36 U/L (ref 5–45)
BILIRUB SERPL-MCNC: 0.6 MG/DL (ref 0.2–1)
BUN SERPL-MCNC: 23 MG/DL (ref 5–25)
CALCIUM ALBUM COR SERPL-MCNC: 9.9 MG/DL (ref 8.3–10.1)
CALCIUM SERPL-MCNC: 8.6 MG/DL (ref 8.3–10.1)
CHLORIDE SERPL-SCNC: 100 MMOL/L (ref 100–108)
CO2 SERPL-SCNC: 27 MMOL/L (ref 21–32)
CREAT SERPL-MCNC: 0.86 MG/DL (ref 0.6–1.3)
D DIMER PPP FEU-MCNC: 1.82 UG/ML FEU
ERYTHROCYTE [DISTWIDTH] IN BLOOD BY AUTOMATED COUNT: 12.7 % (ref 11.6–15.1)
GFR SERPL CREATININE-BSD FRML MDRD: 112 ML/MIN/1.73SQ M
GLUCOSE SERPL-MCNC: 260 MG/DL (ref 65–140)
GLUCOSE SERPL-MCNC: 264 MG/DL (ref 65–140)
GLUCOSE SERPL-MCNC: 269 MG/DL (ref 65–140)
GLUCOSE SERPL-MCNC: 302 MG/DL (ref 65–140)
GLUCOSE SERPL-MCNC: 308 MG/DL (ref 65–140)
HCT VFR BLD AUTO: 41.4 % (ref 36.5–49.3)
HGB BLD-MCNC: 14.1 G/DL (ref 12–17)
MCH RBC QN AUTO: 29 PG (ref 26.8–34.3)
MCHC RBC AUTO-ENTMCNC: 34.1 G/DL (ref 31.4–37.4)
MCV RBC AUTO: 85 FL (ref 82–98)
PLATELET # BLD AUTO: 390 THOUSANDS/UL (ref 149–390)
PMV BLD AUTO: 9 FL (ref 8.9–12.7)
POTASSIUM SERPL-SCNC: 5 MMOL/L (ref 3.5–5.3)
PROT SERPL-MCNC: 6 G/DL (ref 6.4–8.2)
RBC # BLD AUTO: 4.87 MILLION/UL (ref 3.88–5.62)
SODIUM SERPL-SCNC: 133 MMOL/L (ref 136–145)
WBC # BLD AUTO: 12.93 THOUSAND/UL (ref 4.31–10.16)

## 2021-03-23 PROCEDURE — 85027 COMPLETE CBC AUTOMATED: CPT | Performed by: INTERNAL MEDICINE

## 2021-03-23 PROCEDURE — 80053 COMPREHEN METABOLIC PANEL: CPT | Performed by: INTERNAL MEDICINE

## 2021-03-23 PROCEDURE — 94762 N-INVAS EAR/PLS OXIMTRY CONT: CPT

## 2021-03-23 PROCEDURE — 94660 CPAP INITIATION&MGMT: CPT

## 2021-03-23 PROCEDURE — 99232 SBSQ HOSP IP/OBS MODERATE 35: CPT | Performed by: PHYSICIAN ASSISTANT

## 2021-03-23 PROCEDURE — 99232 SBSQ HOSP IP/OBS MODERATE 35: CPT | Performed by: INTERNAL MEDICINE

## 2021-03-23 PROCEDURE — 94760 N-INVAS EAR/PLS OXIMETRY 1: CPT

## 2021-03-23 PROCEDURE — 85379 FIBRIN DEGRADATION QUANT: CPT | Performed by: INTERNAL MEDICINE

## 2021-03-23 PROCEDURE — 82948 REAGENT STRIP/BLOOD GLUCOSE: CPT

## 2021-03-23 RX ADMIN — DEXAMETHASONE SODIUM PHOSPHATE 8.08 MG: 4 INJECTION INTRA-ARTICULAR; INTRALESIONAL; INTRAMUSCULAR; INTRAVENOUS; SOFT TISSUE at 08:47

## 2021-03-23 RX ADMIN — FAMOTIDINE 20 MG: 20 TABLET ORAL at 08:47

## 2021-03-23 RX ADMIN — DIVALPROEX SODIUM 500 MG: 500 TABLET, DELAYED RELEASE ORAL at 08:47

## 2021-03-23 RX ADMIN — ATORVASTATIN CALCIUM 40 MG: 40 TABLET, FILM COATED ORAL at 21:15

## 2021-03-23 RX ADMIN — DOXYCYCLINE 100 MG: 100 CAPSULE ORAL at 21:15

## 2021-03-23 RX ADMIN — FAMOTIDINE 20 MG: 20 TABLET ORAL at 18:28

## 2021-03-23 RX ADMIN — PANTOPRAZOLE SODIUM 40 MG: 40 TABLET, DELAYED RELEASE ORAL at 05:20

## 2021-03-23 RX ADMIN — ZINC SULFATE 220 MG (50 MG) CAPSULE 220 MG: CAPSULE at 08:47

## 2021-03-23 RX ADMIN — DEXAMETHASONE SODIUM PHOSPHATE 8.08 MG: 4 INJECTION INTRA-ARTICULAR; INTRALESIONAL; INTRAMUSCULAR; INTRAVENOUS; SOFT TISSUE at 21:15

## 2021-03-23 RX ADMIN — OXYCODONE HYDROCHLORIDE AND ACETAMINOPHEN 1000 MG: 500 TABLET ORAL at 21:15

## 2021-03-23 RX ADMIN — OXYCODONE HYDROCHLORIDE AND ACETAMINOPHEN 1000 MG: 500 TABLET ORAL at 08:47

## 2021-03-23 RX ADMIN — ENOXAPARIN SODIUM 70 MG: 80 INJECTION SUBCUTANEOUS at 21:14

## 2021-03-23 RX ADMIN — INSULIN LISPRO 6 UNITS: 100 INJECTION, SOLUTION INTRAVENOUS; SUBCUTANEOUS at 08:49

## 2021-03-23 RX ADMIN — VENLAFAXINE HYDROCHLORIDE 37.5 MG: 37.5 CAPSULE, EXTENDED RELEASE ORAL at 08:47

## 2021-03-23 RX ADMIN — GABAPENTIN 300 MG: 300 CAPSULE ORAL at 21:15

## 2021-03-23 RX ADMIN — CEFTRIAXONE 1000 MG: 1 INJECTION, POWDER, FOR SOLUTION INTRAMUSCULAR; INTRAVENOUS at 08:48

## 2021-03-23 RX ADMIN — INSULIN LISPRO 8 UNITS: 100 INJECTION, SOLUTION INTRAVENOUS; SUBCUTANEOUS at 11:51

## 2021-03-23 RX ADMIN — CLONIDINE HYDROCHLORIDE 0.1 MG: 0.1 TABLET ORAL at 08:47

## 2021-03-23 RX ADMIN — DOXYCYCLINE 100 MG: 100 CAPSULE ORAL at 08:47

## 2021-03-23 RX ADMIN — INSULIN LISPRO 2 UNITS: 100 INJECTION, SOLUTION INTRAVENOUS; SUBCUTANEOUS at 21:14

## 2021-03-23 RX ADMIN — LORATADINE 10 MG: 10 TABLET ORAL at 08:48

## 2021-03-23 RX ADMIN — Medication 2000 UNITS: at 08:47

## 2021-03-23 RX ADMIN — METOPROLOL SUCCINATE 25 MG: 25 TABLET, EXTENDED RELEASE ORAL at 08:47

## 2021-03-23 RX ADMIN — GABAPENTIN 300 MG: 300 CAPSULE ORAL at 08:47

## 2021-03-23 RX ADMIN — INSULIN GLARGINE 40 UNITS: 100 INJECTION, SOLUTION SUBCUTANEOUS at 21:14

## 2021-03-23 RX ADMIN — INSULIN LISPRO 6 UNITS: 100 INJECTION, SOLUTION INTRAVENOUS; SUBCUTANEOUS at 18:31

## 2021-03-23 RX ADMIN — DIVALPROEX SODIUM 500 MG: 500 TABLET, DELAYED RELEASE ORAL at 21:15

## 2021-03-23 RX ADMIN — TRAZODONE HYDROCHLORIDE 100 MG: 100 TABLET ORAL at 21:15

## 2021-03-23 RX ADMIN — LEVOTHYROXINE SODIUM 25 MCG: 25 TABLET ORAL at 05:20

## 2021-03-23 RX ADMIN — ENOXAPARIN SODIUM 70 MG: 80 INJECTION SUBCUTANEOUS at 08:47

## 2021-03-23 RX ADMIN — GABAPENTIN 300 MG: 300 CAPSULE ORAL at 18:28

## 2021-03-23 NOTE — PROGRESS NOTES
Progress Note - Pulmonary   Linnette Rash 38 y o  male MRN: [id number]  Unit/Bed#: S -01 Encounter: [id number]    Assessment:  Acute hypoxic respiratory failure  COVID-19 infection  Superimposed bacterial pneumonia  ENRIKE    Plan:  Patient requiring 6 L nasal cannula  Continue to monitor SpO2 and titrate to maintain saturations greater than 92 %  Continue treatment per pathway  Vitamin-C, D, zinc, multivitamin  Atorvastatin, famotidine  Dexamethasone 0 1 milligram/kilogram IV x 10 days  Remdesivir x5 days completed  Received Actemra  Anticoagulation:  1 milligram/kilogram per protocol  Antibiotics:  Day 7 of 7 Rocephin/doxycycline  Encourage self-proning, activity as tolerated, incentive spirometry  Ongoing monitoring of inflammatory markers and D-dimer  Continue nocturnal CPAP    Will need outpatient pulmonary follow-up including repeat sleep study for qualification for new CPAP machine    Subjective:   Patient states that his breathing feels much better today  He denies significant cough or congestion  No new complaints other than poor night's rest last evening  12 point review of systems otherwise negative  Objective:     Vitals: Blood pressure 130/76, pulse (!) 53, temperature 98 °F (36 7 °C), temperature source Oral, resp  rate 18, height 5' 5" (1 651 m), weight 74 kg (163 lb 2 3 oz), SpO2 93 %  ,Body mass index is 27 15 kg/m²  Intake/Output Summary (Last 24 hours) at 3/23/2021 1016  Last data filed at 3/23/2021 0858  Gross per 24 hour   Intake 240 ml   Output 2850 ml   Net -2610 ml       Invasive Devices     Peripheral Intravenous Line            Peripheral IV 03/21/21 Dorsal (posterior); Right Hand 1 day                Physical Exam:   General appearance: alert and oriented, in no acute distress  Head: Normocephalic, without obvious abnormality, atraumatic  Eyes: EOMI  No discharge bilaterally  No scleral icterus     Neck: supple, symmetrical, trachea midline  Lungs:  Decreased at the bases bilaterally, lungs otherwise clear  Heart:  Bradycardia  Abdomen:  No appreciable distension or tenderness  Extremities: No edema or tenderness  Skin: No lesions or pallor noted  No rash  Neurologic: Grossly normal     Labs: I have personally reviewed pertinent lab results  Imaging and other studies: I have personally reviewed pertinent reports

## 2021-03-23 NOTE — ASSESSMENT & PLAN NOTE
· Blood mixed with stool, has a chronic hx, was straining at the time  Likely hemorrhoidal and has been considered such in the past  Had seen GI recently and plan was to schedule for EGD/colonoscopy in the future     · No indication for acute EGD and colonoscopy in this COVID positive patient was stable hemoglobin defer to outpatient  · H/H remains stable, has not had recurring bpr  · Close monitor as on therapeutic lovenox

## 2021-03-23 NOTE — ASSESSMENT & PLAN NOTE
· Secondary to COVID-19 pneumonia  · Elevated d-dimer, on therapeutic lovenox    Elevation likely secondary to hypercoag state associated with COVID pna  · Slowly improving  · Oxygen level down to 6 liters/minute per discussion with nursing

## 2021-03-23 NOTE — PLAN OF CARE
Problem: Nutrition/Hydration-ADULT  Goal: Nutrient/Hydration intake appropriate for improving, restoring or maintaining nutritional needs  Description: Monitor and assess patient's nutrition/hydration status for malnutrition  Collaborate with interdisciplinary team and initiate plan and interventions as ordered  Monitor patient's weight and dietary intake as ordered or per policy  Utilize nutrition screening tool and intervene as necessary  Determine patient's food preferences and provide high-protein, high-caloric foods as appropriate       INTERVENTIONS:  - Monitor oral intake, urinary output, labs, and treatment plans  - Assess nutrition and hydration status and recommend course of action  - Evaluate amount of meals eaten  - Assist patient with eating if necessary   - Allow adequate time for meals  - Recommend/ encourage appropriate diets, oral nutritional supplements, and vitamin/mineral supplements  - Order, calculate, and assess calorie counts as needed  - Recommend, monitor, and adjust tube feedings and TPN/PPN based on assessed needs  - Assess need for intravenous fluids  - Provide specific nutrition/hydration education as appropriate  - Include patient/family/caregiver in decisions related to nutrition  Outcome: Progressing     Problem: PAIN - ADULT  Goal: Verbalizes/displays adequate comfort level or baseline comfort level  Description: Interventions:  - Encourage patient to monitor pain and request assistance  - Assess pain using appropriate pain scale  - Administer analgesics based on type and severity of pain and evaluate response  - Implement non-pharmacological measures as appropriate and evaluate response  - Consider cultural and social influences on pain and pain management  - Notify physician/advanced practitioner if interventions unsuccessful or patient reports new pain  Outcome: Progressing     Problem: INFECTION - ADULT  Goal: Absence or prevention of progression during hospitalization  Description: INTERVENTIONS:  - Assess and monitor for signs and symptoms of infection  - Monitor lab/diagnostic results  - Monitor all insertion sites, i e  indwelling lines, tubes, and drains  - [place] appropriate cooling/warming therapies per order  - Administer medications as ordered  - Instruct and encourage patient and family to use good hand hygiene technique  - Identify and instruct in appropriate isolation precautions for identified infection/condition  Outcome: Progressing     Problem: SAFETY ADULT  Goal: Patient will remain free of falls  Description: INTERVENTIONS:  - Assess patient frequently for physical needs  -  Identify cognitive and physical deficits and behaviors that affect risk of falls    -  [place] fall precautions as indicated by assessment   - Educate patient/family on patient safety including physical limitations  - Instruct patient to call for assistance with activity based on assessment  - Modify environment to reduce risk of injury  - Consider OT/PT consult to assist with strengthening/mobility  Outcome: Progressing  Goal: Maintain or return to baseline ADL function  Description: INTERVENTIONS:  -  Assess patient's ability to carry out ADLs; assess patient's baseline for ADL function and identify physical deficits which impact ability to perform ADLs (bathing, care of mouth/teeth, toileting, grooming, dressing, etc )  - Assess/evaluate cause of self-care deficits   - Assess range of motion  - Assess patient's mobility; develop plan if impaired  - Assess patient's need for assistive devices and provide as appropriate  - Encourage maximum independence but intervene and supervise when necessary  - Involve family in performance of ADLs  - Assess for home care needs following discharge   - Consider OT consult to assist with ADL evaluation and planning for discharge  - Provide patient education as appropriate  Outcome: Progressing  Goal: Maintain or return mobility status to optimal level  Description: INTERVENTIONS:  - Assess patient's baseline mobility status (ambulation, transfers, stairs, etc )    - Identify cognitive and physical deficits and behaviors that affect mobility  - Identify mobility aids required to assist with transfers and/or ambulation (gait belt, sit-to-stand, lift, walker, cane, etc )  - [place] fall precautions as indicated by assessment  - Record patient progress and toleration of activity level on Mobility SBAR; progress patient to next Phase/Stage  - Instruct patient to call for assistance with activity based on assessment  - Consider rehabilitation consult to assist with strengthening/weightbearing, etc   Outcome: Progressing     Problem: DISCHARGE PLANNING  Goal: Discharge to home or other facility with appropriate resources  Description: INTERVENTIONS:  - Identify barriers to discharge w/patient and caregiver  - Arrange for needed discharge resources and transportation as appropriate  - Identify discharge learning needs (meds, wound care, etc )  - Arrange for interpretive services to assist at discharge as needed  - Refer to Case Management Department for coordinating discharge planning if the patient needs post-hospital services based on physician/advanced practitioner order or complex needs related to functional status, cognitive ability, or social support system  Outcome: Progressing     Problem: Knowledge Deficit  Goal: Patient/family/caregiver demonstrates understanding of disease process, treatment plan, medications, and discharge instructions  Description: Complete learning assessment and assess knowledge base    Interventions:  - Provide teaching at level of understanding  - Provide teaching via preferred learning methods  Outcome: Progressing     Problem: RESPIRATORY - ADULT  Goal: Achieves optimal ventilation and oxygenation  Description: INTERVENTIONS:  - Assess for changes in respiratory status  - Assess for changes in mentation and behavior  - Position to facilitate oxygenation and minimize respiratory effort  - Oxygen administered by appropriate delivery if ordered  - Encourage broncho-pulmonary hygiene including cough, deep breathe, Incentive Spirometry  - Assess and instruct to report SOB or any respiratory difficulty  - Respiratory Therapy support as indicated  Outcome: Progressing     Problem: Potential for Falls  Goal: Patient will remain free of falls  Description: INTERVENTIONS:  - Assess patient frequently for physical needs  -  Identify cognitive and physical deficits and behaviors that affect risk of falls    -  [place] fall precautions as indicated by assessment   - Educate patient/family on patient safety including physical limitations  - Instruct patient to call for assistance with activity based on assessment  - Modify environment to reduce risk of injury  - Consider OT/PT consult to assist with strengthening/mobility  Outcome: Progressing

## 2021-03-23 NOTE — PROGRESS NOTES
Pt with improved intake, says he has a good appetite and feels hungry  Marina Mejias, is currently order TID, will decrease to daily and consider complete d/c at follow up  Will order double protein portions and maintain CCD 1 to help meet estimated needs with high POC BS levels/steroid use

## 2021-03-23 NOTE — PROGRESS NOTES
[address]   Progress Note - Bere Skinner 1985, 38 y o  male MRN: [id number]  Unit/Bed#: S -[facility] Antonio Encounter: [id number]  Primary Care Provider: Andrae Mendoza MD   Date and time admitted to hospital: 3/17/2021 11:24 AM    * Acute respiratory failure with hypoxia ([address] )  Assessment & Plan  · Secondary to COVID-19 pneumonia  · Elevated d-dimer, on therapeutic lovenox  Elevation likely secondary to hypercoag state associated with COVID pna  · Slowly improving  · Oxygen level down to 6 liters/minute per discussion with nursing    COVID-19  Assessment & Plan  · COVID-19 pneumonia with superimposed bacterial infection given elevated procalcitonin  COVID positive on March 9  · Pulmonary following  · Cont remdesivir day 5/5, high-dose dexamethasone day 7/10  · Received Actemra x 1 (3/18)  · Antibiotic treatment with ceftriaxone and doxycycline as per pulm recommending 7days  · Therapeutic lovenox, trending down of d-dimer, repeat for tomorrow  · Continue mid flow and wean as tolerated        Type 2 diabetes mellitus without complication, with long-term current use of insulin [facility])  Assessment & Plan  Lab Results   Component Value Date    HGBA1C 7 8 (H) 10/27/2020       Recent Labs     03/22/21  1042 03/22/21  1508 03/22/21  2115 03/23/21  0802   POCGLU 285* 293* 238* 260*       Blood Sugar Average: Last 72 hrs:  (P) 266   On high-dose dexamethasone   · Increase Lantus today    Transaminitis  Assessment & Plan  · Trending down  · Patient off remdesivir  · Repeat in a m  Blood per rectum  Assessment & Plan  · Blood mixed with stool, has a chronic hx, was straining at the time  Likely hemorrhoidal and has been considered such in the past  Had seen GI recently and plan was to schedule for EGD/colonoscopy in the future     · No indication for acute EGD and colonoscopy in this COVID positive patient was stable hemoglobin defer to outpatient  · H/H remains stable, has not had recurring bpr  · Close monitor as on therapeutic lovenox    Bradycardic baseline fetal heart rate  Assessment & Plan  · Asymptomatic   · Holding parameters placed on metoprolol, lowered to 25mg  · Hx of hypothyroidism, thus checked tsh, in euthyroid state      VTE Pharmacologic Prophylaxis:   Pharmacologic: Enoxaparin (Lovenox)  Mechanical VTE Prophylaxis in Place: No    Patient Centered Rounds: I have performed bedside rounds with nursing staff today  Education and Discussions with Family / Patient:  Patient seen examined while wife on the phone via face time    Time Spent for Care: 30 minutes  More than 50% of total time spent on counseling and coordination of care as described above  Current Length of Stay: 6 day(s)    Current Patient Status: Inpatient   Certification Statement: The patient will continue to require additional inpatient hospital stay due to Need for continue high-dose steroids for COVID    Discharge Plan:  Possible discharge in 48 hours    Code Status: Level 1 - Full Code      Subjective:   Patient comfortable anxious to go home    Objective:     Vitals:   Temp (24hrs), Av 1 °F (36 7 °C), Min:98 °F (36 7 °C), Max:98 3 °F (36 8 °C)    Temp:  [98 °F (36 7 °C)-98 3 °F (36 8 °C)] 98 °F (36 7 °C)  HR:  [53-60] 53  Resp:  [18-20] 18  BP: (107-139)/(62-83) 130/76  SpO2:  [90 %-93 %] 93 %  Body mass index is 27 15 kg/m²  Input and Output Summary (last 24 hours): Intake/Output Summary (Last 24 hours) at 3/23/2021 0834  Last data filed at 3/23/2021 0601  Gross per 24 hour   Intake 600 ml   Output 2550 ml   Net -1950 ml       Physical Exam:     Physical Exam  Constitutional:       Appearance: He is not toxic-appearing or diaphoretic  Eyes:      General: No scleral icterus  Right eye: No discharge  Left eye: No discharge  Cardiovascular:      Rate and Rhythm: Normal rate and regular rhythm  Heart sounds: No murmur  No gallop      Pulmonary:      Effort: Pulmonary effort is normal  No respiratory distress  Breath sounds: Normal breath sounds  No wheezing, rhonchi or rales  Abdominal:      General: There is no distension  Palpations: Abdomen is soft  Tenderness: There is no abdominal tenderness  There is no guarding or rebound  Skin:     General: Skin is warm  Coloration: Skin is not jaundiced  Neurological:      General: No focal deficit present  Mental Status: He is oriented to person, place, and time  Psychiatric:         Mood and Affect: Mood normal          Behavior: Behavior normal          Additional Data:     Labs:    Results from last 7 days   Lab Units 03/23/21  0509  03/18/21  0537   WBC Thousand/uL 12 93*   < > 5 80   HEMOGLOBIN g/dL 14 1   < > 12 4   HEMATOCRIT % 41 4   < > 37 1   PLATELETS Thousands/uL 390   < > 237   BANDS PCT %  --   --  9*   LYMPHO PCT %  --   --  9*   MONO PCT %  --   --  6   EOS PCT %  --   --  0    < > = values in this interval not displayed  Results from last 7 days   Lab Units 03/23/21  0509   SODIUM mmol/L 133*   POTASSIUM mmol/L 5 0   CHLORIDE mmol/L 100   CO2 mmol/L 27   BUN mg/dL 23   CREATININE mg/dL 0 86   ANION GAP mmol/L 6   CALCIUM mg/dL 8 6   ALBUMIN g/dL 2 4*   TOTAL BILIRUBIN mg/dL 0 60   ALK PHOS U/L 118*   ALT U/L 196*   AST U/L 36   GLUCOSE RANDOM mg/dL 302*     Results from last 7 days   Lab Units 03/19/21  1624   INR  1 13     Results from last 7 days   Lab Units 03/23/21  0802 03/22/21  2115 03/22/21  1508 03/22/21  1042 03/22/21  0734 03/21/21  2233 03/21/21  2125 03/21/21  1544 03/21/21  1140 03/21/21  0712 03/20/21  2122 03/20/21  1633   POC GLUCOSE mg/dl 260* 238* 293* 285* 216* 306* 273* 297* 290* 251* 204* 271*         Results from last 7 days   Lab Units 03/20/21  0535 03/19/21  0602 03/18/21  0537 03/17/21  1420 03/17/21  1134   LACTIC ACID mmol/L  --   --   --  1 0 2 4*   PROCALCITONIN ng/ml 0 46* 0 99* 1 75*  --  3 17*           * I Have Reviewed All Lab Data Listed Above    * Additional Pertinent Lab Tests Reviewed: All Labs Within Last 24 Hours Reviewed    Imaging:    Imaging Reports Reviewed Today Include:   Imaging Personally Reviewed by Myself Includes:     Recent Cultures (last 7 days):     Results from last 7 days   Lab Units 03/17/21  1145 03/17/21  1134   BLOOD CULTURE  No Growth After 5 Days  No Growth After 5 Days         Last 24 Hours Medication List:   Current Facility-Administered Medications   Medication Dose Route Frequency Provider Last Rate    acetaminophen  650 mg Oral Q6H PRN Carola Hdz MD      ALPRAZolam  0 5 mg Oral TID PRN Carola Hdz MD      ascorbic acid  1,000 mg Oral Q12H [facility] Carola Hdz MD      atorvastatin  40 mg Oral HS Carola Hdz MD      cefTRIAXone  1,000 mg Intravenous Q24H Carola Hdz MD 1,000 mg (03/22/21 0948)    cholecalciferol  2,000 Units Oral Daily Carola Hdz MD      cloNIDine  0 1 mg Oral Daily Carola Hdz MD      dexamethasone  0 1 mg/kg Intravenous Q12H [facility] IGNACIO Guillen      dicyclomine  20 mg Oral Q6H PRN Carola Hdz MD      divalproex sodium  500 mg Oral Q12H [facility] Carola Hdz MD      doxycycline hyclate  100 mg Oral Q12H Carola Hdz MD      enoxaparin  1 mg/kg Subcutaneous [facility]      famotidine  20 mg Oral BID Carola Hdz MD      gabapentin  300 mg Oral TID Caorla Hdz MD      insulin glargine  40 Units Subcutaneous HS [facility], DO      insulin lispro  1-5 Units Subcutaneous HS Major Herder, DO      insulin lispro  15 Units Subcutaneous TID With Meals [facility], DO      insulin lispro  2-12 Units Subcutaneous TID AC Major Herder, DO      levothyroxine  25 mcg Oral Early Morning Carola Hdz MD      loratadine  10 mg Oral Daily Carola Hdz MD      metoprolol succinate  25 mg Oral Daily Major Herder, DO      zinc sulfate  220 mg Oral Daily Carola Hdz MD      Followed by   Aleida Betancur ON 3/25/2021] multivitamin-minerals  1 tablet Oral Daily María العلي MD      pantoprazole  40 mg Oral Early Morning María العلي MD      polyethylene glycol  17 g Oral Daily PRN Willow Diamond,       traZODone  100 mg Oral HS María العلي MD      venlafaxine  37 5 mg Oral Daily María العلي MD          Today, Patient Was Seen By: Laquita Cavazos MD    ** Please Note: Dictation voice to text software may have been used in the creation of this document   **

## 2021-03-23 NOTE — ASSESSMENT & PLAN NOTE
Lab Results   Component Value Date    HGBA1C 7 8 (H) 10/27/2020       Recent Labs     03/22/21  1042 03/22/21  1508 03/22/21  2115 03/23/21  0802   POCGLU 285* 293* 238* 260*       Blood Sugar Average: Last 72 hrs:  (P) 266   On high-dose dexamethasone   · Increase Lantus today

## 2021-03-23 NOTE — ASSESSMENT & PLAN NOTE
· Asymptomatic   · Holding parameters placed on metoprolol, lowered to 25mg  · Hx of hypothyroidism, thus checked tsh, in euthyroid state

## 2021-03-23 NOTE — ASSESSMENT & PLAN NOTE
· COVID-19 pneumonia with superimposed bacterial infection given elevated procalcitonin   COVID positive on March 9  · Pulmonary following  · Cont remdesivir day 5/5, high-dose dexamethasone day 7/10  · Received Actemra x 1 (3/18)  · Antibiotic treatment with ceftriaxone and doxycycline as per pulm recommending 7days  · Therapeutic lovenox, trending down of d-dimer, repeat for tomorrow  · Continue mid flow and wean as tolerated

## 2021-03-23 NOTE — CASE MANAGEMENT
LOS 6, patient is not a bundle, patient is not a 30 day readmission  Unplanned Readmission Risk Score is 11    Patient is Covid-19+  CM spoke to patient via his room telephone  CM name and role introduced  Patient reports the following: he lives with his wife, 4 kids and 2 dogs, is mostly independent with his ADLs but gets assistance from spouse as needed  Spouse provides transportation  Fills prescriptions is [employer]  Patient is on disability through the [place] and is retired   DME includes a shower chair and a CPAP  Patient inquires about a replacement CPAP, he states that he had a sleep study through the [place] about 10 years ago and has been using this CPAP for about 10 years  CM called the [place] at [phone] and spoke to Seema at the [place] who reports that she will put this request in to the patient's primary provider at the [place] and will requests that the primary call CM with any updates or questions  Patient denies hx of home healthcare, denies hx of STR; patient does go to Outpatient PT with Adil[facility] Reich on [address] but given his Covid-19 infection, patient requests home healthcare PT  [facility] of choice provided and patient chooses [facility]  Referral to [facility] placed in [facility]  Awaiting response from [facility]  Denies D&A, reports history of PTSD and currently gets treatment through the [place] and [facility] in [place]  Patient is currently on Acute O2  Patient will likely need Home Respiratory Eval prior to discharge  Patient does not identify a preference for DME companies if he needs Home O2  Spouse to drive home  CM will continue to follow patient's hospital course and assist through discharge      CM reviewed discharge planning process including the following: identifying caregivers at home, preference for d/c planning needs, availability of treatment team to discuss questions or concerns patient and/or family may have regarding diagnosis, plan of care, old or new medications and discharge planning  CM will continue to follow for care coordination and update assessment as necessary

## 2021-03-24 LAB
GLUCOSE SERPL-MCNC: 264 MG/DL (ref 65–140)
GLUCOSE SERPL-MCNC: 293 MG/DL (ref 65–140)
GLUCOSE SERPL-MCNC: 373 MG/DL (ref 65–140)
GLUCOSE SERPL-MCNC: 455 MG/DL (ref 65–140)

## 2021-03-24 PROCEDURE — 99232 SBSQ HOSP IP/OBS MODERATE 35: CPT | Performed by: INTERNAL MEDICINE

## 2021-03-24 PROCEDURE — 82948 REAGENT STRIP/BLOOD GLUCOSE: CPT

## 2021-03-24 PROCEDURE — 94760 N-INVAS EAR/PLS OXIMETRY 1: CPT

## 2021-03-24 PROCEDURE — 94660 CPAP INITIATION&MGMT: CPT

## 2021-03-24 RX ORDER — SERTRALINE HYDROCHLORIDE 20 MG/ML
25 SOLUTION ORAL DAILY
Status: DISCONTINUED | OUTPATIENT
Start: 2021-03-24 | End: 2021-03-24

## 2021-03-24 RX ORDER — INSULIN GLARGINE 100 [IU]/ML
45 INJECTION, SOLUTION SUBCUTANEOUS
Status: DISCONTINUED | OUTPATIENT
Start: 2021-03-24 | End: 2021-03-25 | Stop reason: HOSPADM

## 2021-03-24 RX ORDER — SERTRALINE HYDROCHLORIDE 25 MG/1
25 TABLET, FILM COATED ORAL DAILY
Status: DISCONTINUED | OUTPATIENT
Start: 2021-03-24 | End: 2021-03-25 | Stop reason: HOSPADM

## 2021-03-24 RX ORDER — DEXAMETHASONE SODIUM PHOSPHATE 4 MG/ML
8 INJECTION, SOLUTION INTRA-ARTICULAR; INTRALESIONAL; INTRAMUSCULAR; INTRAVENOUS; SOFT TISSUE DAILY
Status: DISCONTINUED | OUTPATIENT
Start: 2021-03-24 | End: 2021-03-25 | Stop reason: HOSPADM

## 2021-03-24 RX ADMIN — CEFTRIAXONE 1000 MG: 1 INJECTION, POWDER, FOR SOLUTION INTRAMUSCULAR; INTRAVENOUS at 09:29

## 2021-03-24 RX ADMIN — TRAZODONE HYDROCHLORIDE 100 MG: 100 TABLET ORAL at 22:04

## 2021-03-24 RX ADMIN — ZINC SULFATE 220 MG (50 MG) CAPSULE 220 MG: CAPSULE at 08:28

## 2021-03-24 RX ADMIN — DEXAMETHASONE SODIUM PHOSPHATE 8.08 MG: 4 INJECTION INTRA-ARTICULAR; INTRALESIONAL; INTRAMUSCULAR; INTRAVENOUS; SOFT TISSUE at 08:29

## 2021-03-24 RX ADMIN — SERTRALINE HYDROCHLORIDE 25 MG: 25 TABLET ORAL at 09:29

## 2021-03-24 RX ADMIN — LORATADINE 10 MG: 10 TABLET ORAL at 08:28

## 2021-03-24 RX ADMIN — LEVOTHYROXINE SODIUM 25 MCG: 25 TABLET ORAL at 05:03

## 2021-03-24 RX ADMIN — GABAPENTIN 300 MG: 300 CAPSULE ORAL at 16:50

## 2021-03-24 RX ADMIN — INSULIN LISPRO 6 UNITS: 100 INJECTION, SOLUTION INTRAVENOUS; SUBCUTANEOUS at 08:21

## 2021-03-24 RX ADMIN — ENOXAPARIN SODIUM 70 MG: 80 INJECTION SUBCUTANEOUS at 22:04

## 2021-03-24 RX ADMIN — GABAPENTIN 300 MG: 300 CAPSULE ORAL at 22:05

## 2021-03-24 RX ADMIN — GABAPENTIN 300 MG: 300 CAPSULE ORAL at 08:27

## 2021-03-24 RX ADMIN — METOPROLOL SUCCINATE 25 MG: 25 TABLET, EXTENDED RELEASE ORAL at 08:27

## 2021-03-24 RX ADMIN — DIVALPROEX SODIUM 500 MG: 500 TABLET, DELAYED RELEASE ORAL at 08:28

## 2021-03-24 RX ADMIN — FAMOTIDINE 20 MG: 20 TABLET ORAL at 16:50

## 2021-03-24 RX ADMIN — DOXYCYCLINE 100 MG: 100 CAPSULE ORAL at 22:04

## 2021-03-24 RX ADMIN — PANTOPRAZOLE SODIUM 40 MG: 40 TABLET, DELAYED RELEASE ORAL at 05:03

## 2021-03-24 RX ADMIN — DEXAMETHASONE SODIUM PHOSPHATE 8.08 MG: 4 INJECTION INTRA-ARTICULAR; INTRALESIONAL; INTRAMUSCULAR; INTRAVENOUS; SOFT TISSUE at 22:03

## 2021-03-24 RX ADMIN — FAMOTIDINE 20 MG: 20 TABLET ORAL at 08:27

## 2021-03-24 RX ADMIN — ATORVASTATIN CALCIUM 40 MG: 40 TABLET, FILM COATED ORAL at 22:03

## 2021-03-24 RX ADMIN — INSULIN LISPRO 6 UNITS: 100 INJECTION, SOLUTION INTRAVENOUS; SUBCUTANEOUS at 16:50

## 2021-03-24 RX ADMIN — DIVALPROEX SODIUM 500 MG: 500 TABLET, DELAYED RELEASE ORAL at 22:03

## 2021-03-24 RX ADMIN — INSULIN LISPRO 5 UNITS: 100 INJECTION, SOLUTION INTRAVENOUS; SUBCUTANEOUS at 22:10

## 2021-03-24 RX ADMIN — Medication 2000 UNITS: at 08:27

## 2021-03-24 RX ADMIN — ENOXAPARIN SODIUM 70 MG: 80 INJECTION SUBCUTANEOUS at 08:22

## 2021-03-24 RX ADMIN — DEXAMETHASONE SODIUM PHOSPHATE 8 MG: 4 INJECTION INTRA-ARTICULAR; INTRALESIONAL; INTRAMUSCULAR; INTRAVENOUS; SOFT TISSUE at 16:50

## 2021-03-24 RX ADMIN — OXYCODONE HYDROCHLORIDE AND ACETAMINOPHEN 1000 MG: 500 TABLET ORAL at 08:27

## 2021-03-24 RX ADMIN — INSULIN LISPRO 10 UNITS: 100 INJECTION, SOLUTION INTRAVENOUS; SUBCUTANEOUS at 12:39

## 2021-03-24 RX ADMIN — DOXYCYCLINE 100 MG: 100 CAPSULE ORAL at 08:27

## 2021-03-24 RX ADMIN — INSULIN GLARGINE 45 UNITS: 100 INJECTION, SOLUTION SUBCUTANEOUS at 22:03

## 2021-03-24 NOTE — PROGRESS NOTES
[facility]  Progress Note - Selvin Jumper 1985, 38 y o  male MRN: [id number]  Unit/Bed#: S -01 Encounter: [id number]  Primary Care Provider: Parminder Cook MD   Date and time admitted to hospital: 3/17/2021 11:24 AM    * Acute respiratory failure with hypoxia ([address] )  Assessment & Plan  · Secondary to COVID-19 pneumonia  · Elevated d-dimer, on therapeutic lovenox  Elevation likely secondary to hypercoag state associated with COVID pna  · Slowly improving  · Remains on 6 L per nasal cannula  · Patient is upset over need to stay in the hospital and very anxious to go home    COVID-19  Assessment & Plan  · COVID-19 pneumonia with superimposed bacterial infection given elevated procalcitonin  COVID positive on March 9  · Pulmonary following  · Cont remdesivir day 5/5, high-dose dexamethasone day 7/10  · Received Actemra x 1 (3/18)  · Antibiotic treatment with ceftriaxone and doxycycline as per pulm recommending 7days  · Therapeutic lovenox, trending down of d-dimer, repeat for tomorrow  · Continue mid flow and wean as tolerated        Type 2 diabetes mellitus without complication, with long-term current use of insulin [facility])  Assessment & Plan  Lab Results   Component Value Date    HGBA1C 7 8 (H) 10/27/2020       Recent Labs     03/23/21  1139 03/23/21  1718 03/23/21  2112 03/24/21  0810   POCGLU 308* 264* 269* 264*       Blood Sugar Average: Last 72 hrs:  (P) 272 [id number]   On high-dose dexamethasone   · Increase Lantus again today    Transaminitis  Assessment & Plan  · Trending down  · Patient off remdesivir  · Repeat in a m  VTE Pharmacologic Prophylaxis:   Pharmacologic: Enoxaparin (Lovenox)  Mechanical VTE Prophylaxis in Place: No    Patient Centered Rounds: I have performed bedside rounds with nursing staff today      Discussions with Specialists or Other Care Team Provider:  Pulmonary    Education and Discussions with Family / Patient:  Case discussed with patient's wife    Time Spent for Care: 30 minutes  More than 50% of total time spent on counseling and coordination of care as described above  Current Length of Stay: 7 day(s)    Current Patient Status: Inpatient   Certification Statement: The patient will continue to require additional inpatient hospital stay due to Need for continue IV steroids    Discharge Plan:  Dependent on oxygen requirements    Code Status: Level 1 - Full Code      Subjective:   Patient upset that he still remains in the hospital and is anxious to go home    Objective:     Vitals:   Temp (24hrs), Av 2 °F (36 8 °C), Min:98 °F (36 7 °C), Max:98 6 °F (37 °C)    Temp:  [98 °F (36 7 °C)-98 6 °F (37 °C)] 98 6 °F (37 °C)  HR:  [57-66] 57  Resp:  [18] 18  BP: (111-134)/(60-78) 126/78  SpO2:  [87 %-94 %] 93 %  Body mass index is 27 26 kg/m²  Input and Output Summary (last 24 hours): Intake/Output Summary (Last 24 hours) at 3/24/2021 1118  Last data filed at 3/24/2021 0934  Gross per 24 hour   Intake 960 ml   Output 1500 ml   Net -540 ml       Physical Exam:     Physical Exam  HENT:      Head: Normocephalic and atraumatic  Cardiovascular:      Rate and Rhythm: Normal rate and regular rhythm  Pulses: Normal pulses  Heart sounds: No murmur  No gallop  Pulmonary:      Effort: Pulmonary effort is normal  No respiratory distress  Breath sounds: Normal breath sounds  No wheezing or rales  Abdominal:      General: There is no distension  Palpations: Abdomen is soft  Tenderness: There is no abdominal tenderness  There is no guarding or rebound  Neurological:      Mental Status: He is oriented to person, place, and time           Additional Data:     Labs:    Results from last 7 days   Lab Units 21  0509  21  0537   WBC Thousand/uL 12 93*   < > 5 80   HEMOGLOBIN g/dL 14 1   < > 12 4   HEMATOCRIT % 41 4   < > 37 1   PLATELETS Thousands/uL 390   < > 237   BANDS PCT %  --   --  9*   LYMPHO PCT %  -- --  9*   MONO PCT %  --   --  6   EOS PCT %  --   --  0    < > = values in this interval not displayed  Results from last 7 days   Lab Units 03/23/21  0509   SODIUM mmol/L 133*   POTASSIUM mmol/L 5 0   CHLORIDE mmol/L 100   CO2 mmol/L 27   BUN mg/dL 23   CREATININE mg/dL 0 86   ANION GAP mmol/L 6   CALCIUM mg/dL 8 6   ALBUMIN g/dL 2 4*   TOTAL BILIRUBIN mg/dL 0 60   ALK PHOS U/L 118*   ALT U/L 196*   AST U/L 36   GLUCOSE RANDOM mg/dL 302*     Results from last 7 days   Lab Units 03/19/21  1624   INR  1 13     Results from last 7 days   Lab Units 03/24/21  0810 03/23/21  2112 03/23/21  1718 03/23/21  1139 03/23/21  0802 03/22/21  2115 03/22/21  1508 03/22/21  1042 03/22/21  0734 03/21/21  2233 03/21/21  2125 03/21/21  1544   POC GLUCOSE mg/dl 264* 269* 264* 308* 260* 238* 293* 285* 216* 306* 273* 297*         Results from last 7 days   Lab Units 03/20/21  0535 03/19/21  0602 03/18/21  0537 03/17/21  1420 03/17/21  1134   LACTIC ACID mmol/L  --   --   --  1 0 2 4*   PROCALCITONIN ng/ml 0 46* 0 99* 1 75*  --  3 17*           * I Have Reviewed All Lab Data Listed Above  * Additional Pertinent Lab Tests Reviewed: All Labs Within Last 24 Hours Reviewed    Imaging:    Imaging Reports Reviewed Today Include:   Imaging Personally Reviewed by Myself Includes:      Recent Cultures (last 7 days):     Results from last 7 days   Lab Units 03/17/21  1145 03/17/21  1134   BLOOD CULTURE  No Growth After 5 Days  No Growth After 5 Days         Last 24 Hours Medication List:   Current Facility-Administered Medications   Medication Dose Route Frequency Provider Last Rate    acetaminophen  650 mg Oral Q6H PRN Shae Crespo MD      ALPRAZolam  0 5 mg Oral TID PRN Shae Crespo MD      atorvastatin  40 mg Oral HS Shae Crespo MD      cefTRIAXone  1,000 mg Intravenous Q24H Shae Crespo MD 1,000 mg (03/24/21 0938)    cholecalciferol  2,000 Units Oral Daily Shae Crespo MD      cloNIDine  0 1 mg Oral Daily Jessica Phillips Katie aPlmer MD      dexamethasone  0 1 mg/kg Intravenous Q12H [address] Jones Loud, CRNP      dicyclomine  20 mg Oral Q6H PRN Cherrie Trejo MD      divalproex sodium  500 mg Oral Q12H [address] Cherrie Trejo MD      doxycycline hyclate  100 mg Oral Q12H Cherrie Trejo MD      enoxaparin  1 mg/kg Subcutaneous Q12H [address] Jones Loud, CRNP      famotidine  20 mg Oral BID Cherrie Trejo MD      gabapentin  300 mg Oral TID Cherrie Trejo MD      insulin glargine  45 Units Subcutaneous HS Leonides Brooks MD      insulin lispro  1-5 Units Subcutaneous HS [facility]      insulin lispro  15 Units Subcutaneous TID With Meals [facility] Escobar, DO      insulin lispro  2-12 Units Subcutaneous TID AC [facility] Esc[facility], DO      levothyroxine  25 mcg Oral Early Morning Cherrie Trejo MD      loratadine  10 mg Oral Daily Cherrie Trejo MD      metoprolol succinate  25 mg Oral Daily [facility] Esc[facility], DO      [START ON 3/25/2021] multivitamin-minerals  1 tablet Oral Daily Cherrie Trejo MD      pantoprazole  40 mg Oral Early Morning Cherrie Trejo MD      polyethylene glycol  17 g Oral Daily PRN [facility] Esc[facility], DO      sertraline  25 mg Oral Daily Leonides Brooks MD     [facility] 4[facility]] sertraline  50 mg Oral Daily Leonides Brooks MD      traZODone  100 mg Oral HS Cherrie Trejo MD          Today, Patient Was Seen By: Leonides Brooks MD    ** Please Note: Dictation voice to text software may have been used in the creation of this document   **

## 2021-03-24 NOTE — PROGRESS NOTES
Progress Note - Pulmonary   Sunita Babcock 38 y o  male MRN: [id number]  Unit/Bed#: S -01 Encounter: [id number]    Assessment:  Acute hypoxic respiratory failure   COVID-19 infection  Superimposed bacterial pneumonia   ENRIKE    Plan:  Patient  Has been on 6 L by nasal cannula  I personally titrated patient down to 3 L on my exam today & he maintained saturations in the 90s at rest   Continue to monitor SpO2 and titrate to maintain saturations greater than 91%  Determine O2 needs prior to discharge  Continue treatment per pathway  Vitamin-C, D, zinc, multivitamin  Atorvastatin, famotidine  Dexamethasone 0 1 milligram/kilogram IV x 10 days  Remdesivir x5 days completed  Received Actemra  Anticoagulation:  1 milligram/kilogram per protocol  Antibiotics:  Rocephin/doxycycline 7 days completed  Encourage self-proning, activity as tolerated, incentive spirometry  Ongoing monitoring of inflammatory markers and D-dimer  Continue nocturnal CPAP     Will need outpatient pulmonary follow-up including repeat sleep study for qualification for new CPAP machine    Discussed with SLIM and nursing    Subjective:   Patient denies SOB  He does not like the CPAP mask here in the hospital  He is frustrated because he feels like he is fine and wants to go home  12 point review of systems otherwise negative  Objective:     Vitals: Blood pressure 126/78, pulse 57, temperature 98 6 °F (37 °C), temperature source Oral, resp  rate 18, height 5' 5" (1 651 m), weight 74 3 kg (163 lb 12 8 oz), SpO2 91 %  ,Body mass index is 27 26 kg/m²  Intake/Output Summary (Last 24 hours) at 3/24/2021 1217  Last data filed at 3/24/2021 0934  Gross per 24 hour   Intake 720 ml   Output 1450 ml   Net -730 ml       Invasive Devices     Peripheral Intravenous Line            Peripheral IV 03/21/21 Dorsal (posterior); Right Hand 2 days                Physical Exam:   General appearance: alert and oriented, in no acute distress  Head: Normocephalic, without obvious abnormality, atraumatic  Eyes: EOMI  No discharge bilaterally  No scleral icterus  Neck: supple, symmetrical, trachea midline  Lungs: Clear to ausculation bilaterally  Heart: regular rate and rhythm, S1, S2 normal, no murmur, click, rub or gallop  Abdomen:  No appreciable distension or tenderness  Extremities: No edema or tenderness  Skin: No lesions or pallor noted  No rash  Neurologic: Grossly normal     Labs: I have personally reviewed pertinent lab results  Imaging and other studies: I have personally reviewed pertinent reports

## 2021-03-24 NOTE — PLAN OF CARE
Problem: Nutrition/Hydration-ADULT  Goal: Nutrient/Hydration intake appropriate for improving, restoring or maintaining nutritional needs  Description: Monitor and assess patient's nutrition/hydration status for malnutrition  Collaborate with interdisciplinary team and initiate plan and interventions as ordered  Monitor patient's weight and dietary intake as ordered or per policy  Utilize nutrition screening tool and intervene as necessary  Determine patient's food preferences and provide high-protein, high-caloric foods as appropriate       INTERVENTIONS:  - Monitor oral intake, urinary output, labs, and treatment plans  - Assess nutrition and hydration status and recommend course of action  - Evaluate amount of meals eaten  - Assist patient with eating if necessary   - Allow adequate time for meals  - Recommend/ encourage appropriate diets, oral nutritional supplements, and vitamin/mineral supplements  - Order, calculate, and assess calorie counts as needed  - Recommend, monitor, and adjust tube feedings and TPN/PPN based on assessed needs  - Assess need for intravenous fluids  - Provide specific nutrition/hydration education as appropriate  - Include patient/family/caregiver in decisions related to nutrition  Outcome: Progressing     Problem: PAIN - ADULT  Goal: Verbalizes/displays adequate comfort level or baseline comfort level  Description: Interventions:  - Encourage patient to monitor pain and request assistance  - Assess pain using appropriate pain scale  - Administer analgesics based on type and severity of pain and evaluate response  - Implement non-pharmacological measures as appropriate and evaluate response  - Consider cultural and social influences on pain and pain management  - Notify physician/advanced practitioner if interventions unsuccessful or patient reports new pain  Outcome: Progressing     Problem: INFECTION - ADULT  Goal: Absence or prevention of progression during hospitalization  Description: INTERVENTIONS:  - Assess and monitor for signs and symptoms of infection  - Monitor lab/diagnostic results  - Monitor all insertion sites, i e  indwelling lines, tubes, and drains  - [place] appropriate cooling/warming therapies per order  - Administer medications as ordered  - Instruct and encourage patient and family to use good hand hygiene technique  - Identify and instruct in appropriate isolation precautions for identified infection/condition  Outcome: Progressing     Problem: SAFETY ADULT  Goal: Patient will remain free of falls  Description: INTERVENTIONS:  - Assess patient frequently for physical needs  -  Identify cognitive and physical deficits and behaviors that affect risk of falls    -  [place] fall precautions as indicated by assessment   - Educate patient/family on patient safety including physical limitations  - Instruct patient to call for assistance with activity based on assessment  - Modify environment to reduce risk of injury  - Consider OT/PT consult to assist with strengthening/mobility  Outcome: Progressing  Goal: Maintain or return to baseline ADL function  Description: INTERVENTIONS:  -  Assess patient's ability to carry out ADLs; assess patient's baseline for ADL function and identify physical deficits which impact ability to perform ADLs (bathing, care of mouth/teeth, toileting, grooming, dressing, etc )  - Assess/evaluate cause of self-care deficits   - Assess range of motion  - Assess patient's mobility; develop plan if impaired  - Assess patient's need for assistive devices and provide as appropriate  - Encourage maximum independence but intervene and supervise when necessary  - Involve family in performance of ADLs  - Assess for home care needs following discharge   - Consider OT consult to assist with ADL evaluation and planning for discharge  - Provide patient education as appropriate  Outcome: Progressing  Goal: Maintain or return mobility status to optimal level  Description: INTERVENTIONS:  - Assess patient's baseline mobility status (ambulation, transfers, stairs, etc )    - Identify cognitive and physical deficits and behaviors that affect mobility  - Identify mobility aids required to assist with transfers and/or ambulation (gait belt, sit-to-stand, lift, walker, cane, etc )  - [place] fall precautions as indicated by assessment  - Record patient progress and toleration of activity level on Mobility SBAR; progress patient to next Phase/Stage  - Instruct patient to call for assistance with activity based on assessment  - Consider rehabilitation consult to assist with strengthening/weightbearing, etc   Outcome: Progressing     Problem: DISCHARGE PLANNING  Goal: Discharge to home or other facility with appropriate resources  Description: INTERVENTIONS:  - Identify barriers to discharge w/patient and caregiver  - Arrange for needed discharge resources and transportation as appropriate  - Identify discharge learning needs (meds, wound care, etc )  - Arrange for interpretive services to assist at discharge as needed  - Refer to Case Management Department for coordinating discharge planning if the patient needs post-hospital services based on physician/advanced practitioner order or complex needs related to functional status, cognitive ability, or social support system  Outcome: Progressing     Problem: Knowledge Deficit  Goal: Patient/family/caregiver demonstrates understanding of disease process, treatment plan, medications, and discharge instructions  Description: Complete learning assessment and assess knowledge base    Interventions:  - Provide teaching at level of understanding  - Provide teaching via preferred learning methods  Outcome: Progressing     Problem: RESPIRATORY - ADULT  Goal: Achieves optimal ventilation and oxygenation  Description: INTERVENTIONS:  - Assess for changes in respiratory status  - Assess for changes in mentation and behavior  - Position to facilitate oxygenation and minimize respiratory effort  - Oxygen administered by appropriate delivery if ordered  - Encourage broncho-pulmonary hygiene including cough, deep breathe, Incentive Spirometry  - Assess and instruct to report SOB or any respiratory difficulty  - Respiratory Therapy support as indicated  Outcome: Progressing     Problem: Potential for Falls  Goal: Patient will remain free of falls  Description: INTERVENTIONS:  - Assess patient frequently for physical needs  -  Identify cognitive and physical deficits and behaviors that affect risk of falls    -  [place] fall precautions as indicated by assessment   - Educate patient/family on patient safety including physical limitations  - Instruct patient to call for assistance with activity based on assessment  - Modify environment to reduce risk of injury  - Consider OT/PT consult to assist with strengthening/mobility  Outcome: Progressing

## 2021-03-24 NOTE — PLAN OF CARE
Problem: Nutrition/Hydration-ADULT  Goal: Nutrient/Hydration intake appropriate for improving, restoring or maintaining nutritional needs  Description: Monitor and assess patient's nutrition/hydration status for malnutrition  Collaborate with interdisciplinary team and initiate plan and interventions as ordered  Monitor patient's weight and dietary intake as ordered or per policy  Utilize nutrition screening tool and intervene as necessary  Determine patient's food preferences and provide high-protein, high-caloric foods as appropriate       INTERVENTIONS:  - Monitor oral intake, urinary output, labs, and treatment plans  - Assess nutrition and hydration status and recommend course of action  - Evaluate amount of meals eaten  - Assist patient with eating if necessary   - Allow adequate time for meals  - Recommend/ encourage appropriate diets, oral nutritional supplements, and vitamin/mineral supplements  - Order, calculate, and assess calorie counts as needed  - Recommend, monitor, and adjust tube feedings and TPN/PPN based on assessed needs  - Assess need for intravenous fluids  - Provide specific nutrition/hydration education as appropriate  - Include patient/family/caregiver in decisions related to nutrition  Outcome: Progressing     Problem: PAIN - ADULT  Goal: Verbalizes/displays adequate comfort level or baseline comfort level  Description: Interventions:  - Encourage patient to monitor pain and request assistance  - Assess pain using appropriate pain scale  - Administer analgesics based on type and severity of pain and evaluate response  - Implement non-pharmacological measures as appropriate and evaluate response  - Consider cultural and social influences on pain and pain management  - Notify physician/advanced practitioner if interventions unsuccessful or patient reports new pain  Outcome: Progressing     Problem: INFECTION - ADULT  Goal: Absence or prevention of progression during hospitalization  Description: INTERVENTIONS:  - Assess and monitor for signs and symptoms of infection  - Monitor lab/diagnostic results  - Monitor all insertion sites, i e  indwelling lines, tubes, and drains  - [place] appropriate cooling/warming therapies per order  - Administer medications as ordered  - Instruct and encourage patient and family to use good hand hygiene technique  - Identify and instruct in appropriate isolation precautions for identified infection/condition  Outcome: Progressing     Problem: SAFETY ADULT  Goal: Patient will remain free of falls  Description: INTERVENTIONS:  - Assess patient frequently for physical needs  -  Identify cognitive and physical deficits and behaviors that affect risk of falls    -  [place] fall precautions as indicated by assessment   - Educate patient/family on patient safety including physical limitations  - Instruct patient to call for assistance with activity based on assessment  - Modify environment to reduce risk of injury  - Consider OT/PT consult to assist with strengthening/mobility  Outcome: Progressing  Goal: Maintain or return to baseline ADL function  Description: INTERVENTIONS:  -  Assess patient's ability to carry out ADLs; assess patient's baseline for ADL function and identify physical deficits which impact ability to perform ADLs (bathing, care of mouth/teeth, toileting, grooming, dressing, etc )  - Assess/evaluate cause of self-care deficits   - Assess range of motion  - Assess patient's mobility; develop plan if impaired  - Assess patient's need for assistive devices and provide as appropriate  - Encourage maximum independence but intervene and supervise when necessary  - Involve family in performance of ADLs  - Assess for home care needs following discharge   - Consider OT consult to assist with ADL evaluation and planning for discharge  - Provide patient education as appropriate  Outcome: Progressing  Goal: Maintain or return mobility status to optimal level  Description: INTERVENTIONS:  - Assess patient's baseline mobility status (ambulation, transfers, stairs, etc )    - Identify cognitive and physical deficits and behaviors that affect mobility  - Identify mobility aids required to assist with transfers and/or ambulation (gait belt, sit-to-stand, lift, walker, cane, etc )  - [place] fall precautions as indicated by assessment  - Record patient progress and toleration of activity level on Mobility SBAR; progress patient to next Phase/Stage  - Instruct patient to call for assistance with activity based on assessment  - Consider rehabilitation consult to assist with strengthening/weightbearing, etc   Outcome: Progressing     Problem: DISCHARGE PLANNING  Goal: Discharge to home or other facility with appropriate resources  Description: INTERVENTIONS:  - Identify barriers to discharge w/patient and caregiver  - Arrange for needed discharge resources and transportation as appropriate  - Identify discharge learning needs (meds, wound care, etc )  - Arrange for interpretive services to assist at discharge as needed  - Refer to Case Management Department for coordinating discharge planning if the patient needs post-hospital services based on physician/advanced practitioner order or complex needs related to functional status, cognitive ability, or social support system  Outcome: Progressing     Problem: Knowledge Deficit  Goal: Patient/family/caregiver demonstrates understanding of disease process, treatment plan, medications, and discharge instructions  Description: Complete learning assessment and assess knowledge base    Interventions:  - Provide teaching at level of understanding  - Provide teaching via preferred learning methods  Outcome: Progressing     Problem: RESPIRATORY - ADULT  Goal: Achieves optimal ventilation and oxygenation  Description: INTERVENTIONS:  - Assess for changes in respiratory status  - Assess for changes in mentation and behavior  - Position to facilitate oxygenation and minimize respiratory effort  - Oxygen administered by appropriate delivery if ordered  - Encourage broncho-pulmonary hygiene including cough, deep breathe, Incentive Spirometry  - Assess and instruct to report SOB or any respiratory difficulty  - Respiratory Therapy support as indicated  Outcome: Progressing     Problem: Potential for Falls  Goal: Patient will remain free of falls  Description: INTERVENTIONS:  - Assess patient frequently for physical needs  -  Identify cognitive and physical deficits and behaviors that affect risk of falls    -  [place] fall precautions as indicated by assessment   - Educate patient/family on patient safety including physical limitations  - Instruct patient to call for assistance with activity based on assessment  - Modify environment to reduce risk of injury  - Consider OT/PT consult to assist with strengthening/mobility  Outcome: Progressing

## 2021-03-24 NOTE — ASSESSMENT & PLAN NOTE
· Secondary to COVID-19 pneumonia  · Elevated d-dimer, on therapeutic lovenox    Elevation likely secondary to hypercoag state associated with COVID pna  · Slowly improving  · Remains on 6 L per nasal cannula  · Patient is upset over need to stay in the hospital and very anxious to go home

## 2021-03-24 NOTE — ASSESSMENT & PLAN NOTE
Lab Results   Component Value Date    HGBA1C 7 8 (H) 10/27/2020       Recent Labs     03/23/21  1139 03/23/21  1718 03/23/21  2112 03/24/21  0810   POCGLU 308* 264* 269* 264*       Blood Sugar Average: Last 72 hrs:  (P) 272 [id number]   On high-dose dexamethasone   · Increase Lantus again today

## 2021-03-25 VITALS
TEMPERATURE: 97.5 F | BODY MASS INDEX: 27.29 KG/M2 | HEIGHT: 65 IN | DIASTOLIC BLOOD PRESSURE: 69 MMHG | RESPIRATION RATE: 16 BRPM | HEART RATE: 65 BPM | OXYGEN SATURATION: 93 % | SYSTOLIC BLOOD PRESSURE: 104 MMHG | WEIGHT: 163.8 LBS

## 2021-03-25 LAB
ALBUMIN SERPL BCP-MCNC: 2.6 G/DL (ref 3.5–5)
ALP SERPL-CCNC: 99 U/L (ref 46–116)
ALT SERPL W P-5'-P-CCNC: 121 U/L (ref 12–78)
ANION GAP SERPL CALCULATED.3IONS-SCNC: 8 MMOL/L (ref 4–13)
AST SERPL W P-5'-P-CCNC: 19 U/L (ref 5–45)
BILIRUB SERPL-MCNC: 0.67 MG/DL (ref 0.2–1)
BUN SERPL-MCNC: 23 MG/DL (ref 5–25)
CALCIUM ALBUM COR SERPL-MCNC: 9.9 MG/DL (ref 8.3–10.1)
CALCIUM SERPL-MCNC: 8.8 MG/DL (ref 8.3–10.1)
CHLORIDE SERPL-SCNC: 102 MMOL/L (ref 100–108)
CO2 SERPL-SCNC: 25 MMOL/L (ref 21–32)
CREAT SERPL-MCNC: 0.94 MG/DL (ref 0.6–1.3)
DME PARACHUTE DELIVERY DATE ACTUAL: NORMAL
DME PARACHUTE DELIVERY DATE EXPECTED: NORMAL
DME PARACHUTE DELIVERY DATE REQUESTED: NORMAL
DME PARACHUTE DELIVERY NOTE: NORMAL
DME PARACHUTE ITEM DESCRIPTION: NORMAL
DME PARACHUTE ORDER STATUS: NORMAL
DME PARACHUTE SUPPLIER NAME: NORMAL
DME PARACHUTE SUPPLIER PHONE: NORMAL
ERYTHROCYTE [DISTWIDTH] IN BLOOD BY AUTOMATED COUNT: 13 % (ref 11.6–15.1)
GFR SERPL CREATININE-BSD FRML MDRD: 104 ML/MIN/1.73SQ M
GLUCOSE SERPL-MCNC: 259 MG/DL (ref 65–140)
GLUCOSE SERPL-MCNC: 289 MG/DL (ref 65–140)
GLUCOSE SERPL-MCNC: 302 MG/DL (ref 65–140)
HCT VFR BLD AUTO: 41.2 % (ref 36.5–49.3)
HGB BLD-MCNC: 14.3 G/DL (ref 12–17)
MCH RBC QN AUTO: 29.4 PG (ref 26.8–34.3)
MCHC RBC AUTO-ENTMCNC: 34.7 G/DL (ref 31.4–37.4)
MCV RBC AUTO: 85 FL (ref 82–98)
PLATELET # BLD AUTO: 425 THOUSANDS/UL (ref 149–390)
PMV BLD AUTO: 9.4 FL (ref 8.9–12.7)
POTASSIUM SERPL-SCNC: 5.1 MMOL/L (ref 3.5–5.3)
PROT SERPL-MCNC: 6 G/DL (ref 6.4–8.2)
RBC # BLD AUTO: 4.86 MILLION/UL (ref 3.88–5.62)
SODIUM SERPL-SCNC: 135 MMOL/L (ref 136–145)
WBC # BLD AUTO: 15.96 THOUSAND/UL (ref 4.31–10.16)

## 2021-03-25 PROCEDURE — 82948 REAGENT STRIP/BLOOD GLUCOSE: CPT

## 2021-03-25 PROCEDURE — 94761 N-INVAS EAR/PLS OXIMETRY MLT: CPT

## 2021-03-25 PROCEDURE — 85027 COMPLETE CBC AUTOMATED: CPT | Performed by: INTERNAL MEDICINE

## 2021-03-25 PROCEDURE — 94760 N-INVAS EAR/PLS OXIMETRY 1: CPT

## 2021-03-25 PROCEDURE — 94762 N-INVAS EAR/PLS OXIMTRY CONT: CPT

## 2021-03-25 PROCEDURE — 99239 HOSP IP/OBS DSCHRG MGMT >30: CPT | Performed by: INTERNAL MEDICINE

## 2021-03-25 PROCEDURE — 80053 COMPREHEN METABOLIC PANEL: CPT | Performed by: INTERNAL MEDICINE

## 2021-03-25 RX ORDER — ATORVASTATIN CALCIUM 40 MG/1
40 TABLET, FILM COATED ORAL
Qty: 14 TABLET | Refills: 0 | Status: SHIPPED | OUTPATIENT
Start: 2021-03-25 | End: 2021-04-12

## 2021-03-25 RX ORDER — SERTRALINE HYDROCHLORIDE 25 MG/1
25 TABLET, FILM COATED ORAL DAILY
Qty: 8 TABLET | Refills: 0 | Status: SHIPPED | OUTPATIENT
Start: 2021-03-26 | End: 2021-06-23

## 2021-03-25 RX ORDER — INSULIN GLARGINE 100 [IU]/ML
45 INJECTION, SOLUTION SUBCUTANEOUS
Qty: 10 ML | Refills: 0
Start: 2021-03-25 | End: 2021-03-25 | Stop reason: HOSPADM

## 2021-03-25 RX ORDER — MULTIVITAMIN/IRON/FOLIC ACID 18MG-0.4MG
1 TABLET ORAL DAILY
Qty: 30 TABLET | Refills: 0 | Status: SHIPPED | OUTPATIENT
Start: 2021-03-26 | End: 2021-11-04

## 2021-03-25 RX ORDER — METOPROLOL SUCCINATE 25 MG/1
25 TABLET, EXTENDED RELEASE ORAL DAILY
Qty: 30 TABLET | Refills: 0 | Status: SHIPPED | OUTPATIENT
Start: 2021-03-26 | End: 2021-05-04 | Stop reason: SDUPTHER

## 2021-03-25 RX ORDER — DEXAMETHASONE 4 MG/1
8 TABLET ORAL 2 TIMES DAILY WITH MEALS
Qty: 12 TABLET | Refills: 0 | Status: SHIPPED | OUTPATIENT
Start: 2021-03-25 | End: 2021-03-29

## 2021-03-25 RX ORDER — PRAVASTATIN SODIUM 40 MG
40 TABLET ORAL DAILY
Refills: 0
Start: 2021-04-09 | End: 2022-07-06 | Stop reason: SDUPTHER

## 2021-03-25 RX ADMIN — Medication 2000 UNITS: at 09:13

## 2021-03-25 RX ADMIN — CEFTRIAXONE 1000 MG: 1 INJECTION, POWDER, FOR SOLUTION INTRAMUSCULAR; INTRAVENOUS at 09:35

## 2021-03-25 RX ADMIN — PANTOPRAZOLE SODIUM 40 MG: 40 TABLET, DELAYED RELEASE ORAL at 05:48

## 2021-03-25 RX ADMIN — LEVOTHYROXINE SODIUM 25 MCG: 25 TABLET ORAL at 05:48

## 2021-03-25 RX ADMIN — SERTRALINE HYDROCHLORIDE 25 MG: 25 TABLET ORAL at 09:14

## 2021-03-25 RX ADMIN — FAMOTIDINE 20 MG: 20 TABLET ORAL at 09:13

## 2021-03-25 RX ADMIN — ENOXAPARIN SODIUM 70 MG: 80 INJECTION SUBCUTANEOUS at 09:13

## 2021-03-25 RX ADMIN — DOXYCYCLINE 100 MG: 100 CAPSULE ORAL at 09:13

## 2021-03-25 RX ADMIN — INSULIN LISPRO 6 UNITS: 100 INJECTION, SOLUTION INTRAVENOUS; SUBCUTANEOUS at 09:15

## 2021-03-25 RX ADMIN — DIVALPROEX SODIUM 500 MG: 500 TABLET, DELAYED RELEASE ORAL at 09:14

## 2021-03-25 RX ADMIN — LORATADINE 10 MG: 10 TABLET ORAL at 09:14

## 2021-03-25 RX ADMIN — DEXAMETHASONE SODIUM PHOSPHATE 8 MG: 4 INJECTION INTRA-ARTICULAR; INTRALESIONAL; INTRAMUSCULAR; INTRAVENOUS; SOFT TISSUE at 09:13

## 2021-03-25 RX ADMIN — METOPROLOL SUCCINATE 25 MG: 25 TABLET, EXTENDED RELEASE ORAL at 09:13

## 2021-03-25 RX ADMIN — Medication 1 TABLET: at 09:14

## 2021-03-25 RX ADMIN — GABAPENTIN 300 MG: 300 CAPSULE ORAL at 09:14

## 2021-03-25 RX ADMIN — INSULIN LISPRO 6 UNITS: 100 INJECTION, SOLUTION INTRAVENOUS; SUBCUTANEOUS at 12:03

## 2021-03-25 NOTE — DISCHARGE INSTRUCTIONS

## 2021-03-25 NOTE — PROGRESS NOTES
Went in to patient's room in response to call bell  Nurse emptied pt's urinal/commode and cleaned urine up off of floor  Nurse asked pt if they had any other needs and pt denied  Will continue to mt and be attentive to patient      Rozina Delgado RN

## 2021-03-25 NOTE — PLAN OF CARE
Problem: Nutrition/Hydration-ADULT  Goal: Nutrient/Hydration intake appropriate for improving, restoring or maintaining nutritional needs  Description: Monitor and assess patient's nutrition/hydration status for malnutrition  Collaborate with interdisciplinary team and initiate plan and interventions as ordered  Monitor patient's weight and dietary intake as ordered or per policy  Utilize nutrition screening tool and intervene as necessary  Determine patient's food preferences and provide high-protein, high-caloric foods as appropriate       INTERVENTIONS:  - Monitor oral intake, urinary output, labs, and treatment plans  - Assess nutrition and hydration status and recommend course of action  - Evaluate amount of meals eaten  - Assist patient with eating if necessary   - Allow adequate time for meals  - Recommend/ encourage appropriate diets, oral nutritional supplements, and vitamin/mineral supplements  - Order, calculate, and assess calorie counts as needed  - Recommend, monitor, and adjust tube feedings and TPN/PPN based on assessed needs  - Assess need for intravenous fluids  - Provide specific nutrition/hydration education as appropriate  - Include patient/family/caregiver in decisions related to nutrition  Outcome: Progressing     Problem: PAIN - ADULT  Goal: Verbalizes/displays adequate comfort level or baseline comfort level  Description: Interventions:  - Encourage patient to monitor pain and request assistance  - Assess pain using appropriate pain scale  - Administer analgesics based on type and severity of pain and evaluate response  - Implement non-pharmacological measures as appropriate and evaluate response  - Consider cultural and social influences on pain and pain management  - Notify physician/advanced practitioner if interventions unsuccessful or patient reports new pain  Outcome: Progressing     Problem: INFECTION - ADULT  Goal: Absence or prevention of progression during hospitalization  Description: INTERVENTIONS:  - Assess and monitor for signs and symptoms of infection  - Monitor lab/diagnostic results  - Monitor all insertion sites, i e  indwelling lines, tubes, and drains  - [place] appropriate cooling/warming therapies per order  - Administer medications as ordered  - Instruct and encourage patient and family to use good hand hygiene technique  - Identify and instruct in appropriate isolation precautions for identified infection/condition  Outcome: Progressing     Problem: SAFETY ADULT  Goal: Patient will remain free of falls  Description: INTERVENTIONS:  - Assess patient frequently for physical needs  -  Identify cognitive and physical deficits and behaviors that affect risk of falls    -  [place] fall precautions as indicated by assessment   - Educate patient/family on patient safety including physical limitations  - Instruct patient to call for assistance with activity based on assessment  - Modify environment to reduce risk of injury  - Consider OT/PT consult to assist with strengthening/mobility  Outcome: Progressing  Goal: Maintain or return to baseline ADL function  Description: INTERVENTIONS:  -  Assess patient's ability to carry out ADLs; assess patient's baseline for ADL function and identify physical deficits which impact ability to perform ADLs (bathing, care of mouth/teeth, toileting, grooming, dressing, etc )  - Assess/evaluate cause of self-care deficits   - Assess range of motion  - Assess patient's mobility; develop plan if impaired  - Assess patient's need for assistive devices and provide as appropriate  - Encourage maximum independence but intervene and supervise when necessary  - Involve family in performance of ADLs  - Assess for home care needs following discharge   - Consider OT consult to assist with ADL evaluation and planning for discharge  - Provide patient education as appropriate  Outcome: Progressing  Goal: Maintain or return mobility status to optimal level  Description: INTERVENTIONS:  - Assess patient's baseline mobility status (ambulation, transfers, stairs, etc )    - Identify cognitive and physical deficits and behaviors that affect mobility  - Identify mobility aids required to assist with transfers and/or ambulation (gait belt, sit-to-stand, lift, walker, cane, etc )  - [place] fall precautions as indicated by assessment  - Record patient progress and toleration of activity level on Mobility SBAR; progress patient to next Phase/Stage  - Instruct patient to call for assistance with activity based on assessment  - Consider rehabilitation consult to assist with strengthening/weightbearing, etc   Outcome: Progressing     Problem: DISCHARGE PLANNING  Goal: Discharge to home or other facility with appropriate resources  Description: INTERVENTIONS:  - Identify barriers to discharge w/patient and caregiver  - Arrange for needed discharge resources and transportation as appropriate  - Identify discharge learning needs (meds, wound care, etc )  - Arrange for interpretive services to assist at discharge as needed  - Refer to Case Management Department for coordinating discharge planning if the patient needs post-hospital services based on physician/advanced practitioner order or complex needs related to functional status, cognitive ability, or social support system  Outcome: Progressing     Problem: Knowledge Deficit  Goal: Patient/family/caregiver demonstrates understanding of disease process, treatment plan, medications, and discharge instructions  Description: Complete learning assessment and assess knowledge base    Interventions:  - Provide teaching at level of understanding  - Provide teaching via preferred learning methods  Outcome: Progressing     Problem: RESPIRATORY - ADULT  Goal: Achieves optimal ventilation and oxygenation  Description: INTERVENTIONS:  - Assess for changes in respiratory status  - Assess for changes in mentation and behavior  - Position to facilitate oxygenation and minimize respiratory effort  - Oxygen administered by appropriate delivery if ordered  - Encourage broncho-pulmonary hygiene including cough, deep breathe, Incentive Spirometry  - Assess and instruct to report SOB or any respiratory difficulty  - Respiratory Therapy support as indicated  Outcome: Progressing     Problem: Potential for Falls  Goal: Patient will remain free of falls  Description: INTERVENTIONS:  - Assess patient frequently for physical needs  -  Identify cognitive and physical deficits and behaviors that affect risk of falls    -  [place] fall precautions as indicated by assessment   - Educate patient/family on patient safety including physical limitations  - Instruct patient to call for assistance with activity based on assessment  - Modify environment to reduce risk of injury  - Consider OT/PT consult to assist with strengthening/mobility  Outcome: Progressing

## 2021-03-25 NOTE — RESPIRATORY THERAPY NOTE
Home Oxygen Qualifying Test       Patient name: Ashlee Andrade        : 1985   Date of Test:  2021  Diagnosis:      Home Oxygen Test:    **Medicare Guidelines require item(s) 1-5 on all ambulatory patients or 1 and 2 on non-ambulatory patients  1   Baseline SPO2 on Room Air at rest  90 %  2   SPO2 during exercise on Room Air 87 %  During exercise monitor SpO2  If SPO2 increases >=89% with ambulation do not add supplemental             oxygen  If <= 88% on room air add O2 via NC and titrate patient  Patient must be ambulated with O2 and titrated to > 88% with exertion  3   SPO2 on Oxygen at rest 94 % 3 lpm     4   SPO2 during exercise on Oxygen  91% a liter flow of 3 lpm     5   Exercise performed:          walking          [x]  Supplemental Home Oxygen is indicated  []  Client does not qualify for home oxygen  Respiratory Additional Notes- Patient was walked for 6 minutes in room per COVID+  Walk started on room air oxygen saturations 90%  After 2 minutes patients oxygen saturations dropped to 87% and was placed on oxygen and titrated to 3L to keep oxygen saturations above 89%  Patient remained on 3L for the rest of the walk 91-93% sats  3L oxygen required  Patient tolerated walk without issue       Benito Long, RT

## 2021-03-25 NOTE — ASSESSMENT & PLAN NOTE
· Secondary to COVID-19 pneumonia  · Clinically much improved  · Oxygen saturations maintained on 3 L per nasal cannula with respiratory evaluation  · Will discharge home on 3 L per nasal cannula

## 2021-03-25 NOTE — PROGRESS NOTES
Went into pt's room due to pulse ox alarming at beginning of shift  Pt was sitting in chair without 02 on and pulse ox finger probe was on the floor  Pt stated "the call bell has been going off for hours and no one has come to help me  Everyday that I have been here I am neglected and no one comes to help me when I need it " Pt's wife over facetime stated,"I understand that you're busy but a nurse is responsible to check on their patient's throughout the day and they should be checked on and asked if they need to go to the bathroom before they leave for the day " Nurse deescalated pt and wife, concerns were addressed, and Nurse reassured pt that they will be well taken care of  02 and finger probe were reapplied  Pt was provided with fresh water, clean blankets and commode/urinal were emptied  Rounded with PCA to ensure teamwork and extra attentiveness to patient  Will continue to monitor patient closely      Sil Herrmann RN

## 2021-03-25 NOTE — DISCHARGE SUMMARY
[address]   Discharge- [facility] 1985, 38 y o  male MRN: [id number]  Unit/Bed#: S -01 Encounter: [id number]  Primary Care Provider: Shira Santiago MD   Date and time admitted to hospital: 3/17/2021 11:24 AM    * Acute respiratory failure with hypoxia ([address] )  Assessment & Plan  · Secondary to COVID-19 pneumonia  · Clinically much improved  · Oxygen saturations maintained on 3 L per nasal cannula with respiratory evaluation  · Will discharge home on 3 L per nasal cannula    COVID-19  Assessment & Plan  · COVID-19 pneumonia with superimposed bacterial infection given elevated procalcitonin  COVID positive on March 9  · Pulmonary following  · Cont remdesivir day 5/5, high-dose dexamethasone day 7/10  · Received Actemra x 1 (3/18)  · Antibiotic treatment with ceftriaxone and doxycycline as per pulm recommending 7days  · Patient medically stable for discharge  · Will discharge on steroids for total of 10 days  · Continue Lipitor for 2 weeks  · Per COVID discharge guidelines patient with improved score 0 D-dimer most recently is greater than 1 this would place patient a category 3 as his modified improved score is 0  Therefore patient does not get discharged on Eliquis or any anticoagulation  · I also discussed with patient's wife at baseline severity of symptoms on admission he required total of 20 days of isolation from onset of symptoms which would be March 29, 2021   Patient will isolate home as directed    Type 2 diabetes mellitus without complication, with long-term current use of insulin [facility])  Assessment & Plan  Lab Results   Component Value Date    HGBA1C 7 8 (H) 10/27/2020       Recent Labs     03/24/21  1555 03/24/21  2154 03/25/21  0826 03/25/21  1134   POCGLU 293* 455* 259* 289*       Blood Sugar Average: Last 72 hrs:  (P) 290 [id number]   On high-dose dexamethasone   · Recent high sugars reviewed patient  · He admits a lot of dietary indiscretion yesterday  · Steroids soon be tapered off will continue current dosing at discharge    Transaminitis  Assessment & Plan  · Almost completely resolved  · Outpatient follow-up repeat labs      Discharging Physician / Practitioner: Jori Homans, MD  PCP: Jadiel Christianson MD  Admission Date:   Admission Orders (From admission, onward)     Ordered        03/17/21 1336  Inpatient Admission  Once                   Discharge Date: 03/25/21    Resolved Problems  Date Reviewed: 3/25/2021          Resolved    Sepsis ([address] ) 3/23/2021     Resolved by  Jori Homans, MD          Consultations During Hospital Stay:  · Pulmonary    Procedures Performed:   · Chest x-ray multifocal pneumonia consistent with COVID    Significant Findings / Test Results:   · None    Incidental Findings:   · None     Test Results Pending at Discharge (will require follow up): · None     Outpatient Tests Requested:  · None    Complications:  None    Reason for Admission:  COVID pneumonia    Hospital Course:     Irma Hicks is a 38 y o  male patient who originally presented to the hospital on 3/17/2021 due to shortness of breath  Patient presented with several days of cough and shortness of breath  Came to hospital with evidence of sepsis and COVID pneumonia  Started on severe treatment pathway due to need for oxygen  A 1 point patient with up to high-flow nasal cannula 10 liters/minute  Has now improved down to 3 liters/minute stable in room without difficulty on 3 liters/minute plan is to discharge patient home to complete steroid therapy  He received Actemra and remdesivir while in the hospital   And stable for discharge home  Patient have close follow-up with Pulmonary and PCP after discharge    Please see above list of diagnoses and related plan for additional information       Condition at Discharge: good     Discharge Day Visit / Exam:     Subjective:  I feel much better  Vitals: Blood Pressure: 104/69 (03/25/21 0827)  Pulse: 65 (03/25/21 0827)  Temperature: 97 5 °F (36 4 °C) (03/25/21 0827)  Temp Source: Oral (03/25/21 0827)  Respirations: 16 (03/25/21 0827)  Height: 5' 5" (165 1 cm) (03/18/21 1035)  Weight - Scale: 74 3 kg (163 lb 12 8 oz) (03/24/21 0600)  SpO2: 93 % (03/25/21 1047)  Exam:   Physical Exam  Constitutional:       General: He is not in acute distress  Appearance: He is not diaphoretic  Cardiovascular:      Rate and Rhythm: Normal rate and regular rhythm  Pulses: Normal pulses  Heart sounds: No murmur  No gallop  Pulmonary:      Effort: Pulmonary effort is normal  No respiratory distress  Breath sounds: No wheezing, rhonchi or rales  Abdominal:      General: There is no distension  Palpations: Abdomen is soft  There is no mass  Tenderness: There is no abdominal tenderness  There is no guarding or rebound  Neurological:      Mental Status: He is oriented to person, place, and time  Discussion with Family:  Case discussed with patient's wife prior to discharge including need for further isolation at home    Discharge instructions/Information to patient and family:   See after visit summary for information provided to patient and family  Provisions for Follow-Up Care:  See after visit summary for information related to follow-up care and any pertinent home health orders  Disposition:     Home with VNA Services (Reminder: Complete face to face encounter)    For Discharges to [facility]:   · Not Applicable to this Patient - Not Applicable to this Patient    Planned Readmission: no     Discharge Statement:  I spent 45 minutes discharging the patient  This time was spent on the day of discharge  I had direct contact with the patient on the day of discharge  Greater than 50% of the total time was spent examining patient, answering all patient questions, arranging and discussing plan of care with patient as well as directly providing post-discharge instructions  Additional time then spent on discharge activities  Discharge Medications:  See after visit summary for reconciled discharge medications provided to patient and family        ** Please Note: This note has been constructed using a voice recognition system **

## 2021-03-25 NOTE — ASSESSMENT & PLAN NOTE
· COVID-19 pneumonia with superimposed bacterial infection given elevated procalcitonin  COVID positive on March 9  · Pulmonary following  · Cont remdesivir day 5/5, high-dose dexamethasone day 7/10  · Received Actemra x 1 (3/18)  · Antibiotic treatment with ceftriaxone and doxycycline as per pulm recommending 7days  · Patient medically stable for discharge  · Will discharge on steroids for total of 10 days  · Continue Lipitor for 2 weeks  · Per COVID discharge guidelines patient with improved score 0 D-dimer most recently is greater than 1 this would place patient a category 3 as his modified improved score is 0  Therefore patient does not get discharged on Eliquis or any anticoagulation  · I also discussed with patient's wife at baseline severity of symptoms on admission he required total of 20 days of isolation from onset of symptoms which would be March 29, 2021   Patient will isolate home as directed

## 2021-03-25 NOTE — PROGRESS NOTES
Went into patient's room to give medications and check in on patient  Nurse provided patient with 2 fresh reynolds, fresh blanket, and emptied pt's urinal  Pt's 02 was low so nurse bumped pt up to 4L  Will continue to monitor and be attentive to patient      Garry Tejada RN

## 2021-03-25 NOTE — ASSESSMENT & PLAN NOTE
Lab Results   Component Value Date    HGBA1C 7 8 (H) 10/27/2020       Recent Labs     03/24/21  1555 03/24/21  2154 03/25/21  0826 03/25/21  1134   POCGLU 293* 455* 259* 289*       Blood Sugar Average: Last 72 hrs:  (P) 290 [id number]   On high-dose dexamethasone   · Recent high sugars reviewed patient  · He admits a lot of dietary indiscretion yesterday  · Steroids soon be tapered off will continue current dosing at discharge

## 2021-03-25 NOTE — CASE MANAGEMENT
Home O2 eval completed by Respiratory  [facility] requested for Home O2 needs  Order placed via [facility]  Portable tank delivered to bedside   Family will transport pt home

## 2021-03-26 ENCOUNTER — TELEPHONE (OUTPATIENT)
Dept: INTERNAL MEDICINE CLINIC | Facility: CLINIC | Age: 36
End: 2021-03-26

## 2021-03-26 ENCOUNTER — TRANSITIONAL CARE MANAGEMENT (OUTPATIENT)
Dept: INTERNAL MEDICINE CLINIC | Age: 36
End: 2021-03-26

## 2021-03-29 ENCOUNTER — OFFICE VISIT (OUTPATIENT)
Dept: INTERNAL MEDICINE CLINIC | Facility: CLINIC | Age: 36
End: 2021-03-29
Payer: MEDICARE

## 2021-03-29 ENCOUNTER — TRANSCRIBE ORDERS (OUTPATIENT)
Dept: PHYSICAL THERAPY | Facility: REHABILITATION | Age: 36
End: 2021-03-29

## 2021-03-29 DIAGNOSIS — J96.01 ACUTE RESPIRATORY FAILURE WITH HYPOXIA (HCC): ICD-10-CM

## 2021-03-29 DIAGNOSIS — R26.9 GAIT ABNORMALITY: Primary | ICD-10-CM

## 2021-03-29 DIAGNOSIS — A41.9 SEPSIS, DUE TO UNSPECIFIED ORGANISM, UNSPECIFIED WHETHER ACUTE ORGAN DYSFUNCTION PRESENT (HCC): ICD-10-CM

## 2021-03-29 DIAGNOSIS — U07.1 COVID-19: Primary | ICD-10-CM

## 2021-03-29 PROCEDURE — 99496 TRANSJ CARE MGMT HIGH F2F 7D: CPT | Performed by: INTERNAL MEDICINE

## 2021-03-29 NOTE — ASSESSMENT & PLAN NOTE
Improved  He has only been requiring oxygen on occasion and with exertion  Continue oxygen supplementation as needed, goal O2 saturation greater than 90%

## 2021-03-29 NOTE — PROGRESS NOTES
Assessment/Plan:        Problem List Items Addressed This Visit        Respiratory    Acute respiratory failure with hypoxia (HCC)     Improved  He has only been requiring oxygen on occasion and with exertion  Continue oxygen supplementation as needed, goal O2 saturation greater than 90%  Other    COVID-19 - Primary     He completed antibiotic and steroid therapy  Symptoms improving  Continue oxygen supplementation as needed  Sepsis ([address] )             Reason for visit is transition of care    Encounter provider Corina Jin MD       Provider located at [address] [place] [id number]      Recent Visits  Date Type Provider Dept   03/26/21 Telephone Sara Victoria MA  [facility] [employer]   Showing recent visits within past 7 days and meeting all other requirements     Today's Visits  Date Type Provider Dept   03/29/21 Office Visit Corina Jin MD  [facility] [employer]   Showing today's visits and meeting all other requirements     Future Appointments  No visits were found meeting these conditions  Showing future appointments within next 150 days and meeting all other requirements        After connecting through [employer], the patient was identified by name and date of birth  Jewels Burrell was informed that this is a telemedicine visit and that the visit is being conducted through [employer] [place] [address] and patient was informed that this is not a secure, HIPAA-compliant platform  He agrees to proceed     My office door was closed  No one else was in the room  He acknowledged consent and understanding of privacy and security of the video platform  The patient has agreed to participate and understands they can discontinue the visit at any time  Patient is aware this is a billable service  Subjective:     Patient ID: Jewels Burrell is a 38 y o  male     27-year-old male is seen today for transition of care to which he was hospitalized at [facility] from 03/17 until 03/25/2021 for acute respiratory failure with hypoxia secondary to COVID-19 pneumonia  Hospitalization and discharge summary reviewed today  He initially presented to the emergency department with cough and shortness of breath and met sepsis criteria  He was started on severe treatment pathway as he required oxygen supplementation  He received remdesivir, Actemra, steroids, and ceftriaxone and doxycycline during his hospitalization  He improved clinically  He was advised to complete 10 day course of steroids and 7 day course of ceftriaxone and doxycycline  He was discharged with Home O2, currently using as needed  He monitors his pulse ox regularly  Review of Systems   Constitutional: Negative for activity change, appetite change, chills, diaphoresis, fatigue and fever  HENT: Negative for congestion, postnasal drip, rhinorrhea, sinus pressure, sinus pain, sneezing and sore throat  Eyes: Negative for visual disturbance  Respiratory: Negative for apnea, cough, choking, chest tightness, shortness of breath and wheezing  Cardiovascular: Negative for chest pain, palpitations and leg swelling  Gastrointestinal: Negative for abdominal distention, abdominal pain, anal bleeding, blood in stool, constipation, diarrhea, nausea and vomiting  Endocrine: Negative for cold intolerance and heat intolerance  Genitourinary: Negative for difficulty urinating, dysuria and hematuria  Musculoskeletal: Negative  Skin: Negative  Neurological: Negative for dizziness, weakness, light-headedness, numbness and headaches  Hematological: Negative for adenopathy  Psychiatric/Behavioral: Negative for agitation, sleep disturbance and suicidal ideas  All other systems reviewed and are negative  Objective:     There were no vitals filed for this visit     Physical Exam  Vitals signs and nursing note reviewed  Constitutional:       General: He is not in acute distress  Appearance: He is well-developed  He is not diaphoretic  HENT:      Head: Normocephalic and atraumatic  Right Ear: External ear normal       Left Ear: External ear normal    Eyes:      General:         Right eye: No discharge  Left eye: No discharge  Conjunctiva/sclera: Conjunctivae normal    Neck:      Musculoskeletal: Normal range of motion  Pulmonary:      Effort: Pulmonary effort is normal  No respiratory distress  Abdominal:      General: There is no distension  Tenderness: There is no abdominal tenderness  Musculoskeletal: Normal range of motion  General: No deformity  Skin:     Coloration: Skin is not pale  Findings: No erythema or rash  Neurological:      Mental Status: He is alert and oriented to person, place, and time  Cranial Nerves: No cranial nerve deficit  Coordination: Coordination normal    Psychiatric:         Behavior: Behavior normal          Thought Content: Thought content normal          Judgment: Judgment normal              Transitional Care Management Review:  Alexandro Guzman is a 38 y o  male here for TCM follow up  During the TCM phone call patient stated:    TCM Call (since 2/26/2021)     Date and time call was made  3/26/2021 11:23 AM    Hospital care reviewed  Records reviewed    Patient was hospitialized at  [address] [place] [address]        Date of Admission  03/17/21    Date of discharge  03/25/21    Diagnosis  Acute respiratory failure with hypoxia, covid pneumonia    Disposition  Home; Home health services    Were the patients medications reviewed and updated  Yes    Current Symptoms  None      TCM Call (since 2/26/2021)     Post hospital issues  Reduced activity    Should patient be enrolled in anticoag monitoring? Yes    Scheduled for follow up?   Yes    Did you obtain your prescribed medications  Yes    Do you need help managing your prescriptions or medications  No    Is transportation to your appointment needed  No    I have advised the patient to call PCP with any new or worsening symptoms  Claudio Gomez RN           Depression Screening and Follow-up Plan: Patient's depression screening was positive with a PHQ-2 score of 0  Clincally patient does not have depression  No treatment is required  Patient advised to follow-up with PCP for further management  I spent 25 minutes with the patient during this visit      Baron Kain MD

## 2021-03-29 NOTE — PROGRESS NOTES
Assessment and Plan:     Problem List Items Addressed This Visit     None           Preventive health issues were discussed with patient, and age appropriate screening tests were ordered as noted in patient's After Visit Summary  Personalized health advice and appropriate referrals for health education or preventive services given if needed, as noted in patient's After Visit Summary  History of Present Illness:     Patient presents for Medicare Annual Wellness visit    Patient Care Team:  Jocelyn Osborne MD as PCP - General (Internal Medicine)     Problem List:     Patient Active Problem List   Diagnosis    Folliculitis    Anxiety    PTSD (post-traumatic stress disorder)    Mixed hyperlipidemia    Type 2 diabetes mellitus without complication, with long-term current use of insulin (HCC)    ENRIKE (obstructive sleep apnea)    Chest pain    Bright red blood per rectum    Palpitation    Chronic GERD    Oropharyngeal dysphagia    Acute respiratory failure with hypoxia ([address] )    COVID-19    Elevated lactic acid level    High anion gap metabolic acidosis    Tachycardia    Transaminitis    Bradycardic baseline fetal heart rate    Blood per rectum      Past Medical and Surgical History:     Past Medical History:   Diagnosis Date    Diabetes mellitus ([address] )     type 2    Disease of thyroid gland     hypothyroidism    Hyperlipidemia     Hypertension     Psychiatric disorder     PTSD   Anxiety, depression,      Past Surgical History:   Procedure Laterality Date    WISDOM TOOTH EXTRACTION        Family History:     Family History   Problem Relation Age of Onset    Hyperlipidemia Father     Heart attack Paternal Grandfather       Social History:     E-Cigarette/Vaping    E-Cigarette Use Never User      E-Cigarette/Vaping Substances    Nicotine No     THC No     CBD No     Flavoring No     Other No      Social History     Socioeconomic History    Marital status: /Civil Union     Spouse name: None    Number of children: None    Years of education: None    Highest education level: None   Occupational History    None   Social Needs    Financial resource strain: None    Food insecurity     Worry: None     Inability: None    Transportation needs     Medical: None     Non-medical: None   Tobacco Use    Smoking status: Never Smoker    Smokeless tobacco: Never Used   Substance and Sexual Activity    Alcohol use: Not Currently    Drug use: No    Sexual activity: Not Currently   Lifestyle    Physical activity     Days per week: None     Minutes per session: None    Stress: None   Relationships    Social connections     Talks on phone: None     Gets together: None     Attends Restorationist service: None     Active member of club or organization: None     Attends meetings of clubs or organizations: None     Relationship status: None    Intimate partner violence     Fear of current or ex partner: None     Emotionally abused: None     Physically abused: None     Forced sexual activity: None   Other Topics Concern    None   Social History Narrative    None      Medications and Allergies:     Current Outpatient Medications   Medication Sig Dispense Refill    ALPRAZolam (XANAX) 0 5 mg tablet Take 0 5 mg by mouth Three times daily as needed      atorvastatin (LIPITOR) 40 mg tablet Take 1 tablet (40 mg total) by mouth daily at bedtime for 14 days 14 tablet 0    cloNIDine (CATAPRES) 0 2 mg tablet Take 0 1 mg by mouth daily       dicyclomine (BENTYL) 20 mg tablet Take 1 tablet (20 mg total) by mouth every 6 (six) hours as needed (abdominal pain) 45 tablet 2    divalproex sodium (DEPAKOTE) 500 mg EC tablet Take 500 mg by mouth every 12 (twelve) hours       Empagliflozin (Jardiance) 25 MG TABS Take 1 tablet by mouth daily      gabapentin (NEURONTIN) 300 mg capsule Take 300 mg by mouth Three times a day      insulin glargine (LANTUS SOLOSTAR) 100 units/mL injection pen Inject 45 Units under the skin daily 15 mL 0    levothyroxine 25 mcg tablet Take 25 mcg by mouth daily      loratadine (CLARITIN) 10 mg tablet Take 10 mg by mouth daily      metFORMIN (GLUCOPHAGE) 1000 MG tablet Take 1,000 mg by mouth daily       metoprolol succinate (TOPROL-XL) 25 mg 24 hr tablet Take 1 tablet (25 mg total) by mouth daily 30 tablet 0    multivitamin-minerals (CENTRUM ADULTS) tablet Take 1 tablet by mouth daily 30 tablet 0    OMEGA-3 FATTY ACIDS PO Take 2 g by mouth daily      omeprazole (PriLOSEC) 40 MG capsule Take 1 tablet 30-60 minutes prior to breakfast 30 capsule 3    [START ON 4/9/2021] pravastatin (PRAVACHOL) 40 mg tablet Take 1 tablet (40 mg total) by mouth daily  0    sertraline (ZOLOFT) 25 mg tablet Take 1 tablet (25 mg total) by mouth daily for 8 doses 8 tablet 0    [START ON 4/3/2021] sertraline (ZOLOFT) 50 mg tablet Take 1 tablet (50 mg total) by mouth daily 30 tablet 0    sildenafil (VIAGRA) 100 mg tablet Take 100 mg by mouth as needed for erectile dysfunction       traZODone (DESYREL) 100 mg tablet Take 100 mg by mouth        No current facility-administered medications for this visit        Allergies   Allergen Reactions    Lamotrigine GI Intolerance    Simvastatin Other (See Comments)     Elevated liver enzymes  Liver enzyme elevation    Niacin Rash      Immunizations:     Immunization History   Administered Date(s) Administered    Anthrax 03/17/2004    Fluzone Split Quad 0 5 mL 10/02/2019    H1N1 Inj 11/25/2009    INFLUENZA 11/19/2012, 10/23/2018, 10/02/2019    IPV 07/31/2008    Influenza LAIV (Nasal) 08/27/2009    Influenza Quadrivalent Preservative Free 3 years and older IM 10/02/2019    Influenza Split 11/29/2005    Influenza, injectable, quadrivalent, preservative free 0 5 mL 10/02/2019    Influenza, seasonal, injectable, preservative free 10/23/2018    Meningococcal MCV4P 07/31/2008    Meningococcal Polysaccharide (MPSV4) 07/11/2002    Pneumococcal 12/15/2011    Pneumococcal Conjugate PCV 7 12/15/2011    Smallpox 01/24/2003    Td (adult), Unspecified 01/01/2008    Td (adult), adsorbed 11/08/2002, 06/22/2015    Tdap 07/31/2008, 06/22/2015    Typhoid, Unspecified 09/16/2004    Typhoid, ViCPs 11/13/2008    Yellow Fever 07/11/2002      Health Maintenance:         Topic Date Due    HIV Screening  Never done         Topic Date Due    Pneumococcal Vaccine: Pediatrics (0 to 5 Years) and At-Risk Patients (6 to 59 Years) (1 of 1 - PPSV23) 05/23/1990    COVID-19 Vaccine (1) Never done      Medicare Health Risk Assessment:     There were no vitals taken for this visit  Gann Valleyveronica Rojo is here for his Subsequent Wellness visit  Last Medicare Wellness visit information reviewed, patient interviewed and updates made to the record today  Health Risk Assessment:   Patient rates overall health as good  Patient feels that their physical health rating is same  Patient is satisfied with their life  Eyesight was rated as same  Hearing was rated as same  Patient feels that their emotional and mental health rating is same  Patients states they are never, rarely angry  Patient states they are sometimes unusually tired/fatigued  Pain experienced in the last 7 days has been some  Patient's pain rating has been 6/10  Depression Screening:   PHQ-2 Score: 0      Fall Risk Screening: In the past year, patient has experienced: no history of falling in past year      Home Safety:  Patient does not have trouble with stairs inside or outside of their home  Patient has working smoke alarms and has working carbon monoxide detector  Home safety hazards include: none  Nutrition:   Current diet is Regular  Medications:   Patient is currently taking over-the-counter supplements  OTC medications include: see medication list  Patient is able to manage medications       Activities of Daily Living (ADLs)/Instrumental Activities of Daily Living (IADLs):   Walk and transfer into and out of bed and chair?: Yes  Dress and groom yourself?: Yes    Bathe or shower yourself?: Yes    Feed yourself? Yes  Do your laundry/housekeeping?: No  Manage your money, pay your bills and track your expenses?: Yes  Make your own meals?: No    Do your own shopping?: Yes    PREVENTIVE SCREENINGS      Cardiovascular Screening:    General: Screening Not Indicated and History Lipid Disorder      Diabetes Screening:     General: Screening Not Indicated and History Diabetes      Prostate Cancer Screening:    General: Screening Not Indicated      Lung Cancer Screening:     General: Screening Not Indicated    Screening, Brief Intervention, and Referral to Treatment (SBIRT)    Screening  Typical number of drinks in a day: 0  Typical number of drinks in a week: 0  Interpretation: Low risk drinking behavior      Single Item Drug Screening:  How often have you used an illegal drug (including marijuana) or a prescription medication for non-medical reasons in the past year? never    Single Item Drug Screen Score: 0  Interpretation: Negative screen for possible drug use disorder      Meagan Brewer MD

## 2021-03-29 NOTE — ASSESSMENT & PLAN NOTE
He completed antibiotic and steroid therapy  Symptoms improving  Continue oxygen supplementation as needed

## 2021-03-29 NOTE — PATIENT INSTRUCTIONS

## 2021-03-30 ENCOUNTER — TELEMEDICINE (OUTPATIENT)
Dept: PULMONOLOGY | Facility: CLINIC | Age: 36
End: 2021-03-30
Payer: MEDICARE

## 2021-03-30 ENCOUNTER — OFFICE VISIT (OUTPATIENT)
Dept: INTERNAL MEDICINE CLINIC | Facility: CLINIC | Age: 36
End: 2021-03-30
Payer: MEDICARE

## 2021-03-30 ENCOUNTER — TELEPHONE (OUTPATIENT)
Dept: NEUROLOGY | Facility: CLINIC | Age: 36
End: 2021-03-30

## 2021-03-30 VITALS
TEMPERATURE: 97.4 F | WEIGHT: 157 LBS | HEIGHT: 65 IN | OXYGEN SATURATION: 93 % | BODY MASS INDEX: 26.16 KG/M2 | HEART RATE: 81 BPM

## 2021-03-30 VITALS
WEIGHT: 168 LBS | OXYGEN SATURATION: 94 % | HEIGHT: 65 IN | TEMPERATURE: 97.5 F | SYSTOLIC BLOOD PRESSURE: 100 MMHG | DIASTOLIC BLOOD PRESSURE: 60 MMHG | HEART RATE: 92 BPM | BODY MASS INDEX: 27.99 KG/M2

## 2021-03-30 DIAGNOSIS — J96.01 ACUTE RESPIRATORY FAILURE WITH HYPOXIA (HCC): ICD-10-CM

## 2021-03-30 DIAGNOSIS — U07.1 COVID-19: Primary | ICD-10-CM

## 2021-03-30 DIAGNOSIS — M62.81 MUSCLE WEAKNESS OF LOWER EXTREMITY: Primary | ICD-10-CM

## 2021-03-30 PROCEDURE — 99213 OFFICE O/P EST LOW 20 MIN: CPT | Performed by: INTERNAL MEDICINE

## 2021-03-30 PROCEDURE — 99214 OFFICE O/P EST MOD 30 MIN: CPT | Performed by: INTERNAL MEDICINE

## 2021-03-30 RX ORDER — MELOXICAM 15 MG/1
15 TABLET ORAL DAILY PRN
Qty: 30 TABLET | Refills: 0 | Status: SHIPPED | OUTPATIENT
Start: 2021-03-30 | End: 2021-05-04 | Stop reason: SDUPTHER

## 2021-03-30 NOTE — PROGRESS NOTES
Virtual Regular Visit      Assessment/Plan:    Problem List Items Addressed This Visit        Respiratory    Acute respiratory failure with hypoxia (HCC)     ·  See COVID19 Plan            Other    COVID-19 - Primary     ·  Continue supplemental oxygen for goal SpO2 of greater than 92%   · Patient instructed to ambulate as tolerated   · Supportive care for any further cough   · I will recheck D-dimer in the next 1-1 and half weeks to confirm no hypercoagulable state secondary to COVID-19 infection   · Check full PFTs with 6 minutes walk test prior to next visit in 1 month         Relevant Orders    D-dimer, quantitative    Complete PFT with post Bronchodilator and Six Minute walk               Reason for visit is   Chief Complaint   Patient presents with    Virtual Regular Visit        Encounter provider Tiffany Bonds MD    Provider located at [place] [address] [place] [id number]      Recent Visits  No visits were found meeting these conditions  Showing recent visits within past 7 days and meeting all other requirements     Today's Visits  Date Type Provider Dept   03/30/21 Telemedicine Thomas Conte MD [facility]   Showing today's visits and meeting all other requirements     Future Appointments  No visits were found meeting these conditions  Showing future appointments within next 150 days and meeting all other requirements        The patient was identified by name and date of birth  Marivel Abbott was informed that this is a telemedicine visit and that the visit is being conducted through [facility] and patient was informed that this is a secure, HIPAA-compliant platform  He agrees to proceed     My office door was closed  No one else was in the room  He acknowledged consent and understanding of privacy and security of the video platform   The patient has agreed to participate and understands they can discontinue the visit at any time  Patient is aware this is a billable service  Subjective  Immanuel Canada is a 38 y o  male    42-year-old male that presents as a post COVID-19 follow-up  Patient was admitted to [facility] recently for acute hypoxic respiratory failure secondary to COVID-19 pneumonia  Patient states that shortness of breath is improved drastically since admission he is still requiring 2 L of supplemental oxygen  When measuring his  SpO2, he states that he is usually above 94%  He has rare occasional cough  Cough is dry in nature  No further fevers or chills  He states that his energy is improving  Past Medical History:   Diagnosis Date    Diabetes mellitus ([facility] [address] )     type 2    Disease of thyroid gland     hypothyroidism    Hyperlipidemia     Hypertension     Psychiatric disorder     PTSD   Anxiety, depression,        Past Surgical History:   Procedure Laterality Date    WISDOM TOOTH EXTRACTION         Current Outpatient Medications   Medication Sig Dispense Refill    ALPRAZolam (XANAX) 0 5 mg tablet Take 0 5 mg by mouth Three times daily as needed      atorvastatin (LIPITOR) 40 mg tablet Take 1 tablet (40 mg total) by mouth daily at bedtime for 14 days 14 tablet 0    cloNIDine (CATAPRES) 0 2 mg tablet Take 0 1 mg by mouth daily       dicyclomine (BENTYL) 20 mg tablet Take 1 tablet (20 mg total) by mouth every 6 (six) hours as needed (abdominal pain) 45 tablet 2    divalproex sodium (DEPAKOTE) 500 mg EC tablet Take 500 mg by mouth every 12 (twelve) hours       Empagliflozin (Jardiance) 25 MG TABS Take 1 tablet by mouth daily      gabapentin (NEURONTIN) 300 mg capsule Take 300 mg by mouth Three times a day      insulin glargine (LANTUS SOLOSTAR) 100 units/mL injection pen Inject 45 Units under the skin daily 15 mL 0    levothyroxine 25 mcg tablet Take 25 mcg by mouth daily      loratadine (CLARITIN) 10 mg tablet Take 10 mg by mouth daily      metFORMIN (GLUCOPHAGE) 1000 MG tablet Take 1,000 mg by mouth daily       metoprolol succinate (TOPROL-XL) 25 mg 24 hr tablet Take 1 tablet (25 mg total) by mouth daily 30 tablet 0    multivitamin-minerals (CENTRUM ADULTS) tablet Take 1 tablet by mouth daily 30 tablet 0    OMEGA-3 FATTY ACIDS PO Take 2 g by mouth daily      omeprazole (PriLOSEC) 40 MG capsule Take 1 tablet 30-60 minutes prior to breakfast 30 capsule 3    [START ON 4/9/2021] pravastatin (PRAVACHOL) 40 mg tablet Take 1 tablet (40 mg total) by mouth daily  0    sertraline (ZOLOFT) 25 mg tablet Take 1 tablet (25 mg total) by mouth daily for 8 doses 8 tablet 0    [START ON 4/3/2021] sertraline (ZOLOFT) 50 mg tablet Take 1 tablet (50 mg total) by mouth daily 30 tablet 0    sildenafil (VIAGRA) 100 mg tablet Take 100 mg by mouth as needed for erectile dysfunction       traZODone (DESYREL) 100 mg tablet Take 100 mg by mouth        No current facility-administered medications for this visit  Allergies   Allergen Reactions    Lamotrigine GI Intolerance    Niacin Rash    Simvastatin Other (See Comments)     Elevated liver enzymes  Liver enzyme elevation       Review of Systems   Constitutional: Positive for fatigue  Negative for chills, diaphoresis and fever  HENT: Negative for rhinorrhea and sneezing  Respiratory: Positive for cough and shortness of breath  Negative for chest tightness  Cardiovascular: Negative for chest pain  Gastrointestinal: Negative for abdominal pain  Musculoskeletal: Negative for gait problem  Neurological: Negative for dizziness and light-headedness  Psychiatric/Behavioral: Negative for agitation  Video Exam    Vitals:    03/30/21 0905   Pulse: 81   Temp: (!) 97 4 °F (36 3 °C)   SpO2: 93%   Weight: 71 2 kg (157 lb)   Height: 5' 5" (1 651 m)       Physical Exam  Constitutional:       Appearance: Normal appearance  HENT:      Head: Normocephalic and atraumatic        Nose: Nose normal    Eyes:      Extraocular Movements: Extraocular movements intact  Pupils: Pupils are equal, round, and reactive to light  Neck:      Musculoskeletal: Normal range of motion  Pulmonary:      Effort: Pulmonary effort is normal    Abdominal:      Tenderness: There is no abdominal tenderness  Neurological:      General: No focal deficit present  Mental Status: He is alert and oriented to person, place, and time  Psychiatric:         Mood and Affect: Mood normal          Judgment: Judgment normal           I spent Twelve minutes with patient today in which greater than 50% of the time was spent in counseling/coordination of care regarding Continued care regarding COVID-19 infection/pneumonia      VIRTUAL VISIT [address] acknowledges that he has consented to an online visit or consultation  He understands that the online visit is based solely on information provided by him, and that, in the absence of a face-to-face physical evaluation by the physician, the diagnosis he receives is both limited and provisional in terms of accuracy and completeness  This is not intended to replace a full medical face-to-face evaluation by the physician  Mari Better understands and accepts these terms

## 2021-03-30 NOTE — TELEPHONE ENCOUNTER
Patient calling to see when we would be able to complete his [employer] form  Advised that he should give us a call on 4/9 to confirm he has not had a seizure in 6 months  Patient made aware   Also added to the wait list

## 2021-03-30 NOTE — ASSESSMENT & PLAN NOTE
Will prescribe meloxicam to be taken as needed for pain  Continue gabapentin, and Tylenol as needed    Will refer to Physical therapy for further evaluation treatment

## 2021-03-30 NOTE — PROGRESS NOTES
Assessment/Plan:    Muscle weakness of lower extremity  Will prescribe meloxicam to be taken as needed for pain  Continue gabapentin, and Tylenol as needed  Will refer to Physical therapy for further evaluation treatment       Diagnoses and all orders for this visit:    Muscle weakness of lower extremity  -     Ambulatory referral to Physical Therapy; Future  -     meloxicam (MOBIC) 15 mg tablet; Take 1 tablet (15 mg total) by mouth daily as needed for mild pain or moderate pain                  Subjective:      Patient ID: Alyssa Doan is a 38 y o  male  Chief Complaint   Patient presents with    Hip Pain     Patient is here c/o hip and leg pain since last thurday  Pain is getting worse  Patient will like to have the AWV done with Dr Marjorie Hammer  38year old male is seen today with concern for bilateral leg pain from hips and distal  He denies any recent trauma  He has been immobile for the past 21 days due to [place] and hospitalization for COVID pneumonia  He has noticed muscular atrophy during this time  He walks with a limp due to the pain  He has lost approximately 20 lbs in weight since diagnosis of COVID  He has been taking gabapentin for his pain with mild relief  The following portions of the patient's history were reviewed and updated as appropriate: allergies, current medications, past family history, past medical history, past social history, past surgical history and problem list     Review of Systems   Constitutional: Negative for activity change, appetite change, chills, diaphoresis, fatigue and fever  HENT: Negative for congestion, postnasal drip, rhinorrhea, sinus pressure, sinus pain, sneezing and sore throat  Eyes: Negative for visual disturbance  Respiratory: Negative for apnea, cough, choking, chest tightness, shortness of breath and wheezing  Cardiovascular: Negative for chest pain, palpitations and leg swelling     Gastrointestinal: Negative for abdominal distention, abdominal pain, anal bleeding, blood in stool, constipation, diarrhea, nausea and vomiting  Endocrine: Negative for cold intolerance and heat intolerance  Genitourinary: Negative for difficulty urinating, dysuria and hematuria  Musculoskeletal: Negative  Skin: Negative  Neurological: Negative for dizziness, weakness, light-headedness, numbness and headaches  Hematological: Negative for adenopathy  Psychiatric/Behavioral: Negative for agitation, sleep disturbance and suicidal ideas  All other systems reviewed and are negative  Past Medical History:   Diagnosis Date    Diabetes mellitus ([facility] [address] )     type 2    Disease of thyroid gland     hypothyroidism    Hyperlipidemia     Hypertension     Psychiatric disorder     PTSD   Anxiety, depression,          Current Outpatient Medications:     ALPRAZolam (XANAX) 0 5 mg tablet, Take 0 5 mg by mouth Three times daily as needed, Disp: , Rfl:     atorvastatin (LIPITOR) 40 mg tablet, Take 1 tablet (40 mg total) by mouth daily at bedtime for 14 days, Disp: 14 tablet, Rfl: 0    cloNIDine (CATAPRES) 0 2 mg tablet, Take 0 1 mg by mouth daily , Disp: , Rfl:     dicyclomine (BENTYL) 20 mg tablet, Take 1 tablet (20 mg total) by mouth every 6 (six) hours as needed (abdominal pain), Disp: 45 tablet, Rfl: 2    divalproex sodium (DEPAKOTE) 500 mg EC tablet, Take 500 mg by mouth every 12 (twelve) hours , Disp: , Rfl:     Empagliflozin (Jardiance) 25 MG TABS, Take 1 tablet by mouth daily, Disp: , Rfl:     gabapentin (NEURONTIN) 300 mg capsule, Take 300 mg by mouth Three times a day, Disp: , Rfl:     insulin glargine (LANTUS SOLOSTAR) 100 units/mL injection pen, Inject 45 Units under the skin daily, Disp: 15 mL, Rfl: 0    levothyroxine 25 mcg tablet, Take 25 mcg by mouth daily, Disp: , Rfl:     loratadine (CLARITIN) 10 mg tablet, Take 10 mg by mouth daily, Disp: , Rfl:     metFORMIN (GLUCOPHAGE) 1000 MG tablet, Take 1,000 mg by mouth daily , Disp: , Rfl:     metoprolol succinate (TOPROL-XL) 25 mg 24 hr tablet, Take 1 tablet (25 mg total) by mouth daily, Disp: 30 tablet, Rfl: 0    multivitamin-minerals (CENTRUM ADULTS) tablet, Take 1 tablet by mouth daily, Disp: 30 tablet, Rfl: 0    OMEGA-3 FATTY ACIDS PO, Take 2 g by mouth daily, Disp: , Rfl:     omeprazole (PriLOSEC) 40 MG capsule, Take 1 tablet 30-60 minutes prior to breakfast, Disp: 30 capsule, Rfl: 3    [START ON 4/9/2021] pravastatin (PRAVACHOL) 40 mg tablet, Take 1 tablet (40 mg total) by mouth daily, Disp:  , Rfl: 0    sertraline (ZOLOFT) 25 mg tablet, Take 1 tablet (25 mg total) by mouth daily for 8 doses, Disp: 8 tablet, Rfl: 0    [START ON 4/3/2021] sertraline (ZOLOFT) 50 mg tablet, Take 1 tablet (50 mg total) by mouth daily, Disp: 30 tablet, Rfl: 0    sildenafil (VIAGRA) 100 mg tablet, Take 100 mg by mouth as needed for erectile dysfunction , Disp: , Rfl:     traZODone (DESYREL) 100 mg tablet, Take 100 mg by mouth , Disp: , Rfl:     meloxicam (MOBIC) 15 mg tablet, Take 1 tablet (15 mg total) by mouth daily as needed for mild pain or moderate pain, Disp: 30 tablet, Rfl: 0    Allergies   Allergen Reactions    Lamotrigine GI Intolerance    Niacin Rash    Simvastatin Other (See Comments)     Elevated liver enzymes  Liver enzyme elevation       Social History   Past Surgical History:   Procedure Laterality Date    WISDOM TOOTH EXTRACTION       Family History   Problem Relation Age of Onset    Hyperlipidemia Father     Heart attack Paternal Grandfather        Objective:  /60 (BP Location: Left arm, Patient Position: Sitting, Cuff Size: Standard)   Pulse 92   Temp 97 5 °F (36 4 °C) (Temporal)   Ht 5' 5" (1 651 m)   Wt 76 2 kg (168 lb)   SpO2 94%   BMI 27 96 kg/m²     Recent Results (from the past 1344 hour(s))   Novel Coronavirus (Covid-19),PCR [facility]    Collection Time: 03/09/21  7:38 PM    Specimen: Nose; Nares   Result Value Ref Range    SARS-CoV-2 Positive (A) Negative   APTT    Collection Time: 03/17/21 11:34 AM   Result Value Ref Range    PTT 28 23 - 37 seconds   Protime-INR    Collection Time: 03/17/21 11:34 AM   Result Value Ref Range    Protime 12 3 11 6 - 14 5 seconds    INR 0 90 0 84 - 1 19   Blood culture #1    Collection Time: 03/17/21 11:34 AM    Specimen: Arm, Right; Blood   Result Value Ref Range    Blood Culture No Growth After 5 Days      CBC and differential    Collection Time: 03/17/21 11:34 AM   Result Value Ref Range    WBC 7 44 4 31 - 10 16 Thousand/uL    RBC 5 05 3 88 - 5 62 Million/uL    Hemoglobin 14 9 12 0 - 17 0 g/dL    Hematocrit 44 1 36 5 - 49 3 %    MCV 87 82 - 98 fL    MCH 29 5 26 8 - 34 3 pg    MCHC 33 8 31 4 - 37 4 g/dL    RDW 13 6 11 6 - 15 1 %    MPV 9 6 8 9 - 12 7 fL    Platelets 940 976 - 131 Thousands/uL    nRBC 0 /100 WBCs   Comprehensive metabolic panel    Collection Time: 03/17/21 11:34 AM   Result Value Ref Range    Sodium 134 (L) 136 - 145 mmol/L    Potassium 4 3 3 5 - 5 3 mmol/L    Chloride 97 (L) 100 - 108 mmol/L    CO2 22 21 - 32 mmol/L    ANION GAP 15 (H) 4 - 13 mmol/L    BUN 27 (H) 5 - 25 mg/dL    Creatinine 1 33 (H) 0 60 - 1 30 mg/dL    Glucose 168 (H) 65 - 140 mg/dL    Calcium 9 3 8 3 - 10 1 mg/dL    Corrected Calcium 10 3 (H) 8 3 - 10 1 mg/dL     (H) 5 - 45 U/L     (H) 12 - 78 U/L    Alkaline Phosphatase 109 46 - 116 U/L    Total Protein 8 0 6 4 - 8 2 g/dL    Albumin 2 7 (L) 3 5 - 5 0 g/dL    Total Bilirubin 0 53 0 20 - 1 00 mg/dL    eGFR 68 ml/min/1 73sq m   Lactic acid    Collection Time: 03/17/21 11:34 AM   Result Value Ref Range    LACTIC ACID 2 4 (HH) 0 5 - 2 0 mmol/L   Procalcitonin with AM Reflex    Collection Time: 03/17/21 11:34 AM   Result Value Ref Range    Procalcitonin 3 17 (H) <=0 25 ng/ml   Blood gas, venous    Collection Time: 03/17/21 11:34 AM   Result Value Ref Range    pH, [place] 7 334 7 300 - 7 400    pCO2, [place] 40 8 (L) 42 0 - 50 0 mm Hg    pO2, [place] 19 9 (L) 35 0 - 45 0 mm Hg    HCO3, [place] 21 2 (L) 24 - 30 mmol/L    Base Excess, [place] -4 3 mmol/L    O2 Content, [place] 6 6 ml/dL    O2 HGB, VENOUS 32 8 (L) 60 0 - 80 0 %   Manual Differential(PHLEBS Do Not Order)    Collection Time: 03/17/21 11:34 AM   Result Value Ref Range    Segmented % 65 43 - 75 %    Bands % 10 (H) 0 - 8 %    Lymphocytes % 9 (L) 14 - 44 %    Monocytes % 15 (H) 4 - 12 %    Eosinophils, % 0 0 - 6 %    Basophils % 0 0 - 1 %    Myelocytes % 1 0 - 1 %    Absolute Neutrophils 5 58 1 85 - 7 62 Thousand/uL    Lymphocytes Absolute 0 67 0 60 - 4 47 Thousand/uL    Monocytes Absolute 1 12 0 00 - 1 22 Thousand/uL    Eosinophils Absolute 0 00 0 00 - 0 40 Thousand/uL    Basophils Absolute 0 00 0 00 - 0 10 Thousand/uL    Total Counted 100     Platelet Estimate Adequate Adequate   ECG 12 lead    Collection Time: 03/17/21 11:35 AM   Result Value Ref Range    Ventricular Rate 108 BPM    Atrial Rate 108 BPM    KS Interval 128 ms    QRSD Interval 84 ms    QT Interval 328 ms    QTC Interval 439 ms    P Esko 53 degrees    QRS Axis 37 degrees    T Wave Axis 37 degrees   Blood culture #2    Collection Time: 03/17/21 11:45 AM    Specimen: Arm, Right; Blood   Result Value Ref Range    Blood Culture No Growth After 5 Days      Troponin I    Collection Time: 03/17/21 12:07 PM   Result Value Ref Range    Troponin I <0 02 <=0 04 ng/mL   Beta Hydroxybutyrate    Collection Time: 03/17/21 12:07 PM   Result Value Ref Range    BETA-HYDROXYBUTYRATE 2 4 (H) <0 6 mmol/L   Lactic acid 2 Hours    Collection Time: 03/17/21  2:20 PM   Result Value Ref Range    LACTIC ACID 1 0 0 5 - 2 0 mmol/L   D-dimer, quantitative    Collection Time: 03/17/21  5:42 PM   Result Value Ref Range    D-Dimer, Quant <0 27 <0 50 ug/ml FEU   Ferritin    Collection Time: 03/17/21  5:42 PM   Result Value Ref Range    Ferritin 1,064 (H) 8 - 388 ng/mL   Fingerstick Glucose (POCT)    Collection Time: 03/17/21  6:07 PM   Result Value Ref Range    POC Glucose 113 65 - 140 mg/dl Baseline ECG 12 lead    Collection Time: 03/17/21  6:14 PM   Result Value Ref Range    Ventricular Rate 103 BPM    Atrial Rate 103 BPM    LA Interval 134 ms    QRSD Interval 88 ms    QT Interval 338 ms    QTC Interval 442 ms    P Axis 45 degrees    QRS Axis 22 degrees    T Wave Axis 24 degrees   Fingerstick Glucose (POCT)    Collection Time: 03/17/21  8:14 PM   Result Value Ref Range    POC Glucose 177 (H) 65 - 140 mg/dl   UA w Reflex to Microscopic w Reflex to Culture    Collection Time: 03/17/21  8:59 PM    Specimen: Urine, Clean Catch   Result Value Ref Range    Color, UA Yellow     Clarity, UA Clear     Specific [place], UA 1 015 1 003 - 1 030    pH, UA 6 0 4 5, 5 0, 5 5, 6 0, 6 5, 7 0, 7 5, 8 0    Leukocytes, UA Negative Negative    Nitrite, UA Negative Negative    Protein, UA 30 (1+) (A) Negative mg/dl    Glucose,  (1/2%) (A) Negative mg/dl    Ketones, UA 40 (2+) (A) Negative mg/dl    Urobilinogen, UA 0 2 0 2, 1 0 E U /dl E U /dl    Bilirubin, UA Small (A) Negative    Blood, UA Negative Negative   Urine Microscopic    Collection Time: 03/17/21  8:59 PM   Result Value Ref Range    RBC, UA None Seen None Seen, 0-1, 1-2, 2-4, 0-5 /hpf    WBC, UA 1-2 None Seen, 0-1, 1-2, 0-5, 2-4 /hpf    Epithelial Cells Occasional None Seen, Occasional /hpf    Bacteria, UA Occasional None Seen, Occasional /hpf   Fingerstick Glucose (POCT)    Collection Time: 03/17/21  9:46 PM   Result Value Ref Range    POC Glucose 202 (H) 65 - 140 mg/dl   Basic metabolic panel    Collection Time: 03/17/21 11:11 PM   Result Value Ref Range    Sodium 134 (L) 136 - 145 mmol/L    Potassium 4 3 3 5 - 5 3 mmol/L    Chloride 103 100 - 108 mmol/L    CO2 21 21 - 32 mmol/L    ANION GAP 10 4 - 13 mmol/L    BUN 20 5 - 25 mg/dL    Creatinine 1 00 0 60 - 1 30 mg/dL    Glucose 216 (H) 65 - 140 mg/dL    Calcium 8 0 (L) 8 3 - 10 1 mg/dL    eGFR 96 ml/min/1 73sq m   Fingerstick Glucose (POCT)    Collection Time: 03/18/21 12:15 AM   Result Value Ref Range POC Glucose 227 (H) 65 - 140 mg/dl   Fingerstick Glucose (POCT)    Collection Time: 03/18/21  2:40 AM   Result Value Ref Range    POC Glucose 195 (H) 65 - 140 mg/dl   Fingerstick Glucose (POCT)    Collection Time: 03/18/21  5:27 AM   Result Value Ref Range    POC Glucose 184 (H) 65 - 140 mg/dl   Procalcitonin Reflex    Collection Time: 03/18/21  5:37 AM   Result Value Ref Range    Procalcitonin 1 75 (H) <=0 25 ng/ml   CBC and differential    Collection Time: 03/18/21  5:37 AM   Result Value Ref Range    WBC 5 80 4 31 - 10 16 Thousand/uL    RBC 4 26 3 88 - 5 62 Million/uL    Hemoglobin 12 4 12 0 - 17 0 g/dL    Hematocrit 37 1 36 5 - 49 3 %    MCV 87 82 - 98 fL    MCH 29 1 26 8 - 34 3 pg    MCHC 33 4 31 4 - 37 4 g/dL    RDW 13 3 11 6 - 15 1 %    MPV 9 3 8 9 - 12 7 fL    Platelets 468 613 - 993 Thousands/uL    nRBC 0 /100 WBCs   Comprehensive metabolic panel    Collection Time: 03/18/21  5:37 AM   Result Value Ref Range    Sodium 137 136 - 145 mmol/L    Potassium 4 2 3 5 - 5 3 mmol/L    Chloride 105 100 - 108 mmol/L    CO2 20 (L) 21 - 32 mmol/L    ANION GAP 12 4 - 13 mmol/L    BUN 20 5 - 25 mg/dL    Creatinine 1 01 0 60 - 1 30 mg/dL    Glucose 193 (H) 65 - 140 mg/dL    Calcium 8 4 8 3 - 10 1 mg/dL    Corrected Calcium 9 9 8 3 - 10 1 mg/dL     (H) 5 - 45 U/L     (H) 12 - 78 U/L    Alkaline Phosphatase 129 (H) 46 - 116 U/L    Total Protein 6 5 6 4 - 8 2 g/dL    Albumin 2 1 (L) 3 5 - 5 0 g/dL    Total Bilirubin 0 34 0 20 - 1 00 mg/dL    eGFR 95 ml/min/1 73sq m   C-reactive protein    Collection Time: 03/18/21  5:37 AM   Result Value Ref Range     1 (H) <3 0 mg/L   Ferritin    Collection Time: 03/18/21  5:37 AM   Result Value Ref Range    Ferritin 1,578 (H) 8 - 388 ng/mL   D-dimer, quantitative    Collection Time: 03/18/21  5:37 AM   Result Value Ref Range    D-Dimer, Quant 1 17 (H) <0 50 ug/ml FEU   NT-BNP PRO    Collection Time: 03/18/21  5:37 AM   Result Value Ref Range    NT-proBNP 337 (H) <125 pg/mL   CK (with reflex to MB)    Collection Time: 03/18/21  5:37 AM   Result Value Ref Range    Total  39 - 308 U/L   CKMB    Collection Time: 03/18/21  5:37 AM   Result Value Ref Range    CK-MB Index <1 0 0 0 - 2 5 %    CK-MB 0 7 0 0 - 5 0 ng/mL   Manual Differential(PHLEBS Do Not Order)    Collection Time: 03/18/21  5:37 AM   Result Value Ref Range    Segmented % 74 43 - 75 %    Bands % 9 (H) 0 - 8 %    Lymphocytes % 9 (L) 14 - 44 %    Monocytes % 6 4 - 12 %    Eosinophils, % 0 0 - 6 %    Basophils % 0 0 - 1 %    Metamyelocytes% 2 (H) 0 - 1 %    Absolute Neutrophils 4 81 1 85 - 7 62 Thousand/uL    Lymphocytes Absolute 0 52 (L) 0 60 - 4 47 Thousand/uL    Monocytes Absolute 0 35 0 00 - 1 22 Thousand/uL    Eosinophils Absolute 0 00 0 00 - 0 40 Thousand/uL    Basophils Absolute 0 00 0 00 - 0 10 Thousand/uL    Total Counted 100     RBC Morphology Normal     Platelet Estimate Adequate Adequate   Troponin I    Collection Time: 03/18/21  5:41 AM   Result Value Ref Range    Troponin I <0 02 <=0 04 ng/mL   Fingerstick Glucose (POCT)    Collection Time: 03/18/21  7:58 AM   Result Value Ref Range    POC Glucose 176 (H) 65 - 140 mg/dl   Fingerstick Glucose (POCT)    Collection Time: 03/18/21 10:52 AM   Result Value Ref Range    POC Glucose 250 (H) 65 - 140 mg/dl   Fingerstick Glucose (POCT)    Collection Time: 03/18/21 12:52 PM   Result Value Ref Range    POC Glucose 273 (H) 65 - 140 mg/dl   Fingerstick Glucose (POCT)    Collection Time: 03/18/21  4:36 PM   Result Value Ref Range    POC Glucose 299 (H) 65 - 140 mg/dl   Fingerstick Glucose (POCT)    Collection Time: 03/18/21  9:52 PM   Result Value Ref Range    POC Glucose 280 (H) 65 - 140 mg/dl   Fingerstick Glucose (POCT)    Collection Time: 03/19/21 12:02 AM   Result Value Ref Range    POC Glucose 269 (H) 65 - 140 mg/dl   CBC    Collection Time: 03/19/21  6:02 AM   Result Value Ref Range    WBC 5 58 4 31 - 10 16 Thousand/uL    RBC 4 30 3 88 - 5 62 Million/uL Hemoglobin 12 5 12 0 - 17 0 g/dL    Hematocrit 37 6 36 5 - 49 3 %    MCV 87 82 - 98 fL    MCH 29 1 26 8 - 34 3 pg    MCHC 33 2 31 4 - 37 4 g/dL    RDW 13 5 11 6 - 15 1 %    Platelets 649 655 - 723 Thousands/uL    MPV 9 0 8 9 - 12 7 fL   Comprehensive metabolic panel    Collection Time: 03/19/21  6:02 AM   Result Value Ref Range    Sodium 142 136 - 145 mmol/L    Potassium 4 5 3 5 - 5 3 mmol/L    Chloride 108 100 - 108 mmol/L    CO2 21 21 - 32 mmol/L    ANION GAP 13 4 - 13 mmol/L    BUN 30 (H) 5 - 25 mg/dL    Creatinine 0 95 0 60 - 1 30 mg/dL    Glucose 298 (H) 65 - 140 mg/dL    Calcium 8 9 8 3 - 10 1 mg/dL    Corrected Calcium 10 4 (H) 8 3 - 10 1 mg/dL     (H) 5 - 45 U/L     (H) 12 - 78 U/L    Alkaline Phosphatase 159 (H) 46 - 116 U/L    Total Protein 6 4 6 4 - 8 2 g/dL    Albumin 2 1 (L) 3 5 - 5 0 g/dL    Total Bilirubin 0 32 0 20 - 1 00 mg/dL    eGFR 103 ml/min/1 73sq m   Magnesium    Collection Time: 03/19/21  6:02 AM   Result Value Ref Range    Magnesium 2 2 1 6 - 2 6 mg/dL   D-dimer, quantitative    Collection Time: 03/19/21  6:02 AM   Result Value Ref Range    D-Dimer, Quant 2 53 (H) <0 50 ug/ml FEU   Procalcitonin with AM Reflex    Collection Time: 03/19/21  6:02 AM   Result Value Ref Range    Procalcitonin 0 99 (H) <=0 25 ng/ml   TSH, 3rd generation    Collection Time: 03/19/21  6:02 AM   Result Value Ref Range    TSH 3RD GENERATON 1 755 0 358 - 3 740 uIU/mL   Fingerstick Glucose (POCT)    Collection Time: 03/19/21  8:11 AM   Result Value Ref Range    POC Glucose 274 (H) 65 - 140 mg/dl   Fingerstick Glucose (POCT)    Collection Time: 03/19/21 10:30 AM   Result Value Ref Range    POC Glucose 252 (H) 65 - 140 mg/dl   Fingerstick Glucose (POCT)    Collection Time: 03/19/21 12:55 PM   Result Value Ref Range    POC Glucose 296 (H) 65 - 140 mg/dl   Fingerstick Glucose (POCT)    Collection Time: 03/19/21  4:19 PM   Result Value Ref Range    POC Glucose 333 (H) 65 - 140 mg/dl   Chronic Hepatitis Panel Collection Time: 03/19/21  4:24 PM   Result Value Ref Range    Hepatitis B Surface Ag Non-reactive Non-reactive, NonReactive - Confirmed    Hepatitis C Ab Non-reactive Non-reactive    Hep B C IgM Non-reactive Non-reactive    Hep B Core Total Ab Non-reactive Non-reactive   APTT    Collection Time: 03/19/21  4:24 PM   Result Value Ref Range     (HH) 23 - 37 seconds   CBC    Collection Time: 03/19/21  4:24 PM   Result Value Ref Range    WBC 8 24 4 31 - 10 16 Thousand/uL    RBC 4 36 3 88 - 5 62 Million/uL    Hemoglobin 12 9 12 0 - 17 0 g/dL    Hematocrit 39 0 36 5 - 49 3 %    MCV 89 82 - 98 fL    MCH 29 6 26 8 - 34 3 pg    MCHC 33 1 31 4 - 37 4 g/dL    RDW 13 3 11 6 - 15 1 %    Platelets 817 182 - 430 Thousands/uL    MPV 9 1 8 9 - 12 7 fL   Protime-INR    Collection Time: 03/19/21  4:24 PM   Result Value Ref Range    Protime 14 6 (H) 11 6 - 14 5 seconds    INR 1 13 0 84 - 1 19   Fingerstick Glucose (POCT)    Collection Time: 03/19/21  8:51 PM   Result Value Ref Range    POC Glucose 240 (H) 65 - 140 mg/dl   APTT    Collection Time: 03/19/21 11:39 PM   Result Value Ref Range    PTT 49 (H) 23 - 37 seconds   Fingerstick Glucose (POCT)    Collection Time: 03/20/21 12:29 AM   Result Value Ref Range    POC Glucose 243 (H) 65 - 140 mg/dl   Procalcitonin Reflex    Collection Time: 03/20/21  5:35 AM   Result Value Ref Range    Procalcitonin 0 46 (H) <=0 25 ng/ml   CBC    Collection Time: 03/20/21  5:35 AM   Result Value Ref Range    WBC 8 43 4 31 - 10 16 Thousand/uL    RBC 4 39 3 88 - 5 62 Million/uL    Hemoglobin 12 6 12 0 - 17 0 g/dL    Hematocrit 38 8 36 5 - 49 3 %    MCV 88 82 - 98 fL    MCH 28 7 26 8 - 34 3 pg    MCHC 32 5 31 4 - 37 4 g/dL    RDW 13 4 11 6 - 15 1 %    Platelets 574 127 - 874 Thousands/uL    MPV 9 1 8 9 - 12 7 fL   Comprehensive metabolic panel    Collection Time: 03/20/21  5:35 AM   Result Value Ref Range    Sodium 141 136 - 145 mmol/L    Potassium 4 5 3 5 - 5 3 mmol/L    Chloride 104 100 - 108 mmol/L    CO2 24 21 - 32 mmol/L    ANION GAP 13 4 - 13 mmol/L    BUN 29 (H) 5 - 25 mg/dL    Creatinine 0 94 0 60 - 1 30 mg/dL    Glucose 295 (H) 65 - 140 mg/dL    Calcium 8 9 8 3 - 10 1 mg/dL    Corrected Calcium 10 3 (H) 8 3 - 10 1 mg/dL     (H) 5 - 45 U/L     (H) 12 - 78 U/L    Alkaline Phosphatase 150 (H) 46 - 116 U/L    Total Protein 6 3 (L) 6 4 - 8 2 g/dL    Albumin 2 3 (L) 3 5 - 5 0 g/dL    Total Bilirubin 0 49 0 20 - 1 00 mg/dL    eGFR 104 ml/min/1 73sq m   APTT    Collection Time: 03/20/21  5:35 AM   Result Value Ref Range    PTT 37 23 - 37 seconds   D-dimer, quantitative    Collection Time: 03/20/21  5:35 AM   Result Value Ref Range    D-Dimer, Quant 1 32 (H) <0 50 ug/ml FEU   C-reactive protein    Collection Time: 03/20/21  5:35 AM   Result Value Ref Range    CRP 58 8 (H) <3 0 mg/L   Fingerstick Glucose (POCT)    Collection Time: 03/20/21  7:49 AM   Result Value Ref Range    POC Glucose 243 (H) 65 - 140 mg/dl   Fingerstick Glucose (POCT)    Collection Time: 03/20/21 11:10 AM   Result Value Ref Range    POC Glucose 320 (H) 65 - 140 mg/dl   Fingerstick Glucose (POCT)    Collection Time: 03/20/21  4:33 PM   Result Value Ref Range    POC Glucose 271 (H) 65 - 140 mg/dl   Hemoglobin and hematocrit, blood    Collection Time: 03/20/21  7:55 PM   Result Value Ref Range    Hemoglobin 12 6 12 0 - 17 0 g/dL    Hematocrit 38 5 36 5 - 49 3 %   Fingerstick Glucose (POCT)    Collection Time: 03/20/21  9:22 PM   Result Value Ref Range    POC Glucose 204 (H) 65 - 140 mg/dl   CBC    Collection Time: 03/21/21  4:07 AM   Result Value Ref Range    WBC 8 36 4 31 - 10 16 Thousand/uL    RBC 4 33 3 88 - 5 62 Million/uL    Hemoglobin 12 6 12 0 - 17 0 g/dL    Hematocrit 37 8 36 5 - 49 3 %    MCV 87 82 - 98 fL    MCH 29 1 26 8 - 34 3 pg    MCHC 33 3 31 4 - 37 4 g/dL    RDW 13 2 11 6 - 15 1 %    Platelets 922 076 - 225 Thousands/uL    MPV 8 9 8 9 - 12 7 fL   Comprehensive metabolic panel    Collection Time: 03/21/21  4:07 AM   Result Value Ref Range    Sodium 139 136 - 145 mmol/L    Potassium 4 7 3 5 - 5 3 mmol/L    Chloride 106 100 - 108 mmol/L    CO2 26 21 - 32 mmol/L    ANION GAP 7 4 - 13 mmol/L    BUN 23 5 - 25 mg/dL    Creatinine 0 91 0 60 - 1 30 mg/dL    Glucose 271 (H) 65 - 140 mg/dL    Calcium 8 6 8 3 - 10 1 mg/dL    Corrected Calcium 10 0 8 3 - 10 1 mg/dL     (H) 5 - 45 U/L     (H) 12 - 78 U/L    Alkaline Phosphatase 128 (H) 46 - 116 U/L    Total Protein 5 8 (L) 6 4 - 8 2 g/dL    Albumin 2 2 (L) 3 5 - 5 0 g/dL    Total Bilirubin 0 41 0 20 - 1 00 mg/dL    eGFR 108 ml/min/1 73sq m   Fingerstick Glucose (POCT)    Collection Time: 03/21/21  7:12 AM   Result Value Ref Range    POC Glucose 251 (H) 65 - 140 mg/dl   Fingerstick Glucose (POCT)    Collection Time: 03/21/21 11:40 AM   Result Value Ref Range    POC Glucose 290 (H) 65 - 140 mg/dl   Fingerstick Glucose (POCT)    Collection Time: 03/21/21  3:44 PM   Result Value Ref Range    POC Glucose 297 (H) 65 - 140 mg/dl   Fingerstick Glucose (POCT)    Collection Time: 03/21/21  9:25 PM   Result Value Ref Range    POC Glucose 273 (H) 65 - 140 mg/dl   Fingerstick Glucose (POCT)    Collection Time: 03/21/21 10:33 PM   Result Value Ref Range    POC Glucose 306 (H) 65 - 140 mg/dl   Fingerstick Glucose (POCT)    Collection Time: 03/22/21  7:34 AM   Result Value Ref Range    POC Glucose 216 (H) 65 - 140 mg/dl   CBC    Collection Time: 03/22/21 10:42 AM   Result Value Ref Range    WBC 10 12 4 31 - 10 16 Thousand/uL    RBC 4 68 3 88 - 5 62 Million/uL    Hemoglobin 13 7 12 0 - 17 0 g/dL    Hematocrit 40 3 36 5 - 49 3 %    MCV 86 82 - 98 fL    MCH 29 3 26 8 - 34 3 pg    MCHC 34 0 31 4 - 37 4 g/dL    RDW 12 9 11 6 - 15 1 %    Platelets 601 792 - 328 Thousands/uL    MPV 9 0 8 9 - 12 7 fL   Comprehensive metabolic panel    Collection Time: 03/22/21 10:42 AM   Result Value Ref Range    Sodium 137 136 - 145 mmol/L    Potassium 4 3 3 5 - 5 3 mmol/L    Chloride 102 100 - 108 mmol/L CO2 25 21 - 32 mmol/L    ANION GAP 10 4 - 13 mmol/L    BUN 24 5 - 25 mg/dL    Creatinine 0 93 0 60 - 1 30 mg/dL    Glucose 346 (H) 65 - 140 mg/dL    Calcium 8 4 8 3 - 10 1 mg/dL    Corrected Calcium 9 8 8 3 - 10 1 mg/dL    AST 50 (H) 5 - 45 U/L     (H) 12 - 78 U/L    Alkaline Phosphatase 123 (H) 46 - 116 U/L    Total Protein 5 9 (L) 6 4 - 8 2 g/dL    Albumin 2 3 (L) 3 5 - 5 0 g/dL    Total Bilirubin 0 45 0 20 - 1 00 mg/dL    eGFR 105 ml/min/1 73sq m   Fingerstick Glucose (POCT)    Collection Time: 03/22/21 10:42 AM   Result Value Ref Range    POC Glucose 285 (H) 65 - 140 mg/dl   Fingerstick Glucose (POCT)    Collection Time: 03/22/21  3:08 PM   Result Value Ref Range    POC Glucose 293 (H) 65 - 140 mg/dl   Fingerstick Glucose (POCT)    Collection Time: 03/22/21  9:15 PM   Result Value Ref Range    POC Glucose 238 (H) 65 - 140 mg/dl   CBC    Collection Time: 03/23/21  5:09 AM   Result Value Ref Range    WBC 12 93 (H) 4 31 - 10 16 Thousand/uL    RBC 4 87 3 88 - 5 62 Million/uL    Hemoglobin 14 1 12 0 - 17 0 g/dL    Hematocrit 41 4 36 5 - 49 3 %    MCV 85 82 - 98 fL    MCH 29 0 26 8 - 34 3 pg    MCHC 34 1 31 4 - 37 4 g/dL    RDW 12 7 11 6 - 15 1 %    Platelets 785 355 - 602 Thousands/uL    MPV 9 0 8 9 - 12 7 fL   Comprehensive metabolic panel    Collection Time: 03/23/21  5:09 AM   Result Value Ref Range    Sodium 133 (L) 136 - 145 mmol/L    Potassium 5 0 3 5 - 5 3 mmol/L    Chloride 100 100 - 108 mmol/L    CO2 27 21 - 32 mmol/L    ANION GAP 6 4 - 13 mmol/L    BUN 23 5 - 25 mg/dL    Creatinine 0 86 0 60 - 1 30 mg/dL    Glucose 302 (H) 65 - 140 mg/dL    Calcium 8 6 8 3 - 10 1 mg/dL    Corrected Calcium 9 9 8 3 - 10 1 mg/dL    AST 36 5 - 45 U/L     (H) 12 - 78 U/L    Alkaline Phosphatase 118 (H) 46 - 116 U/L    Total Protein 6 0 (L) 6 4 - 8 2 g/dL    Albumin 2 4 (L) 3 5 - 5 0 g/dL    Total Bilirubin 0 60 0 20 - 1 00 mg/dL    eGFR 112 ml/min/1 73sq m   D-dimer, quantitative    Collection Time: 03/23/21  5:09 AM   Result Value Ref Range    D-Dimer, Quant 1 82 (H) <0 50 ug/ml FEU   Fingerstick Glucose (POCT)    Collection Time: 03/23/21  8:02 AM   Result Value Ref Range    POC Glucose 260 (H) 65 - 140 mg/dl   Fingerstick Glucose (POCT)    Collection Time: 03/23/21 11:39 AM   Result Value Ref Range    POC Glucose 308 (H) 65 - 140 mg/dl   Fingerstick Glucose (POCT)    Collection Time: 03/23/21  5:18 PM   Result Value Ref Range    POC Glucose 264 (H) 65 - 140 mg/dl   Fingerstick Glucose (POCT)    Collection Time: 03/23/21  9:12 PM   Result Value Ref Range    POC Glucose 269 (H) 65 - 140 mg/dl   Fingerstick Glucose (POCT)    Collection Time: 03/24/21  8:10 AM   Result Value Ref Range    POC Glucose 264 (H) 65 - 140 mg/dl   Fingerstick Glucose (POCT)    Collection Time: 03/24/21 11:49 AM   Result Value Ref Range    POC Glucose 373 (H) 65 - 140 mg/dl   Fingerstick Glucose (POCT)    Collection Time: 03/24/21  3:55 PM   Result Value Ref Range    POC Glucose 293 (H) 65 - 140 mg/dl   Fingerstick Glucose (POCT)    Collection Time: 03/24/21  9:54 PM   Result Value Ref Range    POC Glucose 455 (H) 65 - 140 mg/dl   Comprehensive metabolic panel    Collection Time: 03/25/21  5:45 AM   Result Value Ref Range    Sodium 135 (L) 136 - 145 mmol/L    Potassium 5 1 3 5 - 5 3 mmol/L    Chloride 102 100 - 108 mmol/L    CO2 25 21 - 32 mmol/L    ANION GAP 8 4 - 13 mmol/L    BUN 23 5 - 25 mg/dL    Creatinine 0 94 0 60 - 1 30 mg/dL    Glucose 302 (H) 65 - 140 mg/dL    Calcium 8 8 8 3 - 10 1 mg/dL    Corrected Calcium 9 9 8 3 - 10 1 mg/dL    AST 19 5 - 45 U/L     (H) 12 - 78 U/L    Alkaline Phosphatase 99 46 - 116 U/L    Total Protein 6 0 (L) 6 4 - 8 2 g/dL    Albumin 2 6 (L) 3 5 - 5 0 g/dL    Total Bilirubin 0 67 0 20 - 1 00 mg/dL    eGFR 104 ml/min/1 73sq m   CBC    Collection Time: 03/25/21  5:45 AM   Result Value Ref Range    WBC 15 96 (H) 4 31 - 10 16 Thousand/uL    RBC 4 86 3 88 - 5 62 Million/uL    Hemoglobin 14 3 12 0 - 17 0 g/dL Hematocrit 41 2 36 5 - 49 3 %    MCV 85 82 - 98 fL    MCH 29 4 26 8 - 34 3 pg    MCHC 34 7 31 4 - 37 4 g/dL    RDW 13 0 11 6 - 15 1 %    Platelets 531 (H) 892 - 390 Thousands/uL    MPV 9 4 8 9 - 12 7 fL   Fingerstick Glucose (POCT)    Collection Time: 03/25/21  8:26 AM   Result Value Ref Range    POC Glucose 259 (H) 65 - 140 mg/dl   Fingerstick Glucose (POCT)    Collection Time: 03/25/21 11:34 AM   Result Value Ref Range    POC Glucose 289 (H) 65 - 140 mg/dl   Home O2 Setup    Collection Time: 03/25/21  2:08 PM   Result Value Ref Range    Supplier Name [id number]     Supplier Phone Number [phone]     Order Status Delivery Successful     Delivery Note Portable taken from consignment     Delivery Request Date 03/25/2021     Date Delivered  03/25/2021     Supplier Name 03/25/2021     Item Description       Home Oxygen Concentrator with Portability, Adult, Standard Liter Flow    Item Description Portable Gaseous Oxygen System     Item Description Portable O2 Contents, Gas     Item Description No Conserving Device     Item Description O2 Humidifier Bottle, Standard Liter Flow             Physical Exam  Vitals signs and nursing note reviewed  Constitutional:       General: He is not in acute distress  Appearance: He is well-developed  He is not diaphoretic  HENT:      Head: Normocephalic and atraumatic  Eyes:      General: No scleral icterus  Right eye: No discharge  Left eye: No discharge  Conjunctiva/sclera: Conjunctivae normal       Pupils: Pupils are equal, round, and reactive to light  Neck:      Musculoskeletal: Normal range of motion and neck supple  Thyroid: No thyromegaly  Vascular: No JVD  Cardiovascular:      Rate and Rhythm: Normal rate and regular rhythm  Heart sounds: Normal heart sounds  No murmur  No friction rub  No gallop  Pulmonary:      Effort: Pulmonary effort is normal  No respiratory distress        Breath sounds: Normal breath sounds  No wheezing or rales  Chest:      Chest wall: No tenderness  Abdominal:      General: Bowel sounds are normal  There is no distension  Palpations: Abdomen is soft  There is no mass  Tenderness: There is no abdominal tenderness  There is no guarding or rebound  Musculoskeletal: Normal range of motion  General: No tenderness or deformity  Lymphadenopathy:      Cervical: No cervical adenopathy  Skin:     General: Skin is warm and dry  Coloration: Skin is not pale  Findings: No erythema or rash  Neurological:      Mental Status: He is alert and oriented to person, place, and time  Cranial Nerves: No cranial nerve deficit  Motor: Weakness (bilateral 4/5 strength  ) and atrophy (mild) present  Coordination: Coordination normal       Deep Tendon Reflexes: Reflexes are normal and symmetric  Psychiatric:         Behavior: Behavior normal          Thought Content:  Thought content normal          Judgment: Judgment normal

## 2021-03-30 NOTE — ASSESSMENT & PLAN NOTE
·  Continue supplemental oxygen for goal SpO2 of greater than 92%   · Patient instructed to ambulate as tolerated   · Supportive care for any further cough   · I will recheck D-dimer in the next 1-1 and half weeks to confirm no hypercoagulable state secondary to COVID-19 infection   · Check full PFTs with 6 minutes walk test prior to next visit in 1 month

## 2021-04-02 ENCOUNTER — EVALUATION (OUTPATIENT)
Dept: PHYSICAL THERAPY | Facility: CLINIC | Age: 36
End: 2021-04-02
Payer: MEDICARE

## 2021-04-02 DIAGNOSIS — M62.81 MUSCLE WEAKNESS OF LOWER EXTREMITY: ICD-10-CM

## 2021-04-02 DIAGNOSIS — R53.81 PHYSICAL DECONDITIONING: ICD-10-CM

## 2021-04-02 DIAGNOSIS — R26.89 BALANCE DISORDER: ICD-10-CM

## 2021-04-02 DIAGNOSIS — R29.898 MUSCULAR DECONDITIONING: Primary | ICD-10-CM

## 2021-04-02 PROCEDURE — 97112 NEUROMUSCULAR REEDUCATION: CPT

## 2021-04-02 PROCEDURE — 97163 PT EVAL HIGH COMPLEX 45 MIN: CPT

## 2021-04-02 NOTE — PROGRESS NOTES
PT Evaluation     Today's date: 2021  Patient name: Dania Lang  : 1985  MRN: [id number]  Referring provider: Alireza Swain MD  Dx:   Encounter Diagnosis     ICD-10-CM    1  Muscular deconditioning  R29 898    2  Muscle weakness of lower extremity  M62 81 Ambulatory referral to Physical Therapy   3  Physical deconditioning  R53 81    4  Balance disorder  R26 89        Start Time: 1005  Stop Time: 1100  Total time in clinic (min): 55 minutes    Assessment  Assessment details: Mr Edin Deleon") is a 39 y/o male presenting to [facility] OP PT at [address] [place] [address] for initial evaluation of functional mobility and strength s/p hospitalization for COVID-19 from 3/17/21 to 3/25/21 at [address] [place] [address]  He contracted the virus on 3/9/21 and meets the 20-day quarantine timeline to be seen today  His PMHx is significant for PTSD, anxiety, depression, pyschogenic nonepileptic seizures, and DM II  Upon examination he presents with significant deficiencies in BL LE muscular strength as assessed via MMT with greater deficiencies appreciate proximally than distally secondary to hospital-associated deconditioning from prolonged bed rest  Functionally, this manifested in significant reductions in power output as seen via 5xSTS testing today  His blood oxygen levels were monitored throughout the session, with significant drops to 90% with bed mobility that would recover with seated rest breaks  He did not need to resort to his 3L NC O2 supply throughout the session  Per 5xSTS and MCTSIB performance, he is considered at a greater risk for falls at this time and demonstrates poor vestibular integration (MCTSIB respectively)  Strength, power, and static balance testing were all significantly taxing for Bryan Acosta preventing further dynamic balance assessment via FGA, endurance assessment via 6 MWT, or aerobic assessment via Balk-C protocol and will be continued in upcoming sessions as tolerated   Bryan Acosta would benefit from skilled PT care to maximize his level of function, strength, endurance, quality of life, and minimize his risk for falls following his deconditioning secondary to his COVID recovery  Impairments: abnormal gait, abnormal muscle firing, abnormal movement, activity intolerance, impaired balance, impaired physical strength, lacks appropriate home exercise program, pain with function, safety issue, weight-bearing intolerance, poor posture  and poor body mechanics    Symptom irritability: highBarriers to therapy: Psychological history, episode of care for gait/balance interrupted by COVID diagnosis  Understanding of Dx/Px/POC: good   Prognosis: good    Goals  ST weeks  1  Pt will demonstrate independence with initial HEP    2  Pt will improve 5 x STS minimally by 15s with no UE assistance  3  Pt will improve gait speed minimally by 0 1 m/s   4  Pt will be able to complete FGA  5  Pt will be able to complete 6 MWT     LT-12 weeks  1  Pt will demonstrate independence with finalized HEP    2  Pt will no longer be considered a greater risk for falls per 5xSTS, MCTSIB, and FGA testing  3  Pt will subjectively report abilities to participate in household ADL's and looking after his four children at his PLOF  4  Pt will subjectively report no shortness of breath with all ADL's   5  Pt's SPO2 levels will not drop below 90% with subjectively strenuous activities  Additional goals to be added with subsequent outcome measure testing and progress reports  Plan  Plan details: Continue COVID exertional testing, endurance testing, and balance testing next visit    Patient would benefit from: speech eval and skilled physical therapy  Referral necessary: Yes  Planned modality interventions: biofeedback, cryotherapy, TENS, electrical stimulation/Russian stimulation and thermotherapy: hydrocollator packs  Planned therapy interventions: abdominal trunk stabilization, activity modification, ADL retraining, ADL training, balance, balance/weight bearing training, behavior modification, breathing training, body mechanics training, community reintegration, coordination, fine motor coordination training, flexibility, functional ROM exercises, gait training, graded activity, graded exercise, graded motor, home exercise program, transfer training, therapeutic training, therapeutic exercise, therapeutic activities, stretching, strengthening, sensory integrative techniques, postural training, patient education, neuromuscular re-education, muscle pump exercises, motor coordination training, massage, manual therapy, joint mobilization and IADL retraining  Frequency: 2x week  Duration in weeks: 12  Plan of Care beginning date: 4/2/2021  Plan of Care expiration date: 10/1/2021  Treatment plan discussed with: patient        Subjective Evaluation    History of Present Illness  Date of onset: 3/9/2021  Mechanism of injury: Mr Vicky Taylor") is a 37 y/o male presenting to [facility] OP PT at [address] [place] [address] for initial evaluation of functional mobility and strength s/p hospitalization for COVID-19 from 3/17/21 to 3/25/21 at [address] [place] [address]  He contracted the virus on 3/9/21 and meets the 20-day quarantine timeline to be seen today  Prior to COVID diagnosis and hospitalization, Devyn Toth was being seen at [facility] for physical therapy services to address L sided weakness he believes was related to his pyschogenic nonepileptic seizures and concomittant c/o dizziness (see chart review eval 1/4/21)  Up until this recent setback, Devyn Toth felt he was making good progress with skilled PT care and has fewer episodes of dizziness  Devyn Toth says that when he was diagnosed with COVID, his wife and 4 children also contracted the virus  His children displayed no symptoms and his wife was significantly less affected by the virus than he was   He says that over the course of his hospitalization he was on bed rest for more than a week, "lost 20 pounds" (also seen in [address] [place] [address], [address] note), and "was so close to being sent to the ICU " He denies being sent to the ICU nor being intubated, and has had no exacerbations in depression or PTSD  He feels that his breathing and leg strength were most affected; he was DC from the hospital with 3L NC PRN and had such significant leg pain from muscle soreness he was prescribed meloxicam to manage these symptoms  He says the pulmonologist there suspected he was exposed to chemicals over the course of his service in [facility] in   He has yet to schedule a pulmonary function test (orders present in Epic)  He only uses his NC O2 when he feels like his "chest can't keep up" with what he is doing as a means to recover  Functionally, he feels severely taxed from an energy and strength standpoint with just standing up, walking, and navigating stairs  He feels frustrated he cannot help his wife with most chores around the house and has difficulty with keeping up with his 4 children  He is on disability no longer working since his tour in [facility] so spending all his time at home is difficult for him  Recurrent probem    Quality of life: poor    Pain  Current pain ratin  At best pain ratin  At worst pain rating: 10  Location: BL LE due to muscle weakness and   Quality: burning, cramping and discomfort  Relieving factors: relaxation and rest  Aggravating factors: standing, walking, stair climbing, lifting and running  Progression: improved    Social Support  Steps to enter house: yes  Stairs in house: yes   Lives in: multiple-level home  Lives with: spouse and young children    Employment status: not working (On disability)  Hand dominance: right  Exercise history: Sedentary lifestyle, looking after 4 children at home      Treatments  Previous treatment: physical therapy and medication  Current treatment: medication and physical therapy  Discharged from (in last 30 days): inpatient hospitalization  Patient Goals  Patient goals for therapy: increased strength, independence with ADLs/IADLs, return to sport/leisure activities, increased motion, improved balance and decreased pain  Patient goal: "To get the strength back in my legs "        Objective  Post Covid Evaluation    Yellow flag questions:   Were you in the ICU? NO  Were you on a ventilator? NO                 Difficulty with Swallowing? Change in Voice? - Has frequent mouth dryness secondary to breathing and medications, occasional difficulties with swallowing and is open to SLP consult  Breathing Difficulty? -  Pulmonary function test has not yet been scheduled  Patient Specific Functional Scale score: Not included in FOTO this visit      Objective:      Vitals: Seated, manual, L UE   - BP: 112/72 mmHg  - HR: 74 BPM  - P02: 96% resting    Lower Extremity Objective Measures    (MMT=Manual Muscle Test, P/AROM=Passive/Active Range of Motion)  Muscle/Movement MMT PROM AROM   Hip L R L R L R   Flexion 4-/5 4-/5       Extension 2/5 2/5       Abduction 2/5 2+/5       Adduction 3/5 3/5       Knee         Flexion 4/5 4/5       Extension 4/5 4/5       Ankle         Dorsiflexion 4+/5 4+/5       Plantarflexion 4/5 4/5       Inversion         Eversion         Greater Hallux         Flexion         Extension           Functional Outcome Measures:    6 minute walk test: TBA NV    Gait Speed : TBA NV    Balance :  5x Sit to stand:  1:13 58 , 6/10 mRPE/ dyspnea scale  MCTSIB: FTEO firm 30s, FTEC firm 30s, FTEO foam 30s, FTEC 5 15s  FGA score: TBA NV    Balk C Protocol: TO BE ASSESSED NV OR WHEN APPROPRIATE  Recommended Intensity Dyspnea Scale: 3-5 (moderate to heavy)     Rate of perceived exertion:3-4/10 (moderate to heavy)      HR:    Minutes Incline/LVL RPE Dyspnea Heart Rate PO2 BP   1 min 0%        2 min 1%        3min  2%        4 min  3%        5 min  4%        6 min 5%        7 min 6%        8 min 7%        9 min 8%        10 min 9%        11 min 10%        12 min 11%        13 min 12%        14 min 13%        15 min  14%            Breathing evaluation:     Describe breathing: via: On RA at rest, 3L NC for recovery with exertional activities at home  No NC needed for bed mobilities or 5xSTS today  Breathing pattern: Apical breathing, reports frequent shoulder/neck fatigue secondary    Capillary refill: 2 seconds/phalange BL  Clubbing: absent  Nasal flaring: absent       02 saturation: supine: 96% seated: 96% standin% with bed rolling activity this session: 90%      Rib cage expansion/Chest wall mobility    Supine normal breathe (tidal)/supine deep breathe(vital capacity) Seated normal breathe (tidal)/supine deep breathe (vital capacity) Standing normal breathe (tidal)/supine deep breathe (vital capacity)   Axilla  Tidal: normal  Vital: limited    xiphoid  Tidal: normal  Vital: limited    Half way between xiphoid and umbilicus  Tidal: normal  Vital: limited          Is chest wall expansion symmetrical? YES    Flowsheet Rows      Most Recent Value   PT/OT G-Codes   Current Score  31   Projected Score  56             Precautions: FALL RISK, POST-COVID (3/9/21-3/29/21), Hx PTSD, PNES, DMII, NO NC AT REST, 3L NC WITH EXERTION      Manuals 21  IE                                                                Neuro Re-Ed                                                                                           Pt Education Hospital-associated deconditioning, COVID-19 influence on respiratory dysfunction, POC            Ther Ex             Hip 3-Way HEP            STS HEP                                                                                          Ther Activity                                       Gait Training                                       Modalities

## 2021-04-05 ENCOUNTER — TELEMEDICINE (OUTPATIENT)
Dept: INTERNAL MEDICINE CLINIC | Age: 36
End: 2021-04-05
Payer: MEDICARE

## 2021-04-05 VITALS — BODY MASS INDEX: 26.16 KG/M2 | WEIGHT: 157 LBS | HEIGHT: 65 IN

## 2021-04-05 DIAGNOSIS — J96.01 ACUTE RESPIRATORY FAILURE WITH HYPOXIA (HCC): ICD-10-CM

## 2021-04-05 DIAGNOSIS — Z79.4 TYPE 2 DIABETES MELLITUS WITHOUT COMPLICATION, WITH LONG-TERM CURRENT USE OF INSULIN (HCC): ICD-10-CM

## 2021-04-05 DIAGNOSIS — U07.1 COVID-19: ICD-10-CM

## 2021-04-05 DIAGNOSIS — R06.2 WHEEZING: Primary | ICD-10-CM

## 2021-04-05 DIAGNOSIS — E11.9 TYPE 2 DIABETES MELLITUS WITHOUT COMPLICATION, WITH LONG-TERM CURRENT USE OF INSULIN (HCC): ICD-10-CM

## 2021-04-05 PROCEDURE — 99214 OFFICE O/P EST MOD 30 MIN: CPT | Performed by: INTERNAL MEDICINE

## 2021-04-05 RX ORDER — ALBUTEROL SULFATE 90 UG/1
2 AEROSOL, METERED RESPIRATORY (INHALATION) EVERY 6 HOURS PRN
Qty: 1 INHALER | Refills: 1 | Status: SHIPPED | OUTPATIENT
Start: 2021-04-05 | End: 2021-11-04 | Stop reason: SDUPTHER

## 2021-04-05 NOTE — PROGRESS NOTES
Virtual Regular Visit      Assessment/Plan:    COVID-19 infection   - improving  - patient was counseled that we will continue with symptomatic management   - he was counseled to continue with a daily multivitamin, to continue to keep well hydrated, and eat a diabetic diet as tolerated, wash his hands liberally with soap and water for at least 20 seconds at a time  -I reassured and counseled patient that people recover at different rates and so he should take good care of himself without trying to over do things   -continue with physical therapy  - he was counseled to call the office or go to the emergency department if he develops worsening shortness of breath, chest pain, fever or an inability to tolerate oral intake  - follow-up in 1 week    Acute hypoxic respiratory failure with wheezing   - stable and gradually improving, currently on 3 L of oxygen by nasal cannula as needed  -continue with oxygen as needed and titrate down as tolerated  - will send  some albuterol inhaler to his pharmacy for wheezing and shortness of breath    -he was counseled to monitor his heart rate and to call the office if heart rate rises above 100 consistently on the albuterol inhaler      Diabetes mellitus type 2  - not optimally controlled, blood glucose has been running high into the 300s  and last hemoglobin A1c done on 10/ 27/20 was 7 8   - patient was counseled on importance of keeping his blood glucose well controlled to improve healing  -he was counseled to get his  Hemoglobin A1c recheck  -continue with insulin Lantus and with a diabetic diet    Problem List Items Addressed This Visit        Endocrine    Type 2 diabetes mellitus without complication, with long-term current use of insulin ([facility])       Respiratory    Acute respiratory failure with hypoxia ([facility] [address] )       Other    COVID-19      Other Visit Diagnoses     Wheezing    -  Primary    Relevant Medications    albuterol (PROVENTIL HFA,VENTOLIN HFA) 90 mcg/act inhaler Reason for visit is   Chief Complaint   Patient presents with    Shortness of Breath    COVID-19    Virtual Regular Visit        Encounter provider Gem Gray DO    Provider located at [address] [place] [address]  [place] [address]      Recent Visits  No visits were found meeting these conditions  Showing recent visits within past 7 days and meeting all other requirements     Today's Visits  Date Type Provider Dept   04/05/21 Telemedicine Gem Gray DO [facility]   Showing today's visits and meeting all other requirements     Future Appointments  No visits were found meeting these conditions  Showing future appointments within next 150 days and meeting all other requirements        The patient was identified by name and date of birth  Celine Smith was informed that this is a telemedicine visit and that the visit is being conducted through [employer] and patient was informed that this is not a secure, HIPAA-compliant platform  He agrees to proceed     My office door was closed  No one else was in the room  He acknowledged consent and understanding of privacy and security of the video platform  The patient has agreed to participate and understands they can discontinue the visit at any time  Patient is aware this is a billable service  Subjective  Celine Smith is a 38 y o  male    HPI    Patient presents via telemedicine visit for a follow-up visit regarding his COVID-19 infection  He states that he was diagnosed with COVID-19 infection on March 9th, 2021 and was admitted in the hospital  On March 17th, 2021and discharged about 2 weeks ago  He complains of worsening shortness of breath over the past couple of days but states that his oxygen saturation remains between 92 and 95    Today oxygen saturation is 95% and he states that he does not use oxygen all the time but only uses it when he has to talk for a while or when he is exerting himself  He states that he may have been overdoing things at home since he was discharged  He admits to chest tightness and occasional wheezing with fatigue and arthralgias in the bilateral lower extremities  He also admits to a mild intermittent dry cough  He states that he lost about 20 lb while in the hospital   He denies fever, chills, night sweats, chest pain, palpitations, nausea, vomiting, abdominal pain, diarrhea, constipation, headache or dizziness  Of note, he does have diabetes mellitus and states that his blood sugars have been elevated  He got a value of 300 a couple of days ago  Past Medical History:   Diagnosis Date    Diabetes mellitus ([facility] [address] )     type 2    Disease of thyroid gland     hypothyroidism    Hyperlipidemia     Hypertension     Psychiatric disorder     PTSD   Anxiety, depression,        Past Surgical History:   Procedure Laterality Date    WISDOM TOOTH EXTRACTION         Current Outpatient Medications   Medication Sig Dispense Refill    ALPRAZolam (XANAX) 0 5 mg tablet Take 0 5 mg by mouth Three times daily as needed      atorvastatin (LIPITOR) 40 mg tablet Take 1 tablet (40 mg total) by mouth daily at bedtime for 14 days 14 tablet 0    divalproex sodium (DEPAKOTE) 500 mg EC tablet Take 500 mg by mouth every 12 (twelve) hours       Empagliflozin (Jardiance) 25 MG TABS Take 1 tablet by mouth daily      gabapentin (NEURONTIN) 300 mg capsule Take 300 mg by mouth Three times a day      insulin glargine (LANTUS SOLOSTAR) 100 units/mL injection pen Inject 45 Units under the skin daily 15 mL 0    levothyroxine 25 mcg tablet Take 25 mcg by mouth daily      loratadine (CLARITIN) 10 mg tablet Take 10 mg by mouth daily      meloxicam (MOBIC) 15 mg tablet Take 1 tablet (15 mg total) by mouth daily as needed for mild pain or moderate pain 30 tablet 0    metFORMIN (GLUCOPHAGE) 1000 MG tablet Take 1,000 mg by mouth daily  metoprolol succinate (TOPROL-XL) 25 mg 24 hr tablet Take 1 tablet (25 mg total) by mouth daily 30 tablet 0    multivitamin-minerals (CENTRUM ADULTS) tablet Take 1 tablet by mouth daily 30 tablet 0    OMEGA-3 FATTY ACIDS PO Take 2 g by mouth daily      omeprazole (PriLOSEC) 40 MG capsule Take 1 tablet 30-60 minutes prior to breakfast 30 capsule 3    [START ON 4/9/2021] pravastatin (PRAVACHOL) 40 mg tablet Take 1 tablet (40 mg total) by mouth daily  0    sertraline (ZOLOFT) 50 mg tablet Take 1 tablet (50 mg total) by mouth daily 30 tablet 0    sildenafil (VIAGRA) 100 mg tablet Take 100 mg by mouth as needed for erectile dysfunction       traZODone (DESYREL) 100 mg tablet Take 100 mg by mouth       albuterol (PROVENTIL HFA,VENTOLIN HFA) 90 mcg/act inhaler Inhale 2 puffs every 6 (six) hours as needed for wheezing or shortness of breath 1 Inhaler 1    cloNIDine (CATAPRES) 0 2 mg tablet Take 0 1 mg by mouth daily       dicyclomine (BENTYL) 20 mg tablet Take 1 tablet (20 mg total) by mouth every 6 (six) hours as needed (abdominal pain) (Patient not taking: Reported on 4/5/2021) 45 tablet 2    sertraline (ZOLOFT) 25 mg tablet Take 1 tablet (25 mg total) by mouth daily for 8 doses 8 tablet 0     No current facility-administered medications for this visit  Allergies   Allergen Reactions    Lamotrigine GI Intolerance    Niacin Rash    Simvastatin Other (See Comments)     Elevated liver enzymes  Liver enzyme elevation       Review of Systems   Constitutional: Positive for fatigue  Negative for activity change, chills, fever and unexpected weight change  HENT: Negative for ear pain, postnasal drip, rhinorrhea, sinus pressure and sore throat  Eyes: Negative for pain  Respiratory: Positive for cough (occasional dry cough), chest tightness, shortness of breath (worsened in the past couple of days) and wheezing ( occasionally)  Negative for choking           Was diagnosed with covid 19 infection about one month , was hospitalized and was on 12 L of oxygen while in the hospital and was discharged from the hospital about 2 weeks ago  Cardiovascular: Negative for chest pain, palpitations and leg swelling  Gastrointestinal: Negative for abdominal pain, constipation, diarrhea, nausea and vomiting  Genitourinary: Negative for dysuria and hematuria  Musculoskeletal: Positive for arthralgias (in his legs and hips)  Negative for back pain, gait problem, joint swelling, myalgias and neck stiffness  Skin: Negative for pallor and rash  Neurological: Negative for dizziness, tremors, seizures, syncope, light-headedness and headaches  Hematological: Negative for adenopathy  Psychiatric/Behavioral: Negative for behavioral problems  Video Exam    Vitals:    04/05/21 1421   Weight: 71 2 kg (157 lb)   Height: 5' 5" (1 651 m)       Physical Exam  Constitutional:       General: He is not in acute distress  Appearance: Normal appearance  He is not ill-appearing or toxic-appearing  HENT:      Head: Normocephalic and atraumatic  Mouth/Throat:      Mouth: Mucous membranes are moist    Eyes:      General: No scleral icterus  Right eye: No discharge  Left eye: No discharge  Neck:      Musculoskeletal: Normal range of motion  Pulmonary:      Effort: Respiratory distress ( conversational dyspnea) present  Abdominal:      Tenderness: There is no abdominal tenderness  Skin:     Coloration: Skin is not jaundiced  Neurological:      Mental Status: He is alert and oriented to person, place, and time  Psychiatric:         Behavior: Behavior normal           I spent 20 minutes with patient today in which greater than 50% of the time was spent in counseling/coordination of care regarding  prognosis, management,  reassurance and counseling      VIRTUAL VISIT DISCLAIMER    Malissa Palencia acknowledges that he has consented to an online visit or consultation   He understands that the online visit is based solely on information provided by him, and that, in the absence of a face-to-face physical evaluation by the physician, the diagnosis he receives is both limited and provisional in terms of accuracy and completeness  This is not intended to replace a full medical face-to-face evaluation by the physician  Deana Bellamy understands and accepts these terms

## 2021-04-08 ENCOUNTER — OFFICE VISIT (OUTPATIENT)
Dept: PHYSICAL THERAPY | Facility: CLINIC | Age: 36
End: 2021-04-08
Payer: MEDICARE

## 2021-04-08 DIAGNOSIS — M62.81 MUSCLE WEAKNESS OF LOWER EXTREMITY: ICD-10-CM

## 2021-04-08 DIAGNOSIS — R29.898 MUSCULAR DECONDITIONING: Primary | ICD-10-CM

## 2021-04-08 DIAGNOSIS — R26.89 BALANCE DISORDER: ICD-10-CM

## 2021-04-08 DIAGNOSIS — R53.81 PHYSICAL DECONDITIONING: ICD-10-CM

## 2021-04-08 DIAGNOSIS — R26.9 GAIT ABNORMALITY: ICD-10-CM

## 2021-04-08 DIAGNOSIS — R42 DIZZINESS: ICD-10-CM

## 2021-04-08 PROCEDURE — 97110 THERAPEUTIC EXERCISES: CPT

## 2021-04-08 PROCEDURE — 97112 NEUROMUSCULAR REEDUCATION: CPT

## 2021-04-08 NOTE — PROGRESS NOTES
DISCHARGE  Discharged this episode of care due to patient getting COVID and returning to outpatient PT for deconditioning

## 2021-04-08 NOTE — PROGRESS NOTES
Daily Note     Today's date: 2021  Patient name: Padmini Bach  : 1985  MRN: [id number]  Referring provider: Claudia Titus MD  Dx:   Encounter Diagnosis     ICD-10-CM    1  Muscular deconditioning  R29 898    2  Physical deconditioning  R53 81    3  Balance disorder  R26 89    4  Dizziness  R42    5  Gait abnormality  R26 9    6  Muscle weakness of lower extremity  M62 81        Start Time: 1005  Stop Time: 1100  Total time in clinic (min): 55 minutes    Subjective: Presents to his OP PT session with subjective reports of improvements of feelings of strength, mobility, and overall activity tolerance since his evaluation last week  He says last week he was largely using his SPC to walk around (purchased after evaluation) because his legs were hurting so bad  Today his leg pain was a 3-10; he came in without his SPC  He had his best night of sleep last night so far since his hospitalization; was able to sleep all night  Has had a small exacerbation in lung irritation; feels this is due to the heightened allergens this week [place]  Has PFT scheduled for 21  Objective: See treatment diary below    6 MWT: 700 feet  Starting vitals  95% O2  87 BPM HR    Ending vitals  93% O2  108 BPM HR    Minimum O2 levels during test: 88% - subjectively did not report he needed to sit  Maximal dyspnea scale 6-7/10    Subjectively required NC supplementation S/P test     Gait speed: 0 885 m/s NO AD    FGA:   Starting vitals  95% O2  94 BPM HR    Ending vitals  93% O2  111 BPM HR      Max O2 this visit:  95% with O2 off  98% with O2 On    Subjectively highest 96/97% no O2 at home    Leg pain pre-Tx: 3-4/10   Post-Tx: 4-5/10    Assessment: Continued assessment of functional mobility, endurance, and dynamic balance today   6 MWT performed at session start to maximize energy levels; at this time he was able to ambulate for the full 6 minutes with 2 standing rest breaks at a distance signficantly below age-related norms supporting subjective feelings of significant losses of endurance  FGA performed; he was able to complete entirety of FGA compare to episode of care prior to [facility]; demonstrated significant improvements in balance since this episode  Required 3L NC O2 during several rest breaks throughout the session  Since his evaluation last week, he already visually appears to be making healthy bodyweight gains, is moving less sluggishly, and overall is having less pain and fatigue in his legs  Today during the 6 MWT was the longest time and furthest distance he has walked since being hospitalized two weeks ago  Santiago Dumont would continue to benefit from skilled PT care to maximize his functional endurance and strength and improve his overall quality of life  Plan: Continue per plan of care  Progress treatment as tolerated  Tim C Protocol [place]       Precautions: FALL RISK, POST-COVID (3/9/21-3/29/21), Hx PTSD, PNES, DMII, NO NC AT REST, 3L NC WITH EXERTION      Manuals 4/2/21  IE 4/8/21                                                               Neuro Re-Ed                                                                                           Pt Education Hospital-associated deconditioning, COVID-19 influence on respiratory dysfunction, POC FGA testing, assessment, results           Ther Ex             Hip 3-Way HEP BUE  Extension  1x12 ea           STS HEP x5 no UE           Walking  6 MWT  700'                                                               Pt Education  6 MWT results           Ther Activity                                       Gait Training                                       Modalities

## 2021-04-09 NOTE — TELEPHONE ENCOUNTER
Patient called in to report he has been seizure free for 6 months  Requesting seizure reporting form be completed  Are you agreeable to form completion?

## 2021-04-12 ENCOUNTER — OFFICE VISIT (OUTPATIENT)
Dept: CARDIOLOGY CLINIC | Facility: CLINIC | Age: 36
End: 2021-04-12
Payer: MEDICARE

## 2021-04-12 ENCOUNTER — TELEPHONE (OUTPATIENT)
Dept: PULMONOLOGY | Facility: CLINIC | Age: 36
End: 2021-04-12

## 2021-04-12 ENCOUNTER — HOSPITAL ENCOUNTER (OUTPATIENT)
Dept: PULMONOLOGY | Facility: HOSPITAL | Age: 36
Discharge: HOME | End: 2021-04-12
Attending: INTERNAL MEDICINE
Payer: MEDICARE

## 2021-04-12 ENCOUNTER — TELEMEDICINE (OUTPATIENT)
Dept: INTERNAL MEDICINE CLINIC | Facility: CLINIC | Age: 36
End: 2021-04-12
Payer: MEDICARE

## 2021-04-12 VITALS
DIASTOLIC BLOOD PRESSURE: 64 MMHG | HEIGHT: 65 IN | OXYGEN SATURATION: 95 % | WEIGHT: 160 LBS | HEART RATE: 98 BPM | SYSTOLIC BLOOD PRESSURE: 100 MMHG | BODY MASS INDEX: 26.66 KG/M2

## 2021-04-12 DIAGNOSIS — I10 ESSENTIAL HYPERTENSION: ICD-10-CM

## 2021-04-12 DIAGNOSIS — R00.0 TACHYCARDIA: Primary | ICD-10-CM

## 2021-04-12 DIAGNOSIS — J96.11 CHRONIC RESPIRATORY FAILURE WITH HYPOXIA (HCC): Primary | ICD-10-CM

## 2021-04-12 DIAGNOSIS — M62.81 MUSCLE WEAKNESS OF LOWER EXTREMITY: Primary | ICD-10-CM

## 2021-04-12 DIAGNOSIS — E78.2 MIXED HYPERLIPIDEMIA: ICD-10-CM

## 2021-04-12 DIAGNOSIS — U07.1 COVID-19: ICD-10-CM

## 2021-04-12 DIAGNOSIS — F41.9 ANXIETY: ICD-10-CM

## 2021-04-12 PROBLEM — E87.29 HIGH ANION GAP METABOLIC ACIDOSIS: Status: RESOLVED | Noted: 2021-03-17 | Resolved: 2021-04-12

## 2021-04-12 PROBLEM — E87.2 HIGH ANION GAP METABOLIC ACIDOSIS: Status: RESOLVED | Noted: 2021-03-17 | Resolved: 2021-04-12

## 2021-04-12 PROBLEM — L73.9 FOLLICULITIS: Status: RESOLVED | Noted: 2020-09-22 | Resolved: 2021-04-12

## 2021-04-12 PROBLEM — J96.01 ACUTE RESPIRATORY FAILURE WITH HYPOXIA (HCC): Status: RESOLVED | Noted: 2021-03-17 | Resolved: 2021-04-12

## 2021-04-12 PROBLEM — R07.9 CHEST PAIN: Status: RESOLVED | Noted: 2020-12-11 | Resolved: 2021-04-12

## 2021-04-12 PROBLEM — R00.2 PALPITATION: Status: RESOLVED | Noted: 2021-01-11 | Resolved: 2021-04-12

## 2021-04-12 PROBLEM — R79.89 ELEVATED LACTIC ACID LEVEL: Status: RESOLVED | Noted: 2021-03-17 | Resolved: 2021-04-12

## 2021-04-12 PROCEDURE — 94618 PULMONARY STRESS TESTING: CPT | Performed by: INTERNAL MEDICINE

## 2021-04-12 PROCEDURE — 94729 DIFFUSING CAPACITY: CPT | Performed by: INTERNAL MEDICINE

## 2021-04-12 PROCEDURE — 94060 EVALUATION OF WHEEZING: CPT

## 2021-04-12 PROCEDURE — 94729 DIFFUSING CAPACITY: CPT

## 2021-04-12 PROCEDURE — 94761 N-INVAS EAR/PLS OXIMETRY MLT: CPT

## 2021-04-12 PROCEDURE — 94726 PLETHYSMOGRAPHY LUNG VOLUMES: CPT | Performed by: INTERNAL MEDICINE

## 2021-04-12 PROCEDURE — 94726 PLETHYSMOGRAPHY LUNG VOLUMES: CPT

## 2021-04-12 PROCEDURE — 99213 OFFICE O/P EST LOW 20 MIN: CPT | Performed by: INTERNAL MEDICINE

## 2021-04-12 PROCEDURE — 94060 EVALUATION OF WHEEZING: CPT | Performed by: INTERNAL MEDICINE

## 2021-04-12 RX ORDER — ALBUTEROL SULFATE 2.5 MG/3ML
2.5 SOLUTION RESPIRATORY (INHALATION) ONCE
Status: COMPLETED | OUTPATIENT
Start: 2021-04-12 | End: 2021-04-12

## 2021-04-12 RX ADMIN — ALBUTEROL SULFATE 2.5 MG: 2.5 SOLUTION RESPIRATORY (INHALATION) at 10:36

## 2021-04-12 NOTE — ASSESSMENT & PLAN NOTE
Improving  Continue current pain regimen: Gabapentin 300 mg b i d  -t i d  And meloxicam 50 mg daily as needed  Discussed continuing physical therapy and home stretching exercises  He is to contact office for worsening symptoms

## 2021-04-12 NOTE — PROGRESS NOTES
Virtual Regular Visit      Assessment/Plan:    Problem List Items Addressed This Visit        Other    Muscle weakness of lower extremity - Primary       Improving  Continue current pain regimen: Gabapentin 300 mg b i d  -t i d  And meloxicam 50 mg daily as needed  Discussed continuing physical therapy and home stretching exercises  He is to contact office for worsening symptoms  Reason for visit is   Chief Complaint   Patient presents with    Follow-up     Patient is here for 1 week f/u muscle weakness of lower extremity  Pt states that he feels much better   Virtual Regular Visit        Encounter provider Amelie Loera MD    Provider located at [address] [place] [address] [address] [facility] [address]      Recent Visits  No visits were found meeting these conditions  Showing recent visits within past 7 days and meeting all other requirements     Today's Visits  Date Type Provider Dept   04/12/21 Telemedicine Amelie Loera MD [facility]   Showing today's visits and meeting all other requirements     Future Appointments  No visits were found meeting these conditions  Showing future appointments within next 150 days and meeting all other requirements        The patient was identified by name and date of birth  Sana Gabriel was informed that this is a telemedicine visit and that the visit is being conducted through [facility] and patient was informed that this is a secure, HIPAA-compliant platform  He agrees to proceed     My office door was closed  No one else was in the room  He acknowledged consent and understanding of privacy and security of the video platform  The patient has agreed to participate and understands they can discontinue the visit at any time  Patient is aware this is a billable service       Subjective  Sana Gabriel is a     58-year-old male is seen today for generalized muscle weakness and deconditioning  He has been going to physical therapy  Physical therapy notes reviewed today  He has also been taking gabapentin 300 mg b i d  t i d  and meloxicam daily as needed for his pain  He reports that his symptoms have overall improved  Past Medical History:   Diagnosis Date    COVID-19 3/17/2021    Diabetes mellitus ([facility] [address] )     type 2    Disease of thyroid gland     hypothyroidism    Hyperlipidemia     Hypertension     Psychiatric disorder     PTSD   Anxiety, depression,        Past Surgical History:   Procedure Laterality Date    WISDOM TOOTH EXTRACTION         Current Outpatient Medications   Medication Sig Dispense Refill    albuterol (PROVENTIL HFA,VENTOLIN HFA) 90 mcg/act inhaler Inhale 2 puffs every 6 (six) hours as needed for wheezing or shortness of breath 1 Inhaler 1    ALPRAZolam (XANAX) 0 5 mg tablet Take 0 5 mg by mouth Three times daily as needed      divalproex sodium (DEPAKOTE) 500 mg EC tablet Take 500 mg by mouth every 12 (twelve) hours       Empagliflozin (Jardiance) 25 MG TABS Take 1 tablet by mouth daily      gabapentin (NEURONTIN) 300 mg capsule Take 300 mg by mouth Three times a day      insulin glargine (LANTUS SOLOSTAR) 100 units/mL injection pen Inject 45 Units under the skin daily 15 mL 0    levothyroxine 25 mcg tablet Take 25 mcg by mouth daily      loratadine (CLARITIN) 10 mg tablet Take 10 mg by mouth daily      meloxicam (MOBIC) 15 mg tablet Take 1 tablet (15 mg total) by mouth daily as needed for mild pain or moderate pain 30 tablet 0    metFORMIN (GLUCOPHAGE) 1000 MG tablet Take 1,000 mg by mouth daily       metoprolol succinate (TOPROL-XL) 25 mg 24 hr tablet Take 1 tablet (25 mg total) by mouth daily 30 tablet 0    multivitamin-minerals (CENTRUM ADULTS) tablet Take 1 tablet by mouth daily 30 tablet 0    OMEGA-3 FATTY ACIDS PO Take 2 g by mouth daily      omeprazole (PriLOSEC) 40 MG capsule Take 1 tablet 30-60 minutes prior to breakfast 30 capsule 3    pravastatin (PRAVACHOL) 40 mg tablet Take 1 tablet (40 mg total) by mouth daily  0    sertraline (ZOLOFT) 25 mg tablet Take 1 tablet (25 mg total) by mouth daily for 8 doses 8 tablet 0    sertraline (ZOLOFT) 50 mg tablet Take 1 tablet (50 mg total) by mouth daily (Patient taking differently: Take 100 mg by mouth daily ) 30 tablet 0    sildenafil (VIAGRA) 100 mg tablet Take 100 mg by mouth as needed for erectile dysfunction       traZODone (DESYREL) 100 mg tablet Take 100 mg by mouth        No current facility-administered medications for this visit  Allergies   Allergen Reactions    Lamotrigine GI Intolerance    Niacin Rash    Simvastatin Other (See Comments)     Elevated liver enzymes  Liver enzyme elevation       Review of Systems   Constitutional: Negative for activity change, appetite change, chills, diaphoresis, fatigue and fever  HENT: Negative for congestion, postnasal drip, rhinorrhea, sinus pressure, sinus pain, sneezing and sore throat  Eyes: Negative for visual disturbance  Respiratory: Negative for apnea, cough, choking, chest tightness, shortness of breath and wheezing  Cardiovascular: Negative for chest pain, palpitations and leg swelling  Gastrointestinal: Negative for abdominal distention, abdominal pain, anal bleeding, blood in stool, constipation, diarrhea, nausea and vomiting  Endocrine: Negative for cold intolerance and heat intolerance  Genitourinary: Negative for difficulty urinating, dysuria and hematuria  Musculoskeletal: Negative  Skin: Negative  Neurological: Negative for dizziness, weakness, light-headedness, numbness and headaches  Hematological: Negative for adenopathy  Psychiatric/Behavioral: Negative for agitation, sleep disturbance and suicidal ideas  All other systems reviewed and are negative  Video Exam    There were no vitals filed for this visit      Physical Exam  Vitals signs and nursing note reviewed  Constitutional:       General: He is not in acute distress  Appearance: He is well-developed  He is not diaphoretic  HENT:      Head: Normocephalic and atraumatic  Right Ear: External ear normal       Left Ear: External ear normal    Eyes:      General:         Right eye: No discharge  Left eye: No discharge  Conjunctiva/sclera: Conjunctivae normal    Neck:      Musculoskeletal: Normal range of motion  Pulmonary:      Effort: Pulmonary effort is normal  No respiratory distress  Abdominal:      General: There is no distension  Tenderness: There is no abdominal tenderness  Musculoskeletal: Normal range of motion  General: No deformity  Skin:     Coloration: Skin is not pale  Findings: No erythema or rash  Neurological:      Mental Status: He is alert and oriented to person, place, and time  Cranial Nerves: No cranial nerve deficit  Coordination: Coordination normal    Psychiatric:         Behavior: Behavior normal          Thought Content: Thought content normal          Judgment: Judgment normal           I spent 15 minutes directly with the patient during this visit      [address] acknowledges that he has consented to an online visit or consultation  He understands that the online visit is based solely on information provided by him, and that, in the absence of a face-to-face physical evaluation by the physician, the diagnosis he receives is both limited and provisional in terms of accuracy and completeness  This is not intended to replace a full medical face-to-face evaluation by the physician  Layla Shah understands and accepts these terms

## 2021-04-12 NOTE — PATIENT INSTRUCTIONS
The patient is advised to continue the same medications  He knows that should his symptoms of palpitation worsen that he can take an extra dose of metoprolol succinate ER 25 mg

## 2021-04-12 NOTE — ASSESSMENT & PLAN NOTE
History of anxiety disorder, reasonably stable at this time  The patient is on sertraline 100 mg daily

## 2021-04-12 NOTE — LETTER
April 12, 2021     Amy Luna MD  [address] [place] [address]    Patient: Lashon Baird   YOB: 1985   Date of Visit: 4/12/2021       Dear Dr Migue Malcolm: Thank you for referring Jimcristal Ruthann to me for evaluation  Below are my notes for this consultation  If you have questions, please do not hesitate to call me  I look forward to following your patient along with you  Sincerely,        Mireille Navarro MD        CC: No Recipients  Mireille Navarro MD  4/12/2021  2:22 PM  Sign when Signing Visit  Assessment/Plan:    Tachycardia    History of tachycardia with symptoms of palpitation  This has improved on metoprolol succinate ER 25 mg daily  I am advising the patient that he can take an extra tablet metoprolol should this become necessary because of increased symptoms of palpitation  Anxiety    History of anxiety disorder, reasonably stable at this time  The patient is on sertraline 100 mg daily  Essential hypertension    Hypertension, stable  The patient is no longer taking clonidine  Mixed hyperlipidemia    Hyperlipidemia, stable  The patient will continue pravastatin at 40 mg daily  Diagnoses and all orders for this visit:    Tachycardia    Anxiety    Essential hypertension    Mixed hyperlipidemia          Subjective:   Shortness of breath and mild palpitation  Patient ID: Lashon Baird is a 38 y o  male  The patient presented to this office for the purpose of cardiac follow-up  He is known to have a history hypertension and symptoms of palpitation with atypical chest pain  Approximately 1 month ago the patient was diagnosed with pneumonia secondary to [place]  He was hospitalized for 9 days and was subsequently discharged on oxygen  At this point mild symptoms palpitation  He has however symptoms of shortness of breath on activity  No significant chest pain but occasionally experiences some tightness in the chest   He has no leg edema  Because of symptoms of palpitation the patient was started a few weeks ago metoprolol 25 mg daily  This has apparently been helpful as far as his symptoms of palpitation is concerned  The following portions of the patient's history were reviewed and updated as appropriate: allergies, current medications, past family history, past medical history, past social history, past surgical history and problem list     Review of Systems   Respiratory: Positive for chest tightness and shortness of breath  Negative for apnea, cough and wheezing  Cardiovascular: Positive for palpitations  Negative for chest pain and leg swelling  Gastrointestinal: Negative for abdominal pain  Neurological: Negative for dizziness and light-headedness  Psychiatric/Behavioral: Negative  Objective:  Stable cardiac-wise  /64 (BP Location: Left arm, Patient Position: Sitting, Cuff Size: Standard)   Pulse 98   Ht 5' 5" (1 651 m)   Wt 72 6 kg (160 lb)   SpO2 95% Comment: 3 liters  BMI 26 63 kg/m²          Physical Exam  Vitals signs reviewed  Constitutional:       General: He is not in acute distress  Appearance: He is well-developed  He is not diaphoretic  HENT:      Head: Normocephalic  Eyes:      Pupils: Pupils are equal, round, and reactive to light  Neck:      Musculoskeletal: Normal range of motion  Thyroid: No thyromegaly  Vascular: No JVD  Cardiovascular:      Rate and Rhythm: Normal rate and regular rhythm  Heart sounds: S1 normal and S2 normal  No murmur  No friction rub  No gallop  Pulmonary:      Effort: Pulmonary effort is normal  No respiratory distress  Breath sounds: Normal breath sounds  No wheezing or rales  Chest:      Chest wall: No tenderness  Abdominal:      Palpations: Abdomen is soft  Musculoskeletal: Normal range of motion  General: No tenderness or deformity  Right lower leg: No edema  Left lower leg: No edema     Skin: General: Skin is warm and dry  Neurological:      Mental Status: He is alert and oriented to person, place, and time     Psychiatric:         Mood and Affect: Mood normal          Behavior: Behavior normal

## 2021-04-12 NOTE — ASSESSMENT & PLAN NOTE
History of tachycardia with symptoms of palpitation  This has improved on metoprolol succinate ER 25 mg daily  I am advising the patient that he can take an extra tablet metoprolol should this become necessary because of increased symptoms of palpitation

## 2021-04-12 NOTE — PROGRESS NOTES
Assessment/Plan:    Tachycardia    History of tachycardia with symptoms of palpitation  This has improved on metoprolol succinate ER 25 mg daily  I am advising the patient that he can take an extra tablet metoprolol should this become necessary because of increased symptoms of palpitation  Anxiety    History of anxiety disorder, reasonably stable at this time  The patient is on sertraline 100 mg daily  Essential hypertension    Hypertension, stable  The patient is no longer taking clonidine  Mixed hyperlipidemia    Hyperlipidemia, stable  The patient will continue pravastatin at 40 mg daily  Diagnoses and all orders for this visit:    Tachycardia    Anxiety    Essential hypertension    Mixed hyperlipidemia          Subjective:   Shortness of breath and mild palpitation  Patient ID: Celine Smith is a 38 y o  male  The patient presented to this office for the purpose of cardiac follow-up  He is known to have a history hypertension and symptoms of palpitation with atypical chest pain  Approximately 1 month ago the patient was diagnosed with pneumonia secondary to [place]  He was hospitalized for 9 days and was subsequently discharged on oxygen  At this point mild symptoms palpitation  He has however symptoms of shortness of breath on activity  No significant chest pain but occasionally experiences some tightness in the chest   He has no leg edema  Because of symptoms of palpitation the patient was started a few weeks ago metoprolol 25 mg daily  This has apparently been helpful as far as his symptoms of palpitation is concerned  The following portions of the patient's history were reviewed and updated as appropriate: allergies, current medications, past family history, past medical history, past social history, past surgical history and problem list     Review of Systems   Respiratory: Positive for chest tightness and shortness of breath  Negative for apnea, cough and wheezing  Cardiovascular: Positive for palpitations  Negative for chest pain and leg swelling  Gastrointestinal: Negative for abdominal pain  Neurological: Negative for dizziness and light-headedness  Psychiatric/Behavioral: Negative  Objective:  Stable cardiac-wise  /64 (BP Location: Left arm, Patient Position: Sitting, Cuff Size: Standard)   Pulse 98   Ht 5' 5" (1 651 m)   Wt 72 6 kg (160 lb)   SpO2 95% Comment: 3 liters  BMI 26 63 kg/m²          Physical Exam  Vitals signs reviewed  Constitutional:       General: He is not in acute distress  Appearance: He is well-developed  He is not diaphoretic  HENT:      Head: Normocephalic  Eyes:      Pupils: Pupils are equal, round, and reactive to light  Neck:      Musculoskeletal: Normal range of motion  Thyroid: No thyromegaly  Vascular: No JVD  Cardiovascular:      Rate and Rhythm: Normal rate and regular rhythm  Heart sounds: S1 normal and S2 normal  No murmur  No friction rub  No gallop  Pulmonary:      Effort: Pulmonary effort is normal  No respiratory distress  Breath sounds: Normal breath sounds  No wheezing or rales  Chest:      Chest wall: No tenderness  Abdominal:      Palpations: Abdomen is soft  Musculoskeletal: Normal range of motion  General: No tenderness or deformity  Right lower leg: No edema  Left lower leg: No edema  Skin:     General: Skin is warm and dry  Neurological:      Mental Status: He is alert and oriented to person, place, and time     Psychiatric:         Mood and Affect: Mood normal          Behavior: Behavior normal

## 2021-04-12 NOTE — TELEPHONE ENCOUNTER
Patient calling stating he would like a POC ordered for him since he will be traveling to [place]   Please advise

## 2021-04-15 ENCOUNTER — OFFICE VISIT (OUTPATIENT)
Dept: PHYSICAL THERAPY | Facility: CLINIC | Age: 36
End: 2021-04-15
Payer: MEDICARE

## 2021-04-15 DIAGNOSIS — R26.9 GAIT ABNORMALITY: ICD-10-CM

## 2021-04-15 DIAGNOSIS — R26.89 BALANCE DISORDER: ICD-10-CM

## 2021-04-15 DIAGNOSIS — R42 DIZZINESS: ICD-10-CM

## 2021-04-15 DIAGNOSIS — R29.898 MUSCULAR DECONDITIONING: Primary | ICD-10-CM

## 2021-04-15 DIAGNOSIS — R53.81 PHYSICAL DECONDITIONING: ICD-10-CM

## 2021-04-15 DIAGNOSIS — M62.81 MUSCLE WEAKNESS OF LOWER EXTREMITY: ICD-10-CM

## 2021-04-15 PROCEDURE — 97110 THERAPEUTIC EXERCISES: CPT

## 2021-04-15 PROCEDURE — 97150 GROUP THERAPEUTIC PROCEDURES: CPT

## 2021-04-15 NOTE — PROGRESS NOTES
Daily Note     Today's date: 4/15/2021  Patient name: Srinivas Novak  : 1985  MRN: [id number]  Referring provider: Arlene Tomlinson MD  Dx:   Encounter Diagnosis     ICD-10-CM    1  Muscular deconditioning  R29 898    2  Gait abnormality  R26 9    3  Physical deconditioning  R53 81    4  Muscle weakness of lower extremity  M62 81    5  Balance disorder  R26 89    6  Dizziness  R42        Start Time: 1400  Stop Time: 1500  Total time in clinic (min): 60 minutes    Subjective: Presents to his OP PT session with slightly more breathing difficulty since last session; says he missed Tuesday's appointment because he "just had too much going on " He had his PFT and has felt that his allergies in the last week with the pollen blooms has been inhibiting his breathing; has had to use his supplemental O2 at 3L more often than he was before however his pain levels in his legs are improving significantly with BL LE strengthening  Objective: See treatment diary below    O2 levels hovered throughout session between 91-96% off NC and 97-98% on 3L NC O2     Self-directed Exercise: 4361-2484  Grouped Exercise: [id number]   One-to-one with PT: [id number]  One-to-One with Basilio Fernandez, PTA: [id number]    Assessment: Joslyn Melendez participated in his skilled PT session focused on improving activity tolerance and BL LE strengthening while assessing limitations with NC   Demonstrated improvements this session compared to prior session with increased tolerance to progression of repetitions and new LE strengthening/conditioning exercises  Was able to again perform all interventions without NC supplemental O2 but required it for recovery with seated rest breaks  Would continue to benefit from skilled PT intervention to address deficits in activity tolerance and conditioning to maximize overall functional mobility, quality of life, and decrease risk for falls  Plan: Continue per plan of care  Progress treatment as tolerated  Complete Balke C protocol NV       Precautions: FALL RISK, POST-COVID (3/9/21-3/29/21), Hx PTSD, PNES, DMII, NO NC AT REST, 3L NC WITH EXERTION      Manuals 4/2/21  IE 4/8/21 4/15                                                              Neuro Re-Ed                                                                                           Pt Education Hospital-associated deconditioning, COVID-19 influence on respiratory dysfunction, POC FGA testing, assessment, results           Ther Ex             Hip 3-Way HEP BUE  Extension  1x12 ea 3x5 ea          STS HEP x5 no UE 5x5 no UE          Walking  6 MWT  700'           Mini Squats   x10          Lunges   x6 ea                                    Pt Education  6 MWT results           Ther Activity                                       Gait Training                                       Modalities

## 2021-04-16 LAB
LEFT EYE DIABETIC RETINOPATHY: NORMAL
RIGHT EYE DIABETIC RETINOPATHY: NORMAL
SEVERITY (EYE EXAM): NORMAL

## 2021-04-19 NOTE — TELEPHONE ENCOUNTER
Pt wife calling and left a message, saying she spoke with [facility] and they have not received the order  Please advise and call pt with an update

## 2021-04-20 ENCOUNTER — OFFICE VISIT (OUTPATIENT)
Dept: PHYSICAL THERAPY | Facility: CLINIC | Age: 36
End: 2021-04-20
Payer: MEDICARE

## 2021-04-20 DIAGNOSIS — M62.81 MUSCLE WEAKNESS OF LOWER EXTREMITY: ICD-10-CM

## 2021-04-20 DIAGNOSIS — R29.898 MUSCULAR DECONDITIONING: Primary | ICD-10-CM

## 2021-04-20 DIAGNOSIS — R26.89 BALANCE DISORDER: ICD-10-CM

## 2021-04-20 DIAGNOSIS — R53.81 PHYSICAL DECONDITIONING: ICD-10-CM

## 2021-04-20 DIAGNOSIS — R42 DIZZINESS: ICD-10-CM

## 2021-04-20 DIAGNOSIS — R26.9 GAIT ABNORMALITY: ICD-10-CM

## 2021-04-20 PROCEDURE — 97112 NEUROMUSCULAR REEDUCATION: CPT

## 2021-04-20 PROCEDURE — 97110 THERAPEUTIC EXERCISES: CPT

## 2021-04-20 NOTE — PROGRESS NOTES
Daily Note     Today's date: 2021  Patient name: Tammie Gonzalez  : 1985  MRN: [id number]  Referring provider: Genia Dubon MD  Dx:   Encounter Diagnosis     ICD-10-CM    1  Muscular deconditioning  R29 898    2  Gait abnormality  R26 9    3  Physical deconditioning  R53 81    4  Muscle weakness of lower extremity  M62 81    5  Balance disorder  R26 89    6  Dizziness  R42        Start Time: 1410  Stop Time: 1500  Total time in clinic (min): 50 minutes    Subjective: Presents to his OP PT session feeling like he is having a better day today than his last visit; still being affected by his allergies with burning sensations in his lungs and thinks this upcoming week will be a little worse 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  Is doing more around the house and using less NC O2 supplementation  No new c/o BL LE pain or R LE weakness  Objective: See treatment diary below    Balk C Protocol:  Recommended Intensity Dyspnea Scale: 3-5 (moderate to heavy)      Rate of perceived exertion:3-4/10 (moderate to heavy)     RESTING VITALS: Seated, manual, L UE  BP: 116/76 mmHg  O2: 97%  HR: 92 BPM    SPEED/TIME:  1 3 mph  5:47s (requests to stop due to R LE weakness)    Minutes Incline/LVL RPE Dyspnea Heart Rate PO2 BP   1 min 0%  3/10 3/10   107  92  122/74   2 min 1%  4  4  110  90  122/74   3min  2%  4 4   102 92 NC 3L 124/76   4 min  3%  5 5   108 97 NC 3L  132/80   5 min  4%  7 7   110 98 NC 3L  132/74   6 min 5%  STOP           7 min 6%             8 min 7%             9 min 8%             10 min 9%             11 min 10%             12 min 11%             13 min 12%             14 min 13%             15 min  14%                  POST: Seated, manual, L UE  BP: 128/72 mmHg -> 5 min post 112/70 mmHg  HR: 88 BPM  O2: 98%    Assessment: Performed Balke-C protocol testing this session to assess subjective and systemic responses to graded exercise   Since initial evaluation, heart rate response to activity is more representative of activity load however blood O2 levels rapidly dropped today within the first two minutes of testing requiring 3L NC O2 for remainder of test  At this time Devyn Toth appears to be additionally limited to the allergies he is experiencing this spring, which in the last two weeks have been making his breathing more difficult despite his increased activity tolerance  Reviewed addition of TB resistance bands for HEP to continue to progress strength and power generation  Would benefit from continued skilled PT care to safely guide full return to functional mobility and activity tolerance  Plan: Continue per plan of care  Progress treatment as tolerated         Precautions: FALL RISK, POST-COVID (3/9/21-3/29/21), Hx PTSD, PNES, DMII, NO NC AT REST, 3L NC WITH EXERTION      Manuals 4/2/21  IE 4/8/21 4/15 4/20                                                             Neuro Re-Ed                                                                                           Pt Education/Assessment Hospital-associated deconditioning, COVID-19 influence on respiratory dysfunction, POC FGA testing, assessment, results  Balke-C Protocol assessment, review of results and vital sign responses         Ther Ex             Hip 3-Way HEP BUE  Extension  1x12 ea 3x5 ea 3x5 ea w/ pchTB  HEP         STS HEP x5 no UE 5x5 no UE 5x5 no UE w/ prpl TB loop in front squat position  HEP         Walking  6 MWT  700'  6' Balke-C Protocol on TM         Mini Squats   x10 x10 w/ prpl TB loop in front squat position  HEP         Lunges   x6 ea NV                                   Pt Education  6 MWT results           Ther Activity                                       Gait Training                                       Modalities

## 2021-04-22 ENCOUNTER — APPOINTMENT (OUTPATIENT)
Dept: PHYSICAL THERAPY | Facility: CLINIC | Age: 36
End: 2021-04-22
Payer: MEDICARE

## 2021-04-23 NOTE — TELEPHONE ENCOUNTER
Wife calling saying she has not heard anything regarding this issue  While speaking with her she is mentioning a "backpack concentrator" needed to fly  She said [facility] has not received anything  [facility] also told her the diagnosis of Covid may not cover it, that it would have to be a long term/chronic lung disease  I asked her if she called the airlines to see what they require for the oxygen  Advised some have restrictions on size and need a form signed  She said she will call the airline and then call us back to let us know exactly what he needs

## 2021-04-23 NOTE — TELEPHONE ENCOUNTER
Wife calling back, airline advised [employer], Airsep focus or Airsep freestyle  She then called [facility] and the only way it can be 100% covered is if dx is changed and not Covid

## 2021-04-23 NOTE — TELEPHONE ENCOUNTER
Called pt left message to call back  Pt will need further testing to be able to diagnose a chronic pulmonary condition because we cannot just change a dx

## 2021-04-27 ENCOUNTER — TELEPHONE (OUTPATIENT)
Dept: NEUROLOGY | Facility: CLINIC | Age: 36
End: 2021-04-27

## 2021-04-27 ENCOUNTER — APPOINTMENT (OUTPATIENT)
Dept: PHYSICAL THERAPY | Facility: CLINIC | Age: 36
End: 2021-04-27
Payer: MEDICARE

## 2021-04-27 NOTE — TELEPHONE ENCOUNTER
Patient called to request completion of general neurological form ([id number]) which he received from [facility] after we had submitted seizure reporting form  Patient would like a copy of form mailed to home once completed  Elizabeth - please complete General neurological form for patient  Patient transferred to scheduling to reschedule missed appt from today

## 2021-04-28 ENCOUNTER — OFFICE VISIT (OUTPATIENT)
Dept: PULMONOLOGY | Facility: CLINIC | Age: 36
End: 2021-04-28
Payer: MEDICARE

## 2021-04-28 VITALS
SYSTOLIC BLOOD PRESSURE: 116 MMHG | TEMPERATURE: 97.4 F | RESPIRATION RATE: 18 BRPM | OXYGEN SATURATION: 97 % | HEART RATE: 75 BPM | DIASTOLIC BLOOD PRESSURE: 80 MMHG | WEIGHT: 172 LBS | BODY MASS INDEX: 28.66 KG/M2 | HEIGHT: 65 IN

## 2021-04-28 DIAGNOSIS — U09.9 POST-COVID-19 CONDITION: ICD-10-CM

## 2021-04-28 DIAGNOSIS — F41.9 ANXIETY: ICD-10-CM

## 2021-04-28 DIAGNOSIS — J45.40 MODERATE PERSISTENT ALLERGIC ASTHMA: Primary | ICD-10-CM

## 2021-04-28 PROCEDURE — 99215 OFFICE O/P EST HI 40 MIN: CPT | Performed by: INTERNAL MEDICINE

## 2021-04-28 RX ORDER — FLUTICASONE FUROATE AND VILANTEROL 200; 25 UG/1; UG/1
1 POWDER RESPIRATORY (INHALATION) DAILY
Qty: 1 INHALER | Refills: 5 | Status: SHIPPED | OUTPATIENT
Start: 2021-04-28 | End: 2021-05-06

## 2021-04-28 NOTE — PROGRESS NOTES
Pulmonary Follow Up Note  Brigido Rendon 38 y o  male MRN: [id number]  2021      HPI:     patient states that overall, shortness of breath has improved  He also states that his oxygen saturation has improved upon monitoring  When checking his pulse ox E states that it typically reads from 91-93 while at rest   He states that it does drop with ambulation, any requires supplemental oxygen at that time  No fevers or chills  Continues to feel inability to take a full inspiration  He feels that seasonal allergies have been playing a role in his symptoms this year  He did have asthma as a child but feels that symptoms have resolved since that time  Meds:    Albuterol as needed    ROS:    Constitutional: - Fatigue, - chills, - fever, - weight change  HEENT: - rhinorrhea, - sneezing, - sore throat  Respiratory: - cough,  + shortness of breath, - wheezing  Cardiovascular: - chest pain,  -palpitations, - leg swelling  Gastrointestinal: - abdominal pain, - constipation, - diarrhea, - nausea, - vomiting  Endocrine: - cold intolerance, - heat intolerance  Genitourinary: - dysuria  Musculoskeletal: - arthralgias  Skin:- rash, - wound  Allergic/Immunologic:  + allergies  Neurological: - dizziness, - numbness        Vitals: Blood pressure 116/80, pulse 75, temperature (!) 97 4 °F (36 3 °C), temperature source Tympanic, resp  rate 18, height 5' 5" (1 651 m), weight 78 kg (172 lb), SpO2 97 %  , Body mass index is 28 62 kg/m²  Physical Exam:  GEN  NAD  NECK  supple, no JVD, no LAD  CV  +s1s2, no mrg, RRR  PULM  CTA BL, no wrr  ABD  soft, ntnd, + BS  EXT  no edema, no cyanosis, no clubbing  NEURO  Aox3, no focal weakness    Imaging and other studies:    I personally viewed interpret the following imaging studies:   Chest x-ray 2021 shows increased alveolar predominant opacifications diffusely but favoring the bases      Pulmonary function testin2021   No obstruction   Mild restriction   Severe diffusion impairment    EKG, Pathology, and Other Studies:   none    Assessment:   post COVID-19 syndrome   Moderate persistent asthma   Hypoxic respiratory failure, subacute    Plan:   prescribed Breo 200 daily  Demonstrate Ellipta usage   Continue albuterol as needed   Continue supplemental oxygen with ambulation   Repeat chest x-ray   Will consider repeating 6 minutes walk test on next visit patient feels improved   If patient is still hypoxic on next visit will consider echocardiogram    Return visit in  1 month  On next visit we will evaluate  Symptoms, chest x-ray  Results, consider echocardiogram patient is still markedly short of breath and/or if chest x-ray does not show improvement      CAYETANO Najera    [facility]    Answers for HPI/ROS submitted by the patient on 4/28/2021   Primary symptoms  Do you have chest tightness?: Yes  Do you have a cough?: Yes  Do you experience frequent throat clearing?: Yes  Do you have shortness of breath?: Yes  Do you have wheezing?: Yes  Chronicity: recurrent  When did you first notice your symptoms?: more than 1 year ago  How often do your symptoms occur?: constantly  Since you first noticed this problem, how has it changed?: unchanged  Have you had a change in appetite?: No  Do you have chest pain?: Yes  Do you have shortness of breath that occurs with effort or exertion?: Yes  Do you have ear congestion?: No  Do you have ear pain?: No  Do you have a fever?: No  Do you have headaches?: No  Do you have heartburn?: No  Do you have fatigue?: Yes  Do you have muscle pain?: Yes  Do you have nasal congestion?: Yes  Do you have shortness of breath when lying flat?: No  Do you have shortness of breath when you wake up?: No  Do you have post-nasal drip?: No  Do you have a runny nose?: Yes  Do you have sneezing?: Yes  Do you have a sore throat?: Yes  Do you have sweats?: No  Do you have trouble swallowing?: No  Have you experienced weight loss?: No  Which of the following makes your symptoms worse?: any activity, change in weather, climbing stairs, exercise, exposure to fumes, exposure to smoke, pollen, strenuous activity  Which of the following makes your symptoms better?: steroid inhaler

## 2021-04-29 ENCOUNTER — OFFICE VISIT (OUTPATIENT)
Dept: PHYSICAL THERAPY | Facility: CLINIC | Age: 36
End: 2021-04-29
Payer: MEDICARE

## 2021-04-29 DIAGNOSIS — R42 DIZZINESS: ICD-10-CM

## 2021-04-29 DIAGNOSIS — R53.81 PHYSICAL DECONDITIONING: ICD-10-CM

## 2021-04-29 DIAGNOSIS — M62.81 MUSCLE WEAKNESS OF LOWER EXTREMITY: ICD-10-CM

## 2021-04-29 DIAGNOSIS — R26.89 BALANCE DISORDER: ICD-10-CM

## 2021-04-29 DIAGNOSIS — R29.898 MUSCULAR DECONDITIONING: Primary | ICD-10-CM

## 2021-04-29 DIAGNOSIS — R26.9 GAIT ABNORMALITY: ICD-10-CM

## 2021-04-29 PROCEDURE — 97110 THERAPEUTIC EXERCISES: CPT

## 2021-04-29 PROCEDURE — 97112 NEUROMUSCULAR REEDUCATION: CPT

## 2021-04-29 PROCEDURE — 97150 GROUP THERAPEUTIC PROCEDURES: CPT

## 2021-04-29 NOTE — PROGRESS NOTES
Daily Note     Today's date: 2021  Patient name: Jean Marie Pepper  : 1985  MRN: [id number]  Referring provider: Ju Elizalde MD  Dx:   Encounter Diagnosis     ICD-10-CM    1  Muscular deconditioning  R29 898    2  Gait abnormality  R26 9    3  Physical deconditioning  R53 81    4  Muscle weakness of lower extremity  M62 81    5  Balance disorder  R26 89    6  Dizziness  R42        Start Time: 5417  Stop Time: 1500  Total time in clinic (min): 45 minutes    Subjective: Presents to his OP PT session 15 minutes past scheduled start time due to family logistics; saw pulmonologist yesterday noting overall improvements in O2 readings in the last week (last week was 92-94% resting, 88-90% with mild/mod activity) - today resting consistently 94-96%  Still experiencing BL LE muscle pain; says this however is improving as well  Pulmonologist additionally said his asthma from his childhood may also be acting up; prescribed daily inhaler treatment - will be picking this up either today or tomorrow  Objective: See treatment diary below    Self-Directed Exercise: [id number]  Grouped Exercise: [id number]  One-to-one with PT: [id number]      Assessment: Blaine Fernandez participated in his skilled PT session focused on improving activity tolerance  O2 levels monitored throughout session; consistently 93-96% at rest and only fell below 90% to 89% with recumbent cycling however improved and stayed above 90% at 91% for remainder of cycling  Overall able to perform more physical activity today than last week  Would continue to benefit from skilled PT intervention to address deficits in activity tolerance secondary to COVID-19 to maximize overall functional mobility, quality of life, and decrease risk for falls  Plan: Continue per plan of care  Progress treatment as tolerated         Precautions: FALL RISK, POST-COVID (3/9/21-3/29/21), Hx PTSD, PNES, DMII, NO NC AT REST, 3L NC WITH EXERTION      Manuals 21  IE 4/8/21 4/15 4/20 4/23 4/29                                                           Neuro Re-Ed                                                                                           Pt Education/Assessment Hospital-associated deconditioning, COVID-19 influence on respiratory dysfunction, POC FGA testing, assessment, results  Balke-C Protocol assessment, review of results and vital sign responses  Positional assessment of vitals, discussion of vital response to rest, activity throughout session       Ther Ex             Hip 3-Way HEP BUE  Extension  1x12 ea 3x5 ea 3x5 ea w/ pchTB  HEP         STS HEP x5 no UE 5x5 no UE 5x5 no UE w/ prpl TB loop in front squat position  HEP         Walking  6 MWT  700'  6' Balke-C Protocol on TM         Mini Squats   x10 x10 w/ prpl TB loop in front squat position  HEP  Box squat from prpl chair BL UE HR  x20       Lunges   x6 ea NV  x5 ea BL UE       Recumbent Bike      L1 x10'                    Pt Education  6 MWT results           Ther Activity                                       Gait Training                                       Modalities

## 2021-05-04 ENCOUNTER — OFFICE VISIT (OUTPATIENT)
Dept: INTERNAL MEDICINE CLINIC | Facility: CLINIC | Age: 36
End: 2021-05-04
Payer: MEDICARE

## 2021-05-04 ENCOUNTER — TELEPHONE (OUTPATIENT)
Dept: PULMONOLOGY | Facility: CLINIC | Age: 36
End: 2021-05-04

## 2021-05-04 ENCOUNTER — APPOINTMENT (OUTPATIENT)
Dept: PHYSICAL THERAPY | Facility: CLINIC | Age: 36
End: 2021-05-04
Payer: MEDICARE

## 2021-05-04 VITALS
OXYGEN SATURATION: 98 % | HEART RATE: 79 BPM | DIASTOLIC BLOOD PRESSURE: 62 MMHG | WEIGHT: 172 LBS | BODY MASS INDEX: 28.66 KG/M2 | HEIGHT: 65 IN | TEMPERATURE: 96.9 F | SYSTOLIC BLOOD PRESSURE: 102 MMHG

## 2021-05-04 DIAGNOSIS — M62.81 MUSCLE WEAKNESS OF LOWER EXTREMITY: ICD-10-CM

## 2021-05-04 DIAGNOSIS — Z79.4 TYPE 2 DIABETES MELLITUS WITHOUT COMPLICATION, WITH LONG-TERM CURRENT USE OF INSULIN (HCC): ICD-10-CM

## 2021-05-04 DIAGNOSIS — E11.9 TYPE 2 DIABETES MELLITUS WITHOUT COMPLICATION, WITH LONG-TERM CURRENT USE OF INSULIN (HCC): ICD-10-CM

## 2021-05-04 DIAGNOSIS — U09.9 POST-COVID-19 CONDITION: Primary | ICD-10-CM

## 2021-05-04 DIAGNOSIS — R06.2 WHEEZING: ICD-10-CM

## 2021-05-04 DIAGNOSIS — M54.42 ACUTE BILATERAL LOW BACK PAIN WITH BILATERAL SCIATICA: ICD-10-CM

## 2021-05-04 DIAGNOSIS — M54.41 ACUTE BILATERAL LOW BACK PAIN WITH BILATERAL SCIATICA: ICD-10-CM

## 2021-05-04 PROCEDURE — 99214 OFFICE O/P EST MOD 30 MIN: CPT | Performed by: NURSE PRACTITIONER

## 2021-05-04 RX ORDER — CLONAZEPAM 1 MG/1
1 TABLET ORAL DAILY
COMMUNITY
Start: 2021-05-03

## 2021-05-04 RX ORDER — SERTRALINE HYDROCHLORIDE 100 MG/1
200 TABLET, FILM COATED ORAL DAILY
COMMUNITY
Start: 2021-04-07

## 2021-05-04 RX ORDER — MELOXICAM 15 MG/1
15 TABLET ORAL DAILY PRN
Qty: 30 TABLET | Refills: 1 | Status: SHIPPED | OUTPATIENT
Start: 2021-05-04 | End: 2021-09-28 | Stop reason: SDUPTHER

## 2021-05-04 RX ORDER — METOPROLOL SUCCINATE 25 MG/1
25 TABLET, EXTENDED RELEASE ORAL DAILY
Qty: 30 TABLET | Refills: 0 | Status: SHIPPED | OUTPATIENT
Start: 2021-05-04 | End: 2021-06-16 | Stop reason: SDUPTHER

## 2021-05-04 NOTE — PROGRESS NOTES
Assessment/Plan:    Problem List Items Addressed This Visit        Endocrine    Type 2 diabetes mellitus without complication, with long-term current use of insulin (HCC)    Relevant Medications    metoprolol succinate (TOPROL-XL) 25 mg 24 hr tablet       Nervous and Auditory    Acute bilateral low back pain with bilateral sciatica     Continue physical therapy   Follow up with PMR  Which XR lumbar spine         Relevant Orders    XR spine lumbar minimum 4 views non injury    Ambulatory referral to Physical Medicine Rehab       Other    Muscle weakness of lower extremity     Continue physical therapy   Follow up with PMR  Which XR lumbar spine         Relevant Medications    meloxicam (MOBIC) 15 mg tablet    Other Relevant Orders    Ambulatory referral to Physical Medicine Rehab    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     Meloxicam refilled as pt was having some relief w/ this   Continue physical therapy  Follow up with PMR         Relevant Orders    Ambulatory referral to Physical Medicine Rehab          M*Modal software was used to dictate this note  It may contain errors with dictating incorrect words or incorrect spelling  Please contact the provider directly with any questions  Subjective:      Patient ID: Sydnee Chang is a 38 y o  male  HPI    Patient presents today with concerns for "pain all over"   Onset of pain was with COVID in March 2021  He was hospitalized for 8 days  He states over the last two weeks his pain has been worsening  He also ran out of his meloxicam which he was taking daily  He also endorses bilateral LE weakness since having COVID which he was referred to physical therapy or in March and has been going to  Pain is specific to his low back, bilateral hips and knees, myalgias in his legs  He reports the pain can radiate up his back to his neck and then his neck feels stiff  He has not been taking any medication for his     He has tried stretching with no improvement    He does report having some back pain with radicular symptoms prior to covid but not previously worked up  The following portions of the patient's history were reviewed and updated as appropriate: allergies, current medications, past family history, past medical history, past social history, past surgical history and problem list     Review of Systems   Constitutional: Negative for chills and fever  Respiratory: Negative for cough, chest tightness, shortness of breath and wheezing  Cardiovascular: Negative for chest pain  Musculoskeletal: Positive for arthralgias, back pain, gait problem (due to pain), myalgias and neck stiffness  Negative for joint swelling  Neurological: Positive for numbness (and tingling in LE)  Negative for weakness  Past Medical History:   Diagnosis Date    COVID-19 3/17/2021    Diabetes mellitus ([facility] [address] )     type 2    Disease of thyroid gland     hypothyroidism    Hyperlipidemia     Hypertension     Psychiatric disorder     PTSD   Anxiety, depression,          Current Outpatient Medications:     albuterol (PROVENTIL HFA,VENTOLIN HFA) 90 mcg/act inhaler, Inhale 2 puffs every 6 (six) hours as needed for wheezing or shortness of breath, Disp: 1 Inhaler, Rfl: 1    divalproex sodium (DEPAKOTE) 500 mg EC tablet, Take 500 mg by mouth every 12 (twelve) hours , Disp: , Rfl:     Empagliflozin (Jardiance) 25 MG TABS, Take 1 tablet by mouth daily, Disp: , Rfl:     fluticasone-vilanterol (BREO ELLIPTA) 200-25 MCG/INH inhaler, Inhale 1 puff daily Rinse mouth after use , Disp: 1 Inhaler, Rfl: 5    gabapentin (NEURONTIN) 300 mg capsule, Take 300 mg by mouth Three times a day, Disp: , Rfl:     insulin glargine (LANTUS SOLOSTAR) 100 units/mL injection pen, Inject 45 Units under the skin daily (Patient taking differently: Inject 50 Units under the skin daily ), Disp: 15 mL, Rfl: 0    levothyroxine 25 mcg tablet, Take 25 mcg by mouth daily, Disp: , Rfl:     loratadine (CLARITIN) 10 mg tablet, Take 10 mg by mouth daily, Disp: , Rfl:     metFORMIN (GLUCOPHAGE) 1000 MG tablet, Take 1,000 mg by mouth daily , Disp: , Rfl:     metoprolol succinate (TOPROL-XL) 25 mg 24 hr tablet, Take 1 tablet (25 mg total) by mouth daily, Disp: 30 tablet, Rfl: 0    OMEGA-3 FATTY ACIDS PO, Take 2 g by mouth daily, Disp: , Rfl:     omeprazole (PriLOSEC) 40 MG capsule, Take 1 tablet 30-60 minutes prior to breakfast, Disp: 30 capsule, Rfl: 3    pravastatin (PRAVACHOL) 40 mg tablet, Take 1 tablet (40 mg total) by mouth daily, Disp:  , Rfl: 0    sertraline (ZOLOFT) 100 mg tablet, , Disp: , Rfl:     ALPRAZolam (XANAX) 0 5 mg tablet, Take 0 5 mg by mouth Three times daily as needed, Disp: , Rfl:     clonazePAM (KlonoPIN) 1 mg tablet, , Disp: , Rfl:     meloxicam (MOBIC) 15 mg tablet, Take 1 tablet (15 mg total) by mouth daily as needed for mild pain or moderate pain, Disp: 30 tablet, Rfl: 1    multivitamin-minerals (CENTRUM ADULTS) tablet, Take 1 tablet by mouth daily, Disp: 30 tablet, Rfl: 0    sertraline (ZOLOFT) 25 mg tablet, Take 1 tablet (25 mg total) by mouth daily for 8 doses, Disp: 8 tablet, Rfl: 0    sertraline (ZOLOFT) 50 mg tablet, Take 1 tablet (50 mg total) by mouth daily (Patient not taking: Reported on 5/4/2021), Disp: 30 tablet, Rfl: 0    sildenafil (VIAGRA) 100 mg tablet, Take 100 mg by mouth as needed for erectile dysfunction , Disp: , Rfl:     traZODone (DESYREL) 100 mg tablet, Take 100 mg by mouth , Disp: , Rfl:     Allergies   Allergen Reactions    Lamotrigine GI Intolerance    Niacin Rash    Simvastatin Other (See Comments)     Elevated liver enzymes  Liver enzyme elevation       Social History   Past Surgical History:   Procedure Laterality Date    WISDOM TOOTH EXTRACTION       Family History   Problem Relation Age of Onset    Hyperlipidemia Father     Heart attack Paternal Grandfather        Objective:  /62 (BP Location: Left arm, Patient Position: Sitting, Cuff Size: Adult) Pulse 79   Temp (!) 96 9 °F (36 1 °C) (Tympanic)   Ht 5' 4 5" (1 638 m)   Wt 78 kg (172 lb)   SpO2 98%   BMI 29 07 kg/m²      Physical Exam  Vitals signs reviewed  Constitutional:       General: He is not in acute distress  Appearance: Normal appearance  He is not diaphoretic  HENT:      Head: Normocephalic and atraumatic  Eyes:      Extraocular Movements: Extraocular movements intact  Conjunctiva/sclera: Conjunctivae normal       Pupils: Pupils are equal, round, and reactive to light  Cardiovascular:      Rate and Rhythm: Normal rate and regular rhythm  Heart sounds: Normal heart sounds  No murmur  Pulmonary:      Effort: Pulmonary effort is normal  No respiratory distress  Breath sounds: Normal breath sounds  No wheezing, rhonchi or rales  Musculoskeletal:      Thoracic back: He exhibits bony tenderness  Lumbar back: He exhibits decreased range of motion and bony tenderness  Comments: Bilateral LE weakness   Neurological:      Mental Status: He is alert and oriented to person, place, and time  Mental status is at baseline        Gait: Gait abnormal    Psychiatric:         Mood and Affect: Mood normal          Behavior: Behavior normal

## 2021-05-05 ENCOUNTER — APPOINTMENT (OUTPATIENT)
Dept: RADIOLOGY | Age: 36
End: 2021-05-05
Payer: MEDICARE

## 2021-05-05 ENCOUNTER — DOCUMENTATION (OUTPATIENT)
Dept: PULMONOLOGY | Facility: HOSPITAL | Age: 36
End: 2021-05-05

## 2021-05-05 DIAGNOSIS — J45.40 MODERATE PERSISTENT ALLERGIC ASTHMA: ICD-10-CM

## 2021-05-05 DIAGNOSIS — M54.41 ACUTE BILATERAL LOW BACK PAIN WITH BILATERAL SCIATICA: ICD-10-CM

## 2021-05-05 DIAGNOSIS — M54.42 ACUTE BILATERAL LOW BACK PAIN WITH BILATERAL SCIATICA: ICD-10-CM

## 2021-05-05 PROCEDURE — 71046 X-RAY EXAM CHEST 2 VIEWS: CPT

## 2021-05-05 PROCEDURE — 72110 X-RAY EXAM L-2 SPINE 4/>VWS: CPT

## 2021-05-05 RX ORDER — ALBUTEROL SULFATE 90 UG/1
2 AEROSOL, METERED RESPIRATORY (INHALATION) EVERY 6 HOURS PRN
Refills: 0 | OUTPATIENT
Start: 2021-05-05

## 2021-05-05 RX ORDER — MELOXICAM 15 MG/1
15 TABLET ORAL DAILY PRN
Qty: 30 TABLET | Refills: 1 | OUTPATIENT
Start: 2021-05-05

## 2021-05-05 NOTE — ASSESSMENT & PLAN NOTE
Meloxicam refilled as pt was having some relief w/ this   Continue physical therapy  Follow up with PMR

## 2021-05-05 NOTE — TELEPHONE ENCOUNTER
Insurance denied [facility] De Leon, they will approve either Advair, Dulera, Anoro, Stiolto, Trelegy   Please advise thank you

## 2021-05-06 ENCOUNTER — APPOINTMENT (OUTPATIENT)
Dept: PHYSICAL THERAPY | Facility: CLINIC | Age: 36
End: 2021-05-06
Payer: MEDICARE

## 2021-05-06 DIAGNOSIS — J45.40 MODERATE PERSISTENT ALLERGIC ASTHMA: Primary | ICD-10-CM

## 2021-05-06 NOTE — PROGRESS NOTES
Progress Note     Today's date: 2021  Patient name: Pooja Bundy  : 1985  MRN: [id number]  Referring provider: Violet Hyman MD  Dx: No diagnosis found  Subjective: ***      Objective: See treatment diary below    6 minute walk test: TBA NV     Gait Speed : TBA NV     Balance :  5x Sit to stand:  1:13 58 , 10 mRPE/ dyspnea scale  MCTSIB: FTEO firm 30s, FTEC firm 30s, FTEO foam 30s, FTEC 5 15s  FGA score: TBA NV    6 MWT: 700 feet  Starting vitals  95% O2  87 BPM HR     Ending vitals  93% O2  108 BPM HR     Minimum O2 levels during test: 88% - subjectively did not report he needed to sit  Maximal dyspnea scale -7/10     Subjectively required NC supplementation S/P test      Gait speed: 0 885 m/s NO AD     FGA:   Starting vitals  95% O2  94 BPM HR     Ending vitals  93% O2  111 BPM HR        Max O2 this visit:  95% with O2 off  98% with O2 On     Subjectively highest 96/97% no O2 at home     Leg pain pre-Tx: 3-4/10              Post-Tx: -5/10       Assessment: Tolerated treatment {Tolerated treatment :}  Patient {assessment:}    Goals  ST weeks  1  Pt will demonstrate independence with initial HEP    2  Pt will improve 5 x STS minimally by 15s with no UE assistance  3  Pt will improve gait speed minimally by 0 1 m/s   4  Pt will be able to complete FGA  5  Pt will be able to complete 6 MWT     LT-12 weeks  1  Pt will demonstrate independence with finalized HEP    2  Pt will no longer be considered a greater risk for falls per 5xSTS, MCTSIB, and FGA testing  3  Pt will subjectively report abilities to participate in household ADL's and looking after his four children at his PLOF  4  Pt will subjectively report no shortness of breath with all ADL's   5  Pt's SPO2 levels will not drop below 90% with subjectively strenuous activities      Plan: {PLAN:[id number]}     Precautions: FALL RISK, POST-COVID (3/9/21-3/29/21), Hx PTSD, PNES, DMII, NO NC AT REST, 3L NC WITH EXERTION      Manuals 4/2/21  IE 4/8/21 4/15 4/20 4/23 4/29                                                           Neuro Re-Ed                                                                                           Pt Education/Assessment Hospital-associated deconditioning, COVID-19 influence on respiratory dysfunction, POC FGA testing, assessment, results  Balke-C Protocol assessment, review of results and vital sign responses  Positional assessment of vitals, discussion of vital response to rest, activity throughout session       Ther Ex             Hip 3-Way HEP BUE  Extension  1x12 ea 3x5 ea 3x5 ea w/ pchTB  HEP         STS HEP x5 no UE 5x5 no UE 5x5 no UE w/ prpl TB loop in front squat position  HEP         Walking  6 MWT  700'  6' Balke-C Protocol on TM         Mini Squats   x10 x10 w/ prpl TB loop in front squat position  HEP  Box squat from prpl chair BL UE HR  x20       Lunges   x6 ea NV  x5 ea BL UE       Recumbent Bike      L1 x10'                    Pt Education  6 MWT results           Ther Activity                                       Gait Training                                       Modalities

## 2021-05-07 NOTE — TELEPHONE ENCOUNTER
Patient calling to follow up on form  Form appears to have been signed and faxed 5/4/2021 to [employer] and copy mailed to patient

## 2021-05-11 ENCOUNTER — APPOINTMENT (OUTPATIENT)
Dept: PHYSICAL THERAPY | Facility: CLINIC | Age: 36
End: 2021-05-11
Payer: MEDICARE

## 2021-05-18 ENCOUNTER — APPOINTMENT (OUTPATIENT)
Dept: PHYSICAL THERAPY | Facility: CLINIC | Age: 36
End: 2021-05-18
Payer: MEDICARE

## 2021-05-18 ENCOUNTER — TELEPHONE (OUTPATIENT)
Dept: INTERNAL MEDICINE CLINIC | Facility: CLINIC | Age: 36
End: 2021-05-18

## 2021-05-18 NOTE — TELEPHONE ENCOUNTER
Patient returned call for X-Ray results  Also, patient stated he needs a new handicap placard form filled out  Had one completed by Dr Delta Barry on 3/31/21 but notary apparently checked off too many boxes and form is now void

## 2021-05-18 NOTE — TELEPHONE ENCOUNTER
Form redone and ready for Dr Reynoso Amend  Emailed to [facility]  Patient was informed that Dr Annie Carey is not back in the office until next week

## 2021-05-20 ENCOUNTER — EVALUATION (OUTPATIENT)
Dept: PHYSICAL THERAPY | Facility: CLINIC | Age: 36
End: 2021-05-20
Payer: MEDICARE

## 2021-05-20 DIAGNOSIS — R29.898 MUSCULAR DECONDITIONING: Primary | ICD-10-CM

## 2021-05-20 DIAGNOSIS — R26.89 BALANCE DISORDER: ICD-10-CM

## 2021-05-20 DIAGNOSIS — R53.81 PHYSICAL DECONDITIONING: ICD-10-CM

## 2021-05-20 DIAGNOSIS — M62.81 MUSCLE WEAKNESS OF LOWER EXTREMITY: ICD-10-CM

## 2021-05-20 DIAGNOSIS — R26.9 GAIT ABNORMALITY: ICD-10-CM

## 2021-05-20 DIAGNOSIS — R42 DIZZINESS: ICD-10-CM

## 2021-05-20 PROCEDURE — 97112 NEUROMUSCULAR REEDUCATION: CPT

## 2021-05-20 PROCEDURE — 97112 NEUROMUSCULAR REEDUCATION: CPT | Performed by: PHYSICAL THERAPIST

## 2021-05-20 NOTE — PROGRESS NOTES
Progress Note     Today's date: 2021  Patient name: Lashon Baird  : 1985  MRN: [id number]  Referring provider: Parker Woods MD  Dx:   Encounter Diagnosis     ICD-10-CM    1  Muscular deconditioning  R29 898    2  Gait abnormality  R26 9    3  Physical deconditioning  R53 81    4  Muscle weakness of lower extremity  M62 81    5  Dizziness  R42    6  Balance disorder  R26 89        Start Time: 1500  Stop Time: 1600  Total time in clinic (min): 60 minutes    Subjective: Returns to PT following multiple cancellations now on room air! Is continuing to gradually feel better  Session time limited due to [employer] filming for COVID-19 interview  Objective: See treatment diary below    One-to-one with Divya Aldana, PT, DPT, NCS [phone]  One-to-one with Chad Lima PT, DPT [phone]    Functional Outcome Measures:    6MWT: IE - 700 ft, 3L NC O2 PRN   PN 21: 900 ft no NC    5xSTS: IE - 1:13 58 s BL UE   PN 21: 12 88s no UE     Assessment: Significant improvements in power and endurance since IE  Session time extremely limited with main PT Chad Lima) due to need of [employer] filming and interview  Will continue ongoing assessment NV when uninterrupted  Plan:  Continue per POC      Precautions: FALL RISK, POST-COVID (3/9/21-3/29/21), Hx PTSD, PNES, DMII, NO NC AT REST, 3L NC WITH EXERTION      Manuals 21  IE 4/8/21 4/15 4/20 4/23 4/29 5/21                                                          Neuro Re-Ed                                                                              TM       1 4 mph x3 min BUE      Pt Education/Assessment Hospital-associated deconditioning, COVID-19 influence on respiratory dysfunction, POC FGA testing, assessment, results  Balke-C Protocol assessment, review of results and vital sign responses  Positional assessment of vitals, discussion of vital response to rest, activity throughout session PROGRESS NOTE TESTING    Review of current progress, POC, HEP      Ther Ex             Hip 3-Way HEP BUE  Extension  1x12 ea 3x5 ea 3x5 ea w/ pchTB  HEP         STS HEP x5 no UE 5x5 no UE 5x5 no UE w/ prpl TB loop in front squat position  HEP         Walking  6 MWT  700'  6' Balke-C Protocol on TM         Mini Squats   x10 x10 w/ prpl TB loop in front squat position  HEP  Box squat from prpl chair BL UE HR  x20       Lunges   x6 ea NV  x5 ea BL UE       Recumbent Bike      L1 x10'                    Pt Education  6 MWT results           Ther Activity                                       Gait Training                                       Modalities

## 2021-05-25 ENCOUNTER — APPOINTMENT (OUTPATIENT)
Dept: PHYSICAL THERAPY | Facility: CLINIC | Age: 36
End: 2021-05-25
Payer: MEDICARE

## 2021-05-27 ENCOUNTER — APPOINTMENT (OUTPATIENT)
Dept: PHYSICAL THERAPY | Facility: CLINIC | Age: 36
End: 2021-05-27
Payer: MEDICARE

## 2021-05-27 ENCOUNTER — OFFICE VISIT (OUTPATIENT)
Dept: PULMONOLOGY | Facility: CLINIC | Age: 36
End: 2021-05-27
Payer: MEDICARE

## 2021-05-27 VITALS
OXYGEN SATURATION: 99 % | SYSTOLIC BLOOD PRESSURE: 124 MMHG | WEIGHT: 180.6 LBS | TEMPERATURE: 98.3 F | DIASTOLIC BLOOD PRESSURE: 76 MMHG | HEIGHT: 65 IN | BODY MASS INDEX: 30.09 KG/M2 | HEART RATE: 98 BPM

## 2021-05-27 DIAGNOSIS — J45.40 MODERATE PERSISTENT ALLERGIC ASTHMA: ICD-10-CM

## 2021-05-27 DIAGNOSIS — F41.9 ANXIETY: ICD-10-CM

## 2021-05-27 DIAGNOSIS — U09.9 POST-COVID-19 CONDITION: Primary | ICD-10-CM

## 2021-05-27 PROCEDURE — 99214 OFFICE O/P EST MOD 30 MIN: CPT | Performed by: INTERNAL MEDICINE

## 2021-05-27 RX ORDER — FLUTICASONE FUROATE AND VILANTEROL 200; 25 UG/1; UG/1
1 POWDER RESPIRATORY (INHALATION) DAILY
Qty: 1 EACH | Refills: 6 | Status: SHIPPED | OUTPATIENT
Start: 2021-05-27 | End: 2021-10-13

## 2021-05-27 NOTE — PROGRESS NOTES
Pulmonary Follow Up Note  Delmis Nunez 39 y o  male MRN: [id number]  2021      HPI:    Patient has had a drastic improvement in shortness of breath and dyspnea on exertion  His health insurance did not cover Breo daily so he is now taking Advair (although just 1 time per day due to incorrect reading of directions)  He rarely requires albuterol as a rescue inhaler  He denies any major cough or sputum production  No fevers or chills  No significant weight loss  No nausea or vomiting  Exercise Tolerance: Almost no exercise intolerance now       Meds:    Advair Two hundred fifty daily    albuterol as needed (rarely)    ROS:  Constitutional: - Fatigue, - chills, - fever, - weight change  HEENT: - rhinorrhea, - sneezing, - sore throat  Respiratory: - cough, - shortness of breath, - wheezing  Cardiovascular: - chest pain,  -palpitations, - leg swelling  Gastrointestinal: - abdominal pain, - constipation, - diarrhea, - nausea, - vomiting  Endocrine: - cold intolerance, - heat intolerance  Genitourinary: - dysuria  Musculoskeletal: - arthralgias  Skin:- rash, - wound  Allergic/Immunologic: - allergies  Neurological: - dizziness, - numbness        Vitals: Blood pressure 124/76, pulse 98, temperature 98 3 °F (36 8 °C), temperature source Temporal, height 5' 5" (1 651 m), weight 81 9 kg (180 lb 9 6 oz), SpO2 99 %  , Body mass index is 30 05 kg/m²  Physical Exam:  GEN  NAD  NECK  supple, no JVD, no LAD  CV  +s1s2, no mrg, RRR  PULM  CTA BL, no wrr  ABD  soft, ntnd, + BS  EXT  no edema, no cyanosis, no clubbing  NEURO  Aox3, no focal weakness    Imaging and other studies:    I personally viewed and interpreted the following imaging studies:  Chest x-ray 2021 shows bilateral, peripheral alveolar pattern opacification that is drastically improved from x-ray on 2021      Pulmonary function testin2021  Spirometry shows no obstruction however there is a restrictive spirometric pattern   Mild  Restrictive disease   Severe decrease in diffusion capacity    EKG, Pathology, and Other Studies:   none    Assessment:   post COVID-19 syndrome   Moderate persistent asthma/ reactive airways disease   Hypoxic respiratory failure-improved    Plan:  Discontinue Advair   Fax'd a prescription of Breo 200 to [address] in [address]   continue albuterol as needed   Will consider repeating full PFTs upon next visit   Patient states that he feels markedly improved now   Chest x-ray appears to be drastically improved    Return visit in  4-5 months  On next visit we will evaluate  Symptoms, consider repeating full PFTs and 6 minutes walk test    [facility]

## 2021-05-28 ENCOUNTER — OFFICE VISIT (OUTPATIENT)
Dept: INTERNAL MEDICINE CLINIC | Facility: CLINIC | Age: 36
End: 2021-05-28
Payer: MEDICARE

## 2021-05-28 ENCOUNTER — TELEPHONE (OUTPATIENT)
Dept: NEUROLOGY | Facility: CLINIC | Age: 36
End: 2021-05-28

## 2021-05-28 ENCOUNTER — TELEPHONE (OUTPATIENT)
Dept: ADMINISTRATIVE | Facility: OTHER | Age: 36
End: 2021-05-28

## 2021-05-28 VITALS
OXYGEN SATURATION: 97 % | BODY MASS INDEX: 30.26 KG/M2 | TEMPERATURE: 97.4 F | DIASTOLIC BLOOD PRESSURE: 74 MMHG | HEART RATE: 60 BPM | WEIGHT: 181.6 LBS | SYSTOLIC BLOOD PRESSURE: 108 MMHG | RESPIRATION RATE: 18 BRPM | HEIGHT: 65 IN

## 2021-05-28 DIAGNOSIS — E78.2 MIXED HYPERLIPIDEMIA: ICD-10-CM

## 2021-05-28 DIAGNOSIS — E11.9 TYPE 2 DIABETES MELLITUS WITHOUT COMPLICATION, WITH LONG-TERM CURRENT USE OF INSULIN (HCC): Primary | ICD-10-CM

## 2021-05-28 DIAGNOSIS — E03.9 HYPOTHYROIDISM, UNSPECIFIED TYPE: ICD-10-CM

## 2021-05-28 DIAGNOSIS — K21.9 CHRONIC GERD: ICD-10-CM

## 2021-05-28 DIAGNOSIS — I10 ESSENTIAL HYPERTENSION: ICD-10-CM

## 2021-05-28 DIAGNOSIS — F41.9 ANXIETY: ICD-10-CM

## 2021-05-28 DIAGNOSIS — F43.10 PTSD (POST-TRAUMATIC STRESS DISORDER): ICD-10-CM

## 2021-05-28 DIAGNOSIS — Z79.4 TYPE 2 DIABETES MELLITUS WITHOUT COMPLICATION, WITH LONG-TERM CURRENT USE OF INSULIN (HCC): Primary | ICD-10-CM

## 2021-05-28 LAB — SL AMB POCT HEMOGLOBIN AIC: 10.3 (ref ?–6.5)

## 2021-05-28 PROCEDURE — 83036 HEMOGLOBIN GLYCOSYLATED A1C: CPT | Performed by: INTERNAL MEDICINE

## 2021-05-28 PROCEDURE — 99214 OFFICE O/P EST MOD 30 MIN: CPT | Performed by: INTERNAL MEDICINE

## 2021-05-28 NOTE — TELEPHONE ENCOUNTER
----- Message from Magda Gibbs MA sent at 5/28/2021 11:55 AM EDT -----  Regarding: dm eye exam  05/28/21 11:56 AM    Hello, our patient Brgiido Rendon has had Diabetic Eye Exam completed/performed  Please assist in updating the patient chart by making an External outreach to [facility]  The date of service is unknown      Thank you,  Magda Gibbs, [id number]  PRIMARY CARE [facility]

## 2021-05-28 NOTE — PROGRESS NOTES
Assessment and Plan:     Problem List Items Addressed This Visit        Endocrine    Type 2 diabetes mellitus without complication, with long-term current use of insulin ([address] ) - Primary    Relevant Orders    POCT hemoglobin A1c (Completed)           Preventive health issues were discussed with patient, and age appropriate screening tests were ordered as noted in patient's After Visit Summary  Personalized health advice and appropriate referrals for health education or preventive services given if needed, as noted in patient's After Visit Summary  History of Present Illness:     Patient presents for Medicare Annual Wellness visit    Patient Care Team:  Andrea Jackson MD as PCP - General (Internal Medicine)     Problem List:     Patient Active Problem List   Diagnosis    Anxiety    PTSD (post-traumatic stress disorder)    Mixed hyperlipidemia    Type 2 diabetes mellitus without complication, with long-term current use of insulin ([address] )    ENRIKE (obstructive sleep apnea)    Bright red blood per rectum    Chronic GERD    Oropharyngeal dysphagia    Transaminitis    Bradycardic baseline fetal heart rate    Blood per rectum    Muscle weakness of lower extremity    Essential hypertension    Moderate persistent allergic asthma    Post-COVID-19 condition    Acute bilateral low back pain with bilateral sciatica      Past Medical and Surgical History:     Past Medical History:   Diagnosis Date    COVID-19 3/17/2021    Diabetes mellitus ([address] )     type 2    Disease of thyroid gland     hypothyroidism    Hyperlipidemia     Hypertension     Psychiatric disorder     PTSD   Anxiety, depression,      Past Surgical History:   Procedure Laterality Date    WISDOM TOOTH EXTRACTION        Family History:     Family History   Problem Relation Age of Onset    Hyperlipidemia Father     Heart attack Paternal Grandfather       Social History:     E-Cigarette/Vaping    E-Cigarette Use Never User E-Cigarette/Vaping Substances    Nicotine No     THC No     CBD No     Flavoring No     Other No      Social History     Socioeconomic History    Marital status: /Civil Union     Spouse name: None    Number of children: None    Years of education: None    Highest education level: None   Occupational History    None   Social Needs    Financial resource strain: None    Food insecurity     Worry: None     Inability: None    Transportation needs     Medical: None     Non-medical: None   Tobacco Use    Smoking status: Never Smoker    Smokeless tobacco: Never Used   Substance and Sexual Activity    Alcohol use: Not Currently    Drug use: No    Sexual activity: Not Currently   Lifestyle    Physical activity     Days per week: None     Minutes per session: None    Stress: None   Relationships    Social connections     Talks on phone: None     Gets together: None     Attends [employer] service: None     Active member of club or organization: None     Attends meetings of clubs or organizations: None     Relationship status: None    Intimate partner violence     Fear of current or ex partner: None     Emotionally abused: None     Physically abused: None     Forced sexual activity: None   Other Topics Concern    None   Social History Narrative    None      Medications and Allergies:     Current Outpatient Medications   Medication Sig Dispense Refill    albuterol (PROVENTIL HFA,VENTOLIN HFA) 90 mcg/act inhaler Inhale 2 puffs every 6 (six) hours as needed for wheezing or shortness of breath 1 Inhaler 1    clonazePAM (KlonoPIN) 1 mg tablet       divalproex sodium (DEPAKOTE) 500 mg EC tablet Take 500 mg by mouth every 12 (twelve) hours       Empagliflozin (Jardiance) 25 MG TABS Take 1 tablet by mouth daily      fluticasone-salmeterol (Advair Diskus) 500-50 mcg/dose inhaler Inhale 1 puff 2 (two) times a day Rinse mouth after use   1 Inhaler 3    gabapentin (NEURONTIN) 300 mg capsule Take 300 mg by mouth Three times a day      insulin glargine (LANTUS SOLOSTAR) 100 units/mL injection pen Inject 45 Units under the skin daily (Patient taking differently: Inject 50 Units under the skin daily ) 15 mL 0    levothyroxine 25 mcg tablet Take 25 mcg by mouth daily      loratadine (CLARITIN) 10 mg tablet Take 10 mg by mouth daily      meloxicam (MOBIC) 15 mg tablet Take 1 tablet (15 mg total) by mouth daily as needed for mild pain or moderate pain 30 tablet 1    metFORMIN (GLUCOPHAGE) 1000 MG tablet Take 1,000 mg by mouth daily       metoprolol succinate (TOPROL-XL) 25 mg 24 hr tablet Take 1 tablet (25 mg total) by mouth daily 30 tablet 0    OMEGA-3 FATTY ACIDS PO Take 2 g by mouth daily      omeprazole (PriLOSEC) 40 MG capsule Take 1 tablet 30-60 minutes prior to breakfast 30 capsule 3    sertraline (ZOLOFT) 100 mg tablet       ALPRAZolam (XANAX) 0 5 mg tablet Take 0 5 mg by mouth Three times daily as needed      fluticasone-vilanterol (BREO ELLIPTA) 200-25 MCG/INH inhaler Inhale 1 puff daily Rinse mouth after use  (Patient not taking: Reported on 5/28/2021) 1 each 6    multivitamin-minerals (CENTRUM ADULTS) tablet Take 1 tablet by mouth daily (Patient not taking: Reported on 5/28/2021) 30 tablet 0    pravastatin (PRAVACHOL) 40 mg tablet Take 1 tablet (40 mg total) by mouth daily  0    sertraline (ZOLOFT) 25 mg tablet Take 1 tablet (25 mg total) by mouth daily for 8 doses 8 tablet 0    sertraline (ZOLOFT) 50 mg tablet Take 1 tablet (50 mg total) by mouth daily (Patient not taking: Reported on 5/4/2021) 30 tablet 0    sildenafil (VIAGRA) 100 mg tablet Take 100 mg by mouth as needed for erectile dysfunction       traZODone (DESYREL) 100 mg tablet Take 100 mg by mouth        No current facility-administered medications for this visit        Allergies   Allergen Reactions    Lamotrigine GI Intolerance    Niacin Rash    Simvastatin Other (See Comments)     Elevated liver enzymes  Liver enzyme elevation Immunizations:     Immunization History   Administered Date(s) Administered    Anthrax 03/17/2004    Fluzone Split Quad 0 5 mL 10/02/2019    H1N1 Inj 11/25/2009    INFLUENZA 11/19/2012, 10/23/2018, 10/02/2019    IPV 07/31/2008    Influenza LAIV (Nasal) 08/27/2009    Influenza Quadrivalent Preservative Free 3 years and older IM 10/02/2019    Influenza Split 11/29/2005    Influenza, injectable, quadrivalent, preservative free 0 5 mL 10/02/2019    Influenza, seasonal, injectable, preservative free 10/23/2018    Meningococcal MCV4P 07/31/2008    Meningococcal Polysaccharide (MPSV4) 07/11/2002    Pneumococcal 12/15/2011    Pneumococcal Conjugate PCV 7 12/15/2011    Smallpox 01/24/2003    Td (adult), Unspecified 01/01/2008    Td (adult), adsorbed 11/08/2002, 06/22/2015    Tdap 07/31/2008, 06/22/2015    Typhoid, Unspecified 09/16/2004    Typhoid, ViCPs 11/13/2008    Yellow Fever 07/11/2002      Health Maintenance:         Topic Date Due    HIV Screening  Never done         Topic Date Due    Pneumococcal Vaccine: Pediatrics (0 to 5 Years) and At-Risk Patients (6 to 59 Years) (1 of 1 - PPSV23) 05/23/1990    COVID-19 Vaccine (1) Never done      Medicare Health Risk Assessment:     /74 (BP Location: Left arm, Patient Position: Sitting, Cuff Size: Large)   Pulse 60   Temp (!) 97 4 °F (36 3 °C) (Temporal)   Resp 18   Ht 5' 5" (1 651 m)   Wt 82 4 kg (181 lb 9 6 oz)   SpO2 97%   BMI 30 22 kg/m²      Angel Luis Rodriguez is here for his Subsequent Wellness visit  Last Medicare Wellness visit information reviewed, patient interviewed and updates made to the record today  Health Risk Assessment:   Patient rates overall health as good  Patient feels that their physical health rating is slightly better  Patient is satisfied with their life  Eyesight was rated as same  Hearing was rated as slightly worse  Patient feels that their emotional and mental health rating is same   Patients states they are sometimes angry  Patient states they are often unusually tired/fatigued  Pain experienced in the last 7 days has been a lot  Patient's pain rating has been 7/10  Patient states that he has experienced no weight loss or gain in last 6 months  Depression Screening:   PHQ-2 Score: 2      Fall Risk Screening: In the past year, patient has experienced: history of falling in past year    Number of falls: 2 or more  Injured during fall?: No    Feels unsteady when standing or walking?: Yes    Worried about falling?: Yes      Home Safety:  Patient has trouble with stairs inside or outside of their home  Patient has working smoke alarms and has working carbon monoxide detector  Home safety hazards include: none  Nutrition:   Tries to eat healthy    Medications:   Patient is currently taking over-the-counter supplements  OTC medications include: see medication list  Patient is able to manage medications  Activities of Daily Living (ADLs)/Instrumental Activities of Daily Living (IADLs):   Walk and transfer into and out of bed and chair?: Yes  Dress and groom yourself?: Yes    Bathe or shower yourself?: Yes    Feed yourself?  Yes  Do your laundry/housekeeping?: Yes  Manage your money, pay your bills and track your expenses?: Yes  Make your own meals?: Yes    Do your own shopping?: Yes    Previous Hospitalizations:   Any hospitalizations or ED visits within the last 12 months?: Yes    How many hospitalizations have you had in the last year?: more than 4    Advance Care Planning:   Living will: No    Advanced directive: Yes    Five wishes given: Yes      Cognitive Screening:   Provider or family/friend/caregiver concerned regarding cognition?: No    PREVENTIVE SCREENINGS      Cardiovascular Screening:    General: Screening Not Indicated and History Lipid Disorder      Diabetes Screening:     General: Screening Not Indicated and History Diabetes      Colorectal Cancer Screening:     General: Screening Not Indicated Prostate Cancer Screening:    General: Screening Not Indicated      Abdominal Aortic Aneurysm (AAA) Screening:        General: Screening Not Indicated      Lung Cancer Screening:     General: Screening Not Indicated      Hepatitis C Screening:    General: Screening Not Indicated    Screening, Brief Intervention, and Referral to Treatment (SBIRT)    Screening  Typical number of drinks in a day: 0    Single Item Drug Screening:  How often have you used an illegal drug (including marijuana) or a prescription medication for non-medical reasons in the past year? never    Single Item Drug Screen Score: 0  Interpretation: Negative screen for possible drug use disorder    Brief Intervention  Alcohol & drug use screenings were reviewed  No concerns regarding substance use disorder identified  Review of Current Opioid Use    Opioid Risk Tool (ORT) Interpretation: Complete Opioid Risk Tool (ORT)    Other Counseling Topics:   Car/seat belt/driving safety, skin self-exam, sunscreen and calcium and vitamin D intake and regular weightbearing exercise         Alison Sampson MD

## 2021-05-28 NOTE — TELEPHONE ENCOUNTER
Patient calling to request office notes faxed to [facility]  RRO#[phone]  Printed and faxed as requested

## 2021-05-28 NOTE — TELEPHONE ENCOUNTER
Upon review of the In Basket request we were able to locate, review, and update the patient chart as requested for Diabetic Eye Exam     Any additional questions or concerns should be emailed to the Practice Liaisons via [email or web address] email, please do not reply via In Basket      Thank you  Sisi Rosario

## 2021-05-28 NOTE — PATIENT INSTRUCTIONS
Medicare Preventive Visit Patient Instructions  Thank you for completing your Welcome to Medicare Visit or Medicare Annual Wellness Visit today  Your next wellness visit will be due in one year (5/29/2022)  The screening/preventive services that you may require over the next 5-10 years are detailed below  Some tests may not apply to you based off risk factors and/or age  Screening tests ordered at today's visit but not completed yet may show as past due  Also, please note that scanned in results may not display below  Preventive Screenings:  Service Recommendations Previous Testing/Comments   Colorectal Cancer Screening  · Colonoscopy    · Fecal Occult Blood Test (FOBT)/Fecal Immunochemical Test (FIT)  · Fecal DNA/Cologuard Test  · Flexible Sigmoidoscopy Age: 54-65 years old   Colonoscopy: every 10 years (May be performed more frequently if at higher risk)  OR  FOBT/FIT: every 1 year  OR  Cologuard: every 3 years  OR  Sigmoidoscopy: every 5 years  Screening may be recommended earlier than age 48 if at higher risk for colorectal cancer  Also, an individualized decision between you and your healthcare provider will decide whether screening between the ages of 74-80 would be appropriate   Colonoscopy: Not on file  FOBT/FIT: Not on file  Cologuard: Not on file  Sigmoidoscopy: Not on file          Prostate Cancer Screening Individualized decision between patient and health care provider in men between ages of 53-78   Medicare will cover every 12 months beginning on the day after your 50th birthday PSA: No results in last 5 years     Screening Not Indicated     Hepatitis C Screening Once for adults born between [facility]  More frequently in patients at high risk for Hepatitis C Hep C Antibody: Not on file        Diabetes Screening 1-2 times per year if you're at risk for diabetes or have pre-diabetes Fasting glucose: No results in last 5 years   A1C: 7 8 %    Screening Not Indicated  History Diabetes   Cholesterol Screening Once every 5 years if you don't have a lipid disorder  May order more often based on risk factors  Lipid panel: 10/02/2018    Screening Not Indicated  History Lipid Disorder      Other Preventive Screenings Covered by Medicare:  1  Abdominal Aortic Aneurysm (AAA) Screening: covered once if your at risk  You're considered to be at risk if you have a family history of AAA or a male between the age of 73-68 who smoking at least 100 cigarettes in your lifetime  2  Lung Cancer Screening: covers low dose CT scan once per year if you meet all of the following conditions: (1) Age 50-69; (2) No signs or symptoms of lung cancer; (3) Current smoker or have quit smoking within the last 15 years; (4) You have a tobacco smoking history of at least 30 pack years (packs per day x number of years you smoked); (5) You get a written order from a healthcare provider  3  Glaucoma Screening: covered annually if you're considered high risk: (1) You have diabetes OR (2) Family history of glaucoma OR (3)  aged 48 and older OR (3)  American aged 72 and older  3  Osteoporosis Screening: covered every 2 years if you meet one of the following conditions: (1) Have a vertebral abnormality; (2) On glucocorticoid therapy for more than 3 months; (3) Have primary hyperparathyroidism; (4) On osteoporosis medications and need to assess response to drug therapy  5  HIV Screening: covered annually if you're between the age of 12-76  Also covered annually if you are younger than 13 and older than 72 with risk factors for HIV infection  For pregnant patients, it is covered up to 3 times per pregnancy      Immunizations:  Immunization Recommendations   Influenza Vaccine Annual influenza vaccination during flu season is recommended for all persons aged >= 6 months who do not have contraindications   Pneumococcal Vaccine (Prevnar and Pneumovax)  * Prevnar = PCV13  * Pneumovax = PPSV23 Adults 25-60 years old: 1-3 doses may be recommended based on certain risk factors  Adults 72 years old: Prevnar (PCV13) vaccine recommended followed by Pneumovax (PPSV23) vaccine  If already received PPSV23 since turning 65, then PCV13 recommended at least one year after PPSV23 dose  Hepatitis B Vaccine 3 dose series if at intermediate or high risk (ex: diabetes, end stage renal disease, liver disease)   Tetanus (Td) Vaccine - COST NOT COVERED BY MEDICARE PART B Following completion of primary series, a booster dose should be given every 10 years to maintain immunity against tetanus  Td may also be given as tetanus wound prophylaxis  Tdap Vaccine - COST NOT COVERED BY MEDICARE PART B Recommended at least once for all adults  For pregnant patients, recommended with each pregnancy  Shingles Vaccine (Shingrix) - COST NOT COVERED BY MEDICARE PART B  2 shot series recommended in those aged 48 and above     Health Maintenance Due:      Topic Date Due    HIV Screening  Never done     Immunizations Due:      Topic Date Due    Pneumococcal Vaccine: Pediatrics (0 to 5 Years) and At-Risk Patients (6 to 59 Years) (1 of 1 - PPSV23) 05/[birth year]/1990    COVID-19 Vaccine (1) Never done     Advance Directives   What are advance directives? Advance directives are legal documents that state your wishes and plans for medical care  These plans are made ahead of time in case you lose your ability to make decisions for yourself  Advance directives can apply to any medical decision, such as the treatments you want, and if you want to donate organs  What are the types of advance directives? There are many types of advance directives, and each state has rules about how to use them  You may choose a combination of any of the following:  · Living will: This is a written record of the treatment you want  You can also choose which treatments you do not want, which to limit, and which to stop at a certain time  This includes surgery, medicine, IV fluid, and tube feedings  · Durable power of  for healthcare [facility]): This is a written record that states who you want to make healthcare choices for you when you are unable to make them for yourself  This person, called a proxy, is usually a family member or a friend  You may choose more than 1 proxy  · Do not resuscitate (DNR) order:  A DNR order is used in case your heart stops beating or you stop breathing  It is a request not to have certain forms of treatment, such as CPR  A DNR order may be included in other types of advance directives  · Medical directive: This covers the care that you want if you are in a coma, near death, or unable to make decisions for yourself  You can list the treatments you want for each condition  Treatment may include pain medicine, surgery, blood transfusions, dialysis, IV or tube feedings, and a ventilator (breathing machine)  · Values history: This document has questions about your views, beliefs, and how you feel and think about life  This information can help others choose the care that you would choose  Why are advance directives important? An advance directive helps you control your care  Although spoken wishes may be used, it is better to have your wishes written down  Spoken wishes can be misunderstood, or not followed  Treatments may be given even if you do not want them  An advance directive may make it easier for your family to make difficult choices about your care  Weight Management   Why it is important to manage your weight:  Being overweight increases your risk of health conditions such as heart disease, high blood pressure, type 2 diabetes, and certain types of cancer  It can also increase your risk for osteoarthritis, sleep apnea, and other respiratory problems  Aim for a slow, steady weight loss  Even a small amount of weight loss can lower your risk of health problems    How to lose weight safely:  A safe and healthy way to lose weight is to eat fewer calories and get regular exercise  You can lose up about 1 pound a week by decreasing the number of calories you eat by 500 calories each day  Healthy meal plan for weight management:  A healthy meal plan includes a variety of foods, contains fewer calories, and helps you stay healthy  A healthy meal plan includes the following:  · Eat whole-grain foods more often  A healthy meal plan should contain fiber  Fiber is the part of grains, fruits, and vegetables that is not broken down by your body  Whole-grain foods are healthy and provide extra fiber in your diet  Some examples of whole-grain foods are whole-wheat breads and pastas, oatmeal, brown rice, and bulgur  · Eat a variety of vegetables every day  Include dark, leafy greens such as spinach, kale, luis carlos greens, and mustard greens  Eat yellow and orange vegetables such as carrots, sweet potatoes, and winter squash  · Eat a variety of fruits every day  Choose fresh or canned fruit (canned in its own juice or light syrup) instead of juice  Fruit juice has very little or no fiber  · Eat low-fat dairy foods  Drink fat-free (skim) milk or 1% milk  Eat fat-free yogurt and low-fat cottage cheese  Try low-fat cheeses such as mozzarella and other reduced-fat cheeses  · Choose meat and other protein foods that are low in fat  Choose beans or other legumes such as split peas or lentils  Choose fish, skinless poultry (chicken or turkey), or lean cuts of red meat (beef or pork)  Before you cook meat or poultry, cut off any visible fat  · Use less fat and oil  Try baking foods instead of frying them  Add less fat, such as margarine, sour cream, regular salad dressing and mayonnaise to foods  Eat fewer high-fat foods  Some examples of high-fat foods include french fries, doughnuts, ice cream, and cakes  · Eat fewer sweets  Limit foods and drinks that are high in sugar  This includes candy, cookies, regular soda, and sweetened drinks    Exercise:  Exercise at least 30 minutes per day on most days of the week  Some examples of exercise include walking, biking, dancing, and swimming  You can also fit in more physical activity by taking the stairs instead of the elevator or parking farther away from stores  Ask your healthcare provider about the best exercise plan for you  Narcotic (Opioid) Safety    Use narcotics safely:  · Take prescribed narcotics exactly as directed  · Do not give narcotics to others or take narcotics that belong to someone else  · Do not mix narcotics without medicines or alcohol  · Do not drive or operate heavy machinery after you take the narcotic  · Monitor for side effects and notify your healthcare provider if you experienced side effects such as nausea, sleepiness, itching, or trouble thinking clearly  Manage constipation:    Constipation is the most common side effect of narcotic medicine  Constipation is when you have hard, dry bowel movements, or you go longer than usual between bowel movements  Tell your healthcare provider about all changes in your bowel movements while you are taking narcotics  He or she may recommend laxative medicine to help you have a bowel movement  He or she may also change the kind of narcotic you are taking, or change when you take it  The following are more ways you can prevent or relieve constipation:    · Drink liquids as directed  You may need to drink extra liquids to help soften and move your bowels  Ask how much liquid to drink each day and which liquids are best for you  · Eat high-fiber foods  This may help decrease constipation by adding bulk to your bowel movements  High-fiber foods include fruits, vegetables, whole-grain breads and cereals, and beans  Your healthcare provider or dietitian can help you create a high-fiber meal plan  Your provider may also recommend a fiber supplement if you cannot get enough fiber from food  · Exercise regularly  Regular physical activity can help stimulate your intestines  Walking is a good exercise to prevent or relieve constipation  Ask which exercises are best for you  · Schedule a time each day to have a bowel movement  This may help train your body to have regular bowel movements  Bend forward while you are on the toilet to help move the bowel movement out  Sit on the toilet for at least 10 minutes, even if you do not have a bowel movement  Store narcotics safely:   · Store narcotics where others cannot easily get them  Keep them in a locked cabinet or secure area  Do not  keep them in a purse or other bag you carry with you  A person may be looking for something else and find the narcotics  · Make sure narcotics are stored out of the reach of children  A child can easily overdose on narcotics  Narcotics may look like candy to a small child  The best way to dispose of narcotics: The laws vary by country and area  In the [place], the best way is to return the narcotics through a take-back program  This program is offered by the [employer]  The following are options for using the program:  · Take the narcotics to a NOEMI collection site  The site is often a law enforcement center  Call your local law enforcement center for scheduled take-back days in your area  You will be given information on where to go if the collection site is in a different location  · Take the narcotics to an approved pharmacy or hospital   A pharmacy or hospital may be set up as a collection site  You will need to ask if it is a NOEMI collection site if you were not directed there  A pharmacy or doctor's office may not be able to take back narcotics unless it is a NOEMI site  · Use a mail-back system  This means you are given containers to put the narcotics into  You will then mail them in the containers  · Use a take-back drop box  This is a place to leave the narcotics at any time  People and animals will not be able to get into the box   Your local law enforcement agency can tell you where to find a drop box in your area  Other ways to manage pain:   · Ask your healthcare provider about non-narcotic medicines to control pain  Nonprescription medicines include NSAIDs (such as ibuprofen) and acetaminophen  Prescription medicines include muscle relaxers, antidepressants, and steroids  · Pain may be managed without any medicines  Some ways to relieve pain include massage, aromatherapy, or meditation  Physical or occupational therapy may also help  For more information:   · Drug Enforcement Administration  [facility] [address]  Phone: 4- 318 - 106-6794  Web Address: [facility]/drug_disposal/    · [address]  and Drug Administration  [facility] , [address] [facility]  Phone: 1- 234 - 686-4329  Web Address: http://[employer]24/     © Copyright [employer] 2018 Information is for End User's use only and may not be sold, redistributed or otherwise used for commercial purposes   All illustrations and images included in CareNotes® are the copyrighted property of A D A M , Inc  or [address] [place] [address] [facility] [facility]

## 2021-05-28 NOTE — TELEPHONE ENCOUNTER
Upon review of the In Basket request and the patient's chart, initial outreach has been made via fax, please see Contacts section for details       Thank you  Jessica Singh

## 2021-05-28 NOTE — PROGRESS NOTES
Assessment/Plan:    1  Uncontrolled type 2 diabetes mellitus  Hemoglobin A1c done in office today is 10 3  And his home blood sugar fasting in the range of 200  Will increase his Lantus insulin 60 units daily  Continue with metformin and Jardiance as before  Will also refer him to endocrinologist for further management of diabetes  2  Hypothyroidism  Continue with levothyroxine 25 mcg daily  Will check thyroid function test prior to next visit    3  Essential hypertension  Blood pressure is stable on present regimen    4  PTSD  Being managed by psychiatrist      Diagnoses and all orders for this visit:    Type 2 diabetes mellitus without complication, with long-term current use of insulin (HCC)  -     POCT hemoglobin A1c  -     metFORMIN (GLUCOPHAGE) 1000 MG tablet; Take 1 tablet (1,000 mg total) by mouth daily with breakfast  -     insulin glargine (LANTUS SOLOSTAR) 100 units/mL injection pen; Inject 60 Units under the skin daily  -     Ambulatory referral to Endocrinology; Future  -     Hemoglobin A1C; Future  -     CBC; Future  -     Lipid Panel with Direct LDL reflex; Future  -     Comprehensive metabolic panel; Future    Chronic GERD    Essential hypertension  -     Lipid Panel with Direct LDL reflex; Future  -     Comprehensive metabolic panel; Future    Anxiety    PTSD (post-traumatic stress disorder)    Mixed hyperlipidemia    Hypothyroidism, unspecified type  -     TSH, 3rd generation with Free T4 reflex; Future    Other orders  -     Cancel: Ambulatory referral to Ophthalmology; Future               Subjective:          Patient ID: Dania Lang is a 39 y o  male  Patient is here for regular follow-up  In March 2021 here was admitted to hospital with COVID-19 and was in hospital for about 9 days  His blood sugar at home is also running high fasting blood sugar are in the range of 200         The following portions of the patient's history were reviewed and updated as appropriate: allergies, current medications, past family history, past medical history, past social history, past surgical history and problem list     Review of Systems   Constitutional: Negative for fatigue and fever  HENT: Negative for congestion, ear discharge, ear pain, postnasal drip, sinus pressure, sore throat, tinnitus and trouble swallowing  Eyes: Negative for discharge, itching and visual disturbance  Respiratory: Negative for cough and shortness of breath  Cardiovascular: Negative for chest pain and palpitations  Gastrointestinal: Negative for abdominal pain, diarrhea, nausea and vomiting  Endocrine: Negative for cold intolerance and polyuria  Genitourinary: Negative for difficulty urinating, dysuria and urgency  Musculoskeletal: Negative for arthralgias and neck pain  Skin: Negative for rash  Allergic/Immunologic: Negative for environmental allergies  Neurological: Negative for dizziness, weakness and headaches  Psychiatric/Behavioral: Negative for agitation  The patient is nervous/anxious  Past Medical History:   Diagnosis Date    COVID-19 3/17/2021    Diabetes mellitus ([facility] [address] )     type 2    Disease of thyroid gland     hypothyroidism    Hyperlipidemia     Hypertension     Psychiatric disorder     PTSD   Anxiety, depression,          Current Outpatient Medications:     albuterol (PROVENTIL HFA,VENTOLIN HFA) 90 mcg/act inhaler, Inhale 2 puffs every 6 (six) hours as needed for wheezing or shortness of breath, Disp: 1 Inhaler, Rfl: 1    clonazePAM (KlonoPIN) 1 mg tablet, , Disp: , Rfl:     divalproex sodium (DEPAKOTE) 500 mg EC tablet, Take 500 mg by mouth every 12 (twelve) hours , Disp: , Rfl:     Empagliflozin (Jardiance) 25 MG TABS, Take 1 tablet by mouth daily, Disp: , Rfl:     fluticasone-salmeterol (Advair Diskus) 500-50 mcg/dose inhaler, Inhale 1 puff 2 (two) times a day Rinse mouth after use , Disp: 1 Inhaler, Rfl: 3    gabapentin (NEURONTIN) 300 mg capsule, Take 300 mg by mouth Three times a day, Disp: , Rfl:     insulin glargine (LANTUS SOLOSTAR) 100 units/mL injection pen, Inject 60 Units under the skin daily, Disp: 20 mL, Rfl: 6    levothyroxine 25 mcg tablet, Take 25 mcg by mouth daily, Disp: , Rfl:     loratadine (CLARITIN) 10 mg tablet, Take 10 mg by mouth daily, Disp: , Rfl:     meloxicam (MOBIC) 15 mg tablet, Take 1 tablet (15 mg total) by mouth daily as needed for mild pain or moderate pain, Disp: 30 tablet, Rfl: 1    metFORMIN (GLUCOPHAGE) 1000 MG tablet, Take 1 tablet (1,000 mg total) by mouth daily with breakfast, Disp: 180 tablet, Rfl: 3    metoprolol succinate (TOPROL-XL) 25 mg 24 hr tablet, Take 1 tablet (25 mg total) by mouth daily, Disp: 30 tablet, Rfl: 0    OMEGA-3 FATTY ACIDS PO, Take 2 g by mouth daily, Disp: , Rfl:     omeprazole (PriLOSEC) 40 MG capsule, Take 1 tablet 30-60 minutes prior to breakfast, Disp: 30 capsule, Rfl: 3    sertraline (ZOLOFT) 100 mg tablet, , Disp: , Rfl:     fluticasone-vilanterol (BREO ELLIPTA) 200-25 MCG/INH inhaler, Inhale 1 puff daily Rinse mouth after use   (Patient not taking: Reported on 5/28/2021), Disp: 1 each, Rfl: 6    multivitamin-minerals (CENTRUM ADULTS) tablet, Take 1 tablet by mouth daily (Patient not taking: Reported on 5/28/2021), Disp: 30 tablet, Rfl: 0    pravastatin (PRAVACHOL) 40 mg tablet, Take 1 tablet (40 mg total) by mouth daily, Disp:  , Rfl: 0    sertraline (ZOLOFT) 25 mg tablet, Take 1 tablet (25 mg total) by mouth daily for 8 doses, Disp: 8 tablet, Rfl: 0    sertraline (ZOLOFT) 50 mg tablet, Take 1 tablet (50 mg total) by mouth daily (Patient not taking: Reported on 5/4/2021), Disp: 30 tablet, Rfl: 0    sildenafil (VIAGRA) 100 mg tablet, Take 100 mg by mouth as needed for erectile dysfunction , Disp: , Rfl:     traZODone (DESYREL) 100 mg tablet, Take 100 mg by mouth , Disp: , Rfl:     Allergies   Allergen Reactions    Lamotrigine GI Intolerance    Niacin Rash    Simvastatin Other (See Comments)     Elevated liver enzymes  Liver enzyme elevation       Social History   Past Surgical History:   Procedure Laterality Date    WISDOM TOOTH EXTRACTION       Family History   Problem Relation Age of Onset    Hyperlipidemia Father     Heart attack Paternal Grandfather        Objective:  /74 (BP Location: Left arm, Patient Position: Sitting, Cuff Size: Large)   Pulse 60   Temp (!) 97 4 °F (36 3 °C) (Temporal)   Resp 18   Ht 5' 5" (1 651 m)   Wt 82 4 kg (181 lb 9 6 oz)   SpO2 97%   BMI 30 22 kg/m²   Body mass index is 30 22 kg/m²  Physical Exam  Constitutional:       Appearance: He is well-developed  HENT:      Head: Normocephalic  Right Ear: There is no impacted cerumen  Left Ear: There is no impacted cerumen  Eyes:      General: No scleral icterus  Pupils: Pupils are equal, round, and reactive to light  Neck:      Musculoskeletal: Normal range of motion and neck supple  Thyroid: No thyromegaly  Trachea: No tracheal deviation  Cardiovascular:      Rate and Rhythm: Normal rate and regular rhythm  Heart sounds: Normal heart sounds  Pulmonary:      Effort: Pulmonary effort is normal  No respiratory distress  Breath sounds: Normal breath sounds  Chest:      Chest wall: No tenderness  Abdominal:      General: Bowel sounds are normal       Palpations: Abdomen is soft  There is no mass  Tenderness: There is no abdominal tenderness  Musculoskeletal: Normal range of motion  Right lower leg: No edema  Left lower leg: No edema  Lymphadenopathy:      Cervical: No cervical adenopathy  Skin:     General: Skin is warm and dry  Neurological:      Mental Status: He is alert and oriented to person, place, and time  Cranial Nerves: No cranial nerve deficit     Psychiatric:         Mood and Affect: Mood normal          Behavior: Behavior normal

## 2021-05-28 NOTE — LETTER
Diabetic Eye Exam Form    Date Requested: 21  Patient: Pooja Bundy  Patient : 1985   Referring Provider: Padmini Horowitz MD    Dilated Retinal Exam, Optomap-Iris Exam, or Fundus Photography Done         Yes ([place] one above)         No     Date of Diabetic Eye Exam ______________________________  Left Eye      Exam did show retinopathy    Exam did not show retinopathy         Mild       Moderate       None       Proliferative       Severe     Right Eye     Exam did show retinopathy    Exam did not show retinopathy         Mild       Moderate       None       Proliferative       Severe     Comments __________________________________________________________    Practice Providing Exam ______________________________________________    Exam Performed By (print name) _______________________________________      Provider Signature ___________________________________________________      These reports are needed for  compliance    Please fax this completed form and a copy of the Diabetic Eye Exam report to our office located at [place] [address] as soon as possible to [phone] [facility]: Phone [phone]    We thank you for your assistance in treating our mutual patient

## 2021-06-01 ENCOUNTER — APPOINTMENT (OUTPATIENT)
Dept: PHYSICAL THERAPY | Facility: CLINIC | Age: 36
End: 2021-06-01
Payer: MEDICARE

## 2021-06-02 ENCOUNTER — OFFICE VISIT (OUTPATIENT)
Dept: PHYSICAL THERAPY | Facility: CLINIC | Age: 36
End: 2021-06-02
Payer: MEDICARE

## 2021-06-02 DIAGNOSIS — M62.81 MUSCLE WEAKNESS OF LOWER EXTREMITY: ICD-10-CM

## 2021-06-02 DIAGNOSIS — R53.81 PHYSICAL DECONDITIONING: ICD-10-CM

## 2021-06-02 DIAGNOSIS — R26.9 GAIT ABNORMALITY: ICD-10-CM

## 2021-06-02 DIAGNOSIS — R26.89 BALANCE DISORDER: ICD-10-CM

## 2021-06-02 DIAGNOSIS — R29.898 MUSCULAR DECONDITIONING: Primary | ICD-10-CM

## 2021-06-02 DIAGNOSIS — R42 DIZZINESS: ICD-10-CM

## 2021-06-02 PROCEDURE — 97112 NEUROMUSCULAR REEDUCATION: CPT

## 2021-06-02 NOTE — PROGRESS NOTES
Daily Note     Today's date: 2021  Patient name: Ana Orellana  : 1985  MRN: [id number]  Referring provider: Tavon Mccarthy MD  Dx:   Encounter Diagnosis     ICD-10-CM    1  Muscular deconditioning  R29 898    2  Gait abnormality  R26 9    3  Physical deconditioning  R53 81    4  Muscle weakness of lower extremity  M62 81    5  Dizziness  R42    6  Balance disorder  R26 89        Start Time: 1330  Stop Time: 1415  Total time in clinic (min): 45 minutes    Subjective: Presents to his OP PT session with no new c/o fatigue, discomfort, or pain  Says he has gradually been feeling much better, inhaler has been helping with asthma and allergies; will be switching to a medication requiring one dose per day rather than two  Avg O2: 95-96%   Lowest O2 recently; 89% with heavy lifting     Objective: See treatment diary below      Assessment: Will be traveling to [place] in August; training specificity focused on endurance and power generation today  Will continue to benefit from skilled PT care to maximize mobility and conditioning  Plan: Continue per plan of care  Progress treatment as tolerated           Precautions: FALL RISK, POST-COVID (3/9/21-3/29/21), Hx PTSD, PNES, DMII, NO NC AT REST, 3L NC WITH EXERTION      Manuals 21  IE 4/8/21 4/15 4/20 4/23 4/29 5/21 6/2                                                         Neuro Re-Ed                                                                              TM       1 4 mph x3 min BUE 1 6 mph   10 min  109 BPM  96-97%     Pt Education/Assessment Hospital-associated deconditioning, COVID-19 influence on respiratory dysfunction, POC FGA testing, assessment, results  Balke-C Protocol assessment, review of results and vital sign responses  Positional assessment of vitals, discussion of vital response to rest, activity throughout session PROGRESS NOTE TESTING    Review of current progress, POC, HEP      Thrusters        6# med ball outdoors    Thrusters x12 min    Over shoulder atlas x12 min     Ther Ex             Hip 3-Way HEP BUE  Extension  1x12 ea 3x5 ea 3x5 ea w/ pchTB  HEP         STS HEP x5 no UE 5x5 no UE 5x5 no UE w/ prpl TB loop in front squat position  HEP         Walking  6 MWT  700'  6' Balke-C Protocol on TM         Mini Squats   x10 x10 w/ prpl TB loop in front squat position  HEP  Box squat from prpl chair BL UE HR  x20       Lunges   x6 ea NV  x5 ea BL UE       Recumbent Bike      L1 x10'                    Pt Education  6 MWT results           Ther Activity                                       Gait Training                                       Modalities

## 2021-06-07 DIAGNOSIS — E11.9 TYPE 2 DIABETES MELLITUS WITHOUT COMPLICATION, WITH LONG-TERM CURRENT USE OF INSULIN (HCC): ICD-10-CM

## 2021-06-07 DIAGNOSIS — Z79.4 TYPE 2 DIABETES MELLITUS WITHOUT COMPLICATION, WITH LONG-TERM CURRENT USE OF INSULIN (HCC): ICD-10-CM

## 2021-06-08 RX ORDER — METOPROLOL SUCCINATE 25 MG/1
25 TABLET, EXTENDED RELEASE ORAL DAILY
Qty: 30 TABLET | Refills: 0 | OUTPATIENT
Start: 2021-06-08

## 2021-06-16 ENCOUNTER — OFFICE VISIT (OUTPATIENT)
Dept: INTERNAL MEDICINE CLINIC | Age: 36
End: 2021-06-16
Payer: MEDICARE

## 2021-06-16 VITALS
BODY MASS INDEX: 30.32 KG/M2 | DIASTOLIC BLOOD PRESSURE: 68 MMHG | HEART RATE: 91 BPM | HEIGHT: 65 IN | TEMPERATURE: 98.7 F | OXYGEN SATURATION: 96 % | WEIGHT: 182 LBS | SYSTOLIC BLOOD PRESSURE: 112 MMHG

## 2021-06-16 DIAGNOSIS — L02.214 ABSCESS OF LEFT GROIN: ICD-10-CM

## 2021-06-16 DIAGNOSIS — E78.2 MIXED HYPERLIPIDEMIA: ICD-10-CM

## 2021-06-16 DIAGNOSIS — K21.9 CHRONIC GERD: ICD-10-CM

## 2021-06-16 DIAGNOSIS — E11.9 TYPE 2 DIABETES MELLITUS WITHOUT COMPLICATION, WITH LONG-TERM CURRENT USE OF INSULIN (HCC): Primary | ICD-10-CM

## 2021-06-16 DIAGNOSIS — Z79.4 TYPE 2 DIABETES MELLITUS WITHOUT COMPLICATION, WITH LONG-TERM CURRENT USE OF INSULIN (HCC): Primary | ICD-10-CM

## 2021-06-16 PROCEDURE — 99214 OFFICE O/P EST MOD 30 MIN: CPT | Performed by: INTERNAL MEDICINE

## 2021-06-16 RX ORDER — CEPHALEXIN 500 MG/1
500 CAPSULE ORAL EVERY 6 HOURS SCHEDULED
Qty: 40 CAPSULE | Refills: 0 | Status: SHIPPED | OUTPATIENT
Start: 2021-06-16 | End: 2021-06-23

## 2021-06-16 RX ORDER — METOPROLOL SUCCINATE 25 MG/1
25 TABLET, EXTENDED RELEASE ORAL DAILY
Qty: 90 TABLET | Refills: 3 | Status: SHIPPED | OUTPATIENT
Start: 2021-06-16 | End: 2021-07-15 | Stop reason: SDUPTHER

## 2021-06-16 RX ORDER — EMPAGLIFLOZIN 25 MG/1
25 TABLET, FILM COATED ORAL DAILY
Qty: 90 TABLET | Refills: 3 | Status: SHIPPED | OUTPATIENT
Start: 2021-06-16 | End: 2021-06-23

## 2021-06-16 NOTE — PROGRESS NOTES
Assessment/Plan:    1  Left groin abscess  Will start patient on Keflex 500 mg p o  Q i d  For 10 days  Will refer patient to general surgeon for possible incision and drainage  Can take 2 Tylenol couple of times a day as needed for pain and discomfort  Our office will make appointment with general surgeon  2  Type 2 diabetes mellitus  Continue with present regimen  3  Hyperlipidemia  Continue with Pravachol 40 mg daily     4  Chronic GERD  Continue with present dose of PPI  Patient is on omeprazole 40 mg daily    Follow-up  Keep next scheduled appointment       Diagnoses and all orders for this visit:    Type 2 diabetes mellitus without complication, with long-term current use of insulin (HCC)  -     Empagliflozin (Jardiance) 25 MG TABS; Take 1 tablet (25 mg total) by mouth daily  -     metoprolol succinate (TOPROL-XL) 25 mg 24 hr tablet; Take 1 tablet (25 mg total) by mouth daily  -     cephalexin (KEFLEX) 500 mg capsule; Take 1 capsule (500 mg total) by mouth every 6 (six) hours for 10 days    Abscess of left groin  -     cephalexin (KEFLEX) 500 mg capsule; Take 1 capsule (500 mg total) by mouth every 6 (six) hours for 10 days  -     Ambulatory referral to General Surgery; Future    Mixed hyperlipidemia    Chronic GERD               Subjective:          Patient ID: Armand Azrate is a 39 y o  male  Patient complaining of lump in left groin area  Which she noted about a week ago  As per patient is tender to touch  Denied any fever chills  The following portions of the patient's history were reviewed and updated as appropriate: allergies, current medications, past family history, past medical history, past social history, past surgical history and problem list     Review of Systems   Constitutional: Negative for fatigue and fever  HENT: Negative for congestion, ear discharge, ear pain, postnasal drip, sinus pressure, sore throat, tinnitus and trouble swallowing      Eyes: Negative for discharge, itching and visual disturbance  Respiratory: Negative for cough and shortness of breath  Cardiovascular: Negative for chest pain and palpitations  Gastrointestinal: Negative for abdominal pain, diarrhea, nausea and vomiting  Endocrine: Negative for cold intolerance and polyuria  Genitourinary: Negative for difficulty urinating, dysuria and urgency  Musculoskeletal: Negative for arthralgias and neck pain  Skin: Positive for color change  Negative for rash  Painful lump over left groin area   Allergic/Immunologic: Negative for environmental allergies  Neurological: Negative for dizziness, weakness and headaches  Psychiatric/Behavioral: Negative for agitation  The patient is not nervous/anxious  Past Medical History:   Diagnosis Date    COVID-19 3/17/2021    Diabetes mellitus ([facility] [address] )     type 2    Disease of thyroid gland     hypothyroidism    Hyperlipidemia     Hypertension     Psychiatric disorder     PTSD   Anxiety, depression,          Current Outpatient Medications:     albuterol (PROVENTIL HFA,VENTOLIN HFA) 90 mcg/act inhaler, Inhale 2 puffs every 6 (six) hours as needed for wheezing or shortness of breath, Disp: 1 Inhaler, Rfl: 1    clonazePAM (KlonoPIN) 1 mg tablet, , Disp: , Rfl:     divalproex sodium (DEPAKOTE) 500 mg EC tablet, Take 500 mg by mouth every 12 (twelve) hours , Disp: , Rfl:     Empagliflozin (Jardiance) 25 MG TABS, Take 1 tablet (25 mg total) by mouth daily, Disp: 90 tablet, Rfl: 3    fluticasone-salmeterol (Advair Diskus) 500-50 mcg/dose inhaler, Inhale 1 puff 2 (two) times a day Rinse mouth after use , Disp: 1 Inhaler, Rfl: 3    gabapentin (NEURONTIN) 300 mg capsule, Take 300 mg by mouth Three times a day, Disp: , Rfl:     insulin glargine (LANTUS SOLOSTAR) 100 units/mL injection pen, Inject 60 Units under the skin daily, Disp: 20 mL, Rfl: 6    levothyroxine 25 mcg tablet, Take 25 mcg by mouth daily, Disp: , Rfl:     loratadine (CLARITIN) 10 mg tablet, Take 10 mg by mouth daily, Disp: , Rfl:     meloxicam (MOBIC) 15 mg tablet, Take 1 tablet (15 mg total) by mouth daily as needed for mild pain or moderate pain, Disp: 30 tablet, Rfl: 1    metFORMIN (GLUCOPHAGE) 1000 MG tablet, Take 1 tablet (1,000 mg total) by mouth daily with breakfast, Disp: 180 tablet, Rfl: 3    OMEGA-3 FATTY ACIDS PO, Take 2 g by mouth daily, Disp: , Rfl:     omeprazole (PriLOSEC) 40 MG capsule, Take 1 tablet 30-60 minutes prior to breakfast, Disp: 30 capsule, Rfl: 3    pravastatin (PRAVACHOL) 40 mg tablet, Take 1 tablet (40 mg total) by mouth daily, Disp:  , Rfl: 0    sertraline (ZOLOFT) 100 mg tablet, , Disp: , Rfl:     sildenafil (VIAGRA) 100 mg tablet, Take 100 mg by mouth as needed for erectile dysfunction , Disp: , Rfl:     cephalexin (KEFLEX) 500 mg capsule, Take 1 capsule (500 mg total) by mouth every 6 (six) hours for 10 days, Disp: 40 capsule, Rfl: 0    fluticasone-vilanterol (BREO ELLIPTA) 200-25 MCG/INH inhaler, Inhale 1 puff daily Rinse mouth after use   (Patient not taking: Reported on 5/28/2021), Disp: 1 each, Rfl: 6    metoprolol succinate (TOPROL-XL) 25 mg 24 hr tablet, Take 1 tablet (25 mg total) by mouth daily, Disp: 90 tablet, Rfl: 3    multivitamin-minerals (CENTRUM ADULTS) tablet, Take 1 tablet by mouth daily (Patient not taking: Reported on 5/28/2021), Disp: 30 tablet, Rfl: 0    sertraline (ZOLOFT) 25 mg tablet, Take 1 tablet (25 mg total) by mouth daily for 8 doses, Disp: 8 tablet, Rfl: 0    sertraline (ZOLOFT) 50 mg tablet, Take 1 tablet (50 mg total) by mouth daily (Patient not taking: Reported on 5/4/2021), Disp: 30 tablet, Rfl: 0    traZODone (DESYREL) 100 mg tablet, Take 100 mg by mouth , Disp: , Rfl:     Allergies   Allergen Reactions    Lamotrigine GI Intolerance    Niacin Rash    Simvastatin Other (See Comments)     Elevated liver enzymes  Liver enzyme elevation       Social History   Past Surgical History:   Procedure Laterality Date    WISDOM TOOTH EXTRACTION       Family History   Problem Relation Age of Onset    Hyperlipidemia Father     Heart attack Paternal Grandfather        Objective:  /68 (BP Location: Left arm, Patient Position: Sitting, Cuff Size: Standard)   Pulse 91   Temp 98 7 °F (37 1 °C) (Temporal)   Ht 5' 5" (1 651 m)   Wt 82 6 kg (182 lb)   SpO2 96%   BMI 30 29 kg/m²   Body mass index is 30 29 kg/m²  Physical Exam  Constitutional:       Appearance: He is well-developed  HENT:      Head: Normocephalic  Right Ear: External ear normal       Left Ear: External ear normal       Nose: No rhinorrhea  Mouth/Throat:      Pharynx: No posterior oropharyngeal erythema  Eyes:      General: No scleral icterus  Pupils: Pupils are equal, round, and reactive to light  Neck:      Thyroid: No thyromegaly  Trachea: No tracheal deviation  Cardiovascular:      Rate and Rhythm: Normal rate and regular rhythm  Heart sounds: Normal heart sounds  Pulmonary:      Effort: Pulmonary effort is normal  No respiratory distress  Breath sounds: Normal breath sounds  Chest:      Chest wall: No tenderness  Abdominal:      General: Bowel sounds are normal       Palpations: Abdomen is soft  There is no mass  Tenderness: There is no abdominal tenderness  Musculoskeletal:         General: Normal range of motion  Cervical back: Normal range of motion and neck supple  Lymphadenopathy:      Cervical: No cervical adenopathy  Skin:     General: Skin is warm  Findings: Erythema present  Comments: Examination of left groin area reveals about 2 in 2 3 inches area of indurated hyperemia with underlying firm lump compatible with developing abscess  Left groin lymph node area also tender  No exudate noted  Neurological:      Mental Status: He is alert  Cranial Nerves: No cranial nerve deficit

## 2021-06-17 ENCOUNTER — CONSULT (OUTPATIENT)
Dept: SURGERY | Facility: CLINIC | Age: 36
End: 2021-06-17
Payer: MEDICARE

## 2021-06-17 ENCOUNTER — TELEPHONE (OUTPATIENT)
Dept: INTERNAL MEDICINE CLINIC | Facility: CLINIC | Age: 36
End: 2021-06-17

## 2021-06-17 VITALS — BODY MASS INDEX: 29.99 KG/M2 | HEIGHT: 65 IN | WEIGHT: 180 LBS

## 2021-06-17 DIAGNOSIS — Z79.4 TYPE 2 DIABETES MELLITUS WITHOUT COMPLICATION, WITH LONG-TERM CURRENT USE OF INSULIN (HCC): Primary | ICD-10-CM

## 2021-06-17 DIAGNOSIS — L02.214 ABSCESS OF LEFT GROIN: Primary | ICD-10-CM

## 2021-06-17 DIAGNOSIS — E11.9 TYPE 2 DIABETES MELLITUS WITHOUT COMPLICATION, WITH LONG-TERM CURRENT USE OF INSULIN (HCC): Primary | ICD-10-CM

## 2021-06-17 PROCEDURE — 10060 I&D ABSCESS SIMPLE/SINGLE: CPT | Performed by: SURGERY

## 2021-06-17 PROCEDURE — 99203 OFFICE O/P NEW LOW 30 MIN: CPT | Performed by: SURGERY

## 2021-06-17 RX ORDER — AMOXICILLIN AND CLAVULANATE POTASSIUM 875; 125 MG/1; MG/1
1 TABLET, FILM COATED ORAL EVERY 12 HOURS SCHEDULED
Qty: 14 TABLET | Refills: 0 | Status: SHIPPED | OUTPATIENT
Start: 2021-06-17 | End: 2021-06-24

## 2021-06-17 NOTE — TELEPHONE ENCOUNTER
Jardiance 25 mg is not covered     Alternatives:    - Metformin 500 mg tab  - Januvia 100 mg tab  - Tradjenta  - Janumet  mg tab  - Steglatro 15 mg tab  - Farxiga 10 mg tab     -or-    - Lantus Solostar [id number] insuline  - Trulicity 1 5 UT/7 9 MI

## 2021-06-17 NOTE — PROGRESS NOTES
Assessment/Plan:    Diagnoses and all orders for this visit:    Abscess of left groin  -     Ambulatory referral to General Surgery    Incision and drainage performed of groin abscess/induration  Only scant amount of material emanated  Some packing was placed  Will change to Augmentin 875 twice a day for week  Local wound care instructions given to him  If he were to experience fevers or enlarging area of induration or erythema or uncontrolled blood sugars, I recommended that he go to the emergency room  He has long as area of induration subsides he may follow-up p r n  Subjective:      Patient ID: Malissa Palencia is a 39 y o  male  Patient presents for evaluation of a left groin abscess  He has had the abscess for 1 1/2 weeks  He is taking Keflex 500 mg  Qid  Denies any specific trauma  States his blood sugars run approximately 200  They have not recently changed at all  Denies fevers  Abscess  The current episode started 1 to 4 weeks ago  The problem occurs daily  The problem has been unchanged  The following portions of the patient's history were reviewed and updated as appropriate:     He  has a past medical history of COVID-19 (3/17/2021), Diabetes mellitus ([facility] [address] ), Disease of thyroid gland, Hyperlipidemia, Hypertension, and Psychiatric disorder  He  has a past surgical history that includes [place] tooth extraction  His family history includes Heart attack in his paternal grandfather; Hyperlipidemia in his father  He  reports that he has never smoked  He has never used smokeless tobacco  He reports previous alcohol use  He reports that he does not use drugs    Current Outpatient Medications   Medication Sig Dispense Refill    albuterol (PROVENTIL HFA,VENTOLIN HFA) 90 mcg/act inhaler Inhale 2 puffs every 6 (six) hours as needed for wheezing or shortness of breath 1 Inhaler 1    cephalexin (KEFLEX) 500 mg capsule Take 1 capsule (500 mg total) by mouth every 6 (six) hours for 10 days 40 capsule 0    clonazePAM (KlonoPIN) 1 mg tablet       divalproex sodium (DEPAKOTE) 500 mg EC tablet Take 500 mg by mouth every 12 (twelve) hours       Empagliflozin (Jardiance) 25 MG TABS Take 1 tablet (25 mg total) by mouth daily 90 tablet 3    fluticasone-salmeterol (Advair Diskus) 500-50 mcg/dose inhaler Inhale 1 puff 2 (two) times a day Rinse mouth after use  1 Inhaler 3    fluticasone-vilanterol (BREO ELLIPTA) 200-25 MCG/INH inhaler Inhale 1 puff daily Rinse mouth after use   (Patient not taking: Reported on 5/28/2021) 1 each 6    gabapentin (NEURONTIN) 300 mg capsule Take 300 mg by mouth Three times a day      insulin glargine (LANTUS SOLOSTAR) 100 units/mL injection pen Inject 60 Units under the skin daily 20 mL 6    levothyroxine 25 mcg tablet Take 25 mcg by mouth daily      loratadine (CLARITIN) 10 mg tablet Take 10 mg by mouth daily      meloxicam (MOBIC) 15 mg tablet Take 1 tablet (15 mg total) by mouth daily as needed for mild pain or moderate pain 30 tablet 1    metFORMIN (GLUCOPHAGE) 1000 MG tablet Take 1 tablet (1,000 mg total) by mouth daily with breakfast 180 tablet 3    metoprolol succinate (TOPROL-XL) 25 mg 24 hr tablet Take 1 tablet (25 mg total) by mouth daily 90 tablet 3    multivitamin-minerals (CENTRUM ADULTS) tablet Take 1 tablet by mouth daily (Patient not taking: Reported on 5/28/2021) 30 tablet 0    OMEGA-3 FATTY ACIDS PO Take 2 g by mouth daily      omeprazole (PriLOSEC) 40 MG capsule Take 1 tablet 30-60 minutes prior to breakfast 30 capsule 3    pravastatin (PRAVACHOL) 40 mg tablet Take 1 tablet (40 mg total) by mouth daily  0    sertraline (ZOLOFT) 100 mg tablet       sertraline (ZOLOFT) 25 mg tablet Take 1 tablet (25 mg total) by mouth daily for 8 doses 8 tablet 0    sertraline (ZOLOFT) 50 mg tablet Take 1 tablet (50 mg total) by mouth daily (Patient not taking: Reported on 5/4/2021) 30 tablet 0    sildenafil (VIAGRA) 100 mg tablet Take 100 mg by mouth as needed for erectile dysfunction       traZODone (DESYREL) 100 mg tablet Take 100 mg by mouth        No current facility-administered medications for this visit  He is allergic to lamotrigine, niacin, and simvastatin       Review of Systems   Skin: Positive for color change  All other systems reviewed and are negative  Objective:      Ht 5' 5" (1 651 m)   Wt 81 6 kg (180 lb)   BMI 29 95 kg/m²          Physical Exam  Skin:     General: Skin is warm and dry  Comments: High in the left groin in the region of the lateral left scrotum, he has a fair amount of induration and scant erythema  Skin appears healthy without signs of necrosis  Discussed risks and benefits of an incision and drainage procedure given the infection and his diabetes and he agreed to proceed         Incision and Drainage    Date/Time: 6/17/2021 1:41 PM  Performed by: Alfonza Nyhan, DO  Authorized by: Alfonza Nyhan, DO   Universal Protocol:  Consent: Verbal consent obtained  Risks and benefits: risks, benefits and alternatives were discussed  Consent given by: patient  Patient understanding: patient states understanding of the procedure being performed      Patient location:  Clinic  Location:     Type:  Abscess    Location:  Anogenital    Anogenital location:  Perineum  Procedure details:     Complexity:  Simple    Incision types:  Cruciate    Scalpel blade:  11    Approach:  Open    Incision depth:  Subcutaneous    Wound management:  Probed and deloculated    Drainage:  Serosanguinous    Wound treatment:  Wound left open    Packing materials:  1/2 in gauze  Post-procedure details:     Patient tolerance of procedure:   Tolerated well, no immediate complications

## 2021-06-18 ENCOUNTER — TELEPHONE (OUTPATIENT)
Dept: SURGERY | Facility: CLINIC | Age: 36
End: 2021-06-18

## 2021-06-18 RX ORDER — DAPAGLIFLOZIN 10 MG/1
10 TABLET, FILM COATED ORAL DAILY
Qty: 30 TABLET | Refills: 1 | OUTPATIENT
Start: 2021-06-18

## 2021-06-18 NOTE — TELEPHONE ENCOUNTER
Spoke to NAVIN re: area of I&D  Doing ok, no new redness or drainage   Advised to call office if any problems or questions

## 2021-06-23 ENCOUNTER — TELEPHONE (OUTPATIENT)
Dept: ENDOCRINOLOGY | Facility: CLINIC | Age: 36
End: 2021-06-23

## 2021-06-23 ENCOUNTER — CONSULT (OUTPATIENT)
Dept: ENDOCRINOLOGY | Facility: CLINIC | Age: 36
End: 2021-06-23
Payer: MEDICARE

## 2021-06-23 VITALS
HEART RATE: 71 BPM | WEIGHT: 184.4 LBS | BODY MASS INDEX: 30.72 KG/M2 | DIASTOLIC BLOOD PRESSURE: 80 MMHG | HEIGHT: 65 IN | SYSTOLIC BLOOD PRESSURE: 110 MMHG

## 2021-06-23 DIAGNOSIS — E11.65 TYPE 2 DIABETES MELLITUS WITH HYPERGLYCEMIA, WITH LONG-TERM CURRENT USE OF INSULIN (HCC): Primary | ICD-10-CM

## 2021-06-23 DIAGNOSIS — E11.9 TYPE 2 DIABETES MELLITUS WITHOUT COMPLICATION, WITH LONG-TERM CURRENT USE OF INSULIN (HCC): ICD-10-CM

## 2021-06-23 DIAGNOSIS — E66.09 CLASS 1 OBESITY DUE TO EXCESS CALORIES WITH SERIOUS COMORBIDITY AND BODY MASS INDEX (BMI) OF 30.0 TO 30.9 IN ADULT: ICD-10-CM

## 2021-06-23 DIAGNOSIS — Z79.4 TYPE 2 DIABETES MELLITUS WITHOUT COMPLICATION, WITH LONG-TERM CURRENT USE OF INSULIN (HCC): ICD-10-CM

## 2021-06-23 DIAGNOSIS — E78.2 MIXED HYPERLIPIDEMIA: ICD-10-CM

## 2021-06-23 DIAGNOSIS — Z79.4 TYPE 2 DIABETES MELLITUS WITH HYPERGLYCEMIA, WITH LONG-TERM CURRENT USE OF INSULIN (HCC): Primary | ICD-10-CM

## 2021-06-23 PROBLEM — E66.811 CLASS 1 OBESITY DUE TO EXCESS CALORIES WITH SERIOUS COMORBIDITY AND BODY MASS INDEX (BMI) OF 30.0 TO 30.9 IN ADULT: Status: ACTIVE | Noted: 2021-06-23

## 2021-06-23 PROCEDURE — 99205 OFFICE O/P NEW HI 60 MIN: CPT | Performed by: INTERNAL MEDICINE

## 2021-06-23 RX ORDER — PEN NEEDLE, DIABETIC 32GX 5/32"
NEEDLE, DISPOSABLE MISCELLANEOUS
Qty: 100 EACH | Refills: 3 | Status: SHIPPED | OUTPATIENT
Start: 2021-06-23

## 2021-06-23 RX ORDER — INSULIN ASPART 100 [IU]/ML
INJECTION, SOLUTION INTRAVENOUS; SUBCUTANEOUS
Qty: 15 ML | Refills: 3 | Status: SHIPPED | OUTPATIENT
Start: 2021-06-23 | End: 2021-08-16 | Stop reason: DRUGHIGH

## 2021-06-23 NOTE — PROGRESS NOTES
Malissa Palencia 39 y o  male MRN: [id number]    Encounter: [id number]      Assessment/Plan     Problem List Items Addressed This Visit        Endocrine    Type 2 diabetes mellitus with hyperglycemia, with long-term current use of insulin ([facility] ) - Primary       Lab Results   Component Value Date    HGBA1C 10 3 (A) 05/28/2021   Patient counseled on complications of uncontrolled type 2 diabetes including retinopathy, nephropathy and neuropathy  Given recent groin infection will discontinue Jardiance  Also hold off on metformin given chronic diarrhea  Now continue Lantus and add short-acting insulin before meals  He may benefit from personal continuous glucose monitor to improve glycemic control  If approved will set up for teaching  Counseled on importance of taking insulin as directed, being on intensive insulin therapy he is at risk for severe hypoglycemia    Also referred medical nutrition therapy    Given young age at diagnosis consider checking sherlyn and islet cell antibodies as well as C-peptide level next visit         Relevant Medications    insulin glargine (LANTUS SOLOSTAR) 100 units/mL injection pen    insulin aspart (NovoLOG FlexPen) 100 UNIT/ML injection pen    Insulin Pen Needle (BD Pen Needle Rossana U/F) 32G X 4 MM MISC    Other Relevant Orders    Ambulatory referral to Diabetic Education    Comprehensive metabolic panel Lab Collect    HEMOGLOBIN A1C W/ EAG ESTIMATION Lab Collect    Microalbumin / creatinine urine ratio Lab Collect    TSH, 3rd generation Lab Collect    T4, free Lab Collect    Type 2 diabetes mellitus without complication, with long-term current use of insulin ([facility])    Relevant Medications    insulin glargine (LANTUS SOLOSTAR) 100 units/mL injection pen    insulin aspart (NovoLOG FlexPen) 100 UNIT/ML injection pen    Other Relevant Orders    Comprehensive metabolic panel Lab Collect       Other    Class 1 obesity due to excess calories with serious comorbidity and body mass index (BMI) of 30 0 to 30 9 in adult    Relevant Orders    Ambulatory referral to Diabetic Education    Mixed hyperlipidemia    Relevant Orders    Ambulatory referral to Diabetic Education    Lipid Panel with Direct LDL reflex Lab Collect        CC: Diabetes    History of Present Illness     HPI:  69-year-old male referred here for evaluation of uncontrolled type 2 diabetes  He has had for Type 2 DM since 2010  - started on metformin and started insulin 5 years later   Current regimen  lantus -50 units at bedtime   Metformin 1000 mg Twice daily   jardiance 25 mg daily     No microvascular complications - last eye exam earlier this year- no retinopathy  grion infection - I &D 2 weeks back  Checks F s fasting - 180-220  predinner - 250-300s  No hypoglycemia     COVID in march and was on steroids till April       C/o polyuria , polydipsia , no blurry vision , no numbness and tingling in feet  Weight loss- 20 lbs in march and gained back  Review of Systems    Historical Information   Past Medical History:   Diagnosis Date    COVID-19 3/17/2021    Diabetes mellitus ([address] )     type 2    Disease of thyroid gland     hypothyroidism    Hyperlipidemia     Hypertension     Psychiatric disorder     PTSD   Anxiety, depression,      Past Surgical History:   Procedure Laterality Date    WISDOM TOOTH EXTRACTION       Social History   Social History     Substance and Sexual Activity   Alcohol Use Not Currently     Social History     Substance and Sexual Activity   Drug Use No     Social History     Tobacco Use   Smoking Status Never Smoker   Smokeless Tobacco Never Used     Family History:   Family History   Problem Relation Age of Onset    Hyperlipidemia Father     Heart attack Paternal Grandfather     Prostate cancer Paternal Grandfather     Cancer Paternal Grandmother        Meds/Allergies   Current Outpatient Medications   Medication Sig Dispense Refill    albuterol (PROVENTIL HFA,VENTOLIN HFA) 90 mcg/act inhaler Inhale 2 puffs every 6 (six) hours as needed for wheezing or shortness of breath 1 Inhaler 1    amoxicillin-clavulanate (AUGMENTIN) 875-125 mg per tablet Take 1 tablet by mouth every 12 (twelve) hours for 7 days 14 tablet 0    clonazePAM (KlonoPIN) 1 mg tablet       divalproex sodium (DEPAKOTE) 500 mg EC tablet Take 500 mg by mouth every 12 (twelve) hours       fluticasone-salmeterol (Advair Diskus) 500-50 mcg/dose inhaler Inhale 1 puff 2 (two) times a day Rinse mouth after use  1 Inhaler 3    gabapentin (NEURONTIN) 300 mg capsule Take 300 mg by mouth Three times a day      insulin glargine (LANTUS SOLOSTAR) 100 units/mL injection pen 40 units at bedtime 15 mL 3    levothyroxine 25 mcg tablet Take 25 mcg by mouth daily      loratadine (CLARITIN) 10 mg tablet Take 10 mg by mouth daily      meloxicam (MOBIC) 15 mg tablet Take 1 tablet (15 mg total) by mouth daily as needed for mild pain or moderate pain 30 tablet 1    metFORMIN (GLUCOPHAGE) 1000 MG tablet Take 1 tablet (1,000 mg total) by mouth daily with breakfast 180 tablet 3    metoprolol succinate (TOPROL-XL) 25 mg 24 hr tablet Take 1 tablet (25 mg total) by mouth daily 90 tablet 3    OMEGA-3 FATTY ACIDS PO Take 2 g by mouth daily      omeprazole (PriLOSEC) 40 MG capsule Take 1 tablet 30-60 minutes prior to breakfast 30 capsule 3    pravastatin (PRAVACHOL) 40 mg tablet Take 1 tablet (40 mg total) by mouth daily  0    sertraline (ZOLOFT) 100 mg tablet       fluticasone-vilanterol (BREO ELLIPTA) 200-25 MCG/INH inhaler Inhale 1 puff daily Rinse mouth after use   (Patient not taking: Reported on 5/28/2021) 1 each 6    insulin aspart (NovoLOG FlexPen) 100 UNIT/ML injection pen 10 units before meals 15 mL 3    Insulin Pen Needle (BD Pen Needle Rossana U/F) 32G X 4 MM MISC Use 4/day 100 each 3    multivitamin-minerals (CENTRUM ADULTS) tablet Take 1 tablet by mouth daily (Patient not taking: Reported on 5/28/2021) 30 tablet 0     No current facility-administered medications for this visit  Allergies   Allergen Reactions    Lamotrigine GI Intolerance    Niacin Rash    Simvastatin Other (See Comments)     Elevated liver enzymes  Liver enzyme elevation       Objective   Vitals: Blood pressure 110/80, pulse 71, height 5' 5" (1 651 m), weight 83 6 kg (184 lb 6 4 oz)  Physical Exam  Vitals reviewed  Constitutional:       Appearance: Normal appearance  He is obese  He is not ill-appearing or diaphoretic  HENT:      Head: Normocephalic and atraumatic  Eyes:      General: No scleral icterus  Extraocular Movements: Extraocular movements intact  Cardiovascular:      Rate and Rhythm: Normal rate and regular rhythm  Heart sounds: Normal heart sounds  No murmur heard  Pulmonary:      Effort: Pulmonary effort is normal  No respiratory distress  Breath sounds: Normal breath sounds  No wheezing  Abdominal:      General: There is no distension  Palpations: Abdomen is soft  Tenderness: There is no abdominal tenderness  There is no guarding  Musculoskeletal:      Cervical back: Neck supple  Right lower leg: No edema  Left lower leg: No edema  Lymphadenopathy:      Cervical: No cervical adenopathy  Skin:     General: Skin is warm and dry  Neurological:      General: No focal deficit present  Mental Status: He is alert and oriented to person, place, and time  Psychiatric:         Mood and Affect: Mood normal          Behavior: Behavior normal          Thought Content: Thought content normal          Judgment: Judgment normal          The history was obtained from the review of the chart, patient and family      Lab Results:   Lab Results   Component Value Date/Time    Hemoglobin A1C 10 3 (A) 05/28/2021 11:23 AM    Hemoglobin A1C 7 8 (H) 10/27/2020 06:25 PM    Hemoglobin A1C 7 2 09/11/2020 12:00 AM    WBC 15 96 (H) 03/25/2021 05:45 AM    WBC 12 93 (H) 03/23/2021 05:09 AM    WBC 10 12 03/22/2021 10:42 AM    Hemoglobin 14 3 03/25/2021 05:45 AM    Hemoglobin 14 1 03/23/2021 05:09 AM    Hemoglobin 13 7 03/22/2021 10:42 AM    Hematocrit 41 2 03/25/2021 05:45 AM    Hematocrit 41 4 03/23/2021 05:09 AM    Hematocrit 40 3 03/22/2021 10:42 AM    MCV 85 03/25/2021 05:45 AM    MCV 85 03/23/2021 05:09 AM    MCV 86 03/22/2021 10:42 AM    Platelets 496 (H) 02/18/2419 05:45 AM    Platelets 369 26/26/6361 05:09 AM    Platelets 158 04/69/0868 10:42 AM    BUN 23 03/25/2021 05:45 AM    BUN 23 03/23/2021 05:09 AM    BUN 24 03/22/2021 10:42 AM    Potassium 5 1 03/25/2021 05:45 AM    Potassium 5 0 03/23/2021 05:09 AM    Potassium 4 3 03/22/2021 10:42 AM    Chloride 102 03/25/2021 05:45 AM    Chloride 100 03/23/2021 05:09 AM    Chloride 102 03/22/2021 10:42 AM    CO2 25 03/25/2021 05:45 AM    CO2 27 03/23/2021 05:09 AM    CO2 25 03/22/2021 10:42 AM    Creatinine 0 94 03/25/2021 05:45 AM    Creatinine 0 86 03/23/2021 05:09 AM    Creatinine 0 93 03/22/2021 10:42 AM    AST 19 03/25/2021 05:45 AM    AST 36 03/23/2021 05:09 AM    AST 50 (H) 03/22/2021 10:42 AM     (H) 03/25/2021 05:45 AM     (H) 03/23/2021 05:09 AM     (H) 03/22/2021 10:42 AM    Albumin 2 6 (L) 03/25/2021 05:45 AM    Albumin 2 4 (L) 03/23/2021 05:09 AM    Albumin 2 3 (L) 03/22/2021 10:42 AM           Imaging Studies:     Portions of the record may have been created with voice recognition software  Occasional wrong word or "sound a like" substitutions may have occurred due to the inherent limitations of voice recognition software  Read the chart carefully and recognize, using context, where substitutions have occurred

## 2021-06-23 NOTE — PATIENT INSTRUCTIONS
Dulaglutide (By injection)   Dulaglutide (doo-la-GLOO-tide)  Treats type 2 diabetes  Also lowers risk of death, heart attack, or stroke in patients with diabetes and heart or blood vessel problems  Brand Name(s): Trulicity   There may be other brand names for this medicine  When This Medicine Should Not Be Used: This medicine is not right for everyone  Do not use it if you had an allergic reaction to dulaglutide, you have multiple endocrine neoplasia syndrome type 2 (MEN 2), or you or anyone in your family has had medullary thyroid cancer  How to Use This Medicine:   Injectable  · Your doctor will prescribe your exact dose  This medicine is usually given once a week, on the same day of the week  It is given as a shot under the skin of your stomach, thighs, or upper arms  · You may be taught how to give your medicine at home  Make sure you understand all instructions before giving yourself an injection  Do not use more medicine or use it more often than your doctor tells you to  · If you use insulin in addition to this medicine, do not mix them in the same syringe  You may give the shots in the same area (including the stomach), but do not give the shots right next to each other  · If the medicine in the pen or prefilled syringe has changed color, looks cloudy, or has particles in it, do not use it  · You will be shown the body areas where this shot can be given  Use a different body area each time you give yourself a shot  Keep track of where you give each shot to make sure you rotate body areas  · Use a new needle and syringe each time you inject your medicine  · This medicine should come with a Medication Guide  Ask your pharmacist for a copy if you do not have one  · Missed dose: If you miss a dose, use it as soon as possible within 3 days after your missed dose  If you miss a dose by more than 3 days, wait until your next regular weekly dose    · Store your medicine pens or prefilled syringes in the refrigerator and keep them in the original carton  Protect the pens from light  You may also store the pens at room temperature for up to 14 days  Do not freeze the medicine, and do not use the medicine if it has been frozen  Drugs and Foods to Avoid:   Ask your doctor or pharmacist before using any other medicine, including over-the-counter medicines, vitamins, and herbal products  · Some medicines can affect how dulaglutide works  Tell your doctor if you are using warfarin, insulin, or other diabetes medicine  Warnings While Using This Medicine:   · Tell your doctor if you are pregnant or breastfeeding, or if you have kidney disease or a history of diabetic retinopathy or pancreas problems  Tell your doctor if you have severe digestion problems (including gastroparesis) or stomach or bowel problems  · This medicine may cause the following problems:  ? Increased risk of thyroid tumor  ? Pancreatitis (swelling of the pancreas)  ? Low blood sugar (more likely if you also take insulin or other diabetes medicine)  · Your doctor will do lab tests at regular visits to check on the effects of this medicine  Keep all appointments  · Keep all medicine out of the reach of children  Never share your medicine with anyone  Do not share needles or pens because you can spread an infection    Possible Side Effects While Using This Medicine:   Call your doctor right away if you notice any of these side effects:  · Allergic reaction: Itching or hives, swelling in your face or hands, swelling or tingling in your mouth or throat, chest tightness, trouble breathing  · Blurred vision, changes in vision  · Change in how much or how often you urinate  · Lump or swelling in your neck, trouble breathing or swallowing  · Shaking, trembling, sweating, fast or pounding heartbeat, lightheadedness, hunger, confusion  · Sudden and severe stomach pain, nausea, vomiting, fever, and lightheadedness  If you notice these less serious side effects, talk with your doctor:   · Diarrhea  · Redness, itching, swelling, or any changes in your skin where the shot was given  If you notice other side effects that you think are caused by this medicine, tell your doctor  Call your doctor for medical advice about side effects  You may report side effects to FDA at 1-641-FDA-6851  © Copyright [employer] 2021 Information is for End User's use only and may not be sold, redistributed or otherwise used for commercial purposes  The above information is an  only  It is not intended as medical advice for individual conditions or treatments  Talk to your doctor, nurse or pharmacist before following any medical regimen to see if it is safe and effective for you  Hypoglycemia in a Person with Diabetes   WHAT YOU NEED TO KNOW:   Hypoglycemia is a serious condition that happens when your blood glucose (sugar) level drops too low  The blood sugar level is usually too high in a person with diabetes, but the level can also drop too low  It is important to follow your diabetes management plan to keep your blood sugar level steady  DISCHARGE INSTRUCTIONS:   You or someone close to you needs to call the local emergency number (911 in the [address] [facility]) if:   · You have a seizure or pass out  · Your blood sugar is less than 50 mg/dL and does not respond to treatment  · You feel you are going to pass out  · You have trouble thinking clearly  Call your diabetes care team if:   · You have had symptoms of low blood sugar several times  · You have questions about the amount of insulin or diabetes medicine you are taking  · You have questions or concerns about your condition or care  Medicines:   · Insulin or diabetes medicine  help to keep your blood sugar under control  · Glucagon  may be needed if you have severe hypoglycemia  · Take your medicine as directed    Contact your healthcare provider if you think your medicine is not helping or if you have side effects  Tell him or her if you are allergic to any medicine  Keep a list of the medicines, vitamins, and herbs you take  Include the amounts, and when and why you take them  Bring the list or the pill bottles to follow-up visits  Carry your medicine list with you in case of an emergency  Manage hypoglycemia:   · Check your blood sugar level right away if you have symptoms of hypoglycemia  Hypoglycemia is usually 70 mg/dL or below  Ask your diabetes care team what blood sugar level is too low for you  · If your blood sugar level is too low, eat or drink 15 grams of fast-acting carbohydrate  Examples of this amount of fast-acting carbohydrate are 4 ounces (½ cup) of fruit juice or 4 ounces of regular soda  Other examples are 2 tablespoons of raisins or 1 tube of glucose gel  Check your blood sugar level 15 minutes later  Sit still as you wait  If the level is still low (less than 100 mg/dL), have another 15 grams of carbohydrate  When the level returns to 100 mg/dL, eat a meal if it is time  If your meal time is more than 1 hour away, eat a snack  The snack should contain carbohydrates, such as the following:     ? 3/4 cup of cereal    ? 1 cup of skim or low fat milk    ? 6 soda crackers    ? 1/2 of a turkey sandwich    ? 15 fat-free chips  This will help prevent another drop in blood sugar  Always carefully follow your diabetes care team's instructions on how to treat low blood sugar levels  · Always carry a source of fast-acting carbohydrate  If you have symptoms of hypoglycemia and you do not have a blood glucose meter, have a source of fast-acting carbohydrate anyway  Avoid carbohydrate foods that are high in fat  The fat content may make the carbohydrate take longer to increase your blood sugar level  Ask your diabetes care team if you should carry a glucagon kit  Glucagon is a medicine that is injected when you develop severe hypoglycemia and become unconscious   Check the expiration date every month and replace it before it expires  · Teach others how to help you if you have symptoms of hypoglycemia  Tell them about the symptoms of hypoglycemia  Ask them to give you a source of fast-acting carbohydrate if you cannot get it yourself  Ask them to give you a glucagon injection if you have signs of hypoglycemia and you become unconscious or have a seizure  Ask them to call the local emergency number (911 in the [address] [facility] [address])   This is an emergency  Tell them never to try to make you swallow anything if you faint or have a seizure  · Wear medical alert jewelry  or carry a card that says you have diabetes  Ask where to get these items  Prevent hypoglycemia:   · Take diabetes medicine as directed  Take your medicine at the right time and in the right amount  Do not  double the amount of medicine you take unless instructed by your diabetes care team      · Eat regular meals and snacks  Talk to your dietitian or diabetes care team about a meal plan that is right for you  Do not skip meals  · Check your blood sugar level as directed  Ask your diabetes care team what your blood sugar levels should be before and after you eat  Ask when and how often to check your blood sugar level  You may need to check at least 3 times each day  Record your blood sugar level results and take the record with you when you see your care team  Changes may need to be made to your medicine, food, or exercise schedules using the record  · Check your blood sugar level before you exercise  Physical activity, such as exercise, can decrease your blood sugar level  If your blood sugar level is less than 100 mg/dL, have a carbohydrate snack  Examples are 4 to 6 crackers, ½ banana, 8 ounces (1 cup) of nonfat or 1% milk, or 4 ounces (½ cup) of juice  If you will be active for more than 1 hour, you may need to check your blood sugar level every 30 minutes   Your diabetes care team may also recommend that you check your blood sugar level after your activity  · Know the risks if you choose to drink alcohol  Alcohol can cause your blood sugar levels to be low if you use insulin  Alcohol can cause high blood sugar levels and weight gain if you drink too much  Women 21 years or older and men 72 years or older should limit alcohol to 1 drink a day  Men aged 24 to 59 years should limit alcohol to 2 drinks a day  A drink of alcohol is 12 ounces of beer, 5 ounces of wine, or 1½ ounces of liquor  Follow up with your diabetes care team or specialist as directed: You may need dose changes to your insulin or oral diabetes medicine if you have hypoglycemia  Write down your questions so you remember to ask them during your visits  © Copyright [address] Information is for End User's use only and may not be sold, redistributed or otherwise used for commercial purposes  All illustrations and images included in CareNotes® are the copyrighted property of A D A M [employer] Inc  or [address] [place], [place] [address]brian   The above information is an  only  It is not intended as medical advice for individual conditions or treatments  Talk to your doctor, nurse or pharmacist before following any medical regimen to see if it is safe and effective for you

## 2021-07-07 ENCOUNTER — TELEPHONE (OUTPATIENT)
Dept: PULMONOLOGY | Facility: CLINIC | Age: 36
End: 2021-07-07

## 2021-07-07 NOTE — TELEPHONE ENCOUNTER
[facility]  Spoke with Yessenia  @ [phone] [place]  ID [id number]

## 2021-07-08 NOTE — PROGRESS NOTES
Negro Harshil has not returned [address]'s messages to schedule his next appointment - per [facility] attendance policy and being 30 days past his most recent visit he will be DC from skilled PT care

## 2021-07-08 NOTE — TELEPHONE ENCOUNTER
I reached out to [facility] program mail order srvc  and did a verbal script they will cover the Advair Diskus inhaler plus 3 refills for a co pay of $10 00 , will call the Pt and let him know

## 2021-07-15 DIAGNOSIS — E11.9 TYPE 2 DIABETES MELLITUS WITHOUT COMPLICATION, WITH LONG-TERM CURRENT USE OF INSULIN (HCC): ICD-10-CM

## 2021-07-15 DIAGNOSIS — Z79.4 TYPE 2 DIABETES MELLITUS WITHOUT COMPLICATION, WITH LONG-TERM CURRENT USE OF INSULIN (HCC): ICD-10-CM

## 2021-07-15 RX ORDER — METOPROLOL SUCCINATE 25 MG/1
25 TABLET, EXTENDED RELEASE ORAL DAILY
Qty: 90 TABLET | Refills: 1 | Status: SHIPPED | OUTPATIENT
Start: 2021-07-15 | End: 2021-08-02 | Stop reason: SDUPTHER

## 2021-07-16 ENCOUNTER — TELEPHONE (OUTPATIENT)
Dept: DIABETES SERVICES | Facility: CLINIC | Age: 36
End: 2021-07-16

## 2021-07-16 ENCOUNTER — OFFICE VISIT (OUTPATIENT)
Dept: DIABETES SERVICES | Facility: CLINIC | Age: 36
End: 2021-07-16
Payer: MEDICARE

## 2021-07-16 VITALS — HEIGHT: 65 IN | WEIGHT: 183 LBS | BODY MASS INDEX: 30.49 KG/M2

## 2021-07-16 DIAGNOSIS — E66.09 CLASS 1 OBESITY DUE TO EXCESS CALORIES WITH SERIOUS COMORBIDITY AND BODY MASS INDEX (BMI) OF 30.0 TO 30.9 IN ADULT: ICD-10-CM

## 2021-07-16 DIAGNOSIS — E11.65 TYPE 2 DIABETES MELLITUS WITH HYPERGLYCEMIA, WITH LONG-TERM CURRENT USE OF INSULIN (HCC): ICD-10-CM

## 2021-07-16 DIAGNOSIS — E78.2 MIXED HYPERLIPIDEMIA: ICD-10-CM

## 2021-07-16 DIAGNOSIS — Z79.4 TYPE 2 DIABETES MELLITUS WITH HYPERGLYCEMIA, WITH LONG-TERM CURRENT USE OF INSULIN (HCC): ICD-10-CM

## 2021-07-16 PROCEDURE — 97802 MEDICAL NUTRITION INDIV IN: CPT | Performed by: DIETITIAN, REGISTERED

## 2021-07-16 NOTE — TELEPHONE ENCOUNTER
Pt just seen for MNT  He will be back Monday for Dexcom  I will have BG scanned into media  All readings are >250 for the past 2 weeks, up to 457 on 7/2  He is still using the same Novolog pen  I suggested he try a new one starting [facility] to see if maybe the current pen is no longer working, it may have been exposed to heat  Also, you mentioned in your note about antibody testing but there is no order  Do you want to do antibody testing to see if he is in fact type 1?

## 2021-07-16 NOTE — PROGRESS NOTES
Medical Nutrition Therapy        Assessment    Visit Type: Initial visit    Chief complaint Hyperglycemia almost consistently since early July with readings up to 457  He does not seem to feel well today at all  His wife was present and did most of the talking today  HPI: Alejandro diet history reveals 2-3 meals eaten per day  He doesn't eat if his wife is not there to prepare food, he does continue to drink [facility]  RAFA, but not as much as he used to by his admission  He has generally been following a poor diet but recently has been making more of an effort to eat the healthier food his wife prepares  Currently meals range from 0 to 70 grams of carbohydrate  Explained basic pathophysiology of diabetes and impact of diet on blood glucose levels  Together we discussed what foods contain CHO, reading a food label, and serving sizes  Used the portion booklet to teach Nadja Pereira more about food groups and basic carbohydrate counting  Created an individualized meal plan for Nadja Pereira with 3 meals and 2-3 snacks providing 45-60 g carb per meal and 15-20 g carb per snack  This plan will help promote weight loss  Put together sample meals for Dillon's reference  Nadjatoi Leachda demonstrated fair understanding, Nadja Pereira will call with questions or for more education  Given his persistent recent hyperglycemia jose while using insulin as prescribed, I suspect his Novolog and possible Lantus may have gotten exposed to higher temperatures than tolerated and may be ineffective  However, he has also discontinued oral agents for diabetes as prescribed  I recommended he start using new insulin pens that have been in the refrigerator until now  If his BG improve, he should discard the other partially used pens and he needs to be sure to keep insulin pens stored at 40-80 degrees at all times      Ht Readings from Last 1 Encounters:   06/23/21 5' 5" (1 651 m)     Wt Readings from Last 2 Encounters:   06/23/21 83 6 kg (184 lb 6 4 oz)   06/17/21 81 6 kg (180 lb)     Weight Change: Yes lost weight to 150s in March and has regained    Medical Diagnosis/ICD10: E11 65, Z79 4     Barriers to Learning: physical, he seems to be in pain and fatigued    On disability/retired from [address] you follow any special diet presently?: No  Who shops: spouse  Who cooks: spouse    Food Log: Completed via the method of food recall    Breakfast: usually 10:30 skipped today because wife wasn't home, often has a scrambled egg and sausage, half bagel  Morning Snack:none  Lunch: usually 1:33, 1 slice of pepperoni pizza, regular mountain dew, 1 can  Afternoon Snack: none  Dinner: 6-7 pm, wife cooks in almost every night  Baked chicken  Chicken tacos in hard shells, 1, a little rice  Meat and vegetable most nights   Meatloaf or chicken OR salad with chicken on it  Evening Snack:pretzels OR almonds or nuts  calorie packs with nuts and dried fruit  Beverages: water, mountain dew, coffee some mornings  Eating out/Take out:rarely  Exercise limited, gets SOB from activity, still has oxygen as home    Calorie needs 1775 kcals/day Carbs: 45-60 g/meal, 15-20 g/snack         Nutrition Diagnosis:  Food and nutrition related knowledge deficit  related to Lack of prior exposure to accurate nutrition related information as evidenced by Francois inaccurate or incomplete information    Intervention: carbohydrate counting, meal planning, individualized meal plan and monitoring portion control     Treatment Goals: Patient understands education and recommendations, Patient will monitor portion control and Patient will count carbohydrates    Monitoring and evaluation:    Term code indicator  FH 1 6 3 Carbohydrate Intake Criteria: Follow meal plan  Term code indicator  FH 1 3 1 Fluid/Beverage Intake Criteria: Drink only 4 ounces of mountain dew at a time and count carbs    Patients Response to Instruction:   Nino Cheung  Expected Compliancegood    Thank you for coming to the [facility] for education today  Please feel free to call with any questions or concerns      Shakir Ye  [address] [place] [id number] [facility] [id number]

## 2021-07-16 NOTE — PATIENT INSTRUCTIONS
1  Follow meal plan, aim for 45-60 g carb per meal  If you must drink [employer]   RAFA, measure small portions and count the carbs  2  Start using a new Novolog pen and keep it out of the heat  3  If the new insulin pen doesn't improve your blood sugar, let us know, call Dr Bria Segura office  4   I will ask if she wants to order the antibody blood tests and she will order for you if so

## 2021-07-19 ENCOUNTER — TELEPHONE (OUTPATIENT)
Dept: DIABETES SERVICES | Facility: CLINIC | Age: 36
End: 2021-07-19

## 2021-07-19 ENCOUNTER — TELEPHONE (OUTPATIENT)
Dept: ENDOCRINOLOGY | Facility: CLINIC | Age: 36
End: 2021-07-19

## 2021-07-19 NOTE — TELEPHONE ENCOUNTER
----- Message from Savanah Ruiz MD sent at 7/18/2021 11:41 PM EDT -----  He is not checking enough - mostly once a day ? Is he taking all 4 shots of insulin ? On the log it says stopped for a few days ? Does that mean he stopped taking insulin or not checking at all ?  ----- Message -----  From: Woo Broussard  Sent: 7/16/2021   3:42 PM EDT  To: MD Dr Tiny Dhaliwal - Patient's BG logs are scanned for your review

## 2021-07-19 NOTE — TELEPHONE ENCOUNTER
Patient did try another pen and his sugars lowered 200 points and he will test at least 3 times a day

## 2021-07-20 ENCOUNTER — TELEPHONE (OUTPATIENT)
Dept: GASTROENTEROLOGY | Facility: CLINIC | Age: 36
End: 2021-07-20

## 2021-07-20 NOTE — TELEPHONE ENCOUNTER
I spoke to the patient  He stated he will try and get the right pharmacy information and call us back

## 2021-07-20 NOTE — TELEPHONE ENCOUNTER
I have tried putting in the [facility] that he requested and it is not letting me send the Rx electronically  (I asked a staff from the clinical team and the same happened when she tried, too)  It is saying I have to print it  I'm not sure if this is the correct [facility] that he is to be using or not  Please have the pt call the pharmacy to clarify

## 2021-07-20 NOTE — TELEPHONE ENCOUNTER
I spoke to the patient   He would like the prescription to be send to:  [facility]  [address] [facility], [address] [place] [address]  Phone # [phone]  Fax # [phone]

## 2021-07-20 NOTE — TELEPHONE ENCOUNTER
[facility] is requesting a 90 day supply to the medication Omeprazole 40 mg capsule  Please send script to the pharmacy  Thank you

## 2021-07-20 NOTE — TELEPHONE ENCOUNTER
Spoke to patient and he thinks his Novolog pen was defective and opened another one    Novolog 10units before meals  Lantus 40 units at bedtime

## 2021-07-21 ENCOUNTER — TELEPHONE (OUTPATIENT)
Dept: OTHER | Facility: OTHER | Age: 36
End: 2021-07-21

## 2021-07-22 ENCOUNTER — TELEPHONE (OUTPATIENT)
Dept: DIABETES SERVICES | Facility: CLINIC | Age: 36
End: 2021-07-22

## 2021-08-02 DIAGNOSIS — E11.9 TYPE 2 DIABETES MELLITUS WITHOUT COMPLICATION, WITH LONG-TERM CURRENT USE OF INSULIN (HCC): ICD-10-CM

## 2021-08-02 DIAGNOSIS — Z79.4 TYPE 2 DIABETES MELLITUS WITHOUT COMPLICATION, WITH LONG-TERM CURRENT USE OF INSULIN (HCC): ICD-10-CM

## 2021-08-02 RX ORDER — METOPROLOL SUCCINATE 25 MG/1
25 TABLET, EXTENDED RELEASE ORAL DAILY
Qty: 90 TABLET | Refills: 1 | Status: SHIPPED | OUTPATIENT
Start: 2021-08-02 | End: 2021-10-18 | Stop reason: SDUPTHER

## 2021-08-03 ENCOUNTER — OFFICE VISIT (OUTPATIENT)
Dept: DIABETES SERVICES | Facility: CLINIC | Age: 36
End: 2021-08-03
Payer: MEDICARE

## 2021-08-03 DIAGNOSIS — Z79.4 TYPE 2 DIABETES MELLITUS WITH HYPERGLYCEMIA, WITH LONG-TERM CURRENT USE OF INSULIN (HCC): ICD-10-CM

## 2021-08-03 DIAGNOSIS — E11.65 TYPE 2 DIABETES MELLITUS WITH HYPERGLYCEMIA, WITH LONG-TERM CURRENT USE OF INSULIN (HCC): ICD-10-CM

## 2021-08-03 PROCEDURE — 95249 CONT GLUC MNTR PT PROV EQP: CPT

## 2021-08-03 NOTE — PATIENT INSTRUCTIONS
Sensor will last for 10 days  Always enter in sensor 4 digit code to prevent you from having to calibrate with a meter blood sugar  Transmitter will last for 3 months  Once attached it is safe for swimming and showering  It should be remove for MRI's and CT scans

## 2021-08-03 NOTE — PROGRESS NOTES
Personal CGM Initiation    Met with Elda Seth, 39 -yr old patient,  here today for initiation of Personal Continuous Glucose Monitoring: DexCom G6  Stated goal for CGM expressed by parent/patient: more frequent glucose information  The patient/family brings all necessary equipment, fully charged?: Yes     The patient/family was assisted with set-up of the device  High alert: 250 Snooze time: 0 minutes  Low alert: 80 Snooze time: 15 minutes  Other alert: low alert soon   Alerts set? :  yes    The patients skin was prepped with alcohol  The patient/family was assisted in performing sensor insertion into the patients left abdomin: Yes  The patient tolerance of the procedure: good  Sensor connectivity was confirmed?: Yes    Topics discussed included:   difference between sensor and meter glucose   when fingerstick confirmation is advisable   system set-up   sensor insertion and start   reading graphs and trend information   troubleshooting alarms    activities of daily living: shower, swimming, travel, CT/MRI  downloading appropriate nelly(s) for downloading/sharing  sensor removal, disposal    The family is scheduled to return   for personal CGM follow-up

## 2021-08-10 ENCOUNTER — OFFICE VISIT (OUTPATIENT)
Dept: INTERNAL MEDICINE CLINIC | Facility: CLINIC | Age: 36
End: 2021-08-10
Payer: MEDICARE

## 2021-08-10 VITALS
WEIGHT: 185.2 LBS | HEIGHT: 65 IN | SYSTOLIC BLOOD PRESSURE: 130 MMHG | RESPIRATION RATE: 18 BRPM | OXYGEN SATURATION: 98 % | DIASTOLIC BLOOD PRESSURE: 82 MMHG | HEART RATE: 65 BPM | BODY MASS INDEX: 30.86 KG/M2 | TEMPERATURE: 97.8 F

## 2021-08-10 DIAGNOSIS — Z00.00 MEDICARE ANNUAL WELLNESS VISIT, SUBSEQUENT: ICD-10-CM

## 2021-08-10 DIAGNOSIS — M54.50 CHRONIC MIDLINE LOW BACK PAIN WITHOUT SCIATICA: ICD-10-CM

## 2021-08-10 DIAGNOSIS — L65.9 ALOPECIA: ICD-10-CM

## 2021-08-10 DIAGNOSIS — E03.9 HYPOTHYROIDISM, UNSPECIFIED TYPE: ICD-10-CM

## 2021-08-10 DIAGNOSIS — K21.9 CHRONIC GERD: Primary | ICD-10-CM

## 2021-08-10 DIAGNOSIS — I10 ESSENTIAL HYPERTENSION: ICD-10-CM

## 2021-08-10 DIAGNOSIS — E11.9 TYPE 2 DIABETES MELLITUS WITHOUT COMPLICATION, WITH LONG-TERM CURRENT USE OF INSULIN (HCC): ICD-10-CM

## 2021-08-10 DIAGNOSIS — E78.2 MIXED HYPERLIPIDEMIA: ICD-10-CM

## 2021-08-10 DIAGNOSIS — G89.29 CHRONIC MIDLINE LOW BACK PAIN WITHOUT SCIATICA: ICD-10-CM

## 2021-08-10 DIAGNOSIS — G47.33 OSA (OBSTRUCTIVE SLEEP APNEA): ICD-10-CM

## 2021-08-10 DIAGNOSIS — Z79.4 TYPE 2 DIABETES MELLITUS WITHOUT COMPLICATION, WITH LONG-TERM CURRENT USE OF INSULIN (HCC): ICD-10-CM

## 2021-08-10 PROCEDURE — G0439 PPPS, SUBSEQ VISIT: HCPCS | Performed by: INTERNAL MEDICINE

## 2021-08-10 PROCEDURE — 83036 HEMOGLOBIN GLYCOSYLATED A1C: CPT | Performed by: INTERNAL MEDICINE

## 2021-08-10 PROCEDURE — 99214 OFFICE O/P EST MOD 30 MIN: CPT | Performed by: INTERNAL MEDICINE

## 2021-08-10 NOTE — PROGRESS NOTES
Assessment and Plan:     Problem List Items Addressed This Visit     None          Depression Screening and Follow-up Plan: Patient's depression screening was positive with a PHQ-2 score of 4  Their PHQ-9 score was 22  Continue regular follow-up with their mental health provider who is managing their mental health condition(s)  Preventive health issues were discussed with patient, and age appropriate screening tests were ordered as noted in patient's After Visit Summary  Personalized health advice and appropriate referrals for health education or preventive services given if needed, as noted in patient's After Visit Summary  History of Present Illness:     Patient presents for Medicare Annual Wellness visit    Patient Care Team:  Sun Borges MD as PCP - General (Internal Medicine)     Problem List:     Patient Active Problem List   Diagnosis    Anxiety    PTSD (post-traumatic stress disorder)    Mixed hyperlipidemia    Type 2 diabetes mellitus without complication, with long-term current use of insulin ([facility] [address] )    ENRIKE (obstructive sleep apnea)    Bright red blood per rectum    Chronic GERD    Oropharyngeal dysphagia    Transaminitis    Bradycardic baseline fetal heart rate    Blood per rectum    Muscle weakness of lower extremity    Essential hypertension    Moderate persistent allergic asthma    Post-COVID-19 condition    Acute bilateral low back pain with bilateral sciatica    Hypothyroidism    Type 2 diabetes mellitus with hyperglycemia, with long-term current use of insulin ([facility])    Class 1 obesity due to excess calories with serious comorbidity and body mass index (BMI) of 30 0 to 30 9 in adult      Past Medical and Surgical History:     Past Medical History:   Diagnosis Date    COVID-19 3/17/2021    Diabetes mellitus ([address] )     type 2    Disease of thyroid gland     hypothyroidism    Hyperlipidemia     Hypertension     Psychiatric disorder     PTSD   Anxiety, depression, Past Surgical History:   Procedure Laterality Date    WISDOM TOOTH EXTRACTION        Family History:     Family History   Problem Relation Age of Onset    Hyperlipidemia Father     Heart attack Paternal Grandfather     Prostate cancer Paternal Grandfather     Cancer Paternal Grandmother       Social History:     Social History     Socioeconomic History    Marital status: /Civil Union     Spouse name: None    Number of children: None    Years of education: None    Highest education level: None   Occupational History    None   Tobacco Use    Smoking status: Never Smoker    Smokeless tobacco: Never Used   Vaping Use    Vaping Use: Never used   Substance and Sexual Activity    Alcohol use: Not Currently    Drug use: No    Sexual activity: Not Currently   Other Topics Concern    None   Social History Narrative    None     Social Determinants of Health     Financial Resource Strain:     Difficulty of Paying Living Expenses:    Food Insecurity:     Worried About Running Out of Food in the Last Year:     Ran Out of Food in the Last Year:    Transportation Needs:     Lack of Transportation (Medical):      Lack of Transportation (Non-Medical):    Physical Activity:     Days of Exercise per Week:     Minutes of Exercise per Session:    Stress:     Feeling of Stress :    Social Connections:     Frequency of Communication with Friends and Family:     Frequency of Social Gatherings with Friends and Family:     Attends Oriental orthodox Services:     Active Member of Clubs or Organizations:     Attends Club or Organization Meetings:     Marital Status:    Intimate Partner Violence:     Fear of Current or Ex-Partner:     Emotionally Abused:     Physically Abused:     Sexually Abused:       Medications and Allergies:     Current Outpatient Medications   Medication Sig Dispense Refill    albuterol (PROVENTIL HFA,VENTOLIN HFA) 90 mcg/act inhaler Inhale 2 puffs every 6 (six) hours as needed for wheezing or shortness of breath 1 Inhaler 1    clonazePAM (KlonoPIN) 1 mg tablet       divalproex sodium (DEPAKOTE) 500 mg EC tablet Take 500 mg by mouth every 12 (twelve) hours       gabapentin (NEURONTIN) 300 mg capsule Take 300 mg by mouth Three times a day      insulin aspart (NovoLOG FlexPen) 100 UNIT/ML injection pen 10 units before meals 15 mL 3    insulin glargine (LANTUS SOLOSTAR) 100 units/mL injection pen 40 units at bedtime 15 mL 3    Insulin Pen Needle (BD Pen Needle Rossana U/F) 32G X 4 MM MISC Use 4/day 100 each 3    levothyroxine 25 mcg tablet Take 25 mcg by mouth daily      loratadine (CLARITIN) 10 mg tablet Take 10 mg by mouth daily      meloxicam (MOBIC) 15 mg tablet Take 1 tablet (15 mg total) by mouth daily as needed for mild pain or moderate pain 30 tablet 1    metoprolol succinate (TOPROL-XL) 25 mg 24 hr tablet Take 1 tablet (25 mg total) by mouth daily 90 tablet 1    OMEGA-3 FATTY ACIDS PO Take 2 g by mouth daily      omeprazole (PriLOSEC) 40 MG capsule Take 1 tablet 30-60 minutes prior to breakfast 30 capsule 3    pravastatin (PRAVACHOL) 40 mg tablet Take 1 tablet (40 mg total) by mouth daily  0    sertraline (ZOLOFT) 100 mg tablet       fluticasone-salmeterol (Advair Diskus) 500-50 mcg/dose inhaler Inhale 1 puff 2 (two) times a day Rinse mouth after use  (Patient not taking: Reported on 8/10/2021) 1 Inhaler 3    fluticasone-vilanterol (BREO ELLIPTA) 200-25 MCG/INH inhaler Inhale 1 puff daily Rinse mouth after use  (Patient not taking: Reported on 5/28/2021) 1 each 6    metFORMIN (GLUCOPHAGE) 1000 MG tablet Take 1 tablet (1,000 mg total) by mouth daily with breakfast (Patient not taking: Reported on 7/16/2021) 180 tablet 3    multivitamin-minerals (CENTRUM ADULTS) tablet Take 1 tablet by mouth daily (Patient not taking: Reported on 5/28/2021) 30 tablet 0     No current facility-administered medications for this visit       Allergies   Allergen Reactions    Lamotrigine GI Intolerance    Niacin Rash    Simvastatin Other (See Comments)     Elevated liver enzymes  Liver enzyme elevation      Immunizations:     Immunization History   Administered Date(s) Administered    Anthrax 03/17/2004    Fluzone Split Quad 0 5 mL 10/02/2019    H1N1 Inj 11/25/2009    INFLUENZA 11/19/2012, 10/23/2018, 10/02/2019    IPV 07/31/2008    Influenza LAIV (Nasal) 08/27/2009    Influenza Quadrivalent Preservative Free 3 years and older IM 10/02/2019    Influenza Split 11/29/2005    Influenza, injectable, quadrivalent, preservative free 0 5 mL 10/02/2019    Influenza, seasonal, injectable, preservative free 10/23/2018    Meningococcal MCV4P 07/31/2008    Meningococcal Polysaccharide (MPSV4) 07/11/2002    Pneumococcal 12/15/2011    Pneumococcal Conjugate PCV 7 12/15/2011    Smallpox 01/24/2003    Td (adult), Unspecified 01/01/2008    Td (adult), adsorbed 11/08/2002, 06/22/2015    Tdap 07/31/2008, 06/22/2015    Typhoid Live, oral 09/16/2004    Typhoid, Unspecified 09/16/2004    Typhoid, ViCPs 11/13/2008    Yellow Fever 07/11/2002      Health Maintenance:         Topic Date Due    HIV Screening  Never done    Hepatitis C Screening  Completed         Topic Date Due    Pneumococcal Vaccine: Pediatrics (0 to 5 Years) and At-Risk Patients (6 to 59 Years) (1 of 2 - PPSV23) 05/23/1990    COVID-19 Vaccine (1) Never done    Influenza Vaccine (1) 09/01/2021      Medicare Health Risk Assessment:     /82 (BP Location: Left arm, Patient Position: Sitting, Cuff Size: Standard)   Pulse 65   Temp 97 8 °F (36 6 °C) (Temporal)   Resp 18   Ht 5' 5" (1 651 m)   Wt 84 kg (185 lb 3 2 oz)   SpO2 98%   BMI 30 82 kg/m²      Maureen Cummings is here for his Subsequent Wellness visit  Last Medicare Wellness visit information reviewed, patient interviewed and updates made to the record today  Health Risk Assessment:   Patient rates overall health as poor   Patient feels that their physical health rating is slightly worse  Patient is satisfied with their life  Eyesight was rated as slightly worse  Hearing was rated as much worse  Patient feels that their emotional and mental health rating is slightly worse  Patients states they are often angry  Patient states they are always unusually tired/fatigued  Pain experienced in the last 7 days has been a lot  Patient's pain rating has been 8/10  Patient states that he has experienced no weight loss or gain in last 6 months  Depression Screening:   PHQ-2 Score: 4  PHQ-9 Score: 22      Fall Risk Screening: In the past year, patient has experienced: no history of falling in past year      Home Safety:  Patient has trouble with stairs inside or outside of their home  Patient has working smoke alarms and has working carbon monoxide detector  Home safety hazards include: none  Nutrition:   Current diet is Regular  Medications:   Patient is currently taking over-the-counter supplements  OTC medications include: see medication list  Patient is able to manage medications  Activities of Daily Living (ADLs)/Instrumental Activities of Daily Living (IADLs):   Walk and transfer into and out of bed and chair?: Yes  Dress and groom yourself?: Yes    Bathe or shower yourself?: Yes    Feed yourself?  Yes  Do your laundry/housekeeping?: Yes  Manage your money, pay your bills and track your expenses?: Yes  Make your own meals?: Yes    Do your own shopping?: Yes    Previous Hospitalizations:   Any hospitalizations or ED visits within the last 12 months?: Yes    How many hospitalizations have you had in the last year?: more than 4    Advance Care Planning:   Living will: Yes    Advanced directive: Yes      Cognitive Screening:   Provider or family/friend/caregiver concerned regarding cognition?: No    PREVENTIVE SCREENINGS      Cardiovascular Screening:    General: Screening Not Indicated and History Lipid Disorder      Diabetes Screening:     General: Screening Not Indicated and History Diabetes      Colorectal Cancer Screening:     General: Screening Not Indicated      Prostate Cancer Screening:    General: Screening Not Indicated      Abdominal Aortic Aneurysm (AAA) Screening:        General: Screening Not Indicated      Lung Cancer Screening:     General: Screening Not Indicated      Hepatitis C Screening:    General: Screening Current    Screening, Brief Intervention, and Referral to Treatment (SBIRT)    Screening  Typical number of drinks in a day: 0  Typical number of drinks in a week: 0  Interpretation: Low risk drinking behavior  Single Item Drug Screening:  How often have you used an illegal drug (including marijuana) or a prescription medication for non-medical reasons in the past year? never    Single Item Drug Screen Score: 0  Interpretation: Negative screen for possible drug use disorder    Brief Intervention  Alcohol & drug use screenings were reviewed  No concerns regarding substance use disorder identified  Review of Current Opioid Use    Opioid Risk Tool (ORT) Interpretation: Complete Opioid Risk Tool (ORT)    Other Counseling Topics:   Car/seat belt/driving safety, skin self-exam, sunscreen and calcium and vitamin D intake and regular weightbearing exercise         Haja Sanders MD

## 2021-08-10 NOTE — PROGRESS NOTES
Assessment/Plan:  1  Alopecia  Will refer patient to dermatologist for further management    2  Uncontrolled type 2 diabetes mellitus  Being managed by endocrinologist  Hemoglobin A1c checked in our office today is 11 5  Again discussed with him that this is very much uncontrolled he should contact his endocrinologist as soon as possible and adjust the dose of insulin  3  Hypothyroidism  Continue with present dose of levothyroxine  Will check thyroid function test before next visit  4  Hyperlipidemia  Continue with present dose of Pravachol 40 mg daily  Will check lipid profile before next visit    5  Chronic lower back pain  Will refer patient for physical therapy           Diagnoses and all orders for this visit:    Chronic GERD    Type 2 diabetes mellitus without complication, with long-term current use of insulin ([facility])    Hypothyroidism, unspecified type    ENRIKE (obstructive sleep apnea)    Essential hypertension    Mixed hyperlipidemia    Alopecia  -     Ambulatory referral to Dermatology; Future    Chronic midline low back pain without sciatica  -     Ambulatory referral to Physical Therapy; Future    Medicare annual wellness visit, subsequent            Depression Screening and Follow-up Plan: Patient's depression screening was positive with a PHQ-2 score of 4  Their PHQ-9 score was 22  Continue regular follow-up with their mental health provider who is managing their mental health condition(s)  Subjective:          Patient ID: Obey Grewal is a 39 y o  male  Patient is here for follow-up  Was not able to do blood work  Also complaining of patches of hair loss from scalp over the last few weeks  Also complaining of lower back pain off and on increase with physical activity        The following portions of the patient's history were reviewed and updated as appropriate: allergies, current medications, past family history, past medical history, past social history, past surgical history and problem list     Review of Systems   Constitutional: Negative for fatigue and fever  HENT: Negative for congestion, ear discharge, ear pain, postnasal drip, sinus pressure, sore throat, tinnitus and trouble swallowing  Eyes: Negative for discharge, itching and visual disturbance  Respiratory: Negative for cough and shortness of breath  Cardiovascular: Negative for chest pain and palpitations  Gastrointestinal: Negative for abdominal pain, diarrhea, nausea and vomiting  Endocrine: Negative for cold intolerance and polyuria  Genitourinary: Negative for difficulty urinating, dysuria and urgency  Musculoskeletal: Positive for back pain  Negative for arthralgias and neck pain  Skin: Negative for rash  Patches of hair loss from scalp   Allergic/Immunologic: Negative for environmental allergies  Neurological: Negative for dizziness, weakness and headaches  Psychiatric/Behavioral: Negative for agitation and behavioral problems  The patient is not nervous/anxious  Past Medical History:   Diagnosis Date    COVID-19 3/17/2021    Diabetes mellitus ([facility] [address] )     type 2    Disease of thyroid gland     hypothyroidism    Hyperlipidemia     Hypertension     Psychiatric disorder     PTSD   Anxiety, depression,          Current Outpatient Medications:     albuterol (PROVENTIL HFA,VENTOLIN HFA) 90 mcg/act inhaler, Inhale 2 puffs every 6 (six) hours as needed for wheezing or shortness of breath, Disp: 1 Inhaler, Rfl: 1    clonazePAM (KlonoPIN) 1 mg tablet, , Disp: , Rfl:     divalproex sodium (DEPAKOTE) 500 mg EC tablet, Take 500 mg by mouth every 12 (twelve) hours , Disp: , Rfl:     gabapentin (NEURONTIN) 300 mg capsule, Take 300 mg by mouth Three times a day, Disp: , Rfl:     insulin aspart (NovoLOG FlexPen) 100 UNIT/ML injection pen, 10 units before meals, Disp: 15 mL, Rfl: 3    insulin glargine (LANTUS SOLOSTAR) 100 units/mL injection pen, 40 units at bedtime, Disp: 15 mL, Rfl: 3   Insulin Pen Needle (BD Pen Needle Rossana U/F) 32G X 4 MM MISC, Use 4/day, Disp: 100 each, Rfl: 3    levothyroxine 25 mcg tablet, Take 25 mcg by mouth daily, Disp: , Rfl:     loratadine (CLARITIN) 10 mg tablet, Take 10 mg by mouth daily, Disp: , Rfl:     meloxicam (MOBIC) 15 mg tablet, Take 1 tablet (15 mg total) by mouth daily as needed for mild pain or moderate pain, Disp: 30 tablet, Rfl: 1    metoprolol succinate (TOPROL-XL) 25 mg 24 hr tablet, Take 1 tablet (25 mg total) by mouth daily, Disp: 90 tablet, Rfl: 1    OMEGA-3 FATTY ACIDS PO, Take 2 g by mouth daily, Disp: , Rfl:     omeprazole (PriLOSEC) 40 MG capsule, Take 1 tablet 30-60 minutes prior to breakfast, Disp: 30 capsule, Rfl: 3    pravastatin (PRAVACHOL) 40 mg tablet, Take 1 tablet (40 mg total) by mouth daily, Disp:  , Rfl: 0    sertraline (ZOLOFT) 100 mg tablet, , Disp: , Rfl:     fluticasone-salmeterol (Advair Diskus) 500-50 mcg/dose inhaler, Inhale 1 puff 2 (two) times a day Rinse mouth after use  (Patient not taking: Reported on 8/10/2021), Disp: 1 Inhaler, Rfl: 3    fluticasone-vilanterol (BREO ELLIPTA) 200-25 MCG/INH inhaler, Inhale 1 puff daily Rinse mouth after use   (Patient not taking: Reported on 5/28/2021), Disp: 1 each, Rfl: 6    metFORMIN (GLUCOPHAGE) 1000 MG tablet, Take 1 tablet (1,000 mg total) by mouth daily with breakfast (Patient not taking: Reported on 7/16/2021), Disp: 180 tablet, Rfl: 3    multivitamin-minerals (CENTRUM ADULTS) tablet, Take 1 tablet by mouth daily (Patient not taking: Reported on 5/28/2021), Disp: 30 tablet, Rfl: 0    Allergies   Allergen Reactions    Lamotrigine GI Intolerance    Niacin Rash    Simvastatin Other (See Comments)     Elevated liver enzymes  Liver enzyme elevation       Social History   Past Surgical History:   Procedure Laterality Date    WISDOM TOOTH EXTRACTION       Family History   Problem Relation Age of Onset    Hyperlipidemia Father     Heart attack Paternal Paddy Ding Prostate cancer Paternal Grandfather     Cancer Paternal Grandmother        Objective:  /82 (BP Location: Left arm, Patient Position: Sitting, Cuff Size: Standard)   Pulse 65   Temp 97 8 °F (36 6 °C) (Temporal)   Resp 18   Ht 5' 5" (1 651 m)   Wt 84 kg (185 lb 3 2 oz)   SpO2 98%   BMI 30 82 kg/m²   Body mass index is 30 82 kg/m²  Physical Exam  Constitutional:       Appearance: He is well-developed  HENT:      Head: Normocephalic  Right Ear: External ear normal       Left Ear: External ear normal       Mouth/Throat:      Pharynx: No posterior oropharyngeal erythema  Eyes:      General: No scleral icterus  Pupils: Pupils are equal, round, and reactive to light  Neck:      Thyroid: No thyromegaly  Trachea: No tracheal deviation  Cardiovascular:      Rate and Rhythm: Normal rate and regular rhythm  Heart sounds: Normal heart sounds  Pulmonary:      Effort: Pulmonary effort is normal  No respiratory distress  Breath sounds: Normal breath sounds  Chest:      Chest wall: No tenderness  Abdominal:      General: Bowel sounds are normal       Palpations: Abdomen is soft  There is no mass  Tenderness: There is no abdominal tenderness  Musculoskeletal:         General: Normal range of motion  Cervical back: Normal range of motion and neck supple  Right lower leg: No edema  Left lower leg: No edema  Lymphadenopathy:      Cervical: No cervical adenopathy  Skin:     General: Skin is warm  Comments: Multiple patches of hair loss over scalp noted   Neurological:      Mental Status: He is alert and oriented to person, place, and time  Cranial Nerves: No cranial nerve deficit     Psychiatric:         Mood and Affect: Mood normal          Behavior: Behavior normal

## 2021-08-10 NOTE — PROGRESS NOTES
Diabetic Foot Exam    Patient's shoes and socks removed  Right Foot/Ankle   Right Foot Inspection  Skin Exam: skin normal, skin intact, callus and callus no dry skin, no warmth, no erythema, no maceration, no abnormal color, no pre-ulcer and no ulcer                            Sensory       Monofilament testing: intact  Vascular    The right DP pulse is 2+  The right PT pulse is 2+  Left Foot/Ankle  Left Foot Inspection  Skin Exam: skin normal, skin intact and callusno dry skin, no warmth, no erythema, no maceration, normal color, no pre-ulcer and no ulcer                                         Sensory       Monofilament: intact  Vascular    The left DP pulse is 2+  The left PT pulse is 2+  Assign Risk Category:  No deformity present; No loss of protective sensation;  No weak pulses       Risk: 0

## 2021-08-12 LAB — SL AMB POCT HEMOGLOBIN AIC: 11.5 (ref ?–6.5)

## 2021-08-16 ENCOUNTER — TELEPHONE (OUTPATIENT)
Dept: ENDOCRINOLOGY | Facility: CLINIC | Age: 36
End: 2021-08-16

## 2021-08-16 DIAGNOSIS — E11.65 TYPE 2 DIABETES MELLITUS WITH HYPERGLYCEMIA, WITH LONG-TERM CURRENT USE OF INSULIN (HCC): ICD-10-CM

## 2021-08-16 DIAGNOSIS — Z79.4 TYPE 2 DIABETES MELLITUS WITHOUT COMPLICATION, WITH LONG-TERM CURRENT USE OF INSULIN (HCC): ICD-10-CM

## 2021-08-16 DIAGNOSIS — E11.9 TYPE 2 DIABETES MELLITUS WITHOUT COMPLICATION, WITH LONG-TERM CURRENT USE OF INSULIN (HCC): ICD-10-CM

## 2021-08-16 DIAGNOSIS — Z79.4 TYPE 2 DIABETES MELLITUS WITH HYPERGLYCEMIA, WITH LONG-TERM CURRENT USE OF INSULIN (HCC): ICD-10-CM

## 2021-08-16 RX ORDER — INSULIN ASPART 100 [IU]/ML
14 INJECTION, SOLUTION INTRAVENOUS; SUBCUTANEOUS
Qty: 15 ML | Refills: 5 | Status: SHIPPED | OUTPATIENT
Start: 2021-08-16 | End: 2021-08-31 | Stop reason: SDUPTHER

## 2021-08-16 NOTE — TELEPHONE ENCOUNTER
Pt called c/o high blood sugars,  Dexcom data downloaded    Pt is taking Novolog 10 unit TID before meals, Lantus 40 units qhs, , not taking metformin,

## 2021-08-16 NOTE — TELEPHONE ENCOUNTER
Sugars high consistently - increase lantus to 50 units at bedtime and increase Novolog to 14 units before meals   Send over log in 2 weeks

## 2021-08-30 ENCOUNTER — OFFICE VISIT (OUTPATIENT)
Dept: DIABETES SERVICES | Facility: CLINIC | Age: 36
End: 2021-08-30
Payer: MEDICARE

## 2021-08-30 DIAGNOSIS — Z79.4 TYPE 2 DIABETES MELLITUS WITH HYPERGLYCEMIA, WITH LONG-TERM CURRENT USE OF INSULIN (HCC): ICD-10-CM

## 2021-08-30 DIAGNOSIS — E11.65 TYPE 2 DIABETES MELLITUS WITH HYPERGLYCEMIA, WITH LONG-TERM CURRENT USE OF INSULIN (HCC): ICD-10-CM

## 2021-08-30 DIAGNOSIS — E11.65 UNCONTROLLED TYPE 2 DIABETES MELLITUS WITH HYPERGLYCEMIA (HCC): Primary | ICD-10-CM

## 2021-08-30 PROCEDURE — 95251 CONT GLUC MNTR ANALYSIS I&R: CPT

## 2021-08-30 NOTE — PROGRESS NOTES
Personal CGM Follow-up    Met with Suman Verduzco, 39 -yr old patient, here today for Personal Continuous Glucose Monitoring follow-up: DexCom G6  The patient/family report no problems with using sensor  Data was downloaded, reviewed with the patient/family, and submitted to Dr Daron Lakhani for review  Albanfabrizio Ha is taking Lantus 50 units 9 -10 pm and Novolog 14 units before his meals  He has been reviewing the information the sensor and has cut back on the amount of food he is eating  He has been taking his insulin  I recommended that if glucoses are high before meals to take it 15 - 20 minute before he eats

## 2021-08-31 ENCOUNTER — EVALUATION (OUTPATIENT)
Dept: PHYSICAL THERAPY | Age: 36
End: 2021-08-31
Payer: MEDICARE

## 2021-08-31 DIAGNOSIS — M54.50 CHRONIC MIDLINE LOW BACK PAIN WITHOUT SCIATICA: ICD-10-CM

## 2021-08-31 DIAGNOSIS — G89.29 CHRONIC MIDLINE LOW BACK PAIN WITHOUT SCIATICA: ICD-10-CM

## 2021-08-31 PROCEDURE — 97162 PT EVAL MOD COMPLEX 30 MIN: CPT | Performed by: PHYSICAL THERAPIST

## 2021-08-31 PROCEDURE — 97110 THERAPEUTIC EXERCISES: CPT | Performed by: PHYSICAL THERAPIST

## 2021-08-31 RX ORDER — INSULIN ASPART 100 [IU]/ML
INJECTION, SOLUTION INTRAVENOUS; SUBCUTANEOUS
Qty: 45 ML | Refills: 1
Start: 2021-08-31 | End: 2021-09-17 | Stop reason: SDUPTHER

## 2021-08-31 NOTE — TELEPHONE ENCOUNTER
Abraham Goode   Device used [address] use       Indication     Type 2 Diabetes    More than 72 hours of data was reviewed  Report to be scanned to chart  Date Range: august 24-30th 2021    Analysis of data:   Average Glucose: 231 mg/dl  SD : 71 mg/dl  Time in Target Range: 23%  Time Above Range: 77%  Time Below Range: 0%    Interpretation of data:  Sugars above range 77% of the time-no hypoglycemia noted  Sugars are highest between 2:30 p m  till 4:00 a m  For now change NovoLog to 10 units before breakfast and 18 before lunch and dinner  Continue Lantus at current dose  Upload sensor in the next few weeks    Please reschedule follow-up appointment

## 2021-08-31 NOTE — PROGRESS NOTES
PT Evaluation     Today's date: 2021  Patient name: Tammie Murillo  : 1985  MRN: [id number]  Referring provider: Ivy Parker MD  Dx:   Encounter Diagnosis     ICD-10-CM    1  Chronic midline low back pain without sciatica  M54 5 Ambulatory referral to Physical Therapy    G89 29                   Assessment  Assessment details: Patient presents complaining of low back pain, which has been ongoing for years, per patient, but he reports it got worse in the last year  Prior to his pain beginning, he denies any increase in activity or trauma prior to the pain beginning  He reports the pain is worse with lifting, prolonged walking or standing    He locates his pain mostly on his R side, and into R LE, down to his toes, he does report constant numbness into his toes     He had x-rays which were (-)     He is currently unemployed     Patient presents with limitations in thoracolumbar range of motion and B/L LE strength  Patient would benefit from skilled physical therapy to address limitations and deficits  Patient provided with HEP  Patient made aware of condition as well as the proposed treatment plan, including risks, benefits and alternatives  Impairments: abnormal or restricted ROM, activity intolerance, impaired physical strength, lacks appropriate home exercise program and pain with function     Prognosis: good    Goals  ST- demonstrate compliancy with HEP in 1 week     Decrease reported pain to 5/10 at worst and with activity, to improve functional capacity, within 3 weeks   Improve thoracolumbar range of motion to only minimal limitations, within 3 weeks     LT- Improve FOTO score to specified value to improve patients perceived benefit of therapy, in 8 weeks   Improve B/L LE strength to 4+/5, to improve ADL's, within 8 weeks   Be able to play with his kids with no complaints of pain, within 10 weeks     Plan  Plan details: Physical therapy with focus on there ex and manual therapy to improve ability to complete tasks around the house and complete functional activities, use of modalities as needed     Patient would benefit from: skilled physical therapy  Referral necessary: No  Planned modality interventions: cryotherapy, TENS and thermotherapy: hydrocollator packs  Planned therapy interventions: ADL training, balance, balance/weight bearing training, gait training, manual therapy, joint mobilization, neuromuscular re-education, strengthening, stretching, therapeutic activities and therapeutic exercise  Frequency: 2x week  Duration in weeks: 12  Plan of Care beginning date: 2021  Plan of Care expiration date: 2021  Treatment plan discussed with: patient        Subjective Evaluation    History of Present Illness  Date of onset: 2020  Mechanism of injury: Chronic onset   Pain  Current pain ratin  At best pain ratin  At worst pain rating: 10  Location: low back (R>L)   Quality: sharp, dull ache and tight  Relieving factors: relaxation and rest  Aggravating factors: walking, sitting, standing and lifting  Progression: worsening      Diagnostic Tests  X-ray: normal  Treatments  Current treatment: medication  Patient Goals  Patient goals for therapy: decreased pain and independence with ADLs/IADLs  Patient goal: be able to play with his kids         Objective     General Comments:      Lumbar Comments  Ttp along central lumbar spine, worse on R     rom  Thoracolumbar flexion and extension both moderately limited secondary to pain, worse distally  B/L side bend and rotation moderately limited     Unable to tolerate DANIELLE x 30 s     mmt  Hip flexion L=4* R=4*  Hip abduction L=4* R=4*  Hip adduction L=4* R=4*  Knee extension L=4* R=4*  Knee flexion L=4 R=4    Special tests  Slump (+) on R   slr (+)   Crossed slr (-)      Joint play   CPA's unable to be assessed secondary to pain with thoracolumbar joint play              Precautions: none       Manuals Neuro Re-Ed             glute sets             TA cont             TA marches             TA fallouts             Hip 3way              Mini bridges             Mini squats             Ther Ex             Bike                                                                                                         Ther Activity                                       Gait Training                                       Modalities

## 2021-09-01 DIAGNOSIS — J45.40 MODERATE PERSISTENT ALLERGIC ASTHMA: ICD-10-CM

## 2021-09-02 ENCOUNTER — APPOINTMENT (OUTPATIENT)
Dept: PHYSICAL THERAPY | Age: 36
End: 2021-09-02
Payer: MEDICARE

## 2021-09-08 ENCOUNTER — APPOINTMENT (OUTPATIENT)
Dept: PHYSICAL THERAPY | Age: 36
End: 2021-09-08
Payer: MEDICARE

## 2021-09-09 ENCOUNTER — OFFICE VISIT (OUTPATIENT)
Dept: PHYSICAL THERAPY | Age: 36
End: 2021-09-09
Payer: MEDICARE

## 2021-09-09 DIAGNOSIS — M54.50 CHRONIC MIDLINE LOW BACK PAIN WITHOUT SCIATICA: Primary | ICD-10-CM

## 2021-09-09 DIAGNOSIS — G89.29 CHRONIC MIDLINE LOW BACK PAIN WITHOUT SCIATICA: Primary | ICD-10-CM

## 2021-09-09 PROCEDURE — 97112 NEUROMUSCULAR REEDUCATION: CPT | Performed by: PHYSICAL THERAPIST

## 2021-09-09 NOTE — PROGRESS NOTES
Daily Note     Today's date: 2021  Patient name: Daya Gross  : 1985  MRN: [id number]  Referring provider: Blas Swartz MD  Dx:   Encounter Diagnosis     ICD-10-CM    1  Chronic midline low back pain without sciatica  M54 5     G89 29                   Subjective: Reports no change in status upon arrival      Objective: See treatment diary below      Assessment: Tolerated treatment well  Patient would benefit from continued PT, patient demonstrated ability to complete HEP and did well with first tx  Plan: Continue per plan of care        Precautions: none       Manuals                                                                  Neuro Re-Ed             glute sets 20x5"             TA cont 20x5"             TA marches 20x alt            TA fallouts 10x ea            Hip 3way  10x ea B/L            Mini bridges nv            Mini squats             Ther Ex             Bike  5'                                                                                                        Ther Activity                                       Gait Training                                       Modalities

## 2021-09-10 ENCOUNTER — APPOINTMENT (OUTPATIENT)
Dept: PHYSICAL THERAPY | Age: 36
End: 2021-09-10
Payer: MEDICARE

## 2021-09-14 ENCOUNTER — OFFICE VISIT (OUTPATIENT)
Dept: PHYSICAL THERAPY | Age: 36
End: 2021-09-14
Payer: MEDICARE

## 2021-09-14 DIAGNOSIS — M54.50 CHRONIC MIDLINE LOW BACK PAIN WITHOUT SCIATICA: Primary | ICD-10-CM

## 2021-09-14 DIAGNOSIS — G89.29 CHRONIC MIDLINE LOW BACK PAIN WITHOUT SCIATICA: Primary | ICD-10-CM

## 2021-09-14 PROCEDURE — 97112 NEUROMUSCULAR REEDUCATION: CPT | Performed by: PHYSICAL THERAPIST

## 2021-09-14 PROCEDURE — 97110 THERAPEUTIC EXERCISES: CPT | Performed by: PHYSICAL THERAPIST

## 2021-09-14 NOTE — PROGRESS NOTES
Daily Note     Today's date: 2021  Patient name: Georgina Tinajero  : 1985  MRN: [id number]  Referring provider: Leward Dance, MD  Dx:   Encounter Diagnosis     ICD-10-CM    1  Chronic midline low back pain without sciatica  M54 5     G89 29                   Subjective: "My backs always hurts and I need to find a doctor"       Objective: See treatment diary below      Assessment: Tolerated treatment well  Patient exhibited good technique with therapeutic exercises and would benefit from continued PT      Plan: Continue per plan of care        Precautions: none       Manuals                                                                Neuro Re-Ed             glute sets 20x5"  25x5"            TA cont 20x5"  np           TA marches 20x alt 20x alt            TA fallouts 10x ea 20x ea            Hip 3way  10x ea B/L 15x ea B/L            Mini bridges nv 2x10            Mini squats  10x           Ther Ex             Bike  5'  8'                                                                                                       Ther Activity                                       Gait Training                                       Modalities

## 2021-09-16 NOTE — PROGRESS NOTES
Patient will be D/C due to noncompliance to therapy, no formal re-evaluation completed and goals were not assessed

## 2021-09-17 DIAGNOSIS — E11.65 TYPE 2 DIABETES MELLITUS WITH HYPERGLYCEMIA, WITH LONG-TERM CURRENT USE OF INSULIN (HCC): ICD-10-CM

## 2021-09-17 DIAGNOSIS — Z79.4 TYPE 2 DIABETES MELLITUS WITH HYPERGLYCEMIA, WITH LONG-TERM CURRENT USE OF INSULIN (HCC): ICD-10-CM

## 2021-09-17 RX ORDER — INSULIN ASPART 100 [IU]/ML
INJECTION, SOLUTION INTRAVENOUS; SUBCUTANEOUS
Qty: 45 ML | Refills: 1 | Status: ON HOLD | OUTPATIENT
Start: 2021-09-17 | End: 2021-10-21 | Stop reason: SDUPTHER

## 2021-09-20 DIAGNOSIS — Z79.4 TYPE 2 DIABETES MELLITUS WITHOUT COMPLICATION, WITH LONG-TERM CURRENT USE OF INSULIN (HCC): ICD-10-CM

## 2021-09-20 DIAGNOSIS — E11.9 TYPE 2 DIABETES MELLITUS WITHOUT COMPLICATION, WITH LONG-TERM CURRENT USE OF INSULIN (HCC): ICD-10-CM

## 2021-09-22 DIAGNOSIS — E11.9 TYPE 2 DIABETES MELLITUS WITHOUT COMPLICATION, WITH LONG-TERM CURRENT USE OF INSULIN (HCC): ICD-10-CM

## 2021-09-22 DIAGNOSIS — Z79.4 TYPE 2 DIABETES MELLITUS WITHOUT COMPLICATION, WITH LONG-TERM CURRENT USE OF INSULIN (HCC): ICD-10-CM

## 2021-09-23 ENCOUNTER — TELEPHONE (OUTPATIENT)
Dept: PULMONOLOGY | Facility: CLINIC | Age: 36
End: 2021-09-23

## 2021-09-23 NOTE — TELEPHONE ENCOUNTER
DIOR  Advised pt to bring in results of sleep study to apt, as well as he can ask the [place] to try and fax the results over again directly to the [facility] office   Thank you

## 2021-09-23 NOTE — TELEPHONE ENCOUNTER
Pt called, he stated he had a sleep study done at the [place]  They wanted him to contact us and have Dr Rosario look over the results  They would like your opinion on if he is in need of oxygen at night, that he was "12%" lower than he was supposed to be  He said the [place] should have sent over the results but I'm not sure if I see them in his chart  He also has a f/u apt on 10/5 to discuss further about this as well   Please advise    [facility] contact Shona Sewell #[phone] [id number]  Or #[phone]

## 2021-09-27 NOTE — TELEPHONE ENCOUNTER
I did reach out to Francois from the [place] at the number provided and left her a detailed message regarding obtaining a copy of Mr Kofi Paz Sleep Study  --Informed her that we will be seeing Mr Baxter in the office on 10/05/21 and he would like Dr Josselyn Torres to take a look at it so if she would fax the copy of his sleep study to [phone] ATTN: Lalit Oats  Please forward Sleep Study to Dr Josselyn Torres if it is faxed over before sending it to be scanned in  It does not always get scanned right away or it could get lost in transit  Thank you

## 2021-09-28 ENCOUNTER — OFFICE VISIT (OUTPATIENT)
Dept: INTERNAL MEDICINE CLINIC | Age: 36
End: 2021-09-28
Payer: MEDICARE

## 2021-09-28 ENCOUNTER — APPOINTMENT (OUTPATIENT)
Dept: LAB | Age: 36
End: 2021-09-28
Payer: MEDICARE

## 2021-09-28 VITALS
WEIGHT: 191.6 LBS | OXYGEN SATURATION: 96 % | HEART RATE: 71 BPM | SYSTOLIC BLOOD PRESSURE: 110 MMHG | HEIGHT: 65 IN | TEMPERATURE: 98.2 F | BODY MASS INDEX: 31.92 KG/M2 | DIASTOLIC BLOOD PRESSURE: 72 MMHG

## 2021-09-28 DIAGNOSIS — H92.01 RIGHT EAR PAIN: Primary | ICD-10-CM

## 2021-09-28 DIAGNOSIS — R68.84 JAW PAIN: ICD-10-CM

## 2021-09-28 DIAGNOSIS — H65.91 RIGHT SEROUS OTITIS MEDIA, UNSPECIFIED CHRONICITY: ICD-10-CM

## 2021-09-28 DIAGNOSIS — M62.81 MUSCLE WEAKNESS OF LOWER EXTREMITY: ICD-10-CM

## 2021-09-28 DIAGNOSIS — R56.9 SEIZURE (HCC): ICD-10-CM

## 2021-09-28 LAB
CRP SERPL QL: 8.7 MG/L
ERYTHROCYTE [SEDIMENTATION RATE] IN BLOOD: 9 MM/HOUR (ref 0–14)

## 2021-09-28 PROCEDURE — 86140 C-REACTIVE PROTEIN: CPT

## 2021-09-28 PROCEDURE — 99214 OFFICE O/P EST MOD 30 MIN: CPT | Performed by: STUDENT IN AN ORGANIZED HEALTH CARE EDUCATION/TRAINING PROGRAM

## 2021-09-28 PROCEDURE — 36415 COLL VENOUS BLD VENIPUNCTURE: CPT

## 2021-09-28 PROCEDURE — 85652 RBC SED RATE AUTOMATED: CPT

## 2021-09-28 RX ORDER — METHOCARBAMOL 750 MG/1
750 TABLET, FILM COATED ORAL
Qty: 30 TABLET | Refills: 0 | Status: SHIPPED | OUTPATIENT
Start: 2021-09-28 | End: 2021-12-23

## 2021-09-28 RX ORDER — MELOXICAM 15 MG/1
15 TABLET ORAL DAILY PRN
Qty: 30 TABLET | Refills: 1 | Status: SHIPPED | OUTPATIENT
Start: 2021-09-28 | End: 2021-12-23

## 2021-09-28 RX ORDER — AZITHROMYCIN 250 MG/1
TABLET, FILM COATED ORAL
Qty: 6 TABLET | Refills: 0 | Status: SHIPPED | OUTPATIENT
Start: 2021-09-28 | End: 2021-10-03

## 2021-09-28 NOTE — PROGRESS NOTES
Clinic Visit Note  Suman Verduzco 39 y o  male   MRN: [id number]    Assessment and Plan      Diagnoses and all orders for this visit:    Right ear pain  Serous otitis media  Cannot rule out acute on chronic infection, will order azithromycin x5 days  -     azithromycin (Zithromax) 250 mg tablet; Take 2 tablets (500 mg total) by mouth daily for 1 day, THEN 1 tablet (250 mg total) daily for 4 days  Seizure ([address] )  Continue Depakote, follows up with Neurology, seizure free    Jaw pain  Concern for TMJ, has physical therapy scheduled, MRI scheduled with other specialist, will obtain inflammatory markers and trial of Robaxin at night for muscle spasms and meloxicam for anti-inflammatory effect  -     Sedimentation rate, automated; Future  -     C-reactive protein; Future  -     methocarbamol (Robaxin-750) 750 mg tablet; Take 1 tablet (750 mg total) by mouth daily at bedtime  -     meloxicam (MOBIC) 15 mg tablet; Take 1 tablet (15 mg total) by mouth daily as needed for mild pain or moderate pain      My impressions and treatment recommendations were discussed in detail with the patient who verbalized understanding and had no further questions  Discharge instructions were provided  Subjective     Chief Complaint:  Acute Care Visit    History of Present Illness:    Patient is a 77-year-old male coming in for an acute care visit secondary to right ear pain, acute on chronic  Patient notes that multiple years ago he had a traumatic event to right ear/jaw jaw did dislocate and has had symptomatology since  Pain is intermittent, feels sharp in nature, does not radiate down face  Does not have hearing loss  He is wondering today what we can do to see if we can help with this pain that he feels is becoming uncontrollable  Patient does have physical therapy planned as he has been told in the past there may be some component of TMJ  It also appears that an MRI scheduled      MRI imaging last year unremarkable, x-ray mandible normal position, no mandible fracture, no periapical abscess or teeth fracture seen  The following portions of the patient's history were reviewed and updated as appropriate: allergies, current medications, past family history, past medical history, past social history, past surgical history and problem list     REVIEW OF SYSTEMS:  A complete 12-point review of systems is negative other than that noted in the HPI  Review of Systems   Constitutional: Negative for chills, fatigue and fever  HENT: Positive for ear pain  Negative for congestion, ear discharge, sinus pressure and sinus pain  Respiratory: Negative for shortness of breath and wheezing  Cardiovascular: Negative for chest pain, palpitations and leg swelling  Neurological: Negative for dizziness and headaches  Psychiatric/Behavioral: Negative for agitation, behavioral problems and confusion           Current Outpatient Medications:     albuterol (PROVENTIL HFA,VENTOLIN HFA) 90 mcg/act inhaler, Inhale 2 puffs every 6 (six) hours as needed for wheezing or shortness of breath, Disp: 1 Inhaler, Rfl: 1    clonazePAM (KlonoPIN) 1 mg tablet, , Disp: , Rfl:     divalproex sodium (DEPAKOTE) 500 mg EC tablet, Take 500 mg by mouth every 12 (twelve) hours , Disp: , Rfl:     fluticasone-salmeterol (Advair Diskus) 500-50 mcg/dose inhaler, Inhale 1 puff 2 (two) times a day Rinse mouth after use , Disp: 180 blister, Rfl: 1    gabapentin (NEURONTIN) 300 mg capsule, Take 300 mg by mouth Three times a day, Disp: , Rfl:     insulin aspart (NovoLOG FlexPen) 100 UNIT/ML injection pen, 10 units before breakfast , 18 units before lunch and dinner , Disp: 45 mL, Rfl: 1    insulin glargine (LANTUS SOLOSTAR) 100 units/mL injection pen, Inject 50 Units under the skin daily at bedtime, Disp: 45 mL, Rfl: 0    Insulin Pen Needle (BD Pen Needle Rossana U/F) 32G X 4 MM MISC, Use 4/day, Disp: 100 each, Rfl: 3    levothyroxine 25 mcg tablet, Take 25 mcg by mouth daily, Disp: , Rfl:     loratadine (CLARITIN) 10 mg tablet, Take 10 mg by mouth daily, Disp: , Rfl:     metoprolol succinate (TOPROL-XL) 25 mg 24 hr tablet, Take 1 tablet (25 mg total) by mouth daily, Disp: 90 tablet, Rfl: 1    OMEGA-3 FATTY ACIDS PO, Take 2 g by mouth daily, Disp: , Rfl:     omeprazole (PriLOSEC) 40 MG capsule, Take 1 tablet 30-60 minutes prior to breakfast, Disp: 30 capsule, Rfl: 3    pravastatin (PRAVACHOL) 40 mg tablet, Take 1 tablet (40 mg total) by mouth daily, Disp:  , Rfl: 0    sertraline (ZOLOFT) 100 mg tablet, 150 mg , Disp: , Rfl:     fluticasone-vilanterol (BREO ELLIPTA) 200-25 MCG/INH inhaler, Inhale 1 puff daily Rinse mouth after use  (Patient not taking: Reported on 5/28/2021), Disp: 1 each, Rfl: 6    meloxicam (MOBIC) 15 mg tablet, Take 1 tablet (15 mg total) by mouth daily as needed for mild pain or moderate pain (Patient not taking: Reported on 9/28/2021), Disp: 30 tablet, Rfl: 1    metFORMIN (GLUCOPHAGE) 1000 MG tablet, Take 1 tablet (1,000 mg total) by mouth daily with breakfast (Patient not taking: Reported on 7/16/2021), Disp: 180 tablet, Rfl: 3    multivitamin-minerals (CENTRUM ADULTS) tablet, Take 1 tablet by mouth daily (Patient not taking: Reported on 5/28/2021), Disp: 30 tablet, Rfl: 0  Past Medical History:   Diagnosis Date    COVID-19 3/17/2021    Diabetes mellitus ([facility] [address] )     type 2    Disease of thyroid gland     hypothyroidism    Hyperlipidemia     Hypertension     Psychiatric disorder     PTSD   Anxiety, depression,      Past Surgical History:   Procedure Laterality Date    MOUTH SURGERY  08/2021    WISDOM TOOTH EXTRACTION       Social History     Socioeconomic History    Marital status: /Civil Union     Spouse name: Not on file    Number of children: Not on file    Years of education: Not on file    Highest education level: Not on file   Occupational History    Not on file   Tobacco Use    Smoking status: Never Smoker    Smokeless tobacco: Never Used   Vaping Use    Vaping Use: Never used   Substance and Sexual Activity    Alcohol use: Not Currently    Drug use: No    Sexual activity: Not Currently   Other Topics Concern    Not on file   Social History Narrative    Not on file     Social Determinants of Health     Financial Resource Strain:     Difficulty of Paying Living Expenses:    Food Insecurity:     Worried About Running Out of Food in the Last Year:     [address] in the Last Year:    Transportation Needs:     Lack of Transportation (Medical):  Lack of Transportation (Non-Medical):    Physical Activity:     Days of Exercise per Week:     Minutes of Exercise per Session:    Stress:     Feeling of Stress :    Social Connections:     Frequency of Communication with Friends and Family:     Frequency of Social Gatherings with Friends and Family:     Attends [employer] Services:     Active Member of Clubs or Organizations:     Attends Club or Organization Meetings:     Marital Status:    Intimate Partner Violence:     Fear of Current or Ex-Partner:     Emotionally Abused:     Physically Abused:     Sexually Abused:      Family History   Problem Relation Age of Onset    Hyperlipidemia Father     Heart attack Paternal Grandfather     Prostate cancer Paternal Grandfather     Cancer Paternal Grandmother      Allergies   Allergen Reactions    Lamotrigine GI Intolerance    Niacin Rash    Simvastatin Other (See Comments)     Elevated liver enzymes  Liver enzyme elevation       Objective     Vitals:    09/28/21 1156   BP: 110/72   BP Location: Left arm   Patient Position: Sitting   Cuff Size: Adult   Pulse: 71   Temp: 98 2 °F (36 8 °C)   TempSrc: Temporal   SpO2: 96%   Weight: 86 9 kg (191 lb 9 6 oz)   Height: 5' 5" (1 651 m)       Physical exam:     GENERAL: NAD, pleasant   HEENT:  NC/AT, PERRL, EOMI, no scleral icterus    Tympanic membrane on right ear appears to be scarred with limited light reflex comparatively to left  Mandible bilaterally in place with mild tenderness to palpation on right side  CARDIAC:  RRR, +S1/S2, no S3/S4 heard, no m/g/r  PULMONARY:  CTA B/L, no wheezing/rales/rhonci, non-labored breathing  ABDOMEN:  Soft, NT/ND, no rebound/guarding/rigidity  Extremities:  No edema, cyanosis, or clubbing  NEUROLOGIC: Grossly intact, No focal deficits  SKIN:  No rashes or erythema noted on exposed skin  Psych: Normal affect    ==  PLEASE NOTE:  This encounter was completed utilizing the [employer] Direct Speech Voice Recognition Software  Grammatical errors, random word insertions, pronoun errors and incomplete sentences are occasional consequences of the system due to software limitations, ambient noise and hardware issues  These may be missed by proof reading prior to affixing electronic signature  Any questions or concerns about the content, text or information contained within the body of this dictation should be directly addressed to the physician for clarification  Please do not hesitate to call me directly if you have any any questions or concerns      DO Angie Ugarte [address] Internal Medicine PGY-3

## 2021-10-04 NOTE — TELEPHONE ENCOUNTER
Precharting for patient's appt susan with Dr Marylene Francisco  I still do not see sleep study results that were requested from the [place]  [facility] and Cathi Perdomo again and left message on her voicemail to please fax over sleep study results as patient has an appointment 10/5/21 to go over results

## 2021-10-05 ENCOUNTER — OFFICE VISIT (OUTPATIENT)
Dept: PULMONOLOGY | Facility: CLINIC | Age: 36
End: 2021-10-05
Payer: MEDICARE

## 2021-10-05 VITALS
SYSTOLIC BLOOD PRESSURE: 120 MMHG | HEART RATE: 74 BPM | TEMPERATURE: 97.6 F | OXYGEN SATURATION: 97 % | BODY MASS INDEX: 32.02 KG/M2 | HEIGHT: 65 IN | DIASTOLIC BLOOD PRESSURE: 86 MMHG | WEIGHT: 192.2 LBS

## 2021-10-05 DIAGNOSIS — Z28.21 COVID-19 VACCINATION REFUSED: ICD-10-CM

## 2021-10-05 DIAGNOSIS — U09.9 POST-COVID-19 CONDITION: Primary | ICD-10-CM

## 2021-10-05 DIAGNOSIS — G47.33 OSA (OBSTRUCTIVE SLEEP APNEA): ICD-10-CM

## 2021-10-05 PROCEDURE — 99214 OFFICE O/P EST MOD 30 MIN: CPT | Performed by: INTERNAL MEDICINE

## 2021-10-13 ENCOUNTER — TELEMEDICINE (OUTPATIENT)
Dept: INTERNAL MEDICINE CLINIC | Facility: CLINIC | Age: 36
End: 2021-10-13
Payer: MEDICARE

## 2021-10-13 VITALS — BODY MASS INDEX: 29.16 KG/M2 | WEIGHT: 175 LBS | HEIGHT: 65 IN

## 2021-10-13 DIAGNOSIS — H66.001 NON-RECURRENT ACUTE SUPPURATIVE OTITIS MEDIA OF RIGHT EAR WITHOUT SPONTANEOUS RUPTURE OF TYMPANIC MEMBRANE: Primary | ICD-10-CM

## 2021-10-13 PROCEDURE — 99213 OFFICE O/P EST LOW 20 MIN: CPT | Performed by: NURSE PRACTITIONER

## 2021-10-13 RX ORDER — AMOXICILLIN AND CLAVULANATE POTASSIUM 875; 125 MG/1; MG/1
1 TABLET, FILM COATED ORAL EVERY 12 HOURS SCHEDULED
Qty: 14 TABLET | Refills: 0 | Status: SHIPPED | OUTPATIENT
Start: 2021-10-13 | End: 2021-10-21 | Stop reason: HOSPADM

## 2021-10-13 RX ORDER — DIVALPROEX SODIUM 125 MG/1
TABLET, DELAYED RELEASE ORAL
COMMUNITY
Start: 2021-10-12 | End: 2021-10-21 | Stop reason: HOSPADM

## 2021-10-18 ENCOUNTER — OFFICE VISIT (OUTPATIENT)
Dept: CARDIOLOGY CLINIC | Facility: CLINIC | Age: 36
End: 2021-10-18
Payer: MEDICARE

## 2021-10-18 VITALS
WEIGHT: 190.6 LBS | OXYGEN SATURATION: 94 % | DIASTOLIC BLOOD PRESSURE: 74 MMHG | HEIGHT: 65 IN | SYSTOLIC BLOOD PRESSURE: 118 MMHG | HEART RATE: 80 BPM | BODY MASS INDEX: 31.75 KG/M2

## 2021-10-18 DIAGNOSIS — Z79.4 TYPE 2 DIABETES MELLITUS WITHOUT COMPLICATION, WITH LONG-TERM CURRENT USE OF INSULIN (HCC): ICD-10-CM

## 2021-10-18 DIAGNOSIS — E78.2 MIXED HYPERLIPIDEMIA: ICD-10-CM

## 2021-10-18 DIAGNOSIS — F41.9 ANXIETY: ICD-10-CM

## 2021-10-18 DIAGNOSIS — E11.9 TYPE 2 DIABETES MELLITUS WITHOUT COMPLICATION, WITH LONG-TERM CURRENT USE OF INSULIN (HCC): ICD-10-CM

## 2021-10-18 DIAGNOSIS — R07.9 CHEST PAIN, UNSPECIFIED TYPE: ICD-10-CM

## 2021-10-18 DIAGNOSIS — I10 ESSENTIAL HYPERTENSION: Primary | ICD-10-CM

## 2021-10-18 PROCEDURE — 93000 ELECTROCARDIOGRAM COMPLETE: CPT | Performed by: INTERNAL MEDICINE

## 2021-10-18 PROCEDURE — 99214 OFFICE O/P EST MOD 30 MIN: CPT | Performed by: INTERNAL MEDICINE

## 2021-10-18 RX ORDER — METOPROLOL SUCCINATE 25 MG/1
25 TABLET, EXTENDED RELEASE ORAL DAILY
Qty: 90 TABLET | Refills: 3 | Status: SHIPPED | OUTPATIENT
Start: 2021-10-18 | End: 2022-04-05 | Stop reason: SDUPTHER

## 2021-10-19 ENCOUNTER — APPOINTMENT (EMERGENCY)
Dept: RADIOLOGY | Facility: HOSPITAL | Age: 36
DRG: 091 | End: 2021-10-19
Payer: MEDICARE

## 2021-10-19 ENCOUNTER — HOSPITAL ENCOUNTER (INPATIENT)
Facility: HOSPITAL | Age: 36
LOS: 2 days | Discharge: HOME | DRG: 091 | End: 2021-10-21
Attending: EMERGENCY MEDICINE | Admitting: ANESTHESIOLOGY
Payer: MEDICARE

## 2021-10-19 DIAGNOSIS — G40.901 STATUS EPILEPTICUS (HCC): Primary | ICD-10-CM

## 2021-10-19 DIAGNOSIS — E11.65 TYPE 2 DIABETES MELLITUS WITH HYPERGLYCEMIA, WITH LONG-TERM CURRENT USE OF INSULIN (HCC): ICD-10-CM

## 2021-10-19 DIAGNOSIS — Z79.4 TYPE 2 DIABETES MELLITUS WITHOUT COMPLICATION, WITH LONG-TERM CURRENT USE OF INSULIN (HCC): ICD-10-CM

## 2021-10-19 DIAGNOSIS — E11.9 TYPE 2 DIABETES MELLITUS WITHOUT COMPLICATION, WITH LONG-TERM CURRENT USE OF INSULIN (HCC): ICD-10-CM

## 2021-10-19 DIAGNOSIS — G40.909 SEIZURE DISORDER (HCC): ICD-10-CM

## 2021-10-19 DIAGNOSIS — Z79.4 TYPE 2 DIABETES MELLITUS WITH HYPERGLYCEMIA, WITH LONG-TERM CURRENT USE OF INSULIN (HCC): ICD-10-CM

## 2021-10-19 DIAGNOSIS — R41.82 ALTERED MENTAL STATUS: ICD-10-CM

## 2021-10-19 PROBLEM — E78.2 MIXED HYPERLIPIDEMIA: Chronic | Status: ACTIVE | Noted: 2020-09-22

## 2021-10-19 PROBLEM — F41.9 ANXIETY: Chronic | Status: ACTIVE | Noted: 2020-09-22

## 2021-10-19 PROBLEM — K21.9 CHRONIC GERD: Chronic | Status: ACTIVE | Noted: 2021-02-01

## 2021-10-19 PROBLEM — J96.00 ACUTE RESPIRATORY FAILURE (HCC): Status: ACTIVE | Noted: 2021-10-19

## 2021-10-19 PROBLEM — I10 ESSENTIAL HYPERTENSION: Chronic | Status: ACTIVE | Noted: 2021-04-12

## 2021-10-19 PROBLEM — E03.9 HYPOTHYROIDISM: Chronic | Status: ACTIVE | Noted: 2021-05-28

## 2021-10-19 LAB
ALBUMIN SERPL BCP-MCNC: 3.6 G/DL (ref 3.5–5)
ALP SERPL-CCNC: 83 U/L (ref 46–116)
ALT SERPL W P-5'-P-CCNC: 60 U/L (ref 12–78)
ANION GAP SERPL CALCULATED.3IONS-SCNC: 4 MMOL/L (ref 4–13)
APAP SERPL-MCNC: <2 UG/ML (ref 10–20)
APTT PPP: 25 SECONDS (ref 23–37)
AST SERPL W P-5'-P-CCNC: 33 U/L (ref 5–45)
BACTERIA UR QL AUTO: NORMAL /HPF
BASOPHILS # BLD AUTO: 0.1 THOUSANDS/ΜL (ref 0–0.1)
BASOPHILS NFR BLD AUTO: 2 % (ref 0–1)
BILIRUB SERPL-MCNC: 0.38 MG/DL (ref 0.2–1)
BILIRUB UR QL STRIP: NEGATIVE
BUN SERPL-MCNC: 16 MG/DL (ref 5–25)
CALCIUM SERPL-MCNC: 9.1 MG/DL (ref 8.3–10.1)
CHLORIDE SERPL-SCNC: 101 MMOL/L (ref 100–108)
CLARITY UR: CLEAR
CO2 SERPL-SCNC: 28 MMOL/L (ref 21–32)
COLOR UR: YELLOW
CREAT SERPL-MCNC: 1.23 MG/DL (ref 0.6–1.3)
EOSINOPHIL # BLD AUTO: 0.21 THOUSAND/ΜL (ref 0–0.61)
EOSINOPHIL NFR BLD AUTO: 3 % (ref 0–6)
ERYTHROCYTE [DISTWIDTH] IN BLOOD BY AUTOMATED COUNT: 13.2 % (ref 11.6–15.1)
ETHANOL SERPL-MCNC: <3 MG/DL (ref 0–3)
GFR SERPL CREATININE-BSD FRML MDRD: 75 ML/MIN/1.73SQ M
GLUCOSE SERPL-MCNC: 235 MG/DL (ref 65–140)
GLUCOSE SERPL-MCNC: 267 MG/DL (ref 65–140)
GLUCOSE SERPL-MCNC: 275 MG/DL (ref 65–140)
GLUCOSE UR STRIP-MCNC: ABNORMAL MG/DL
HCT VFR BLD AUTO: 44.3 % (ref 36.5–49.3)
HGB BLD-MCNC: 15 G/DL (ref 12–17)
HGB UR QL STRIP.AUTO: NEGATIVE
HYALINE CASTS #/AREA URNS LPF: NORMAL /LPF
IMM GRANULOCYTES # BLD AUTO: 0.13 THOUSAND/UL (ref 0–0.2)
IMM GRANULOCYTES NFR BLD AUTO: 2 % (ref 0–2)
INR PPP: 0.85 (ref 0.84–1.19)
KETONES UR STRIP-MCNC: ABNORMAL MG/DL
LACTATE SERPL-SCNC: 1.6 MMOL/L (ref 0.5–2)
LEUKOCYTE ESTERASE UR QL STRIP: ABNORMAL
LYMPHOCYTES # BLD AUTO: 2.68 THOUSANDS/ΜL (ref 0.6–4.47)
LYMPHOCYTES NFR BLD AUTO: 39 % (ref 14–44)
MCH RBC QN AUTO: 28.9 PG (ref 26.8–34.3)
MCHC RBC AUTO-ENTMCNC: 33.9 G/DL (ref 31.4–37.4)
MCV RBC AUTO: 85 FL (ref 82–98)
MONOCYTES # BLD AUTO: 0.86 THOUSAND/ΜL (ref 0.17–1.22)
MONOCYTES NFR BLD AUTO: 13 % (ref 4–12)
NEUTROPHILS # BLD AUTO: 2.82 THOUSANDS/ΜL (ref 1.85–7.62)
NEUTS SEG NFR BLD AUTO: 41 % (ref 43–75)
NITRITE UR QL STRIP: NEGATIVE
NON-SQ EPI CELLS URNS QL MICRO: NORMAL /HPF
NRBC BLD AUTO-RTO: 0 /100 WBCS
PH UR STRIP.AUTO: 6 [PH]
PLATELET # BLD AUTO: 207 THOUSANDS/UL (ref 149–390)
PLATELET # BLD AUTO: 226 THOUSANDS/UL (ref 149–390)
PMV BLD AUTO: 9.1 FL (ref 8.9–12.7)
PMV BLD AUTO: 9.8 FL (ref 8.9–12.7)
POTASSIUM SERPL-SCNC: 3.9 MMOL/L (ref 3.5–5.3)
PROCALCITONIN SERPL-MCNC: 0.08 NG/ML
PROT SERPL-MCNC: 7.7 G/DL (ref 6.4–8.2)
PROT UR STRIP-MCNC: ABNORMAL MG/DL
PROTHROMBIN TIME: 11.3 SECONDS (ref 11.6–14.5)
RBC # BLD AUTO: 5.19 MILLION/UL (ref 3.88–5.62)
RBC #/AREA URNS AUTO: NORMAL /HPF
SALICYLATES SERPL-MCNC: <3 MG/DL (ref 3–20)
SODIUM SERPL-SCNC: 133 MMOL/L (ref 136–145)
SP GR UR STRIP.AUTO: >1.045 (ref 1–1.03)
T4 FREE SERPL-MCNC: 0.86 NG/DL (ref 0.76–1.46)
TSH SERPL DL<=0.05 MIU/L-ACNC: 5.86 UIU/ML (ref 0.36–3.74)
UROBILINOGEN UR QL STRIP.AUTO: 0.2 E.U./DL
VALPROATE SERPL-MCNC: 68 UG/ML (ref 50–100)
WBC # BLD AUTO: 6.8 THOUSAND/UL (ref 4.31–10.16)
WBC #/AREA URNS AUTO: NORMAL /HPF

## 2021-10-19 PROCEDURE — 84443 ASSAY THYROID STIM HORMONE: CPT | Performed by: EMERGENCY MEDICINE

## 2021-10-19 PROCEDURE — 85049 AUTOMATED PLATELET COUNT: CPT | Performed by: PHYSICIAN ASSISTANT

## 2021-10-19 PROCEDURE — 87040 BLOOD CULTURE FOR BACTERIA: CPT | Performed by: EMERGENCY MEDICINE

## 2021-10-19 PROCEDURE — 36415 COLL VENOUS BLD VENIPUNCTURE: CPT | Performed by: EMERGENCY MEDICINE

## 2021-10-19 PROCEDURE — 82077 ASSAY SPEC XCP UR&BREATH IA: CPT | Performed by: EMERGENCY MEDICINE

## 2021-10-19 PROCEDURE — 85025 COMPLETE CBC W/AUTO DIFF WBC: CPT | Performed by: EMERGENCY MEDICINE

## 2021-10-19 PROCEDURE — 99291 CRITICAL CARE FIRST HOUR: CPT | Performed by: PHYSICIAN ASSISTANT

## 2021-10-19 PROCEDURE — 82948 REAGENT STRIP/BLOOD GLUCOSE: CPT

## 2021-10-19 PROCEDURE — 84439 ASSAY OF FREE THYROXINE: CPT | Performed by: EMERGENCY MEDICINE

## 2021-10-19 PROCEDURE — 70496 CT ANGIOGRAPHY HEAD: CPT

## 2021-10-19 PROCEDURE — 85730 THROMBOPLASTIN TIME PARTIAL: CPT | Performed by: EMERGENCY MEDICINE

## 2021-10-19 PROCEDURE — 80179 DRUG ASSAY SALICYLATE: CPT | Performed by: EMERGENCY MEDICINE

## 2021-10-19 PROCEDURE — 94002 VENT MGMT INPAT INIT DAY: CPT

## 2021-10-19 PROCEDURE — 5A1935Z RESPIRATORY VENTILATION, LESS THAN 24 CONSECUTIVE HOURS: ICD-10-PCS | Performed by: INTERNAL MEDICINE

## 2021-10-19 PROCEDURE — 80164 ASSAY DIPROPYLACETIC ACD TOT: CPT | Performed by: EMERGENCY MEDICINE

## 2021-10-19 PROCEDURE — 80053 COMPREHEN METABOLIC PANEL: CPT | Performed by: EMERGENCY MEDICINE

## 2021-10-19 PROCEDURE — 85610 PROTHROMBIN TIME: CPT | Performed by: EMERGENCY MEDICINE

## 2021-10-19 PROCEDURE — 70498 CT ANGIOGRAPHY NECK: CPT

## 2021-10-19 PROCEDURE — 0BH17EZ INSERTION OF ENDOTRACHEAL AIRWAY INTO TRACHEA, VIA NATURAL OR ARTIFICIAL OPENING: ICD-10-PCS | Performed by: INTERNAL MEDICINE

## 2021-10-19 PROCEDURE — 83605 ASSAY OF LACTIC ACID: CPT | Performed by: EMERGENCY MEDICINE

## 2021-10-19 PROCEDURE — 81001 URINALYSIS AUTO W/SCOPE: CPT | Performed by: EMERGENCY MEDICINE

## 2021-10-19 PROCEDURE — 96375 TX/PRO/DX INJ NEW DRUG ADDON: CPT

## 2021-10-19 PROCEDURE — 99291 CRITICAL CARE FIRST HOUR: CPT

## 2021-10-19 PROCEDURE — 99292 CRITICAL CARE ADDL 30 MIN: CPT | Performed by: ANESTHESIOLOGY

## 2021-10-19 PROCEDURE — 84145 PROCALCITONIN (PCT): CPT | Performed by: EMERGENCY MEDICINE

## 2021-10-19 PROCEDURE — 96374 THER/PROPH/DIAG INJ IV PUSH: CPT

## 2021-10-19 PROCEDURE — 71045 X-RAY EXAM CHEST 1 VIEW: CPT

## 2021-10-19 PROCEDURE — 94760 N-INVAS EAR/PLS OXIMETRY 1: CPT

## 2021-10-19 PROCEDURE — 80143 DRUG ASSAY ACETAMINOPHEN: CPT | Performed by: EMERGENCY MEDICINE

## 2021-10-19 PROCEDURE — 99291 CRITICAL CARE FIRST HOUR: CPT | Performed by: EMERGENCY MEDICINE

## 2021-10-19 PROCEDURE — 93005 ELECTROCARDIOGRAM TRACING: CPT

## 2021-10-19 RX ORDER — PROPOFOL 10 MG/ML
5-50 INJECTION, EMULSION INTRAVENOUS
Status: DISCONTINUED | OUTPATIENT
Start: 2021-10-19 | End: 2021-10-20

## 2021-10-19 RX ORDER — CHLORHEXIDINE GLUCONATE 0.12 MG/ML
15 RINSE ORAL EVERY 12 HOURS SCHEDULED
Status: DISCONTINUED | OUTPATIENT
Start: 2021-10-19 | End: 2021-10-20

## 2021-10-19 RX ORDER — MIDAZOLAM HYDROCHLORIDE 2 MG/2ML
INJECTION, SOLUTION INTRAMUSCULAR; INTRAVENOUS
Status: COMPLETED
Start: 2021-10-19 | End: 2021-10-19

## 2021-10-19 RX ORDER — DEXMEDETOMIDINE 100 UG/ML
.1-.7 INJECTION, SOLUTION, CONCENTRATE INTRAVENOUS
Status: DISCONTINUED | OUTPATIENT
Start: 2021-10-19 | End: 2021-10-20

## 2021-10-19 RX ORDER — ONDANSETRON 2 MG/ML
INJECTION INTRAMUSCULAR; INTRAVENOUS
Status: COMPLETED
Start: 2021-10-19 | End: 2021-10-19

## 2021-10-19 RX ORDER — SUCCINYLCHOLINE/SOD CL,ISO/PF 100 MG/5ML
120 SYRINGE (ML) INTRAVENOUS ONCE
Status: COMPLETED | OUTPATIENT
Start: 2021-10-19 | End: 2021-10-19

## 2021-10-19 RX ORDER — ETOMIDATE 2 MG/ML
20 INJECTION INTRAVENOUS ONCE
Status: COMPLETED | OUTPATIENT
Start: 2021-10-19 | End: 2021-10-19

## 2021-10-19 RX ORDER — DIVALPROEX SODIUM 125 MG/1
500 CAPSULE, COATED PELLETS ORAL EVERY 12 HOURS SCHEDULED
Status: DISCONTINUED | OUTPATIENT
Start: 2021-10-19 | End: 2021-10-20

## 2021-10-19 RX ORDER — ONDANSETRON 2 MG/ML
4 INJECTION INTRAMUSCULAR; INTRAVENOUS ONCE
Status: COMPLETED | OUTPATIENT
Start: 2021-10-19 | End: 2021-10-19

## 2021-10-19 RX ORDER — LORAZEPAM 2 MG/ML
INJECTION INTRAMUSCULAR
Status: DISPENSED
Start: 2021-10-19 | End: 2021-10-20

## 2021-10-19 RX ORDER — PROPOFOL 10 MG/ML
100 INJECTION, EMULSION INTRAVENOUS ONCE
Status: COMPLETED | OUTPATIENT
Start: 2021-10-19 | End: 2021-10-19

## 2021-10-19 RX ORDER — MIDAZOLAM HYDROCHLORIDE 2 MG/2ML
6 INJECTION, SOLUTION INTRAMUSCULAR; INTRAVENOUS ONCE
Status: COMPLETED | OUTPATIENT
Start: 2021-10-19 | End: 2021-10-19

## 2021-10-19 RX ORDER — GABAPENTIN 300 MG/1
300 CAPSULE ORAL 3 TIMES DAILY
Status: DISCONTINUED | OUTPATIENT
Start: 2021-10-19 | End: 2021-10-21

## 2021-10-19 RX ORDER — LORAZEPAM 2 MG/ML
4 INJECTION INTRAMUSCULAR ONCE
Status: COMPLETED | OUTPATIENT
Start: 2021-10-19 | End: 2021-10-19

## 2021-10-19 RX ORDER — LORAZEPAM 2 MG/ML
1 INJECTION INTRAMUSCULAR ONCE
Status: COMPLETED | OUTPATIENT
Start: 2021-10-19 | End: 2021-10-19

## 2021-10-19 RX ORDER — AMOXICILLIN AND CLAVULANATE POTASSIUM 875; 125 MG/1; MG/1
1 TABLET, FILM COATED ORAL EVERY 12 HOURS SCHEDULED
Status: COMPLETED | OUTPATIENT
Start: 2021-10-19 | End: 2021-10-21

## 2021-10-19 RX ADMIN — INSULIN LISPRO 3 UNITS: 100 INJECTION, SOLUTION INTRAVENOUS; SUBCUTANEOUS at 21:46

## 2021-10-19 RX ADMIN — Medication 120 MG: at 17:39

## 2021-10-19 RX ADMIN — AMOXICILLIN AND CLAVULANATE POTASSIUM 1 TABLET: 875; 125 TABLET, FILM COATED ORAL at 22:05

## 2021-10-19 RX ADMIN — ETOMIDATE 20 MG: 20 INJECTION, SOLUTION INTRAVENOUS at 17:39

## 2021-10-19 RX ADMIN — DIVALPROEX SODIUM 500 MG: 125 CAPSULE, COATED PELLETS ORAL at 21:45

## 2021-10-19 RX ADMIN — GABAPENTIN 300 MG: 300 CAPSULE ORAL at 21:45

## 2021-10-19 RX ADMIN — SODIUM CHLORIDE 0.4 MCG/KG/HR: 9 INJECTION, SOLUTION INTRAVENOUS at 20:29

## 2021-10-19 RX ADMIN — MIDAZOLAM HYDROCHLORIDE 6 MG: 1 INJECTION, SOLUTION INTRAMUSCULAR; INTRAVENOUS at 18:26

## 2021-10-19 RX ADMIN — IOHEXOL 85 ML: 350 INJECTION, SOLUTION INTRAVENOUS at 18:13

## 2021-10-19 RX ADMIN — MIDAZOLAM HYDROCHLORIDE 6 MG: 2 INJECTION, SOLUTION INTRAMUSCULAR; INTRAVENOUS at 18:26

## 2021-10-19 RX ADMIN — PROPOFOL 100 MG: 10 INJECTION, EMULSION INTRAVENOUS at 18:00

## 2021-10-19 RX ADMIN — PROPOFOL 40 MCG/KG/MIN: 10 INJECTION, EMULSION INTRAVENOUS at 17:47

## 2021-10-19 RX ADMIN — PROPOFOL 50 MCG/KG/MIN: 10 INJECTION, EMULSION INTRAVENOUS at 19:58

## 2021-10-19 RX ADMIN — LORAZEPAM 4 MG: 2 INJECTION INTRAMUSCULAR; INTRAVENOUS at 17:34

## 2021-10-19 RX ADMIN — CHLORHEXIDINE GLUCONATE 15 ML: 1.2 SOLUTION ORAL at 21:45

## 2021-10-19 RX ADMIN — LEVETIRACETAM 3000 MG: 100 INJECTION, SOLUTION INTRAVENOUS at 18:22

## 2021-10-19 RX ADMIN — ONDANSETRON 4 MG: 2 INJECTION INTRAMUSCULAR; INTRAVENOUS at 17:49

## 2021-10-19 RX ADMIN — PROPOFOL 100 MG: 10 INJECTION, EMULSION INTRAVENOUS at 17:45

## 2021-10-20 PROBLEM — E78.5 HYPERLIPIDEMIA: Status: ACTIVE | Noted: 2020-09-22

## 2021-10-20 LAB
ALBUMIN SERPL BCP-MCNC: 3 G/DL (ref 3.5–5)
ALP SERPL-CCNC: 71 U/L (ref 46–116)
ALT SERPL W P-5'-P-CCNC: 52 U/L (ref 12–78)
AMPHETAMINES SERPL QL SCN: NEGATIVE
ANION GAP SERPL CALCULATED.3IONS-SCNC: 4 MMOL/L (ref 4–13)
AST SERPL W P-5'-P-CCNC: 39 U/L (ref 5–45)
ATRIAL RATE: 77 BPM
BARBITURATES UR QL: NEGATIVE
BASOPHILS # BLD AUTO: 0.08 THOUSANDS/ΜL (ref 0–0.1)
BASOPHILS NFR BLD AUTO: 1 % (ref 0–1)
BENZODIAZ UR QL: POSITIVE
BILIRUB SERPL-MCNC: 0.49 MG/DL (ref 0.2–1)
BUN SERPL-MCNC: 14 MG/DL (ref 5–25)
CALCIUM ALBUM COR SERPL-MCNC: 9.2 MG/DL (ref 8.3–10.1)
CALCIUM SERPL-MCNC: 8.4 MG/DL (ref 8.3–10.1)
CHLORIDE SERPL-SCNC: 106 MMOL/L (ref 100–108)
CO2 SERPL-SCNC: 28 MMOL/L (ref 21–32)
COCAINE UR QL: NEGATIVE
CREAT SERPL-MCNC: 1.16 MG/DL (ref 0.6–1.3)
EOSINOPHIL # BLD AUTO: 0.18 THOUSAND/ΜL (ref 0–0.61)
EOSINOPHIL NFR BLD AUTO: 2 % (ref 0–6)
ERYTHROCYTE [DISTWIDTH] IN BLOOD BY AUTOMATED COUNT: 13.4 % (ref 11.6–15.1)
GFR SERPL CREATININE-BSD FRML MDRD: 80 ML/MIN/1.73SQ M
GLUCOSE SERPL-MCNC: 143 MG/DL (ref 65–140)
GLUCOSE SERPL-MCNC: 207 MG/DL (ref 65–140)
GLUCOSE SERPL-MCNC: 219 MG/DL (ref 65–140)
GLUCOSE SERPL-MCNC: 220 MG/DL (ref 65–140)
GLUCOSE SERPL-MCNC: 224 MG/DL (ref 65–140)
GLUCOSE SERPL-MCNC: 228 MG/DL (ref 65–140)
GLUCOSE SERPL-MCNC: 231 MG/DL (ref 65–140)
GLUCOSE SERPL-MCNC: 247 MG/DL (ref 65–140)
HCT VFR BLD AUTO: 40.9 % (ref 36.5–49.3)
HGB BLD-MCNC: 13.3 G/DL (ref 12–17)
IMM GRANULOCYTES # BLD AUTO: 0.08 THOUSAND/UL (ref 0–0.2)
IMM GRANULOCYTES NFR BLD AUTO: 1 % (ref 0–2)
LYMPHOCYTES # BLD AUTO: 2.12 THOUSANDS/ΜL (ref 0.6–4.47)
LYMPHOCYTES NFR BLD AUTO: 20 % (ref 14–44)
MAGNESIUM SERPL-MCNC: 1.9 MG/DL (ref 1.6–2.6)
MCH RBC QN AUTO: 28.3 PG (ref 26.8–34.3)
MCHC RBC AUTO-ENTMCNC: 32.5 G/DL (ref 31.4–37.4)
MCV RBC AUTO: 87 FL (ref 82–98)
METHADONE UR QL: NEGATIVE
MONOCYTES # BLD AUTO: 1.05 THOUSAND/ΜL (ref 0.17–1.22)
MONOCYTES NFR BLD AUTO: 10 % (ref 4–12)
NEUTROPHILS # BLD AUTO: 7.23 THOUSANDS/ΜL (ref 1.85–7.62)
NEUTS SEG NFR BLD AUTO: 66 % (ref 43–75)
NRBC BLD AUTO-RTO: 0 /100 WBCS
OPIATES UR QL SCN: NEGATIVE
OXYCODONE+OXYMORPHONE UR QL SCN: NEGATIVE
P AXIS: 52 DEGREES
PCP UR QL: NEGATIVE
PHOSPHATE SERPL-MCNC: 2.3 MG/DL (ref 2.7–4.5)
PLATELET # BLD AUTO: 187 THOUSANDS/UL (ref 149–390)
PMV BLD AUTO: 9.4 FL (ref 8.9–12.7)
POTASSIUM SERPL-SCNC: 4 MMOL/L (ref 3.5–5.3)
PR INTERVAL: 142 MS
PROCALCITONIN SERPL-MCNC: <0.05 NG/ML
PROT SERPL-MCNC: 6.3 G/DL (ref 6.4–8.2)
QRS AXIS: 47 DEGREES
QRSD INTERVAL: 86 MS
QT INTERVAL: 364 MS
QTC INTERVAL: 411 MS
RBC # BLD AUTO: 4.7 MILLION/UL (ref 3.88–5.62)
SODIUM SERPL-SCNC: 138 MMOL/L (ref 136–145)
T WAVE AXIS: 14 DEGREES
THC UR QL: NEGATIVE
TROPONIN I SERPL-MCNC: <0.02 NG/ML
VENTRICULAR RATE: 77 BPM
WBC # BLD AUTO: 10.74 THOUSAND/UL (ref 4.31–10.16)

## 2021-10-20 PROCEDURE — 94760 N-INVAS EAR/PLS OXIMETRY 1: CPT

## 2021-10-20 PROCEDURE — 93005 ELECTROCARDIOGRAM TRACING: CPT

## 2021-10-20 PROCEDURE — 80307 DRUG TEST PRSMV CHEM ANLYZR: CPT | Performed by: PHYSICIAN ASSISTANT

## 2021-10-20 PROCEDURE — 82948 REAGENT STRIP/BLOOD GLUCOSE: CPT

## 2021-10-20 PROCEDURE — 84100 ASSAY OF PHOSPHORUS: CPT | Performed by: PHYSICIAN ASSISTANT

## 2021-10-20 PROCEDURE — 80053 COMPREHEN METABOLIC PANEL: CPT | Performed by: PHYSICIAN ASSISTANT

## 2021-10-20 PROCEDURE — 93010 ELECTROCARDIOGRAM REPORT: CPT | Performed by: INTERNAL MEDICINE

## 2021-10-20 PROCEDURE — 84145 PROCALCITONIN (PCT): CPT | Performed by: EMERGENCY MEDICINE

## 2021-10-20 PROCEDURE — NC001 PR NO CHARGE: Performed by: INTERNAL MEDICINE

## 2021-10-20 PROCEDURE — 85025 COMPLETE CBC W/AUTO DIFF WBC: CPT | Performed by: PHYSICIAN ASSISTANT

## 2021-10-20 PROCEDURE — 83735 ASSAY OF MAGNESIUM: CPT | Performed by: PHYSICIAN ASSISTANT

## 2021-10-20 PROCEDURE — 84484 ASSAY OF TROPONIN QUANT: CPT | Performed by: INTERNAL MEDICINE

## 2021-10-20 PROCEDURE — NC001 PR NO CHARGE: Performed by: PHYSICIAN ASSISTANT

## 2021-10-20 PROCEDURE — 99233 SBSQ HOSP IP/OBS HIGH 50: CPT | Performed by: PHYSICIAN ASSISTANT

## 2021-10-20 RX ORDER — FENTANYL CITRATE 50 UG/ML
50 INJECTION, SOLUTION INTRAMUSCULAR; INTRAVENOUS ONCE
Status: COMPLETED | OUTPATIENT
Start: 2021-10-20 | End: 2021-10-20

## 2021-10-20 RX ORDER — PRAVASTATIN SODIUM 40 MG
40 TABLET ORAL
Status: DISCONTINUED | OUTPATIENT
Start: 2021-10-20 | End: 2021-10-21 | Stop reason: HOSPADM

## 2021-10-20 RX ORDER — DIVALPROEX SODIUM 125 MG/1
500 CAPSULE, COATED PELLETS ORAL EVERY 12 HOURS SCHEDULED
Status: DISCONTINUED | OUTPATIENT
Start: 2021-10-20 | End: 2021-10-21 | Stop reason: HOSPADM

## 2021-10-20 RX ORDER — SODIUM CHLORIDE, SODIUM GLUCONATE, SODIUM ACETATE, POTASSIUM CHLORIDE, MAGNESIUM CHLORIDE, SODIUM PHOSPHATE, DIBASIC, AND POTASSIUM PHOSPHATE .53; .5; .37; .037; .03; .012; .00082 G/100ML; G/100ML; G/100ML; G/100ML; G/100ML; G/100ML; G/100ML
1000 INJECTION, SOLUTION INTRAVENOUS ONCE
Status: COMPLETED | OUTPATIENT
Start: 2021-10-20 | End: 2021-10-20

## 2021-10-20 RX ORDER — LEVOTHYROXINE SODIUM 0.03 MG/1
25 TABLET ORAL
Status: DISCONTINUED | OUTPATIENT
Start: 2021-10-20 | End: 2021-10-21 | Stop reason: HOSPADM

## 2021-10-20 RX ORDER — METOPROLOL SUCCINATE 25 MG/1
25 TABLET, EXTENDED RELEASE ORAL DAILY
Status: DISCONTINUED | OUTPATIENT
Start: 2021-10-20 | End: 2021-10-20

## 2021-10-20 RX ORDER — INSULIN GLARGINE 100 [IU]/ML
40 INJECTION, SOLUTION SUBCUTANEOUS EVERY MORNING
Status: DISCONTINUED | OUTPATIENT
Start: 2021-10-20 | End: 2021-10-21

## 2021-10-20 RX ORDER — FLUTICASONE FUROATE AND VILANTEROL 200; 25 UG/1; UG/1
1 POWDER RESPIRATORY (INHALATION) DAILY
Status: DISCONTINUED | OUTPATIENT
Start: 2021-10-20 | End: 2021-10-21 | Stop reason: HOSPADM

## 2021-10-20 RX ORDER — FENTANYL CITRATE 50 UG/ML
INJECTION, SOLUTION INTRAMUSCULAR; INTRAVENOUS
Status: COMPLETED
Start: 2021-10-20 | End: 2021-10-20

## 2021-10-20 RX ORDER — METOPROLOL SUCCINATE 50 MG/1
50 TABLET, EXTENDED RELEASE ORAL DAILY
Status: DISCONTINUED | OUTPATIENT
Start: 2021-10-21 | End: 2021-10-21 | Stop reason: HOSPADM

## 2021-10-20 RX ORDER — ALBUTEROL SULFATE 90 UG/1
2 AEROSOL, METERED RESPIRATORY (INHALATION) EVERY 4 HOURS PRN
Status: DISCONTINUED | OUTPATIENT
Start: 2021-10-20 | End: 2021-10-21 | Stop reason: HOSPADM

## 2021-10-20 RX ORDER — INSULIN GLARGINE 100 [IU]/ML
40 INJECTION, SOLUTION SUBCUTANEOUS
Status: DISCONTINUED | OUTPATIENT
Start: 2021-10-20 | End: 2021-10-20

## 2021-10-20 RX ADMIN — GABAPENTIN 300 MG: 300 CAPSULE ORAL at 08:15

## 2021-10-20 RX ADMIN — DIVALPROEX SODIUM 500 MG: 125 CAPSULE, COATED PELLETS ORAL at 21:10

## 2021-10-20 RX ADMIN — DIVALPROEX SODIUM 500 MG: 125 CAPSULE, COATED PELLETS ORAL at 08:29

## 2021-10-20 RX ADMIN — SODIUM CHLORIDE 2 UNITS/HR: 9 INJECTION, SOLUTION INTRAVENOUS at 06:16

## 2021-10-20 RX ADMIN — SERTRALINE HYDROCHLORIDE 150 MG: 50 TABLET ORAL at 08:15

## 2021-10-20 RX ADMIN — SODIUM CHLORIDE 0.7 MCG/KG/HR: 9 INJECTION, SOLUTION INTRAVENOUS at 04:07

## 2021-10-20 RX ADMIN — FLUTICASONE FUROATE AND VILANTEROL TRIFENATATE 1 PUFF: 200; 25 POWDER RESPIRATORY (INHALATION) at 11:28

## 2021-10-20 RX ADMIN — PRAVASTATIN SODIUM 40 MG: 40 TABLET ORAL at 16:38

## 2021-10-20 RX ADMIN — AMOXICILLIN AND CLAVULANATE POTASSIUM 1 TABLET: 875; 125 TABLET, FILM COATED ORAL at 21:10

## 2021-10-20 RX ADMIN — INSULIN LISPRO 4 UNITS: 100 INJECTION, SOLUTION INTRAVENOUS; SUBCUTANEOUS at 17:24

## 2021-10-20 RX ADMIN — GABAPENTIN 300 MG: 300 CAPSULE ORAL at 16:38

## 2021-10-20 RX ADMIN — ENOXAPARIN SODIUM 40 MG: 40 INJECTION SUBCUTANEOUS at 08:15

## 2021-10-20 RX ADMIN — GABAPENTIN 300 MG: 300 CAPSULE ORAL at 21:10

## 2021-10-20 RX ADMIN — AMOXICILLIN AND CLAVULANATE POTASSIUM 1 TABLET: 875; 125 TABLET, FILM COATED ORAL at 11:28

## 2021-10-20 RX ADMIN — INSULIN GLARGINE 40 UNITS: 100 INJECTION, SOLUTION SUBCUTANEOUS at 10:29

## 2021-10-20 RX ADMIN — Medication 20 MG: at 08:15

## 2021-10-20 RX ADMIN — FENTANYL CITRATE 50 MCG: 50 INJECTION INTRAMUSCULAR; INTRAVENOUS at 01:52

## 2021-10-20 RX ADMIN — FENTANYL CITRATE 50 MCG: 50 INJECTION, SOLUTION INTRAMUSCULAR; INTRAVENOUS at 01:52

## 2021-10-20 RX ADMIN — SODIUM CHLORIDE, SODIUM GLUCONATE, SODIUM ACETATE, POTASSIUM CHLORIDE, MAGNESIUM CHLORIDE, SODIUM PHOSPHATE, DIBASIC, AND POTASSIUM PHOSPHATE 1000 ML: .53; .5; .37; .037; .03; .012; .00082 INJECTION, SOLUTION INTRAVENOUS at 04:07

## 2021-10-20 RX ADMIN — INSULIN LISPRO 2 UNITS: 100 INJECTION, SOLUTION INTRAVENOUS; SUBCUTANEOUS at 12:09

## 2021-10-20 RX ADMIN — CHLORHEXIDINE GLUCONATE 15 ML: 1.2 SOLUTION ORAL at 08:15

## 2021-10-20 RX ADMIN — POTASSIUM & SODIUM PHOSPHATES POWDER PACK 280-160-250 MG 2 PACKET: 280-160-250 PACK at 08:15

## 2021-10-20 RX ADMIN — LEVOTHYROXINE SODIUM 25 MCG: 25 TABLET ORAL at 11:28

## 2021-10-21 VITALS
TEMPERATURE: 99 F | SYSTOLIC BLOOD PRESSURE: 99 MMHG | HEART RATE: 93 BPM | OXYGEN SATURATION: 95 % | DIASTOLIC BLOOD PRESSURE: 64 MMHG | WEIGHT: 192.4 LBS | RESPIRATION RATE: 23 BRPM | HEIGHT: 65 IN | BODY MASS INDEX: 32.06 KG/M2

## 2021-10-21 PROBLEM — F44.5 PSYCHOGENIC NONEPILEPTIC SEIZURE: Status: ACTIVE | Noted: 2021-10-19

## 2021-10-21 PROBLEM — R51.9 CHRONIC HEADACHE: Status: ACTIVE | Noted: 2021-10-21

## 2021-10-21 PROBLEM — G40.909 SEIZURE DISORDER (HCC): Chronic | Status: RESOLVED | Noted: 2021-10-19 | Resolved: 2021-10-21

## 2021-10-21 PROBLEM — G89.29 CHRONIC HEADACHE: Status: ACTIVE | Noted: 2021-10-21

## 2021-10-21 LAB
ANION GAP SERPL CALCULATED.3IONS-SCNC: 4 MMOL/L (ref 4–13)
ATRIAL RATE: 101 BPM
BASOPHILS # BLD AUTO: 0.05 THOUSANDS/ΜL (ref 0–0.1)
BASOPHILS NFR BLD AUTO: 0 % (ref 0–1)
BUN SERPL-MCNC: 9 MG/DL (ref 5–25)
CALCIUM SERPL-MCNC: 8.6 MG/DL (ref 8.3–10.1)
CHLORIDE SERPL-SCNC: 103 MMOL/L (ref 100–108)
CO2 SERPL-SCNC: 27 MMOL/L (ref 21–32)
CREAT SERPL-MCNC: 1 MG/DL (ref 0.6–1.3)
EOSINOPHIL # BLD AUTO: 0.16 THOUSAND/ΜL (ref 0–0.61)
EOSINOPHIL NFR BLD AUTO: 1 % (ref 0–6)
ERYTHROCYTE [DISTWIDTH] IN BLOOD BY AUTOMATED COUNT: 13.5 % (ref 11.6–15.1)
GFR SERPL CREATININE-BSD FRML MDRD: 96 ML/MIN/1.73SQ M
GLUCOSE SERPL-MCNC: 139 MG/DL (ref 65–140)
GLUCOSE SERPL-MCNC: 192 MG/DL (ref 65–140)
GLUCOSE SERPL-MCNC: 205 MG/DL (ref 65–140)
HBV SURFACE AG SER QL: NORMAL
HCT VFR BLD AUTO: 38.6 % (ref 36.5–49.3)
HCV AB SER QL: NORMAL
HGB BLD-MCNC: 12.7 G/DL (ref 12–17)
HIV 1+2 AB+HIV1 P24 AG SERPL QL IA: NORMAL
HIV1 P24 AG SER QL: NORMAL
IMM GRANULOCYTES # BLD AUTO: 0.1 THOUSAND/UL (ref 0–0.2)
IMM GRANULOCYTES NFR BLD AUTO: 1 % (ref 0–2)
LYMPHOCYTES # BLD AUTO: 1.7 THOUSANDS/ΜL (ref 0.6–4.47)
LYMPHOCYTES NFR BLD AUTO: 14 % (ref 14–44)
MCH RBC QN AUTO: 28.9 PG (ref 26.8–34.3)
MCHC RBC AUTO-ENTMCNC: 32.9 G/DL (ref 31.4–37.4)
MCV RBC AUTO: 88 FL (ref 82–98)
MONOCYTES # BLD AUTO: 1.51 THOUSAND/ΜL (ref 0.17–1.22)
MONOCYTES NFR BLD AUTO: 13 % (ref 4–12)
NEUTROPHILS # BLD AUTO: 8.47 THOUSANDS/ΜL (ref 1.85–7.62)
NEUTS SEG NFR BLD AUTO: 71 % (ref 43–75)
NRBC BLD AUTO-RTO: 0 /100 WBCS
P AXIS: 50 DEGREES
PHOSPHATE SERPL-MCNC: 2.9 MG/DL (ref 2.7–4.5)
PLATELET # BLD AUTO: 201 THOUSANDS/UL (ref 149–390)
PMV BLD AUTO: 9.4 FL (ref 8.9–12.7)
POTASSIUM SERPL-SCNC: 3.6 MMOL/L (ref 3.5–5.3)
PR INTERVAL: 144 MS
QRS AXIS: 9 DEGREES
QRSD INTERVAL: 90 MS
QT INTERVAL: 336 MS
QTC INTERVAL: 435 MS
RBC # BLD AUTO: 4.4 MILLION/UL (ref 3.88–5.62)
SODIUM SERPL-SCNC: 134 MMOL/L (ref 136–145)
T WAVE AXIS: 10 DEGREES
VENTRICULAR RATE: 101 BPM
WBC # BLD AUTO: 11.99 THOUSAND/UL (ref 4.31–10.16)

## 2021-10-21 PROCEDURE — 84100 ASSAY OF PHOSPHORUS: CPT | Performed by: PSYCHIATRY & NEUROLOGY

## 2021-10-21 PROCEDURE — 82948 REAGENT STRIP/BLOOD GLUCOSE: CPT

## 2021-10-21 PROCEDURE — 99222 1ST HOSP IP/OBS MODERATE 55: CPT | Performed by: PHYSICIAN ASSISTANT

## 2021-10-21 PROCEDURE — 97162 PT EVAL MOD COMPLEX 30 MIN: CPT

## 2021-10-21 PROCEDURE — 80048 BASIC METABOLIC PNL TOTAL CA: CPT | Performed by: PSYCHIATRY & NEUROLOGY

## 2021-10-21 PROCEDURE — 99239 HOSP IP/OBS DSCHRG MGMT >30: CPT | Performed by: INTERNAL MEDICINE

## 2021-10-21 PROCEDURE — 93010 ELECTROCARDIOGRAM REPORT: CPT | Performed by: INTERNAL MEDICINE

## 2021-10-21 PROCEDURE — 87340 HEPATITIS B SURFACE AG IA: CPT | Performed by: INTERNAL MEDICINE

## 2021-10-21 PROCEDURE — 86803 HEPATITIS C AB TEST: CPT | Performed by: INTERNAL MEDICINE

## 2021-10-21 PROCEDURE — 85025 COMPLETE CBC W/AUTO DIFF WBC: CPT | Performed by: PSYCHIATRY & NEUROLOGY

## 2021-10-21 PROCEDURE — 87806 HIV AG W/HIV1&2 ANTB W/OPTIC: CPT | Performed by: INTERNAL MEDICINE

## 2021-10-21 RX ORDER — FAMOTIDINE 20 MG/1
20 TABLET, FILM COATED ORAL 2 TIMES DAILY
Status: DISCONTINUED | OUTPATIENT
Start: 2021-10-21 | End: 2021-10-21 | Stop reason: HOSPADM

## 2021-10-21 RX ORDER — ACETAMINOPHEN 325 MG/1
650 TABLET ORAL EVERY 6 HOURS PRN
Status: DISCONTINUED | OUTPATIENT
Start: 2021-10-21 | End: 2021-10-21 | Stop reason: HOSPADM

## 2021-10-21 RX ORDER — POTASSIUM CHLORIDE 20 MEQ/1
40 TABLET, EXTENDED RELEASE ORAL ONCE
Status: COMPLETED | OUTPATIENT
Start: 2021-10-21 | End: 2021-10-21

## 2021-10-21 RX ORDER — INSULIN GLARGINE 100 [IU]/ML
30 INJECTION, SOLUTION SUBCUTANEOUS 2 TIMES DAILY
Qty: 60 ML | Refills: 0 | Status: SHIPPED | OUTPATIENT
Start: 2021-10-21 | End: 2022-07-06 | Stop reason: SDUPTHER

## 2021-10-21 RX ORDER — ONDANSETRON 2 MG/ML
4 INJECTION INTRAMUSCULAR; INTRAVENOUS EVERY 6 HOURS PRN
Status: DISCONTINUED | OUTPATIENT
Start: 2021-10-21 | End: 2021-10-21 | Stop reason: HOSPADM

## 2021-10-21 RX ORDER — INSULIN GLARGINE 100 [IU]/ML
50 INJECTION, SOLUTION SUBCUTANEOUS EVERY MORNING
Status: DISCONTINUED | OUTPATIENT
Start: 2021-10-21 | End: 2021-10-21 | Stop reason: HOSPADM

## 2021-10-21 RX ORDER — SENNOSIDES 8.6 MG
1 TABLET ORAL
Status: DISCONTINUED | OUTPATIENT
Start: 2021-10-21 | End: 2021-10-21 | Stop reason: HOSPADM

## 2021-10-21 RX ORDER — INSULIN ASPART 100 [IU]/ML
INJECTION, SOLUTION INTRAVENOUS; SUBCUTANEOUS
Qty: 45 ML | Refills: 0 | Status: SHIPPED | OUTPATIENT
Start: 2021-10-21 | End: 2022-07-05 | Stop reason: SDUPTHER

## 2021-10-21 RX ORDER — GABAPENTIN 300 MG/1
300 CAPSULE ORAL 3 TIMES DAILY
Status: DISCONTINUED | OUTPATIENT
Start: 2021-10-21 | End: 2021-10-21 | Stop reason: HOSPADM

## 2021-10-21 RX ORDER — METHOCARBAMOL 500 MG/1
500 TABLET, FILM COATED ORAL EVERY 6 HOURS PRN
Status: DISCONTINUED | OUTPATIENT
Start: 2021-10-21 | End: 2021-10-21 | Stop reason: HOSPADM

## 2021-10-21 RX ORDER — POLYETHYLENE GLYCOL 3350 17 G/17G
17 POWDER, FOR SOLUTION ORAL DAILY PRN
Status: DISCONTINUED | OUTPATIENT
Start: 2021-10-21 | End: 2021-10-21 | Stop reason: HOSPADM

## 2021-10-21 RX ADMIN — FAMOTIDINE 20 MG: 20 TABLET ORAL at 08:27

## 2021-10-21 RX ADMIN — POTASSIUM CHLORIDE 40 MEQ: 1500 TABLET, EXTENDED RELEASE ORAL at 08:28

## 2021-10-21 RX ADMIN — ACETAMINOPHEN 650 MG: 325 TABLET, FILM COATED ORAL at 10:31

## 2021-10-21 RX ADMIN — DIVALPROEX SODIUM 500 MG: 125 CAPSULE, COATED PELLETS ORAL at 08:26

## 2021-10-21 RX ADMIN — INSULIN GLARGINE 50 UNITS: 100 INJECTION, SOLUTION SUBCUTANEOUS at 08:28

## 2021-10-21 RX ADMIN — SERTRALINE HYDROCHLORIDE 150 MG: 50 TABLET ORAL at 08:27

## 2021-10-21 RX ADMIN — INSULIN LISPRO 4 UNITS: 100 INJECTION, SOLUTION INTRAVENOUS; SUBCUTANEOUS at 07:16

## 2021-10-21 RX ADMIN — AMOXICILLIN AND CLAVULANATE POTASSIUM 1 TABLET: 875; 125 TABLET, FILM COATED ORAL at 08:35

## 2021-10-21 RX ADMIN — ENOXAPARIN SODIUM 40 MG: 40 INJECTION SUBCUTANEOUS at 08:27

## 2021-10-21 RX ADMIN — LEVOTHYROXINE SODIUM 25 MCG: 25 TABLET ORAL at 04:56

## 2021-10-21 RX ADMIN — Medication 1 SPRAY: at 07:17

## 2021-10-21 RX ADMIN — GABAPENTIN 300 MG: 300 CAPSULE ORAL at 08:28

## 2021-10-21 RX ADMIN — INSULIN LISPRO 5 UNITS: 100 INJECTION, SOLUTION INTRAVENOUS; SUBCUTANEOUS at 08:44

## 2021-10-21 RX ADMIN — METHOCARBAMOL 500 MG: 500 TABLET, FILM COATED ORAL at 03:43

## 2021-10-22 ENCOUNTER — TRANSITIONAL CARE MANAGEMENT (OUTPATIENT)
Dept: INTERNAL MEDICINE CLINIC | Facility: CLINIC | Age: 36
End: 2021-10-22

## 2021-10-24 LAB
BACTERIA BLD CULT: NORMAL
BACTERIA BLD CULT: NORMAL

## 2021-10-27 LAB
DME PARACHUTE DELIVERY DATE ACTUAL: NORMAL
DME PARACHUTE DELIVERY DATE REQUESTED: NORMAL
DME PARACHUTE ITEM DESCRIPTION: NORMAL
DME PARACHUTE ORDER STATUS: NORMAL
DME PARACHUTE SUPPLIER NAME: NORMAL
DME PARACHUTE SUPPLIER PHONE: NORMAL

## 2021-11-01 ENCOUNTER — PATIENT OUTREACH (OUTPATIENT)
Dept: INTERNAL MEDICINE CLINIC | Facility: CLINIC | Age: 36
End: 2021-11-01

## 2021-11-03 ENCOUNTER — PATIENT OUTREACH (OUTPATIENT)
Dept: INTERNAL MEDICINE CLINIC | Facility: CLINIC | Age: 36
End: 2021-11-03

## 2021-11-03 DIAGNOSIS — Z71.89 COMPLEX CARE COORDINATION: Primary | ICD-10-CM

## 2021-11-04 ENCOUNTER — OFFICE VISIT (OUTPATIENT)
Dept: INTERNAL MEDICINE CLINIC | Facility: CLINIC | Age: 36
End: 2021-11-04
Payer: MEDICARE

## 2021-11-04 VITALS
TEMPERATURE: 97 F | DIASTOLIC BLOOD PRESSURE: 72 MMHG | HEIGHT: 65 IN | OXYGEN SATURATION: 97 % | WEIGHT: 186.6 LBS | SYSTOLIC BLOOD PRESSURE: 118 MMHG | BODY MASS INDEX: 31.09 KG/M2 | HEART RATE: 81 BPM

## 2021-11-04 DIAGNOSIS — E03.9 HYPOTHYROIDISM, UNSPECIFIED TYPE: Chronic | ICD-10-CM

## 2021-11-04 DIAGNOSIS — E11.9 TYPE 2 DIABETES MELLITUS WITHOUT COMPLICATION, WITH LONG-TERM CURRENT USE OF INSULIN (HCC): ICD-10-CM

## 2021-11-04 DIAGNOSIS — J01.00 ACUTE NON-RECURRENT MAXILLARY SINUSITIS: Primary | ICD-10-CM

## 2021-11-04 DIAGNOSIS — J96.00 ACUTE RESPIRATORY FAILURE, UNSPECIFIED WHETHER WITH HYPOXIA OR HYPERCAPNIA (HCC): ICD-10-CM

## 2021-11-04 DIAGNOSIS — F43.10 PTSD (POST-TRAUMATIC STRESS DISORDER): ICD-10-CM

## 2021-11-04 DIAGNOSIS — F44.5 PSYCHOGENIC NONEPILEPTIC SEIZURE: ICD-10-CM

## 2021-11-04 DIAGNOSIS — R06.2 WHEEZING: ICD-10-CM

## 2021-11-04 DIAGNOSIS — Z79.4 TYPE 2 DIABETES MELLITUS WITHOUT COMPLICATION, WITH LONG-TERM CURRENT USE OF INSULIN (HCC): ICD-10-CM

## 2021-11-04 DIAGNOSIS — M79.672 FOOT PAIN, LEFT: ICD-10-CM

## 2021-11-04 PROBLEM — H66.001 NON-RECURRENT ACUTE SUPPURATIVE OTITIS MEDIA OF RIGHT EAR WITHOUT SPONTANEOUS RUPTURE OF TYMPANIC MEMBRANE: Status: RESOLVED | Noted: 2021-10-13 | Resolved: 2021-11-04

## 2021-11-04 PROBLEM — K62.5 BRIGHT RED BLOOD PER RECTUM: Status: RESOLVED | Noted: 2020-12-11 | Resolved: 2021-11-04

## 2021-11-04 PROBLEM — U09.9 POST-COVID-19 CONDITION: Status: RESOLVED | Noted: 2021-04-28 | Resolved: 2021-11-04

## 2021-11-04 PROBLEM — R07.9 CHEST PAIN: Status: RESOLVED | Noted: 2020-12-11 | Resolved: 2021-11-04

## 2021-11-04 PROBLEM — M62.81 MUSCLE WEAKNESS OF LOWER EXTREMITY: Status: RESOLVED | Noted: 2021-03-30 | Resolved: 2021-11-04

## 2021-11-04 PROBLEM — K62.5 BLOOD PER RECTUM: Status: RESOLVED | Noted: 2021-03-20 | Resolved: 2021-11-04

## 2021-11-04 PROBLEM — R74.01 TRANSAMINITIS: Status: RESOLVED | Noted: 2021-03-18 | Resolved: 2021-11-04

## 2021-11-04 PROCEDURE — 99495 TRANSJ CARE MGMT MOD F2F 14D: CPT | Performed by: INTERNAL MEDICINE

## 2021-11-04 RX ORDER — ALBUTEROL SULFATE 90 UG/1
2 AEROSOL, METERED RESPIRATORY (INHALATION) EVERY 6 HOURS PRN
Qty: 18 G | Refills: 3 | Status: SHIPPED | OUTPATIENT
Start: 2021-11-04

## 2021-11-04 RX ORDER — FLUTICASONE PROPIONATE 50 MCG
1 SPRAY, SUSPENSION (ML) NASAL DAILY
Qty: 18.2 ML | Refills: 1 | Status: SHIPPED | OUTPATIENT
Start: 2021-11-04

## 2021-11-05 ENCOUNTER — TELEPHONE (OUTPATIENT)
Dept: INTERNAL MEDICINE CLINIC | Facility: CLINIC | Age: 36
End: 2021-11-05

## 2021-11-05 ENCOUNTER — TELEPHONE (OUTPATIENT)
Dept: NEUROLOGY | Facility: CLINIC | Age: 36
End: 2021-11-05

## 2021-11-05 ENCOUNTER — PATIENT OUTREACH (OUTPATIENT)
Dept: CASE MANAGEMENT | Facility: OTHER | Age: 36
End: 2021-11-05

## 2021-11-05 DIAGNOSIS — R56.9 SEIZURE (HCC): Primary | ICD-10-CM

## 2021-11-10 ENCOUNTER — TELEPHONE (OUTPATIENT)
Dept: NEUROLOGY | Facility: CLINIC | Age: 36
End: 2021-11-10

## 2021-11-10 ENCOUNTER — OFFICE VISIT (OUTPATIENT)
Dept: NEUROLOGY | Facility: CLINIC | Age: 36
End: 2021-11-10
Payer: MEDICARE

## 2021-11-10 VITALS
DIASTOLIC BLOOD PRESSURE: 71 MMHG | TEMPERATURE: 96.5 F | BODY MASS INDEX: 31.65 KG/M2 | WEIGHT: 190 LBS | SYSTOLIC BLOOD PRESSURE: 121 MMHG | HEART RATE: 97 BPM | HEIGHT: 65 IN

## 2021-11-10 DIAGNOSIS — F44.5 PSYCHOGENIC NONEPILEPTIC SEIZURE: Primary | ICD-10-CM

## 2021-11-10 PROCEDURE — 99215 OFFICE O/P EST HI 40 MIN: CPT | Performed by: PSYCHIATRY & NEUROLOGY

## 2021-11-10 RX ORDER — DIVALPROEX SODIUM 250 MG/1
250 TABLET, DELAYED RELEASE ORAL
COMMUNITY
Start: 2021-11-05 | End: 2021-12-23

## 2021-11-18 ENCOUNTER — PATIENT OUTREACH (OUTPATIENT)
Dept: INTERNAL MEDICINE CLINIC | Facility: CLINIC | Age: 36
End: 2021-11-18

## 2021-11-29 ENCOUNTER — PATIENT OUTREACH (OUTPATIENT)
Dept: INTERNAL MEDICINE CLINIC | Facility: CLINIC | Age: 36
End: 2021-11-29

## 2021-12-08 ENCOUNTER — HOSPITAL ENCOUNTER (OUTPATIENT)
Dept: PULMONOLOGY | Facility: HOSPITAL | Age: 36
Discharge: HOME | End: 2021-12-08
Attending: INTERNAL MEDICINE
Payer: MEDICARE

## 2021-12-08 DIAGNOSIS — U09.9 POST-COVID-19 CONDITION: ICD-10-CM

## 2021-12-08 PROCEDURE — 94726 PLETHYSMOGRAPHY LUNG VOLUMES: CPT | Performed by: INTERNAL MEDICINE

## 2021-12-08 PROCEDURE — 94060 EVALUATION OF WHEEZING: CPT

## 2021-12-08 PROCEDURE — 94060 EVALUATION OF WHEEZING: CPT | Performed by: INTERNAL MEDICINE

## 2021-12-08 PROCEDURE — 94729 DIFFUSING CAPACITY: CPT | Performed by: INTERNAL MEDICINE

## 2021-12-08 PROCEDURE — 94761 N-INVAS EAR/PLS OXIMETRY MLT: CPT

## 2021-12-08 PROCEDURE — 94729 DIFFUSING CAPACITY: CPT

## 2021-12-08 PROCEDURE — 94618 PULMONARY STRESS TESTING: CPT | Performed by: INTERNAL MEDICINE

## 2021-12-08 PROCEDURE — 94726 PLETHYSMOGRAPHY LUNG VOLUMES: CPT

## 2021-12-08 RX ORDER — ALBUTEROL SULFATE 2.5 MG/3ML
2.5 SOLUTION RESPIRATORY (INHALATION) ONCE
Status: COMPLETED | OUTPATIENT
Start: 2021-12-08 | End: 2021-12-08

## 2021-12-08 RX ADMIN — ALBUTEROL SULFATE 2.5 MG: 2.5 SOLUTION RESPIRATORY (INHALATION) at 10:11

## 2021-12-15 ENCOUNTER — TELEMEDICINE (OUTPATIENT)
Dept: INTERNAL MEDICINE CLINIC | Age: 36
End: 2021-12-15
Payer: MEDICARE

## 2021-12-15 VITALS — HEIGHT: 65 IN | BODY MASS INDEX: 31.65 KG/M2 | WEIGHT: 190 LBS

## 2021-12-15 DIAGNOSIS — J45.40 MODERATE PERSISTENT ASTHMA WITHOUT COMPLICATION: ICD-10-CM

## 2021-12-15 DIAGNOSIS — F41.9 ANXIETY: ICD-10-CM

## 2021-12-15 DIAGNOSIS — J01.00 ACUTE NON-RECURRENT MAXILLARY SINUSITIS: Primary | ICD-10-CM

## 2021-12-15 DIAGNOSIS — Z86.16 HISTORY OF COVID-19: ICD-10-CM

## 2021-12-15 PROCEDURE — 99213 OFFICE O/P EST LOW 20 MIN: CPT | Performed by: STUDENT IN AN ORGANIZED HEALTH CARE EDUCATION/TRAINING PROGRAM

## 2021-12-15 RX ORDER — ARIPIPRAZOLE 5 MG/1
20 TABLET ORAL DAILY
COMMUNITY
Start: 2021-12-13

## 2021-12-15 RX ORDER — AZITHROMYCIN 250 MG/1
TABLET, FILM COATED ORAL
Qty: 6 TABLET | Refills: 0 | Status: SHIPPED | OUTPATIENT
Start: 2021-12-15 | End: 2021-12-20

## 2021-12-15 RX ORDER — GUAIFENESIN 600 MG
1200 TABLET, EXTENDED RELEASE 12 HR ORAL EVERY 12 HOURS SCHEDULED
Qty: 30 TABLET | Refills: 0 | Status: SHIPPED | OUTPATIENT
Start: 2021-12-15 | End: 2021-12-23

## 2021-12-23 ENCOUNTER — HOSPITAL ENCOUNTER (EMERGENCY)
Facility: HOSPITAL | Age: 36
Discharge: HOME | End: 2021-12-24
Attending: INTERNAL MEDICINE | Admitting: INTERNAL MEDICINE
Payer: MEDICARE

## 2021-12-23 ENCOUNTER — APPOINTMENT (EMERGENCY)
Dept: CT IMAGING | Facility: HOSPITAL | Age: 36
End: 2021-12-23
Payer: MEDICARE

## 2021-12-23 ENCOUNTER — APPOINTMENT (OUTPATIENT)
Dept: RADIOLOGY | Facility: CLINIC | Age: 36
End: 2021-12-23
Payer: COMMERCIAL

## 2021-12-23 ENCOUNTER — OFFICE VISIT (OUTPATIENT)
Dept: URGENT CARE | Facility: CLINIC | Age: 36
End: 2021-12-23
Payer: COMMERCIAL

## 2021-12-23 VITALS
TEMPERATURE: 98.6 F | OXYGEN SATURATION: 96 % | BODY MASS INDEX: 28.32 KG/M2 | SYSTOLIC BLOOD PRESSURE: 132 MMHG | RESPIRATION RATE: 18 BRPM | HEIGHT: 65 IN | WEIGHT: 170 LBS | DIASTOLIC BLOOD PRESSURE: 68 MMHG | HEART RATE: 86 BPM

## 2021-12-23 VITALS
DIASTOLIC BLOOD PRESSURE: 59 MMHG | OXYGEN SATURATION: 96 % | HEART RATE: 87 BPM | TEMPERATURE: 98 F | SYSTOLIC BLOOD PRESSURE: 118 MMHG | RESPIRATION RATE: 19 BRPM

## 2021-12-23 DIAGNOSIS — S06.0X9A CONCUSSION: ICD-10-CM
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
## 2021-12-23 DIAGNOSIS — M54.9 BACK PAIN: Primary | ICD-10-CM
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
## 2021-12-23 PROCEDURE — 70450 CT HEAD/BRAIN W/O DYE: CPT

## 2021-12-23 PROCEDURE — 99284 EMERGENCY DEPT VISIT MOD MDM: CPT | Performed by: INTERNAL MEDICINE

## 2021-12-23 PROCEDURE — 72131 CT LUMBAR SPINE W/O DYE: CPT

## 2021-12-23 PROCEDURE — G1004 CDSM NDSC: HCPCS

## 2021-12-23 PROCEDURE — 72040 X-RAY EXAM NECK SPINE 2-3 VW: CPT

## 2021-12-23 PROCEDURE — 72100 X-RAY EXAM L-S SPINE 2/3 VWS: CPT

## 2021-12-23 PROCEDURE — 72072 X-RAY EXAM THORAC SPINE 3VWS: CPT

## 2021-12-23 PROCEDURE — 99213 OFFICE O/P EST LOW 20 MIN: CPT

## 2021-12-23 PROCEDURE — 99284 EMERGENCY DEPT VISIT MOD MDM: CPT

## 2021-12-23 PROCEDURE — 96372 THER/PROPH/DIAG INJ SC/IM: CPT

## 2021-12-23 RX ORDER — INSULIN GLARGINE 100 [IU]/ML
30 INJECTION, SOLUTION SUBCUTANEOUS ONCE
Status: COMPLETED | OUTPATIENT
Start: 2021-12-23 | End: 2021-12-23

## 2021-12-23 RX ORDER — METHOCARBAMOL 500 MG/1
500 TABLET, FILM COATED ORAL 2 TIMES DAILY PRN
Qty: 20 TABLET | Refills: 0 | Status: SHIPPED | OUTPATIENT
Start: 2021-12-23 | End: 2022-01-24

## 2021-12-23 RX ORDER — TRAMADOL HYDROCHLORIDE 50 MG/1
50 TABLET ORAL 4 TIMES DAILY PRN
Qty: 15 TABLET | Refills: 0 | Status: SHIPPED | OUTPATIENT
Start: 2021-12-23

## 2021-12-23 RX ORDER — KETOROLAC TROMETHAMINE 30 MG/ML
60 INJECTION, SOLUTION INTRAMUSCULAR; INTRAVENOUS ONCE
Status: COMPLETED | OUTPATIENT
Start: 2021-12-23 | End: 2021-12-24

## 2021-12-23 RX ADMIN — INSULIN GLARGINE 30 UNITS: 100 INJECTION, SOLUTION SUBCUTANEOUS at 22:27

## 2021-12-24 PROCEDURE — 96372 THER/PROPH/DIAG INJ SC/IM: CPT

## 2021-12-24 RX ADMIN — KETOROLAC TROMETHAMINE 60 MG: 30 INJECTION, SOLUTION INTRAMUSCULAR; INTRAVENOUS at 00:11

## 2021-12-24 RX ADMIN — INSULIN LISPRO 6 UNITS: 100 INJECTION, SOLUTION INTRAVENOUS; SUBCUTANEOUS at 00:11

## 2021-12-27 ENCOUNTER — NURSE TRIAGE (OUTPATIENT)
Dept: PHYSICAL THERAPY | Facility: OTHER | Age: 36
End: 2021-12-27

## 2021-12-27 DIAGNOSIS — M54.50 ACUTE BILATERAL LOW BACK PAIN, UNSPECIFIED WHETHER SCIATICA PRESENT: Primary | ICD-10-CM

## 2021-12-28 ENCOUNTER — PATIENT OUTREACH (OUTPATIENT)
Dept: INTERNAL MEDICINE CLINIC | Age: 36
End: 2021-12-28

## 2022-01-03 ENCOUNTER — OFFICE VISIT (OUTPATIENT)
Dept: INTERNAL MEDICINE CLINIC | Age: 37
End: 2022-01-03
Payer: MEDICARE

## 2022-01-03 VITALS
WEIGHT: 184.3 LBS | HEART RATE: 102 BPM | BODY MASS INDEX: 30.71 KG/M2 | DIASTOLIC BLOOD PRESSURE: 70 MMHG | OXYGEN SATURATION: 97 % | TEMPERATURE: 98.8 F | SYSTOLIC BLOOD PRESSURE: 100 MMHG | HEIGHT: 65 IN

## 2022-01-03 DIAGNOSIS — Z79.4 TYPE 2 DIABETES MELLITUS WITHOUT COMPLICATION, WITH LONG-TERM CURRENT USE OF INSULIN (HCC): Primary | Chronic | ICD-10-CM

## 2022-01-03 DIAGNOSIS — K21.9 CHRONIC GERD: Chronic | ICD-10-CM

## 2022-01-03 DIAGNOSIS — F41.9 ANXIETY: Chronic | ICD-10-CM

## 2022-01-03 DIAGNOSIS — Z79.4 TYPE 2 DIABETES MELLITUS WITH HYPERGLYCEMIA, WITH LONG-TERM CURRENT USE OF INSULIN (HCC): ICD-10-CM

## 2022-01-03 DIAGNOSIS — E11.65 TYPE 2 DIABETES MELLITUS WITH HYPERGLYCEMIA, WITH LONG-TERM CURRENT USE OF INSULIN (HCC): ICD-10-CM

## 2022-01-03 DIAGNOSIS — M54.50 ACUTE MIDLINE LOW BACK PAIN WITHOUT SCIATICA: ICD-10-CM

## 2022-01-03 DIAGNOSIS — I10 ESSENTIAL HYPERTENSION: Chronic | ICD-10-CM

## 2022-01-03 DIAGNOSIS — F33.9 DEPRESSION, RECURRENT (HCC): ICD-10-CM

## 2022-01-03 DIAGNOSIS — F11.20 CONTINUOUS OPIOID DEPENDENCE (HCC): ICD-10-CM

## 2022-01-03 DIAGNOSIS — E03.9 HYPOTHYROIDISM, UNSPECIFIED TYPE: ICD-10-CM

## 2022-01-03 DIAGNOSIS — E11.9 TYPE 2 DIABETES MELLITUS WITHOUT COMPLICATION, WITH LONG-TERM CURRENT USE OF INSULIN (HCC): Primary | Chronic | ICD-10-CM

## 2022-01-03 PROCEDURE — 99214 OFFICE O/P EST MOD 30 MIN: CPT | Performed by: INTERNAL MEDICINE

## 2022-01-03 NOTE — PROGRESS NOTES
Assessment/Plan:    1  Acute lower back pain secondary to fall  CT scan of lumbar spine reveals no fracture  Presently he is on Robaxin, Tylenol and tramadol  He was supposed to start his physical therapy today but he cancel it  Advised him to start physical therapy as soon as possible and continue with present regimen  2  Type 2 diabetes mellitus  Last hemoglobin A1c in August 2021 which was more than 11  He is also being followed by endocrinologist   Dinorah Cummingsning him to follow-up with them as soon as possible  I requested and other hemoglobin A1c    3  Hypothyroidism  Continue with present dose of levothyroxine  Will check TSH before next visit    4  Anxiety/depression  Being followed by psychiatrist     Diagnoses and all orders for this visit:    Type 2 diabetes mellitus without complication, with long-term current use of insulin ([facility] [address] )  -     CBC; Future  -     Lipid Panel with Direct LDL reflex; Future  -     Comprehensive metabolic panel; Future  -     Hemoglobin A1C; Future    Essential hypertension    Anxiety    Continuous opioid dependence (HCC)    Depression, recurrent (HCC)    Type 2 diabetes mellitus with hyperglycemia, with long-term current use of insulin (HCC)    Acute midline low back pain without sciatica    Chronic GERD    Hypothyroidism, unspecified type          BMI Counseling: Body mass index is 30 67 kg/m²  The BMI is above normal  Nutrition recommendations include decreasing portion sizes, encouraging healthy choices of fruits and vegetables, decreasing fast food intake and consuming healthier snacks  Exercise recommendations include vigorous physical activity 75 minutes/week and exercising 3-5 times per week  No pharmacotherapy was ordered  Rationale for BMI follow-up plan is due to patient being overweight or obese  Depression Screening and Follow-up Plan:   Patient was screened for depression during today's encounter   They screened negative with a PHQ-2 score of 0         Subjective:          Patient ID: Deana Bellamy is a 39 y o  male  Patient came to office with a complain of lower back pain  Which started on December 23, 2021 after fall  He was seen in the emergency room underwent CT scan of lumbar spine along with CT scan of brain and x-ray lumbar spine  No fractures noticed  He was scheduled for physical therapy today but he cancel it  Presently he is on Tylenol and tramadol  Denied any radiation to lower extremity pain is limited to lower back area  The following portions of the patient's history were reviewed and updated as appropriate: allergies, current medications, past family history, past medical history, past social history, past surgical history and problem list     Review of Systems   Constitutional: Negative for fatigue and fever  HENT: Negative for congestion, ear discharge, ear pain, postnasal drip, sinus pressure, sore throat, tinnitus and trouble swallowing  Eyes: Negative for discharge, itching and visual disturbance  Respiratory: Negative for cough and shortness of breath  Cardiovascular: Negative for chest pain and palpitations  Gastrointestinal: Negative for abdominal pain, diarrhea, nausea and vomiting  Endocrine: Negative for cold intolerance and polyuria  Genitourinary: Negative for difficulty urinating, dysuria and urgency  Musculoskeletal: Positive for back pain  Negative for arthralgias and neck pain  Skin: Negative for rash  Allergic/Immunologic: Negative for environmental allergies  Neurological: Negative for dizziness, weakness and headaches  Psychiatric/Behavioral: Negative for agitation  The patient is not nervous/anxious  Past Medical History:   Diagnosis Date    COVID-19 03/17/2021    Diabetes mellitus ([facility] [address] )     type 2    Disease of thyroid gland     hypothyroidism    Hyperlipidemia     Hypertension     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    Psychiatric disorder     PTSD  Anxiety, depression,     Psychogenic nonepileptic spells          Current Outpatient Medications:     albuterol (PROVENTIL HFA,VENTOLIN HFA) 90 mcg/act inhaler, Inhale 2 puffs every 6 (six) hours as needed for wheezing or shortness of breath, Disp: 18 g, Rfl: 3    ARIPiprazole (ABILIFY) 5 mg tablet, Take 20 mg by mouth daily  , Disp: , Rfl:     clonazePAM (KlonoPIN) 1 mg tablet, Take 1 mg by mouth daily  , Disp: , Rfl:     fluticasone (FLONASE) 50 mcg/act nasal spray, 1 spray into each nostril daily, Disp: 18 2 mL, Rfl: 1    fluticasone-salmeterol (Advair Diskus) 500-50 mcg/dose inhaler, Inhale 1 puff 2 (two) times a day Rinse mouth after use , Disp: 180 blister, Rfl: 1    insulin aspart (NovoLOG FlexPen) 100 UNIT/ML injection pen, 14 units before breakfast , 20 units before lunch and dinner , Disp: 45 mL, Rfl: 0    insulin glargine (Lantus) 100 units/mL subcutaneous injection, Inject 30 Units under the skin 2 (two) times a day Basal insulin as split into 2 doses, take 30 units in the morning and 30 units before bed, Disp: 60 mL, Rfl: 0    levothyroxine 25 mcg tablet, Take 50 mcg by mouth daily, Disp: , Rfl:     loratadine (CLARITIN) 10 mg tablet, Take 10 mg by mouth daily, Disp: , Rfl:     methocarbamol (ROBAXIN) 500 mg tablet, Take 1 tablet (500 mg total) by mouth 2 (two) times a day as needed for muscle spasms for up to 10 days, Disp: 20 tablet, Rfl: 0    metoprolol succinate (TOPROL-XL) 25 mg 24 hr tablet, Take 1 tablet (25 mg total) by mouth daily, Disp: 90 tablet, Rfl: 3    OMEGA-3 FATTY ACIDS PO, Take 2 g by mouth daily, Disp: , Rfl:     pravastatin (PRAVACHOL) 40 mg tablet, Take 1 tablet (40 mg total) by mouth daily, Disp:  , Rfl: 0    sertraline (ZOLOFT) 100 mg tablet, Take 150 mg by mouth daily , Disp: , Rfl:     Insulin Pen Needle (BD Pen Needle Rossana U/F) 32G X 4 MM MISC, Use 4/day, Disp: 100 each, Rfl: 3    traMADol (ULTRAM) 50 mg tablet, Take 1 tablet (50 mg total) by mouth 4 (four) times a day as needed for moderate pain for up to 15 doses, Disp: 15 tablet, Rfl: 0    Allergies   Allergen Reactions    Lamotrigine GI Intolerance    Niacin Rash    Simvastatin Other (See Comments)     Elevated liver enzymes  Liver enzyme elevation       Social History   Past Surgical History:   Procedure Laterality Date    MOUTH SURGERY  08/2021    WISDOM TOOTH EXTRACTION       Family History   Problem Relation Age of Onset    Hyperlipidemia Father     Heart attack Paternal Grandfather     Prostate cancer Paternal Grandfather     Cancer Paternal Grandmother        Objective:  /70 (BP Location: Left arm, Patient Position: Sitting, Cuff Size: Adult)   Pulse 102   Temp 98 8 °F (37 1 °C) (Temporal)   Ht 5' 5" (1 651 m)   Wt 83 6 kg (184 lb 4 8 oz)   SpO2 97% Comment: Room Air  BMI 30 67 kg/m²   Body mass index is 30 67 kg/m²  Physical Exam  Constitutional:       Appearance: He is well-developed  HENT:      Head: Normocephalic  Right Ear: External ear normal       Left Ear: External ear normal       Nose: No rhinorrhea  Mouth/Throat:      Pharynx: No posterior oropharyngeal erythema  Eyes:      General: No scleral icterus  Pupils: Pupils are equal, round, and reactive to light  Neck:      Thyroid: No thyromegaly  Trachea: No tracheal deviation  Cardiovascular:      Rate and Rhythm: Normal rate and regular rhythm  Heart sounds: Normal heart sounds  Pulmonary:      Effort: Pulmonary effort is normal  No respiratory distress  Breath sounds: Normal breath sounds  Chest:      Chest wall: No tenderness  Abdominal:      General: Bowel sounds are normal       Palpations: Abdomen is soft  There is no mass  Tenderness: There is no abdominal tenderness  Musculoskeletal:         General: Tenderness present  Normal range of motion  Cervical back: Normal range of motion and neck supple        Comments: Moderate tenderness over lumbar left paravertebral area in the range L1-L2 L3 noted  Lymphadenopathy:      Cervical: No cervical adenopathy  Skin:     General: Skin is warm  Neurological:      Mental Status: He is alert and oriented to person, place, and time  Cranial Nerves: No cranial nerve deficit     Psychiatric:         Mood and Affect: Mood normal          Behavior: Behavior normal

## 2022-01-10 ENCOUNTER — EVALUATION (OUTPATIENT)
Dept: PHYSICAL THERAPY | Facility: CLINIC | Age: 37
End: 2022-01-10
Payer: MEDICARE

## 2022-01-10 DIAGNOSIS — G89.29 ACUTE EXACERBATION OF CHRONIC LOW BACK PAIN: Primary | ICD-10-CM

## 2022-01-10 DIAGNOSIS — M54.50 ACUTE EXACERBATION OF CHRONIC LOW BACK PAIN: Primary | ICD-10-CM

## 2022-01-10 PROCEDURE — 97161 PT EVAL LOW COMPLEX 20 MIN: CPT | Performed by: PHYSICAL MEDICINE & REHABILITATION

## 2022-01-10 PROCEDURE — 97110 THERAPEUTIC EXERCISES: CPT | Performed by: PHYSICAL MEDICINE & REHABILITATION

## 2022-01-10 NOTE — PROGRESS NOTES
PT Evaluation     Today's date: 1/10/2022  Patient name: Delmis Nunez  : 1985  MRN: [id number]  Referring provider: Harry Hernandez PT  Dx:   Encounter Diagnosis     ICD-10-CM    1  Acute exacerbation of chronic low back pain  M54 50 Ambulatory referral to PT spine    G89 29                   Assessment  Assessment details: Delmis Nunez is a 39 y o  male presenting to outpatient physical therapy with noted impairments including pain, impaired soft tissue mobility, reduced range of motion, reduced strength, reduced postural awareness, and reduced activity tolerance  Signs and symptoms at present are consistent with referring diagnosis of acute exacerbation of chronic LBP s/p fall a few weeks ago  Due to noted impairments, the patient's present functional limitations include difficulty with ADLs with increased need for assistance, reliance on medication and/or modalities for pain relief, reduced tolerance for functional mobility and activity, and difficulty completing home care and ADL activities/ responsibilities  Patient to benefit from skilled outpatient physical therapy 2x/week for 4 weeks in order to reduce pain, maximize pain free range of motion, increase strength and stability, and improve functional mobility/functional activity in order to maximize return to prior level of function with reduced limitations  Home exercise program was provided and all questions answered to patient's level of satisfaction  Thank you for your referral       Impairments: abnormal coordination, abnormal muscle firing, abnormal muscle tone, abnormal or restricted ROM, abnormal movement, activity intolerance, difficulty understanding, impaired physical strength, lacks appropriate home exercise program, pain with function and poor posture     Symptom irritability: highUnderstanding of Dx/Px/POC: good   Prognosis: fair    Goals  STGs to be achieved in 4-6 weeks:    1   Pt will report reduced low back pain levels "at worst" by at least 2 points (0-10 scale) in order to allow improved tolerance for functional mobility and reduced reliance on medication or modalities for pain relief  2  Pt will demonstrate improved AROM of L/S flexion by at least 5-10* in order to reduce pt difficulty with functional mobility and transfers  3  Pt will demonstrate improved proximal L hip by at least 1/2-1 MMT in order to improve lumbo-pelvic stability to reduce pain and improve function  4  Pt will report overall improved tolerance for sustained mobility/activity by at least 25-50% since [facility] with overall reduced pain levels and reduced fear avoidance  LTGs to be achieved in 8-12 weeks:    1  Pt will be independent with HEP, demonstrating proper technique with exercises and understanding of self progression of program without need for cueing or assistance  2  Pt will report minimized pain levels with at least 50% reduction in pain since [facility]  3  Pt will demonstrate WFL AROM and strength of L hip without increased pain/sx  4  Pt will report return to ADLs without limitations  5  FOTO will reflect score at discharge that is greater than or equal to predicted level           Plan  Patient would benefit from: skilled physical therapy  Planned modality interventions: cryotherapy and thermotherapy: hydrocollator packs  Planned therapy interventions: manual therapy, joint mobilization, abdominal trunk stabilization, motor coordination training, neuromuscular re-education, patient education, postural training, self care, strengthening, stretching, therapeutic exercise, home exercise program, graded exercise, functional ROM exercises and behavior modification  Frequency: 2x week  Duration in visits: 8  Duration in weeks: 4  Plan of Care beginning date: 1/10/2022  Plan of Care expiration date: 2/9/2022  Treatment plan discussed with: patient        Subjective Evaluation    History of Present Illness  Mechanism of injury: Taken from ER note: "41-year-old insulin-dependent diabetic, he suffers from PTSD from his post were injuries, presents with low back pain  Yesterday morning, there was an IC later on his deck when she slipped on  Landed on his buttock he thinks  Since then he just not felt well  He has had no via nausea vomiting  He cannot recall whether even he has had or he passed out  Just isn't feeling right  He has pain going down his left leg at times  Feels slightly weak in the left leg at this time  Is overdue for his insulin  Did not take Tylenol or Motrin for the pain because "they do not work for me"  He was able take the cyclobenzaprine however "  Taken from recent f/u note : "Patient came to office with a complain of lower back pain  Which started on December 23, 2021 after fall  He was seen in the emergency room underwent CT scan of lumbar spine along with CT scan of brain and x-ray lumbar spine  No fractures noticed      He was scheduled for physical therapy today but he cancel it  Presently he is on Tylenol and tramadol  Denied any radiation to lower extremity pain is limited to lower back area "    Pt notes overall his pain is slightly improved  Notes pain is constant  Notes he is very limited with activity  If he lifts heavy, he has pain that lasts into the next day (Occurred after lifting 93# bed recently)  Pain is located central L/S into L upper buttocks  Notes pain can go into L hip and buttock  Notes he can still have intermittent numbness/tingling in B LEs ;comes/goes  Notes his legs can "give out more than normal" 2* his seizures per pt (chronic hx of similar sx per pt)  Notes he gets "tired really fast" in his legs  Tired walking up 3 flights of stairs  Chronic stiffness and loss of ROM of his L/S  Pt notes chronic hx of LBP for > 10 years; hx of "OA" in the back; has been seen by the [place]; no formal tx to date other than medication which does not help him per pt   F/u with PCP for recent episode of ongoing pain since his fall; referred to OPPT at this time; RTD in 3 months  No additional tx to date  Presently not taking anything for pain as it "doesn't help"  No relief with tramadol, mm relaxers, or anti inflammatories per pt  Pt notes resting reduces the pain somewhat; especially resting with LEs elevated  Most comfortable in desk chair with lumbar support  Pain worse with standing, walking, lifting, bending over, and sitting in soft chair/sofa  Does have chronic night pain  No unexplained weight loss, no recent trauma, no consitutional sx, no change in bowel/bladder function, and no saddle anesthesia  Notes present pain is "still a little more" than his "usual chronic pain level"  Recurrent probem    Quality of life: poor    Pain  Current pain ratin  At best pain ratin  At worst pain rating: 10 (10+/10 )  Location: Central L L/S , L hip/glute   Quality: sharp, knife-like and dull ache  Progression: improved (a little)    Social Support  Steps to enter house: yes  Stairs in house: yes   Lives in: multiple-level home  Lives with: spouse and young children (5 kids, sister)    Employment status: not working  Hand dominance: right      Diagnostic Tests  X-ray: normal  CT scan: normal  Treatments  Previous treatment: medication  Current treatment: physical therapy  Patient Goals  Patient goals for therapy: decreased pain, increased motion and increased strength  Patient's goals regarding treatment: "help get rid of the pain, to get better movement in my back"        Objective     Concurrent Complaints  Positive for night pain, disturbed sleep and history of trauma  Negative for bladder dysfunction, bowel dysfunction and saddle (S4) numbness    Postural Observations  Seated posture: fair  Standing posture: good    Additional Postural Observation Details  Seated :   Forward head/rounded shoulders  Increased thoracic kyphosis   B scapular depression and protraction with mild winging   PPT in sitting    Standing:  Grossly WNL       Palpation     Additional Palpation Details  Elevated sensitivity to light palpation L SIJ region into L Lower L/S PVMs and L upper gluteal region locally      Tenderness     Lumbar Spine  Tenderness in the spinous process  Left Hip   Tenderness in the PSIS  Right Hip   No tenderness in the PSIS  Additional Tenderness Details  Moderate ttp L SIJ region, L5/S1 locally, and thoracolumbar junction region with light palpation       Neurological Testing     Sensation     Lumbar   Left   Intact: light touch  Diminished: light touch    Right   Intact: light touch    Reflexes   Left   Patellar (L4): normal (2+)  Achilles (S1): normal (2+)  Clonus sign: negative    Right   Patellar (L4): normal (2+)  Achilles (S1): normal (2+)  Clonus sign: negative    Additional Neurological Details  Reports grossly diminished light touch sensation L anterior and medial L thigh and lower leg /foot vs R  Will monitor       Active Range of Motion     Lumbar   Flexion: Active lumbar flexion: fingertips to knees B, pain L L/S, stretching thoracolumbar spine centrally at end range  Restriction level: moderate  Extension: Active lumbar extension: pressure across L/S, pain L L/S at end range  Restriction level: minimal  Left lateral flexion: Active left lumbar lateral flexion: fingertip to mid thigh, pain L L/S and stretching locally  Restriction level: moderate  Right lateral flexion: Active right lumbar lateral flexion: fingertip to mid thigh, pain L L/S locally at end range  Restriction level: moderate  Left rotation: Active left lumbar rotation: pain L L/S  Restriction level: moderate  Right rotation: Active right lumbar rotation: pain L L/S    Restriction level: moderate    Additional Active Range of Motion Details  Pain and apprehension with all L/S AROM in standing; slow/guarded movements  B HS tightness limiting forward flexion in standing   No radicular pain/sx with or following AROM; no change in pain/sx following AROM      Strength/Myotome Testing     Lumbar   Left   Heel walk: normal  Toe walk: normal    Right   Heel walk: normal  Toe walk: normal    Left Hip   Planes of Motion   Flexion: 4+ (pain L L/S; 4-4+/5)  Abduction: 4+ (discomfort L L/S)  Adduction: 4+    Right Hip   Planes of Motion   Flexion: 4+  Abduction: 4+  Adduction: 4+    Left Knee   Flexion: 4+  Extension: 4 (discomfort L L/S)    Right Knee   Flexion: 4+  Extension: 4+    Left Ankle/Foot   Dorsiflexion: 4+  Plantar flexion: 4+  Great toe extension: 4+    Right Ankle/Foot   Dorsiflexion: 4+  Plantar flexion: 4+  Great toe extension: 4+    Muscle Activation   Patient able to activate left transverse abdominals and right transverse abdominals  Additional Muscle Activation Details  Log roll to side and push up to sit  Poor activation of deep abdominal stabilizers and core mm with supine to sit transfers  Will monitor     Tests     Lumbar     Left   Positive passive SLR  Negative slump test      Right   Positive passive SLR     Negative slump test      Additional Tests Details  L slump test with minor discomfort L L/S at end range only  + R/L SLR for moderate -severe HS tightness only- no pain/sx  + hip flexor tightness B  + Moderate-severe restriction L piriformis with pain locally  Will cont to assess  Limited tolerance for special testing at eval with pain; will cont to assess       Ambulation     Quality of Movement During Gait     Additional Quality of Movement During Gait Details  Gait Mildly slow/antalgic at times  Otherwise grossly WNL    Transfers slow/antalgic sit to stand/stand to sit  Will cont to assess/monitor                Precautions: PTSD, anxiety, depression, HTN, DM, ?seizures, chronic LBP , high sx irritability       Re-eval Date: 2/9    Date 1/10       Visit Count 1       FOTO 1/10       Pain In See IE       Pain Out See IE           Manuals 1/10        Gentle CPA mobs L/S to tolerance to increase ROm and reduce pain                                 Neuro Re-Ed        TA training supine      TA with hip add/abd        TA with alt heel slides      Dead bugs-->serafin march        Modified planks      Modified side planks         Pallof press seated-->disc-->standing         Lifts/chops                         Ther Ex        [id number] vs Nustep      LTR         5x/10-15"ea       HS stretch      Piriformis stretch 4x20-30"ea        2x/10-15"ea       L sciatic nerve glide       L hip SLRs 15x/5"        Bridges      Clamshells         Mini squats      Step ups         Leg press                         Ther Activity                        Gait Training                        Modalities prn                         1/10 - HEP was issued and reviewed this date for above noted exercises  Pt demonstrated understanding without incident and without questions/concerns  Will continue to update upcoming

## 2022-01-18 ENCOUNTER — PATIENT OUTREACH (OUTPATIENT)
Dept: INTERNAL MEDICINE CLINIC | Age: 37
End: 2022-01-18

## 2022-01-18 ENCOUNTER — OFFICE VISIT (OUTPATIENT)
Dept: PHYSICAL THERAPY | Facility: CLINIC | Age: 37
End: 2022-01-18
Payer: MEDICARE

## 2022-01-18 DIAGNOSIS — G89.29 ACUTE EXACERBATION OF CHRONIC LOW BACK PAIN: Primary | ICD-10-CM

## 2022-01-18 DIAGNOSIS — M54.50 ACUTE EXACERBATION OF CHRONIC LOW BACK PAIN: Primary | ICD-10-CM

## 2022-01-18 DIAGNOSIS — M54.50 ACUTE BILATERAL LOW BACK PAIN, UNSPECIFIED WHETHER SCIATICA PRESENT: ICD-10-CM

## 2022-01-18 PROCEDURE — 97112 NEUROMUSCULAR REEDUCATION: CPT

## 2022-01-18 PROCEDURE — 97110 THERAPEUTIC EXERCISES: CPT

## 2022-01-18 NOTE — PROGRESS NOTES
Daily Note     Today's date: 2022  Patient name: Sana Gabriel  : 1985  MRN: [id number]  Referring provider: Luis Angel Sweeney, PT  Dx:   Encounter Diagnosis     ICD-10-CM    1  Acute exacerbation of chronic low back pain  M54 50     G89 29    2  Acute bilateral low back pain, unspecified whether sciatica present  M54 50        Start Time: 1315  Stop Time: 1400  Total time in clinic (min): 45 minutes    Subjective: "I tripped going up the stairs 2 days ago and javier my back  The shoveling yesterday didn't help  I am having more pain today "      Objective: See treatment diary below      Assessment: Tolerated treatment fair  Initiated exercise program this date with fair tolerance  Pt slow and guared with all movements and transfers  Noted increased burning in bilateral feet  Bilateral LE tightness at end of session  No change in pain levels/burning after nerve glides  Increased symptoms noted at end of session  Will continue to progress as able to address deficits  Patient demonstrated fatigue post treatment and would benefit from continued PT      Plan: Continue per plan of care  Progress treatment as tolerated         Precautions: PTSD, anxiety, depression, HTN, DM, ?seizures, chronic LBP , high sx irritability       Re-eval Date:     Date 1/10 1/18      Visit Count 1 2/10      FOTO 1/10       Pain In See IE 6/10      Pain Out See IE 7/10          Manuals 1/10        Gentle CPA mobs L/S to tolerance to increase ROm and reduce pain                                 Neuro Re-Ed        TA training supine      TA with hip add/abd  20x/3-5"        Ball/grn  20x/3-5"      TA with alt heel slides      Dead bugs-->serafin march        Modified planks      Modified side planks         Pallof press seated-->disc-->standing         Lifts/chops                         Ther Ex        [facility] vs Nustep      LTR         5x/10-15"ea NS L3 10'        5x/10-15"ea      HS stretch      Piriformis stretch 4x20-30"ea        2x/10-15"ea NP        NP      L sciatic nerve glide       L hip SLRs 15x/5"  B 10x5" ea      Bridges      Clamshells   2x10/3-5"      Mini squats      Step ups         Leg press                         Ther Activity                        Gait Training                        Modalities prn                         1/10 - HEP was issued and reviewed this date for above noted exercises  Pt demonstrated understanding without incident and without questions/concerns  Will continue to update upcoming

## 2022-01-19 ENCOUNTER — TELEPHONE (OUTPATIENT)
Dept: NEUROLOGY | Facility: CLINIC | Age: 37
End: 2022-01-19

## 2022-01-19 NOTE — TELEPHONE ENCOUNTER
Please start a seizure reporting form  I can fill in the details to support return to driving  [place] [employer] will make the final decision

## 2022-01-19 NOTE — TELEPHONE ENCOUNTER
Patient calling to notify he has been event free for 3 months  States at last visit you had mentioned if he makes 3 months without an event you would help him get license back  I do see this mentioned in office note  How would you like to proceed?

## 2022-01-21 ENCOUNTER — OFFICE VISIT (OUTPATIENT)
Dept: URGENT CARE | Facility: CLINIC | Age: 37
End: 2022-01-21
Payer: MEDICARE

## 2022-01-21 VITALS
HEART RATE: 85 BPM | HEIGHT: 65 IN | TEMPERATURE: 98.3 F | OXYGEN SATURATION: 97 % | RESPIRATION RATE: 18 BRPM | BODY MASS INDEX: 28.32 KG/M2 | WEIGHT: 170 LBS

## 2022-01-21 DIAGNOSIS — B96.89 ACUTE BACTERIAL BRONCHITIS: Primary | ICD-10-CM

## 2022-01-21 DIAGNOSIS — R05.9 COUGH: ICD-10-CM

## 2022-01-21 DIAGNOSIS — J20.8 ACUTE BACTERIAL BRONCHITIS: Primary | ICD-10-CM

## 2022-01-21 PROCEDURE — U0003 INFECTIOUS AGENT DETECTION BY NUCLEIC ACID (DNA OR RNA); SEVERE ACUTE RESPIRATORY SYNDROME CORONAVIRUS 2 (SARS-COV-2) (CORONAVIRUS DISEASE [COVID-19]), AMPLIFIED PROBE TECHNIQUE, MAKING USE OF HIGH THROUGHPUT TECHNOLOGIES AS DESCRIBED BY CMS-2020-01-R: HCPCS

## 2022-01-21 PROCEDURE — U0005 INFEC AGEN DETEC AMPLI PROBE: HCPCS

## 2022-01-21 PROCEDURE — 99213 OFFICE O/P EST LOW 20 MIN: CPT

## 2022-01-21 PROCEDURE — G0463 HOSPITAL OUTPT CLINIC VISIT: HCPCS

## 2022-01-21 RX ORDER — AZITHROMYCIN 250 MG/1
TABLET, FILM COATED ORAL
Qty: 6 TABLET | Refills: 0 | Status: SHIPPED | OUTPATIENT
Start: 2022-01-21 | End: 2022-01-25

## 2022-01-21 NOTE — PROGRESS NOTES
[facility]        NAME: Deana Bellamy is a 39 y o  male  : 1985    MRN: [id number]  DATE: 2022  TIME: 4:23 PM    Assessment and Plan   Acute bacterial bronchitis [J20 8, B96 89]  1  Acute bacterial bronchitis  azithromycin (ZITHROMAX) 250 mg tablet   2  Cough  COVID Only -Office Collect         Patient Instructions       Follow up with PCP in 3-5 days  Proceed to  ER if symptoms worsen  Chief Complaint     Chief Complaint   Patient presents with    Cough     started 3 days ago     Nasal Congestion    Shortness of Breath     occasionally          History of Present Illness       Patient presents with a 3 day hx of cough and nasal congestion  Was to a concert in [facility] on the   Had negative covid test prior to going to the concert  Now having a productive cough and slight SOB which he feels is for his asthma  Review of Systems   Review of Systems   Constitutional: Positive for fatigue  Negative for chills and fever  HENT: Positive for congestion and rhinorrhea  Negative for sore throat  Respiratory: Positive for cough and shortness of breath  Gastrointestinal: Negative for diarrhea, nausea and vomiting  Musculoskeletal: Negative for myalgias  Skin: Negative for rash  Neurological: Negative for dizziness and headaches           Current Medications       Current Outpatient Medications:     albuterol (PROVENTIL HFA,VENTOLIN HFA) 90 mcg/act inhaler, Inhale 2 puffs every 6 (six) hours as needed for wheezing or shortness of breath, Disp: 18 g, Rfl: 3    ARIPiprazole (ABILIFY) 5 mg tablet, Take 20 mg by mouth daily  , Disp: , Rfl:     azithromycin (ZITHROMAX) 250 mg tablet, Take 2 tablets today then 1 tablet daily x 4 days, Disp: 6 tablet, Rfl: 0    clonazePAM (KlonoPIN) 1 mg tablet, Take 1 mg by mouth daily  , Disp: , Rfl:     fluticasone (FLONASE) 50 mcg/act nasal spray, 1 spray into each nostril daily, Disp: 18 2 mL, Rfl: 1    fluticasone-salmeterol (Advair Diskus) 500-50 mcg/dose inhaler, Inhale 1 puff 2 (two) times a day Rinse mouth after use , Disp: 180 blister, Rfl: 1    insulin aspart (NovoLOG FlexPen) 100 UNIT/ML injection pen, 14 units before breakfast , 20 units before lunch and dinner , Disp: 45 mL, Rfl: 0    insulin glargine (Lantus) 100 units/mL subcutaneous injection, Inject 30 Units under the skin 2 (two) times a day Basal insulin as split into 2 doses, take 30 units in the morning and 30 units before bed, Disp: 60 mL, Rfl: 0    Insulin Pen Needle (BD Pen Needle Rossana U/F) 32G X 4 MM MISC, Use 4/day, Disp: 100 each, Rfl: 3    levothyroxine 25 mcg tablet, Take 50 mcg by mouth daily, Disp: , Rfl:     loratadine (CLARITIN) 10 mg tablet, Take 10 mg by mouth daily, Disp: , Rfl:     methocarbamol (ROBAXIN) 500 mg tablet, Take 1 tablet (500 mg total) by mouth 2 (two) times a day as needed for muscle spasms for up to 10 days, Disp: 20 tablet, Rfl: 0    metoprolol succinate (TOPROL-XL) 25 mg 24 hr tablet, Take 1 tablet (25 mg total) by mouth daily, Disp: 90 tablet, Rfl: 3    OMEGA-3 FATTY ACIDS PO, Take 2 g by mouth daily, Disp: , Rfl:     pravastatin (PRAVACHOL) 40 mg tablet, Take 1 tablet (40 mg total) by mouth daily, Disp:  , Rfl: 0    sertraline (ZOLOFT) 100 mg tablet, Take 150 mg by mouth daily , Disp: , Rfl:     traMADol (ULTRAM) 50 mg tablet, Take 1 tablet (50 mg total) by mouth 4 (four) times a day as needed for moderate pain for up to 15 doses, Disp: 15 tablet, Rfl: 0    Current Allergies     Allergies as of 01/21/2022 - Reviewed 01/21/2022   Allergen Reaction Noted    Lamotrigine GI Intolerance 09/18/2020    Niacin Rash 02/11/2018    Simvastatin Other (See Comments) 12/13/2015            The following portions of the patient's history were reviewed and updated as appropriate: allergies, current medications, past family history, past medical history, past social history, past surgical history and problem list      Past Medical History: Diagnosis Date    COVID-19 03/17/2021    Diabetes mellitus ([facility] [address] )     type 2    Disease of thyroid gland     hypothyroidism    Hyperlipidemia     Hypertension     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    Psychiatric disorder     PTSD  Anxiety, depression,     Psychogenic nonepileptic spells        Past Surgical History:   Procedure Laterality Date    MOUTH SURGERY  08/2021    WISDOM TOOTH EXTRACTION         Family History   Problem Relation Age of Onset    Hyperlipidemia Father     Heart attack Paternal Grandfather     Prostate cancer Paternal Grandfather     Cancer Paternal Grandmother          Medications have been verified  Objective   Pulse 85   Temp 98 3 °F (36 8 °C) (Temporal)   Resp 18   Ht 5' 5" (1 651 m)   Wt 77 1 kg (170 lb)   SpO2 97%   BMI 28 29 kg/m²   No LMP for male patient  Physical Exam     Physical Exam  Vitals and nursing note reviewed  Constitutional:       Appearance: He is well-developed  HENT:      Head: Normocephalic and atraumatic  Cardiovascular:      Rate and Rhythm: Normal rate and regular rhythm  Heart sounds: Normal heart sounds  Pulmonary:      Effort: Pulmonary effort is normal       Breath sounds: Normal breath sounds  Skin:     General: Skin is warm  Neurological:      Mental Status: He is alert

## 2022-01-21 NOTE — PATIENT INSTRUCTIONS
Check MyChart for test results  If SARS-CoV-2 is positive it means you tested positive for Covid-19  Start taking Vitamin D3 2000 IU daily  Contact your family doctor to set up a follow up visit  They will monitor you and decide when you can come off quarantine and/or return to work  Drink plenty of fluids  If you have a pulse-oximeter and your oxygen levels drop below 90% you need to be re-evaluated  Try to lay prone (on your stomach) if possible  [address]    Your healthcare provider and/or public health staff have evaluated you and have determined that you do not need to remain in the hospital at this time  At this time you can be isolated at home where you will be monitored by staff from your local or state health department  You should carefully follow the prevention and isolation steps below until a healthcare provider or local or state health department says that you can return to your normal activities  Stay home except to get medical care    People who are mildly ill with COVID-19 are able to isolate at home during their illness  You should restrict activities outside your home, except for getting medical care  Do not go to work, school, or public areas  Avoid using public transportation, ride-sharing, or taxis  Separate yourself from other people and animals in your home    People: As much as possible, you should stay in a specific room and away from other people in your home  Also, you should use a separate bathroom, if available  Animals: You should restrict contact with pets and other animals while you are sick with COVID-19, just like you would around other people  Although there have not been reports of pets or other animals becoming sick with COVID-19, it is still recommended that people sick with COVID-19 limit contact with animals until more information is known about the virus   When possible, have another member of your household care for your animals while you are sick  If you are sick with COVID-19, avoid contact with your pet, including petting, snuggling, being kissed or licked, and sharing food  If you must care for your pet or be around animals while you are sick, wash your hands before and after you interact with pets and wear a facemask  See COVID-19 and Animals for more information  Call ahead before visiting your doctor    If you have a medical appointment, call the healthcare provider and tell them that you have or may have COVID-19  This will help the healthcare providers office take steps to keep other people from getting infected or exposed  Wear a facemask    You should wear a facemask when you are around other people (e g , sharing a room or vehicle) or pets and before you enter a healthcare providers office  If you are not able to wear a facemask (for example, because it causes trouble breathing), then people who live with you should not stay in the same room with you, or they should wear a facemask if they enter your room  Cover your coughs and sneezes    Cover your mouth and nose with a tissue when you cough or sneeze  Throw used tissues in a lined trash can  Immediately wash your hands with soap and water for at least 20 seconds or, if soap and water are not available, clean your hands with an alcohol-based hand  that contains at least 60% alcohol  Clean your hands often    Wash your hands often with soap and water for at least 20 seconds, especially after blowing your nose, coughing, or sneezing; going to the bathroom; and before eating or preparing food  If soap and water are not readily available, use an alcohol-based hand  with at least 60% alcohol, covering all surfaces of your hands and rubbing them together until they feel dry  Soap and water are the best option if hands are visibly dirty  Avoid touching your eyes, nose, and mouth with unwashed hands      Avoid sharing personal household items    You should not share dishes, drinking glasses, cups, eating utensils, towels, or bedding with other people or pets in your home  After using these items, they should be washed thoroughly with soap and water  Clean all high-touch surfaces everyday    High touch surfaces include counters, tabletops, doorknobs, bathroom fixtures, toilets, phones, keyboards, tablets, and bedside tables  Also, clean any surfaces that may have blood, stool, or body fluids on them  Use a household cleaning spray or wipe, according to the label instructions  Labels contain instructions for safe and effective use of the cleaning product including precautions you should take when applying the product, such as wearing gloves and making sure you have good ventilation during use of the product  Monitor your symptoms    Seek prompt medical attention if your illness is worsening (e g , difficulty breathing)  Before seeking care, call your healthcare provider and tell them that you have, or are being evaluated for, COVID-19  Put on a facemask before you enter the facility  These steps will help the healthcare providers office to keep other people in the office or waiting room from getting infected or exposed  Ask your healthcare provider to call the local or [facility] health department  Persons who are placed under active monitoring or facilitated self-monitoring should follow instructions provided by their local health department or occupational health professionals, as appropriate  If you have a medical emergency and need to call 911, notify the dispatch personnel that you have, or are being evaluated for COVID-19  If possible, put on a facemask before emergency medical services arrive      Discontinuing home isolation    Patients with confirmed COVID-19 should remain under home isolation precautions until the following conditions are met:   - They have had no fever for at least 24 hours (that is one full day of no fever without the use medicine that reduces fevers)  AND  - other symptoms have improved (for example, when their cough or shortness of breath have improved)  AND  - If had mild or moderate illness, at least 10 days have passed since their symptoms first appeared or if severe illness (needed oxygen) or immunosuppressed, at least 20 days have passed since symptoms first appeared  Patients with confirmed COVID-19 should also notify close contacts (including their workplace) and ask that they self-quarantine  Currently, close contact is defined as being within 6 feet for 15 minutes or more from the period 24 hours starting 48 hours before symptom onset to the time at which the patient went into isolation  Close contacts of patients diagnosed with COVID-19 should be instructed by the patient to self-quarantine for 14 days from the last time of their last contact with the patient       Source: [employer]

## 2022-01-22 LAB — SARS-COV-2 RNA RESP QL NAA+PROBE: POSITIVE

## 2022-01-23 DIAGNOSIS — J45.40 MODERATE PERSISTENT ALLERGIC ASTHMA: ICD-10-CM

## 2022-01-24 ENCOUNTER — APPOINTMENT (OUTPATIENT)
Dept: PHYSICAL THERAPY | Facility: CLINIC | Age: 37
End: 2022-01-24
Payer: MEDICARE

## 2022-01-24 ENCOUNTER — TELEMEDICINE (OUTPATIENT)
Dept: INTERNAL MEDICINE CLINIC | Age: 37
End: 2022-01-24
Payer: MEDICARE

## 2022-01-24 DIAGNOSIS — U07.1 COVID: Primary | ICD-10-CM

## 2022-01-24 PROCEDURE — 99213 OFFICE O/P EST LOW 20 MIN: CPT | Performed by: INTERNAL MEDICINE

## 2022-01-24 RX ORDER — ARIPIPRAZOLE 20 MG/1
20 TABLET ORAL DAILY
COMMUNITY
Start: 2022-01-03

## 2022-01-24 NOTE — ASSESSMENT & PLAN NOTE
Pt is currently feeling great and thinks his COVID test was a false +  Discussed unlikely that false + but due to previous infection may just be having very  Mild symptoms  He is breathing very well  No using albuterol at all  Blood sugars are variable no hypoglycemic episodes and hyperglycemia more related to taking insulin after eating instead of prior to meals  Discussed monoclonal antibody therapy but due to day 5 since test and pt feeling great will not pursue at this time  Pt wanted a retest to see if it was really +  Explained not really valuable as rapid antigen may be - now since day 5 and PCR will not change current course of action  Discussed quarantine guideline, home for 5 days and wear N95/Kn95 for an additional 5 days anytime around other people  Must call with any worsening of symptoms  Any fever, chest tightness, coughing  Go to ER with any SOB, severe coughing, confusion  Plan better with meals and take short acting insulin before meals    Monitor glucose, f/u with provider for DM

## 2022-01-24 NOTE — PROGRESS NOTES
Patient Name: Malissa Palencia     : 1985     MRN: [id number]    Assessment/Plan:    Problem List Items Addressed This Visit        Other    COVID - Primary     Pt is currently feeling great and thinks his COVID test was a false +  Discussed unlikely that false + but due to previous infection may just be having very  Mild symptoms  He is breathing very well  No using albuterol at all  Blood sugars are variable no hypoglycemic episodes and hyperglycemia more related to taking insulin after eating instead of prior to meals  Discussed monoclonal antibody therapy but due to day 5 since test and pt feeling great will not pursue at this time  Pt wanted a retest to see if it was really +  Explained not really valuable as rapid antigen may be - now since day 5 and PCR will not change current course of action  Discussed quarantine guideline, home for 5 days and wear N95/Kn95 for an additional 5 days anytime around other people  Must call with any worsening of symptoms  Any fever, chest tightness, coughing  Go to ER with any SOB, severe coughing, confusion  Plan better with meals and take short acting insulin before meals  Monitor glucose, f/u with provider for DM             Discussion:     Pt is currently feeling great and thinks his COVID test was a false +  Discussed unlikely that false + but due to previous infection may just be having very  Mild symptoms  He is breathing very well  No using albuterol at all  Blood sugars are variable no hypoglycemic episodes and hyperglycemia more related to taking insulin after eating instead of prior to meals  Discussed monoclonal antibody therapy but due to day 5 since test and pt feeling great will not pursue at this time  Pt wanted a retest to see if it was really +  Explained not really valuable as rapid antigen may be - now since day 5 and PCR will not change current course of action    Discussed quarantine guideline, home for 5 days and wear N95/Kn95 for an additional 5 days anytime around other people  Must call with any worsening of symptoms  Any fever, chest tightness, coughing  Go to ER with any SOB, severe coughing, confusion  Plan better with meals and take short acting insulin before meals  Monitor glucose, f/u with provider for DM         I spent 15 minutes directly with the patient during this visit     Subjective:    COVID-19 Infection:  Date of symptom onset: 1/19/2022  Date of positive test: 1/21/2022    Initial symptoms with acute illness:  Initial symptoms included: nasal congestion and fatigue  Patient denied: fever, chills, cough, rhinorrhea, shortness of breath, chest tightness, sore throat, diarrhea, headache, muscle aches and loss of smell    New or persistent symptoms:  Patient denies: fatigue, weakness, poor endurance, shortness of breath, cough, altered taste, altered smell, anxiety, depression, headache, rhinitis, appetite change, dizziness, insomnia, diarrhea and nausea  Inpatient treatment:      Was patient hospitalized?: No      Outpatient treatment:      Did patient receive monoclonal antibody therapy?: No    - Other therapies patient received: antibiotics  currently taking zpak  He is not taking anything else OTC  He thinks the COVID test was a false + because he feels so good  His wife and son  were negative  He had "mild cold" symptoms and is getting better with zpak so he does not think it is COVID  He did have COVID last year but has not been vaccinated  He is using advair BID as directed and has not needed albuterol at all  His blood sugars have been "up and down" but has been eating more than normal   Lowest 72  He has been getting hungry and eating without taking short acting insulin then he remembers, takes insulin and glucose comes down  He had COVID last year  Review of Systems   Constitutional: Negative for appetite change, chills, fatigue and fever     HENT: Negative for congestion, rhinorrhea and sore throat  Eyes: Negative for visual disturbance  Respiratory: Negative for cough and shortness of breath  Cardiovascular: Negative for chest pain, palpitations and leg swelling  Gastrointestinal: Negative for abdominal pain, constipation, diarrhea, nausea and vomiting  Neurological: Negative for dizziness, weakness and headaches  Psychiatric/Behavioral: Negative for depression, dysphoric mood and sleep disturbance  The patient is not nervous/anxious  Patient Active Problem List   Diagnosis    Anxiety    PTSD (post-traumatic stress disorder)    Hyperlipidemia    Type 2 diabetes mellitus without complication, with long-term current use of insulin ([facility])    ENRIKE (obstructive sleep apnea)    Chronic GERD    Oropharyngeal dysphagia    COVID    Bradycardic baseline fetal heart rate    Essential hypertension    Moderate persistent asthma    Acute bilateral low back pain with bilateral sciatica    Hypothyroidism    Type 2 diabetes mellitus with hyperglycemia, with long-term current use of insulin ([facility])    Class 1 obesity due to excess calories with serious comorbidity and body mass index (BMI) of 30 0 to 30 9 in adult    Alopecia    Medicare annual wellness visit, subsequent    Acute midline low back pain without sciatica    COVID-19 vaccination refused    Psychogenic nonepileptic spells    Acute respiratory failure (HCC)    Chronic headache    Acute non-recurrent maxillary sinusitis    Foot pain, left    Continuous opioid dependence ([address] )    Depression, recurrent ([address] )     Social History     Tobacco Use    Smoking status: Never Smoker    Smokeless tobacco: Never Used   Vaping Use    Vaping Use: Never used   Substance Use Topics    Alcohol use: Not Currently    Drug use: No      Objective: There were no vitals taken for this visit  Physical Exam  Constitutional:       General: He is not in acute distress  Appearance: Normal appearance   He is not ill-appearing  Comments: Very conversational   Did not cough at all throughout encounter  Was sitting in car  No distress   Pulmonary:      Effort: No respiratory distress           Riri Ulloa PA-C

## 2022-01-28 ENCOUNTER — APPOINTMENT (OUTPATIENT)
Dept: PHYSICAL THERAPY | Facility: CLINIC | Age: 37
End: 2022-01-28
Payer: MEDICARE

## 2022-02-10 ENCOUNTER — TELEPHONE (OUTPATIENT)
Dept: INTERNAL MEDICINE CLINIC | Facility: OTHER | Age: 37
End: 2022-02-10

## 2022-02-17 ENCOUNTER — TELEMEDICINE (OUTPATIENT)
Dept: SLEEP CENTER | Facility: CLINIC | Age: 37
End: 2022-02-17
Payer: MEDICARE

## 2022-02-17 VITALS — BODY MASS INDEX: 28.32 KG/M2 | WEIGHT: 170 LBS | HEIGHT: 65 IN

## 2022-02-17 DIAGNOSIS — G47.33 OSA (OBSTRUCTIVE SLEEP APNEA): Primary | ICD-10-CM

## 2022-02-17 DIAGNOSIS — G25.81 RESTLESS LEG SYNDROME: ICD-10-CM

## 2022-02-17 PROCEDURE — 99204 OFFICE O/P NEW MOD 45 MIN: CPT | Performed by: PSYCHIATRY & NEUROLOGY

## 2022-02-17 NOTE — PROGRESS NOTES
Virtual Regular Visit    Verification of patient location:    Patient is located in the following state in which I hold an active license [place]      Assessment/Plan:    Problem List Items Addressed This Visit        Respiratory    ENRIKE (obstructive sleep apnea)               Reason for visit is   Chief Complaint   Patient presents with    Virtual Regular Visit        Encounter provider Vianney Mccann MD    Provider located at [facility] [address] [address]  [phone]      Recent Visits  Date Type Provider Dept   02/10/22 Telephone Ge Garcia MD [facility]   Showing recent visits within past 7 days and meeting all other requirements  Today's Visits  Date Type Provider Dept   02/17/22 1660 [address]  MD Barbara Longoria 10 today's visits and meeting all other requirements  Future Appointments  No visits were found meeting these conditions  Showing future appointments within next 150 days and meeting all other requirements       The patient was identified by name and date of birth  Luisito Knowles was informed that this is a telemedicine visit and that the visit is being conducted through [address] [place] [address] Now and patient was informed that this is a secure, HIPAA-compliant platform  He agrees to proceed     My office door was closed  No one else was in the room  He acknowledged consent and understanding of privacy and security of the video platform  The patient has agreed to participate and understands they can discontinue the visit at any time  Patient is aware this is a billable service  Sleep Medicine Consultation Note    HPI:  Mr Luisito Knowles is a 39 y o  male seen at the request of Abhilash Perez MD for advice regarding suspected sleep disordered breathing  The patient stated that he has severe sleep apnea as diagnosed in 2009 in [place]   He has a CPAP currently and treated at the [place], but wanted to change due to the wait times and care he received there  He has a APAP that is Dreamstation 2 by Shola Peterson  He has a NP mask  When he uses his CPAP he feels good  He sometimes falls asleep early due to him having a 15 month old  He was using it less frequently before, but now using it nightly as it has made a huge difference in how he feels  He feels a lot better when he wakes up and has a lot more energy during the day  He is not as tired  Even with the CPAP, he will get tired and sometimes sleepy  He doesn't try to nap, but involuntarily falls asleep  He is not sure who is DME supplier is  This is a replacement machine for his recalled CPAP and he got it November 2021  He is unsure if he is still snoring, but his wife gets annoyed at times as he does snore sometimes  Please see below for continuation of the HPI:    Sleep Pattern:  -Location: bedroom  -Bed/Recliner/Wedge: bed  -Bed Partner: yes  -Position: side  -Bedtime: 830-1130pm   -Lights out: same time  -Environmental: No lights/TV  -Latency: 10-15 mins with PTSD meds  -Awakenings: 3   -Reason: his child and once in a while to void   -Duration: mins  -Wake time: 615am   -Alarm: yes  -Rise time: same time  -Days off: wakes at 8am in weekends  -Shift Work: retired  -Patient's estimate of total sleep time: 4-6h    Daytime Symptoms:  -Upon Awakening: tired  -Daytime fatigue/sleepiness: tired and sleepy  -Naps: no  -Involuntary Dozing: yes  -Cognitive Symptoms: trouble with memory and focus  -Driving: Difficulty with sleepiness and driving:  denied   -- Close calls related to sleepiness: denied   -- Accidents related to sleepiness: denied      Questionnaires:   Sitting and reading: Moderate chance of dozing  Watching TV: Slight chance of dozing  Sitting, inactive in a public place (e g  a theatre or a meeting): Would never doze  As a passenger in a car for an hour without a break:  Moderate chance of dozing  Lying down to rest in the afternoon when circumstances permit: High chance of dozing  Sitting and talking to someone: Would never doze  Sitting quietly after a lunch without alcohol: Moderate chance of dozing  In a car, while stopped for a few minutes in traffic: Would never doze  Total score: 10      Sleep Review of Symptoms:  -Parasomnias:  --Sleep Walking: no  --Dream Enactment: no  --Bruxism: possibly  -Motor:  --RLS: yes, sometimes keeps him awake  --PLMS: yes  -Narcolepsy:  --Hallucinations: denied  --Paralysis: denied  --Cataplexy: denied    Childhood Sleep History: denied    Prior Sleep Studies/Evaluations:  2009 in [facility]      Family History:  Family history of sleep disorders: father with ENRIKE    Patient Active Problem List   Diagnosis    Anxiety    PTSD (post-traumatic stress disorder)    Hyperlipidemia    Type 2 diabetes mellitus without complication, with long-term current use of insulin ([facility])    ENRIKE (obstructive sleep apnea)    Chronic GERD    Oropharyngeal dysphagia    COVID    Bradycardic baseline fetal heart rate    Essential hypertension    Moderate persistent asthma    Acute bilateral low back pain with bilateral sciatica    Hypothyroidism    Type 2 diabetes mellitus with hyperglycemia, with long-term current use of insulin ([facility])    Class 1 obesity due to excess calories with serious comorbidity and body mass index (BMI) of 30 0 to 30 9 in adult    Alopecia    Medicare annual wellness visit, subsequent    Acute midline low back pain without sciatica    COVID-19 vaccination refused    Psychogenic nonepileptic spells    Acute respiratory failure (HCC)    Chronic headache    Acute non-recurrent maxillary sinusitis    Foot pain, left    Continuous opioid dependence ([address] )    Depression, recurrent ([address] )     Past Medical History:   Diagnosis Date    COVID-19 03/17/2021    Diabetes mellitus ([address] )     type 2    Disease of thyroid gland     hypothyroidism    Hyperlipidemia     Hypertension     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   --Night time seizures: denied   --Last seizure: 10/2021  --> Head injury with LOC: 2  --> Supplemental Oxygen Use: denies    Labs     Results for Terry Bui (MRN [id number]) as of 2/17/2022 09:19   Ref   Range 10/21/2021 04:56   Sodium Latest Ref Range: 136 - 145 mmol/L 134 (L)   Potassium Latest Ref Range: 3 5 - 5 3 mmol/L 3 6   Chloride Latest Ref Range: 100 - 108 mmol/L 103   CO2 Latest Ref Range: 21 - 32 mmol/L 27   Anion Gap Latest Ref Range: 4 - 13 mmol/L 4   BUN Latest Ref Range: 5 - 25 mg/dL 9   Creatinine Latest Ref Range: 0 60 - 1 30 mg/dL 1 00   Glucose, Random Latest Ref Range: 65 - 140 mg/dL 192 (H)   Calcium Latest Ref Range: 8 3 - 10 1 mg/dL 8 6   eGFR Latest Units: ml/min/1 73sq m 96   Phosphorus Latest Ref Range: 2 7 - 4 5 mg/dL 2 9   WBC Latest Ref Range: 4 31 - 10 16 Thousand/uL 11 99 (H)   Red Blood Cell Count Latest Ref Range: 3 88 - 5 62 Million/uL 4 40   Hemoglobin Latest Ref Range: 12 0 - 17 0 g/dL 12 7   HCT Latest Ref Range: 36 5 - 49 3 % 38 6   MCV Latest Ref Range: 82 - 98 fL 88   MCH Latest Ref Range: 26 8 - 34 3 pg 28 9   MCHC Latest Ref Range: 31 4 - 37 4 g/dL 32 9   RDW Latest Ref Range: 11 6 - 15 1 % 13 5   Platelet Count Latest Ref Range: 149 - 390 Thousands/uL 201   MPV Latest Ref Range: 8 9 - 12 7 fL 9 4   nRBC Latest Units: /100 WBCs 0   Neutrophils % Latest Ref Range: 43 - 75 % 71   Immat GRANS % Latest Ref Range: 0 - 2 % 1   Lymphocytes Relative Latest Ref Range: 14 - 44 % 14   Monocytes Relative Latest Ref Range: 4 - 12 % 13 (H)   Eosinophils Latest Ref Range: 0 - 6 % 1   Basophils Relative Latest Ref Range: 0 - 1 % 0   Immature Grans Absolute Latest Ref Range: 0 00 - 0 20 Thousand/uL 0 10   Absolute Neutrophils Latest Ref Range: 1 85 - 7 62 Thousands/µL 8 47 (H)   Lymphocytes Absolute Latest Ref Range: 0 60 - 4 47 Thousands/µL 1 70   Absolute Monocytes Latest Ref Range: 0 17 - 1 22 Thousand/µL 1 51 (H)   Absolute Eosinophils Latest Ref Range: 0 00 - 0 61 Thousand/µL 0 16   Basophils Absolute Latest Ref Range: 0 00 - 0 10 Thousands/µL 0 05   HEPATITIS B SURFACE ANTIGEN Latest Ref Range: Non-reactive, NonReactive - Confirmed  Non-reactive   HEPATITIS C ANTIBODY Latest Ref Range: Non-reactive  Non-reactive   RAPID HIV 1 AND 2 Latest Ref Range: Non-Reactive  Non-Reactive   HIV-1 P24 Ag Latest Ref Range: Non-Reactive  Non-Reactive     Sinus tachycardia  Otherwise normal ECG  When compared with ECG of 19-OCT-2021 17:55,  No significant change was found  Confirmed by Catrachito Rey ([id number]) on 10/21/2021 10:28:26 PM    Past Surgical History:   Procedure Laterality Date    MOUTH SURGERY  08/2021    WISDOM TOOTH EXTRACTION         --> ENT procedures: denies    Current Outpatient Medications   Medication Sig Dispense Refill    Advair Diskus 500-50 MCG/DOSE inhaler USE 1 INHALATION TWICE A DAY (RINSE MOUTH AFTER USE) 180 blister 3    albuterol (PROVENTIL HFA,VENTOLIN HFA) 90 mcg/act inhaler Inhale 2 puffs every 6 (six) hours as needed for wheezing or shortness of breath 18 g 3    ARIPiprazole (ABILIFY) 20 MG tablet Take 20 mg by mouth daily        ARIPiprazole (ABILIFY) 5 mg tablet Take 20 mg by mouth daily        clonazePAM (KlonoPIN) 1 mg tablet Take 1 mg by mouth daily        fluticasone (FLONASE) 50 mcg/act nasal spray 1 spray into each nostril daily 18 2 mL 1    insulin aspart (NovoLOG FlexPen) 100 UNIT/ML injection pen 14 units before breakfast , 20 units before lunch and dinner   45 mL 0    insulin glargine (Lantus) 100 units/mL subcutaneous injection Inject 30 Units under the skin 2 (two) times a day Basal insulin as split into 2 doses, take 30 units in the morning and 30 units before bed 60 mL 0    Insulin Pen Needle (BD Pen Needle Rossana U/F) 32G X 4 MM MISC Use 4/day 100 each 3    levothyroxine 25 mcg tablet Take 50 mcg by mouth daily      loratadine (CLARITIN) 10 mg tablet Take 10 mg by mouth daily      methocarbamol (ROBAXIN) 500 mg tablet Take 1 tablet (500 mg total) by mouth 2 (two) times a day as needed for muscle spasms for up to 10 days 20 tablet 0    metoprolol succinate (TOPROL-XL) 25 mg 24 hr tablet Take 1 tablet (25 mg total) by mouth daily 90 tablet 3    OMEGA-3 FATTY ACIDS PO Take 2 g by mouth daily      pravastatin (PRAVACHOL) 40 mg tablet Take 1 tablet (40 mg total) by mouth daily  0    sertraline (ZOLOFT) 100 mg tablet Take 200 mg by mouth daily        traMADol (ULTRAM) 50 mg tablet Take 1 tablet (50 mg total) by mouth 4 (four) times a day as needed for moderate pain for up to 15 doses 15 tablet 0     No current facility-administered medications for this visit           Social History:  -Employment: retired from [employer]  -Smoking: denied  -Caffeine: 1 coffee and 1 soda  -Alcohol: denied  -THC: denied  -OTC/Supplements/herbals: denied  -Illicits:  denies  -Family: lives with family    ROS:    Review of Systems      Genitourinary none   Cardiology Frequent chest pain or angina, , palpitations/fluttering feeling in the chest and ankle/leg swelling   Gastrointestinal frequent heartburn/acid reflux   Neurology need to move extremities, numbness/tingling of an extremity, forgetfulness, poor concentration or confusion,  and difficulty with memory   Constitutional weight change   Integumentary none   Psychiatry mood change   Musculoskeletal joint pain   Pulmonary shortness of breath with activity, chest tightness, wheezing, frequent cough and snoring   ENT ringing in ears   Endocrine none   Hematological none           MSE:  -Alert and appropriate: alert, calm, cooperative  -Oriented to person, place and time:  name, age, location, day/date/mon/yr  -Behavior: good, sustained eye contact  -Speech: Unremarkable rate/rhythm/volume  -Mood: "good"  -Affect: constricted  -Thought Processes: linear, logical, goal directed  PE:  Body mass index is 28 29 kg/m²  Vitals:    02/17/22 0955   Weight: 77 1 kg (170 lb)   Height: 5' 5" (1 651 m)       -General:  In NAD    -Eyes: Conjunctival injection: none     -Eyelid hooding: no    -ENT: MP: 4/4   -Facial deformity: no retrognathia   -Hard palate: moderte arch   -Soft palate:  crowding   -Gums and teeth: normal dentition   -Tongue:  Scalloping   -Nares:  Patent    -Neck/Lymphatics:   -Circumference: 17 "    -Cardiac:    - LE edema over shins: none appreciated by patient    -Pulm: -Respirations: unlaboured    -Neuro: No resting tremor     -Musculoskeletal: Gait and stance: normal turning and ambulation; unremarkable  Assessment:  Mr Ana Orellana is a 39 y o  male who is seen to evaluate for treatment obstructive sleep apnea  He was diagnosed at the [facility] in 2009 with severe ENRIKE and already got his replacement CPAP from [facility]  Its on APAP 4+cm, but he is still symptomatic during the day  He likely needs a higher pressure  Will order Split study for him  he is amenable  He has a history of MI and lung damage from the [address], he would greatly benefit from continued treatment  The pathophysiology of, the reasons to treat and treatment options for obstructive sleep apnea were all reviewed with the patient today  He is amenable to treatment with PAP therapy  Discussed keeping nasal passages clear, abstaining from alcohol, and other sedating drugs at night- which will worsen symptoms of ENRIKE  --History provided by: patient   --Records reviewed: in chart      Recommendations:  1) split study for AHI>15 and then set his current machine to that setting  2) Driving safety was reviewed with patient  If the patient feels too sleepy to drive he knows not to drive  If he becomes sleepy while driving he will pull over and nap    3) Follow-up after testing  4) Call with any questions or concerns  All questions answered for the patient, who indicated understanding and agreed with the plan  Tori Ziegler MD  Psychiatry/ Sleep medicine      I spent 40 minutes with patient today in which greater than 50% of the time was spent in counseling/coordination of care regarding treatment    Iain verbally agrees to participate in [employer]  Pt is aware that [employer] could be limited without vital signs or the ability to perform a full hands-on physical exam  Dillon Nunez understands he or the provider may request at any time to terminate the video visit and request the patient to seek care or treatment in person

## 2022-02-17 NOTE — PATIENT INSTRUCTIONS
Recommendations:  1) split study for AHI>15 and then set his current machine to that setting  2) Driving safety was reviewed with patient  If the patient feels too sleepy to drive he knows not to drive  If he becomes sleepy while driving he will pull over and nap  3) Follow-up after testing  4) Call with any questions or concerns

## 2022-02-17 NOTE — LETTER
February 17, 2022     Vicky Adhikari MD  [address] [place] [address]    Patient: Jean Marie Pepper   YOB: 1985   Date of Visit: 2/17/2022       Dear Dr Saray Sherwood: Thank you for referring Abdulaziz Rojas to me for evaluation  Below are my notes for this consultation  If you have questions, please do not hesitate to call me  I look forward to following your patient along with you  Sincerely,        Nick Maher MD        CC: MD Nick Perez MD  2/17/2022 10:12 AM  Sign when Signing Visit    Virtual Regular Visit    Verification of patient location:    Patient is located in the following [facility] in which I hold an active license [place]      Assessment/Plan:    Problem List Items Addressed This Visit        Respiratory    ENRIKE (obstructive sleep apnea)               Reason for visit is   Chief Complaint   Patient presents with    Virtual Regular Visit        Encounter provider Nick Maher MD    Provider located at [address] [address]  [phone]      Recent Visits  Date Type Provider Dept   02/10/22 Telephone Vicky Adhikari MD [facility]   Showing recent visits within past 7 days and meeting all other requirements  Today's Visits  Date Type Provider Dept   02/17/22 16[address]  Ever Izquierdo MD [facility] 10 today's visits and meeting all other requirements  Future Appointments  No visits were found meeting these conditions  Showing future appointments within next 150 days and meeting all other requirements       The patient was identified by name and date of birth  Jean Marie Pepper was informed that this is a telemedicine visit and that the visit is being conducted through [address] [place] [address] Now and patient was informed that this is a secure, HIPAA-compliant platform  He agrees to proceed     My office door was closed  No one else was in the room  He acknowledged consent and understanding of privacy and security of the video platform  The patient has agreed to participate and understands they can discontinue the visit at any time  Patient is aware this is a billable service  Sleep Medicine Consultation Note    HPI:  Mr Kayla Raphael is a 39 y o  male seen at the request of Julieta Jama MD for advice regarding suspected sleep disordered breathing  The patient stated that he has severe sleep apnea as diagnosed in 2009 in [place]  He has a CPAP currently and treated at the [facility], but wanted to change due to the wait times and care he received there  He has a APAP that is Dreamstation 2 by Shola Peterson  He has a NP mask  When he uses his CPAP he feels good  He sometimes falls asleep early due to him having a 15 month old  He was using it less frequently before, but now using it nightly as it has made a huge difference in how he feels  He feels a lot better when he wakes up and has a lot more energy during the day  He is not as tired  Even with the CPAP, he will get tired and sometimes sleepy  He doesn't try to nap, but involuntarily falls asleep  He is not sure who is DME supplier is  This is a replacement machine for his recalled CPAP and he got it November 2021  He is unsure if he is still snoring, but his wife gets annoyed at times as he does snore sometimes      Please see below for continuation of the HPI:    Sleep Pattern:  -Location: bedroom  -Bed/Recliner/Wedge: bed  -Bed Partner: yes  -Position: side  -Bedtime: 830-1130pm   -Lights out: same time  -Environmental: No lights/TV  -Latency: 10-15 mins with PTSD meds  -Awakenings: 3   -Reason: his child and once in a while to void   -Duration: mins  -Wake time: 615am   -Alarm: yes  -Rise time: same time  -Days off: wakes at 8am in weekends  -Shift Work: retired  -Patient's estimate of total sleep time: 4-6h    Daytime Symptoms:  -Upon Awakening: tired  -Daytime fatigue/sleepiness: tired and sleepy  -Naps: no  -Involuntary Dozing: yes  -Cognitive Symptoms: trouble with memory and focus  -Driving: Difficulty with sleepiness and driving:  denied   -- Close calls related to sleepiness: denied   -- Accidents related to sleepiness: denied      Questionnaires:   Sitting and reading: Moderate chance of dozing  Watching TV: Slight chance of dozing  Sitting, inactive in a public place (e g  a theatre or a meeting): Would never doze  As a passenger in a car for an hour without a break: Moderate chance of dozing  Lying down to rest in the afternoon when circumstances permit: High chance of dozing  Sitting and talking to someone: Would never doze  Sitting quietly after a lunch without alcohol: Moderate chance of dozing  In a car, while stopped for a few minutes in traffic: Would never doze  Total score: 10      Sleep Review of Symptoms:  -Parasomnias:  --Sleep Walking: no  --Dream Enactment: no  --Bruxism: possibly  -Motor:  --RLS: yes, sometimes keeps him awake  --PLMS: yes  -Narcolepsy:  --Hallucinations: denied  --Paralysis: denied  --Cataplexy: denied    Childhood Sleep History: denied    Prior Sleep Studies/Evaluations:  2009 in [facility]      Family History:  Family history of sleep disorders: father with ENRIKE    Patient Active Problem List   Diagnosis    Anxiety    PTSD (post-traumatic stress disorder)    Hyperlipidemia    Type 2 diabetes mellitus without complication, with long-term current use of insulin ([facility] [address] [address] )    ENRIKE (obstructive sleep apnea)    Chronic GERD    Oropharyngeal dysphagia    COVID    Bradycardic baseline fetal heart rate    Essential hypertension    Moderate persistent asthma    Acute bilateral low back pain with bilateral sciatica    Hypothyroidism    Type 2 diabetes mellitus with hyperglycemia, with long-term current use of insulin ([facility])    Class 1 obesity due to excess calories with serious comorbidity and body mass index (BMI) of 30 0 to 30 9 in adult  Alopecia    Medicare annual wellness visit, subsequent    Acute midline low back pain without sciatica    COVID-19 vaccination refused    Psychogenic nonepileptic spells    Acute respiratory failure (HCC)    Chronic headache    Acute non-recurrent maxillary sinusitis    Foot pain, left    Continuous opioid dependence (HCC)    Depression, recurrent ([facility] )     Past Medical History:   Diagnosis Date    COVID-19 03/17/2021    Diabetes mellitus ([facility] )     type 2    Disease of thyroid gland     hypothyroidism    Hyperlipidemia     Hypertension     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    Psychiatric disorder     PTSD  Anxiety, depression,     Psychogenic nonepileptic spells        MI   Lung damage from the [employer] and also from [facility]  --> Seizure hx: yes, 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   --Night time seizures: denied   --Last seizure: 10/2021  --> Head injury with LOC: 2  --> Supplemental Oxygen Use: denies    Labs     Results for Divya Steen (MRN [id number]) as of 2/17/2022 09:19   Ref   Range 10/21/2021 04:56   Sodium Latest Ref Range: 136 - 145 mmol/L 134 (L)   Potassium Latest Ref Range: 3 5 - 5 3 mmol/L 3 6   Chloride Latest Ref Range: 100 - 108 mmol/L 103   CO2 Latest Ref Range: 21 - 32 mmol/L 27   Anion Gap Latest Ref Range: 4 - 13 mmol/L 4   BUN Latest Ref Range: 5 - 25 mg/dL 9   Creatinine Latest Ref Range: 0 60 - 1 30 mg/dL 1 00   Glucose, Random Latest Ref Range: 65 - 140 mg/dL 192 (H)   Calcium Latest Ref Range: 8 3 - 10 1 mg/dL 8 6   eGFR Latest Units: ml/min/1 73sq m 96   Phosphorus Latest Ref Range: 2 7 - 4 5 mg/dL 2 9   WBC Latest Ref Range: 4 31 - 10 16 Thousand/uL 11 99 (H)   Red Blood Cell Count Latest Ref Range: 3 88 - 5 62 Million/uL 4 40   Hemoglobin Latest Ref Range: 12 0 - 17 0 g/dL 12 7   HCT Latest Ref Range: 36 5 - 49 3 % 38 6   MCV Latest Ref Range: 82 - 98 fL 88   MCH Latest Ref Range: 26 8 - 34 3 pg 28 9   MCHC Latest Ref Range: 31 4 - 37 4 g/dL 32 9   RDW Latest Ref Range: 11 6 - 15 1 % 13 5   Platelet Count Latest Ref Range: 149 - 390 Thousands/uL 201   MPV Latest Ref Range: 8 9 - 12 7 fL 9 4   nRBC Latest Units: /100 WBCs 0   Neutrophils % Latest Ref Range: 43 - 75 % 71   Immat GRANS % Latest Ref Range: 0 - 2 % 1   Lymphocytes Relative Latest Ref Range: 14 - 44 % 14   Monocytes Relative Latest Ref Range: 4 - 12 % 13 (H)   Eosinophils Latest Ref Range: 0 - 6 % 1   Basophils Relative Latest Ref Range: 0 - 1 % 0   Immature Grans Absolute Latest Ref Range: 0 00 - 0 20 Thousand/uL 0 10   Absolute Neutrophils Latest Ref Range: 1 85 - 7 62 Thousands/µL 8 47 (H)   Lymphocytes Absolute Latest Ref Range: 0 60 - 4 47 Thousands/µL 1 70   Absolute Monocytes Latest Ref Range: 0 17 - 1 22 Thousand/µL 1 51 (H)   Absolute Eosinophils Latest Ref Range: 0 00 - 0 61 Thousand/µL 0 16   Basophils Absolute Latest Ref Range: 0 00 - 0 10 Thousands/µL 0 05   HEPATITIS B SURFACE ANTIGEN Latest Ref Range: Non-reactive, NonReactive - Confirmed  Non-reactive   HEPATITIS C ANTIBODY Latest Ref Range: Non-reactive  Non-reactive   RAPID HIV 1 AND 2 Latest Ref Range: Non-Reactive  Non-Reactive   HIV-1 P24 Ag Latest Ref Range: Non-Reactive  Non-Reactive     Sinus tachycardia  Otherwise normal ECG  When compared with ECG of 19-OCT-2021 17:55,  No significant change was found  Confirmed by Jennifer Mcduffie ([id number]) on 10/21/2021 10:28:26 PM    Past Surgical History:   Procedure Laterality Date    MOUTH SURGERY  08/2021    WISDOM TOOTH EXTRACTION         --> ENT procedures: denies    Current Outpatient Medications   Medication Sig Dispense Refill    Advair Diskus 500-50 MCG/DOSE inhaler USE 1 INHALATION TWICE A DAY (RINSE MOUTH AFTER USE) 180 blister 3    albuterol (PROVENTIL HFA,VENTOLIN HFA) 90 mcg/act inhaler Inhale 2 puffs every 6 (six) hours as needed for wheezing or shortness of breath 18 g 3    ARIPiprazole (ABILIFY) 20 MG tablet Take 20 mg by mouth daily        ARIPiprazole (ABILIFY) 5 mg tablet Take 20 mg by mouth daily        clonazePAM (KlonoPIN) 1 mg tablet Take 1 mg by mouth daily        fluticasone (FLONASE) 50 mcg/act nasal spray 1 spray into each nostril daily 18 2 mL 1    insulin aspart (NovoLOG FlexPen) 100 UNIT/ML injection pen 14 units before breakfast , 20 units before lunch and dinner  45 mL 0    insulin glargine (Lantus) 100 units/mL subcutaneous injection Inject 30 Units under the skin 2 (two) times a day Basal insulin as split into 2 doses, take 30 units in the morning and 30 units before bed 60 mL 0    Insulin Pen Needle (BD Pen Needle Rossana U/F) 32G X 4 MM MISC Use 4/day 100 each 3    levothyroxine 25 mcg tablet Take 50 mcg by mouth daily      loratadine (CLARITIN) 10 mg tablet Take 10 mg by mouth daily      methocarbamol (ROBAXIN) 500 mg tablet Take 1 tablet (500 mg total) by mouth 2 (two) times a day as needed for muscle spasms for up to 10 days 20 tablet 0    metoprolol succinate (TOPROL-XL) 25 mg 24 hr tablet Take 1 tablet (25 mg total) by mouth daily 90 tablet 3    OMEGA-3 FATTY ACIDS PO Take 2 g by mouth daily      pravastatin (PRAVACHOL) 40 mg tablet Take 1 tablet (40 mg total) by mouth daily  0    sertraline (ZOLOFT) 100 mg tablet Take 200 mg by mouth daily        traMADol (ULTRAM) 50 mg tablet Take 1 tablet (50 mg total) by mouth 4 (four) times a day as needed for moderate pain for up to 15 doses 15 tablet 0     No current facility-administered medications for this visit           Social History:  -Employment: retired from [employer]  -Smoking: denied  -Caffeine: 1 coffee and 1 soda  -Alcohol: denied  -THC: denied  -OTC/Supplements/herbals: denied  -Illicits:  denies  -Family: lives with family    ROS:    Review of Systems      Genitourinary none   Cardiology Frequent chest pain or angina, , palpitations/fluttering feeling in the chest and ankle/leg swelling   Gastrointestinal frequent heartburn/acid reflux   Neurology need to move extremities, numbness/tingling of an extremity, forgetfulness, poor concentration or confusion,  and difficulty with memory   Constitutional weight change   Integumentary none   Psychiatry mood change   Musculoskeletal joint pain   Pulmonary shortness of breath with activity, chest tightness, wheezing, frequent cough and snoring   ENT ringing in ears   Endocrine none   Hematological none           MSE:  -Alert and appropriate: alert, calm, cooperative  -Oriented to person, place and time:  name, age, location, day/date/mon/yr  -Behavior: good, sustained eye contact  -Speech: Unremarkable rate/rhythm/volume  -Mood: "good"  -Affect: constricted  -Thought Processes: linear, logical, goal directed  PE:  Body mass index is 28 29 kg/m²  Vitals:    02/17/22 0955   Weight: 77 1 kg (170 lb)   Height: 5' 5" (1 651 m)       -General:  In NAD    -Eyes: Conjunctival injection: none     -Eyelid hooding: no    -ENT: MP: 4/4   -Facial deformity: no retrognathia   -Hard palate: moderte arch   -Soft palate:  crowding   -Gums and teeth: normal dentition   -Tongue:  Scalloping   -Nares:  Patent    -Neck/Lymphatics:   -Circumference: 17 "    -Cardiac:    - LE edema over shins: none appreciated by patient    -Pulm: -Respirations: unlaboured    -Neuro: No resting tremor     -Musculoskeletal: Gait and stance: normal turning and ambulation; unremarkable  Assessment:  Mr Dania Lang is a 39 y o  male who is seen to evaluate for treatment obstructive sleep apnea  He was diagnosed at the [facility] in 2009 with severe ENRIKE and already got his replacement CPAP from [facility]  Its on APAP 4+cm, but he is still symptomatic during the day  He likely needs a higher pressure  Will order Split study for him  he is amenable  He has a history of MI and lung damage from the [address], he would greatly benefit from continued treatment   The pathophysiology of, the reasons to treat and treatment options for obstructive sleep apnea were all reviewed with the patient today  He is amenable to treatment with PAP therapy  Discussed keeping nasal passages clear, abstaining from alcohol, and other sedating drugs at night- which will worsen symptoms of ENRIKE  --History provided by: patient   --Records reviewed: in chart      Recommendations:  1) split study for AHI>15 and then set his current machine to that setting  2) Driving safety was reviewed with patient  If the patient feels too sleepy to drive he knows not to drive  If he becomes sleepy while driving he will pull over and nap  3) Follow-up after testing  4) Call with any questions or concerns  All questions answered for the patient, who indicated understanding and agreed with the plan  Demetrius Gabriel MD  Psychiatry/ Sleep medicine      I spent 40 minutes with patient today in which greater than 50% of the time was spent in counseling/coordination of care regarding treatment    Iain verbally agrees to participate in [employer]  Pt is aware that [employer] could be limited without vital signs or the ability to perform a full hands-on physical exam  Dillon Nunez understands he or the provider may request at any time to terminate the video visit and request the patient to seek care or treatment in person

## 2022-02-18 NOTE — PROGRESS NOTES
Prisca Dillon will be discharged from outpatient physical therapy care due to expiration of plan of care  Last attended tx session was on 1/18 ;did not return to therapy after this date  No objective measures updated, Prisca Dillon is not present at time of discharge

## 2022-03-07 ENCOUNTER — OFFICE VISIT (OUTPATIENT)
Dept: URGENT CARE | Facility: CLINIC | Age: 37
End: 2022-03-07
Payer: COMMERCIAL

## 2022-03-07 VITALS
RESPIRATION RATE: 18 BRPM | DIASTOLIC BLOOD PRESSURE: 78 MMHG | OXYGEN SATURATION: 98 % | TEMPERATURE: 98.3 F | HEART RATE: 81 BPM | SYSTOLIC BLOOD PRESSURE: 140 MMHG

## 2022-03-07 DIAGNOSIS — S05.01XA ABRASION OF RIGHT CORNEA, INITIAL ENCOUNTER: Primary | ICD-10-CM

## 2022-03-07 PROCEDURE — 99213 OFFICE O/P EST LOW 20 MIN: CPT

## 2022-03-07 PROCEDURE — S9088 SERVICES PROVIDED IN URGENT: HCPCS

## 2022-03-07 RX ORDER — CIPROFLOXACIN HYDROCHLORIDE 3.5 MG/ML
2 SOLUTION/ DROPS TOPICAL 4 TIMES DAILY
Qty: 2 ML | Refills: 0 | Status: SHIPPED | OUTPATIENT
Start: 2022-03-07 | End: 2022-03-12

## 2022-03-07 NOTE — PATIENT INSTRUCTIONS
Use eye drops as prescribed  Discontinue contact use  Follow up with Ophthalmology  Take tylenol/motrin for pain  Apply cool compresses  Corneal Abrasion   WHAT YOU NEED TO KNOW:   A corneal abrasion is a scratch on the cornea of your eye  The cornea is the clear layer that covers the front of your eye  A small scratch may heal in 1 to 2 days  Deeper or larger scratches may take longer to heal         DISCHARGE INSTRUCTIONS:   Call your doctor or ophthalmologist if:   · Your eye pain or vision gets worse  · You have yellow or green drainage from your eye  · You have questions or concerns about your condition or care  Medicines:   · Medicines  may be given in the form of eyedrops or ointment to help prevent an eye infection  You may also be given eyedrops to decrease pain  · Take your medicine as directed  Contact your healthcare provider if you think your medicine is not helping or if you have side effects  Tell him or her if you are allergic to any medicine  Keep a list of the medicines, vitamins, and herbs you take  Include the amounts, and when and why you take them  Bring the list or the pill bottles to follow-up visits  Carry your medicine list with you in case of an emergency  Care for your eyes:   · Get regular eye exams  Get your eyes checked at least every year  · Eat healthy foods  Fresh fruits and vegetables that are rich in vitamins A and C may help with your vision  Foods such as sweet potatoes, apricots, and carrots are rich in nutrients for the eyes  · Take care of your contacts or glasses  Store, clean, and use your contacts or glasses as directed  Replace your glasses or contact lenses as often as your healthcare provider suggests  · Decrease eye strain  Rest your eyes, especially after you read or use a computer for long periods of time  Get plenty of sleep at night  Use lights that reduce glare in your home, school, or workplace  · Wear dark sunglasses  This will help prevent pain and light sensitivity  Make sure the sunglasses have UVA and UVB protection  This will protect your eyes when you go outside  · Use eyedrops safely  If your treatment plan includes eyedrops, it is important to use them as directed  Your provider may give you detailed instructions to follow  The eyedrops may also come with safety instructions  Follow all instructions to help prevent an infection  Do not touch the tip of the bottle to your eye  Germs from your eye can spread to the medicine bottle  Help prevent corneal abrasions:   · Remove your contact lenses if your eyes feel dry or irritated  · Wash your hands if you need to touch your eyes or your face  · Trim your fingernails so you cannot scratch your eye  · Wear protective eyewear when you work with chemicals, wood, dust, or metal     · Wear protective eyewear when you play sports  · Do not wear your contacts for longer than you should  · Do not sleep with your contacts in  · Do not wear glitter makeup  Glitter can easily get into your eyes and under contact lenses  Follow up with your doctor or ophthalmologist as directed:  Write down your questions so you remember to ask them during your visits  © Copyright [employer] 2021 Information is for End User's use only and may not be sold, redistributed or otherwise used for commercial purposes  All illustrations and images included in CareNotes® are the copyrighted property of [employer]  or Rocio Car[employer]  The above information is an  only  It is not intended as medical advice for individual conditions or treatments  Talk to your doctor, nurse or pharmacist before following any medical regimen to see if it is safe and effective for you

## 2022-03-07 NOTE — PROGRESS NOTES
[facility]        NAME: Padmini Bach is a 39 y o  male  : 1985    MRN: [id number]  DATE: 2022  TIME: 10:40 AM    Assessment and Plan   Abrasion of right cornea, initial encounter [S05 01XA]  1  Abrasion of right cornea, initial encounter  ciprofloxacin (CILOXAN) 0 3 % ophthalmic solution         Patient Instructions     Patient Instructions     Use eye drops as prescribed  Discontinue contact use  Follow up with Ophthalmology  Take tylenol/motrin for pain  Apply cool compresses  Corneal Abrasion   WHAT YOU NEED TO KNOW:   A corneal abrasion is a scratch on the cornea of your eye  The cornea is the clear layer that covers the front of your eye  A small scratch may heal in 1 to 2 days  Deeper or larger scratches may take longer to heal         DISCHARGE INSTRUCTIONS:   Call your doctor or ophthalmologist if:   · Your eye pain or vision gets worse  · You have yellow or green drainage from your eye  · You have questions or concerns about your condition or care  Medicines:   · Medicines  may be given in the form of eyedrops or ointment to help prevent an eye infection  You may also be given eyedrops to decrease pain  · Take your medicine as directed  Contact your healthcare provider if you think your medicine is not helping or if you have side effects  Tell him or her if you are allergic to any medicine  Keep a list of the medicines, vitamins, and herbs you take  Include the amounts, and when and why you take them  Bring the list or the pill bottles to follow-up visits  Carry your medicine list with you in case of an emergency  Care for your eyes:   · Get regular eye exams  Get your eyes checked at least every year  · Eat healthy foods  Fresh fruits and vegetables that are rich in vitamins A and C may help with your vision  Foods such as sweet potatoes, apricots, and carrots are rich in nutrients for the eyes  · Take care of your contacts or glasses  Store, clean, and use your contacts or glasses as directed  Replace your glasses or contact lenses as often as your healthcare provider suggests  · Decrease eye strain  Rest your eyes, especially after you read or use a computer for long periods of time  Get plenty of sleep at night  Use lights that reduce glare in your home, school, or workplace  · Wear dark sunglasses  This will help prevent pain and light sensitivity  Make sure the sunglasses have UVA and UVB protection  This will protect your eyes when you go outside  · Use eyedrops safely  If your treatment plan includes eyedrops, it is important to use them as directed  Your provider may give you detailed instructions to follow  The eyedrops may also come with safety instructions  Follow all instructions to help prevent an infection  Do not touch the tip of the bottle to your eye  Germs from your eye can spread to the medicine bottle  Help prevent corneal abrasions:   · Remove your contact lenses if your eyes feel dry or irritated  · Wash your hands if you need to touch your eyes or your face  · Trim your fingernails so you cannot scratch your eye  · Wear protective eyewear when you work with chemicals, wood, dust, or metal     · Wear protective eyewear when you play sports  · Do not wear your contacts for longer than you should  · Do not sleep with your contacts in  · Do not wear glitter makeup  Glitter can easily get into your eyes and under contact lenses  Follow up with your doctor or ophthalmologist as directed:  Write down your questions so you remember to ask them during your visits  © [employer] 2021 Information is for End User's use only and may not be sold, redistributed or otherwise used for commercial purposes  All illustrations and images included in CareNotes® are the copyrighted property of [employer]  or Rocio Carreon  The above information is an  only   It is not intended as medical advice for individual conditions or treatments  Talk to your doctor, nurse or pharmacist before following any medical regimen to see if it is safe and effective for you  Follow up with PCP in 3-5 days  Proceed to  ER if symptoms worsen  Chief Complaint     Chief Complaint   Patient presents with    Eye Pain     woke up with right eye pain, redness/watering         History of Present Illness       HPI   Lizzy Talbert is a 39 y o  male presents today for evaluation of right eye pain, redness, irritation, and drainage that started this morning  He is a contact lens wearer and reports taking his contact out last night, but this morning reports it feels like it is still in there  He has not put anything in the eye or taken anything for the pain  He is not wearing his contacts today  Review of Systems   Review of Systems   Constitutional: Negative for chills and fever  HENT: Negative for congestion, ear pain and sore throat  Eyes: Positive for photophobia, pain, discharge, redness and visual disturbance  Respiratory: Negative for cough and shortness of breath  Cardiovascular: Negative for chest pain and palpitations  Gastrointestinal: Negative for abdominal pain, nausea and vomiting  Skin: Negative for color change and rash  Neurological: Negative for dizziness and headaches  All other systems reviewed and are negative          Current Medications       Current Outpatient Medications:     Advair Diskus 500-50 MCG/DOSE inhaler, USE 1 INHALATION TWICE A DAY (RINSE MOUTH AFTER USE), Disp: 180 blister, Rfl: 3    albuterol (PROVENTIL HFA,VENTOLIN HFA) 90 mcg/act inhaler, Inhale 2 puffs every 6 (six) hours as needed for wheezing or shortness of breath, Disp: 18 g, Rfl: 3    ARIPiprazole (ABILIFY) 20 MG tablet, Take 20 mg by mouth daily  , Disp: , Rfl:     ARIPiprazole (ABILIFY) 5 mg tablet, Take 20 mg by mouth daily  , Disp: , Rfl:     clonazePAM (KlonoPIN) 1 mg tablet, Take 1 mg by mouth daily  , Disp: , Rfl:     fluticasone (FLONASE) 50 mcg/act nasal spray, 1 spray into each nostril daily, Disp: 18 2 mL, Rfl: 1    insulin aspart (NovoLOG FlexPen) 100 UNIT/ML injection pen, 14 units before breakfast , 20 units before lunch and dinner , Disp: 45 mL, Rfl: 0    Insulin Pen Needle (BD Pen Needle Rossana U/F) 32G X 4 MM MISC, Use 4/day, Disp: 100 each, Rfl: 3    levothyroxine 25 mcg tablet, Take 50 mcg by mouth daily, Disp: , Rfl:     loratadine (CLARITIN) 10 mg tablet, Take 10 mg by mouth daily, Disp: , Rfl:     metoprolol succinate (TOPROL-XL) 25 mg 24 hr tablet, Take 1 tablet (25 mg total) by mouth daily, Disp: 90 tablet, Rfl: 3    OMEGA-3 FATTY ACIDS PO, Take 2 g by mouth daily, Disp: , Rfl:     pravastatin (PRAVACHOL) 40 mg tablet, Take 1 tablet (40 mg total) by mouth daily, Disp:  , Rfl: 0    sertraline (ZOLOFT) 100 mg tablet, Take 200 mg by mouth daily  , Disp: , Rfl:     traMADol (ULTRAM) 50 mg tablet, Take 1 tablet (50 mg total) by mouth 4 (four) times a day as needed for moderate pain for up to 15 doses, Disp: 15 tablet, Rfl: 0    ciprofloxacin (CILOXAN) 0 3 % ophthalmic solution, Administer 2 drops to the right eye 4 (four) times a day for 5 days, Disp: 2 mL, Rfl: 0    insulin glargine (Lantus) 100 units/mL subcutaneous injection, Inject 30 Units under the skin 2 (two) times a day Basal insulin as split into 2 doses, take 30 units in the morning and 30 units before bed, Disp: 60 mL, Rfl: 0    methocarbamol (ROBAXIN) 500 mg tablet, Take 1 tablet (500 mg total) by mouth 2 (two) times a day as needed for muscle spasms for up to 10 days, Disp: 20 tablet, Rfl: 0    Current Allergies     Allergies as of 03/07/2022 - Reviewed 03/07/2022   Allergen Reaction Noted    Lamotrigine GI Intolerance 09/18/2020    Niacin Rash 02/11/2018    Simvastatin Other (See Comments) 12/13/2015            The following portions of the patient's history were reviewed and updated as appropriate: allergies, current medications, past family history, past medical history, past social history, past surgical history and problem list      Past Medical History:   Diagnosis Date    COVID-19 03/17/2021    Diabetes mellitus ([address] )     type 2    Disease of thyroid gland     hypothyroidism    Hyperlipidemia     Hypertension     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    Psychiatric disorder     PTSD  Anxiety, depression,     Psychogenic nonepileptic spells        Past Surgical History:   Procedure Laterality Date    MOUTH SURGERY  08/2021    WISDOM TOOTH EXTRACTION         Family History   Problem Relation Age of Onset    Hyperlipidemia Father     Heart attack Paternal Grandfather     Prostate cancer Paternal Grandfather     Cancer Paternal Grandmother          Medications have been verified  Objective   /78   Pulse 81   Temp 98 3 °F (36 8 °C)   Resp 18   SpO2 98%        Physical Exam     Physical Exam  Vitals and nursing note reviewed  Constitutional:       General: He is not in acute distress  Appearance: Normal appearance  He is well-developed  HENT:      Head: Normocephalic and atraumatic  Mouth/Throat:      Pharynx: Uvula midline  Tonsils: No tonsillar exudate  Eyes:      General: Lids are normal  Vision grossly intact  Right eye: Discharge (clear to yellow drainage noted) present  No foreign body  Extraocular Movements: Extraocular movements intact  Right eye: Normal extraocular motion  Conjunctiva/sclera:      Right eye: Right conjunctiva is injected  Pupils: Pupils are equal, round, and reactive to light  Right eye: Corneal abrasion and fluorescein uptake present  Comments: After patient consent was obtained, 1 drop of tetracaine and fluorescein stain was administered into right eye   Direct visualization of small abrasion between 11 o'clock and 1 o'clock position was achieved with UV light  R eyelid was everted without evidence of foreign body  Right eye was irrigated with saline solution  Patient tolerated procedure without incident  Cardiovascular:      Rate and Rhythm: Normal rate and regular rhythm  Heart sounds: Normal heart sounds, S1 normal and S2 normal    Pulmonary:      Effort: Pulmonary effort is normal       Breath sounds: Normal breath sounds  No wheezing, rhonchi or rales  Skin:     General: Skin is warm and dry  Capillary Refill: Capillary refill takes less than 2 seconds  Neurological:      Mental Status: He is alert  Psychiatric:         Behavior: Behavior is cooperative

## 2022-04-04 ENCOUNTER — OFFICE VISIT (OUTPATIENT)
Dept: PULMONOLOGY | Facility: CLINIC | Age: 37
End: 2022-04-04
Payer: COMMERCIAL

## 2022-04-04 VITALS
HEIGHT: 65 IN | SYSTOLIC BLOOD PRESSURE: 138 MMHG | WEIGHT: 172 LBS | OXYGEN SATURATION: 98 % | HEART RATE: 93 BPM | TEMPERATURE: 97.8 F | DIASTOLIC BLOOD PRESSURE: 72 MMHG | BODY MASS INDEX: 28.66 KG/M2

## 2022-04-04 DIAGNOSIS — J45.40 MODERATE PERSISTENT ASTHMA WITHOUT COMPLICATION: Primary | ICD-10-CM

## 2022-04-04 DIAGNOSIS — Z28.21 COVID-19 VACCINATION REFUSED: ICD-10-CM

## 2022-04-04 DIAGNOSIS — U09.9 POST-ACUTE SEQUELAE OF COVID-19 (PASC): ICD-10-CM

## 2022-04-04 PROCEDURE — 1036F TOBACCO NON-USER: CPT | Performed by: INTERNAL MEDICINE

## 2022-04-04 PROCEDURE — 99214 OFFICE O/P EST MOD 30 MIN: CPT | Performed by: INTERNAL MEDICINE

## 2022-04-04 PROCEDURE — 3008F BODY MASS INDEX DOCD: CPT | Performed by: INTERNAL MEDICINE

## 2022-04-04 PROCEDURE — 3078F DIAST BP <80 MM HG: CPT | Performed by: INTERNAL MEDICINE

## 2022-04-04 PROCEDURE — 3075F SYST BP GE 130 - 139MM HG: CPT | Performed by: INTERNAL MEDICINE

## 2022-04-04 NOTE — PROGRESS NOTES
Pulmonary Follow Up Note  Dariusz Palomino 39 y o  male MRN: [id number]  4/4/2022      HPI:    Patient states that shortness of breath is improved significant  Unfortunately, he tested positive for COVID-19 approximately 1 month ago again but had very mild symptoms  No fevers or chills  No significant exercise intolerance  No longer requiring supplemental oxygen    Meds:  Advair 500 q 12  Albuterol as needed, rarely    ROS:  Constitutional: - Fatigue, - chills, - fever, - weight change  HEENT: - rhinorrhea, - sneezing, - sore throat  Respiratory: - cough, rare intermittent  shortness of breath, - wheezing  Cardiovascular: - chest pain,  -palpitations, - leg swelling  Gastrointestinal: - abdominal pain, - constipation, - diarrhea, - nausea, - vomiting  Endocrine: - cold intolerance, - heat intolerance  Genitourinary: - dysuria  Musculoskeletal: - arthralgias  Skin:- rash, - wound  Allergic/Immunologic: - allergies  Neurological: - dizziness, - numbness        Vitals: Blood pressure 138/72, pulse 93, temperature 97 8 °F (36 6 °C), height 5' 5" (1 651 m), weight 78 kg (172 lb), SpO2 98 %  , Body mass index is 28 62 kg/m²      Physical Exam:  GEN  NAD  HEENT  ncat, non icteric, MM moist  NECK  supple, no JVD, no LAD  CV  +s1s2, no mrg, RRR  PULM  CTA BL, no wrr  ABD  soft, ntnd, + BS  EXT  no edema, no cyanosis, no clubbing  NEURO  Aox3, no focal weakness    Imaging and other studies:   I personally viewed interpreted following imaging studies:  Chest x-ray 10/19/2021 shows low lung volumes and increased alveolar infiltrates bilaterally    Pulmonary function testing:   I personally interpreted pulmonary function tests from 12/08/2021:  Normal spirometry  Normal lung volumes  Mild diffusing impairment    Assessment:  Moderate persistent asthma  Reactive airways disease  PASC  Hypoxic respiratory failure-resolved    Plan:  · Continue Advair 500 q 12  · Breo is not covered by insurance  · Continue albuterol as needed  · Yearly PFTs  · Patient continues to feel markedly improved  · Again counseled on the importance of COVID-19 vaccination  Patient continues to decline for personal unspecified reasons    Return visit in 1 year  On next visit we will evaluate PFT results, symptoms, albuterol requirement, compliance with Advair    CAYETANO Barker [facility]

## 2022-04-05 DIAGNOSIS — E11.9 TYPE 2 DIABETES MELLITUS WITHOUT COMPLICATION, WITH LONG-TERM CURRENT USE OF INSULIN (HCC): ICD-10-CM

## 2022-04-05 DIAGNOSIS — Z79.4 TYPE 2 DIABETES MELLITUS WITHOUT COMPLICATION, WITH LONG-TERM CURRENT USE OF INSULIN (HCC): ICD-10-CM

## 2022-04-05 RX ORDER — METOPROLOL SUCCINATE 25 MG/1
25 TABLET, EXTENDED RELEASE ORAL DAILY
Qty: 90 TABLET | Refills: 3 | Status: SHIPPED | OUTPATIENT
Start: 2022-04-05

## 2022-04-14 ENCOUNTER — RA CDI HCC (OUTPATIENT)
Dept: OTHER | Facility: HOSPITAL | Age: 37
End: 2022-04-14

## 2022-04-14 NOTE — PROGRESS NOTES
[address]  coding opportunities       Chart reviewed, no opportunity found: CHART REVIEWED, NO OPPORTUNITY FOUND        Patients Insurance     Medicare Insurance: Medicare

## 2022-04-20 ENCOUNTER — OFFICE VISIT (OUTPATIENT)
Dept: URGENT CARE | Facility: CLINIC | Age: 37
End: 2022-04-20
Payer: COMMERCIAL

## 2022-04-20 VITALS
BODY MASS INDEX: 29.16 KG/M2 | HEART RATE: 92 BPM | RESPIRATION RATE: 18 BRPM | TEMPERATURE: 98 F | WEIGHT: 175 LBS | HEIGHT: 65 IN | OXYGEN SATURATION: 97 %

## 2022-04-20 DIAGNOSIS — L02.212 CUTANEOUS ABSCESS OF BACK EXCLUDING BUTTOCKS: Primary | ICD-10-CM

## 2022-04-20 PROCEDURE — 99213 OFFICE O/P EST LOW 20 MIN: CPT | Performed by: PHYSICIAN ASSISTANT

## 2022-04-20 PROCEDURE — S9088 SERVICES PROVIDED IN URGENT: HCPCS | Performed by: PHYSICIAN ASSISTANT

## 2022-04-20 RX ORDER — SULFAMETHOXAZOLE AND TRIMETHOPRIM 800; 160 MG/1; MG/1
1 TABLET ORAL EVERY 12 HOURS SCHEDULED
Qty: 14 TABLET | Refills: 0 | Status: SHIPPED | OUTPATIENT
Start: 2022-04-20 | End: 2022-04-27

## 2022-04-20 NOTE — PROGRESS NOTES
[facility]    NAME: Rufino Oh is a 39 y o  male  : 1985    MRN: [id number]  DATE: 2022  TIME: 1:31 PM    Assessment and Plan   Cutaneous abscess of back excluding buttocks [L02 212]  1  Cutaneous abscess of back excluding buttocks  sulfamethoxazole-trimethoprim (BACTRIM DS) 800-160 mg per tablet    Ambulatory Referral to [facility] is very firm  Appears to had drained at 1 point  It may drain and resolve with antibiotic  But if not resolving, recommend follow-up with General surgery  Patient Instructions     Patient Instructions   Antibiotic as directed  Follow up with general surgery if not improving  Chief Complaint     Chief Complaint   Patient presents with    Wound Infection     on back started last week        History of Present Illness   48-year-old male here with complaint of infection on his upper back  Started a couple days ago has been getting progressively worse  Noticed some drainage  Is very painful  He denies any fever or chills  Review of Systems   Review of Systems   Constitutional: Negative for chills, fatigue and fever  HENT: Negative for congestion, ear pain, postnasal drip, sinus pressure and sore throat  Respiratory: Negative for cough, shortness of breath and wheezing  Skin: Positive for color change and wound  Neurological: Negative for headaches  All other systems reviewed and are negative        Current Medications     Current Outpatient Medications:     Advair Diskus 500-50 MCG/DOSE inhaler, USE 1 INHALATION TWICE A DAY (RINSE MOUTH AFTER USE), Disp: 180 blister, Rfl: 3    albuterol (PROVENTIL HFA,VENTOLIN HFA) 90 mcg/act inhaler, Inhale 2 puffs every 6 (six) hours as needed for wheezing or shortness of breath, Disp: 18 g, Rfl: 3    ARIPiprazole (ABILIFY) 20 MG tablet, Take 20 mg by mouth daily  , Disp: , Rfl:     ARIPiprazole (ABILIFY) 5 mg tablet, Take 20 mg by mouth daily  , Disp: , Rfl:     clonazePAM (KlonoPIN) 1 mg tablet, Take 1 mg by mouth daily  , Disp: , Rfl:     fluticasone (FLONASE) 50 mcg/act nasal spray, 1 spray into each nostril daily, Disp: 18 2 mL, Rfl: 1    insulin aspart (NovoLOG FlexPen) 100 UNIT/ML injection pen, 14 units before breakfast , 20 units before lunch and dinner , Disp: 45 mL, Rfl: 0    insulin glargine (Lantus) 100 units/mL subcutaneous injection, Inject 30 Units under the skin 2 (two) times a day Basal insulin as split into 2 doses, take 30 units in the morning and 30 units before bed, Disp: 60 mL, Rfl: 0    Insulin Pen Needle (BD Pen Needle Rossana U/F) 32G X 4 MM MISC, Use 4/day, Disp: 100 each, Rfl: 3    levothyroxine 25 mcg tablet, Take 50 mcg by mouth daily, Disp: , Rfl:     loratadine (CLARITIN) 10 mg tablet, Take 10 mg by mouth daily, Disp: , Rfl:     methocarbamol (ROBAXIN) 500 mg tablet, Take 1 tablet (500 mg total) by mouth 2 (two) times a day as needed for muscle spasms for up to 10 days, Disp: 20 tablet, Rfl: 0    metoprolol succinate (TOPROL-XL) 25 mg 24 hr tablet, Take 1 tablet (25 mg total) by mouth daily, Disp: 90 tablet, Rfl: 3    OMEGA-3 FATTY ACIDS PO, Take 2 g by mouth daily, Disp: , Rfl:     pravastatin (PRAVACHOL) 40 mg tablet, Take 1 tablet (40 mg total) by mouth daily, Disp:  , Rfl: 0    sertraline (ZOLOFT) 100 mg tablet, Take 200 mg by mouth daily  , Disp: , Rfl:     sulfamethoxazole-trimethoprim (BACTRIM DS) 800-160 mg per tablet, Take 1 tablet by mouth every 12 (twelve) hours for 7 days, Disp: 14 tablet, Rfl: 0    traMADol (ULTRAM) 50 mg tablet, Take 1 tablet (50 mg total) by mouth 4 (four) times a day as needed for moderate pain for up to 15 doses, Disp: 15 tablet, Rfl: 0    Current Allergies     Allergies as of 04/20/2022 - Reviewed 04/20/2022   Allergen Reaction Noted    Lamotrigine GI Intolerance 09/18/2020    Niacin Rash 02/11/2018    Simvastatin Other (See Comments) 12/13/2015          The following portions of the patient's history were reviewed and updated as appropriate: allergies, current medications, past family history, past medical history, past social history, past surgical history and problem list    Past Medical History:   Diagnosis Date    COVID-19 03/17/2021    Diabetes mellitus ([facility] [address] )     type 2    Disease of thyroid gland     hypothyroidism    Hyperlipidemia     Hypertension     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    Psychiatric disorder     PTSD  Anxiety, depression,     Psychogenic nonepileptic spells      Past Surgical History:   Procedure Laterality Date    MOUTH SURGERY  08/2021    WISDOM TOOTH EXTRACTION       Family History   Problem Relation Age of Onset    Hyperlipidemia Father     Heart attack Paternal Grandfather     Prostate cancer Paternal Grandfather     Cancer Paternal Grandmother      Social History     Socioeconomic History    Marital status: /Civil Union     Spouse name: Not on file    Number of children: Not on file    Years of education: Not on file    Highest education level: Not on file   Occupational History    Not on file   Tobacco Use    Smoking status: Never Smoker    Smokeless tobacco: Never Used   Vaping Use    Vaping Use: Never used   Substance and Sexual Activity    Alcohol use: Not Currently    Drug use: Not Currently     Types: Marijuana    Sexual activity: Not Currently   Other Topics Concern    Not on file   Social History Narrative    Not on file     Social Determinants of Health     Financial Resource Strain: Not on file   Food Insecurity: Not on file   Transportation Needs: Not on file   Physical Activity: Not on file   Stress: Not on file   Social Connections: Not on file   Intimate Partner Violence: Not on file   Housing Stability: Not on file     Medications have been verified      Objective   Pulse 92   Temp 98 °F (36 7 °C)   Resp 18   Ht 5' 5" (1 651 m)   Wt 79 4 kg (175 lb)   SpO2 97%   BMI 29 12 kg/m²      Physical Exam   Physical Exam  Vitals and nursing note reviewed  Constitutional:       General: He is not in acute distress  Appearance: He is well-developed  HENT:      Head: Normocephalic and atraumatic  Right Ear: Tympanic membrane normal       Left Ear: Tympanic membrane normal       Nose: Nose normal  No mucosal edema  Mouth/Throat:      Mouth: Mucous membranes are moist    Cardiovascular:      Rate and Rhythm: Normal rate and regular rhythm  Heart sounds: Normal heart sounds  Pulmonary:      Effort: Pulmonary effort is normal  No respiratory distress  Breath sounds: Normal breath sounds     Skin:

## 2022-04-23 ENCOUNTER — HOSPITAL ENCOUNTER (EMERGENCY)
Facility: HOSPITAL | Age: 37
Discharge: HOME | End: 2022-04-23
Attending: EMERGENCY MEDICINE | Admitting: EMERGENCY MEDICINE
Payer: COMMERCIAL

## 2022-04-23 VITALS
RESPIRATION RATE: 18 BRPM | HEIGHT: 65 IN | TEMPERATURE: 96.9 F | WEIGHT: 175 LBS | SYSTOLIC BLOOD PRESSURE: 126 MMHG | HEART RATE: 84 BPM | DIASTOLIC BLOOD PRESSURE: 76 MMHG | BODY MASS INDEX: 29.16 KG/M2 | OXYGEN SATURATION: 98 %

## 2022-04-23 DIAGNOSIS — L02.91 ABSCESS: Primary | ICD-10-CM

## 2022-04-23 DIAGNOSIS — L03.90 CELLULITIS: ICD-10-CM

## 2022-04-23 PROCEDURE — 99283 EMERGENCY DEPT VISIT LOW MDM: CPT

## 2022-04-23 PROCEDURE — 10061 I&D ABSCESS COMP/MULTIPLE: CPT | Performed by: PHYSICIAN ASSISTANT

## 2022-04-23 PROCEDURE — 99284 EMERGENCY DEPT VISIT MOD MDM: CPT | Performed by: PHYSICIAN ASSISTANT

## 2022-04-23 RX ORDER — CEFUROXIME AXETIL 500 MG/1
500 TABLET ORAL EVERY 12 HOURS SCHEDULED
Qty: 14 TABLET | Refills: 0 | Status: SHIPPED | OUTPATIENT
Start: 2022-04-23 | End: 2022-04-30

## 2022-04-23 RX ORDER — CEFUROXIME AXETIL 500 MG/1
500 TABLET ORAL EVERY 12 HOURS SCHEDULED
Qty: 14 TABLET | Refills: 0 | Status: SHIPPED | OUTPATIENT
Start: 2022-04-23 | End: 2022-04-23 | Stop reason: SDUPTHER

## 2022-04-23 RX ORDER — LIDOCAINE HYDROCHLORIDE 10 MG/ML
20 INJECTION, SOLUTION EPIDURAL; INFILTRATION; INTRACAUDAL; PERINEURAL ONCE
Status: COMPLETED | OUTPATIENT
Start: 2022-04-23 | End: 2022-04-23

## 2022-04-23 RX ADMIN — LIDOCAINE HYDROCHLORIDE 20 ML: 10 INJECTION, SOLUTION EPIDURAL; INFILTRATION; INTRACAUDAL at 11:22

## 2022-04-23 NOTE — ED ATTENDING ATTESTATION
4/23/2022  INathalia DO, saw and evaluated the patient  I have discussed the patient with the resident/non-physician practitioner and agree with the resident's/non-physician practitioner's findings, Plan of Care, and MDM as documented in the resident's/non-physician practitioner's note, except where noted  All available labs and Radiology studies were reviewed  I was present for key portions of any procedure(s) performed by the resident/non-physician practitioner and I was immediately available to provide assistance  At this point I agree with the current assessment done in the Emergency Department  I have conducted an independent evaluation of this patient a history and physical is as follows:    ED Course     Evaluated with PA at bedside  Abscess noted to upper thoracic spine with overlying erythema  4 more days of bactrim still remaining in prior prescription  Wound opened and packed with iodoform packing  Discussed with patient packing needs to be removed in 48 hours  Stressed keeping and going to gen surg appt he already has on 4/27  Strict return precautions provided and discussed    Antibiotics added    Critical Care Time  Procedures

## 2022-04-23 NOTE — ED PROVIDER NOTES
History  Chief Complaint   Patient presents with    Abscess     pt has an abscess in his mid upper back, pt has been taking antibiotics but believes they are ineffective  pt has an appt on      39year-old male presents to the emergency department seeking evaluation for an abscess and it infection for his upper back  Patient was previously seen at an urgent care for this and prescribed Bactrim  Patient does have an upcoming appoint with General surgery on the   Patient denies fevers chills or signs of systemic illness  Allergies reviewed          Prior to Admission Medications   Prescriptions Last Dose Informant Patient Reported? Taking? ARIPiprazole (ABILIFY) 20 MG tablet  Self Yes No   Sig: Take 20 mg by mouth daily     ARIPiprazole (ABILIFY) 5 mg tablet  Self Yes No   Sig: Take 20 mg by mouth daily     Advair Diskus 500-50 MCG/DOSE inhaler  Self No No   Sig: USE 1 INHALATION TWICE A DAY (RINSE MOUTH AFTER USE)   Insulin Pen Needle (BD Pen Needle Rossana U/F) 32G X 4 MM MISC  Self No No   Sig: Use 4/day   OMEGA-3 FATTY ACIDS PO  Self Yes No   Sig: Take 2 g by mouth daily   albuterol (PROVENTIL HFA,VENTOLIN HFA) 90 mcg/act inhaler  Self No No   Sig: Inhale 2 puffs every 6 (six) hours as needed for wheezing or shortness of breath   clonazePAM (KlonoPIN) 1 mg tablet  Self Yes No   Sig: Take 1 mg by mouth daily     fluticasone (FLONASE) 50 mcg/act nasal spray  Self No No   Si spray into each nostril daily   insulin aspart (NovoLOG FlexPen) 100 UNIT/ML injection pen  Self No No   Si units before breakfast , 20 units before lunch and dinner     insulin glargine (Lantus) 100 units/mL subcutaneous injection  Self No No   Sig: Inject 30 Units under the skin 2 (two) times a day Basal insulin as split into 2 doses, take 30 units in the morning and 30 units before bed   levothyroxine 25 mcg tablet  Self Yes No   Sig: Take 50 mcg by mouth daily   loratadine (CLARITIN) 10 mg tablet  Self Yes No   Sig: Take 10 mg by mouth daily   methocarbamol (ROBAXIN) 500 mg tablet  Self No No   Sig: Take 1 tablet (500 mg total) by mouth 2 (two) times a day as needed for muscle spasms for up to 10 days   metoprolol succinate (TOPROL-XL) 25 mg 24 hr tablet   No No   Sig: Take 1 tablet (25 mg total) by mouth daily   pravastatin (PRAVACHOL) 40 mg tablet  Self No No   Sig: Take 1 tablet (40 mg total) by mouth daily   sertraline (ZOLOFT) 100 mg tablet  Self Yes No   Sig: Take 200 mg by mouth daily     sulfamethoxazole-trimethoprim (BACTRIM DS) 800-160 mg per tablet   No No   Sig: Take 1 tablet by mouth every 12 (twelve) hours for 7 days   traMADol (ULTRAM) 50 mg tablet  Self No No   Sig: Take 1 tablet (50 mg total) by mouth 4 (four) times a day as needed for moderate pain for up to 15 doses      Facility-Administered Medications: None       Past Medical History:   Diagnosis Date    COVID-19 03/17/2021    Diabetes mellitus ([facility] [address] )     type 2    Disease of thyroid gland     hypothyroidism    Hyperlipidemia     Hypertension     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    Psychiatric disorder     PTSD  Anxiety, depression,     Psychogenic nonepileptic spells        Past Surgical History:   Procedure Laterality Date    MOUTH SURGERY  08/2021    WISDOM TOOTH EXTRACTION         Family History   Problem Relation Age of Onset    Hyperlipidemia Father     Heart attack Paternal Grandfather     Prostate cancer Paternal Grandfather     Cancer Paternal Grandmother      I have reviewed and agree with the history as documented      E-Cigarette/Vaping    E-Cigarette Use Never User      E-Cigarette/Vaping Substances    Nicotine No     THC No     CBD No     Flavoring No     Other No      Social History     Tobacco Use    Smoking status: Never Smoker    Smokeless tobacco: Never Used   Vaping Use    Vaping Use: Never used   Substance Use Topics    Alcohol use: Not Currently    Drug use: Not Currently     Types: Marijuana Review of Systems   Constitutional: Negative for chills and fever  HENT: Negative for ear pain and sore throat  Eyes: Negative for pain and visual disturbance  Respiratory: Negative for cough and shortness of breath  Cardiovascular: Negative for chest pain and palpitations  Gastrointestinal: Negative for abdominal pain and vomiting  Genitourinary: Negative for dysuria and hematuria  Musculoskeletal: Negative for arthralgias and back pain  Skin: Positive for rash and wound  Negative for color change  Neurological: Negative for seizures and syncope  All other systems reviewed and are negative  Physical Exam  Physical Exam  Vitals and nursing note reviewed  Constitutional:       General: He is not in acute distress  Appearance: He is well-developed  He is not ill-appearing  HENT:      Head: Normocephalic and atraumatic  Right Ear: External ear normal       Left Ear: External ear normal       Nose: Nose normal    Eyes:      Pupils: Pupils are equal, round, and reactive to light  Cardiovascular:      Rate and Rhythm: Normal rate and regular rhythm  Heart sounds: Normal heart sounds  No murmur heard  No friction rub  No gallop  Pulmonary:      Effort: Pulmonary effort is normal  No respiratory distress  Breath sounds: Normal breath sounds  No stridor  No wheezing or rales  Abdominal:      General: Bowel sounds are normal  There is no distension  Palpations: Abdomen is soft  Tenderness: There is no abdominal tenderness  There is no guarding  Musculoskeletal:         General: No tenderness  Normal range of motion  Cervical back: Normal range of motion and neck supple  Skin:     General: Skin is warm  Capillary Refill: Capillary refill takes less than 2 seconds  Comments: Abscess of the upper thoracic spine superior to the scapulae   Neurological:      Mental Status: He is alert and oriented to person, place, and time  Psychiatric:         Behavior: Behavior is cooperative  Vital Signs  ED Triage Vitals [04/23/22 1052]   Temperature Pulse Respirations Blood Pressure SpO2   (!) 96 9 °F (36 1 °C) 84 18 126/76 96 %      Temp Source Heart Rate Source Patient Position - Orthostatic VS BP Location FiO2 (%)   Tympanic Monitor Lying Right arm --      Pain Score       8           Vitals:    04/23/22 1052   BP: 126/76   Pulse: 84   Patient Position - Orthostatic VS: Lying         Visual Acuity      ED Medications  Medications   lidocaine (PF) (XYLOCAINE-MPF) 1 % injection 20 mL (20 mL Infiltration Given 4/23/22 1122)       Diagnostic Studies  Results Reviewed     None                 No orders to display              Procedures  Incision and drain    Date/Time: 4/23/2022 11:20 AM  Performed by: Nicola Merritt PA-C  Authorized by: Nicola Merritt PA-C   Universal Protocol:  Procedure performed by: (Dr Patricia Cross)  Consent: Verbal consent obtained  Risks and benefits: risks, benefits and alternatives were discussed  Consent given by: patient  Required items: required blood products, implants, devices, and special equipment available  Patient identity confirmed: verbally with patient and arm band      Patient location:  ED  Location:     Type:  Abscess             ED Course                                             MDM  Number of Diagnoses or Management Options  Abscess  Cellulitis  Diagnosis management comments: Incision and drainage well tolerated  Assistance provided by Dr Patricia Cross  Adequate drainage  Packing placed  Wound was thoroughly irrigated  Recommend follow-up with General surgery  General surgery appointment is already been made for next week  Added Ceftin to the patient's course of Bactrim  Patient educated regarding their diagnosis and given return and follow-up instructions  Patient was advised to returned to the ED with worsening symptoms or concerns    Patient is understanding of and in agreement with the treatment plan  There are no questions at the time of discharge  Risk of Complications, Morbidity, and/or Mortality  Presenting problems: low  Diagnostic procedures: low  Management options: low    Patient Progress  Patient progress: stable      Disposition  Final diagnoses:   Abscess   Cellulitis     Time reflects when diagnosis was documented in both MDM as applicable and the Disposition within this note     Time User Action Codes Description Comment    4/23/2022 11:29 AM Albino Shaw Add [L02 91] Abscess     4/23/2022 11:29 AM Matthew Ottder Add [U48 18] Cellulitis       ED Disposition     ED Disposition Condition Date/Time Comment    Discharge Stable Sat Apr 23, 2022 11:29 AM Alona Alexander discharge to home/self care  Follow-up Information     Follow up With Specialties Details Why Contact Info Additional [address] General Surgery   [address] [address]  [facility], [address] [facility], [address], [address] [place] [address]    Christina Barth MD General Surgery, Surgical Oncology   [address] [facility]   [place] [address]  [phone]       Patric Epps MD General Surgery   [address] [address] [address], MD General Surgery   [address] [facility]  [phone]             Discharge Medication List as of 4/23/2022 11:31 AM      START taking these medications    Details   cefuroxime (CEFTIN) 500 mg tablet Take 1 tablet (500 mg total) by mouth every 12 (twelve) hours for 7 days, Starting Sat 4/23/2022, Until Sat 4/30/2022, Normal         CONTINUE these medications which have NOT CHANGED    Details   Advair Diskus 500-50 MCG/DOSE inhaler USE 1 INHALATION TWICE A DAY (RINSE MOUTH AFTER USE), Normal      albuterol (PROVENTIL HFA,VENTOLIN HFA) 90 mcg/act inhaler Inhale 2 puffs every 6 (six) hours as needed for wheezing or shortness of breath, Starting Thu 11/4/2021, Normal      !! ARIPiprazole (ABILIFY) 20 MG tablet Take 20 mg by mouth daily  , Starting Mon 1/3/2022, Historical Med      !! ARIPiprazole (ABILIFY) 5 mg tablet Take 20 mg by mouth daily  , Starting Mon 12/13/2021, Historical Med      clonazePAM (KlonoPIN) 1 mg tablet Take 1 mg by mouth daily  , Starting Mon 5/3/2021, Historical Med      fluticasone (FLONASE) 50 mcg/act nasal spray 1 spray into each nostril daily, Starting Thu 11/4/2021, Normal      insulin aspart (NovoLOG FlexPen) 100 UNIT/ML injection pen 14 units before breakfast , 20 units before lunch and dinner , Normal      insulin glargine (Lantus) 100 units/mL subcutaneous injection Inject 30 Units under the skin 2 (two) times a day Basal insulin as split into 2 doses, take 30 units in the morning and 30 units before bed, Starting Thu 10/21/2021, Until Mon 1/24/2022, Normal      Insulin Pen Needle (BD Pen Needle Rossana U/F) 32G X 4 MM MISC Use 4/day, Normal      levothyroxine 25 mcg tablet Take 50 mcg by mouth daily, Historical Med      loratadine (CLARITIN) 10 mg tablet Take 10 mg by mouth daily, Historical Med      methocarbamol (ROBAXIN) 500 mg tablet Take 1 tablet (500 mg total) by mouth 2 (two) times a day as needed for muscle spasms for up to 10 days, Starting Thu 12/23/2021, Until Mon 1/24/2022 at 2359, Normal      metoprolol succinate (TOPROL-XL) 25 mg 24 hr tablet Take 1 tablet (25 mg total) by mouth daily, Starting Tue 4/5/2022, Normal      OMEGA-3 FATTY ACIDS PO Take 2 g by mouth daily, Historical Med      pravastatin (PRAVACHOL) 40 mg tablet Take 1 tablet (40 mg total) by mouth daily, Starting Fri 4/9/2021, No Print      sertraline (ZOLOFT) 100 mg tablet Take 200 mg by mouth daily  , Starting Wed 4/7/2021, Historical Med      sulfamethoxazole-trimethoprim (BACTRIM DS) 800-160 mg per tablet Take 1 tablet by mouth every 12 (twelve) hours for 7 days, Starting Wed 4/20/2022, Until Wed 4/27/2022, Normal      traMADol (ULTRAM) 50 mg tablet Take 1 tablet (50 mg total) by mouth 4 (four) times a day as needed for moderate pain for up to 15 doses, Starting Thu 12/23/2021, Normal       !! - Potential duplicate medications found  Please discuss with provider  No discharge procedures on file      PDMP Review     None          ED Provider  Electronically Signed by           Prieto Ramírez PA-C  04/25/22 0120

## 2022-04-26 ENCOUNTER — TELEPHONE (OUTPATIENT)
Dept: SURGERY | Facility: CLINIC | Age: 37
End: 2022-04-26

## 2022-04-26 NOTE — TELEPHONE ENCOUNTER
Called patient's cell phone twice to reschedule his appointment to Friday but got the busy tone, so I called his father and left a message to give us a call to see if rescheduling his appointment to Friday is okay for him

## 2022-05-02 ENCOUNTER — CONSULT (OUTPATIENT)
Dept: SURGERY | Facility: CLINIC | Age: 37
End: 2022-05-02
Payer: COMMERCIAL

## 2022-05-02 VITALS
TEMPERATURE: 98.2 F | DIASTOLIC BLOOD PRESSURE: 70 MMHG | HEART RATE: 80 BPM | HEIGHT: 65 IN | SYSTOLIC BLOOD PRESSURE: 118 MMHG | WEIGHT: 191 LBS | BODY MASS INDEX: 31.82 KG/M2 | OXYGEN SATURATION: 96 %

## 2022-05-02 DIAGNOSIS — L02.212 CUTANEOUS ABSCESS OF BACK EXCLUDING BUTTOCKS: ICD-10-CM

## 2022-05-02 PROCEDURE — 1036F TOBACCO NON-USER: CPT | Performed by: SURGERY

## 2022-05-02 PROCEDURE — 99213 OFFICE O/P EST LOW 20 MIN: CPT | Performed by: SURGERY

## 2022-05-02 PROCEDURE — 3008F BODY MASS INDEX DOCD: CPT | Performed by: SURGERY

## 2022-05-02 PROCEDURE — 3078F DIAST BP <80 MM HG: CPT | Performed by: SURGERY

## 2022-05-02 PROCEDURE — 3074F SYST BP LT 130 MM HG: CPT | Performed by: SURGERY

## 2022-05-02 NOTE — ASSESSMENT & PLAN NOTE
Patient is a pleasant 79-year-old male presenting status post I&D of a back abscess in the emergency department on April 23rd  The patient voiced no new complaints referable to the wound  He had wound packing placed  This was removed 2 days following the procedure  He has completed his antibiotic course  On physical examination he is a well-nourished well-developed male  He is in no acute distress  Pleasant competent reliable as a historian  Inspection and palpation of the wound reveals it to be well healed  There is a small bed of granulation tissue  The wound reach dressed with a topical antibiotic and Band-Aid applied  I have instructed the patient to return to the practice in 3 months time to reassess the wound and to decide as to whether not there is a necessity to excise the scar to prophylax against recurrent abscess formation  All questions answered to the satisfaction of the patient

## 2022-05-02 NOTE — LETTER
May 2, 2022     [facility], [address] [facility] [address]    Patient: Selvin Neumann   YOB: 1985   Date of Visit: 5/2/2022       Dear Dr Iris Pallas: Thank you for referring Kristen Gomez to me for evaluation  Below are my notes for this consultation  If you have questions, please do not hesitate to call me  I look forward to following your patient along with you  Sincerely,        Ngoc Bellamy MD        CC: MD Ngoc Gutierrez MD  5/2/2022  1:24 PM  Incomplete  Assessment/Plan:    Back abscess  Patient is a pleasant 59-year-old male presenting status post I&D of a back abscess in the emergency department on April 23rd  The patient voiced no new complaints referable to the wound  He had wound packing placed  This was removed 2 days following the procedure  He has completed his antibiotic course  On physical examination he is a well-nourished well-developed male  He is in no acute distress  Pleasant competent reliable as a historian  Inspection and palpation of the wound reveals it to be well healed  There is a small bed of granulation tissue  The wound reach dressed with a topical antibiotic and Band-Aid applied  I have instructed the patient to return to the practice in 3 months time to reassess the wound and to decide as to whether not there is a necessity to excise the scar to prophylax against recurrent abscess formation  All questions answered to the satisfaction of the patient  Subjective:      Patient ID: Selvin Neumann is a 39 y o  male  Patient came in today for a abscess on his back that he had a week pior to going to the ER where he had it cut and packed  He was told to remove the packing after 48 hours  He removed the packing and states there no drainage it just has a scab  No fevers or chills  Yasemin GONSALEZ MA       The following portions of the patient's history were reviewed and updated as appropriate: allergies, current medications, past family history, past medical history, past social history, past surgical history and problem list     Review of Systems   Constitutional: Negative for chills and fever  HENT: Negative for ear pain and sore throat  Eyes: Negative for pain and visual disturbance  Respiratory: Negative for cough and shortness of breath  Cardiovascular: Negative for chest pain and palpitations  Gastrointestinal: Negative for abdominal pain and vomiting  Genitourinary: Negative for dysuria and hematuria  Musculoskeletal: Negative for arthralgias and back pain  Skin: Negative for color change and rash  Neurological: Negative for seizures and syncope  All other systems reviewed and are negative  Objective:      /70 (BP Location: Right arm, Patient Position: Sitting, Cuff Size: Large)   Pulse 80   Temp 98 2 °F (36 8 °C) (Temporal)   Ht 5' 5" (1 651 m)   Wt 86 6 kg (191 lb)   SpO2 96%   BMI 31 78 kg/m²          Physical Exam  Vitals and nursing note reviewed  Constitutional:       Appearance: He is well-developed  HENT:      Head: Normocephalic and atraumatic  Eyes:      Conjunctiva/sclera: Conjunctivae normal       Pupils: Pupils are equal, round, and reactive to light  Cardiovascular:      Rate and Rhythm: Normal rate and regular rhythm  Pulmonary:      Effort: Pulmonary effort is normal       Breath sounds: Normal breath sounds  Abdominal:      General: Bowel sounds are normal       Palpations: Abdomen is soft  Musculoskeletal:         General: Normal range of motion  Cervical back: Normal range of motion and neck supple  Skin:     General: Skin is warm and dry  Comments: Upper mid back wound open clean and granulating  No evidence of active soft tissue infection  Neurological:      Mental Status: He is alert and oriented to person, place, and time     Psychiatric:         Behavior: Behavior normal          Thought Content:  Thought content normal          Judgment: Judgment normal

## 2022-05-02 NOTE — PROGRESS NOTES
Assessment/Plan:    Back abscess  Patient is a pleasant 19-year-old male presenting status post I&D of a back abscess in the emergency department on April 23rd  The patient voiced no new complaints referable to the wound  He had wound packing placed  This was removed 2 days following the procedure  He has completed his antibiotic course  On physical examination he is a well-nourished well-developed male  He is in no acute distress  Pleasant competent reliable as a historian  Inspection and palpation of the wound reveals it to be well healed  There is a small bed of granulation tissue  The wound reach dressed with a topical antibiotic and Band-Aid applied  I have instructed the patient to return to the practice in 3 months time to reassess the wound and to decide as to whether not there is a necessity to excise the scar to prophylax against recurrent abscess formation  All questions answered to the satisfaction of the patient  Subjective:      Patient ID: Angel Luis Hernandez is a 39 y o  male  Patient came in today for a abscess on his back that he had a week pior to going to the ER where he had it cut and packed  He was told to remove the packing after 48 hours  He removed the packing and states there no drainage it just has a scab  No fevers or chills  Yasemin GONSALEZ MA       The following portions of the patient's history were reviewed and updated as appropriate: allergies, current medications, past family history, past medical history, past social history, past surgical history and problem list     Review of Systems   Constitutional: Negative for chills and fever  HENT: Negative for ear pain and sore throat  Eyes: Negative for pain and visual disturbance  Respiratory: Negative for cough and shortness of breath  Cardiovascular: Negative for chest pain and palpitations  Gastrointestinal: Negative for abdominal pain and vomiting     Genitourinary: Negative for dysuria and hematuria  Musculoskeletal: Negative for arthralgias and back pain  Skin: Negative for color change and rash  Neurological: Negative for seizures and syncope  All other systems reviewed and are negative  Objective:      /70 (BP Location: Right arm, Patient Position: Sitting, Cuff Size: Large)   Pulse 80   Temp 98 2 °F (36 8 °C) (Temporal)   Ht 5' 5" (1 651 m)   Wt 86 6 kg (191 lb)   SpO2 96%   BMI 31 78 kg/m²          Physical Exam  Vitals and nursing note reviewed  Constitutional:       Appearance: He is well-developed  HENT:      Head: Normocephalic and atraumatic  Eyes:      Conjunctiva/sclera: Conjunctivae normal       Pupils: Pupils are equal, round, and reactive to light  Cardiovascular:      Rate and Rhythm: Normal rate and regular rhythm  Pulmonary:      Effort: Pulmonary effort is normal       Breath sounds: Normal breath sounds  Abdominal:      General: Bowel sounds are normal       Palpations: Abdomen is soft  Musculoskeletal:         General: Normal range of motion  Cervical back: Normal range of motion and neck supple  Skin:     General: Skin is warm and dry  Comments: Upper mid back wound open clean and granulating  No evidence of active soft tissue infection  Neurological:      Mental Status: He is alert and oriented to person, place, and time  Psychiatric:         Behavior: Behavior normal          Thought Content:  Thought content normal          Judgment: Judgment normal

## 2022-05-03 ENCOUNTER — TELEPHONE (OUTPATIENT)
Dept: SURGERY | Facility: CLINIC | Age: 37
End: 2022-05-03

## 2022-05-03 NOTE — TELEPHONE ENCOUNTER
Patient stated at the time of his visit (05/02/2022) that he did not want to use his VA insurance  No referral was collected for this visit

## 2022-06-01 ENCOUNTER — TELEPHONE (OUTPATIENT)
Dept: ENDOCRINOLOGY | Facility: CLINIC | Age: 37
End: 2022-06-01

## 2022-06-16 NOTE — TELEPHONE ENCOUNTER
Pt is requesting a refill for his Dexcom Through [address] [place] [address], we did fax a from to [address] [place] [address] that he last OV was on 06/23/2021 and pt needs an appointment, Per Pt he will make an appointment in [address] [place] [address]    Per Pt he does not have a glucometer at home and wants us to fax the complete form to [facility] so he can get his supplies, Please advise

## 2022-06-16 NOTE — TELEPHONE ENCOUNTER
Pt called back regarding diabetic supply order  He said he scheduled w/ [facility], but not until September  Mentioned to schedule an appt w/ our office to be seen ASAP for follow up, so he can get supplied filled in the meantime and keep appt w/ Eder Castro in September to establish care  They will then take over refilling supplies  Pt was ok to do that  Transferred him out front to schedule

## 2022-06-17 ENCOUNTER — OFFICE VISIT (OUTPATIENT)
Dept: ENDOCRINOLOGY | Facility: CLINIC | Age: 37
End: 2022-06-17
Payer: COMMERCIAL

## 2022-06-17 VITALS
DIASTOLIC BLOOD PRESSURE: 70 MMHG | HEIGHT: 65 IN | BODY MASS INDEX: 31.99 KG/M2 | HEART RATE: 82 BPM | SYSTOLIC BLOOD PRESSURE: 102 MMHG | WEIGHT: 192 LBS

## 2022-06-17 DIAGNOSIS — E11.9 TYPE 2 DIABETES MELLITUS WITHOUT COMPLICATION, WITH LONG-TERM CURRENT USE OF INSULIN (HCC): Chronic | ICD-10-CM

## 2022-06-17 DIAGNOSIS — E03.9 HYPOTHYROIDISM, UNSPECIFIED TYPE: Primary | Chronic | ICD-10-CM

## 2022-06-17 DIAGNOSIS — E66.9 OBESITY (BMI 30.0-34.9): ICD-10-CM

## 2022-06-17 DIAGNOSIS — Z79.4 TYPE 2 DIABETES MELLITUS WITHOUT COMPLICATION, WITH LONG-TERM CURRENT USE OF INSULIN (HCC): Chronic | ICD-10-CM

## 2022-06-17 DIAGNOSIS — E78.5 HYPERLIPIDEMIA, UNSPECIFIED HYPERLIPIDEMIA TYPE: ICD-10-CM

## 2022-06-17 DIAGNOSIS — I10 ESSENTIAL HYPERTENSION: Chronic | ICD-10-CM

## 2022-06-17 PROBLEM — E66.811 OBESITY (BMI 30.0-34.9): Status: ACTIVE | Noted: 2022-06-17

## 2022-06-17 LAB — SL AMB POCT HEMOGLOBIN AIC: 10.3 (ref ?–6.5)

## 2022-06-17 PROCEDURE — 99214 OFFICE O/P EST MOD 30 MIN: CPT

## 2022-06-17 PROCEDURE — 83036 HEMOGLOBIN GLYCOSYLATED A1C: CPT

## 2022-06-17 PROCEDURE — 1036F TOBACCO NON-USER: CPT

## 2022-06-17 PROCEDURE — 3074F SYST BP LT 130 MM HG: CPT

## 2022-06-17 PROCEDURE — 3008F BODY MASS INDEX DOCD: CPT

## 2022-06-17 PROCEDURE — 3078F DIAST BP <80 MM HG: CPT

## 2022-06-17 PROCEDURE — 3046F HEMOGLOBIN A1C LEVEL >9.0%: CPT

## 2022-06-17 RX ORDER — SEMAGLUTIDE 1.34 MG/ML
0.25 INJECTION, SOLUTION SUBCUTANEOUS WEEKLY
Qty: 3 ML | Refills: 0 | Status: SHIPPED | COMMUNITY
Start: 2022-06-17

## 2022-06-17 NOTE — ASSESSMENT & PLAN NOTE
Start ozempic 0 25 mg for 4 weeks  Increase to 0 5 mg for the next 4 weeks  Reviewed side effects and benefits  Patient to notify us with any issues  1 month sample provided  Due for lipid panel and urine microalbumin/Cr  He states he will have this done  Referral for MNT sent  We also discussed benefits of carb counting and using in pen if interested in the future  Patient to follow up in 6 weeks         Lab Results   Component Value Date    HGBA1C 11 5 (A) 08/10/2021

## 2022-06-17 NOTE — ASSESSMENT & PLAN NOTE
Recheck TSH/T4  Last TSH elevated  Unclear whether dose change was made at this time  Has been some time since he has followed up with us  Continue with levothyroxine for now

## 2022-06-17 NOTE — PROGRESS NOTES
Established Patient Progress Note    CC: Follow up for type 2 diabetes mellitus    History of Present Illness:   Curtis Box is a 44 y o  male with a history of type 2 diabetes, hypothyroidism, hypertension, and hyperlipidemia  Last seen in the office 6/23/21  Last A1C was 10 3 from today  He was diagnosed with T2DM in 2010  He was previously on metformin for 10 years but stopped due to diarrhea  Also previously on SGLT-2 but stopped due to groin infection  He states he has been struggling with this diabetes  His sugars have been running in the 300's-400's consistently throughout the day  He is using his lantus and novolog regularly and correctly  He has also been eating less than usual which he states is due to stress and depression  He also changes his dexcom sensor every 10 days  He cannot correlate meals with his high sugars  He states they are high no matter what he eats  He was referred to [facility] in the past but did not follow up  He is open to meeting with them again  He has no history of pancreatitis, MEN or MTC  He reports polydipsia but no polyphagia or polyuria  He denies numbness, tingling or wounds in his feet  He also denies blurry vision  He states he see flashing lights 2-3 x per month when he sneezes which he believes is due to his concussion in the past      Patient also complaining of bruising at injection sites  He is regularly rotating sites  I provided him with samples of smaller needles  For the hypothyroidism, he is taking levothyroxine 50 mcg daily regularly and correctly  His last T4 was normal but his last TSH was elevated from 10/19/21  Unclear whether a dose change or follow up occurred at this time  He does report feeling more fatigued than usual  He denies excessive weight gain or cold intolerance       CGM interpretation    Device: Dexcom  Date Range: May 31, 2022-June 13, 2022    Time percentage CGM worn %: 93%  Time in range  (goal >65-70%): 3%  High: 24%  Very high: 73%  Low: 0%  Very low: 0%     SD (goal <50): 69     Average glucose mg/dL: 301  GMI %: 10 5%    Interpretation: BG running in the 300's-400's throughout the day  Poorly controlled  No hypoglycemia  Diet: Denies eating fast food or junk food     Current regimen:   Lantus 30 units in the morning and 30 units before bed  novolog 14-20-20-0     Last Eye Exam: 4/16/21- no retinopathy  Needs to schedule  Last Foot Exam: done in office today    Has hypertension: Taking metoprolol  Has hyperlipidemia: Taking pravastatin        Patient Active Problem List   Diagnosis    Anxiety    PTSD (post-traumatic stress disorder)    Hyperlipidemia    Type 2 diabetes mellitus without complication, with long-term current use of insulin ([facility])    ENRIKE (obstructive sleep apnea)    Chronic GERD    Oropharyngeal dysphagia    COVID    Bradycardic baseline fetal heart rate    Essential hypertension    Moderate persistent asthma    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[facility])    Acute bilateral low back pain with bilateral sciatica    Hypothyroidism    Type 2 diabetes mellitus with hyperglycemia, with long-term current use of insulin ([facility])    Class 1 obesity due to excess calories with serious comorbidity and body mass index (BMI) of 30 0 to 30 9 in adult    Alopecia    Medicare annual wellness visit, subsequent    Acute midline low back pain without sciatica    COVID-19 vaccination refused    Psychogenic nonepileptic spells    Acute respiratory failure (HCC)    Chronic headache    Acute non-recurrent maxillary sinusitis    Foot pain, left    Continuous opioid dependence ([address] )    Depression, recurrent ([address] )    Back abscess    Obesity (BMI 30 0-34  9)      Past Medical History:   Diagnosis Date    COVID-19 03/17/2021    Diabetes mellitus ([address] )     type 2    Disease of thyroid gland     hypothyroidism    Hyperlipidemia     Hypertension     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    Psychiatric disorder     PTSD   Anxiety, depression,  Psychogenic nonepileptic spells       Past Surgical History:   Procedure Laterality Date    MOUTH SURGERY  08/2021    WISDOM TOOTH EXTRACTION        Family History   Problem Relation Age of Onset    Hyperlipidemia Father     Heart attack Paternal Grandfather     Prostate cancer Paternal Grandfather     Cancer Paternal Grandmother      Social History     Tobacco Use    Smoking status: Never Smoker    Smokeless tobacco: Never Used   Substance Use Topics    Alcohol use: Not Currently     Allergies   Allergen Reactions    Lamotrigine GI Intolerance    Niacin Rash    Simvastatin Other (See Comments)     Elevated liver enzymes  Liver enzyme elevation         Current Outpatient Medications:     Advair Diskus 500-50 MCG/DOSE inhaler, USE 1 INHALATION TWICE A DAY (RINSE MOUTH AFTER USE), Disp: 180 blister, Rfl: 3    albuterol (PROVENTIL HFA,VENTOLIN HFA) 90 mcg/act inhaler, Inhale 2 puffs every 6 (six) hours as needed for wheezing or shortness of breath, Disp: 18 g, Rfl: 3    ARIPiprazole (ABILIFY) 20 MG tablet, Take 20 mg by mouth daily  , Disp: , Rfl:     clonazePAM (KlonoPIN) 1 mg tablet, Take 1 mg by mouth daily  , Disp: , Rfl:     fluticasone (FLONASE) 50 mcg/act nasal spray, 1 spray into each nostril daily, Disp: 18 2 mL, Rfl: 1    insulin aspart (NovoLOG FlexPen) 100 UNIT/ML injection pen, 14 units before breakfast , 20 units before lunch and dinner , Disp: 45 mL, Rfl: 0    insulin glargine (Lantus) 100 units/mL subcutaneous injection, Inject 30 Units under the skin 2 (two) times a day Basal insulin as split into 2 doses, take 30 units in the morning and 30 units before bed, Disp: 60 mL, Rfl: 0    Insulin Pen Needle (BD Pen Needle Rossana U/F) 32G X 4 MM MISC, Use 4/day, Disp: 100 each, Rfl: 3    levothyroxine 25 mcg tablet, Take 50 mcg by mouth daily, Disp: , Rfl:     loratadine (CLARITIN) 10 mg tablet, Take 10 mg by mouth daily, Disp: , Rfl:     metoprolol succinate (TOPROL-XL) 25 mg 24 hr tablet, Take 1 tablet (25 mg total) by mouth daily, Disp: 90 tablet, Rfl: 3    Semaglutide,0 25 or 0 5MG/DOS, (Ozempic, 0 25 or 0 5 MG/DOSE,) 2 MG/1 5ML SOPN, Inject 0 25 mg under the skin once a week, Disp: 3 mL, Rfl: 0    sertraline (ZOLOFT) 100 mg tablet, Take 200 mg by mouth daily  , Disp: , Rfl:     ARIPiprazole (ABILIFY) 5 mg tablet, Take 20 mg by mouth daily   (Patient not taking: No sig reported), Disp: , Rfl:     methocarbamol (ROBAXIN) 500 mg tablet, Take 1 tablet (500 mg total) by mouth 2 (two) times a day as needed for muscle spasms for up to 10 days (Patient not taking: No sig reported), Disp: 20 tablet, Rfl: 0    OMEGA-3 FATTY ACIDS PO, Take 2 g by mouth daily (Patient not taking: No sig reported), Disp: , Rfl:     pravastatin (PRAVACHOL) 40 mg tablet, Take 1 tablet (40 mg total) by mouth daily (Patient not taking: No sig reported), Disp:  , Rfl: 0    traMADol (ULTRAM) 50 mg tablet, Take 1 tablet (50 mg total) by mouth 4 (four) times a day as needed for moderate pain for up to 15 doses (Patient not taking: No sig reported), Disp: 15 tablet, Rfl: 0    Review of Systems   Constitutional: Positive for fatigue  Negative for unexpected weight change  HENT: Negative for trouble swallowing and voice change  Eyes: Positive for visual disturbance  Negative for photophobia  Occasionally sees flashing lights when sneezes 2-3 x / month   Respiratory: Negative for shortness of breath  Endocrine: Positive for polydipsia  Negative for cold intolerance, polyphagia and polyuria  Neurological: Negative for numbness  All other systems reviewed and are negative  Physical Exam:  Body mass index is 31 95 kg/m²    /70 (BP Location: Left arm, Patient Position: Sitting, Cuff Size: Large)   Pulse 82   Ht 5' 5" (1 651 m)   Wt 87 1 kg (192 lb)   BMI 31 95 kg/m²    Wt Readings from Last 3 Encounters:   06/17/22 87 1 kg (192 lb)   05/02/22 86 6 kg (191 lb)   04/23/22 79 4 kg (175 lb) Physical Exam  Vitals reviewed  Constitutional:       Appearance: Normal appearance  HENT:      Head: Normocephalic  Neck:      Thyroid: No thyroid mass, thyromegaly or thyroid tenderness  Cardiovascular:      Rate and Rhythm: Normal rate and regular rhythm  Pulses: no weak pulses          Dorsalis pedis pulses are 2+ on the right side and 2+ on the left side  Heart sounds: Normal heart sounds  Pulmonary:      Effort: Pulmonary effort is normal       Breath sounds: Normal breath sounds  Musculoskeletal:      Cervical back: Neck supple  No tenderness  Feet:      Right foot:      Skin integrity: No ulcer, skin breakdown, erythema, warmth, callus or dry skin  Left foot:      Skin integrity: No ulcer, skin breakdown, erythema, warmth, callus or dry skin  Lymphadenopathy:      Cervical: No cervical adenopathy  Right cervical: No superficial cervical adenopathy  Left cervical: No superficial cervical adenopathy  Skin:     General: Skin is warm and dry  Neurological:      Mental Status: He is alert and oriented to person, place, and time  Patient's shoes and socks removed  Right Foot/Ankle   Right Foot Inspection  Skin Exam: skin normal and skin intact  No dry skin, no warmth, no callus, no erythema, no maceration, no abnormal color, no pre-ulcer, no ulcer and no callus  Sensory   Vibration: intact  Monofilament testing: intact    Vascular  The right DP pulse is 2+  Left Foot/Ankle  Left Foot Inspection  Skin Exam: skin normal and skin intact  No dry skin, no warmth, no erythema, no maceration, normal color, no pre-ulcer, no ulcer and no callus  Sensory   Vibration: intact  Monofilament testing: intact    Vascular  The left DP pulse is 2+       Assign Risk Category  No deformity present  No loss of protective sensation  No weak pulses  Risk: 0      Labs:   Lab Results   Component Value Date    HGBA1C 10 3 (A) 06/17/2022    HGBA1C 11 5 (A) 08/10/2021 HGBA1C 10 3 (A) 05/28/2021     Lab Results   Component Value Date    CREATININE 1 00 10/21/2021    CREATININE 1 16 10/20/2021    CREATININE 1 23 10/19/2021    BUN 9 10/21/2021     01/03/2015    K 3 6 10/21/2021     10/21/2021    CO2 27 10/21/2021     eGFR   Date Value Ref Range Status   10/21/2021 96 ml/min/1 73sq m Final     No results found for: CHOL, HDL, LDL, TRIG, CHOLHDL  Lab Results   Component Value Date    ALT 52 10/20/2021    AST 39 10/20/2021    ALKPHOS 71 10/20/2021    BILITOT 0 7 01/03/2015     Lab Results   Component Value Date    NIN6GZEIIUGE 5 860 (H) 10/19/2021    QYJ5NLCCEJCP 1 755 03/19/2021     Lab Results   Component Value Date    FREET4 0 86 10/19/2021       Impression & Plan:    Problem List Items Addressed This Visit        Endocrine    Type 2 diabetes mellitus without complication, with long-term current use of insulin (HCC) (Chronic)     Start ozempic 0 25 mg for 4 weeks  Increase to 0 5 mg for the next 4 weeks  Reviewed side effects and benefits  Patient to notify us with any issues  1 month sample provided  Due for lipid panel and urine microalbumin/Cr  He states he will have this done  Referral for [facility] sent  We also discussed benefits of carb counting and using in pen if interested in the future  Patient to follow up in 6 weeks  Lab Results   Component Value Date    HGBA1C 11 5 (A) 08/10/2021              Relevant Medications    Semaglutide,0 25 or 0 5MG/DOS, (Ozempic, 0 25 or 0 5 MG/DOSE,) 2 MG/1 5ML SOPN    Other Relevant Orders    Microalbumin / creatinine urine ratio Lab Collect    POCT hemoglobin A1c (Completed)    Ambulatory referral to Diabetic Education    Hypothyroidism - Primary (Chronic)     Recheck TSH/T4  Last TSH elevated  Unclear whether dose change was made at this time  Has been some time since he has followed up with us  Continue with levothyroxine for now             Relevant Orders    TSH, 3rd generation Lab Collect    T4, free Lab Collect Cardiovascular and Mediastinum    Essential hypertension (Chronic)     Controlled  Continue with current regimen  Other    Hyperlipidemia     Due for lipid panel  Continue with pravastatin  Relevant Orders    Lipid panel- Lab Collect    Obesity (BMI 30 0-34  9)     May improve with addition of ozempic and meeting with [facility]  Orders Placed This Encounter   Procedures    Microalbumin / creatinine urine ratio Lab Collect     Standing Status:   Future     Standing Expiration Date:   6/17/2023    Lipid panel- Lab Collect     This is a patient instruction: This test requires patient fasting for 10-12 hours or longer  Drinking of black coffee or black tea is acceptable  Standing Status:   Future     Standing Expiration Date:   6/17/2023    TSH, 3rd generation Lab Collect     This is a patient instruction: This test is non-fasting  Please drink two glasses of water morning of bloodwork  Standing Status:   Future     Standing Expiration Date:   6/17/2023    T4, free Lab Collect     Standing Status:   Future     Standing Expiration Date:   6/17/2023    Ambulatory referral to Diabetic Education     Standing Status:   Future     Standing Expiration Date:   6/17/2023     Referral Priority:   Routine     Referral Type:   Consult - AMB     Referral Reason:   Specialty Services Required     Requested Specialty:   Diabetes Services     Number of Visits Requested:   1     Expiration Date:   6/17/2023    POCT hemoglobin A1c         There are no Patient Instructions on file for this visit  Discussed with the patient and all questioned fully answered  He will call me if any problems arise

## 2022-06-27 ENCOUNTER — RA CDI HCC (OUTPATIENT)
Dept: OTHER | Facility: HOSPITAL | Age: 37
End: 2022-06-27

## 2022-06-27 NOTE — PROGRESS NOTES
[facility]  coding opportunities       Chart reviewed, no opportunity found:   [address]        Patients Insurance     Medicare Insurance: [employer]

## 2022-07-05 DIAGNOSIS — E11.65 TYPE 2 DIABETES MELLITUS WITH HYPERGLYCEMIA, WITH LONG-TERM CURRENT USE OF INSULIN (HCC): ICD-10-CM

## 2022-07-05 DIAGNOSIS — Z79.4 TYPE 2 DIABETES MELLITUS WITH HYPERGLYCEMIA, WITH LONG-TERM CURRENT USE OF INSULIN (HCC): ICD-10-CM

## 2022-07-06 ENCOUNTER — OFFICE VISIT (OUTPATIENT)
Dept: INTERNAL MEDICINE CLINIC | Age: 37
End: 2022-07-06
Payer: COMMERCIAL

## 2022-07-06 VITALS
BODY MASS INDEX: 30.82 KG/M2 | TEMPERATURE: 98.4 F | WEIGHT: 185 LBS | OXYGEN SATURATION: 97 % | HEART RATE: 73 BPM | DIASTOLIC BLOOD PRESSURE: 70 MMHG | HEIGHT: 65 IN | SYSTOLIC BLOOD PRESSURE: 130 MMHG

## 2022-07-06 DIAGNOSIS — U07.1 COVID-19: ICD-10-CM

## 2022-07-06 DIAGNOSIS — E11.65 TYPE 2 DIABETES MELLITUS WITH HYPERGLYCEMIA, WITH LONG-TERM CURRENT USE OF INSULIN (HCC): ICD-10-CM

## 2022-07-06 DIAGNOSIS — K21.9 CHRONIC GERD: Chronic | ICD-10-CM

## 2022-07-06 DIAGNOSIS — E78.5 HYPERLIPIDEMIA, UNSPECIFIED HYPERLIPIDEMIA TYPE: Primary | ICD-10-CM

## 2022-07-06 DIAGNOSIS — J45.40 MODERATE PERSISTENT ASTHMA WITHOUT COMPLICATION: ICD-10-CM

## 2022-07-06 DIAGNOSIS — G47.33 OSA (OBSTRUCTIVE SLEEP APNEA): ICD-10-CM

## 2022-07-06 DIAGNOSIS — I10 ESSENTIAL HYPERTENSION: Chronic | ICD-10-CM

## 2022-07-06 DIAGNOSIS — E11.9 TYPE 2 DIABETES MELLITUS WITHOUT COMPLICATION, WITH LONG-TERM CURRENT USE OF INSULIN (HCC): ICD-10-CM

## 2022-07-06 DIAGNOSIS — E03.9 HYPOTHYROIDISM, UNSPECIFIED TYPE: Chronic | ICD-10-CM

## 2022-07-06 DIAGNOSIS — Z79.4 TYPE 2 DIABETES MELLITUS WITHOUT COMPLICATION, WITH LONG-TERM CURRENT USE OF INSULIN (HCC): ICD-10-CM

## 2022-07-06 DIAGNOSIS — Z79.4 TYPE 2 DIABETES MELLITUS WITH HYPERGLYCEMIA, WITH LONG-TERM CURRENT USE OF INSULIN (HCC): ICD-10-CM

## 2022-07-06 DIAGNOSIS — F43.10 PTSD (POST-TRAUMATIC STRESS DISORDER): ICD-10-CM

## 2022-07-06 LAB
CREAT UR-MCNC: 110 MG/DL
MICROALBUMIN UR-MCNC: <5 MG/L (ref 0–20)
MICROALBUMIN/CREAT 24H UR: <5 MG/G CREATININE (ref 0–30)

## 2022-07-06 PROCEDURE — 3078F DIAST BP <80 MM HG: CPT | Performed by: INTERNAL MEDICINE

## 2022-07-06 PROCEDURE — 99214 OFFICE O/P EST MOD 30 MIN: CPT | Performed by: INTERNAL MEDICINE

## 2022-07-06 PROCEDURE — 3725F SCREEN DEPRESSION PERFORMED: CPT | Performed by: INTERNAL MEDICINE

## 2022-07-06 PROCEDURE — 3075F SYST BP GE 130 - 139MM HG: CPT | Performed by: INTERNAL MEDICINE

## 2022-07-06 PROCEDURE — 82570 ASSAY OF URINE CREATININE: CPT | Performed by: INTERNAL MEDICINE

## 2022-07-06 PROCEDURE — 82043 UR ALBUMIN QUANTITATIVE: CPT | Performed by: INTERNAL MEDICINE

## 2022-07-06 RX ORDER — OMEGA-3/DHA/EPA/FISH OIL 300-1000MG
2 CAPSULE ORAL DAILY
Qty: 180 CAPSULE | Refills: 3 | Status: SHIPPED | OUTPATIENT
Start: 2022-07-06 | End: 2022-10-20

## 2022-07-06 RX ORDER — PRAVASTATIN SODIUM 40 MG
40 TABLET ORAL DAILY
Qty: 90 TABLET | Refills: 1 | Status: SHIPPED | OUTPATIENT
Start: 2022-07-06 | End: 2022-10-14 | Stop reason: SDUPTHER

## 2022-07-06 RX ORDER — INSULIN GLARGINE 100 [IU]/ML
30 INJECTION, SOLUTION SUBCUTANEOUS 2 TIMES DAILY
Qty: 60 ML | Refills: 5 | Status: SHIPPED | OUTPATIENT
Start: 2022-07-06 | End: 2022-10-14 | Stop reason: SDUPTHER

## 2022-07-06 RX ORDER — INSULIN ASPART 100 [IU]/ML
INJECTION, SOLUTION INTRAVENOUS; SUBCUTANEOUS
Qty: 45 ML | Refills: 0 | Status: SHIPPED | OUTPATIENT
Start: 2022-07-06 | End: 2022-07-06 | Stop reason: SDUPTHER

## 2022-07-06 RX ORDER — INSULIN ASPART 100 [IU]/ML
INJECTION, SOLUTION INTRAVENOUS; SUBCUTANEOUS
Qty: 45 ML | Refills: 3 | Status: SHIPPED | OUTPATIENT
Start: 2022-07-06 | End: 2022-10-20

## 2022-07-06 NOTE — PATIENT INSTRUCTIONS
10% - bad control"> 10% - bad control,Hemoglobin A1c (HbA1c) greater than 10% indicating poor diabetic control,Haemoglobin A1c greater than 10% indicating poor diabetic control">   Diabetes Mellitus Type 2 in Adults, Ambulatory Care   GENERAL INFORMATION:   Diabetes mellitus type 2  is a disease that affects how your body uses glucose (sugar)  Insulin helps move sugar out of the blood so it can be used for energy  Normally, when the blood sugar level increases, the pancreas makes more insulin  Type 2 diabetes develops because either the body cannot make enough insulin, or it cannot use the insulin correctly  After many years, your pancreas may stop making insulin  Common symptoms include the following:   · More hunger or thirst than usual     · Frequent urination     · Weight loss without trying     · Blurred vision  Seek immediate care for the following symptoms:   · Severe abdominal pain, or pain that spreads to your back  You may also be vomiting  · Trouble staying awake or focusing    · Shaking or sweating    · Blurred or double vision    · Breath has a fruity, sweet smell    · Breathing is deep and labored, or rapid and shallow    · Heartbeat is fast and weak  Treatment for diabetes mellitus type 2  includes keeping your blood sugar at a normal level  You must eat the right foods, and exercise regularly  You may also need medicine if you cannot control your blood sugar level with nutrition and exercise  Manage diabetes mellitus type 2:   · Check your blood sugar level  You will be taught how to check a small drop of blood in a glucose monitor  Ask your healthcare provider when and how often to check during the day  Ask your healthcare provider what your blood sugar levels should be when you check them  · Keep track of carbohydrates (sugar and starchy foods)  Your blood sugar level can get too high if you eat too many carbohydrates   Your dietitian will help you plan meals and snacks that have the right amount of carbohydrates  · Eat low-fat foods  Some examples are skinless chicken and low-fat milk  · Eat less sodium (salt)  Some examples of high-sodium foods to limit are soy sauce, potato chips, and soup  Do not add salt to food you cook  Limit your use of table salt  · Eat high-fiber foods  Foods that are a good source of fiber include vegetables, whole grain bread, and beans  · Limit alcohol  Alcohol affects your blood sugar level and can make it harder to manage your diabetes  Women should limit alcohol to 1 drink a day  Men should limit alcohol to 2 drinks a day  A drink of alcohol is 12 ounces of beer, 5 ounces of wine, or 1½ ounces of liquor  · Get regular exercise  Exercise can help keep your blood sugar level steady, decrease your risk of heart disease, and help you lose weight  Exercise for at least 30 minutes, 5 days a week  Include muscle strengthening activities 2 days each week  Work with your healthcare provider to create an exercise plan  · Check your feet each day  for injuries or open sores  Ask your healthcare provider for activities you can do if you have an open sore  · Quit smoking  If you smoke, it is never too late to quit  Smoking can worsen the problems that may occur with diabetes  Ask your healthcare provider for information about how to stop smoking if you are having trouble quitting  · Ask about your weight:  Ask healthcare providers if you need to lose weight, and how much to lose  Ask them to help you with a weight loss program  Even a 10 to 15 pound weight loss can help you manage your blood sugar level  · Carry medical alert identification  Wear medical alert jewelry or carry a card that says you have diabetes  Ask your healthcare provider where to get these items  · Ask about vaccines  Diabetes puts you at risk of serious illness if you get the flu, pneumonia, or hepatitis   Ask your healthcare provider if you should get a flu, pneumonia, or hepatitis B vaccine, and when to get the vaccine  Follow up with your healthcare provider as directed:  Write down your questions so you remember to ask them during your visits  CARE AGREEMENT:   You have the right to help plan your care  Learn about your health condition and how it may be treated  Discuss treatment options with your caregivers to decide what care you want to receive  You always have the right to refuse treatment  The above information is an  only  It is not intended as medical advice for individual conditions or treatments  Talk to your doctor, nurse or pharmacist before following any medical regimen to see if it is safe and effective for you  © 2014 [address] is for End User's use only and may not be sold, redistributed or otherwise used for commercial purposes  All illustrations and images included in CareNotes® are the copyrighted property of [employer]  or [facility]  10% - bad control"> 10% - bad control,Hemoglobin A1c (HbA1c) greater than 10% indicating poor diabetic control,Haemoglobin A1c greater than 10% indicating poor diabetic control">   Diabetes Mellitus Type 2 in Adults, Ambulatory Care   GENERAL INFORMATION:   Diabetes mellitus type 2  is a disease that affects how your body uses glucose (sugar)  Insulin helps move sugar out of the blood so it can be used for energy  Normally, when the blood sugar level increases, the pancreas makes more insulin  Type 2 diabetes develops because either the body cannot make enough insulin, or it cannot use the insulin correctly  After many years, your pancreas may stop making insulin  Common symptoms include the following:   · More hunger or thirst than usual     · Frequent urination     · Weight loss without trying     · Blurred vision  Seek immediate care for the following symptoms:   · Severe abdominal pain, or pain that spreads to your back   You may also be vomiting  · Trouble staying awake or focusing    · Shaking or sweating    · Blurred or double vision    · Breath has a fruity, sweet smell    · Breathing is deep and labored, or rapid and shallow    · Heartbeat is fast and weak  Treatment for diabetes mellitus type 2  includes keeping your blood sugar at a normal level  You must eat the right foods, and exercise regularly  You may also need medicine if you cannot control your blood sugar level with nutrition and exercise  Manage diabetes mellitus type 2:   · Check your blood sugar level  You will be taught how to check a small drop of blood in a glucose monitor  Ask your healthcare provider when and how often to check during the day  Ask your healthcare provider what your blood sugar levels should be when you check them  · Keep track of carbohydrates (sugar and starchy foods)  Your blood sugar level can get too high if you eat too many carbohydrates  Your dietitian will help you plan meals and snacks that have the right amount of carbohydrates  · Eat low-fat foods  Some examples are skinless chicken and low-fat milk  · Eat less sodium (salt)  Some examples of high-sodium foods to limit are soy sauce, potato chips, and soup  Do not add salt to food you cook  Limit your use of table salt  · Eat high-fiber foods  Foods that are a good source of fiber include vegetables, whole grain bread, and beans  · Limit alcohol  Alcohol affects your blood sugar level and can make it harder to manage your diabetes  Women should limit alcohol to 1 drink a day  Men should limit alcohol to 2 drinks a day  A drink of alcohol is 12 ounces of beer, 5 ounces of wine, or 1½ ounces of liquor  · Get regular exercise  Exercise can help keep your blood sugar level steady, decrease your risk of heart disease, and help you lose weight  Exercise for at least 30 minutes, 5 days a week  Include muscle strengthening activities 2 days each week   Work with your healthcare provider to create an exercise plan  · Check your feet each day  for injuries or open sores  Ask your healthcare provider for activities you can do if you have an open sore  · Quit smoking  If you smoke, it is never too late to quit  Smoking can worsen the problems that may occur with diabetes  Ask your healthcare provider for information about how to stop smoking if you are having trouble quitting  · Ask about your weight:  Ask healthcare providers if you need to lose weight, and how much to lose  Ask them to help you with a weight loss program  Even a 10 to 15 pound weight loss can help you manage your blood sugar level  · Carry medical alert identification  Wear medical alert jewelry or carry a card that says you have diabetes  Ask your healthcare provider where to get these items  · Ask about vaccines  Diabetes puts you at risk of serious illness if you get the flu, pneumonia, or hepatitis  Ask your healthcare provider if you should get a flu, pneumonia, or hepatitis B vaccine, and when to get the vaccine  Follow up with your healthcare provider as directed:  Write down your questions so you remember to ask them during your visits  CARE AGREEMENT:   You have the right to help plan your care  Learn about your health condition and how it may be treated  Discuss treatment options with your caregivers to decide what care you want to receive  You always have the right to refuse treatment  The above information is an  only  It is not intended as medical advice for individual conditions or treatments  Talk to your doctor, nurse or pharmacist before following any medical regimen to see if it is safe and effective for you  © 2014 [address] is for End User's use only and may not be sold, redistributed or otherwise used for commercial purposes   All illustrations and images included in CareNotes® are the copyrighted property of [employer]  or Hank Arias

## 2022-07-06 NOTE — PROGRESS NOTES
Assessment/Plan:    1  Uncontrolled type 2 diabetes mellitus  Still hemoglobin A1c is significantly elevated  Patient is under the care of endocrinologist     2  GERD  Stable on present regimen  3  Bronchial asthma  Continue with present combination of inhalers  4  Hypothyroidism  Continue with present dose of levothyroxine  He is due for thyroid function test  5  Hyperlipidemia  Continue with pravastatin 40 mg daily along with low-fat diet  Advised for lipid profile to be done as soon as possible  6  PTSD  Being managed by psychiatrist     Diagnoses and all orders for this visit:    Hyperlipidemia, unspecified hyperlipidemia type  -     fish oil-omega-3 fatty acids 1000 MG capsule; Take 2 capsules (2 g total) by mouth daily    Type 2 diabetes mellitus with hyperglycemia, with long-term current use of insulin ([facility])  -     insulin aspart (NovoLOG FlexPen) 100 UNIT/ML injection pen; 14 units before breakfast , 20 units before lunch and dinner  Type 2 diabetes mellitus without complication, with long-term current use of insulin ([facility])  -     insulin glargine (Lantus) 100 units/mL subcutaneous injection; Inject 30 Units under the skin 2 (two) times a day Basal insulin as split into 2 doses, take 30 units in the morning and 30 units before bed    COVID-19  -     pravastatin (PRAVACHOL) 40 mg tablet; Take 1 tablet (40 mg total) by mouth daily    Chronic GERD    Hypothyroidism, unspecified type    ENRIKE (obstructive sleep apnea)    Moderate persistent asthma without complication    Essential hypertension    PTSD (post-traumatic stress disorder)               Subjective:          Patient ID: Ronan Rutledge is a 44 y o  male  PATIENT IS HERE FOR FOLLOW-UP  NO BLOOD WORK PRIOR TO THIS VISIT EVEN THOUGH HE WAS SUPPOSED TO        The following portions of the patient's history were reviewed and updated as appropriate: allergies, current medications, past family history, past medical history, past social history, past surgical history and problem list     Review of Systems   Constitutional: Negative for fatigue and fever  HENT: Negative for congestion, ear discharge, ear pain, postnasal drip, sinus pressure, sore throat, tinnitus and trouble swallowing  Eyes: Negative for discharge, itching and visual disturbance  Respiratory: Negative for cough and shortness of breath  Cardiovascular: Negative for chest pain and palpitations  Gastrointestinal: Negative for abdominal pain, diarrhea, nausea and vomiting  Endocrine: Negative for cold intolerance and polyuria  Genitourinary: Negative for difficulty urinating, dysuria and urgency  Musculoskeletal: Negative for arthralgias and neck pain  Skin: Negative for rash  Allergic/Immunologic: Negative for environmental allergies  Neurological: Negative for dizziness, weakness and headaches  Psychiatric/Behavioral: Positive for sleep disturbance  Negative for agitation  The patient is not nervous/anxious  Past Medical History:   Diagnosis Date    COVID-19 03/17/2021    Diabetes mellitus (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    Psychiatric disorder     PTSD   Anxiety, depression,     Psychogenic nonepileptic spells          Current Outpatient Medications:     Advair Diskus 500-50 MCG/DOSE inhaler, USE 1 INHALATION TWICE A DAY (RINSE MOUTH AFTER USE), Disp: 180 blister, Rfl: 3    albuterol (PROVENTIL HFA,VENTOLIN HFA) 90 mcg/act inhaler, Inhale 2 puffs every 6 (six) hours as needed for wheezing or shortness of breath, Disp: 18 g, Rfl: 3    ARIPiprazole (ABILIFY) 20 MG tablet, Take 20 mg by mouth daily  , Disp: , Rfl:     clonazePAM (KlonoPIN) 1 mg tablet, Take 1 mg by mouth daily  , Disp: , Rfl:     fish oil-omega-3 fatty acids 1000 MG capsule, Take 2 capsules (2 g total) by mouth daily, Disp: 180 capsule, Rfl: 3    fluticasone (FLONASE) 50 mcg/act nasal spray, 1 spray into each nostril daily, Disp: 18 2 mL, Rfl: 1    insulin aspart (NovoLOG FlexPen) 100 UNIT/ML injection pen, 14 units before breakfast , 20 units before lunch and dinner , Disp: 45 mL, Rfl: 3    insulin glargine (Lantus) 100 units/mL subcutaneous injection, Inject 30 Units under the skin 2 (two) times a day Basal insulin as split into 2 doses, take 30 units in the morning and 30 units before bed, Disp: 60 mL, Rfl: 5    Insulin Pen Needle (BD Pen Needle Rossana U/F) 32G X 4 MM MISC, Use 4/day, Disp: 100 each, Rfl: 3    levothyroxine 25 mcg tablet, Take 50 mcg by mouth daily, Disp: , Rfl:     loratadine (CLARITIN) 10 mg tablet, Take 10 mg by mouth daily, Disp: , Rfl:     metoprolol succinate (TOPROL-XL) 25 mg 24 hr tablet, Take 1 tablet (25 mg total) by mouth daily, Disp: 90 tablet, Rfl: 3    pravastatin (PRAVACHOL) 40 mg tablet, Take 1 tablet (40 mg total) by mouth daily, Disp: 90 tablet, Rfl: 1    Semaglutide,0 25 or 0 5MG/DOS, (Ozempic, 0 25 or 0 5 MG/DOSE,) 2 MG/1 5ML SOPN, Inject 0 25 mg under the skin once a week, Disp: 3 mL, Rfl: 0    sertraline (ZOLOFT) 100 mg tablet, Take 200 mg by mouth daily  , Disp: , Rfl:     ARIPiprazole (ABILIFY) 5 mg tablet, Take 20 mg by mouth daily   (Patient not taking: No sig reported), Disp: , Rfl:     methocarbamol (ROBAXIN) 500 mg tablet, Take 1 tablet (500 mg total) by mouth 2 (two) times a day as needed for muscle spasms for up to 10 days, Disp: 20 tablet, Rfl: 0    traMADol (ULTRAM) 50 mg tablet, Take 1 tablet (50 mg total) by mouth 4 (four) times a day as needed for moderate pain for up to 15 doses, Disp: 15 tablet, Rfl: 0    Allergies   Allergen Reactions    Lamotrigine GI Intolerance    Niacin Rash    Simvastatin Other (See Comments)     Elevated liver enzymes  Liver enzyme elevation       Social History   Past Surgical History:   Procedure Laterality Date    MOUTH SURGERY  08/2021    WISDOM TOOTH EXTRACTION       Family History Problem Relation Age of Onset    Hyperlipidemia Father     Heart attack Paternal Grandfather     Prostate cancer Paternal Grandfather     Cancer Paternal Grandmother        Objective:  /70 (BP Location: Left arm, Patient Position: Sitting, Cuff Size: Standard)   Pulse 73   Temp 98 4 °F (36 9 °C) (Temporal)   Ht 5' 5" (1 651 m)   Wt 83 9 kg (185 lb)   SpO2 97%   BMI 30 79 kg/m²   Body mass index is 30 79 kg/m²  Physical Exam  Constitutional:       Appearance: He is well-developed  HENT:      Head: Normocephalic  Right Ear: External ear normal       Left Ear: External ear normal       Nose: No rhinorrhea  Mouth/Throat:      Pharynx: No posterior oropharyngeal erythema  Eyes:      General: No scleral icterus  Pupils: Pupils are equal, round, and reactive to light  Neck:      Thyroid: No thyromegaly  Trachea: No tracheal deviation  Cardiovascular:      Rate and Rhythm: Normal rate and regular rhythm  Heart sounds: Normal heart sounds  No murmur heard  Pulmonary:      Effort: Pulmonary effort is normal  No respiratory distress  Breath sounds: Normal breath sounds  Chest:      Chest wall: No tenderness  Abdominal:      General: Bowel sounds are normal       Palpations: Abdomen is soft  There is no mass  Tenderness: There is no abdominal tenderness  Musculoskeletal:         General: Normal range of motion  Cervical back: Normal range of motion and neck supple  Right lower leg: No edema  Lymphadenopathy:      Cervical: No cervical adenopathy  Skin:     General: Skin is warm and dry  Neurological:      Mental Status: He is alert and oriented to person, place, and time  Cranial Nerves: No cranial nerve deficit

## 2022-07-15 ENCOUNTER — TELEPHONE (OUTPATIENT)
Dept: SURGERY | Facility: CLINIC | Age: 37
End: 2022-07-15

## 2022-07-15 NOTE — TELEPHONE ENCOUNTER
LM for the patient to give us call to move his appt  Due to the doctor being on call and no afternoon appointments

## 2022-07-29 ENCOUNTER — OFFICE VISIT (OUTPATIENT)
Dept: ENDOCRINOLOGY | Facility: CLINIC | Age: 37
End: 2022-07-29
Payer: COMMERCIAL

## 2022-07-29 VITALS
WEIGHT: 188.4 LBS | HEIGHT: 65 IN | BODY MASS INDEX: 31.39 KG/M2 | HEART RATE: 89 BPM | SYSTOLIC BLOOD PRESSURE: 130 MMHG | DIASTOLIC BLOOD PRESSURE: 76 MMHG

## 2022-07-29 DIAGNOSIS — Z79.4 TYPE 2 DIABETES MELLITUS WITHOUT COMPLICATION, WITH LONG-TERM CURRENT USE OF INSULIN (HCC): Chronic | ICD-10-CM

## 2022-07-29 DIAGNOSIS — E03.9 HYPOTHYROIDISM, UNSPECIFIED TYPE: Primary | Chronic | ICD-10-CM

## 2022-07-29 DIAGNOSIS — E11.9 TYPE 2 DIABETES MELLITUS WITHOUT COMPLICATION, WITH LONG-TERM CURRENT USE OF INSULIN (HCC): Chronic | ICD-10-CM

## 2022-07-29 PROCEDURE — 95251 CONT GLUC MNTR ANALYSIS I&R: CPT

## 2022-07-29 PROCEDURE — 99214 OFFICE O/P EST MOD 30 MIN: CPT

## 2022-07-29 NOTE — ASSESSMENT & PLAN NOTE
Patient's blood sugars are still running high overall  Increase Lantus to 32 units in the morning and 32 units at night  He will let us know if the Ozempic is covered by his insurance  If not we will switch to a different GLP 1  He will also have his lipid panel done  Patient will also send us a DexCom download in the next 2 weeks  He has a follow-up appointment coming up and Yumi        Lab Results   Component Value Date    HGBA1C 10 3 (A) 06/17/2022

## 2022-07-29 NOTE — PROGRESS NOTES
Established Patient Progress Note    CC: Follow up for type 2 diabetes mellitus     History of Present Illness:   Brigido Rendon is a 44 y o  male with a history of type 2 diabetes, hypothyroidism, hypertension, and hyperlipidemia  Last seen in the office 6/17/22  Last A1C is 10 3 from 6/17/22  He was diagnosed with T2DM in 2010  He was previously on metformin for 10 years but stopped due to diarrhea  Also previously on SGLT-2 but stopped due to groin infection  Patient was started on Ozempic at his last visit  He states he noted a improvement in his blood sugars  He states they did not go past 300  He has been tolerating so far  He has not picked up a new prescription from his pharmacy yet  He has been using the sample that we provided for him from the last visit  He will let us know if the Ozempic is covered  If not, we will switch to a different GLP 1  The patient also reports that he has been without NovoLog for the past 2 weeks  He recently started using it again yesterday  The patient also noted improvement with bruising at the injection sites by using 6 mm needles  Patient denies hypoglycemia  Patient reports polydipsia  He denies polyuria or polyphagia  The patient denies numbness tingling or wounds in the feet  For the hypothyroidism, he is taking levothyroxine 50 mcg daily regularly and correctly  Patient states he is fatigued but has been stressed because of son's health condition and has not been sleeping  Denies symptoms of hypothyroidism  CGM interpretation    Device: Dexcom  Date Range: July 16, 2022- July 29, 2022    Time percentage CGM worn %: 100%  Time in range  (goal >65-70%): 10%  High: 23%  Very high: 67%  Low: 0%  Very low: 0%     SD (goal <50): 76     Average glucose mg/dL: 286  GMI %: 10 1    Interpretation: Blood glucose running high overall   Patient was without novolog, however, last couple days of using novolog on Dexcom report, sugars are still elevated in the 300 range      Current regimen:   Novolog 14-20-20  Lantus 30 units in am and 30 units pm  Ozempic 0 5 mg weekly    Last Eye Exam: scheduled for next week, will send report  Last Foot Exam: UTD, 6/17/2022    Has hypertension: Taking metoprolol  Has hyperlipidemia: Taking fish oil, pravastatin      Patient Active Problem List   Diagnosis    Anxiety    PTSD (post-traumatic stress disorder)    Hyperlipidemia    Type 2 diabetes mellitus without complication, with long-term current use of insulin ([facility])    ENRIKE (obstructive sleep apnea)    Chronic GERD    Oropharyngeal dysphagia    COVID    Bradycardic baseline fetal heart rate    Essential hypertension    Moderate persistent asthma    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[facility])    Acute bilateral low back pain with bilateral sciatica    Hypothyroidism    Type 2 diabetes mellitus with hyperglycemia, with long-term current use of insulin ([facility])    Class 1 obesity due to excess calories with serious comorbidity and body mass index (BMI) of 30 0 to 30 9 in adult    Alopecia    Medicare annual wellness visit, subsequent    Acute midline low back pain without sciatica    COVID-19 vaccination refused    Psychogenic nonepileptic spells    Acute respiratory failure (HCC)    Chronic headache    Acute non-recurrent maxillary sinusitis    Foot pain, left    Continuous opioid dependence ([address] )    Depression, recurrent ([facility] [address] )    Back abscess    Obesity (BMI 30 0-34  9)      Past Medical History:   Diagnosis Date    COVID-19 03/17/2021    Diabetes mellitus ([facility] [address] )     type 2    Disease of thyroid gland     hypothyroidism    Hyperlipidemia     Hypertension     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    Psychiatric disorder     PTSD   Anxiety, depression,     Psychogenic nonepileptic spells       Past Surgical History:   Procedure Laterality Date    MOUTH SURGERY  08/2021    WISDOM TOOTH EXTRACTION        Family History   Problem Relation Age of Onset    Hyperlipidemia Father     Heart attack Paternal Grandfather     Prostate cancer Paternal Grandfather     Cancer Paternal Grandmother      Social History     Tobacco Use    Smoking status: Never Smoker    Smokeless tobacco: Never Used   Substance Use Topics    Alcohol use: Not Currently     Allergies   Allergen Reactions    Lamotrigine GI Intolerance    Niacin Rash    Simvastatin Other (See Comments)     Elevated liver enzymes  Liver enzyme elevation         Current Outpatient Medications:     Advair Diskus 500-50 MCG/DOSE inhaler, USE 1 INHALATION TWICE A DAY (RINSE MOUTH AFTER USE), Disp: 180 blister, Rfl: 3    albuterol (PROVENTIL HFA,VENTOLIN HFA) 90 mcg/act inhaler, Inhale 2 puffs every 6 (six) hours as needed for wheezing or shortness of breath, Disp: 18 g, Rfl: 3    ARIPiprazole (ABILIFY) 20 MG tablet, Take 20 mg by mouth daily  , Disp: , Rfl:     clonazePAM (KlonoPIN) 1 mg tablet, Take 1 mg by mouth daily  , Disp: , Rfl:     insulin aspart (NovoLOG FlexPen) 100 UNIT/ML injection pen, 14 units before breakfast , 20 units before lunch and dinner , Disp: 45 mL, Rfl: 3    insulin glargine (Lantus) 100 units/mL subcutaneous injection, Inject 30 Units under the skin 2 (two) times a day Basal insulin as split into 2 doses, take 30 units in the morning and 30 units before bed, Disp: 60 mL, Rfl: 5    Insulin Pen Needle (BD Pen Needle Rossana U/F) 32G X 4 MM MISC, Use 4/day, Disp: 100 each, Rfl: 3    levothyroxine 25 mcg tablet, Take 50 mcg by mouth daily, Disp: , Rfl:     loratadine (CLARITIN) 10 mg tablet, Take 10 mg by mouth daily, Disp: , Rfl:     metoprolol succinate (TOPROL-XL) 25 mg 24 hr tablet, Take 1 tablet (25 mg total) by mouth daily, Disp: 90 tablet, Rfl: 3    pravastatin (PRAVACHOL) 40 mg tablet, Take 1 tablet (40 mg total) by mouth daily, Disp: 90 tablet, Rfl: 1    Semaglutide,0 25 or 0 5MG/DOS, (Ozempic, 0 25 or 0 5 MG/DOSE,) 2 MG/1 5ML SOPN, Inject 0 25 mg under the skin once a week, Disp: 3 mL, Rfl: 0    sertraline (ZOLOFT) 100 mg tablet, Take 200 mg by mouth daily  , Disp: , Rfl:     ARIPiprazole (ABILIFY) 5 mg tablet, Take 20 mg by mouth daily   (Patient not taking: No sig reported), Disp: , Rfl:     fish oil-omega-3 fatty acids 1000 MG capsule, Take 2 capsules (2 g total) by mouth daily (Patient not taking: Reported on 7/29/2022), Disp: 180 capsule, Rfl: 3    fluticasone (FLONASE) 50 mcg/act nasal spray, 1 spray into each nostril daily (Patient not taking: Reported on 7/29/2022), Disp: 18 2 mL, Rfl: 1    methocarbamol (ROBAXIN) 500 mg tablet, Take 1 tablet (500 mg total) by mouth 2 (two) times a day as needed for muscle spasms for up to 10 days (Patient not taking: Reported on 7/29/2022), Disp: 20 tablet, Rfl: 0    traMADol (ULTRAM) 50 mg tablet, Take 1 tablet (50 mg total) by mouth 4 (four) times a day as needed for moderate pain for up to 15 doses (Patient not taking: Reported on 7/29/2022), Disp: 15 tablet, Rfl: 0    Review of Systems   Constitutional: Positive for fatigue  Negative for chills and fever  HENT: Negative for ear pain and sore throat  Eyes: Negative for photophobia, pain and visual disturbance  Respiratory: Negative for cough and shortness of breath  Cardiovascular: Negative for chest pain and palpitations  Gastrointestinal: Negative for abdominal pain, constipation and vomiting  Endocrine: Positive for polydipsia  Negative for cold intolerance, polyphagia and polyuria  Genitourinary: Negative for dysuria and hematuria  Musculoskeletal: Negative for arthralgias and back pain  Skin: Negative for color change, rash and wound  Neurological: Negative for seizures, syncope and numbness  All other systems reviewed and are negative  Physical Exam:  Body mass index is 31 35 kg/m²    /76   Pulse 89   Ht 5' 5" (1 651 m)   Wt 85 5 kg (188 lb 6 4 oz)   BMI 31 35 kg/m²    Wt Readings from Last 3 Encounters:   07/29/22 85 5 kg (188 lb 6 4 oz)   07/06/22 83 9 kg (185 lb)   06/17/22 87 1 kg (192 lb)       Physical Exam  Vitals reviewed  Constitutional:       Appearance: Normal appearance  HENT:      Head: Normocephalic and atraumatic  Neck:      Thyroid: No thyroid mass, thyromegaly or thyroid tenderness  Trachea: Trachea normal    Cardiovascular:      Rate and Rhythm: Normal rate and regular rhythm  Heart sounds: Normal heart sounds  Pulmonary:      Effort: Pulmonary effort is normal       Breath sounds: Normal breath sounds  Musculoskeletal:      Cervical back: Neck supple  No tenderness  Lymphadenopathy:      Cervical: No cervical adenopathy  Right cervical: No superficial cervical adenopathy  Left cervical: No superficial cervical adenopathy  Neurological:      Mental Status: He is alert and oriented to person, place, and time  Diabetic Foot Exam    Labs:   Lab Results   Component Value Date    HGBA1C 10 3 (A) 06/17/2022    HGBA1C 11 5 (A) 08/10/2021    HGBA1C 10 3 (A) 05/28/2021     Lab Results   Component Value Date    CREATININE 1 00 10/21/2021    CREATININE 1 16 10/20/2021    CREATININE 1 23 10/19/2021    BUN 9 10/21/2021     01/03/2015    K 3 6 10/21/2021     10/21/2021    CO2 27 10/21/2021     eGFR   Date Value Ref Range Status   10/21/2021 96 ml/min/1 73sq m Final     No results found for: CHOL, HDL, LDL, TRIG, CHOLHDL  Lab Results   Component Value Date    ALT 52 10/20/2021    AST 39 10/20/2021    ALKPHOS 71 10/20/2021    BILITOT 0 7 01/03/2015     Lab Results   Component Value Date    OMZ8EKOGFTPP 5 860 (H) 10/19/2021    PJX0UXGYQTCM 1 755 03/19/2021     Lab Results   Component Value Date    FREET4 0 86 10/19/2021       Impression & Plan:    Problem List Items Addressed This Visit        Endocrine    Type 2 diabetes mellitus without complication, with long-term current use of insulin ([address] ) (Chronic)     Patient's blood sugars are still running high overall    Increase Lantus to 32 units in the morning and 32 units at night  He will let us know if the Ozempic is covered by his insurance  If not we will switch to a different GLP 1  He will also have his lipid panel done  Patient will also send us a DexCom download in the next 2 weeks  He has a follow-up appointment coming up and Yumi  Lab Results   Component Value Date    HGBA1C 10 3 (A) 06/17/2022            Hypothyroidism - Primary (Chronic)     Patient is due for T4 and TSH  Continue with current dose of levothyroxine for now  No orders of the defined types were placed in this encounter  Patient Instructions   Increase Lantus to 32 units in the am and 32 units pm   Let us know if ozempic will be covered  If not, we can switch to another medication in the same class that is  Send us Dexcom download in 2 weeks     Please have your T4, TSH and lipid panel done       Discussed with the patient and all questioned fully answered  He will call me if any problems arise

## 2022-07-29 NOTE — PATIENT INSTRUCTIONS
Increase Lantus to 32 units in the am and 32 units pm   Let us know if ozempic will be covered  If not, we can switch to another medication in the same class that is    Send us Dexcom download in 2 weeks     Please have your T4, TSH and lipid panel done

## 2022-08-05 ENCOUNTER — OFFICE VISIT (OUTPATIENT)
Dept: SURGERY | Facility: CLINIC | Age: 37
End: 2022-08-05
Payer: COMMERCIAL

## 2022-08-05 VITALS
WEIGHT: 192 LBS | TEMPERATURE: 97.9 F | HEART RATE: 78 BPM | RESPIRATION RATE: 18 BRPM | DIASTOLIC BLOOD PRESSURE: 60 MMHG | SYSTOLIC BLOOD PRESSURE: 110 MMHG | BODY MASS INDEX: 31.95 KG/M2 | OXYGEN SATURATION: 98 %

## 2022-08-05 DIAGNOSIS — L02.212 BACK ABSCESS: Primary | ICD-10-CM

## 2022-08-05 PROCEDURE — 99213 OFFICE O/P EST LOW 20 MIN: CPT | Performed by: SURGERY

## 2022-08-05 NOTE — PROGRESS NOTES
Assessment/Plan:    Back abscess  Patient returns for a scheduled 3 month follow-up regarding his history of back abscess in the upper midline  Patient denies all symptoms referable to his skin  He is unaware of the exact location of his abscess  It causes him no symptoms currently  He voiced no additional concerns or complaints  On physical examination he is well-appearing male  Pleasant competent reliable as a historian  Inspection of the skin of the back reveals no signs of a residual epidermoid cyst that would benefit from excision here in the office today  I have advised the patient to follow up with our practice on an as-needed basis on the early signs of recurrent soft tissue infection of the skin  All questions answered to the satisfaction of the patient is in agreement with this treatment plan  Subjective:      Patient ID: Vince Cabrera is a 44 y o  male  Pt is coming in the office today 3 mo f/u abscess on back and is not experiencing any issues with the area and no fever/chiils VINCE Moy Ma      The following portions of the patient's history were reviewed and updated as appropriate: allergies, current medications, past family history, past medical history, past social history, past surgical history and problem list     Review of Systems   Constitutional: Negative for chills and fever  HENT: Negative for ear pain and sore throat  Eyes: Negative for pain and visual disturbance  Respiratory: Negative for cough and shortness of breath  Cardiovascular: Negative for chest pain and palpitations  Gastrointestinal: Negative for abdominal pain and vomiting  Genitourinary: Negative for dysuria and hematuria  Musculoskeletal: Negative for arthralgias and back pain  Skin: Negative for color change and rash  Neurological: Negative for seizures and syncope  All other systems reviewed and are negative          Objective:      /60 (BP Location: Right arm, Patient Position: Sitting, Cuff Size: Large)   Pulse 78   Temp 97 9 °F (36 6 °C) (Temporal)   Resp 18   Wt 87 1 kg (192 lb)   SpO2 98%   BMI 31 95 kg/m²          Physical Exam  Vitals and nursing note reviewed  Constitutional:       Appearance: He is well-developed  HENT:      Head: Normocephalic and atraumatic  Eyes:      Conjunctiva/sclera: Conjunctivae normal       Pupils: Pupils are equal, round, and reactive to light  Cardiovascular:      Rate and Rhythm: Normal rate and regular rhythm  Pulmonary:      Effort: Pulmonary effort is normal       Breath sounds: Normal breath sounds  Abdominal:      General: Bowel sounds are normal       Palpations: Abdomen is soft  Musculoskeletal:         General: Normal range of motion  Cervical back: Normal range of motion and neck supple  Skin:     General: Skin is warm and dry  Comments: Normal skin examination of the back   Neurological:      Mental Status: He is alert and oriented to person, place, and time  Psychiatric:         Behavior: Behavior normal          Thought Content:  Thought content normal          Judgment: Judgment normal

## 2022-08-05 NOTE — LETTER
August 5, 2022     Jose Hager MD  [address] [place] [address]    Patient: Janell Flowers   YOB: 1985   Date of Visit: 8/5/2022       Dear Dr Kaye Humphrey: Thank you for referring Lelon Cowden to me for evaluation  Below are my notes for this consultation  If you have questions, please do not hesitate to call me  I look forward to following your patient along with you  Sincerely,        Jeremy Henderson MD        CC: No Recipients  Jeremy Henderson MD  8/5/2022 10:42 AM  Incomplete  Assessment/Plan:    Back abscess  Patient returns for a scheduled 3 month follow-up regarding his history of back abscess in the upper midline  Patient denies all symptoms referable to his skin  He is unaware of the exact location of his abscess  It causes him no symptoms currently  He voiced no additional concerns or complaints  On physical examination he is well-appearing male  Pleasant competent reliable as a historian  Inspection of the skin of the back reveals no signs of a residual epidermoid cyst that would benefit from excision here in the office today  I have advised the patient to follow up with our practice on an as-needed basis on the early signs of recurrent soft tissue infection of the skin  All questions answered to the satisfaction of the patient is in agreement with this treatment plan  Subjective:      Patient ID: Janell Flowers is a 44 y o  male  Pt is coming in the office today 3 mo f/u abscess on back and is not experiencing any issues with the area and no fever/chiils VINCE Moy Ma      The following portions of the patient's history were reviewed and updated as appropriate: allergies, current medications, past family history, past medical history, past social history, past surgical history and problem list     Review of Systems   Constitutional: Negative for chills and fever  HENT: Negative for ear pain and sore throat      Eyes: Negative for pain and visual disturbance  Respiratory: Negative for cough and shortness of breath  Cardiovascular: Negative for chest pain and palpitations  Gastrointestinal: Negative for abdominal pain and vomiting  Genitourinary: Negative for dysuria and hematuria  Musculoskeletal: Negative for arthralgias and back pain  Skin: Negative for color change and rash  Neurological: Negative for seizures and syncope  All other systems reviewed and are negative  Objective:      /60 (BP Location: Right arm, Patient Position: Sitting, Cuff Size: Large)   Pulse 78   Temp 97 9 °F (36 6 °C) (Temporal)   Resp 18   Wt 87 1 kg (192 lb)   SpO2 98%   BMI 31 95 kg/m²          Physical Exam  Vitals and nursing note reviewed  Constitutional:       Appearance: He is well-developed  HENT:      Head: Normocephalic and atraumatic  Eyes:      Conjunctiva/sclera: Conjunctivae normal       Pupils: Pupils are equal, round, and reactive to light  Cardiovascular:      Rate and Rhythm: Normal rate and regular rhythm  Pulmonary:      Effort: Pulmonary effort is normal       Breath sounds: Normal breath sounds  Abdominal:      General: Bowel sounds are normal       Palpations: Abdomen is soft  Musculoskeletal:         General: Normal range of motion  Cervical back: Normal range of motion and neck supple  Skin:     General: Skin is warm and dry  Comments: Normal skin examination of the back   Neurological:      Mental Status: He is alert and oriented to person, place, and time  Psychiatric:         Behavior: Behavior normal          Thought Content:  Thought content normal          Judgment: Judgment normal

## 2022-08-05 NOTE — ASSESSMENT & PLAN NOTE
Patient returns for a scheduled 3 month follow-up regarding his history of back abscess in the upper midline  Patient denies all symptoms referable to his skin  He is unaware of the exact location of his abscess  It causes him no symptoms currently  He voiced no additional concerns or complaints  On physical examination he is well-appearing male  Pleasant competent reliable as a historian  Inspection of the skin of the back reveals no signs of a residual epidermoid cyst that would benefit from excision here in the office today  I have advised the patient to follow up with our practice on an as-needed basis on the early signs of recurrent soft tissue infection of the skin  All questions answered to the satisfaction of the patient is in agreement with this treatment plan

## 2022-08-29 ENCOUNTER — OFFICE VISIT (OUTPATIENT)
Dept: INTERNAL MEDICINE CLINIC | Facility: CLINIC | Age: 37
End: 2022-08-29
Payer: COMMERCIAL

## 2022-08-29 VITALS
WEIGHT: 190.6 LBS | TEMPERATURE: 98.1 F | OXYGEN SATURATION: 96 % | DIASTOLIC BLOOD PRESSURE: 74 MMHG | BODY MASS INDEX: 31.75 KG/M2 | SYSTOLIC BLOOD PRESSURE: 120 MMHG | HEART RATE: 77 BPM | HEIGHT: 65 IN

## 2022-08-29 DIAGNOSIS — M79.642 LEFT HAND PAIN: Primary | ICD-10-CM

## 2022-08-29 DIAGNOSIS — Z79.4 TYPE 2 DIABETES MELLITUS WITHOUT COMPLICATION, WITH LONG-TERM CURRENT USE OF INSULIN (HCC): Chronic | ICD-10-CM

## 2022-08-29 DIAGNOSIS — J96.00 ACUTE RESPIRATORY FAILURE, UNSPECIFIED WHETHER WITH HYPOXIA OR HYPERCAPNIA (HCC): ICD-10-CM

## 2022-08-29 DIAGNOSIS — E11.9 TYPE 2 DIABETES MELLITUS WITHOUT COMPLICATION, WITH LONG-TERM CURRENT USE OF INSULIN (HCC): Chronic | ICD-10-CM

## 2022-08-29 PROCEDURE — 99213 OFFICE O/P EST LOW 20 MIN: CPT | Performed by: NURSE PRACTITIONER

## 2022-08-29 RX ORDER — METHYLPREDNISOLONE 4 MG/1
TABLET ORAL
Qty: 21 EACH | Refills: 0 | Status: SHIPPED | OUTPATIENT
Start: 2022-08-29 | End: 2022-10-20

## 2022-08-29 RX ORDER — SEMAGLUTIDE 1.34 MG/ML
0.25 INJECTION, SOLUTION SUBCUTANEOUS WEEKLY
Qty: 3 ML | Refills: 1 | Status: CANCELLED | OUTPATIENT
Start: 2022-08-29

## 2022-08-29 RX ORDER — GABAPENTIN 300 MG/1
300 CAPSULE ORAL
Qty: 30 CAPSULE | Refills: 0 | Status: SHIPPED | OUTPATIENT
Start: 2022-08-29

## 2022-08-29 RX ORDER — MELOXICAM 15 MG/1
15 TABLET ORAL DAILY
Qty: 30 TABLET | Refills: 0 | Status: SHIPPED | OUTPATIENT
Start: 2022-08-29 | End: 2022-10-20

## 2022-08-29 NOTE — ASSESSMENT & PLAN NOTE
Concern for carpal tunnel syndrome/neuropathy due to uncontrolled type 2 diabetes  Will get EMG  Advised patient to get over-the-counter cock-up splint  Start Medrol Dosepak, after patient finishes Medrol Dosepak advised to start Mobic advised to take both medications with food  Also advised patient that Medrol Dosepak may increase patient's blood sugars  Start gabapentin 300 mg at HS

## 2022-08-29 NOTE — PROGRESS NOTES
Assessment/Plan:    Left hand pain  Concern for carpal tunnel syndrome/neuropathy due to uncontrolled type 2 diabetes  Will get EMG  Advised patient to get over-the-counter cock-up splint  Start Medrol Dosepak, after patient finishes Medrol Dosepak advised to start Mobic advised to take both medications with food  Also advised patient that Medrol Dosepak may increase patient's blood sugars  Start gabapentin 300 mg at HS  Diagnoses and all orders for this visit:    Left hand pain  -     meloxicam (Mobic) 15 mg tablet; Take 1 tablet (15 mg total) by mouth daily  -     gabapentin (Neurontin) 300 mg capsule; Take 1 capsule (300 mg total) by mouth daily at bedtime  -     methylPREDNISolone 4 MG tablet therapy pack; Use as directed on package  -     EMG 1 Limb; Future    Type 2 diabetes mellitus without complication, with long-term current use of insulin (HCC)  -     gabapentin (Neurontin) 300 mg capsule; Take 1 capsule (300 mg total) by mouth daily at bedtime    Acute respiratory failure, unspecified whether with hypoxia or hypercapnia (HCC)          Subjective:      Patient ID: Ronan Rutledge is a 44 y o  male  Patient presents today with left hand pain  Can not fully close hand   Has been going on for about two weeks    Denies injury to his hand   He reports severe pain to the top of his hand   Has been losing  to his hand     Tingling and numb to his pinky and ring finger       The following portions of the patient's history were reviewed and updated as appropriate: allergies, current medications, past family history, past medical history, past social history, past surgical history and problem list     Review of Systems   Constitutional: Negative for activity change, appetite change, chills, diaphoresis and fever  HENT: Negative for congestion, ear discharge, ear pain, postnasal drip, rhinorrhea, sinus pressure, sinus pain and sore throat      Eyes: Negative for pain, discharge, itching and visual disturbance  Respiratory: Negative for cough, chest tightness, shortness of breath and wheezing  Cardiovascular: Negative for chest pain, palpitations and leg swelling  Gastrointestinal: Negative for abdominal pain, constipation, diarrhea, nausea and vomiting  Endocrine: Negative for polydipsia, polyphagia and polyuria  Genitourinary: Negative for difficulty urinating, dysuria and urgency  Musculoskeletal: Positive for arthralgias  Negative for back pain and neck pain  Skin: Negative for rash and wound  Neurological: Negative for dizziness, weakness, numbness and headaches  Past Medical History:   Diagnosis Date    COVID-19 03/17/2021    Diabetes mellitus ([facility] [address] )     type 2    Disease of thyroid gland     hypothyroidism    Hyperlipidemia     Hypertension     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    Psychiatric disorder     PTSD   Anxiety, depression,     Psychogenic nonepileptic spells          Current Outpatient Medications:     Advair Diskus 500-50 MCG/DOSE inhaler, USE 1 INHALATION TWICE A DAY (RINSE MOUTH AFTER USE), Disp: 180 blister, Rfl: 3    albuterol (PROVENTIL HFA,VENTOLIN HFA) 90 mcg/act inhaler, Inhale 2 puffs every 6 (six) hours as needed for wheezing or shortness of breath, Disp: 18 g, Rfl: 3    ARIPiprazole (ABILIFY) 20 MG tablet, Take 20 mg by mouth daily  , Disp: , Rfl:     clonazePAM (KlonoPIN) 1 mg tablet, Take 1 mg by mouth daily  , Disp: , Rfl:     gabapentin (Neurontin) 300 mg capsule, Take 1 capsule (300 mg total) by mouth daily at bedtime, Disp: 30 capsule, Rfl: 0    insulin aspart (NovoLOG FlexPen) 100 UNIT/ML injection pen, 14 units before breakfast , 20 units before lunch and dinner , Disp: 45 mL, Rfl: 3    insulin glargine (Lantus) 100 units/mL subcutaneous injection, Inject 30 Units under the skin 2 (two) times a day Basal insulin as split into 2 doses, take 30 units in the morning and 30 units before bed, Disp: 60 mL, Rfl: 5    Insulin Pen Needle (BD Pen Needle Rossana U/F) 32G X 4 MM MISC, Use 4/day, Disp: 100 each, Rfl: 3    levothyroxine 25 mcg tablet, Take 50 mcg by mouth daily, Disp: , Rfl:     loratadine (CLARITIN) 10 mg tablet, Take 10 mg by mouth daily, Disp: , Rfl:     meloxicam (Mobic) 15 mg tablet, Take 1 tablet (15 mg total) by mouth daily, Disp: 30 tablet, Rfl: 0    methylPREDNISolone 4 MG tablet therapy pack, Use as directed on package, Disp: 21 each, Rfl: 0    metoprolol succinate (TOPROL-XL) 25 mg 24 hr tablet, Take 1 tablet (25 mg total) by mouth daily, Disp: 90 tablet, Rfl: 3    pravastatin (PRAVACHOL) 40 mg tablet, Take 1 tablet (40 mg total) by mouth daily, Disp: 90 tablet, Rfl: 1    Semaglutide,0 25 or 0 5MG/DOS, (Ozempic, 0 25 or 0 5 MG/DOSE,) 2 MG/1 5ML SOPN, Inject 0 25 mg under the skin once a week, Disp: 3 mL, Rfl: 0    sertraline (ZOLOFT) 100 mg tablet, Take 200 mg by mouth daily  , Disp: , Rfl:     ARIPiprazole (ABILIFY) 5 mg tablet, Take 20 mg by mouth daily   (Patient not taking: No sig reported), Disp: , Rfl:     fish oil-omega-3 fatty acids 1000 MG capsule, Take 2 capsules (2 g total) by mouth daily (Patient not taking: No sig reported), Disp: 180 capsule, Rfl: 3    fluticasone (FLONASE) 50 mcg/act nasal spray, 1 spray into each nostril daily (Patient not taking: No sig reported), Disp: 18 2 mL, Rfl: 1    methocarbamol (ROBAXIN) 500 mg tablet, Take 1 tablet (500 mg total) by mouth 2 (two) times a day as needed for muscle spasms for up to 10 days, Disp: 20 tablet, Rfl: 0    traMADol (ULTRAM) 50 mg tablet, Take 1 tablet (50 mg total) by mouth 4 (four) times a day as needed for moderate pain for up to 15 doses (Patient not taking: No sig reported), Disp: 15 tablet, Rfl: 0    Allergies   Allergen Reactions    Lamotrigine GI Intolerance    Niacin Rash    Simvastatin Other (See Comments)     Elevated liver enzymes  Liver enzyme elevation       Social History   Past Surgical History:   Procedure Laterality Date  MOUTH SURGERY  08/2021    WISDOM TOOTH EXTRACTION       Family History   Problem Relation Age of Onset    Hyperlipidemia Father     Heart attack Paternal Grandfather     Prostate cancer Paternal Grandfather     Cancer Paternal Grandmother        Objective:  /74 (BP Location: Right arm, Patient Position: Sitting, Cuff Size: Standard)   Pulse 77   Temp 98 1 °F (36 7 °C) (Temporal)   Ht 5' 5 43" (1 662 m)   Wt 86 5 kg (190 lb 9 6 oz)   SpO2 96%   BMI 31 30 kg/m²     Recent Results (from the past 1344 hour(s))   Microalbumin / creatinine urine ratio    Collection Time: 07/06/22  6:18 PM   Result Value Ref Range    Creatinine, Ur 110 0 mg/dL    Microalbum  ,U,Random <5 0 0 0 - 20 0 mg/L    Microalb Creat Ratio <5 0 - 30 mg/g creatinine            Physical Exam  Constitutional:       General: He is not in acute distress  Appearance: He is well-developed  He is not diaphoretic  HENT:      Head: Normocephalic and atraumatic  Right Ear: External ear normal       Left Ear: External ear normal       Nose: Nose normal       Mouth/Throat:      Pharynx: No oropharyngeal exudate  Eyes:      General:         Right eye: No discharge  Left eye: No discharge  Conjunctiva/sclera: Conjunctivae normal       Pupils: Pupils are equal, round, and reactive to light  Neck:      Thyroid: No thyromegaly  Cardiovascular:      Rate and Rhythm: Normal rate and regular rhythm  Heart sounds: Normal heart sounds  No murmur heard  No friction rub  No gallop  Pulmonary:      Effort: Pulmonary effort is normal  No respiratory distress  Breath sounds: Normal breath sounds  No stridor  No wheezing or rales  Abdominal:      General: Bowel sounds are normal  There is no distension  Palpations: Abdomen is soft  Tenderness: There is no abdominal tenderness  Musculoskeletal:      Cervical back: Normal range of motion and neck supple        Comments: Positive Tinel and Phalen tests, decreased  to left hand, unable to fully close hand   Lymphadenopathy:      Cervical: No cervical adenopathy  Skin:     General: Skin is warm and dry  Findings: No erythema or rash  Neurological:      Mental Status: He is alert and oriented to person, place, and time  Psychiatric:         Behavior: Behavior normal          Thought Content:  Thought content normal          Judgment: Judgment normal

## 2022-10-06 ENCOUNTER — TELEPHONE (OUTPATIENT)
Dept: ENDOCRINOLOGY | Facility: CLINIC | Age: 37
End: 2022-10-06

## 2022-10-06 NOTE — TELEPHONE ENCOUNTER
Left patient a message to call Deepti at [phone] and request the [facility] THE supplies he needs

## 2022-10-06 NOTE — TELEPHONE ENCOUNTER
Patient called reporting he was out of his dexcom supplies and has been without for past 10 days  He was seen at our center [address] location in July of this year and was trying to switch to our [facility] office due to location, however he missed his appointment  He stated he gets his supplies through [address]  Reached out to our [facility] office to reach out to patient

## 2022-10-12 ENCOUNTER — RA CDI HCC (OUTPATIENT)
Dept: OTHER | Facility: HOSPITAL | Age: 37
End: 2022-10-12

## 2022-10-12 PROBLEM — Z00.00 MEDICARE ANNUAL WELLNESS VISIT, SUBSEQUENT: Status: RESOLVED | Noted: 2021-08-10 | Resolved: 2022-10-12

## 2022-10-12 PROBLEM — J01.00 ACUTE NON-RECURRENT MAXILLARY SINUSITIS: Status: RESOLVED | Noted: 2021-11-04 | Resolved: 2022-10-12

## 2022-10-12 NOTE — PROGRESS NOTES
[facility]  coding opportunities       Chart reviewed, no opportunity found:   [address]        Patients Insurance     Medicare Insurance: [employer]

## 2022-10-14 ENCOUNTER — OFFICE VISIT (OUTPATIENT)
Dept: INTERNAL MEDICINE CLINIC | Facility: OTHER | Age: 37
End: 2022-10-14
Payer: COMMERCIAL

## 2022-10-14 VITALS
TEMPERATURE: 98.4 F | BODY MASS INDEX: 31.32 KG/M2 | OXYGEN SATURATION: 98 % | HEART RATE: 78 BPM | DIASTOLIC BLOOD PRESSURE: 70 MMHG | WEIGHT: 188 LBS | SYSTOLIC BLOOD PRESSURE: 118 MMHG | HEIGHT: 65 IN

## 2022-10-14 DIAGNOSIS — U07.1 COVID-19: ICD-10-CM

## 2022-10-14 DIAGNOSIS — E66.09 CLASS 1 OBESITY DUE TO EXCESS CALORIES WITH SERIOUS COMORBIDITY AND BODY MASS INDEX (BMI) OF 30.0 TO 30.9 IN ADULT: ICD-10-CM

## 2022-10-14 DIAGNOSIS — F33.9 DEPRESSION, RECURRENT (HCC): ICD-10-CM

## 2022-10-14 DIAGNOSIS — E03.9 HYPOTHYROIDISM, UNSPECIFIED TYPE: Chronic | ICD-10-CM

## 2022-10-14 DIAGNOSIS — Z79.4 TYPE 2 DIABETES MELLITUS WITHOUT COMPLICATION, WITH LONG-TERM CURRENT USE OF INSULIN (HCC): Primary | Chronic | ICD-10-CM

## 2022-10-14 DIAGNOSIS — Z79.4 TYPE 2 DIABETES MELLITUS WITH HYPERGLYCEMIA, WITH LONG-TERM CURRENT USE OF INSULIN (HCC): ICD-10-CM

## 2022-10-14 DIAGNOSIS — Z00.00 MEDICARE ANNUAL WELLNESS VISIT, SUBSEQUENT: ICD-10-CM

## 2022-10-14 DIAGNOSIS — E78.5 HYPERLIPIDEMIA, UNSPECIFIED HYPERLIPIDEMIA TYPE: ICD-10-CM

## 2022-10-14 DIAGNOSIS — E11.9 TYPE 2 DIABETES MELLITUS WITHOUT COMPLICATION, WITH LONG-TERM CURRENT USE OF INSULIN (HCC): Primary | Chronic | ICD-10-CM

## 2022-10-14 DIAGNOSIS — I10 ESSENTIAL HYPERTENSION: Chronic | ICD-10-CM

## 2022-10-14 DIAGNOSIS — E11.65 TYPE 2 DIABETES MELLITUS WITH HYPERGLYCEMIA, WITH LONG-TERM CURRENT USE OF INSULIN (HCC): ICD-10-CM

## 2022-10-14 PROBLEM — F11.20 CONTINUOUS OPIOID DEPENDENCE (HCC): Status: RESOLVED | Noted: 2022-01-03 | Resolved: 2022-10-14

## 2022-10-14 PROBLEM — J96.00 ACUTE RESPIRATORY FAILURE (HCC): Status: RESOLVED | Noted: 2021-10-19 | Resolved: 2022-10-14

## 2022-10-14 LAB — SL AMB POCT HEMOGLOBIN AIC: 11.1 (ref ?–6.5)

## 2022-10-14 PROCEDURE — 99214 OFFICE O/P EST MOD 30 MIN: CPT | Performed by: INTERNAL MEDICINE

## 2022-10-14 PROCEDURE — G0439 PPPS, SUBSEQ VISIT: HCPCS | Performed by: INTERNAL MEDICINE

## 2022-10-14 PROCEDURE — 83036 HEMOGLOBIN GLYCOSYLATED A1C: CPT | Performed by: INTERNAL MEDICINE

## 2022-10-14 RX ORDER — INSULIN GLARGINE 100 [IU]/ML
50 INJECTION, SOLUTION SUBCUTANEOUS EVERY 12 HOURS SCHEDULED
Qty: 60 ML | Refills: 5 | Status: SHIPPED | OUTPATIENT
Start: 2022-10-14 | End: 2023-01-12

## 2022-10-14 RX ORDER — LEVOTHYROXINE SODIUM 0.03 MG/1
50 TABLET ORAL DAILY
Qty: 90 TABLET | Refills: 1 | Status: SHIPPED | OUTPATIENT
Start: 2022-10-14

## 2022-10-14 RX ORDER — PRAVASTATIN SODIUM 40 MG
40 TABLET ORAL DAILY
Qty: 90 TABLET | Refills: 1 | Status: SHIPPED | OUTPATIENT
Start: 2022-10-14 | End: 2022-10-17 | Stop reason: SDUPTHER

## 2022-10-14 NOTE — ASSESSMENT & PLAN NOTE
Lab Results   Component Value Date    HGBA1C 10 3 (A) 06/17/2022   Very poorly controlled secondary to noncompliance to follow-up and also failure to follow-up with endocrinologist   As per patient today he told me that he is taking Lantus 40 units twice a day  Will increased 50 units twice a day  Continue with present dose of NovoLog with meals  Again advised to follow-up with endocrinologist as soon as possible

## 2022-10-14 NOTE — PATIENT INSTRUCTIONS
Medicare Preventive Visit Patient Instructions  Thank you for completing your Welcome to Medicare Visit or Medicare Annual Wellness Visit today  Your next wellness visit will be due in one year (10/15/2023)  The screening/preventive services that you may require over the next 5-10 years are detailed below  Some tests may not apply to you based off risk factors and/or age  Screening tests ordered at today's visit but not completed yet may show as past due  Also, please note that scanned in results may not display below  Preventive Screenings:  Service Recommendations Previous Testing/Comments   Colorectal Cancer Screening  · Colonoscopy    · Fecal Occult Blood Test (FOBT)/Fecal Immunochemical Test (FIT)  · Fecal DNA/Cologuard Test  · Flexible Sigmoidoscopy Age: 39-70 years old   Colonoscopy: every 10 years (May be performed more frequently if at higher risk)  OR  FOBT/FIT: every 1 year  OR  Cologuard: every 3 years  OR  Sigmoidoscopy: every 5 years  Screening may be recommended earlier than age 39 if at higher risk for colorectal cancer  Also, an individualized decision between you and your healthcare provider will decide whether screening between the ages of 74-80 would be appropriate   Colonoscopy: Not on file  FOBT/FIT: Not on file  Cologuard: Not on file  Sigmoidoscopy: Not on file          Prostate Cancer Screening Individualized decision between patient and health care provider in men between ages of 53-78   Medicare will cover every 12 months beginning on the day after your 50th birthday PSA: No results in last 5 years     Screening Not Indicated     Hepatitis C Screening Once for adults born between 1945 and 1965  More frequently in patients at high risk for Hepatitis C Hep C Antibody: 10/21/2021    Screening Current   Diabetes Screening 1-2 times per year if you're at risk for diabetes or have pre-diabetes Fasting glucose: No results in last 5 years (No results in last 5 years)  A1C: 10 3 (6/17/2022)  Screening Not Indicated  History Diabetes   Cholesterol Screening Once every 5 years if you don't have a lipid disorder  May order more often based on risk factors  Lipid panel: 10/02/2018  Screening Not Indicated  History Lipid Disorder      Other Preventive Screenings Covered by Medicare:  1  Abdominal Aortic Aneurysm (AAA) Screening: covered once if your at risk  You're considered to be at risk if you have a family history of AAA or a male between the age of 73-68 who smoking at least 100 cigarettes in your lifetime  2  Lung Cancer Screening: covers low dose CT scan once per year if you meet all of the following conditions: (1) Age 50-69; (2) No signs or symptoms of lung cancer; (3) Current smoker or have quit smoking within the last 15 years; (4) You have a tobacco smoking history of at least 20 pack years (packs per day x number of years you smoked); (5) You get a written order from a healthcare provider  3  Glaucoma Screening: covered annually if you're considered high risk: (1) You have diabetes OR (2) Family history of glaucoma OR (3)  aged 48 and older OR (3)  American aged 72 and older  3  Osteoporosis Screening: covered every 2 years if you meet one of the following conditions: (1) Have a vertebral abnormality; (2) On glucocorticoid therapy for more than 3 months; (3) Have primary hyperparathyroidism; (4) On osteoporosis medications and need to assess response to drug therapy  5  HIV Screening: covered annually if you're between the age of 12-76  Also covered annually if you are younger than 13 and older than 72 with risk factors for HIV infection  For pregnant patients, it is covered up to 3 times per pregnancy      Immunizations:  Immunization Recommendations   Influenza Vaccine Annual influenza vaccination during flu season is recommended for all persons aged >= 6 months who do not have contraindications   Pneumococcal Vaccine   * Pneumococcal conjugate vaccine = PCV13 (Prevnar 13), PCV15 (Vaxneuvance), PCV20 (Prevnar 20)  * Pneumococcal polysaccharide vaccine = PPSV23 (Pneumovax) Adults 2364 years old: 1-3 doses may be recommended based on certain risk factors  Adults 72 years old: 1-2 doses may be recommended based off what pneumonia vaccine you previously received   Hepatitis B Vaccine 3 dose series if at intermediate or high risk (ex: diabetes, end stage renal disease, liver disease)   Tetanus (Td) Vaccine - COST NOT COVERED BY MEDICARE PART B Following completion of primary series, a booster dose should be given every 10 years to maintain immunity against tetanus  Td may also be given as tetanus wound prophylaxis  Tdap Vaccine - COST NOT COVERED BY MEDICARE PART B Recommended at least once for all adults  For pregnant patients, recommended with each pregnancy  Shingles Vaccine (Shingrix) - COST NOT COVERED BY MEDICARE PART B  2 shot series recommended in those aged 48 and above     Health Maintenance Due:      Topic Date Due   • HIV Screening  Completed   • Hepatitis C Screening  Completed     Immunizations Due:      Topic Date Due   • COVID-19 Vaccine (1) Never done   • Pneumococcal Vaccine: Pediatrics (0 to 5 Years) and At-Risk Patients (6 to 59 Years) (1 - PCV) 05/[birth year]/1990   • Influenza Vaccine (1) 09/01/2022     Advance Directives   What are advance directives? Advance directives are legal documents that state your wishes and plans for medical care  These plans are made ahead of time in case you lose your ability to make decisions for yourself  Advance directives can apply to any medical decision, such as the treatments you want, and if you want to donate organs  What are the types of advance directives? There are many types of advance directives, and each state has rules about how to use them  You may choose a combination of any of the following:  · Living will: This is a written record of the treatment you want   You can also choose which treatments you do not want, which to limit, and which to stop at a certain time  This includes surgery, medicine, IV fluid, and tube feedings  · Durable power of  for healthcare [facility]): This is a written record that states who you want to make healthcare choices for you when you are unable to make them for yourself  This person, called a proxy, is usually a family member or a friend  You may choose more than 1 proxy  · Do not resuscitate (DNR) order:  A DNR order is used in case your heart stops beating or you stop breathing  It is a request not to have certain forms of treatment, such as CPR  A DNR order may be included in other types of advance directives  · Medical directive: This covers the care that you want if you are in a coma, near death, or unable to make decisions for yourself  You can list the treatments you want for each condition  Treatment may include pain medicine, surgery, blood transfusions, dialysis, IV or tube feedings, and a ventilator (breathing machine)  · Values history: This document has questions about your views, beliefs, and how you feel and think about life  This information can help others choose the care that you would choose  Why are advance directives important? An advance directive helps you control your care  Although spoken wishes may be used, it is better to have your wishes written down  Spoken wishes can be misunderstood, or not followed  Treatments may be given even if you do not want them  An advance directive may make it easier for your family to make difficult choices about your care  Weight Management   Why it is important to manage your weight:  Being overweight increases your risk of health conditions such as heart disease, high blood pressure, type 2 diabetes, and certain types of cancer  It can also increase your risk for osteoarthritis, sleep apnea, and other respiratory problems  Aim for a slow, steady weight loss   Even a small amount of weight loss can lower your risk of health problems  How to lose weight safely:  A safe and healthy way to lose weight is to eat fewer calories and get regular exercise  You can lose up about 1 pound a week by decreasing the number of calories you eat by 500 calories each day  Healthy meal plan for weight management:  A healthy meal plan includes a variety of foods, contains fewer calories, and helps you stay healthy  A healthy meal plan includes the following:  · Eat whole-grain foods more often  A healthy meal plan should contain fiber  Fiber is the part of grains, fruits, and vegetables that is not broken down by your body  Whole-grain foods are healthy and provide extra fiber in your diet  Some examples of whole-grain foods are whole-wheat breads and pastas, oatmeal, brown rice, and bulgur  · Eat a variety of vegetables every day  Include dark, leafy greens such as spinach, kale, luis carlos greens, and mustard greens  Eat yellow and orange vegetables such as carrots, sweet potatoes, and winter squash  · Eat a variety of fruits every day  Choose fresh or canned fruit (canned in its own juice or light syrup) instead of juice  Fruit juice has very little or no fiber  · Eat low-fat dairy foods  Drink fat-free (skim) milk or 1% milk  Eat fat-free yogurt and low-fat cottage cheese  Try low-fat cheeses such as mozzarella and other reduced-fat cheeses  · Choose meat and other protein foods that are low in fat  Choose beans or other legumes such as split peas or lentils  Choose fish, skinless poultry (chicken or turkey), or lean cuts of red meat (beef or pork)  Before you cook meat or poultry, cut off any visible fat  · Use less fat and oil  Try baking foods instead of frying them  Add less fat, such as margarine, sour cream, regular salad dressing and mayonnaise to foods  Eat fewer high-fat foods  Some examples of high-fat foods include french fries, doughnuts, ice cream, and cakes  · Eat fewer sweets    Limit foods and drinks that are high in sugar  This includes candy, cookies, regular soda, and sweetened drinks  Exercise:  Exercise at least 30 minutes per day on most days of the week  Some examples of exercise include walking, biking, dancing, and swimming  You can also fit in more physical activity by taking the stairs instead of the elevator or parking farther away from stores  Ask your healthcare provider about the best exercise plan for you  Narcotic (Opioid) Safety    Use narcotics safely:  · Take prescribed narcotics exactly as directed  · Do not give narcotics to others or take narcotics that belong to someone else  · Do not mix narcotics without medicines or alcohol  · Do not drive or operate heavy machinery after you take the narcotic  · Monitor for side effects and notify your healthcare provider if you experienced side effects such as nausea, sleepiness, itching, or trouble thinking clearly  Manage constipation:    Constipation is the most common side effect of narcotic medicine  Constipation is when you have hard, dry bowel movements, or you go longer than usual between bowel movements  Tell your healthcare provider about all changes in your bowel movements while you are taking narcotics  He or she may recommend laxative medicine to help you have a bowel movement  He or she may also change the kind of narcotic you are taking, or change when you take it  The following are more ways you can prevent or relieve constipation:    · Drink liquids as directed  You may need to drink extra liquids to help soften and move your bowels  Ask how much liquid to drink each day and which liquids are best for you  · Eat high-fiber foods  This may help decrease constipation by adding bulk to your bowel movements  High-fiber foods include fruits, vegetables, whole-grain breads and cereals, and beans  Your healthcare provider or dietitian can help you create a high-fiber meal plan   Your provider may also recommend a fiber supplement if you cannot get enough fiber from food  · Exercise regularly  Regular physical activity can help stimulate your intestines  Walking is a good exercise to prevent or relieve constipation  Ask which exercises are best for you  · Schedule a time each day to have a bowel movement  This may help train your body to have regular bowel movements  Bend forward while you are on the toilet to help move the bowel movement out  Sit on the toilet for at least 10 minutes, even if you do not have a bowel movement  Store narcotics safely:   · Store narcotics where others cannot easily get them  Keep them in a locked cabinet or secure area  Do not  keep them in a purse or other bag you carry with you  A person may be looking for something else and find the narcotics  · Make sure narcotics are stored out of the reach of children  A child can easily overdose on narcotics  Narcotics may look like candy to a small child  The best way to dispose of narcotics: The laws vary by country and area  In the [place], the best way is to return the narcotics through a take-back program  This program is offered by the [employer])  The following are options for using the program:  · Take the narcotics to a NOEMI collection site  The site is often a law enforcement center  Call your local law enforcement center for scheduled take-back days in your area  You will be given information on where to go if the collection site is in a different location  · Take the narcotics to an approved pharmacy or hospital   A pharmacy or hospital may be set up as a collection site  You will need to ask if it is a NOEMI collection site if you were not directed there  A pharmacy or doctor's office may not be able to take back narcotics unless it is a NOEMI site  · Use a mail-back system  This means you are given containers to put the narcotics into  You will then mail them in the containers  · Use a take-back drop box    This is a place to leave the narcotics at any time  People and animals will not be able to get into the box  Your local law enforcement agency can tell you where to find a drop box in your area  Other ways to manage pain:   · Ask your healthcare provider about non-narcotic medicines to control pain  Nonprescription medicines include NSAIDs (such as ibuprofen) and acetaminophen  Prescription medicines include muscle relaxers, antidepressants, and steroids  · Pain may be managed without any medicines  Some ways to relieve pain include massage, aromatherapy, or meditation  Physical or occupational therapy may also help  For more information:   · Drug Enforcement Administration  [address] [facility] [address]  Phone: 3- 687 - [phone]  Web Address: [email or web address]_[email or web address]    · [address]  and Drug Administration  [place] , [address] [facility]  Phone: 3- 349 - [phone]  Web Address: http://[employer]/     © Copyright [employer] 2018 Information is for End User's use only and may not be sold, redistributed or otherwise used for commercial purposes  All illustrations and images included in CareNotes® are the copyrighted property of [employer]  or [address] [place] [address]  Type 2 Diabetes Management for Adults   AMBULATORY CARE:   Type 2 diabetes  is a disease that affects how your body uses glucose (sugar)  Either your body cannot make enough insulin, or it cannot use the insulin correctly  It is important to keep diabetes controlled to prevent damage to your heart, blood vessels, and other organs  Have someone call your local emergency number (911 in the [address] [facility]) if:   · You cannot be woken  · You have signs of diabetic ketoacidosis:     ? confusion, fatigue    ? vomiting    ? rapid heartbeat    ? fruity smelling breath    ? extreme thirst    ? dry mouth and skin    · You have any of the following signs of a heart attack:      ?  Squeezing, pressure, or pain in your chest    ? You may  also have any of the following:     § Discomfort or pain in your back, neck, jaw, stomach, or arm    § Shortness of breath    § Nausea or vomiting    § Lightheadedness or a sudden cold sweat    · You have any of the following signs of a stroke:      ? Numbness or drooping on one side of your face     ? Weakness in an arm or leg    ? Confusion or difficulty speaking    ? Dizziness, a severe headache, or vision loss    Call your doctor or diabetes care team if:   · You have a sore or wound that will not heal     · You have a change in the amount you urinate  · Your blood sugar levels are higher than your target goals  · You often have lower blood sugar levels than your target goals  · Your skin is red, dry, warm, or swollen  · You have trouble coping with diabetes, or you feel anxious or depressed  · You have questions or concerns about your condition or care  What you need to know about high blood sugar levels:  High blood sugar levels may not cause any symptoms  You may feel more thirsty or urinate more often than usual  Over time, high blood sugar levels can damage your nerves, blood vessels, tissues, and organs  The following can increase your blood sugar levels:  · Large meals or large amounts of carbohydrates at one time    · Less physical activity    · Stress    · Illness    · A lower dose of medicine or insulin, or a late dose    What you need to know about low blood sugar levels: You can prevent symptoms such as shakiness, dizziness, irritability, or confusion by preventing your blood sugar levels from going too low  · Treat a low blood sugar level right away  ? Drink 4 ounces of juice or have 1 tube of glucose gel  ? Check your blood sugar level again 10 to 15 minutes later  ? When the level goes back to normal, eat a meal or snack to prevent another decrease         · Keep glucose gel, raisins, or hard candy with you at all times to treat a low blood sugar level  · Your blood sugar level can get too low if you take diabetes medicine or insulin and do not eat enough food  · If you use insulin, check your blood sugar level before you exercise  ? If your blood sugar level is below 100 mg/dL, eat 4 crackers or 2 ounces of raisins, or drink 4 ounces of juice  ? Check your level every 30 minutes if you exercise more than 1 hour  ? You may need a snack during or after exercise  What you can do to manage your blood sugar levels:   · Check your blood sugar levels as directed and as needed  Several items are available to use to check your levels  You may need to check by testing a drop of blood in a glucose monitor  You may instead be given a continuous glucose monitoring (CGM) device  The device is worn at all times  The CGM checks your blood sugar level every 5 minutes  It sends results to an electronic device such as a smart phone  A CGM can be used with or without an insulin pump  Talk with your provider to find out which method is best for you  The goal for blood sugar levels before meals  is between 80 and 130 mg/dL and 2 hours after eating  is lower than 180 mg/dL  · Make healthy food choices  Work with a dietitian to develop a meal plan that works for you and your schedule  A dietitian can help you learn how to eat the right amount of carbohydrates during your meals and snacks  Carbohydrates can raise your blood sugar level if you eat too many at one time  Examples of foods that contain carbohydrates are breads, cereals, rice, pasta, and sweets  · Get regular physical activity  Physical activity can help you get to your target blood sugar level goal and manage your weight  Get at least 150 minutes of moderate to vigorous aerobic physical activity each week  Do not miss more than 2 days in a row  Do not sit longer than 30 minutes at a time  Your healthcare provider can help you create an activity plan   The plan can include the best activities for you and can help you build your strength and endurance  · Maintain a healthy weight  Ask your healthcare provider what a healthy weight is for you  Ask him or her to help you create a safe weight loss plan if you are overweight  · Take your diabetes medicine or insulin as directed  You may need diabetes medicine, insulin, or both to help control your blood sugar levels  Your healthcare provider will teach you how and when to take your diabetes medicine or insulin  You will also be taught about side effects oral diabetes medicine can cause  Insulin may be injected, or given through a pump or pen  You and your care team will discuss which method is best for you  ? An insulin pump  is an implanted device that gives your insulin 24 hours a day  An insulin pump prevents the need for multiple insulin injections in a day  ? An insulin pen  is a device prefilled with the right amount of insulin  ? You and your family members will be taught how to draw up and give insulin  if this is the best method for you  Your education team will also teach you how to dispose of needles and syringes  ? You will learn how much insulin you need  and when to give it  You will be taught when not to give insulin  You will also be taught what to do if your blood sugar level drops too low  This may happen if you take insulin and do not eat the right amount of carbohydrates  Other things you can do to manage type 2 diabetes:   · Wear medical alert identification  Wear medical alert jewelry or carry a card that says you have diabetes  Ask your provider where to get these items  · Do not smoke  Nicotine and other chemicals in cigarettes and cigars can cause lung and blood vessel damage  It also makes it more difficult to manage your diabetes  Ask your provider for information if you currently smoke and need help to quit   Do not use e-cigarettes or smokeless tobacco in place of cigarettes or to help you quit  They still contain nicotine  · Check your feet each day for cuts, scratches, calluses, or other wounds  Look for redness and swelling, and feel for warmth  Wear shoes that fit well  Check your shoes for rocks or other objects that can hurt your feet  Do not walk barefoot or wear shoes without socks  Wear cotton socks to help keep your feet dry  · Ask about vaccines you may need  You have a higher risk for serious illness if you get the flu, pneumonia, COVID-19, or hepatitis  Ask your provider if you should get vaccines to prevent these or other diseases, and when to get the vaccines  · Talk to your care team if you become stressed about diabetes care  Sometimes being able to fit diabetes care into your life can cause increased stress  The stress can cause you not to take care of yourself properly  Your care team can help by offering tips about self-care  Your care team may suggest you talk to a mental health provider  The provider can listen and offer help with self-care issues  Follow up with your doctor or diabetes care team as directed: You may need to have blood tests done before your follow-up visit  The test results will show if changes need to be made in your treatment or self-care  Write down your questions so you remember to ask them during your visits  Talk to your provider if you cannot afford your medicine  © Copyright [employer] 2022 Information is for End User's use only and may not be sold, redistributed or otherwise used for commercial purposes  All illustrations and images included in CareNotes® are the copyrighted property of [employer]  or [facility]   The above information is an  only  It is not intended as medical advice for individual conditions or treatments  Talk to your doctor, nurse or pharmacist before following any medical regimen to see if it is safe and effective for you

## 2022-10-14 NOTE — PROGRESS NOTES
Assessment and Plan:     Problem List Items Addressed This Visit        Endocrine    Type 2 diabetes mellitus without complication, with long-term current use of insulin ([facility] ) - Primary (Chronic)       Lab Results   Component Value Date    HGBA1C 10 3 (A) 06/17/2022   Very poorly controlled secondary to noncompliance to follow-up and also failure to follow-up with endocrinologist   As per patient today he told me that he is taking Lantus 40 units twice a day  Will increased 50 units twice a day  Continue with present dose of NovoLog with meals  Again advised to follow-up with endocrinologist as soon as possible  Relevant Medications    insulin glargine (Lantus) 100 units/mL subcutaneous injection    Other Relevant Orders    Hemoglobin A1C    Hypothyroidism (Chronic)     Continue with present regimen    Is due for thyroid function test before next visit         Relevant Medications    levothyroxine 25 mcg tablet    Type 2 diabetes mellitus with hyperglycemia, with long-term current use of insulin (HCC)    Relevant Medications    insulin glargine (Lantus) 100 units/mL subcutaneous injection       Cardiovascular and Mediastinum    Essential hypertension (Chronic)     Blood pressure is stable on present regimen         Relevant Orders    CBC    Lipid Panel with Direct LDL reflex    Comprehensive metabolic panel       Other    Hyperlipidemia     Continue with present dose of statin         Relevant Medications    pravastatin (PRAVACHOL) 40 mg tablet    Class 1 obesity due to excess calories with serious comorbidity and body mass index (BMI) of 30 0 to 30 9 in adult    Depression, recurrent ([facility] )     Being followed by psychiatrist           Other Visit Diagnoses     COVID-19        Relevant Medications    pravastatin (PRAVACHOL) 40 mg tablet    Medicare annual wellness visit, subsequent               Preventive health issues were discussed with patient, and age appropriate screening tests were ordered as noted in patient's After Visit Summary  Personalized health advice and appropriate referrals for health education or preventive services given if needed, as noted in patient's After Visit Summary  History of Present Illness:     Patient presents for a Medicare Wellness Visit    Patient is here for follow-up  No blood work prior to this visit but he said that blood work was done at [facility] in September 2022 he does not have results with him     Patient Care Team:  Jose Dodd MD as PCP - General (Internal Medicine)  Jose Dodd MD as PCP - PCP-sohail (RTE)  Hussein Villalobos MD (Endocrinology)  Ginger Angela (Dietician)  Governor Ivan as Nurse Practitioner (Endocrinology)     Review of Systems:     Review of Systems   Constitutional: Negative for fatigue and fever  HENT: Negative for congestion, ear discharge, ear pain, postnasal drip, sinus pressure, sore throat, tinnitus and trouble swallowing  Eyes: Negative for discharge, itching and visual disturbance  Respiratory: Negative for cough and shortness of breath  Cardiovascular: Negative for chest pain and palpitations  Gastrointestinal: Negative for abdominal pain, diarrhea, nausea and vomiting  Endocrine: Negative for cold intolerance and polyuria  Genitourinary: Negative for difficulty urinating, dysuria and urgency  Musculoskeletal: Negative for arthralgias and neck pain  Skin: Negative for rash  Allergic/Immunologic: Negative for environmental allergies  Neurological: Negative for dizziness, weakness and headaches  Psychiatric/Behavioral: Negative for agitation and behavioral problems  The patient is nervous/anxious           Problem List:     Patient Active Problem List   Diagnosis   • Anxiety   • PTSD (post-traumatic stress disorder)   • Hyperlipidemia   • Type 2 diabetes mellitus without complication, with long-term current use of insulin (HCC)   • ENRIKE (obstructive sleep apnea)   • Chronic GERD   • Oropharyngeal dysphagia   • COVID   • Bradycardic baseline fetal heart rate   • Essential hypertension   • Moderate persistent asthma   • 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[facility])   • Acute bilateral low back pain with bilateral sciatica   • Hypothyroidism   • Type 2 diabetes mellitus with hyperglycemia, with long-term current use of insulin (HCC)   • Class 1 obesity due to excess calories with serious comorbidity and body mass index (BMI) of 30 0 to 30 9 in adult   • Alopecia   • Acute midline low back pain without sciatica   • COVID-19 vaccination refused   • Psychogenic nonepileptic spells   • Chronic headache   • Foot pain, left   • Depression, recurrent (HCC)   • Obesity (BMI 30 0-34  9)   • Left hand pain      Past Medical and Surgical History:     Past Medical History:   Diagnosis Date   • COVID-19 03/17/2021   • Diabetes mellitus ([facility] )     type 2   • Disease of thyroid gland     hypothyroidism   • Hyperlipidemia    • Hypertension    • 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   • Psychiatric disorder     PTSD   Anxiety, depression,    • Psychogenic nonepileptic spells      Past Surgical History:   Procedure Laterality Date   • MOUTH SURGERY  08/2021   • WISDOM TOOTH EXTRACTION        Family History:     Family History   Problem Relation Age of Onset   • Hyperlipidemia Father    • Heart attack Paternal Grandfather    • Prostate cancer Paternal Grandfather    • Cancer Paternal Grandmother       Social History:     Social History     Socioeconomic History   • Marital status: /Civil Union     Spouse name: None   • Number of children: None   • Years of education: None   • Highest education level: None   Occupational History   • None   Tobacco Use   • Smoking status: Never Smoker   • Smokeless tobacco: Never Used   Vaping Use   • Vaping Use: Never used   Substance and Sexual Activity   • Alcohol use: Not Currently   • Drug use: Not Currently     Types: Marijuana   • Sexual activity: Yes     Partners: Female     Birth control/protection: None     Comment:    Other Topics Concern   • None   Social History Narrative   • None     Social Determinants of Health     Financial Resource Strain: Not on file   Food Insecurity: Not on file   Transportation Needs: Not on file   Physical Activity: Not on file   Stress: Not on file   Social Connections: Not on file   Intimate Partner Violence: Not on file   Housing Stability: Not on file      Medications and Allergies:     Current Outpatient Medications   Medication Sig Dispense Refill   • Advair Diskus 500-50 MCG/DOSE inhaler USE 1 INHALATION TWICE A DAY (RINSE MOUTH AFTER USE) 180 blister 3   • albuterol (PROVENTIL HFA,VENTOLIN HFA) 90 mcg/act inhaler Inhale 2 puffs every 6 (six) hours as needed for wheezing or shortness of breath 18 g 3   • ARIPiprazole (ABILIFY) 20 MG tablet Take 15 mg by mouth daily     • clonazePAM (KlonoPIN) 1 mg tablet Take 1 mg by mouth daily       • gabapentin (Neurontin) 300 mg capsule Take 1 capsule (300 mg total) by mouth daily at bedtime 30 capsule 0   • insulin aspart (NovoLOG FlexPen) 100 UNIT/ML injection pen 14 units before breakfast , 20 units before lunch and dinner   45 mL 3   • insulin glargine (Lantus) 100 units/mL subcutaneous injection Inject 50 Units under the skin every 12 (twelve) hours Basal insulin as split into 2 doses, take 30 units in the morning and 30 units before bed 60 mL 5   • Insulin Pen Needle (BD Pen Needle Rossana U/F) 32G X 4 MM MISC Use 4/day 100 each 3   • levothyroxine 25 mcg tablet Take 2 tablets (50 mcg total) by mouth daily 50 90 tablet 1   • loratadine (CLARITIN) 10 mg tablet Take 10 mg by mouth daily     • metoprolol succinate (TOPROL-XL) 25 mg 24 hr tablet Take 1 tablet (25 mg total) by mouth daily 90 tablet 3   • pravastatin (PRAVACHOL) 40 mg tablet Take 1 tablet (40 mg total) by mouth daily 90 tablet 1   • sertraline (ZOLOFT) 100 mg tablet Take 200 mg by mouth daily       • ARIPiprazole (ABILIFY) 5 mg tablet Take 20 mg by mouth daily   (Patient not taking: No sig reported)     • fish oil-omega-3 fatty acids 1000 MG capsule Take 2 capsules (2 g total) by mouth daily (Patient not taking: No sig reported) 180 capsule 3   • fluticasone (FLONASE) 50 mcg/act nasal spray 1 spray into each nostril daily (Patient not taking: No sig reported) 18 2 mL 1   • meloxicam (Mobic) 15 mg tablet Take 1 tablet (15 mg total) by mouth daily (Patient not taking: Reported on 10/14/2022) 30 tablet 0   • methocarbamol (ROBAXIN) 500 mg tablet Take 1 tablet (500 mg total) by mouth 2 (two) times a day as needed for muscle spasms for up to 10 days (Patient not taking: Reported on 10/14/2022) 20 tablet 0   • methylPREDNISolone 4 MG tablet therapy pack Use as directed on package (Patient not taking: Reported on 10/14/2022) 21 each 0   • Semaglutide,0 25 or 0 5MG/DOS, (Ozempic, 0 25 or 0 5 MG/DOSE,) 2 MG/1 5ML SOPN Inject 0 25 mg under the skin once a week (Patient not taking: Reported on 10/14/2022) 3 mL 0     No current facility-administered medications for this visit       Allergies   Allergen Reactions   • Lamotrigine GI Intolerance   • Niacin Rash   • Simvastatin Other (See Comments)     Elevated liver enzymes  Liver enzyme elevation      Immunizations:     Immunization History   Administered Date(s) Administered   • Anthrax 01/14/2003, 01/30/2003, 02/13/2003, 03/17/2004   • Fluzone Split Quad 0 5 mL 10/02/2019   • H1N1 Inj 11/25/2009   • Hep A, adult 07/11/2002, 03/17/2004   • INFLUENZA 11/19/2012, 10/23/2018, 10/02/2019   • IPV 07/11/2002, 07/31/2008   • Influenza LAIV (Nasal) 10/23/2008, 08/27/2009   • Influenza Quadrivalent Preservative Free 3 years and older IM 10/23/2018, 10/02/2019   • Influenza Split 11/18/2003, 11/12/2004, 11/29/2005   • Influenza, injectable, quadrivalent, preservative free 0 5 mL 10/02/2019   • Influenza, seasonal, injectable, preservative free 10/23/2018   • MMR 07/11/2002   • Meningococcal MCV4P 07/31/2008   • Meningococcal Polysaccharide (MPSV4) 07/11/2002   • Pneumococcal 12/15/2011   • Pneumococcal Conjugate PCV 7 12/15/2011   • Smallpox 01/24/2003   • Td (adult), Unspecified 01/01/2008   • Td (adult), adsorbed 11/08/2002, 06/22/2015   • Tdap 07/31/2008, 06/22/2015   • Typhoid Live, oral 09/16/2004   • Typhoid, Unspecified 11/08/2002, 09/16/2004   • Typhoid, ViCPs 11/13/2008   • Yellow Fever 07/11/2002      Health Maintenance:         Topic Date Due   • HIV Screening  Completed   • Hepatitis C Screening  Completed         Topic Date Due   • COVID-19 Vaccine (1) Never done   • Pneumococcal Vaccine: Pediatrics (0 to 5 Years) and At-Risk Patients (6 to 59 Years) (1 - PCV) 05/23/1990   • Influenza Vaccine (1) 09/01/2022      Medicare Screening Tests and Risk Assessments:     Kobe Ortega is here for his Subsequent Wellness visit  Last Medicare Wellness visit information reviewed, patient interviewed and updates made to the record today  Health Risk Assessment:   Patient rates overall health as good  Patient feels that their physical health rating is much better  Patient is very satisfied with their life  Eyesight was rated as same  Hearing was rated as slightly worse  Patient feels that their emotional and mental health rating is slightly better  Patients states they are sometimes angry  Patient states they are often unusually tired/fatigued  Pain experienced in the last 7 days has been some  Patient's pain rating has been 7/10  Patient states that he has experienced no weight loss or gain in last 6 months  Depression Screening:   PHQ-9 Score: 11      Fall Risk Screening: In the past year, patient has experienced: no history of falling in past year      Home Safety:  Patient does not have trouble with stairs inside or outside of their home  Patient has working smoke alarms and has working carbon monoxide detector  Home safety hazards include: none  Nutrition:   Current diet is Regular       Medications:   Patient is not currently taking any over-the-counter supplements  Patient is able to manage medications  Activities of Daily Living (ADLs)/Instrumental Activities of Daily Living (IADLs):   Walk and transfer into and out of bed and chair?: Yes  Dress and groom yourself?: Yes    Bathe or shower yourself?: Yes    Feed yourself? Yes  Do your laundry/housekeeping?: Yes  Manage your money, pay your bills and track your expenses?: Yes  Make your own meals?: Yes    Do your own shopping?: Yes    Previous Hospitalizations:   Any hospitalizations or ED visits within the last 12 months?: Yes    How many hospitalizations have you had in the last year?: 1-2    Advance Care Planning:   Living will: No    Durable POA for healthcare: No    Five wishes given: Yes      Cognitive Screening:   Provider or family/friend/caregiver concerned regarding cognition?: No    PREVENTIVE SCREENINGS      Cardiovascular Screening:    General: Screening Not Indicated and History Lipid Disorder      Diabetes Screening:     General: Screening Not Indicated and History Diabetes      Colorectal Cancer Screening:     General: Screening Not Indicated      Prostate Cancer Screening:    General: Screening Not Indicated      Osteoporosis Screening:    General: Screening Not Indicated      Abdominal Aortic Aneurysm (AAA) Screening:        General: Screening Not Indicated      Lung Cancer Screening:     General: Screening Not Indicated      Hepatitis C Screening:    General: Screening Current    Screening, Brief Intervention, and Referral to Treatment (SBIRT)    Screening  Typical number of drinks in a day: 0  Typical number of drinks in a week: 0  Interpretation: Low risk drinking behavior  Brief Intervention  Alcohol & drug use screenings were reviewed  No concerns regarding substance use disorder identified       Review of Current Opioid Use    Opioid Risk Tool (ORT) Interpretation: Complete Opioid Risk Tool (ORT)    Other Counseling Topics:   Car/seat belt/driving safety, skin self-exam, sunscreen and calcium and vitamin D intake and regular weightbearing exercise  No exam data present     Physical Exam:     /70 (BP Location: Right arm, Patient Position: Sitting, Cuff Size: Large)   Pulse 78   Temp 98 4 °F (36 9 °C) (Temporal)   Ht 5' 5 43" (1 662 m)   Wt 85 3 kg (188 lb)   SpO2 98%   BMI 30 87 kg/m²     Physical Exam  Vitals and nursing note reviewed  Constitutional:       Appearance: He is well-developed  He is obese  HENT:      Head: Normocephalic and atraumatic  Right Ear: Ear canal and external ear normal  There is no impacted cerumen  Left Ear: Ear canal and external ear normal  There is no impacted cerumen  Nose: No rhinorrhea  Mouth/Throat:      Pharynx: No posterior oropharyngeal erythema  Eyes:      Conjunctiva/sclera: Conjunctivae normal    Cardiovascular:      Rate and Rhythm: Normal rate and regular rhythm  Heart sounds: No murmur heard  Pulmonary:      Effort: Pulmonary effort is normal  No respiratory distress  Breath sounds: Normal breath sounds  Abdominal:      Palpations: Abdomen is soft  Tenderness: There is no abdominal tenderness  Musculoskeletal:         General: No tenderness  Cervical back: Neck supple  Right lower leg: No edema  Left lower leg: No edema  Skin:     General: Skin is warm and dry  Neurological:      Mental Status: He is alert and oriented to person, place, and time     Psychiatric:         Behavior: Behavior normal           Ge Garcia MD

## 2022-10-17 DIAGNOSIS — U07.1 COVID-19: ICD-10-CM

## 2022-10-17 DIAGNOSIS — E11.9 TYPE 2 DIABETES MELLITUS WITHOUT COMPLICATION, WITH LONG-TERM CURRENT USE OF INSULIN (HCC): ICD-10-CM

## 2022-10-17 DIAGNOSIS — Z79.4 TYPE 2 DIABETES MELLITUS WITHOUT COMPLICATION, WITH LONG-TERM CURRENT USE OF INSULIN (HCC): ICD-10-CM

## 2022-10-17 RX ORDER — METOPROLOL SUCCINATE 25 MG/1
25 TABLET, EXTENDED RELEASE ORAL DAILY
Qty: 90 TABLET | Refills: 1 | Status: SHIPPED | OUTPATIENT
Start: 2022-10-17

## 2022-10-17 RX ORDER — PRAVASTATIN SODIUM 40 MG
40 TABLET ORAL DAILY
Qty: 90 TABLET | Refills: 1 | Status: SHIPPED | OUTPATIENT
Start: 2022-10-17

## 2022-10-17 NOTE — TELEPHONE ENCOUNTER
Patient states that he wants these medications sent to [facility] instead of the retail pharmacy  He would need them sent as a 90 day supply

## 2022-10-18 ENCOUNTER — APPOINTMENT (OUTPATIENT)
Dept: CT IMAGING | Facility: HOSPITAL | Age: 37
End: 2022-10-18
Payer: COMMERCIAL

## 2022-10-18 ENCOUNTER — HOSPITAL ENCOUNTER (EMERGENCY)
Facility: HOSPITAL | Age: 37
End: 2022-10-19
Attending: EMERGENCY MEDICINE
Payer: COMMERCIAL

## 2022-10-18 DIAGNOSIS — R29.90 STROKE-LIKE SYMPTOMS: Primary | ICD-10-CM

## 2022-10-18 DIAGNOSIS — R53.1 RIGHT SIDED WEAKNESS: ICD-10-CM

## 2022-10-18 LAB
ANION GAP SERPL CALCULATED.3IONS-SCNC: 7 MMOL/L (ref 4–13)
APTT PPP: 24 SECONDS (ref 23–37)
ATRIAL RATE: 92 BPM
BUN SERPL-MCNC: 14 MG/DL (ref 5–25)
CALCIUM SERPL-MCNC: 8.7 MG/DL (ref 8.4–10.2)
CARDIAC TROPONIN I PNL SERPL HS: 4 NG/L
CHLORIDE SERPL-SCNC: 98 MMOL/L (ref 96–108)
CO2 SERPL-SCNC: 27 MMOL/L (ref 21–32)
CREAT SERPL-MCNC: 1.05 MG/DL (ref 0.6–1.3)
ERYTHROCYTE [DISTWIDTH] IN BLOOD BY AUTOMATED COUNT: 13.1 % (ref 11.6–15.1)
FLUAV RNA RESP QL NAA+PROBE: NEGATIVE
FLUBV RNA RESP QL NAA+PROBE: NEGATIVE
GFR SERPL CREATININE-BSD FRML MDRD: 89 ML/MIN/1.73SQ M
GLUCOSE SERPL-MCNC: 234 MG/DL (ref 65–140)
GLUCOSE SERPL-MCNC: 241 MG/DL (ref 65–140)
HCT VFR BLD AUTO: 39.7 % (ref 36.5–49.3)
HGB BLD-MCNC: 13.4 G/DL (ref 12–17)
INR PPP: 0.97 (ref 0.84–1.19)
MCH RBC QN AUTO: 28.5 PG (ref 26.8–34.3)
MCHC RBC AUTO-ENTMCNC: 33.8 G/DL (ref 31.4–37.4)
MCV RBC AUTO: 85 FL (ref 82–98)
P AXIS: 51 DEGREES
PLATELET # BLD AUTO: 188 THOUSANDS/UL (ref 149–390)
PMV BLD AUTO: 8.9 FL (ref 8.9–12.7)
POTASSIUM SERPL-SCNC: 3.7 MMOL/L (ref 3.5–5.3)
PR INTERVAL: 148 MS
PROTHROMBIN TIME: 12.9 SECONDS (ref 11.6–14.5)
QRS AXIS: 31 DEGREES
QRSD INTERVAL: 90 MS
QT INTERVAL: 362 MS
QTC INTERVAL: 447 MS
RBC # BLD AUTO: 4.7 MILLION/UL (ref 3.88–5.62)
RSV RNA RESP QL NAA+PROBE: NEGATIVE
SARS-COV-2 RNA RESP QL NAA+PROBE: NEGATIVE
SODIUM SERPL-SCNC: 132 MMOL/L (ref 135–147)
T WAVE AXIS: 10 DEGREES
VENTRICULAR RATE: 92 BPM
WBC # BLD AUTO: 6.64 THOUSAND/UL (ref 4.31–10.16)

## 2022-10-18 PROCEDURE — 85610 PROTHROMBIN TIME: CPT | Performed by: EMERGENCY MEDICINE

## 2022-10-18 PROCEDURE — 99285 EMERGENCY DEPT VISIT HI MDM: CPT

## 2022-10-18 PROCEDURE — 85027 COMPLETE CBC AUTOMATED: CPT | Performed by: EMERGENCY MEDICINE

## 2022-10-18 PROCEDURE — 93010 ELECTROCARDIOGRAM REPORT: CPT | Performed by: INTERNAL MEDICINE

## 2022-10-18 PROCEDURE — 84484 ASSAY OF TROPONIN QUANT: CPT | Performed by: EMERGENCY MEDICINE

## 2022-10-18 PROCEDURE — 70496 CT ANGIOGRAPHY HEAD: CPT

## 2022-10-18 PROCEDURE — 99291 CRITICAL CARE FIRST HOUR: CPT | Performed by: EMERGENCY MEDICINE

## 2022-10-18 PROCEDURE — G1004 CDSM NDSC: HCPCS

## 2022-10-18 PROCEDURE — 70498 CT ANGIOGRAPHY NECK: CPT

## 2022-10-18 PROCEDURE — 80048 BASIC METABOLIC PNL TOTAL CA: CPT | Performed by: EMERGENCY MEDICINE

## 2022-10-18 PROCEDURE — 82948 REAGENT STRIP/BLOOD GLUCOSE: CPT

## 2022-10-18 PROCEDURE — 0241U HB NFCT DS VIR RESP RNA 4 TRGT: CPT | Performed by: EMERGENCY MEDICINE

## 2022-10-18 PROCEDURE — 74174 CTA ABD&PLVS W/CONTRAST: CPT

## 2022-10-18 PROCEDURE — 71275 CT ANGIOGRAPHY CHEST: CPT

## 2022-10-18 PROCEDURE — 93005 ELECTROCARDIOGRAM TRACING: CPT

## 2022-10-18 PROCEDURE — 85730 THROMBOPLASTIN TIME PARTIAL: CPT | Performed by: EMERGENCY MEDICINE

## 2022-10-18 PROCEDURE — 36415 COLL VENOUS BLD VENIPUNCTURE: CPT | Performed by: EMERGENCY MEDICINE

## 2022-10-18 RX ADMIN — TENECTEPLASE 20 MG: KIT at 20:40

## 2022-10-18 RX ADMIN — IOHEXOL 100 ML: 350 INJECTION, SOLUTION INTRAVENOUS at 19:24

## 2022-10-18 NOTE — ED PROVIDER NOTES
History  Chief Complaint   Patient presents with   • STROKE Alert     Seizure history; unwitnessed fall at home possibly secondary to seizure activity (patient states this is not his normal presentation); right arm and leg weakness/numbness, per EMS positive pronator drift R side  No facial droop/other neuro deficits noted     Patient is a 44year-old male with history of hyperlipidemia, hypertension, diabetes, who presents for evaluation of syncope, right-sided weakness  According to EMS, the patient passed out around 630 tonight  It was an unwitnessed event  Patient says that he does not really remember anything prior to the incident  According to EMS, the patient's wife heard the patient fall around 630 p m  Liam Sanchezer According to her, he has a history of seizures which were thought to be due to PTSD though I am not able to find anything in the chart in reference to this  He is not on any seizure-like medication  The patient says that he is having numbness and weakness to the right side including the arm, leg  He says that he has never had numbness or weakness like this after a seizure  He says that he “can not feel his right arm or leg ”  He also admits to some mild right-sided numbness  He denies any visual changes, difficulty speaking or swallowing  He also admits to some chest pain  The chest pain is nonradiating  He denies shortness of breath, abdominal pain  Prior to Admission Medications   Prescriptions Last Dose Informant Patient Reported? Taking?    ARIPiprazole (ABILIFY) 20 MG tablet  Self Yes No   Sig: Take 15 mg by mouth daily   ARIPiprazole (ABILIFY) 5 mg tablet  Self Yes No   Sig: Take 20 mg by mouth daily     Patient not taking: No sig reported   Advair Diskus 500-50 MCG/DOSE inhaler  Self No No   Sig: USE 1 INHALATION TWICE A DAY (RINSE MOUTH AFTER USE)   Insulin Pen Needle (BD Pen Needle Rossana U/F) 32G X 4 MM MISC  Self No No   Sig: Use 4/day   Semaglutide,0 25 or 0 5MG/DOS, (Ozempic, 0 25 or 0 5 MG/DOSE,) 2 MG/1 5ML SOPN  Self No No   Sig: Inject 0 25 mg under the skin once a week   Patient not taking: Reported on 10/14/2022   albuterol (PROVENTIL HFA,VENTOLIN HFA) 90 mcg/act inhaler  Self No No   Sig: Inhale 2 puffs every 6 (six) hours as needed for wheezing or shortness of breath   clonazePAM (KlonoPIN) 1 mg tablet  Self Yes No   Sig: Take 1 mg by mouth daily     fish oil-omega-3 fatty acids 1000 MG capsule  Self No No   Sig: Take 2 capsules (2 g total) by mouth daily   Patient not taking: No sig reported   fluticasone (FLONASE) 50 mcg/act nasal spray  Self No No   Si spray into each nostril daily   Patient not taking: No sig reported   gabapentin (Neurontin) 300 mg capsule  Self No No   Sig: Take 1 capsule (300 mg total) by mouth daily at bedtime   insulin aspart (NovoLOG FlexPen) 100 UNIT/ML injection pen  Self No No   Si units before breakfast , 20 units before lunch and dinner     insulin glargine (Lantus) 100 units/mL subcutaneous injection   No No   Sig: Inject 50 Units under the skin every 12 (twelve) hours Basal insulin as split into 2 doses, take 30 units in the morning and 30 units before bed   levothyroxine 25 mcg tablet   No No   Sig: Take 2 tablets (50 mcg total) by mouth daily 50   loratadine (CLARITIN) 10 mg tablet  Self Yes No   Sig: Take 10 mg by mouth daily   meloxicam (Mobic) 15 mg tablet  Self No No   Sig: Take 1 tablet (15 mg total) by mouth daily   Patient not taking: Reported on 10/14/2022   methocarbamol (ROBAXIN) 500 mg tablet   No No   Sig: Take 1 tablet (500 mg total) by mouth 2 (two) times a day as needed for muscle spasms for up to 10 days   Patient not taking: Reported on 10/14/2022   methylPREDNISolone 4 MG tablet therapy pack  Self No No   Sig: Use as directed on package   Patient not taking: Reported on 10/14/2022   metoprolol succinate (TOPROL-XL) 25 mg 24 hr tablet   No No   Sig: Take 1 tablet (25 mg total) by mouth daily   pravastatin (PRAVACHOL) 40 mg tablet   No No   Sig: Take 1 tablet (40 mg total) by mouth daily   sertraline (ZOLOFT) 100 mg tablet  Self Yes No   Sig: Take 200 mg by mouth daily        Facility-Administered Medications: None       Past Medical History:   Diagnosis Date   • COVID-19 03/17/2021   • Diabetes mellitus ([facility] [address] )     type 2   • Disease of thyroid gland     hypothyroidism   • Hyperlipidemia    • Hypertension    • 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   • Psychiatric disorder     PTSD  Anxiety, depression,    • Psychogenic nonepileptic spells        Past Surgical History:   Procedure Laterality Date   • MOUTH SURGERY  08/2021   • WISDOM TOOTH EXTRACTION         Family History   Problem Relation Age of Onset   • Hyperlipidemia Father    • Heart attack Paternal Grandfather    • Prostate cancer Paternal Grandfather    • Cancer Paternal Grandmother      I have reviewed and agree with the history as documented  E-Cigarette/Vaping   • E-Cigarette Use Never User      E-Cigarette/Vaping Substances   • Nicotine No    • THC No    • CBD No    • Flavoring No    • Other No      Social History     Tobacco Use   • Smoking status: Never Smoker   • Smokeless tobacco: Never Used   Vaping Use   • Vaping Use: Never used   Substance Use Topics   • Alcohol use: Not Currently   • Drug use: Not Currently     Types: Marijuana       Review of Systems   Constitutional: Negative for chills, fever and unexpected weight change  HENT: Negative for congestion, sore throat and trouble swallowing  Eyes: Negative for pain, discharge and itching  Respiratory: Negative for cough, chest tightness, shortness of breath and wheezing  Cardiovascular: Negative for chest pain, palpitations and leg swelling  Gastrointestinal: Negative for abdominal pain, blood in stool, diarrhea, nausea and vomiting  Endocrine: Negative for polyuria  Genitourinary: Negative for difficulty urinating, dysuria, frequency and hematuria     Musculoskeletal: Negative for arthralgias and back pain  Neurological: Positive for weakness ( right-sided) and numbness (Right-sided)  Negative for dizziness, syncope, light-headedness and headaches  Physical Exam  Physical Exam  Vitals and nursing note reviewed  Constitutional:       General: He is not in acute distress  Appearance: He is well-developed  HENT:      Head: Normocephalic and atraumatic  Right Ear: External ear normal       Left Ear: External ear normal    Eyes:      Conjunctiva/sclera: Conjunctivae normal       Pupils: Pupils are equal, round, and reactive to light  Cardiovascular:      Rate and Rhythm: Normal rate and regular rhythm  Heart sounds: Normal heart sounds  No murmur heard  No friction rub  No gallop  Pulmonary:      Effort: Pulmonary effort is normal  No respiratory distress  Breath sounds: Normal breath sounds  No wheezing or rales  Abdominal:      General: Bowel sounds are normal  There is no distension  Palpations: Abdomen is soft  Tenderness: There is no abdominal tenderness  There is no guarding  Musculoskeletal:         General: No swelling, tenderness or deformity  Normal range of motion  Cervical back: Normal range of motion  Lymphadenopathy:      Cervical: No cervical adenopathy  Skin:     General: Skin is warm and dry  Neurological:      General: No focal deficit present  Mental Status: He is alert and oriented to person, place, and time  Mental status is at baseline  Cranial Nerves: No cranial nerve deficit  Sensory: Sensory deficit (Right arm, right leg) present  Motor: Weakness ( right arm, right leg) present  No abnormal muscle tone        Comments: Unable to raise right arm right leg against gravity  NIH 7   Psychiatric:         Behavior: Behavior normal          Vital Signs  ED Triage Vitals   Temp Pulse Respirations Blood Pressure SpO2   -- 10/18/22 1938 10/18/22 1938 10/18/22 1938 10/18/22 1938    92 18 133/60 95 %      Temp Source Heart Rate Source Patient Position - Orthostatic VS BP Location FiO2 (%)   10/18/22 1938 10/18/22 2100 10/18/22 1938 10/18/22 1938 --   Tympanic Monitor Lying Left arm       Pain Score       10/18/22 1938       10 - Worst Possible Pain           Vitals:    10/18/22 2100 10/18/22 2110 10/18/22 2115 10/18/22 2124   BP: 123/72  120/70    Pulse: 87 89 92 86   Patient Position - Orthostatic VS: Sitting  Sitting          Visual Acuity  Visual Acuity    Flowsheet Row Most Recent Value   L Pupil Size (mm) 3   R Pupil Size (mm) 3   L Pupil Shape Round   R Pupil Shape Round          ED Medications  Medications   iohexol (OMNIPAQUE) 350 MG/ML injection (SINGLE-DOSE) 100 mL (100 mL Intravenous Given 10/18/22 1924)   tenecteplase (TNKase) injection 20 mg (20 mg Intravenous Given 10/18/22 2040)       Diagnostic Studies  Results Reviewed     Procedure Component Value Units Date/Time    FLU/RSV/COVID - if FLU/RSV clinically relevant [[id number]]  (Normal) Collected: 10/18/22 1947    Lab Status: Final result Specimen: Nares from Nose Updated: 10/18/22 2031     SARS-CoV-2 Negative     INFLUENZA A PCR Negative     INFLUENZA B PCR Negative     RSV PCR Negative    Narrative:      FOR PEDIATRIC PATIENTS - copy/paste COVID Guidelines URL to browser: [email or web address]    SARS-CoV-2 assay is a Nucleic Acid Amplification assay intended for the  qualitative detection of nucleic acid from SARS-CoV-2 in nasopharyngeal  swabs  Results are for the presumptive identification of SARS-CoV-2 RNA  Positive results are indicative of infection with SARS-CoV-2, the virus  causing COVID-19, but do not rule out bacterial infection or co-infection  with other viruses  Laboratories within the [place] and its  territories are required to report all positive results to the appropriate  public health authorities   Negative results do not preclude SARS-CoV-2  infection and should not be used as the sole basis for treatment or other  patient management decisions  Negative results must be combined with  clinical observations, patient history, and epidemiological information  This test has not been FDA cleared or approved  This test has been authorized by FDA under an Emergency Use Authorization  (EUA)  This test is only authorized for the duration of time the  declaration that circumstances exist justifying the authorization of the  emergency use of an in vitro diagnostic tests for detection of SARS-CoV-2  virus and/or diagnosis of COVID-19 infection under section 564(b)(1) of  the Act, 21 U  S C  735DBB-6(J)(3), unless the authorization is terminated  or revoked sooner  The test has been validated but independent review by FDA  and CLIA is pending  Test performed using [employer] Gene[employer]pert: This RT-PCR assay targets N2,  a region unique to SARS-CoV-2  A conserved region in the E-gene was chosen  for pan-Sarbecovirus detection which includes SARS-CoV-2  According to CMS-2020-01-R, this platform meets the definition of high-throughput technology      HS Troponin 0hr (reflex protocol) [[id number]]  (Normal) Collected: 10/18/22 1947    Lab Status: Final result Specimen: Blood from Arm, Left Updated: 10/18/22 2017     hs TnI 0hr 4 ng/L     HS Troponin I 2hr [[id number]]     Lab Status: No result Specimen: Blood     HS Troponin I 4hr [[id number]]     Lab Status: No result Specimen: Blood     Basic metabolic panel [[id number]]  (Abnormal) Collected: 10/18/22 1947    Lab Status: Final result Specimen: Blood from Arm, Left Updated: 10/18/22 2010     Sodium 132 mmol/L      Potassium 3 7 mmol/L      Chloride 98 mmol/L      CO2 27 mmol/L      ANION GAP 7 mmol/L      BUN 14 mg/dL      Creatinine 1 05 mg/dL      Glucose 241 mg/dL      Calcium 8 7 mg/dL      eGFR 89 ml/min/1 73sq m     Narrative:      Destinee guidelines for Chronic Kidney Disease (CKD):   •  Stage 1 with normal or high GFR (GFR > 90 mL/min/1 73 square meters)  •  Stage 2 Mild CKD (GFR = 60-89 mL/min/1 73 square meters)  •  Stage 3A Moderate CKD (GFR = 45-59 mL/min/1 73 square meters)  •  Stage 3B Moderate CKD (GFR = 30-44 mL/min/1 73 square meters)  •  Stage 4 Severe CKD (GFR = 15-29 mL/min/1 73 square meters)  •  Stage 5 End Stage CKD (GFR <15 mL/min/1 73 square meters)  Note: GFR calculation is accurate only with a steady state creatinine    Protime-INR [[id number]]  (Normal) Collected: 10/18/22 1947    Lab Status: Final result Specimen: Blood from Arm, Left Updated: 10/18/22 2006     Protime 12 9 seconds      INR 0 97    APTT [[id number]]  (Normal) Collected: 10/18/22 1947    Lab Status: Final result Specimen: Blood from Arm, Left Updated: 10/18/22 2006     PTT 24 seconds     CBC and Platelet [[id number]]  (Normal) Collected: 10/18/22 1947    Lab Status: Final result Specimen: Blood from Arm, Left Updated: 10/18/22 1955     WBC 6 64 Thousand/uL      RBC 4 70 Million/uL      Hemoglobin 13 4 g/dL      Hematocrit 39 7 %      MCV 85 fL      MCH 28 5 pg      MCHC 33 8 g/dL      RDW 13 1 %      Platelets 092 Thousands/uL      MPV 8 9 fL     Fingerstick Glucose (POCT) [[id number]]  (Abnormal) Collected: 10/18/22 1911    Lab Status: Final result Updated: 10/18/22 1911     POC Glucose 234 mg/dl                  CTA dissection protocol chest/abdomen/pelvis   Final Result by Rosmery Santos MD (10/18 1945)      Hepatomegaly  Workstation performed: [id number]         CTA stroke alert (head/neck)   Final Result by Rosmery Santos MD (10/18 2011)      No evidence of hemodynamic significant stenosis, aneurysm or dissection  I personally discussed this study with Robert Horn on 10/18/2022 at 7:31 PM                         Workstation performed: [id number]         CT stroke alert brain   Final Result by Rosmery Santos MD (10/18 1937)      No acute intracranial abnormality               I personally discussed this study with Robert Horn on 10/18/2022 at 7:31 PM                 Workstation performed: [id number]                    Procedures  CriticalCare Time  Performed by: Lucy Frias DO  Authorized by: Lucy Frias DO     Critical care provider statement:     Critical care time (minutes):  45    Critical care time was exclusive of:  Teaching time and separately billable procedures and treating other patients    Critical care was necessary to treat or prevent imminent or life-threatening deterioration of the following conditions:  CNS failure or compromise    Critical care was time spent personally by me on the following activities:  Blood draw for specimens, obtaining history from patient or surrogate, development of treatment plan with patient or surrogate, discussions with consultants, evaluation of patient's response to treatment, examination of patient, re-evaluation of patient's condition, ordering and review of radiographic studies, ordering and review of laboratory studies, interpretation of cardiac output measurements, ordering and performing treatments and interventions and review of old charts    I assumed direction of critical care for this patient from another provider in my specialty: no               ED Course  ED Course as of 10/18/22 2130   Tue Oct 18, 2022   2022 Patient's imaging is within normal limits including CTA chest abdomen pelvis  Neurology spoke with the patient, both he and the wife would like to go for with TNK   2027 Per Neurology: [facility] CALL-1st Call ([facility]  Page  I think I'd prefer that he's at another site where we have Neuro on site to evaluate him in the AM rather than through tele   2118 Patient to be transferred to step-down level 1 at [facility] under Dr Jeromy Dunne   2127 Patient is moving all of his extremities now    Strength appears to be equal in all extremities                  Stroke Assessment     Row Name 10/18/22 1916             NIH Stroke Scale    Interval Baseline      Level of Consciousness (1a ) 0      LOC Questions (1b ) 0      LOC Commands (1c ) 0      Best Gaze (2 ) 0      Visual (3 ) 0      Facial Palsy (4 ) 0      Motor Arm, Left (5a ) 0      Motor Arm, Right (5b ) 3      Motor Leg, Left (6a ) 0      Motor Leg, Right (6b ) 3      Limb Ataxia (7 ) 0      Sensory (8 ) 1      Best Language (9 ) 0      Dysarthria (10 ) 0      Extinction and Inattention (11 ) (Formerly Neglect) 0      Total 7                            SBIRT 22yo+    Flowsheet Row Most Recent Value   SBIRT (23 yo +)    In order to provide better care to our patients, we are screening all of our patients for alcohol and drug use  Would it be okay to ask you these screening questions? Yes Filed at: 10/18/2022 2101   Initial Alcohol Screen: US AUDIT-C     1  How often do you have a drink containing alcohol? 0 Filed at: 10/18/2022 2101   2  How many drinks containing alcohol do you have on a typical day you are drinking? 0 Filed at: 10/18/2022 2101   3a  Male UNDER 65: How often do you have five or more drinks on one occasion? 0 Filed at: 10/18/2022 2101   3b  FEMALE Any Age, or MALE 65+: How often do you have 4 or more drinks on one occassion? 0 Filed at: 10/18/2022 2101   Audit-C Score 0 Filed at: 10/18/2022 2101   MARCO ANTONIO: How many times in the past year have you    Used an illegal drug or used a prescription medication for non-medical reasons? Never Filed at: 10/18/2022 2101                    MDM  Number of Diagnoses or Management Options  Right sided weakness  Stroke-like symptoms  Diagnosis management comments: 71-year-old male with history of poorly-controlled diabetes, hypertension presenting for evaluation of right-sided weakness  Started around 630 tonight  Wife says that he fell to the floor say he cannot move his right side    According to wife history of nonepileptic seizures, this was different from any other events in the past   Patient also admits to some chest pain  On exam, has no movement against gravity to right arm and right leg  NIH 7  Vitals within normal limits  Patient awake and alert answering questions appropriately no other neuro deficits  Stroke evaluation called  Will obtain stroke imaging, given chest pain and right-sided weakness, will obtain CTA chest abdomen pelvis dissection study  Labs and imaging within normal limits  Neurology spoke with the patient and his wife about the risks and benefits of TNK   They would like to go forward with it       Disposition  Final diagnoses:   Stroke-like symptoms   Right sided weakness     Time reflects when diagnosis was documented in both MDM as applicable and the Disposition within this note     Time User Action Codes Description Comment    10/18/2022  7:15 PM Amber Novak [R29 90] Stroke-like symptoms     10/18/2022  7:15 PM Amber Novak [R53 1] Right sided weakness       ED Disposition     ED Disposition   Transfer to Another Facility-In Network    Condition   --    Date/Time   Tue Oct 18, 2022  9:22 PM    Comment   Mady Byrd should be transferred out to [address]           MD Documentation    Kuldip Outhouse Most Recent Value   Patient Condition The patient has been stabilized such that within reasonable medical probability, no material deterioration of the patient condition or the condition of the unborn child(sree) is likely to result from the transfer   Reason for Transfer Level of Care needed not available at this facility   Benefits of Transfer Specialized equipment and/or services available at the receiving facility (Include comment)________________________   Risks of Transfer Potential for delay in receiving treatment, Potential deterioration of medical condition, Loss of IV, Increased discomfort during transfer   Accepting Physician Dr Law Leyva Name, Trena Kanner Sending MD Dr Gilles Loge   Provider Certification General risk, such as traffic hazards, adverse weather conditions, rough terrain or turbulence, possible failure of equipment (including vehicle or aircraft), or consequences of actions of persons outside the control of the transport personnel, Unanticipated needs of medical equipment and personnel during transport, Risk of worsening condition      RN Documentation    72 Radha Baltazar Name, 295 [facility] 275      Follow-up Information    None         Patient's Medications   Discharge Prescriptions    No medications on file       No discharge procedures on file      PDMP Review     None          ED Provider  Electronically Signed by           Sari Gallego DO  10/18/22 9825

## 2022-10-19 ENCOUNTER — APPOINTMENT (INPATIENT)
Dept: NON INVASIVE DIAGNOSTICS | Facility: HOSPITAL | Age: 37
End: 2022-10-19
Payer: COMMERCIAL

## 2022-10-19 ENCOUNTER — HOSPITAL ENCOUNTER (INPATIENT)
Facility: HOSPITAL | Age: 37
LOS: 1 days | Discharge: HOME | End: 2022-10-20
Attending: INTERNAL MEDICINE | Admitting: INTERNAL MEDICINE
Payer: COMMERCIAL

## 2022-10-19 ENCOUNTER — APPOINTMENT (INPATIENT)
Dept: CT IMAGING | Facility: HOSPITAL | Age: 37
End: 2022-10-19
Payer: COMMERCIAL

## 2022-10-19 VITALS
HEART RATE: 68 BPM | DIASTOLIC BLOOD PRESSURE: 70 MMHG | HEIGHT: 65 IN | OXYGEN SATURATION: 93 % | WEIGHT: 180 LBS | RESPIRATION RATE: 17 BRPM | BODY MASS INDEX: 29.99 KG/M2 | SYSTOLIC BLOOD PRESSURE: 114 MMHG

## 2022-10-19 DIAGNOSIS — R29.90 STROKE-LIKE SYMPTOMS: ICD-10-CM

## 2022-10-19 DIAGNOSIS — U07.1 COVID-19: ICD-10-CM

## 2022-10-19 DIAGNOSIS — I63.9 CVA (CEREBRAL VASCULAR ACCIDENT) (HCC): Primary | ICD-10-CM

## 2022-10-19 PROBLEM — E87.1 HYPONATREMIA: Status: ACTIVE | Noted: 2022-10-19

## 2022-10-19 PROBLEM — R56.9 SEIZURE (HCC): Status: ACTIVE | Noted: 2021-10-19

## 2022-10-19 LAB
2HR DELTA HS TROPONIN: 4 NG/L
ANION GAP SERPL CALCULATED.3IONS-SCNC: 8 MMOL/L (ref 4–13)
BASE EX.OXY STD BLDV CALC-SCNC: 93.3 % (ref 60–80)
BASE EXCESS BLDV CALC-SCNC: 0.7 MMOL/L
BUN SERPL-MCNC: 18 MG/DL (ref 5–25)
CALCIUM SERPL-MCNC: 8.8 MG/DL (ref 8.3–10.1)
CARDIAC TROPONIN I PNL SERPL HS: 8 NG/L
CHLORIDE SERPL-SCNC: 97 MMOL/L (ref 96–108)
CHOLEST SERPL-MCNC: 273 MG/DL
CO2 SERPL-SCNC: 28 MMOL/L (ref 21–32)
CREAT SERPL-MCNC: 1.11 MG/DL (ref 0.6–1.3)
EST. AVERAGE GLUCOSE BLD GHB EST-MCNC: 266 MG/DL
GFR SERPL CREATININE-BSD FRML MDRD: 83 ML/MIN/1.73SQ M
GLUCOSE SERPL-MCNC: 174 MG/DL (ref 65–140)
GLUCOSE SERPL-MCNC: 400 MG/DL (ref 65–140)
GLUCOSE SERPL-MCNC: 437 MG/DL (ref 65–140)
HBA1C MFR BLD: 10.9 %
HCO3 BLDV-SCNC: 25.9 MMOL/L (ref 24–30)
HDLC SERPL-MCNC: 25 MG/DL
LACTATE SERPL-SCNC: 0.6 MMOL/L (ref 0.5–2)
LDLC SERPL DIRECT ASSAY-MCNC: 141 MG/DL (ref 0–100)
O2 CT BLDV-SCNC: 19.7 ML/DL
PCO2 BLDV: 43.4 MM HG (ref 42–50)
PH BLDV: 7.39 [PH] (ref 7.3–7.4)
PO2 BLDV: 74.4 MM HG (ref 35–45)
POTASSIUM SERPL-SCNC: 4.1 MMOL/L (ref 3.5–5.3)
SODIUM SERPL-SCNC: 133 MMOL/L (ref 135–147)
TRIGL SERPL-MCNC: 541 MG/DL

## 2022-10-19 PROCEDURE — 83036 HEMOGLOBIN GLYCOSYLATED A1C: CPT | Performed by: NURSE PRACTITIONER

## 2022-10-19 PROCEDURE — 99223 1ST HOSP IP/OBS HIGH 75: CPT | Performed by: STUDENT IN AN ORGANIZED HEALTH CARE EDUCATION/TRAINING PROGRAM

## 2022-10-19 PROCEDURE — G1004 CDSM NDSC: HCPCS

## 2022-10-19 PROCEDURE — 97163 PT EVAL HIGH COMPLEX 45 MIN: CPT

## 2022-10-19 PROCEDURE — 93306 TTE W/DOPPLER COMPLETE: CPT | Performed by: STUDENT IN AN ORGANIZED HEALTH CARE EDUCATION/TRAINING PROGRAM

## 2022-10-19 PROCEDURE — 83605 ASSAY OF LACTIC ACID: CPT | Performed by: NURSE PRACTITIONER

## 2022-10-19 PROCEDURE — 93306 TTE W/DOPPLER COMPLETE: CPT

## 2022-10-19 PROCEDURE — 82805 BLOOD GASES W/O2 SATURATION: CPT | Performed by: NURSE PRACTITIONER

## 2022-10-19 PROCEDURE — 83721 ASSAY OF BLOOD LIPOPROTEIN: CPT | Performed by: NURSE PRACTITIONER

## 2022-10-19 PROCEDURE — 82948 REAGENT STRIP/BLOOD GLUCOSE: CPT

## 2022-10-19 PROCEDURE — 99222 1ST HOSP IP/OBS MODERATE 55: CPT | Performed by: PHYSICAL MEDICINE & REHABILITATION

## 2022-10-19 PROCEDURE — 70450 CT HEAD/BRAIN W/O DYE: CPT

## 2022-10-19 PROCEDURE — 97166 OT EVAL MOD COMPLEX 45 MIN: CPT

## 2022-10-19 PROCEDURE — 80061 LIPID PANEL: CPT | Performed by: NURSE PRACTITIONER

## 2022-10-19 PROCEDURE — 84484 ASSAY OF TROPONIN QUANT: CPT | Performed by: EMERGENCY MEDICINE

## 2022-10-19 PROCEDURE — 99291 CRITICAL CARE FIRST HOUR: CPT | Performed by: NURSE PRACTITIONER

## 2022-10-19 PROCEDURE — 36415 COLL VENOUS BLD VENIPUNCTURE: CPT | Performed by: EMERGENCY MEDICINE

## 2022-10-19 PROCEDURE — 80048 BASIC METABOLIC PNL TOTAL CA: CPT | Performed by: NURSE PRACTITIONER

## 2022-10-19 RX ORDER — INSULIN GLARGINE 100 [IU]/ML
50 INJECTION, SOLUTION SUBCUTANEOUS EVERY 12 HOURS SCHEDULED
Status: DISCONTINUED | OUTPATIENT
Start: 2022-10-19 | End: 2022-10-20 | Stop reason: HOSPADM

## 2022-10-19 RX ORDER — INSULIN LISPRO 100 [IU]/ML
1-5 INJECTION, SOLUTION INTRAVENOUS; SUBCUTANEOUS
Status: DISCONTINUED | OUTPATIENT
Start: 2022-10-19 | End: 2022-10-20 | Stop reason: HOSPADM

## 2022-10-19 RX ORDER — METOPROLOL SUCCINATE 25 MG/1
25 TABLET, EXTENDED RELEASE ORAL DAILY
Status: DISCONTINUED | OUTPATIENT
Start: 2022-10-19 | End: 2022-10-20 | Stop reason: HOSPADM

## 2022-10-19 RX ORDER — SERTRALINE HYDROCHLORIDE 100 MG/1
200 TABLET, FILM COATED ORAL DAILY
Status: DISCONTINUED | OUTPATIENT
Start: 2022-10-19 | End: 2022-10-20 | Stop reason: HOSPADM

## 2022-10-19 RX ORDER — LEVOTHYROXINE SODIUM 0.05 MG/1
50 TABLET ORAL
Status: DISCONTINUED | OUTPATIENT
Start: 2022-10-19 | End: 2022-10-20 | Stop reason: HOSPADM

## 2022-10-19 RX ORDER — GABAPENTIN 300 MG/1
300 CAPSULE ORAL
Status: DISCONTINUED | OUTPATIENT
Start: 2022-10-19 | End: 2022-10-20 | Stop reason: HOSPADM

## 2022-10-19 RX ORDER — ACETAMINOPHEN 325 MG/1
650 TABLET ORAL EVERY 6 HOURS PRN
Status: DISCONTINUED | OUTPATIENT
Start: 2022-10-19 | End: 2022-10-20 | Stop reason: HOSPADM

## 2022-10-19 RX ORDER — FLUTICASONE PROPIONATE 50 MCG
1 SPRAY, SUSPENSION (ML) NASAL DAILY
Status: DISCONTINUED | OUTPATIENT
Start: 2022-10-19 | End: 2022-10-20 | Stop reason: HOSPADM

## 2022-10-19 RX ORDER — LORATADINE 10 MG/1
10 TABLET ORAL DAILY
Status: DISCONTINUED | OUTPATIENT
Start: 2022-10-19 | End: 2022-10-20 | Stop reason: HOSPADM

## 2022-10-19 RX ORDER — ATORVASTATIN CALCIUM 40 MG/1
40 TABLET, FILM COATED ORAL EVERY EVENING
Status: DISCONTINUED | OUTPATIENT
Start: 2022-10-19 | End: 2022-10-20 | Stop reason: HOSPADM

## 2022-10-19 RX ORDER — ARIPIPRAZOLE 5 MG/1
20 TABLET ORAL DAILY
Status: DISCONTINUED | OUTPATIENT
Start: 2022-10-19 | End: 2022-10-19

## 2022-10-19 RX ORDER — INSULIN LISPRO 100 [IU]/ML
1-6 INJECTION, SOLUTION INTRAVENOUS; SUBCUTANEOUS
Status: DISCONTINUED | OUTPATIENT
Start: 2022-10-20 | End: 2022-10-20 | Stop reason: HOSPADM

## 2022-10-19 RX ORDER — CLONAZEPAM 1 MG/1
1 TABLET ORAL DAILY
Status: DISCONTINUED | OUTPATIENT
Start: 2022-10-19 | End: 2022-10-20 | Stop reason: HOSPADM

## 2022-10-19 RX ADMIN — SERTRALINE HYDROCHLORIDE 200 MG: 100 TABLET ORAL at 10:15

## 2022-10-19 RX ADMIN — GABAPENTIN 300 MG: 300 CAPSULE ORAL at 22:27

## 2022-10-19 RX ADMIN — ACETAMINOPHEN 650 MG: 325 TABLET ORAL at 10:23

## 2022-10-19 RX ADMIN — ATORVASTATIN CALCIUM 40 MG: 40 TABLET, FILM COATED ORAL at 18:43

## 2022-10-19 RX ADMIN — LORATADINE 10 MG: 10 TABLET ORAL at 10:15

## 2022-10-19 RX ADMIN — CLONAZEPAM 1 MG: 1 TABLET ORAL at 10:14

## 2022-10-19 RX ADMIN — INSULIN GLARGINE 50 UNITS: 100 INJECTION, SOLUTION SUBCUTANEOUS at 22:27

## 2022-10-19 RX ADMIN — ARIPIPRAZOLE 15 MG: 5 TABLET ORAL at 10:14

## 2022-10-19 RX ADMIN — INSULIN GLARGINE 50 UNITS: 100 INJECTION, SOLUTION SUBCUTANEOUS at 10:15

## 2022-10-19 RX ADMIN — INSULIN LISPRO 5 UNITS: 100 INJECTION, SOLUTION INTRAVENOUS; SUBCUTANEOUS at 22:27

## 2022-10-19 RX ADMIN — LEVOTHYROXINE SODIUM 50 MCG: 50 TABLET ORAL at 05:47

## 2022-10-19 NOTE — EMTALA/ACUTE CARE TRANSFER
[address] [address] [place] [address]  Dept: [phone]      EMTALA TRANSFER CONSENT    NAME Jean Marie Pepper                                         1985                              MRN [id number]    I have been informed of my rights regarding examination, treatment, and transfer   by Dr Callie Manley DO    Benefits: Specialized equipment and/or services available at the receiving facility (Include comment)________________________    Risks: Potential for delay in receiving treatment, Potential deterioration of medical condition, Loss of IV, Increased discomfort during transfer      Consent for Transfer:  I acknowledge that my medical condition has been evaluated and explained to me by the emergency department physician or other qualified medical person and/or my attending physician, who has recommended that I be transferred to the service of  Accepting Physician: Dr Kendall Dougherty at [address] [place] [address] Name, [address] : [address]  The above potential benefits of such transfer, the potential risks associated with such transfer, and the probable risks of not being transferred have been explained to me, and I fully understand them  The doctor has explained that, in my case, the benefits of transfer outweigh the risks  I agree to be transferred  I authorize the performance of emergency medical procedures and treatments upon me in both transit and upon arrival at the receiving facility  Additionally, I authorize the release of any and all medical records to the receiving facility and request they be transported with me, if possible  I understand that the safest mode of transportation during a medical emergency is an ambulance and that the Hospital advocates the use of this mode of transport   Risks of traveling to the receiving facility by car, including absence of medical control, life sustaining equipment, such as oxygen, and medical personnel has been explained to me and I fully understand them  (NOAH CORRECT BOX BELOW)  [  ]  I consent to the stated transfer and to be transported by ambulance/helicopter  [  ]  I consent to the stated transfer, but refuse transportation by ambulance and accept full responsibility for my transportation by car  I understand the risks of non-ambulance transfers and I exonerate the Hospital and its staff from any deterioration in my condition that results from this refusal     X___________________________________________    DATE  10/18/22  TIME________  Signature of patient or legally responsible individual signing on patient behalf           RELATIONSHIP TO PATIENT_________________________          Provider Certification    NAME Alla Solares                                         1985                              MRN [id number]    A medical screening exam was performed on the above named patient  Based on the examination:    Condition Necessitating Transfer The primary encounter diagnosis was Stroke-like symptoms  A diagnosis of Right sided weakness was also pertinent to this visit      Patient Condition: The patient has been stabilized such that within reasonable medical probability, no material deterioration of the patient condition or the condition of the unborn child(sree) is likely to result from the transfer    Reason for Transfer: Level of Care needed not available at this facility    Transfer Requirements: [address]   · Space available and qualified personnel available for treatment as acknowledged by    · Agreed to accept transfer and to provide appropriate medical treatment as acknowledged by       Dr Ash Else  · Appropriate medical records of the examination and treatment of the patient are provided at the time of transfer   [address] _______  · Transfer will be performed by qualified personnel from    and appropriate transfer equipment as required, including the use of necessary and appropriate life support measures  Provider Certification: I have examined the patient and explained the following risks and benefits of being transferred/refusing transfer to the patient/family:  General risk, such as traffic hazards, adverse weather conditions, rough terrain or turbulence, possible failure of equipment (including vehicle or aircraft), or consequences of actions of persons outside the control of the transport personnel, Unanticipated needs of medical equipment and personnel during transport, Risk of worsening condition      Based on these reasonable risks and benefits to the patient and/or the unborn child(sree), and based upon the information available at the time of the patient’s examination, I certify that the medical benefits reasonably to be expected from the provision of appropriate medical treatments at another medical facility outweigh the increasing risks, if any, to the individual’s medical condition, and in the case of labor to the unborn child, from effecting the transfer      X____________________________________________ DATE 10/18/22        TIME_______      ORIGINAL - SEND TO MEDICAL RECORDS   COPY - SEND WITH PATIENT DURING TRANSFER

## 2022-10-19 NOTE — SPEECH THERAPY NOTE
Speech Language/Pathology  Speech/Language Pathology  Assessment    Patient Name: Shady Ruiz  XOIYA'Y Date: 10/19/2022     Problem List  Principal Problem:    Hypothyroidism  Active Problems:    Type 2 diabetes mellitus without complication, with long-term current use of insulin (HCC)    ENRIKE (obstructive sleep apnea)    Seizure (HCC)    CVA (cerebral vascular accident) ([facility] )    Hyponatremia    Past Medical History  Past Medical History:   Diagnosis Date   • COVID-19 03/17/2021   • Diabetes mellitus (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   • Psychiatric disorder     PTSD  Anxiety, depression,    • Psychogenic nonepileptic spells      Past Surgical History  Past Surgical History:   Procedure Laterality Date   • MOUTH SURGERY  08/2021   • WISDOM TOOTH EXTRACTION       ST note: Pt screened at lunch  Speech clear and fluent  no facial asymmetry  Ate all of his brakfast  Having a  salad and fruit for lunch  No ST needs identified  FYI pt geetal has his contacts in from home  He states he is ok leaving then in  nurse informed  Will d/c order  Chief Complaint: syncope  History of Present Illness []Expand by Default  HX and PE limited by: patient is a poor historian  Shady Ruiz is a 44 y o  male who presents as a transfer from [place] after he presented there after he had a syncopal episode  The patient states that he started to cough and went to use his inhaler and the next thing he recalls is the EMS providers there with him  He states that he wasn't able to move his right side following this event  Currently he has a persistent HA which has been present since the incident  He is unsure whether he had CP prior to the event  Currently, he denies CP   He was given TNL at the outside hospital and is transferred here for further evaluation and monitoring

## 2022-10-19 NOTE — CASE MANAGEMENT
Case Management Assessment & Discharge Planning Note    Patient name Kayla Raphael  Location ICU 11/ICU 11 MRN [id number]  : 1985 Date 10/19/2022       Current Admission Date: 10/19/2022  Current Admission Diagnosis:Hypothyroidism   Patient Active Problem List    Diagnosis Date Noted   • Stroke-like symptoms 10/19/2022   • Hyponatremia 10/19/2022   • Left hand pain 2022   • Obesity (BMI 30 0-34 9) 2022   • Depression, recurrent ([address] ) 2022   • Foot pain, left 2021   • Chronic headache 10/21/2021   • Seizure ([address] ) 10/19/2021   • Psychogenic nonepileptic spells 10/19/2021   • COVID-19 vaccination refused 10/05/2021   • Alopecia 08/10/2021   • Acute midline low back pain without sciatica 08/10/2021   • Type 2 diabetes mellitus with hyperglycemia, with long-term current use of insulin ([address] ) 2021   • Class 1 obesity due to excess calories with serious comorbidity and body mass index (BMI) of 30 0 to 30 9 in adult 2021   • Hypothyroidism 2021   • Acute bilateral low back pain with bilateral sciatica 2021   • Moderate persistent asthma 2021   • 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[facility]) 2021   • Essential hypertension 2021   • Bradycardic baseline fetal heart rate 2021   • COVID 2021   • Chronic GERD 2021   • Oropharyngeal dysphagia 2021   • ENRIKE (obstructive sleep apnea) 10/12/2020   • Anxiety 2020   • PTSD (post-traumatic stress disorder) 2020   • Hyperlipidemia 2020   • Type 2 diabetes mellitus without complication, with long-term current use of insulin ([address] ) 2020      LOS (days): 0  Geometric Mean LOS (GMLOS) (days): 2 90  Days to GMLOS:2 4     OBJECTIVE:    Risk of Unplanned Readmission Score: 10 81         Current admission status: Inpatient  Referral Reason: Other (Disposition planning, post-acute home needs)    Preferred Pharmacy:   [address], [address] [facility] [address] [facility]  [address] [address]  Phone: [phone] Fax: [facility] #[phone] - [place] , [address] [place] [address] [place] [address]  Phone: [phone] Fax: [phone]    Primary Care Provider: Mitra Pandey MD    Primary Insurance: [facility] Eneida Blankens[facility]  Secondary Insurance:  FOR LIFE    ASSESSMENT:  [address] [facility], [facility] 59 Representative - Spouse   Primary Phone: [phone] (Mobile)               Advance Directives  Does patient have a [address]?: Yes  Does patient have Advance Directives?: Yes  Advance Directives: Living will, Power of  for health care  Primary Contact: Wife Bean Smith         Readmission Root Cause  30 Day Readmission: No    Patient Information  Admitted from[de-identified] Home  Mental Status: Alert  During Assessment patient was accompanied by: Not accompanied during assessment  Assessment information provided by[de-identified] Patient  Primary Caregiver: Self  Support Systems: Spouse/significant other  [place] of Residence: [address] do you live in?: [address] entry access options   Select all that apply : Stairs  Number of steps to enter home : 3  Do the steps have railings?: Yes  Type of Current Residence: [address] [place] home  Upon entering residence, is there a bedroom on the main floor (no further steps)?: No  A bedroom is located on the following floor levels of residence (select all that apply):: 2nd Floor  Upon entering residence, is there a bathroom on the main floor (no further steps)?: Yes  Number of steps to 2nd floor from main floor: One Flight  In the last 12 months, was there a time when you were not able to pay the mortgage or rent on time?: No  In the last 12 months, how many places have you lived?: 1  In the last 12 months, was there a time when you did not have a steady place to sleep or slept in a shelter (including now)?: No  Homeless/housing insecurity resource given?: N/A  Living Arrangements: Lives w/ Spouse/significant other, Other (Comment) (Children)  Is patient a ?: Yes  Is patient active with [facility])?: Yes  Is patient service connected?: Yes ([address])    Activities of Daily Living Prior to Admission  Functional Status: Independent  Completes ADLs independently?: Yes  Ambulates independently?: Yes  Does patient use assisted devices?: No  Does patient currently own DME?: Yes  What DME does the patient currently own?: Bedside Commode  Does patient have a history of Outpatient Therapy (PT/OT)?: No  Does the patient have a history of Short-Term Rehab?: No  Does patient have a history of HHC?: No  Does patient currently have [facility]?: No         Patient Information Continued  Income Source: SSI/SSD  Does patient have prescription coverage?: Yes  Within the past 12 months, you worried that your food would run out before you got the money to buy more : Never true  Within the past 12 months, the food you bought just didn't last and you didn't have money to get more : Never true  Food insecurity resource given?: N/A  Does patient receive dialysis treatments?: No  Does patient have a history of Mental Health Diagnosis?: Yes (PTSD)  Is patient receiving treatment for mental health?: Yes  Has patient received inpatient treatment related to mental health in the last 2 years?: No         Means of Transportation  Means of Transport to Appts[de-identified] Drives Self  In the past 12 months, has lack of transportation kept you from medical appointments or from getting medications?: No  In the past 12 months, has lack of transportation kept you from meetings, work, or from getting things needed for daily living?: No  Was application for public transport provided?: N/A        DISCHARGE DETAILS:    Discharge planning discussed with[de-identified] Patient        CM contacted family/caregiver?: No- see comments (Patient declined)  Were Treatment Team discharge recommendations reviewed with patient/caregiver?: Yes                         Other Referral/Resources/Interventions Provided:  Interventions: None Indicated  Referral Comments: PT/OT indicated no rehab needs at dc    Would you like to participate in our [facility] service program?  : No - Declined    Treatment Team Recommendation: Home  Discharge Destination Plan[de-identified] Home  Transport at Discharge : Family (Wife)

## 2022-10-19 NOTE — ASSESSMENT & PLAN NOTE
· The patient has a history of seizures- starring spells  · We will monitor him for seizure activity  · Further management per neurology's recomendation

## 2022-10-19 NOTE — ASSESSMENT & PLAN NOTE
· Patient presented to [place] after he had a syncopal episode which was accompanied with right sided weakness   There was concern for possible CVA and the patient was administered TNK  · We will follow the acute stroke pathway for now  · Our goal will be to maintain his systolic BP between 779 and 180  · PT/OT evaluation  · Further recommendations per the neurology team

## 2022-10-19 NOTE — OCCUPATIONAL THERAPY NOTE
Occupational Therapy Evaluation(time=0928-1001)     Patient Name: Ronan ASTORGA'S Date: 10/19/2022  Problem List  Principal Problem:    Hypothyroidism  Active Problems:    Type 2 diabetes mellitus without complication, with long-term current use of insulin (HCC)    ENRIKE (obstructive sleep apnea)    Seizure (HCC)    Stroke-like symptoms    Hyponatremia    Past Medical History  Past Medical History:   Diagnosis Date    COVID-19 03/17/2021    Diabetes mellitus ([address] )     type 2    Disease of thyroid gland     hypothyroidism    Hyperlipidemia     Hypertension     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    Psychiatric disorder     PTSD   Anxiety, depression,     Psychogenic nonepileptic spells      Past Surgical History  Past Surgical History:   Procedure Laterality Date    MOUTH SURGERY  08/2021    WISDOM TOOTH EXTRACTION             10/19/22 1001   Note Type   Note type Evaluation   Pain Assessment   Pain Assessment Tool 0-10   Pain Score 4   Pain Location/Orientation Location: Head   Restrictions/Precautions   Weight Bearing Precautions Per Order Yes   Other Precautions Multiple lines;Telemetry;Pain   Home Living   Type of Home House   Home Layout Multi-level   Bathroom Shower/Tub Tub/shower unit   Bathroom Toilet Standard   Bathroom Equipment Commode   Home Equipment   (denies)   Prior Function   Lives With Spouse   Falls in the last 6 months 1 to 4  (1)   Lifestyle   Autonomy PTA pt states independence with all aspects of his ADLs, transfers, ambulation--w/o device; +, +falls=1, neg home   Reciprocal Relationships 5 children   Service to Others served in the [employer]; on disability   Intrinsic Gratification spending time with family   Subjective   Subjective "I feel OK "   ADL   Where Assessed Edge of bed   Eating Assistance 6  Modified independent   Grooming Assistance 6  Modified Independent   UB Bathing Assistance 6  Modified Independent   LB Bathing Assistance 6  Modified Independent   UB Dressing Assistance 6  Modified independent   LB Dressing Assistance 6  Modified independent   Bed Mobility   Rolling R 7  Independent   Rolling L 7  Independent   Supine to Sit 7  Independent   Transfers   Sit to Stand 7  Independent   Stand to Sit 7  Independent   Functional Mobility   Functional Mobility 5  Supervision   Additional items   (w/o device)   Balance   Static Sitting Good   Dynamic Sitting Fair +   Static Standing Fair +   Dynamic Standing Fair   Activity Tolerance   Activity Tolerance Patient tolerated treatment well;Patient limited by fatigue   Medical Staff Made Aware nsg, P T    RUE Assessment   RUE Assessment WFL   RUE Strength   RUE Overall Strength Within Functional Limits - strength 5/5   LUE Assessment   LUE Assessment WFL   LUE Strength   LUE Overall Strength Within Functional Limits - strength 5/5   Hand Function   Gross Motor Coordination Functional   Fine Motor Coordination Functional   Sensation   Light Touch No apparent deficits   Proprioception   Proprioception No apparent deficits   Vision-Basic Assessment   Current Vision Wears contacts   Vision - Complex Assessment   Acuity Able to read clock/calendar on wall without difficulty   Psychosocial   Psychosocial (WDL) X   Patient Behaviors/Mood Flat affect; Cooperative   Perception   Inattention/Neglect Appears intact   Cognition   Overall Cognitive Status WFL   Arousal/Participation Alert   Attention Within functional limits   Orientation Level Oriented X4   Memory Within functional limits   Following Commands Follows all commands and directions without difficulty   Assessment   Limitation Decreased endurance   Prognosis Good   Assessment Pt is a 36y/o male admitted to the hospital, x-haydee from [address] [place] [address], * symptoms of R sided weakness, fall  +TNK, CT(brain)=neg for acute findings  Pt with PMH DM, HTN, PTSD, anxiety, mouth sx   PTA pt states independence with all aspects of his ADLs, transfers, ambulation--w/o device; +, +falls=1, neg home  During initial eval, pt demonstrated slight deficits with his functional balance, functional mobility, and activity tolerance  Pt was able to demonstrtae good ADL status and states no concerns about going directly home  Pt would benefit from a restorative program on the unit to improve his overall endurance, balance, and mobility  Acute OT tx not indicated at this time 2* limited ADL deficits  The patient's raw score on the AM-PAC Daily Activity inpatient short form is 24, standardized score is 57 54, greater than 39 4  Patients at this level are likely to benefit from discharge to home  Please refer to the recommendation of the Occupational Therapist for safe discharge planning     Goals   Patient Goals "to go home"   Plan   OT Frequency Eval only   Recommendation   OT Discharge Recommendation No rehabilitation needs   AM-PAC Daily Activity Inpatient   Lower Body Dressing 4   Bathing 4   Toileting 4   Upper Body Dressing 4   Grooming 4   Eating 4   Daily Activity Raw Score 24   Daily Activity Standardized Score (Calc for Raw Score >=11) 57 54   AM-PAC Applied Cognition Inpatient   Following a Speech/Presentation 4   Understanding Ordinary Conversation 4   Taking Medications 4   Remembering Where Things Are Placed or Put Away 4   Remembering List of 4-5 Errands 4   Taking Care of Complicated Tasks 4   Applied Cognition Raw Score 24   Applied Cognition Standardized Score 62 21   Lionel Essex

## 2022-10-19 NOTE — H&P
Arnoldo Desir 1985, 44 y o  male MRN: [id number]  Unit/Bed#: ICU 11 Encounter: [id number]  Primary Care Provider: Tiffanie Rice MD   Date and time admitted to hospital: 10/19/2022  4:34 AM    CVA (cerebral vascular accident) [facility])  Assessment & Plan  · Patient presented to [place] after he had a syncopal episode which was accompanied with right sided weakness   There was concern for possible CVA and the patient was administered TNK  · We will follow the acute stroke pathway for now  · Our goal will be to maintain his systolic BP between 360 and 180  · PT/OT evaluation  · Further recommendations per the neurology team    Seizure [facility])  Assessment & Plan  · The patient has a history of seizures- starring spells  · We will monitor him for seizure activity  · Further management per neurology's recomendation    Type 2 diabetes mellitus without complication, with long-term current use of insulin ([address] )  Assessment & Plan  Lab Results   Component Value Date    HGBA1C 11 1 (A) 10/14/2022       Recent Labs     10/18/22  1911   POCGLU 234*       Blood Sugar Average: Last 72 hrs:     · We will continue his outpatient lantus and ozempic  · We will place on SSI with a goal to maintain his blood glucose between 140 and 180    Hyponatremia  Assessment & Plan  · Sodium level was slightly low at 132  · We will monitor his electrolytes  · Our goal will be to maintain normal levels    ENRIKE (obstructive sleep apnea)  Assessment & Plan  · Will place on nocturnal BIPAP    * Hypothyroidism  Assessment & Plan  · We will continue his outpatient levothyroxine      -------------------------------------------------------------------------------------------------------------  Chief Complaint: syncope    History of Present Illness   HX and PE limited by: patient is a poor historian  Jean Carlos Kearney is a 44 y o  male who presents as a transfer from [place] after he presented there after he had a syncopal episode  The patient states that he started to cough and went to use his inhaler and the next thing he recalls is the EMS providers there with him  He states that he wasn't able to move his right side following this event  Currently he has a persistent HA which has been present since the incident  He is unsure whether he had CP prior to the event  Currently, he denies CP  He was given TNL at the outside hospital and is transferred here for further evaluation and monitoring    History obtained from chart review and the patient   -------------------------------------------------------------------------------------------------------------  Dispo: Admit to Critical Care     Code Status: Level 1 - Full Code  --------------------------------------------------------------------------------------------------------------  Review of Systems    A 12-point, complete review of systems was reviewed and negative except as stated above     Physical Exam  HENT:      Head: Normocephalic  Mouth/Throat:      Mouth: Mucous membranes are moist    Eyes:      Pupils: Pupils are equal, round, and reactive to light  Cardiovascular:      Rate and Rhythm: Normal rate  Pulmonary:      Effort: Pulmonary effort is normal       Breath sounds: Normal breath sounds  Abdominal:      General: There is distension  Tenderness: There is no abdominal tenderness  Musculoskeletal:         General: No swelling  Cervical back: Neck supple  Lymphadenopathy:      Cervical: No cervical adenopathy  Skin:     General: Skin is warm and dry  Neurological:      General: No focal deficit present  Mental Status: He is alert  Mental status is at baseline        Comments: RUE and RLE strength is 5/5 and sensation is grossly intact       --------------------------------------------------------------------------------------------------------------  Vitals:   Vitals:    10/19/22 0453 10/19/22 0500 10/19/22 0507   BP: 106/75 117/74 106/75   BP Location: Right arm  Right arm   Pulse: 74 66 74   Resp: 17 16 17   Temp: 97 5 °F (36 4 °C)  97 5 °F (36 4 °C)   TempSrc: Tympanic  Tympanic   SpO2: 95% 96%    Weight:   81 6 kg (179 lb 14 3 oz)   Height:   5' 5" (1 651 m)     Temp  Min: 97 5 °F (36 4 °C)  Max: 97 5 °F (36 4 °C)  IBW (Ideal Body Weight): 61 5 kg  Height: 5' 5" (165 1 cm)  Body mass index is 29 94 kg/m²  Laboratory and Diagnostics:  Results from last 7 days   Lab Units 10/18/22  1947   WBC Thousand/uL 6 64   HEMOGLOBIN g/dL 13 4   HEMATOCRIT % 39 7   PLATELETS Thousands/uL 188     Results from last 7 days   Lab Units 10/18/22  1947   SODIUM mmol/L 132*   POTASSIUM mmol/L 3 7   CHLORIDE mmol/L 98   CO2 mmol/L 27   ANION GAP mmol/L 7   BUN mg/dL 14   CREATININE mg/dL 1 05   CALCIUM mg/dL 8 7   GLUCOSE RANDOM mg/dL 241*          Results from last 7 days   Lab Units 10/18/22  1947   INR  0 97   PTT seconds 24              ABG:    VBG:          Micro:        EKG: NSR  Imaging: I have personally reviewed pertinent reports  and I have personally reviewed pertinent films in PACS      Historical Information   Past Medical History:   Diagnosis Date   • COVID-19 03/17/2021   • Diabetes mellitus ([facility] [address] )     type 2   • Disease of thyroid gland     hypothyroidism   • Hyperlipidemia    • Hypertension    • 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   • Psychiatric disorder     PTSD   Anxiety, depression,    • Psychogenic nonepileptic spells      Past Surgical History:   Procedure Laterality Date   • MOUTH SURGERY  08/2021   • WISDOM TOOTH EXTRACTION       Social History   Social History     Substance and Sexual Activity   Alcohol Use Not Currently     Social History     Substance and Sexual Activity   Drug Use Not Currently   • Types: Marijuana     Social History     Tobacco Use   Smoking Status Never Smoker   Smokeless Tobacco Never Used     Exercise History:   Family History:   Family History   Problem Relation Age of Onset   • Hyperlipidemia Father    • Heart attack Paternal Grandfather    • Prostate cancer Paternal Grandfather    • Cancer Paternal Grandmother      Family history unknown and I have reviewed this patient's family history and commented on sigificant items within the HPI      Medications:  Current Facility-Administered Medications   Medication Dose Route Frequency   • ARIPiprazole (ABILIFY) tablet 15 mg  15 mg Oral Daily   • ARIPiprazole (ABILIFY) tablet 20 mg  20 mg Oral Daily   • atorvastatin (LIPITOR) tablet 40 mg  40 mg Oral QPM   • clonazePAM (KlonoPIN) tablet 1 mg  1 mg Oral Daily   • fluticasone (FLONASE) 50 mcg/act nasal spray 1 spray  1 spray Nasal Daily   • gabapentin (NEURONTIN) capsule 300 mg  300 mg Oral HS   • insulin glargine (LANTUS) subcutaneous injection 50 Units 0 5 mL  50 Units Subcutaneous Q12H [address]   • levothyroxine tablet 50 mcg  50 mcg Oral Early Morning   • loratadine (CLARITIN) tablet 10 mg  10 mg Oral Daily   • metoprolol succinate (TOPROL-XL) 24 hr tablet 25 mg  25 mg Oral Daily   • Semaglutide(0 25 or 0 5MG/DOS) SOPN 0 25 mg  0 25 mg Subcutaneous Weekly   • sertraline (ZOLOFT) tablet 200 mg  200 mg Oral Daily     Home medications:  Prior to Admission Medications   Prescriptions Last Dose Informant Patient Reported? Taking?    ARIPiprazole (ABILIFY) 20 MG tablet  Self Yes No   Sig: Take 15 mg by mouth daily   ARIPiprazole (ABILIFY) 5 mg tablet  Self Yes No   Sig: Take 20 mg by mouth daily     Patient not taking: No sig reported   Advair Diskus 500-50 MCG/DOSE inhaler  Self No No   Sig: USE 1 INHALATION TWICE A DAY (RINSE MOUTH AFTER USE)   Insulin Pen Needle (BD Pen Needle Rossana U/F) 32G X 4 MM MISC  Self No No   Sig: Use 4/day   Semaglutide,0 25 or 0 5MG/DOS, (Ozempic, 0 25 or 0 5 MG/DOSE,) 2 MG/1 5ML SOPN  Self No No   Sig: Inject 0 25 mg under the skin once a week   Patient not taking: Reported on 10/14/2022   albuterol (PROVENTIL HFA,VENTOLIN HFA) 90 mcg/act inhaler  Self No No   Sig: Inhale 2 puffs every 6 (six) hours as needed for wheezing or shortness of breath   clonazePAM (KlonoPIN) 1 mg tablet  Self Yes No   Sig: Take 1 mg by mouth daily     fish oil-omega-3 fatty acids 1000 MG capsule  Self No No   Sig: Take 2 capsules (2 g total) by mouth daily   Patient not taking: No sig reported   fluticasone (FLONASE) 50 mcg/act nasal spray  Self No No   Si spray into each nostril daily   Patient not taking: No sig reported   gabapentin (Neurontin) 300 mg capsule  Self No No   Sig: Take 1 capsule (300 mg total) by mouth daily at bedtime   insulin aspart (NovoLOG FlexPen) 100 UNIT/ML injection pen  Self No No   Si units before breakfast , 20 units before lunch and dinner  insulin glargine (Lantus) 100 units/mL subcutaneous injection   No No   Sig: Inject 50 Units under the skin every 12 (twelve) hours Basal insulin as split into 2 doses, take 30 units in the morning and 30 units before bed   levothyroxine 25 mcg tablet   No No   Sig: Take 2 tablets (50 mcg total) by mouth daily 50   loratadine (CLARITIN) 10 mg tablet  Self Yes No   Sig: Take 10 mg by mouth daily   meloxicam (Mobic) 15 mg tablet  Self No No   Sig: Take 1 tablet (15 mg total) by mouth daily   Patient not taking: Reported on 10/14/2022   methocarbamol (ROBAXIN) 500 mg tablet   No No   Sig: Take 1 tablet (500 mg total) by mouth 2 (two) times a day as needed for muscle spasms for up to 10 days   Patient not taking: Reported on 10/14/2022   methylPREDNISolone 4 MG tablet therapy pack  Self No No   Sig: Use as directed on package   Patient not taking: Reported on 10/14/2022   metoprolol succinate (TOPROL-XL) 25 mg 24 hr tablet   No No   Sig: Take 1 tablet (25 mg total) by mouth daily   pravastatin (PRAVACHOL) 40 mg tablet   No No   Sig: Take 1 tablet (40 mg total) by mouth daily   sertraline (ZOLOFT) 100 mg tablet  Self Yes No   Sig: Take 200 mg by mouth daily        Facility-Administered Medications: None     Allergies:   Allergies   Allergen Reactions   • Lamotrigine GI Intolerance   • Niacin Rash   • Simvastatin Other (See Comments)     Elevated liver enzymes  Liver enzyme elevation       ------------------------------------------------------------------------------------------------------------  Advance Directive and Living Will:      Power of :    POLST:    ------------------------------------------------------------------------------------------------------------  Anticipated Length of Stay is > 2 midnights    Care Time Delivered:   Upon my evaluation, this patient had a high probability of imminent or life-threatening deterioration due to CVA, which required my direct attention, intervention, and personal management  I have personally provided 40 minutes (0400 to 0440) of critical care time, exclusive of procedures, teaching, family meetings, and any prior time recorded by providers other than myself  IGNACIO Henao        Portions of the record may have been created with voice recognition software  Occasional wrong word or "sound a like" substitutions may have occurred due to the inherent limitations of voice recognition software    Read the chart carefully and recognize, using context, where substitutions have occurred

## 2022-10-19 NOTE — PLAN OF CARE
Problem: PHYSICAL THERAPY ADULT  Goal: Performs mobility at highest level of function for planned discharge setting  See evaluation for individualized goals  Description: Treatment/Interventions: Functional transfer training, Elevations, Endurance training, Patient/family training, Bed mobility, Gait training, Compensatory technique education, Continued evaluation, Spoke to nursing, Spoke to advanced practitioner, OT          See flowsheet documentation for full assessment, interventions and recommendations  Note: Prognosis: Good  Problem List: Decreased endurance, Decreased mobility, Pain  Assessment: Michael Deluna is a 44 y o  male who presents as a transfer from [place] after he presented there after he had a syncopal episode accompanied by R sided weakness  Transferred to [facility] to r/o CVA  Administered TNK  Pt with hx of ENRIKE, hypothyroid, DM, HTN, seizures, PTSD, BMI 29 79  PT consulted  Up with assist orders  Cleared for OOB by neurology at bedside provided BP stable with positional changes  Prior to admission independent without AD use  Currently presents with mild functional limitations related to medical parameters, BP monitoring, decreased activity tolerance and functional mobility related to same  Examination of strength, sensation, coordination, ambulation WFL  Gait slowed, decreased foot clearance  Vitals supine: 122/65  126/60 EOB  116/72 standing  125/77 OOB in chair p ambulation  S for bed mobility, transfers and ambulation  Will benefit from skilled PT 2-3x per week to assure progression of ambulation, ability to negotiate stairs and improve gait pace to baseline  The patient's AM-PAC Basic Mobility Inpatient Short Form Raw Score is 22  A Raw score of greater than 16 suggests the patient may benefit from discharge to home  Please also refer to the recommendation of the Physical Therapist for safe discharge planning   Anticipate at this time will have no PT needs upon discharge based on current presentation  Will follow  PT Discharge Recommendation: No rehabilitation needs    See flowsheet documentation for full assessment

## 2022-10-19 NOTE — CONSULTS
PHYSICAL MEDICINE AND REHABILITATION CONSULT NOTE  Lahson Baird 44 y o  male MRN: [id number]  Unit/Bed#: ICU 11 Encounter: [id number]    Requested by (Physician/Service): Genaro Ortiz, *  Reason for Consultation:  Assessment of rehabilitation needs    Assessment:  Rehabilitation Diagnosis:   • Right sided weakness   • Impaired mobility and self care    Recommendations:  Rehabilitation Plan:  • OT evaluation is pending however patient is supervision with PT  Anticipate he will be able to d/c to home and will not require inpatient rehabilitation  • Covid-19 Testing: [facility] inpatient rehabilitation units require testing within 48 hours of all potential admissions at this time  *Re-testing is NOT required for patients recovering from COVID-19 infection if isolation has been discontinued per CDC criteria  Medical Co-morbidities Plan:  · Seizure   · Diabetes type 2  · Hyponatremia   · ENRIKE  · Hypothyroidism  · DVT ppx: SCD    Thank you for this consultation  Do not hesitate to contact service with further questions  Silva Aquino  PM&R    History of Present Illness:  Lashon Baird is a 44 y o  male with a PMH of diabetes, HTN, HLD who presented to the [address] [place] [address] on 10/18 after his wife heard him fall  He was found to have decreased sensation of his right UE/LE with weakness  CT of the head showed no acute abnormality  He was given TNK and admitted to the ICU  PM&R are consulted for rehabilitation recommendations  The patient was seen in the ICU with his spouse at bedside  He is very fatigued  He continues with right sided weakness  He denies any blurred vision, double vision, dizziness or headache  Review of Systems: 10 point ROS negative except for what is noted in HPI    Function:  Prior level of function and living situation: The patient lives with his spouse and 5 kids in a 3 story home with FFSU available and about 2 KENDALL   He was fully independent  Current level of function:  Physical Therapy: Supervision for bed mobility, transfers and ambulation without any AD  Occupational Therapy: Pending   Speech Therapy: Pending       Physical Exam:  /64   Pulse 80   Temp 97 9 °F (36 6 °C) (Oral)   Resp (!) 25   Ht 5' 5" (1 651 m)   Wt 81 2 kg (179 lb)   SpO2 94%   BMI 29 79 kg/m²      No intake or output data in the 24 hours ending 10/19/22 1214    Body mass index is 29 79 kg/m²  Physical Exam  Constitutional:       General: He is not in acute distress  Appearance: He is not toxic-appearing  Comments: Very fatigued    HENT:      Head: Normocephalic and atraumatic  Right Ear: External ear normal       Left Ear: External ear normal       Nose: Nose normal       Mouth/Throat:      Mouth: Mucous membranes are moist       Pharynx: Oropharynx is clear  Pulmonary:      Effort: Pulmonary effort is normal    Musculoskeletal:      Comments: RUE: 4/5 throughout  LUE: 5/5 throughout   RLE: 4/5 throughout  LLR: 5/5 throughout   Skin:     General: Skin is warm and dry  Neurological:      Mental Status: He is oriented to person, place, and time     Psychiatric:         Mood and Affect: Mood normal         Social History:    Social History     Socioeconomic History   • Marital status: /Civil Union     Spouse name: None   • Number of children: None   • Years of education: None   • Highest education level: None   Occupational History   • None   Tobacco Use   • Smoking status: Never Smoker   • Smokeless tobacco: Never Used   Vaping Use   • Vaping Use: Never used   Substance and Sexual Activity   • Alcohol use: Not Currently   • Drug use: Not Currently     Types: Marijuana   • Sexual activity: Yes     Partners: Female     Birth control/protection: None     Comment:    Other Topics Concern   • None   Social History Narrative   • None     Social Determinants of Health     Financial Resource Strain: Not on file   Food Insecurity: Not on file   Transportation Needs: Not on file   Physical Activity: Not on file   Stress: Not on file   Social Connections: Not on file   Intimate Partner Violence: Not on file   Housing Stability: Not on file        Family History:    Family History   Problem Relation Age of Onset   • Hyperlipidemia Father    • Heart attack Paternal Grandfather    • Prostate cancer Paternal Grandfather    • Cancer Paternal Grandmother          Medications:     Current Facility-Administered Medications:   •  acetaminophen (TYLENOL) tablet 650 mg, 650 mg, Oral, Q6H PRN, Severiano Deal, PA-C, 650 mg at 10/19/22 1023  •  ARIPiprazole (ABILIFY) tablet 15 mg, 15 mg, Oral, Daily, Shamir Petit, CRNP, 15 mg at 10/19/22 1014  •  atorvastatin (LIPITOR) tablet 40 mg, 40 mg, Oral, QPM, Shamir Petit, CRNP  •  clonazePAM (KlonoPIN) tablet 1 mg, 1 mg, Oral, Daily, Shamir Petit, CRNP, 1 mg at 10/19/22 1014  •  fluticasone (FLONASE) 50 mcg/act nasal spray 1 spray, 1 spray, Nasal, Daily, Shamir Petit, CRNP  •  gabapentin (NEURONTIN) capsule 300 mg, 300 mg, Oral, HS, Shamir Petit, CRNP  •  insulin glargine (LANTUS) subcutaneous injection 50 Units 0 5 mL, 50 Units, Subcutaneous, Q12H [address], Shamir Petit, CRNP, 50 Units at 10/19/22 1015  •  levothyroxine tablet 50 mcg, 50 mcg, Oral, Early Morning, Shamir Petit, CRNP, 50 mcg at 10/19/22 0547  •  loratadine (CLARITIN) tablet 10 mg, 10 mg, Oral, Daily, Shamir Petit, CRNP, 10 mg at 10/19/22 1015  •  metoprolol succinate (TOPROL-XL) 24 hr tablet 25 mg, 25 mg, Oral, Daily, Shamir Petit, CRNP  •  sertraline (ZOLOFT) tablet 200 mg, 200 mg, Oral, Daily, Shamir Petit, CRNP, 200 mg at 10/19/22 1015    Past Medical History:     Past Medical History:   Diagnosis Date   • COVID-19 03/17/2021   • Diabetes mellitus ([facility] [address] )     type 2   • Disease of thyroid gland     hypothyroidism   • Hyperlipidemia    • Hypertension    • 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   • Psychiatric disorder     PTSD  Anxiety, depression,    • Psychogenic nonepileptic spells         Past Surgical History:     Past Surgical History:   Procedure Laterality Date   • MOUTH SURGERY  08/2021   • WISDOM TOOTH EXTRACTION           Allergies: Allergies   Allergen Reactions   • Lamotrigine GI Intolerance   • Niacin Rash   • Simvastatin Other (See Comments)     Elevated liver enzymes  Liver enzyme elevation           LABORATORY RESULTS:      Lab Results   Component Value Date    HGB 13 4 10/18/2022    HGB 14 1 01/03/2015    HCT 39 7 10/18/2022    HCT 40 4 01/03/2015    WBC 6 64 10/18/2022    WBC 7 36 01/03/2015     Lab Results   Component Value Date    BUN 14 10/18/2022    BUN 17 01/03/2015     01/03/2015    K 3 7 10/18/2022    K 4 1 01/03/2015    CL 98 10/18/2022     01/03/2015    GLUCOSE 179 (H) 01/03/2015    CREATININE 1 05 10/18/2022    CREATININE 1 04 01/03/2015     Lab Results   Component Value Date    PROTIME 12 9 10/18/2022    PROTIME 12 3 01/03/2015    INR 0 97 10/18/2022    INR 0 90 01/03/2015        DIAGNOSTIC STUDIES: Reviewed  No results found

## 2022-10-19 NOTE — ASSESSMENT & PLAN NOTE
Lab Results   Component Value Date    HGBA1C 11 1 (A) 10/14/2022       Recent Labs     10/18/22  1911   POCGLU 234*       Blood Sugar Average: Last 72 hrs:     · We will continue his outpatient lantus and ozempic  · We will place on SSI with a goal to maintain his blood glucose between 140 and 180

## 2022-10-19 NOTE — QUICK NOTE
Pre-hospital stroke alert called but I was not notified  I was notified by reading radiologist at 7:30 PM after CT scan performed  Pt is a 43 yo M with PMHx T2DM (uncontrolled, A1c 11 1 on 10/14/22), HTN, HLD who presented with acute onset neuro deficits this evening  LKW 6:30 PM  At that time, his wife heard him fall to the ground in a different room  He regained consciousness fairly quickly with no postictal confusion, no tongue biting, no incontinence  After the fall he noticed decreased sensation over the R hemibody and RUE/RLE weakness  NIHSS 7 per ED for R sided sensory deficits and inability to lift RUE/RLE antigravity  Pt has no memory of whether or not he had weakness and numbness prior to the fall  Wife found him and did not notice any motor activity when he was on the ground  He does have history of non-epileptic seizures; however, previous event semiology described as staring spells  He has never had similar unilateral symptoms previously  CTH showed no evidence of acute infarct or hemorrhage  CTA showed no evidence of LVO or significant stenosis  He presented within the window for TNK administration, did not have any contraindications per chart review and discussion  Discussed the risks/benefits of TNK with patient and his wife  After discussion, pt elected to proceed with TNK administration       Recommendations:  -admit stroke pathway s/p TNK administration  -MRI brain in AM  -BP <180/105 post-TNK  -hold all AP/AC/DVT ppx for 24 hrs post-TNK  -stability CTH after 24 hrs  -continue statin  -lipid panel  -TTE  -PT/OT/SLP

## 2022-10-19 NOTE — EMTALA/ACUTE CARE TRANSFER
[address] [facility] [address]  Dept: [phone]      EMTALA TRANSFER CONSENT    NAME Layla Shah                                         1985                              MRN [id number]    I have been informed of my rights regarding examination, treatment, and transfer   by Dr Deepika Felix DO    Benefits:      Risks:        Consent for Transfer:  I acknowledge that my medical condition has been evaluated and explained to me by the emergency department physician or other qualified medical person and/or my attending physician, who has recommended that I be transferred to the service of    at    The above potential benefits of such transfer, the potential risks associated with such transfer, and the probable risks of not being transferred have been explained to me, and I fully understand them  The doctor has explained that, in my case, the benefits of transfer outweigh the risks  I agree to be transferred  I authorize the performance of emergency medical procedures and treatments upon me in both transit and upon arrival at the receiving facility  Additionally, I authorize the release of any and all medical records to the receiving facility and request they be transported with me, if possible  I understand that the safest mode of transportation during a medical emergency is an ambulance and that the Hospital advocates the use of this mode of transport  Risks of traveling to the receiving facility by car, including absence of medical control, life sustaining equipment, such as oxygen, and medical personnel has been explained to me and I fully understand them  (NOAH CORRECT BOX BELOW)  [  ]  I consent to the stated transfer and to be transported by ambulance/helicopter  [  ]  I consent to the stated transfer, but refuse transportation by ambulance and accept full responsibility for my transportation by car    I understand the risks of non-ambulance transfers and I exonerate the Hospital and its staff from any deterioration in my condition that results from this refusal     X___________________________________________    DATE  10/18/22  TIME________  Signature of patient or legally responsible individual signing on patient behalf           RELATIONSHIP TO PATIENT_________________________          Provider Certification    NAME Mady Byrd                                         1985                              MRN [id number]    A medical screening exam was performed on the above named patient  Based on the examination:    Condition Necessitating Transfer The primary encounter diagnosis was Stroke-like symptoms  A diagnosis of Right sided weakness was also pertinent to this visit  Patient Condition:      Reason for Transfer:      Transfer Requirements: Facility     · Space available and qualified personnel available for treatment as acknowledged by    · Agreed to accept transfer and to provide appropriate medical treatment as acknowledged by          · Appropriate medical records of the examination and treatment of the patient are provided at the time of transfer   [address] [facility], [address] _______  · Transfer will be performed by qualified personnel from    and appropriate transfer equipment as required, including the use of necessary and appropriate life support measures      Provider Certification: I have examined the patient and explained the following risks and benefits of being transferred/refusing transfer to the patient/family:         Based on these reasonable risks and benefits to the patient and/or the unborn child(sree), and based upon the information available at the time of the patient’s examination, I certify that the medical benefits reasonably to be expected from the provision of appropriate medical treatments at another medical facility outweigh the increasing risks, if any, to the individual’s medical condition, and in the case of labor to the unborn child, from effecting the transfer      X____________________________________________ DATE 10/18/22        TIME_______      ORIGINAL - SEND TO MEDICAL RECORDS   COPY - SEND WITH PATIENT DURING TRANSFER

## 2022-10-19 NOTE — PHYSICAL THERAPY NOTE
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    Psychiatric disorder     PTSD  Anxiety, depression,     Psychogenic nonepileptic spells          Past Surgical History:   Procedure Laterality Date    MOUTH SURGERY  08/2021    WISDOM TOOTH EXTRACTION        10/19/22 0932   PT Last Visit   PT Visit Date 10/19/22   Note Type   Note type Evaluation   Pain Assessment   Pain Score 4  (neurology aware-ordering tylenol )   Pain Location/Orientation Location: Head   Hospital Pain Intervention(s) Repositioned   Restrictions/Precautions   Weight Bearing Precautions Per Order No   Other Precautions Chair Alarm;Multiple lines;Telemetry; Fall Risk;Pain;Seizure  (ICU monitoring )   Home Living   Type of [address] [place] [address] Multi-level;Bed/bath upstairs;Stairs to enter with rails  (full bath 1st  3-4 KENDALL)   Bathroom Shower/Tub Tub/shower unit   Bathroom Toilet Standard   Bathroom Equipment Commode   Bathroom Accessibility Accessible   Home Equipment   (none)   Additional Comments resides with spouse and 5 kids in [address] [place] home  (ages 15 to 1)  Retired from [employer] therefore cares for children  Driving prior to admission  Prior Function   Level of [place] Independent with ADLs   Lives With Spouse   Receives Help From Family   IADLs Independent with driving   Falls in the last 6 months 1 to 4  (related)   Vocational On disability  (retired from [employer]  On disability )   Comments independent without AD prior to admission  +   General   Additional Pertinent History Pt is 43 y/o male admitted with seizure like activity and R sided weakness s/p TNK  PT consulted  Up with assist orders     Family/Caregiver Present No   Cognition   Overall Cognitive Status [facility] Orientation Level Oriented X4   Comments flat affect  pleasant  Subjective   Subjective Feels symptoms improved from upon admission  RUE Assessment   RUE Assessment WFL   LUE Assessment   LUE Assessment WFL   RLE Assessment   RLE Assessment WNL   LLE Assessment   LLE Assessment WNL   Vision-Basic Assessment   Current Vision Wears contacts   Coordination   Heel to Shin Intact   Light Touch   RLE Light Touch Grossly intact   LLE Light Touch Grossly intact   Proprioception   RLE Proprioception Grossly intact   LLE Proprioception Grossly Intact   Bed Mobility   Supine to Sit 5  Supervision   Additional items Increased time required   Transfers   Sit to Stand 5  Supervision   Additional items Increased time required   Stand to Sit 5  Supervision   Additional items Increased time required;Verbal cues   Additional Comments slowed transition  Ambulation/Elevation   Gait pattern Decreased foot clearance  (slowed pace)   Gait Assistance 5  Supervision   Additional items Verbal cues   Assistive Device None   Distance Amb without AD 20'x1  Ambulation/Elevation Additional Comments Gait slowed, no LOB  Balance   Static Sitting Good   Dynamic Sitting Fair +   Static Standing Fair +   Dynamic Standing Fair   Ambulatory Fair   Endurance Deficit   Endurance Deficit Yes   Endurance Deficit Description fatigue  BP supine 122/65, 126/60 EOB, 116/72 standing  OOB in chair p ambulation 125/77  Activity Tolerance   Activity Tolerance Patient tolerated treatment well;Patient limited by fatigue   Medical Staff Made Aware Nursing  OT: Pt seen for co-evaluation/treatment with skilled Occupational Therapist 2* clinically unstable/unpredictable presentation with continuous monitoring, recent TNK, awaiting imaging, BP monitoring required, medical complexity-r/o CVA, fall risk, limited activity tolerance which is decline from PLOF and may impact overall functional mobility/mobility safety     Assessment   Prognosis Good   Problem List Decreased endurance;Decreased mobility;Pain   Assessment Adry Perea is a 44 y o  male who presents as a transfer from [place] after he presented there after he had a syncopal episode accompanied by R sided weakness  Transferred to [facility] to r/o CVA  Administered TNK  Pt with hx of ENRIKE, hypothyroid, DM, HTN, seizures, PTSD, BMI 29 79  PT consulted  Up with assist orders  Cleared for OOB by neurology at bedside provided BP stable with positional changes  Prior to admission independent without AD use  Currently presents with mild functional limitations related to medical parameters, BP monitoring, decreased activity tolerance and functional mobility related to same  Examination of strength, sensation, coordination, ambulation WFL  Gait slowed, decreased foot clearance  Vitals supine: 122/65  126/60 EOB  116/72 standing  125/77 OOB in chair p ambulation  S for bed mobility, transfers and ambulation  Will benefit from skilled PT 2-3x per week to assure progression of ambulation, ability to negotiate stairs and improve gait pace to baseline  The patient's AM-PAC Basic Mobility Inpatient Short Form Raw Score is 22  A Raw score of greater than 16 suggests the patient may benefit from discharge to home  Please also refer to the recommendation of the Physical Therapist for safe discharge planning  Anticipate at this time will have no PT needs upon discharge based on current presentation  Will follow  Goals   Patient Goals go home   STG Expiration Date 10/26/22   Short Term Goal #1 7 days: 1)  Pt will perform bed mobility with William demonstrating appropriate technique 100% of the time in order to improve function  2)  Perform all transfers with William demonstrating safe and appropriate technique 100% of the time in order to improve ability to negotiate safely in home environment  3) Amb with least restrictive AD prn > 200'x1 with mod I to I in order to demonstrate ability to negotiate in home environment  4)  Improve overall strength and balance 1/2 grade in order to optimize ability to perform functional tasks and reduce fall risk  5) Increase activity tolerance to 45 minutes in order to improve endurance to functional tasks  6)  Negotiate stairs using most appropriate technique and William in order to be able to negotiate safely in home environment  7) PT for ongoing patient and family/caregiver education, DME needs and d/c planning in order to promote highest level of function in least restrictive environment  Plan   Treatment/Interventions Functional transfer training;Elevations; Endurance training;Patient/family training;Bed mobility;Gait training; Compensatory technique education;Continued evaluation;Spoke to nursing;Spoke to advanced practitioner;OT   PT Frequency 2-3x/wk   Recommendation   PT Discharge Recommendation No rehabilitation needs   AM-PAC Basic Mobility Inpatient   Turning in Bed Without Bedrails 4   Lying on Back to Sitting on Edge of Flat Bed 4   Moving Bed to Chair 4   Standing Up From Chair 4   Walk in Room 3   Climb 3-5 Stairs 3   Basic Mobility Inpatient Raw Score 22   Basic Mobility Standardized Score 47 4   Highest Level Of Mobility   JH-HLM Goal 7: Walk 25 feet or more   JH-HLM Achieved 6: Walk 10 steps or more   Modified Shaheen Scale   Modified Vance Scale 1   End of Consult   Patient Position at End of Consult Bedside chair; All needs within reach;Bed/Chair alarm activated   End of Consult Comments Seated OOB in chair, vitals Stable  Neurology aware of readings for BP       Hx/personal factors: co-morbidities, inaccessible home, mutliple lines, telemetry, pain, h/o of falls, and fall risk  Examination: dec mobility, dec endurance, risk for falls, pain, assessed body system, balance, endurance, amb, D/C disposition & fall risk  Clinical: unpredictable (ongoing medical status, abnormal lab values, risk for falls, imaging test/result pending, and pain mgt)  Complexity: high           Mustapha Hoot

## 2022-10-19 NOTE — CONSULTS
Consultation - Neurology   Malissa Socks 44 y o  male MRN: [id number]  Unit/Bed#: ICU 11 Encounter: [id number]      Assessment/Plan     Stroke-like symptoms  Assessment & Plan  44 y o  male with HTN, HLD, DM2, ENRIKE, hypothyroidism, prior staring episodes and PTSD who presented to the ED at [facility] after an unwitnessed syncopal event with collapse  He regained consciousness without postictal state, tongue bite nor incontinence  After fall he noted decreased sensation over the R hemibody and RUE/RLE weakness  /60  In the ED NIHSS 7  LKW 1830  CTH/CTA H/N negative for acute intracranial abnormality, LVO or flow-limiting stenosis  TNK given  According to chart, 15 minutes post TNK administration, patient had "full use of RUE , able to rotate R ankle "    Work-up:  -   - A1C 10 9    On exam today patient has no neurologic deficits and reports his previous neurologic deficits to be fully resolved  Suspect stroke vs TNK aborted stroke, less like seizure activity as detailed above  Awaiting imaging  Plan:  - Acute ischemic stroke protocol  - TNK protocol  - Hold AC/AP until confirmation that repeat head CT 24 hours post TNK is stable  - Atorvastatin 40mg   - Repeat CT head 24 hours post TNK as per protocol  - MRI brain wo contrast   - Pending MRI findings and echo results will determine if additional work-up needed (ie  MARLENY)  - Echo with bubble study  - Telemetry  - Secondary risk factor modification  - Permissive HTN with max of 180/105   - Hyperglycemia control   - PT/OT/ST  - Stroke Education  - Frequent neurological checks  - Stat CT head with acute decline of in exam/mental status  - Notify neurology with any changes in examination      ENRIKE (obstructive sleep apnea)  Assessment & Plan  - Will need to discuss patient's compliance with CPAP as this is a stroke risk factor     Type 2 diabetes mellitus without complication, with long-term current use of insulin [facility])  Assessment & Plan  Lab Results   Component Value Date    HGBA1C 10 9 (H) 10/19/2022       Recent Labs     10/18/22  1911 10/19/22  0838   POCGLU 234* 174*       Blood Sugar Average: Last 72 hrs:     - Goal of euglycemia    Recommendations for outpatient neurological follow up have yet to be determined  History of Present Illness     Reason for Consult / Principal Problem: Stroke  Hx and PE limited by: NA  HPI: Kaylin Conte is a 44 y o  right handed male with HTN, HLD, DM2, ENRIKE hypothyroidism, prior staring episodes and PTSD who presented to the ED at [facility] after an unwitnessed syncopal event with collapse  He regained consciousness without postictal state, tongue bite nor incontinence  After fall he noted decreased sensation over the R hemibody and RUE/RLE weakness  /60  In the ED NIHSS 7 with inability to raise RUE and RLE against gravity as per notes  LKW 1830  CTH/CTA H/N negative for acute intracranial abnormality, LVO or flow-limiting stenosis  TNK given  According to chart, 15 minutes post TNK administration, patient had "full use of RUE , able to rotate R ankle "  When discussing timing of symptom resolution with patient he confirms that shortly after TNK his symptoms fully resolved  He reports no residual deficits  He does note a 4/10 headache which is unusual for him  At baseline patient does not work as he is on disability for his PTSD  He is seen for health care at the [place]  He does drive  He lives with his wife  Inpatient consult to Neurology  Consult performed by: Katharine Mckinnon PA-C  Consult ordered by: IGNACIO Walton          Review of Systems   Constitutional: Negative for fatigue  HENT: Negative for trouble swallowing  Eyes: Negative for visual disturbance  Respiratory: Negative for shortness of breath  Cardiovascular: Negative for chest pain  Gastrointestinal: Negative for abdominal pain  Genitourinary: Negative for difficulty urinating  Musculoskeletal: Negative  Neurological: Positive for headaches (4/10)  A 12 point ROS was completed and other than the above mentioned symptoms and those noted in the HPI, all remaining systems were negative  Historical Information   Past Medical History:   Diagnosis Date   • COVID-19 03/17/2021   • Diabetes mellitus ([facility] [address] )     type 2   • Disease of thyroid gland     hypothyroidism   • Hyperlipidemia    • Hypertension    • 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   • Psychiatric disorder     PTSD  Anxiety, depression,    • Psychogenic nonepileptic spells      Past Surgical History:   Procedure Laterality Date   • MOUTH SURGERY  08/2021   • WISDOM TOOTH EXTRACTION       Social History   Social History     Substance and Sexual Activity   Alcohol Use Not Currently     Social History     Substance and Sexual Activity   Drug Use Not Currently   • Types: Marijuana     E-Cigarette/Vaping   • E-Cigarette Use Never User      E-Cigarette/Vaping Substances   • Nicotine No    • THC No    • CBD No    • Flavoring No    • Other No      Social History     Tobacco Use   Smoking Status Never Smoker   Smokeless Tobacco Never Used     Family History:   Family History   Problem Relation Age of Onset   • Hyperlipidemia Father    • Heart attack Paternal Grandfather    • Prostate cancer Paternal Grandfather    • Cancer Paternal Grandmother        Review of previous medical records was completed  Meds/Allergies   all current active meds have been reviewed    Allergies   Allergen Reactions   • Lamotrigine GI Intolerance   • Niacin Rash   • Simvastatin Other (See Comments)     Elevated liver enzymes  Liver enzyme elevation       Objective   Vitals:Blood pressure 104/75, pulse 86, temperature 98 2 °F (36 8 °C), temperature source Oral, resp  rate 21, height 5' 5" (1 651 m), weight 81 2 kg (179 lb), SpO2 94 %  ,Body mass index is 29 79 kg/m²  No intake or output data in the 24 hours ending 10/19/22 1207    Invasive Devices:    Invasive Devices  Report    Peripheral Intravenous Line  Duration           Peripheral IV 10/18/22 Left Antecubital 1 day                Physical Exam  Constitutional:       General: He is not in acute distress  Appearance: Normal appearance  He is well-developed  He is not ill-appearing  HENT:      Head: Normocephalic and atraumatic  Eyes:      General: No scleral icterus  Right eye: No discharge  Left eye: No discharge  Extraocular Movements: Extraocular movements intact and EOM normal       Conjunctiva/sclera: Conjunctivae normal    Cardiovascular:      Rate and Rhythm: Normal rate and regular rhythm  Pulmonary:      Effort: Pulmonary effort is normal  No respiratory distress  Breath sounds: No stridor  Musculoskeletal:         General: No tenderness or deformity  Normal range of motion  Cervical back: Normal range of motion and neck supple  Lymphadenopathy:      Cervical: No cervical adenopathy  Skin:     General: Skin is warm and dry  Findings: No erythema or rash  Neurological:      Mental Status: He is alert and oriented to person, place, and time  Coordination: Finger-Nose-Finger Test normal       Gait: Gait is intact  Deep Tendon Reflexes: Strength normal    Psychiatric:         Speech: Speech normal          Behavior: Behavior normal          Thought Content: Thought content normal          Judgment: Judgment normal        Neurologic Exam     Mental Status   Oriented to person, place, and time  Follows 2 step commands  Attention: normal  Concentration: normal    Speech: speech is normal (No dysarthria or aphasia)  Level of consciousness: alert  Knowledge: good  Able to name object  Normal comprehension  Cranial Nerves     CN II   Visual acuity: normal (grossly)  Right visual field deficit: none  Left visual field deficit: none     CN III, IV, VI   Extraocular motions are normal    Nystagmus: none   Conjugate gaze: present    CN V   Facial sensation intact       CN VII Facial expression full, symmetric  CN VIII   Hearing: intact    CN XII   Tongue deviation: none    Motor Exam   Muscle bulk: normal  Overall muscle tone: normal  Right arm pronator drift: absent  Left arm pronator drift: absent    Strength   Strength 5/5 throughout  Sensory Exam   Light touch normal    Vibration normal    Temeprature intact and symmetric throughout  Gait, Coordination, and Reflexes     Gait  Gait: normal    Coordination   Finger to nose coordination: normal    Reflexes   Right plantar: normal  Left plantar: normal  DTRs: BUEs 1 throughout, BLEs 2 thoughout     Lab Results: I have personally reviewed pertinent reports  Imaging Studies: I have personally reviewed pertinent films in PACS  EKG, Pathology, and Other Studies: I have personally reviewed pertinent reports      VTE Prophylaxis: NA s/p TNK    Code Status: Level 1 - Full Code

## 2022-10-19 NOTE — ED NOTES
Verbal report called to receiving RN for [facility] ICU, patient transported via ground ALS at this time       Inga Cerda RN  10/19/22 5615

## 2022-10-19 NOTE — PLAN OF CARE
Problem: PAIN - ADULT  Goal: Verbalizes/displays adequate comfort level or baseline comfort level  Description: Interventions:  - Encourage patient to monitor pain and request assistance  - Assess pain using appropriate pain scale  - Administer analgesics based on type and severity of pain and evaluate response  - Implement non-pharmacological measures as appropriate and evaluate response  - Consider cultural and social influences on pain and pain management  - Notify physician/advanced practitioner if interventions unsuccessful or patient reports new pain  Outcome: Progressing     Problem: INFECTION - ADULT  Goal: Absence or prevention of progression during hospitalization  Description: INTERVENTIONS:  - Assess and monitor for signs and symptoms of infection  - Monitor lab/diagnostic results  - Monitor all insertion sites, i e  indwelling lines, tubes, and drains  - Monitor endotracheal if appropriate and nasal secretions for changes in amount and color  - [place] appropriate cooling/warming therapies per order  - Administer medications as ordered  - Instruct and encourage patient and family to use good hand hygiene technique  - Identify and instruct in appropriate isolation precautions for identified infection/condition  Outcome: Progressing  Goal: Absence of fever/infection during neutropenic period  Description: INTERVENTIONS:  - Monitor WBC    Outcome: Progressing     Problem: SAFETY ADULT  Goal: Patient will remain free of falls  Description: INTERVENTIONS:  - Educate patient/family on patient safety including physical limitations  - Instruct patient to call for assistance with activity   - Consult OT/PT to assist with strengthening/mobility   - Keep Call bell within reach  - Keep bed low and locked with side rails adjusted as appropriate  - Keep care items and personal belongings within reach  - Initiate and maintain comfort rounds  - Make Fall Risk Sign visible to staff  - Apply yellow socks and bracelet for high fall risk patients  - Consider moving patient to room near nurses station  Outcome: Progressing  Goal: Maintain or return to baseline ADL function  Description: INTERVENTIONS:  -  Assess patient's ability to carry out ADLs; assess patient's baseline for ADL function and identify physical deficits which impact ability to perform ADLs (bathing, care of mouth/teeth, toileting, grooming, dressing, etc )  - Assess/evaluate cause of self-care deficits   - Assess range of motion  - Assess patient's mobility; develop plan if impaired  - Assess patient's need for assistive devices and provide as appropriate  - Encourage maximum independence but intervene and supervise when necessary  - Involve family in performance of ADLs  - Assess for home care needs following discharge   - Consider OT consult to assist with ADL evaluation and planning for discharge  - Provide patient education as appropriate  Outcome: Progressing  Goal: Maintains/Returns to pre admission functional level  Description: INTERVENTIONS:  - Perform BMAT or MOVE assessment daily    - Set and communicate daily mobility goal to care team and patient/family/caregiver     - Collaborate with rehabilitation services on mobility goals if consulted  - Out of bed for toileting  - Record patient progress and toleration of activity level   Outcome: Progressing     Problem: DISCHARGE PLANNING  Goal: Discharge to home or other facility with appropriate resources  Description: INTERVENTIONS:  - Identify barriers to discharge w/patient and caregiver  - Arrange for needed discharge resources and transportation as appropriate  - Identify discharge learning needs (meds, wound care, etc )  - Arrange for interpretive services to assist at discharge as needed  - Refer to Case Management Department for coordinating discharge planning if the patient needs post-hospital services based on physician/advanced practitioner order or complex needs related to functional status, cognitive ability, or social support system  Outcome: Progressing     Problem: Knowledge Deficit  Goal: Patient/family/caregiver demonstrates understanding of disease process, treatment plan, medications, and discharge instructions  Description: Complete learning assessment and assess knowledge base  Interventions:  - Provide teaching at level of understanding  - Provide teaching via preferred learning methods  Outcome: Progressing     Problem: Neurological Deficit  Goal: Neurological status is stable or improving  Description: Interventions:  - Monitor and assess patient's level of consciousness, motor function, sensory function, and level of assistance needed for ADLs  - Monitor and report changes from baseline  Collaborate with interdisciplinary team to initiate plan and implement interventions as ordered  - Provide and maintain a safe environment  - Consider seizure precautions  - Consider fall precautions  - Consider aspiration precautions  - Consider bleeding precautions  Outcome: Progressing     Problem: Activity Intolerance/Impaired Mobility  Goal: Mobility/activity is maintained at optimum level for patient  Description: Interventions:  - Assess and monitor patient  barriers to mobility and need for assistive/adaptive devices  - Assess patient's emotional response to limitations  - Collaborate with interdisciplinary team and initiate plans and interventions as ordered  - Encourage independent activity per ability   - Maintain proper body alignment  - Perform active/passive rom as tolerated/ordered  - Plan activities to conserve energy   - Turn patient as appropriate  Outcome: Progressing     Problem: Communication Impairment  Goal: Ability to express needs and understand communication  Description: Assess patient's communication skills and ability to understand information    Patient will demonstrate use of effective communication techniques, alternative methods of communication and understanding even if not able to speak  - Encourage communication and provide alternate methods of communication as needed  - Collaborate with case management/ for discharge needs  - Include patient/family/caregiver in decisions related to communication  Outcome: Progressing     Problem: Potential for Aspiration  Goal: Non-ventilated patient's risk of aspiration is minimized  Description: Assess and monitor vital signs, respiratory status, and labs (WBC)  Monitor for signs of aspiration (tachypnea, cough, rales, wheezing, cyanosis, fever)  - Assess and monitor patient's ability to swallow  - Place patient up in chair to eat if possible  - HOB up at 90 degrees to eat if unable to get patient up into chair   - Supervise patient during oral intake  - Instruct patient/ family to take small bites  - Instruct patient/ family to take small single sips when taking liquids  - Follow patient-specific strategies generated by speech pathologist   Outcome: Progressing  Goal: Ventilated patient's risk of aspiration is minimized  Description: Assess and monitor vital signs, respiratory status, airway cuff pressure, and labs (WBC)  Monitor for signs of aspiration (tachypnea, cough, rales, wheezing, cyanosis, fever)  - Elevate head of bed 30 degrees if patient has tube feeding   - Monitor tube feeding  Outcome: Progressing     Problem: Nutrition  Goal: Nutrition/Hydration status is improving  Description: Monitor and assess patient's nutrition/hydration status for malnutrition (ex- brittle hair, bruises, dry skin, pale skin and conjunctiva, muscle wasting, smooth red tongue, and disorientation)  Collaborate with interdisciplinary team and initiate plan and interventions as ordered  Monitor patient's weight and dietary intake as ordered or per policy  Utilize nutrition screening tool and intervene per policy   Determine patient's food preferences and provide high-protein, high-caloric foods as appropriate  - Assist patient with eating   - Allow adequate time for meals   - Encourage patient to take dietary supplement as ordered  - Collaborate with clinical nutritionist   - Include patient/family/caregiver in decisions related to nutrition    Outcome: Progressing

## 2022-10-19 NOTE — ASSESSMENT & PLAN NOTE
Lab Results   Component Value Date    HGBA1C 10 9 (H) 10/19/2022       Recent Labs     10/18/22  1911 10/19/22  0838   POCGLU 234* 174*       Blood Sugar Average: Last 72 hrs:     - Goal of euglycemia

## 2022-10-19 NOTE — ASSESSMENT & PLAN NOTE
· Sodium level was slightly low at 132  · We will monitor his electrolytes  · Our goal will be to maintain normal levels

## 2022-10-20 ENCOUNTER — TELEPHONE (OUTPATIENT)
Dept: PULMONOLOGY | Facility: CLINIC | Age: 37
End: 2022-10-20

## 2022-10-20 ENCOUNTER — APPOINTMENT (OUTPATIENT)
Dept: MRI IMAGING | Facility: HOSPITAL | Age: 37
End: 2022-10-20
Payer: COMMERCIAL

## 2022-10-20 VITALS
TEMPERATURE: 96.9 F | RESPIRATION RATE: 18 BRPM | HEIGHT: 65 IN | WEIGHT: 183.42 LBS | OXYGEN SATURATION: 96 % | BODY MASS INDEX: 30.56 KG/M2 | SYSTOLIC BLOOD PRESSURE: 117 MMHG | HEART RATE: 79 BPM | DIASTOLIC BLOOD PRESSURE: 79 MMHG

## 2022-10-20 DIAGNOSIS — E11.9 TYPE 2 DIABETES MELLITUS WITHOUT COMPLICATION, WITH LONG-TERM CURRENT USE OF INSULIN (HCC): Primary | ICD-10-CM

## 2022-10-20 DIAGNOSIS — Z79.4 TYPE 2 DIABETES MELLITUS WITHOUT COMPLICATION, WITH LONG-TERM CURRENT USE OF INSULIN (HCC): Primary | ICD-10-CM

## 2022-10-20 LAB
AMPHETAMINES SERPL QL SCN: NEGATIVE
BARBITURATES UR QL: NEGATIVE
BENZODIAZ UR QL: POSITIVE
COCAINE UR QL: NEGATIVE
GLUCOSE SERPL-MCNC: 152 MG/DL (ref 65–140)
GLUCOSE SERPL-MCNC: 202 MG/DL (ref 65–140)
GLUCOSE SERPL-MCNC: 239 MG/DL (ref 65–140)
METHADONE UR QL: NEGATIVE
OPIATES UR QL SCN: NEGATIVE
OXYCODONE+OXYMORPHONE UR QL SCN: NEGATIVE
PCP UR QL: NEGATIVE
SL CV LV EF: 60
THC UR QL: NEGATIVE

## 2022-10-20 PROCEDURE — 99239 HOSP IP/OBS DSCHRG MGMT >30: CPT | Performed by: STUDENT IN AN ORGANIZED HEALTH CARE EDUCATION/TRAINING PROGRAM

## 2022-10-20 PROCEDURE — A9585 GADOBUTROL INJECTION: HCPCS | Performed by: NURSE PRACTITIONER

## 2022-10-20 PROCEDURE — 80307 DRUG TEST PRSMV CHEM ANLYZR: CPT | Performed by: STUDENT IN AN ORGANIZED HEALTH CARE EDUCATION/TRAINING PROGRAM

## 2022-10-20 PROCEDURE — 70553 MRI BRAIN STEM W/O & W/DYE: CPT

## 2022-10-20 PROCEDURE — 82948 REAGENT STRIP/BLOOD GLUCOSE: CPT

## 2022-10-20 PROCEDURE — 99232 SBSQ HOSP IP/OBS MODERATE 35: CPT | Performed by: STUDENT IN AN ORGANIZED HEALTH CARE EDUCATION/TRAINING PROGRAM

## 2022-10-20 PROCEDURE — G1004 CDSM NDSC: HCPCS

## 2022-10-20 PROCEDURE — NC001 PR NO CHARGE: Performed by: STUDENT IN AN ORGANIZED HEALTH CARE EDUCATION/TRAINING PROGRAM

## 2022-10-20 RX ORDER — BLOOD-GLUCOSE,RECEIVER,CONT
1 EACH MISCELLANEOUS
Qty: 1 EACH | Refills: 1 | Status: SHIPPED | OUTPATIENT
Start: 2022-10-20

## 2022-10-20 RX ORDER — ASPIRIN 81 MG/1
81 TABLET ORAL DAILY
Qty: 30 TABLET | Refills: 0 | Status: SHIPPED | OUTPATIENT
Start: 2022-10-21 | End: 2022-11-20

## 2022-10-20 RX ORDER — ENOXAPARIN SODIUM 100 MG/ML
30 INJECTION SUBCUTANEOUS
Status: DISCONTINUED | OUTPATIENT
Start: 2022-10-20 | End: 2022-10-20 | Stop reason: HOSPADM

## 2022-10-20 RX ORDER — CLOPIDOGREL BISULFATE 75 MG/1
300 TABLET ORAL ONCE
Status: COMPLETED | OUTPATIENT
Start: 2022-10-20 | End: 2022-10-20

## 2022-10-20 RX ORDER — CLOPIDOGREL BISULFATE 75 MG/1
75 TABLET ORAL DAILY
Qty: 21 TABLET | Refills: 0 | Status: SHIPPED | OUTPATIENT
Start: 2022-10-20 | End: 2022-11-10

## 2022-10-20 RX ORDER — BLOOD-GLUCOSE SENSOR
EACH MISCELLANEOUS
Qty: 3 EACH | Refills: 5 | Status: SHIPPED | OUTPATIENT
Start: 2022-10-20

## 2022-10-20 RX ORDER — ASPIRIN 81 MG/1
81 TABLET ORAL DAILY
Status: DISCONTINUED | OUTPATIENT
Start: 2022-10-20 | End: 2022-10-20 | Stop reason: HOSPADM

## 2022-10-20 RX ORDER — CLOPIDOGREL BISULFATE 75 MG/1
75 TABLET ORAL DAILY
Status: DISCONTINUED | OUTPATIENT
Start: 2022-10-21 | End: 2022-10-20 | Stop reason: HOSPADM

## 2022-10-20 RX ORDER — ATORVASTATIN CALCIUM 40 MG/1
40 TABLET, FILM COATED ORAL EVERY EVENING
Qty: 30 TABLET | Refills: 0 | Status: SHIPPED | OUTPATIENT
Start: 2022-10-20 | End: 2022-10-20

## 2022-10-20 RX ORDER — BLOOD-GLUCOSE TRANSMITTER
1 EACH MISCELLANEOUS DAILY
Qty: 1 EACH | Refills: 0 | Status: SHIPPED | OUTPATIENT
Start: 2022-10-20

## 2022-10-20 RX ADMIN — ASPIRIN 81 MG: 81 TABLET, COATED ORAL at 08:02

## 2022-10-20 RX ADMIN — ENOXAPARIN SODIUM 30 MG: 30 INJECTION SUBCUTANEOUS at 08:02

## 2022-10-20 RX ADMIN — INSULIN LISPRO 2 UNITS: 100 INJECTION, SOLUTION INTRAVENOUS; SUBCUTANEOUS at 11:31

## 2022-10-20 RX ADMIN — LORATADINE 10 MG: 10 TABLET ORAL at 08:02

## 2022-10-20 RX ADMIN — FLUTICASONE PROPIONATE 1 SPRAY: 50 SPRAY, METERED NASAL at 08:02

## 2022-10-20 RX ADMIN — LEVOTHYROXINE SODIUM 50 MCG: 50 TABLET ORAL at 05:18

## 2022-10-20 RX ADMIN — CLONAZEPAM 1 MG: 1 TABLET ORAL at 08:02

## 2022-10-20 RX ADMIN — GADOBUTROL 8 ML: 604.72 INJECTION INTRAVENOUS at 14:15

## 2022-10-20 RX ADMIN — ARIPIPRAZOLE 15 MG: 5 TABLET ORAL at 08:02

## 2022-10-20 RX ADMIN — INSULIN LISPRO 1 UNITS: 100 INJECTION, SOLUTION INTRAVENOUS; SUBCUTANEOUS at 16:41

## 2022-10-20 RX ADMIN — CLOPIDOGREL BISULFATE 300 MG: 75 TABLET ORAL at 16:39

## 2022-10-20 RX ADMIN — INSULIN GLARGINE 50 UNITS: 100 INJECTION, SOLUTION SUBCUTANEOUS at 08:02

## 2022-10-20 RX ADMIN — INSULIN LISPRO 3 UNITS: 100 INJECTION, SOLUTION INTRAVENOUS; SUBCUTANEOUS at 07:53

## 2022-10-20 RX ADMIN — SERTRALINE HYDROCHLORIDE 200 MG: 100 TABLET ORAL at 08:02

## 2022-10-20 RX ADMIN — METOPROLOL SUCCINATE 25 MG: 25 TABLET, EXTENDED RELEASE ORAL at 08:02

## 2022-10-20 NOTE — DISCHARGE SUMMARY
[facility]  Discharge- Adry Perea 1985, 44 y o  male MRN: [id number]  Unit/Bed#: E4 -01 Encounter: [id number]  Primary Care Provider: Jose Dodd MD   Date and time admitted to hospital: 10/19/2022  4:34 AM    * Stroke-like symptoms  Assessment & Plan  · Patient presented to [place] after he had a syncopal episode which was accompanied with right sided weakness  There was concern for possible CVA and the patient was administered TNK  · MRI brain negative for any acute stroke  · 2D Echo showing normal ef, no sig valvular abnormality  · Continue aspirin, statin  · A1c of 10 9, will need close follow up with endocrine regarding BG control  · Lipid panel reviewed, recommend statin 40 lipitor daily  · PT and OT consulted recommending no rehab needs  · Patient will need to follow-up with neurology outpatient in 4-6 weeks, evaluation with Holter monitor  · Continue aspirin, Plavix for 3 weeks, then aspirin 81 mg monotherapy thereafter    Seizure [facility])  Assessment & Plan  · The patient has a history of seizures- staring spells  Prior work up in 2020 showed 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      Hypothyroidism  Assessment & Plan  · Continue home levothyroxine    ENRIKE (obstructive sleep apnea)  Assessment & Plan  · CPAP qhs    Type 2 diabetes mellitus without complication, with long-term current use of insulin [facility])  Assessment & Plan  Lab Results   Component Value Date    HGBA1C 10 9 (H) 10/19/2022       Recent Labs     10/19/22  0838 10/19/22  1959 10/20/22  0708 10/20/22  1112   POCGLU 174* 400* 239* 202*       Blood Sugar Average: Last 72 hrs:  (P) 251 [id number]     · Continue his outpatient lantus  · Recommend close follow-up with endocrinologist outpatient        Discharging Physician / Practitioner: Felipe Finn  PCP: Jose Dodd MD  Admission Date:   Admission Orders (From admission, onward)     Ordered        10/19/22 4979 Inpatient Admission  Once                      Discharge Date: 10/20/22    Medical Problems             Resolved Problems  Date Reviewed: 10/20/2022   None                 Consultations During Hospital Stay:  · Neurology    Procedures Performed:   · none    Significant Findings / Test Results:   CT head wo contrast    Result Date: 10/19/2022  Impression: No acute intracranial abnormality  Specifically no acute intracranial hemorrhage and no noncontrast CT findings to suggest evolving acute intracranial ischemia  Workstation performed: [id number]     MRI brain w wo contrast    Result Date: 10/20/2022  · Impression: No intracranial pathology  Workstation performed: [id number]   ·     Incidental Findings:   · none     Test Results Pending at Discharge (will require follow up):   · none     Outpatient Tests Requested:  · none    Complications:  none    Reason for Admission: Stroke Like Symptoms    Hospital Course:     Armand Arzate is a 44 y o  male patient who originally presented to the hospital on 10/19/2022 due to right-sided weakness and numbness  Patient was evaluated the ED, received TNK and transferred to [facility] from [place] [id number]  He was monitored in the ICU for 1 day, transfer to [facility] floor  His symptoms resolved shortly afterwards after TNK  Echo completed shows no acute abnormality, normal EF  MRI brain was negative for any acute processes  Discussed with neurology, recommend that patient continue aspirin, Plavix for 3 weeks and then aspirin 81 mg thereafter  He will need follow with Neurology outpatient, did suspect that he may have had an aborted stroke after TNK administration  Will need to continue Lipitor 40 mg once daily, have close follow-up with Endocrinology given his A1c is 10 9 for improved blood glucose control  PT and OT evaluated patient, no rehab needs on discharge  Please see above list of diagnoses and related plan for additional information       Condition at Discharge: fair     Discharge Day Visit / Exam:     Subjective:  Patient seen examined today at bedside, reports that his symptoms has resolved, currently at baseline  Able to work with physical therapy without issues  Tolerating diet, denies any dysphagia  Discussed discharge planning and agreeable  Vitals: Blood Pressure: 117/79 (10/20/22 1453)  Pulse: 79 (10/20/22 1453)  Temperature: (!) 96 9 °F (36 1 °C) (10/20/22 1453)  Temp Source: Temporal (10/20/22 1453)  Respirations: 18 (10/20/22 1453)  Height: 5' 5" (165 1 cm) (10/19/22 0900)  Weight - Scale: 83 2 kg (183 lb 6 8 oz) (10/20/22 0552)  SpO2: 96 % (10/20/22 1453)  Exam:   Physical Exam  Vitals and nursing note reviewed  Constitutional:       Appearance: Normal appearance  HENT:      Head: Normocephalic and atraumatic  Eyes:      General: No scleral icterus  Conjunctiva/sclera: Conjunctivae normal    Cardiovascular:      Rate and Rhythm: Normal rate  Pulmonary:      Effort: Pulmonary effort is normal  No respiratory distress  Abdominal:      General: Bowel sounds are normal  There is no distension  Palpations: Abdomen is soft  Musculoskeletal:         General: No swelling  Right lower leg: No edema  Left lower leg: No edema  Skin:     General: Skin is warm and dry  Neurological:      General: No focal deficit present  Mental Status: He is alert  Mental status is at baseline  Psychiatric:         Mood and Affect: Mood normal        Discussion with Family: patient    Discharge instructions/Information to patient and family:   See after visit summary for information provided to patient and family  Provisions for Follow-Up Care:  See after visit summary for information related to follow-up care and any pertinent home health orders  Disposition:     Home    Planned Readmission: none     Discharge Statement:  I spent 35 minutes discharging the patient  This time was spent on the day of discharge   I had direct contact with the patient on the day of discharge  Greater than 50% of the total time was spent examining patient, answering all patient questions, arranging and discussing plan of care with patient as well as directly providing post-discharge instructions  Additional time then spent on discharge activities  Discharge Medications:  See after visit summary for reconciled discharge medications provided to patient and family        ** Please Note: This note has been constructed using a voice recognition system **

## 2022-10-20 NOTE — ASSESSMENT & PLAN NOTE
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

## 2022-10-20 NOTE — ASSESSMENT & PLAN NOTE
Lab Results   Component Value Date    HGBA1C 10 9 (H) 10/19/2022       Recent Labs     10/19/22  0838 10/19/22  1959 10/20/22  0708 10/20/22  1112   POCGLU 174* 400* 239* 202*       Blood Sugar Average: Last 72 hrs:  (P) 146 [id number]     · Continue his outpatient lantus  · Recommend close follow-up with endocrinologist outpatient

## 2022-10-20 NOTE — ASSESSMENT & PLAN NOTE
Lab Results   Component Value Date    HGBA1C 10 9 (H) 10/19/2022       Recent Labs     10/18/22  1911 10/19/22  0838 10/19/22  1959   POCGLU 234* 174* 400*       Blood Sugar Average: Last 72 hrs:  (P) 400     · Continue his outpatient lantus and ozempic  · SSI with a goal to maintain his blood glucose between 140 and 180

## 2022-10-20 NOTE — TELEPHONE ENCOUNTER
Virgia Heimlich had a stroke on Tuesday and is admitted into the hospital  His wife Clemencia Del Cid called to inform us that all Diabetic supplies and medications should be printed and faxed to the [facility] at [phone]  She would also like to know if we can fax the previous meds and the [facility] THE order to them?

## 2022-10-20 NOTE — ASSESSMENT & PLAN NOTE
· Patient presented to [place] after he had a syncopal episode which was accompanied with right sided weakness   There was concern for possible CVA and the patient was administered TNK  · MRI brain negative for any acute stroke  · 2D Echo showing normal ef, no sig valvular abnormality  · Continue aspirin, statin  · A1c of 10 9, will need close follow up with endocrine regarding BG control  · Lipid panel reviewed, recommend statin 40 lipitor daily  · PT and OT consulted recommending no rehab needs  · Patient will need to follow-up with neurology outpatient in 4-6 weeks, evaluation with Holter monitor  · Continue aspirin, Plavix for 3 weeks, then aspirin 81 mg monotherapy thereafter

## 2022-10-20 NOTE — QUICK NOTE
Patient seen and evaluated by Critical Care today and deemed to be appropriate for transfer to [facility] with Telemetry   Spoke to [place], [address] from [facility] to accept patient transfer  Patient on stroke pathway  Per Dr Barbara Rodriguez, patient may be transferred if CT head without evolving acute ischemic changes or evidence of hemorrhagic conversion  14 [facility] from 10/19 20:47 Images personally reviewed, no noted evidence of evolving acute ischemic changes or hemorrhagic conversion noted  Radiology interpretation without evidence of acute ischemic changes or hemorrhagic conversion  Critical care can be contacted via [employer] with any questions or concerns

## 2022-10-20 NOTE — PROGRESS NOTES
[facility]  Progress Note - Filipe Reyes 1985, 44 y o  male MRN: [id number]  Unit/Bed#: E4 -01 Encounter: [id number]  Primary Care Provider: Elin Sawyer MD   Date and time admitted to hospital: 10/19/2022  4:34 AM    * Stroke-like symptoms  Assessment & Plan  · Patient presented to [facility] after he had a syncopal episode which was accompanied with right sided weakness  There was concern for possible CVA and the patient was administered TNK  · MRI brain pending  · 2D Echo showing normal ef, no sig valvular abnormality  · Continue aspirin, statin  · A1c of 10 9, will need close follow up with endocrine regarding BG control  · Lipid panel reviewed, recommend statin 40 lipitor daily  · Neurology following  · PT and OT consulted  · Symptoms appear to have resolved    Hyponatremia  Assessment & Plan  · From uncontrolled T2DM  · Trend daily    Seizure [facility])  Assessment & Plan  · The patient has a history of seizures- staring spells  Prior work up in 2020 showed 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  Hypothyroidism  Assessment & Plan  · Continue home levothyroxine    ENRIKE (obstructive sleep apnea)  Assessment & Plan  · CPAP qhs    Type 2 diabetes mellitus without complication, with long-term current use of insulin [facility])  Assessment & Plan  Lab Results   Component Value Date    HGBA1C 10 9 (H) 10/19/2022       Recent Labs     10/18/22  1911 10/19/22  0838 10/19/22  1959   POCGLU 234* 174* 400*       Blood Sugar Average: Last 72 hrs:  (P) 400     · Continue his outpatient lantus and ozempic  · SSI with a goal to maintain his blood glucose between 140 and 180        VTE Pharmacologic Prophylaxis:   Pharmacologic: Enoxaparin (Lovenox)  Mechanical VTE Prophylaxis in Place: Yes    Patient Centered Rounds: I have performed bedside rounds with nursing staff today      Discussions with Specialists or Other Care Team Provider: Neurology    Education and Discussions with Family / Patient: patient    Time Spent for Care: 30 minutes  More than 50% of total time spent on counseling and coordination of care as described above  Current Length of Stay: 1 day(s)    Current Patient Status: Inpatient   Certification Statement: The patient will continue to require additional inpatient hospital stay due to pending MRI    Discharge Plan: pending    Code Status: Level 1 - Full Code      Subjective:   Patient reports his symptoms has resolved, states that he is currently back to his baseline  Currently pending further imaging results  Tolerating diet without issues, ambulating working with physical therapy without issues  Objective:     Vitals:   Temp (24hrs), Av 4 °F (36 3 °C), Min:96 7 °F (35 9 °C), Max:98 2 °F (36 8 °C)    Temp:  [96 7 °F (35 9 °C)-98 2 °F (36 8 °C)] 96 8 °F (36 °C)  HR:  [58-92] 75  Resp:  [16-27] 18  BP: (104-127)/(61-83) 114/68  SpO2:  [91 %-98 %] 98 %  Body mass index is 30 52 kg/m²  Input and Output Summary (last 24 hours): Intake/Output Summary (Last 24 hours) at 10/20/2022 1421  Last data filed at 10/20/2022 3339  Gross per 24 hour   Intake 600 ml   Output --   Net 600 ml       Physical Exam:     Physical Exam  Vitals and nursing note reviewed  Constitutional:       Appearance: Normal appearance  HENT:      Head: Normocephalic and atraumatic  Eyes:      General: No scleral icterus  Conjunctiva/sclera: Conjunctivae normal    Cardiovascular:      Rate and Rhythm: Normal rate  Pulmonary:      Effort: Pulmonary effort is normal  No respiratory distress  Abdominal:      General: Bowel sounds are normal  There is no distension  Palpations: Abdomen is soft  Musculoskeletal:         General: No swelling  Right lower leg: No edema  Left lower leg: No edema  Skin:     General: Skin is warm and dry  Neurological:      General: No focal deficit present  Mental Status: He is alert   Mental status is at baseline  Psychiatric:         Mood and Affect: Mood normal          Additional Data:     Labs:    Results from last 7 days   Lab Units 10/18/22  1947   WBC Thousand/uL 6 64   HEMOGLOBIN g/dL 13 4   HEMATOCRIT % 39 7   PLATELETS Thousands/uL 188     Results from last 7 days   Lab Units 10/19/22  2021   SODIUM mmol/L 133*   POTASSIUM mmol/L 4 1   CHLORIDE mmol/L 97   CO2 mmol/L 28   BUN mg/dL 18   CREATININE mg/dL 1 11   ANION GAP mmol/L 8   CALCIUM mg/dL 8 8   GLUCOSE RANDOM mg/dL 437*     Results from last 7 days   Lab Units 10/18/22  1947   INR  0 97     Results from last 7 days   Lab Units 10/20/22  1112 10/20/22  0708 10/19/22  1959 10/19/22  0838 10/18/22  1911   POC GLUCOSE mg/dl 202* 239* 400* 174* 234*     Results from last 7 days   Lab Units 10/19/22  0520 10/14/22  1231   HEMOGLOBIN A1C % 10 9* 11 1*     Results from last 7 days   Lab Units 10/19/22  2021   LACTIC ACID mmol/L 0 6           * I Have Reviewed All Lab Data Listed Above  * Additional Pertinent Lab Tests Reviewed: All Labs For Current Hospital Admission Reviewed    Imaging:    CT head wo contrast    Result Date: 10/19/2022  Impression: No acute intracranial abnormality  Specifically no acute intracranial hemorrhage and no noncontrast CT findings to suggest evolving acute intracranial ischemia   Workstation performed: [id number]       Recent Cultures (last 7 days):           Last 24 Hours Medication List:   Current Facility-Administered Medications   Medication Dose Route Frequency Provider Last Rate   • acetaminophen  650 mg Oral Q6H PRN IGNACIO Israel     • ARIPiprazole  15 mg Oral Daily IGNACIO Maya     • aspirin  81 mg Oral Daily IGNACIO Maya     • atorvastatin  40 mg Oral QPM IGNACIO Israel     • clonazePAM  1 mg Oral Daily IGNACIO Maya     • enoxaparin  30 mg Subcutaneous Q24H [address] IGNACIO Israel     • fluticasone  1 spray Nasal Daily Ki [facility] • gabapentin  300 mg Oral HS Darrel Goltz, CRNP     • insulin glargine  50 Units Subcutaneous Q12H [facility] Kiera Macario Francis, [address]     • insulin lispro  1-5 Units Subcutaneous HS IGNACIO Mendoza     • insulin lispro  1-6 Units Subcutaneous TID [facility] Darrel Goltz, CRNP     • levothyroxine  50 mcg Oral Early Morning IGNACIO Mendoza     • loratadine  10 mg Oral Daily IGNACIO Mendoza     • metoprolol succinate  25 mg Oral Daily IGNACIO Mendoza     • sertraline  200 mg Oral Daily Darrel Goltz, CRNP          Today, Patient Was Seen By: Tamiko Villagran    ** Please Note: Dictation voice to text software may have been used in the creation of this document   **

## 2022-10-20 NOTE — ASSESSMENT & PLAN NOTE
· Patient presented to [place] after he had a syncopal episode which was accompanied with right sided weakness   There was concern for possible CVA and the patient was administered TNK  · MRI brain pending  · 2D Echo showing normal ef, no sig valvular abnormality  · Continue aspirin, statin  · A1c of 10 9, will need close follow up with endocrine regarding BG control  · Lipid panel reviewed, recommend statin 40 lipitor daily  · Neurology following  · PT and OT consulted  · Symptoms appear to have resolved

## 2022-10-20 NOTE — PROGRESS NOTES
Progress Note - Neurology   Brigido Rendon 44 y o  male [id number]  Unit/Bed#: East 4 /E4 MS 26-*    Assessment/Plan:  * Stroke-like symptoms  Assessment & Plan  44 y o  male with HTN, HLD, uncontrolled DM2, ENRIKE, hypothyroidism, prior staring episodes consistent with psychogenic nonepileptic spells and PTSD who presented to the ED at [facility] after an unwitnessed syncopal event with collapse  He regained consciousness without postictal state, tongue bite nor incontinence  After fall he noted decreased sensation over the R hemibody and RUE/RLE weakness  /60  In the ED NIHSS 7  LKW 1830  CTH/CTA H/N negative for acute intracranial abnormality, LVO or flow-limiting stenosis  TNK given  According to chart, 15 minutes post TNK administration, patient had "full use of RUE , able to rotate R ankle "    Work-up:  -   - A1C 10 9  - Repeat CTH negative for acute intracranial abnormality, no hemorrhage   - MRI negative for acute pathology  No evidence of ischemia   - Echo: EF 60% Mild concentric left ventricular hypertrophy  Bilateral atria are normal in size  - UDS unremarkable    Patient continues to have no neurologic deficits and reports his previous neurologic deficits to be fully resolved  Differential at this time includes TNK aborted stroke vs TIA vs inorganic  Patient however noted to have significant uncontrolled vascular risk factors that do put him at risk for stroke/future stroke  Would recommend proceeding with recommendations for a diagnosis of TNK aborted stroke as detailed below      Plan:  - Acute ischemic stroke protocol/TNK protocol  - Aspirin 81mg initiated  - load with Plavix 300 mg  - plan to continue DAPT with Aspirin 81 mg and Plavix 75 mg x 3 weeks, followed by transition to monotherapy with Aspirin 81 mg indefinitely  - Continue Atorvastatin 40mg   - Can consider hypercoagulable workup outpatient however if this is indeed an aborted stroke, higher chance of small vessel stroke in the setting of uncontrolled vascular risk factors  - Recommend outpatient Holter monitor due to patient reporting palpitations  - Telemetry  - Secondary risk factor modification  - Goal of normotension and euglycemia  - PT/OT/ST  - Stroke Education  - Frequent neurological checks  - Stat CT head with acute decline of in exam/mental status  - Notify neurology with any changes in examination  ENRIKE (obstructive sleep apnea)  Assessment & Plan  - patient reports compliance of CPAP    Type 2 diabetes mellitus without complication, with long-term current use of insulin [facility])  Assessment & Plan  Lab Results   Component Value Date    HGBA1C 10 9 (H) 10/19/2022       Recent Labs     10/18/22  1911 10/19/22  0838   POCGLU 234* 174*       Blood Sugar Average: Last 72 hrs:     - Goal of euglycemia    Dania Lang will need follow up in in 4 weeks with neurovascular attending or advance practitioner  He will not require outpatient neurological testing  Subjective:   119/83  Patient states he is doing well  No recurrence of symptoms nor new neurologic deficits  Patient does admit to palpitations  He reports that they occur when he is anxious and upset as well as when he is at rest     Past Medical History:   Diagnosis Date   • COVID-19 03/17/2021   • Diabetes mellitus ([facility] [address] )     type 2   • Disease of thyroid gland     hypothyroidism   • Hyperlipidemia    • Hypertension    • 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   • Psychiatric disorder     PTSD   Anxiety, depression,    • Psychogenic nonepileptic spells      Past Surgical History:   Procedure Laterality Date   • MOUTH SURGERY  08/2021   • WISDOM TOOTH EXTRACTION       Family History   Problem Relation Age of Onset   • Hyperlipidemia Father    • Heart attack Paternal Grandfather    • Prostate cancer Paternal Grandfather    • Cancer Paternal Grandmother      Social History     Socioeconomic History   • Marital status: /Civil Union     Spouse name: None • Number of children: None   • Years of education: None   • Highest education level: None   Occupational History   • None   Tobacco Use   • Smoking status: Never Smoker   • Smokeless tobacco: Never Used   Vaping Use   • Vaping Use: Never used   Substance and Sexual Activity   • Alcohol use: Not Currently   • Drug use: Not Currently     Types: Marijuana   • Sexual activity: Yes     Partners: Female     Birth control/protection: None     Comment:    Other Topics Concern   • None   Social History Narrative   • None     Social Determinants of Health     Financial Resource Strain: Not on file   Food Insecurity: No Food Insecurity   • Worried About Running Out of Food in the Last Year: Never true   • Ran Out of Food in the Last Year: Never true   Transportation Needs: No Transportation Needs   • Lack of Transportation (Medical): No   • Lack of Transportation (Non-Medical): No   Physical Activity: Not on file   Stress: Not on file   Social Connections: Not on file   Intimate Partner Violence: Not on file   Housing Stability: Low Risk    • Unable to Pay for Housing in the Last Year: No   • Number of Places Lived in the Last Year: 1   • Unstable Housing in the Last Year: No     Medications: Reviewed in detail by me  ROS: Review of Systems A 12 point ROS was completed which was negative in its entirety  Vitals: /79 (BP Location: Right arm)   Pulse 79   Temp (!) 96 9 °F (36 1 °C) (Temporal)   Resp 18   Ht 5' 5" (1 651 m)   Wt 83 2 kg (183 lb 6 8 oz)   SpO2 96%   BMI 30 52 kg/m²     Physical Exam:   Physical Exam  Constitutional:       General: He is not in acute distress  Appearance: Normal appearance  He is well-developed  He is not ill-appearing or toxic-appearing  HENT:      Head: Normocephalic and atraumatic  Eyes:      General: No scleral icterus  Right eye: No discharge  Left eye: No discharge        Extraocular Movements: Extraocular movements intact and EOM normal  Conjunctiva/sclera: Conjunctivae normal    Cardiovascular:      Rate and Rhythm: Normal rate and regular rhythm  Pulmonary:      Effort: Pulmonary effort is normal  No respiratory distress  Breath sounds: No stridor  Musculoskeletal:         General: No tenderness or deformity  Normal range of motion  Cervical back: Normal range of motion and neck supple  Skin:     General: Skin is warm and dry  Findings: No erythema or rash  Neurological:      Mental Status: He is alert and oriented to person, place, and time  Coordination: Finger-Nose-Finger Test normal       Deep Tendon Reflexes: Strength normal    Psychiatric:         Speech: Speech normal       Comments: Flat affect       Neurologic Exam     Mental Status   Oriented to person, place, and time  Follows 2 step commands  Attention: normal  Concentration: normal    Speech: speech is normal (No dysarthria or aphasia)  Level of consciousness: alert  Knowledge: good  Normal comprehension  Cranial Nerves     CN II   Visual acuity: normal (grossly)  Right visual field deficit: none  Left visual field deficit: none     CN III, IV, VI   Extraocular motions are normal    Conjugate gaze: present    CN V   Facial sensation intact  CN VII   Facial expression full, symmetric  CN XII   Tongue deviation: none    Motor Exam   Muscle bulk: normal  Overall muscle tone: normal  Right arm pronator drift: absent  Left arm pronator drift: absent    Strength   Strength 5/5 throughout  Sensory Exam   Light touch normal      Gait, Coordination, and Reflexes     Coordination   Finger to nose coordination: normal      Labs: Reviewed in detail by me  Imaging: I have personally reviewed pertinent imaging and PACS reports  VTE Prophylaxis: Enoxaparin (Lovenox)    Total time spent today 25 minutes  Greater than 50% of total time was spent with the patient and / or family counseling and / or coordination of care   A description of the counseling / coordination of care: Reviewing imaging, evaluating patient and discussing etiology suspected stroke and plan of care, as well as updating primary team with neurologic recommendations

## 2022-10-20 NOTE — PLAN OF CARE
Problem: PAIN - ADULT  Goal: Verbalizes/displays adequate comfort level or baseline comfort level  Description: Interventions:  - Encourage patient to monitor pain and request assistance  - Assess pain using appropriate pain scale  - Administer analgesics based on type and severity of pain and evaluate response  - Implement non-pharmacological measures as appropriate and evaluate response  - Consider cultural and social influences on pain and pain management  - Notify physician/advanced practitioner if interventions unsuccessful or patient reports new pain  10/20/2022 1644 by Nida Del Cid  Outcome: Adequate for Discharge  10/20/2022 1054 by Nida Del Cid  Outcome: Progressing     Problem: INFECTION - ADULT  Goal: Absence or prevention of progression during hospitalization  Description: INTERVENTIONS:  - Assess and monitor for signs and symptoms of infection  - Monitor lab/diagnostic results  - Monitor all insertion sites, i e  indwelling lines, tubes, and drains  - Monitor endotracheal if appropriate and nasal secretions for changes in amount and color  - [place] appropriate cooling/warming therapies per order  - Administer medications as ordered  - Instruct and encourage patient and family to use good hand hygiene technique  - Identify and instruct in appropriate isolation precautions for identified infection/condition  10/20/2022 1644 by Nida Del Cid  Outcome: Adequate for Discharge  10/20/2022 1054 by Nida Del Cid  Outcome: Progressing  Goal: Absence of fever/infection during neutropenic period  Description: INTERVENTIONS:  - Monitor WBC    10/20/2022 1644 by Nida Del Cid  Outcome: Adequate for Discharge  10/20/2022 1054 by Nida Del Cid  Outcome: Progressing     Problem: SAFETY ADULT  Goal: Patient will remain free of falls  Description: INTERVENTIONS:  - Educate patient/family on patient safety including physical limitations  - Instruct patient to call for assistance with activity   - Consult OT/PT to assist with strengthening/mobility   - Keep Call bell within reach  - Keep bed low and locked with side rails adjusted as appropriate  - Keep care items and personal belongings within reach  - Initiate and maintain comfort rounds  - Make Fall Risk Sign visible to staff  - Offer Toileting every 2 Hours, in advance of need  10/20/2022 1644 by Carlos Ann  Outcome: Adequate for Discharge  10/20/2022 1054 by Carlos Ann  Outcome: Progressing  Goal: Maintain or return to baseline ADL function  Description: INTERVENTIONS:  -  Assess patient's ability to carry out ADLs; assess patient's baseline for ADL function and identify physical deficits which impact ability to perform ADLs (bathing, care of mouth/teeth, toileting, grooming, dressing, etc )  - Assess/evaluate cause of self-care deficits   - Assess range of motion  - Assess patient's mobility; develop plan if impaired  - Assess patient's need for assistive devices and provide as appropriate  - Encourage maximum independence but intervene and supervise when necessary  - Involve family in performance of ADLs  - Assess for home care needs following discharge   - Consider OT consult to assist with ADL evaluation and planning for discharge  - Provide patient education as appropriate  10/20/2022 1644 by Carlos Ann  Outcome: Adequate for Discharge  10/20/2022 1054 by Carlos nAn  Outcome: Progressing  Goal: Maintains/Returns to pre admission functional level  Description: INTERVENTIONS:  - Perform BMAT or MOVE assessment daily    - Set and communicate daily mobility goal to care team and patient/family/caregiver  - Collaborate with rehabilitation services on mobility goals if consulted  - Perform Range of Motion 3 times a day  - Reposition patient every 2 hours    - Dangle patient 3 times a day  - Stand patient 3 times a day  - Ambulate patient 3 times a day  - Out of bed to chair 3 times a day   - Out of bed for meals 3 times a day  - Out of bed for toileting  - Record patient progress and toleration of activity level   10/20/2022 1644 by Franck Pacheco  Outcome: Adequate for Discharge  10/20/2022 1054 by Franck Pacheco  Outcome: Progressing     Problem: DISCHARGE PLANNING  Goal: Discharge to home or other facility with appropriate resources  Description: INTERVENTIONS:  - Identify barriers to discharge w/patient and caregiver  - Arrange for needed discharge resources and transportation as appropriate  - Identify discharge learning needs (meds, wound care, etc )  - Arrange for interpretive services to assist at discharge as needed  - Refer to Case Management Department for coordinating discharge planning if the patient needs post-hospital services based on physician/advanced practitioner order or complex needs related to functional status, cognitive ability, or social support system  10/20/2022 1644 by Franck Pacheco  Outcome: Adequate for Discharge  10/20/2022 1054 by Franck Pacheco  Outcome: Progressing     Problem: Knowledge Deficit  Goal: Patient/family/caregiver demonstrates understanding of disease process, treatment plan, medications, and discharge instructions  Description: Complete learning assessment and assess knowledge base  Interventions:  - Provide teaching at level of understanding  - Provide teaching via preferred learning methods  10/20/2022 1644 by Franck Pacheco  Outcome: Adequate for Discharge  10/20/2022 1054 by Franck Pacheco  Outcome: Progressing     Problem: Neurological Deficit  Goal: Neurological status is stable or improving  Description: Interventions:  - Monitor and assess patient's level of consciousness, motor function, sensory function, and level of assistance needed for ADLs  - Monitor and report changes from baseline  Collaborate with interdisciplinary team to initiate plan and implement interventions as ordered  - Provide and maintain a safe environment  - Consider seizure precautions    - Consider fall precautions  - Consider aspiration precautions  - Consider bleeding precautions  10/20/2022 1644 by Mike Cox  Outcome: Adequate for Discharge  10/20/2022 1054 by Mike Cox  Outcome: Progressing     Problem: Activity Intolerance/Impaired Mobility  Goal: Mobility/activity is maintained at optimum level for patient  Description: Interventions:  - Assess and monitor patient  barriers to mobility and need for assistive/adaptive devices  - Assess patient's emotional response to limitations  - Collaborate with interdisciplinary team and initiate plans and interventions as ordered  - Encourage independent activity per ability   - Maintain proper body alignment  - Perform active/passive rom as tolerated/ordered  - Plan activities to conserve energy   - Turn patient as appropriate  10/20/2022 1644 by Mike Cox  Outcome: Adequate for Discharge  10/20/2022 1054 by Mike Cox  Outcome: Progressing     Problem: Communication Impairment  Goal: Ability to express needs and understand communication  Description: Assess patient's communication skills and ability to understand information  Patient will demonstrate use of effective communication techniques, alternative methods of communication and understanding even if not able to speak  - Encourage communication and provide alternate methods of communication as needed  - Collaborate with case management/ for discharge needs  - Include patient/family/caregiver in decisions related to communication  10/20/2022 1644 by Mike Cox  Outcome: Adequate for Discharge  10/20/2022 1054 by Mike Cox  Outcome: Progressing     Problem: Potential for Aspiration  Goal: Non-ventilated patient's risk of aspiration is minimized  Description: Assess and monitor vital signs, respiratory status, and labs (WBC)  Monitor for signs of aspiration (tachypnea, cough, rales, wheezing, cyanosis, fever)      - Assess and monitor patient's ability to swallow  - Place patient up in chair to eat if possible  - HOB up at 90 degrees to eat if unable to get patient up into chair   - Supervise patient during oral intake  - Instruct patient/ family to take small bites  - Instruct patient/ family to take small single sips when taking liquids  - Follow patient-specific strategies generated by speech pathologist   10/20/2022 164[facility]  Outcome: Adequate for Discharge  10/20/2022 1054 by [facility]  Outcome: Progressing  Goal: Ventilated patient's risk of aspiration is minimized  Description: Assess and monitor vital signs, respiratory status, airway cuff pressure, and labs (WBC)  Monitor for signs of aspiration (tachypnea, cough, rales, wheezing, cyanosis, fever)  - Elevate head of bed 30 degrees if patient has tube feeding   - Monitor tube feeding  10/20/2022 1644 by [facility]  Outcome: Adequate for Discharge  10/20/2022 1054 by [facility]  Outcome: Progressing     Problem: Nutrition  Goal: Nutrition/Hydration status is improving  Description: Monitor and assess patient's nutrition/hydration status for malnutrition (ex- brittle hair, bruises, dry skin, pale skin and conjunctiva, muscle wasting, smooth red tongue, and disorientation)  Collaborate with interdisciplinary team and initiate plan and interventions as ordered  Monitor patient's weight and dietary intake as ordered or per policy  Utilize nutrition screening tool and intervene per policy  Determine patient's food preferences and provide high-protein, high-caloric foods as appropriate  - Assist patient with eating   - Allow adequate time for meals   - Encourage patient to take dietary supplement as ordered  - Collaborate with clinical nutritionist   - Include patient/family/caregiver in decisions related to nutrition    10/20/2022 1644 by  [place]  Outcome: Adequate for Discharge  10/20/2022 1054 by [facility]  Outcome: Progressing

## 2022-10-20 NOTE — PLAN OF CARE
Problem: PAIN - ADULT  Goal: Verbalizes/displays adequate comfort level or baseline comfort level  Description: Interventions:  - Encourage patient to monitor pain and request assistance  - Assess pain using appropriate pain scale  - Administer analgesics based on type and severity of pain and evaluate response  - Implement non-pharmacological measures as appropriate and evaluate response  - Consider cultural and social influences on pain and pain management  - Notify physician/advanced practitioner if interventions unsuccessful or patient reports new pain  Outcome: Progressing     Problem: INFECTION - ADULT  Goal: Absence or prevention of progression during hospitalization  Description: INTERVENTIONS:  - Assess and monitor for signs and symptoms of infection  - Monitor lab/diagnostic results  - Monitor all insertion sites, i e  indwelling lines, tubes, and drains  - Monitor endotracheal if appropriate and nasal secretions for changes in amount and color  - [place] appropriate cooling/warming therapies per order  - Administer medications as ordered  - Instruct and encourage patient and family to use good hand hygiene technique  - Identify and instruct in appropriate isolation precautions for identified infection/condition  Outcome: Progressing  Goal: Absence of fever/infection during neutropenic period  Description: INTERVENTIONS:  - Monitor WBC    Outcome: Progressing     Problem: SAFETY ADULT  Goal: Patient will remain free of falls  Description: INTERVENTIONS:  - Educate patient/family on patient safety including physical limitations  - Instruct patient to call for assistance with activity   - Consult OT/PT to assist with strengthening/mobility   - Keep Call bell within reach  - Keep bed low and locked with side rails adjusted as appropriate  - Keep care items and personal belongings within reach  - Initiate and maintain comfort rounds  - Make Fall Risk Sign visible to staff  - Obtain necessary fall risk management equipment: call bell in reach  Outcome: Progressing  Goal: Maintain or return to baseline ADL function  Description: INTERVENTIONS:  -  Assess patient's ability to carry out ADLs; assess patient's baseline for ADL function and identify physical deficits which impact ability to perform ADLs (bathing, care of mouth/teeth, toileting, grooming, dressing, etc )  - Assess/evaluate cause of self-care deficits   - Assess range of motion  - Assess patient's mobility; develop plan if impaired  - Assess patient's need for assistive devices and provide as appropriate  - Encourage maximum independence but intervene and supervise when necessary  - Involve family in performance of ADLs  - Assess for home care needs following discharge   - Consider OT consult to assist with ADL evaluation and planning for discharge  - Provide patient education as appropriate  Outcome: Progressing  Goal: Maintains/Returns to pre admission functional level  Description: INTERVENTIONS:  - Perform BMAT or MOVE assessment daily    - Set and communicate daily mobility goal to care team and patient/family/caregiver  - Collaborate with rehabilitation services on mobility goals if consulted  - Perform Range of Motion 3 times a day  - Reposition patient every 2 hours    - Dangle patient 3 times a day  - Stand patient 3 times a day  - Ambulate patient 3 times a day  - Out of bed to chair 3 times a day   - Out of bed for meals 3 times a day  - Out of bed for toileting  - Record patient progress and toleration of activity level   Outcome: Progressing     Problem: DISCHARGE PLANNING  Goal: Discharge to home or other facility with appropriate resources  Description: INTERVENTIONS:  - Identify barriers to discharge w/patient and caregiver  - Arrange for needed discharge resources and transportation as appropriate  - Identify discharge learning needs (meds, wound care, etc )  - Arrange for interpretive services to assist at discharge as needed  - Refer to Case Management Department for coordinating discharge planning if the patient needs post-hospital services based on physician/advanced practitioner order or complex needs related to functional status, cognitive ability, or social support system  Outcome: Progressing     Problem: Knowledge Deficit  Goal: Patient/family/caregiver demonstrates understanding of disease process, treatment plan, medications, and discharge instructions  Description: Complete learning assessment and assess knowledge base  Interventions:  - Provide teaching at level of understanding  - Provide teaching via preferred learning methods  Outcome: Progressing     Problem: Neurological Deficit  Goal: Neurological status is stable or improving  Description: Interventions:  - Monitor and assess patient's level of consciousness, motor function, sensory function, and level of assistance needed for ADLs  - Monitor and report changes from baseline  Collaborate with interdisciplinary team to initiate plan and implement interventions as ordered  - Provide and maintain a safe environment  - Consider seizure precautions  - Consider fall precautions  - Consider aspiration precautions  - Consider bleeding precautions  Outcome: Progressing     Problem: Activity Intolerance/Impaired Mobility  Goal: Mobility/activity is maintained at optimum level for patient  Description: Interventions:  - Assess and monitor patient  barriers to mobility and need for assistive/adaptive devices  - Assess patient's emotional response to limitations  - Collaborate with interdisciplinary team and initiate plans and interventions as ordered  - Encourage independent activity per ability   - Maintain proper body alignment  - Perform active/passive rom as tolerated/ordered    - Plan activities to conserve energy   - Turn patient as appropriate  Outcome: Progressing     Problem: Communication Impairment  Goal: Ability to express needs and understand communication  Description: Assess patient's communication skills and ability to understand information  Patient will demonstrate use of effective communication techniques, alternative methods of communication and understanding even if not able to speak  - Encourage communication and provide alternate methods of communication as needed  - Collaborate with case management/ for discharge needs  - Include patient/family/caregiver in decisions related to communication  Outcome: Progressing     Problem: Potential for Aspiration  Goal: Non-ventilated patient's risk of aspiration is minimized  Description: Assess and monitor vital signs, respiratory status, and labs (WBC)  Monitor for signs of aspiration (tachypnea, cough, rales, wheezing, cyanosis, fever)  - Assess and monitor patient's ability to swallow  - Place patient up in chair to eat if possible  - HOB up at 90 degrees to eat if unable to get patient up into chair   - Supervise patient during oral intake  - Instruct patient/ family to take small bites  - Instruct patient/ family to take small single sips when taking liquids  - Follow patient-specific strategies generated by speech pathologist   Outcome: Progressing  Goal: Ventilated patient's risk of aspiration is minimized  Description: Assess and monitor vital signs, respiratory status, airway cuff pressure, and labs (WBC)  Monitor for signs of aspiration (tachypnea, cough, rales, wheezing, cyanosis, fever)  - Elevate head of bed 30 degrees if patient has tube feeding   - Monitor tube feeding  Outcome: Progressing     Problem: Nutrition  Goal: Nutrition/Hydration status is improving  Description: Monitor and assess patient's nutrition/hydration status for malnutrition (ex- brittle hair, bruises, dry skin, pale skin and conjunctiva, muscle wasting, smooth red tongue, and disorientation)  Collaborate with interdisciplinary team and initiate plan and interventions as ordered  Monitor patient's weight and dietary intake as ordered or per policy  Utilize nutrition screening tool and intervene per policy  Determine patient's food preferences and provide high-protein, high-caloric foods as appropriate  - Assist patient with eating   - Allow adequate time for meals   - Encourage patient to take dietary supplement as ordered  - Collaborate with clinical nutritionist   - Include patient/family/caregiver in decisions related to nutrition    Outcome: Progressing

## 2022-10-20 NOTE — PLAN OF CARE
No neuro deficits observed nor reported  Patient reported that he does not check his blood sugar levels due to insurance complications with dexacom meter not available at this time  Reports that he was not surprised when his blood sugars were elevated on the unit  States he takes "too much" long acting insulin at home  Unsure whether it was "35 and 50 or what"  Also when giving nighttime medication, patient reported that he does not take the gabapentin because he forgets  Reports that he takes this medication for his "carpal tunnel" in his left hand  Able to complete CT scan this evening without complication  Takes medications and willingly received insulin as ordered  New sliding scale into affect this evening was utilized  Patient is pleasant with flat affect  Wife was at bedside until visiting hours were over  Denies any pain  Call bell within reach  VSS  Problem: DISCHARGE PLANNING  Goal: Discharge to home or other facility with appropriate resources  Description: INTERVENTIONS:  - Identify barriers to discharge w/patient and caregiver  - Arrange for needed discharge resources and transportation as appropriate  - Identify discharge learning needs (meds, wound care, etc )  - Arrange for interpretive services to assist at discharge as needed  - Refer to Case Management Department for coordinating discharge planning if the patient needs post-hospital services based on physician/advanced practitioner order or complex needs related to functional status, cognitive ability, or social support system  Outcome: Progressing     Problem: Knowledge Deficit  Goal: Patient/family/caregiver demonstrates understanding of disease process, treatment plan, medications, and discharge instructions  Description: Complete learning assessment and assess knowledge base    Interventions:  - Provide teaching at level of understanding  - Provide teaching via preferred learning methods  Outcome: Progressing     Problem: Neurological Deficit  Goal: Neurological status is stable or improving  Description: Interventions:  - Monitor and assess patient's level of consciousness, motor function, sensory function, and level of assistance needed for ADLs  - Monitor and report changes from baseline  Collaborate with interdisciplinary team to initiate plan and implement interventions as ordered  - Provide and maintain a safe environment  - Consider seizure precautions  - Consider fall precautions  - Consider aspiration precautions  - Consider bleeding precautions    Outcome: Progressing

## 2022-10-20 NOTE — TELEPHONE ENCOUNTER
Jonelle Kocher had a stroke on Tuesday and is admitted into the hospital  His wife David Mays called to inform us that all Diabetic supplies and medications should be printed and faxed to the [place] at [phone]  She would also like to know if we can fax the previous meds and the [facility] [place] [facility] order to them?    I called wife and let her know we will fax over scripts for Dexcom to [place]

## 2022-10-20 NOTE — NURSING NOTE
[facility]  Assumed care of the patient 0300  Patient on telemetry, NSR on the monitor  I agree with previous RN's assessment of this patient

## 2022-10-21 ENCOUNTER — TELEPHONE (OUTPATIENT)
Dept: INTERNAL MEDICINE CLINIC | Facility: OTHER | Age: 37
End: 2022-10-21

## 2022-10-21 ENCOUNTER — TRANSITIONAL CARE MANAGEMENT (OUTPATIENT)
Dept: INTERNAL MEDICINE CLINIC | Facility: OTHER | Age: 37
End: 2022-10-21

## 2022-10-21 NOTE — TELEPHONE ENCOUNTER
LMOM for pt to call me at [facility] office    Please schedule TCM on or before 11/3

## 2022-10-21 NOTE — UTILIZATION REVIEW
Initial Clinical Review    Admission: Date/Time/Statement:   Admission Orders (From admission, onward)     Ordered        10/19/22 0505  Inpatient Admission  Once                      Orders Placed This Encounter   Procedures   • Inpatient Admission     Standing Status:   Standing     Number of Occurrences:   1     Order Specific Question:   Level of Care     Answer:   Critical Care [15]     Order Specific Question:   Estimated length of stay     Answer:   More than 2 Midnights     Order Specific Question:   Certification     Answer:   I certify that inpatient services are medically necessary for this patient for a duration of greater than two midnights  See H&P and MD Progress Notes for additional information about the patient's course of treatment  Initial Presentation: 44 y o  male presents as a transfer from the ED [facility] to [address] [place] [address] with syncopal episode which started with coughing and trying to use his inhaler  Could not move his R side after the event  He has a persistent headache, unsure if he had CP pre-event  On arrival he is CP free  UDS + benzos  He had TNK administration pre-transfer with return of function on R side and resolution of Paresthesia  PMH:  HTN, HLD, IDDM, hypothyroidism, psychogenic seizures  On exam he has abd distention  RUE, RLE grossly intact  He is admitted to INPATIENT status to Critical Care with stroke-like symptoms - consider CVA, complex migraine, stress induced, psychogenic - tele, freq neuro checks, therapy evals, MRI Brain, CT head, normotension, therapy evals  IDDM - noncompliant - SSI Cover  Seizure - monitor for seizure activity  Hyponatremia - trend lytes  ENRIKE - nocturnal BIPAP  10/19 Neuro Consult - Acute onset R hemiparesis (arm and leg) + R hemianesthesia, could potentially be from ischemia involving L thalamocapsular region  Sx resolved after IV TNK  Has  poorly controlled DM2   There are some unusual aspects to this presentation, however - specifically the syncopal episode prior to symptoms onset and the normal blood pressures upon presentation - freq neuro checks, Tele, post TNK protocol, MRI Brain, TTE, DVT PPX non pharmacologic, improved glycemic control  Date: 10/20  Day 2:   Downgraded to MS level of care  No neuro deficits  Diff includes TNK aborted stroke vs TIA vs inorganic  Has high risk for future stroke with uncontrolled vascular risks  Starting ASA, Plavix, DAPT x 3 weeks then ASA daily  OP holter  MRI negative for stroke  Pt was d/c to home       ED Triage Vitals [10/19/22 0453]   Temperature Pulse Respirations Blood Pressure SpO2   97 5 °F (36 4 °C) 74 17 106/75 95 %      Temp Source Heart Rate Source Patient Position - Orthostatic VS BP Location FiO2 (%)   Tympanic Monitor Lying Right arm --      Pain Score       No Pain          Wt Readings from Last 1 Encounters:   10/20/22 83 2 kg (183 lb 6 8 oz)     Additional Vital Signs:   10/20/22 1453 96 9 °F (36 1 °C) Abnormal  79 18 117/79 95 96 % None (Room air) Lying   10/20/22 1139 96 8 °F (36 °C) Abnormal  75 18 114/68 -- 98 % None (Room air) Lying   10/20/22 0759 -- -- -- -- -- -- None (Room air) --   10/20/22 0704 97 7 °F (36 5 °C) 82 17 119/83 95 92 % None (Room air) Lying   10/20/22 0300 96 7 °F (35 9 °C) Abnormal  68 17 127/70 90 94 % None (Room air) Lying   10/20/22 0200 -- 58 17 119/75 92 94 % -- --   10/20/22 0100 -- 66 16 122/73 89 93 % -- --   10/20/22 0000 -- 72 16 116/71 87 92 % -- --   10/19/22 2330 -- 66 17 -- -- 93 % -- --   10/19/22 2315 -- 70 18 119/71 85 94 % -- --   10/19/22 2300 -- 72 16 -- -- 93 % -- --   10/19/22 2245 -- 70 16 -- -- 93 % -- --   10/19/22 2230 -- 80 22 -- -- 94 % -- --   10/19/22 2215 -- 72 20 -- -- 93 % -- --   10/19/22 2200 -- 72 17 -- -- 93 % -- --   10/19/22 2145 -- 68 18 -- -- 93 % -- --   10/19/22 2130 -- 72 20 -- -- 93 % -- --   10/19/22 2115 -- 66 19 -- -- 93 % -- --   10/19/22 2100 -- 66 18 -- -- 93 % -- --   10/19/22 2045 -- 66 21 -- -- 93 % -- --   10/19/22 2015 -- 66 22 118/64 78 93 % -- --   10/19/22 2000 97 8 °F (36 6 °C) 80 24 Abnormal  -- -- 93 % -- --   10/19/22 1945 -- 84 18 -- -- 91 % -- --   10/19/22 1930 -- 86 20 -- -- 92 % -- --   10/19/22 1915 -- 78 20 120/75 92 93 % -- --   10/19/22 1900 -- 80 18 -- -- 94 % -- --   10/19/22 1715 -- 92 27 Abnormal  108/67 82 95 % -- --   10/19/22 1615 -- 88 23 Abnormal  106/61 78 93 % -- --   10/19/22 1515 -- 86 21 104/75 88 94 % -- --   10/19/22 1500 98 2 °F (36 8 °C) -- -- -- -- -- -- --   10/19/22 1400 -- 84 23 Abnormal  117/61 80 92 % -- --   10/19/22 1300 -- 74 20 103/72 83 92 % -- --   10/19/22 1200 -- 84 17 109/74 86 93 % -- --   10/19/22 1100 97 9 °F (36 6 °C) 80 18 117/75 88 92 % -- --   10/19/22 1000 -- 80 25 Abnormal  127/64 88 94 % -- --   10/19/22 0900 -- 64 18 122/65 83 95 % -- --   10/19/22 0800 -- 72 16 115/71 92 94 % -- --   10/19/22 0700 97 6 °F (36 4 °C) 76 13 111/75 85 95 % -- --   10/19/22 0600 -- 72 16 110/83 91 94 % -- --   10/19/22 0507 97 5 °F (36 4 °C) 74 17 106/75 -- -- -- --   10/19/22 0500 -- 66 16 117/74 90 96 % -- --   10/19/22 0453 97 5 °F (36 4 °C) 74 17 106/75 86 95 % None (Room air) Lying     Pertinent Labs/Diagnostic Test Results:   MRI brain w wo contrast   Final Result by Vj Du MD (10/20 1438)      No intracranial pathology  Workstation performed: [id number]         CT head wo contrast   Final Result by Debbie Washington MD (10/19 2047)      No acute intracranial abnormality  Specifically no acute intracranial hemorrhage and no noncontrast CT findings to suggest evolving acute intracranial ischemia       Results from last 7 days   Lab Units 10/18/22  1947   SARS-COV-2  Negative     Results from last 7 days   Lab Units 10/18/22  1947   WBC Thousand/uL 6 64   HEMOGLOBIN g/dL 13 4   HEMATOCRIT % 39 7   PLATELETS Thousands/uL 188         Results from last 7 days   Lab Units 10/19/22  2021 10/18/22  1947   SODIUM mmol/L 133* 132* POTASSIUM mmol/L 4 1 3 7   CHLORIDE mmol/L 97 98   CO2 mmol/L 28 27   ANION GAP mmol/L 8 7   BUN mg/dL 18 14   CREATININE mg/dL 1 11 1 05   EGFR ml/min/1 73sq m 83 89   CALCIUM mg/dL 8 8 8 7         Results from last 7 days   Lab Units 10/20/22  1619 10/20/22  1112 10/20/22  0708 10/19/22  1959 10/19/22  0838 10/18/22  1911   POC GLUCOSE mg/dl 152* 202* 239* 400* 174* 234*     Results from last 7 days   Lab Units 10/19/22  2021 10/18/22  1947   GLUCOSE RANDOM mg/dL 437* 241*         Results from last 7 days   Lab Units 10/19/22  0520 10/14/22  1231   HEMOGLOBIN A1C % 10 9* 11 1*   EAG mg/dl 266  --      BETA-HYDROXYBUTYRATE   Date Value Ref Range Status   03/17/2021 2 4 (H) <0 6 mmol/L Final          Results from last 7 days   Lab Units 10/19/22  2021   PH JOSE  7 394   PCO2 JOSE mm Hg 43 4   PO2 JOSE mm Hg 74 4*   HCO3 JOSE mmol/L 25 9   BASE EXC JOSE mmol/L 0 7   O2 CONTENT JOSE ml/dL 19 7   O2 HGB, VENOUS % 93 3*             Results from last 7 days   Lab Units 10/19/22  0007 10/18/22  1947   HS TNI 0HR ng/L  --  4   HS TNI 2HR ng/L 8  --    HSTNI D2 ng/L 4  --          Results from last 7 days   Lab Units 10/18/22  1947   PROTIME seconds 12 9   INR  0 97   PTT seconds 24             Results from last 7 days   Lab Units 10/19/22  2021   LACTIC ACID mmol/L 0 6     Results from last 7 days   Lab Units 10/18/22  1947   INFLUENZA A PCR  Negative   INFLUENZA B PCR  Negative   RSV PCR  Negative         Results from last 7 days   Lab Units 10/20/22  0948   AMPH/METH  Negative   BARBITURATE UR  Negative   BENZODIAZEPINE UR  Positive*   COCAINE UR  Negative   METHADONE URINE  Negative   OPIATE UR  Negative   PCP UR  Negative   THC UR  Negative       Past Medical History:   Diagnosis Date   • COVID-19 03/17/2021   • Diabetes mellitus ([address] )     type 2   • Disease of thyroid gland     hypothyroidism   • Hyperlipidemia    • Hypertension    • 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   • Psychiatric disorder     PTSD   Anxiety, depression, • Psychogenic nonepileptic spells      Present on Admission:  • ENRIKE (obstructive sleep apnea)  • Hypothyroidism  • Stroke-like symptoms      Admitting Diagnosis: Stroke-like symptoms  Age/Sex: 44 y o  male  Admission Orders:  Scheduled Medications:  Abilify  ASA  Lipitor  Klonopin  Plavix  Lovenox sq  flonase  neurontin  lantus and Humalog insulin  Levothyroxine  claritin  Toprol xl   zoloft    Continuous IV Infusions:      PRN Meds:  Tylenol PO x 1 10/19    Tele  POC GLUCOSE AC/HS WITH SSI COVERAGE   Neuro checks Every 1 hour x 4 hours, then every 2 hours x 4, then every 4 hours x 72 hours                 Daily wt  IP CONSULT TO CASE MANAGEMENT  IP CONSULT TO PHYSICAL MEDICINE REHAB  IP CONSULT TO NEUROLOGY      Network Utilization Review Department  ATTENTION: Please call with any questions or concerns to [phone] and carefully listen to the prompts so that you are directed to the right person  All voicemails are confidential   Brandon Ayon all requests for admission clinical reviews, approved or denied determinations and any other requests to dedicated fax number below belonging to the campus where the patient is receiving treatment   List of dedicated fax numbers for the Facilities:  [address] DENIALS (Administrative/Medical Necessity) [phone]   [address] (Maternity/NICU/Pediatrics) [phone]   [address] [phone]   [facility] [address] [phone]   [facility] [phone]   [facility] [phone]   [address] [phone]

## 2022-10-21 NOTE — UTILIZATION REVIEW
NOTIFICATION OF INPATIENT ADMISSION   AUTHORIZATION REQUEST   SERVICING FACILITY:   [address] [place] [address] [facility]  Tax ID: [id number]  NPI: [id number] ATTENDING PROVIDER:  Attending Name and NPI#: Hakeem Solis [[id number]]  Address: [address] [place] [address] [facility]  Phone: [phone]     ADMISSION INFORMATION:  Place of Service: [place] [address]  Place of Service Code: 21  Inpatient Admission Date/Time: 10/19/22  4:34 AM  Discharge Date/Time: 10/20/2022  5:52 PM  Admitting Diagnosis Code/Description:  Stroke-like symptoms     UTILIZATION REVIEW CONTACT:  Rosalina Judd Utilization   Network Utilization Review Department  Phone: [phone]  Fax: [phone]  Email: [email or web address]  Contact for approvals/pending authorizations, clinical reviews, and discharge  PHYSICIAN ADVISORY SERVICES:  Medical Necessity Denial & [employer] Review  Phone: [phone]  Fax: [phone]  Email: [email or web address]

## 2022-10-25 NOTE — UTILIZATION REVIEW
NOTIFICATION OF INPATIENT ADMISSION   AUTHORIZATION REQUEST   SERVICING FACILITY:   [address] [place] [address] [facility]  Tax ID: [id number]  NPI: [id number] ATTENDING PROVIDER:  Attending Name and NPI#: Jacobo Hakeem Lema [[id number]]  Address: [address] [place] [address] [facility]  Phone: [phone]     ADMISSION INFORMATION:  Place of Service: [place] [address]  Place of Service Code: 21  Inpatient Admission Date/Time: 10/19/22  4:34 AM  Discharge Date/Time: 10/20/2022  5:52 PM  Admitting Diagnosis Code/Description:  Stroke-like symptoms     UTILIZATION REVIEW CONTACT:  Cassius Dobbs, Utilization   Network Utilization Review Department  Phone: [phone]  Fax: [phone]  Email: [email or web address]  Contact for approvals/pending authorizations, clinical reviews, and discharge  PHYSICIAN ADVISORY SERVICES:  Medical Necessity Denial & Omrm-oe-Jgdm Review  Phone: [phone]  Fax: [phone]  Email: [email or web address]          Enrike Solo RN   Registered Nurse   Specialty:  Medical Surgical   Utilization Review      Signed   Date of Service:  10/20/2022  5:52 PM                 Signed        Expand All Collapse All        Show:Clear all  [x]Manual[x]Template[x]Copied    Added by:  [x]Chelsea Wing RN      []Selwyn for details    Initial Clinical Review     Admission: Date/Time/Statement:       Admission Orders (From admission, onward)              Ordered          10/19/22 0505   Inpatient Admission  Once                                Orders Placed This Encounter   Procedures   • Inpatient Admission       Standing Status:   Standing       Number of Occurrences:   1       Order Specific Question:   Level of Care       Answer:   Critical Care [15]       Order Specific Question:   Estimated length of stay       Answer:   More than 2 Midnights       Order Specific Question:   Certification       Answer:   I certify that inpatient services are medically necessary for this patient for a duration of greater than two midnights  See H&P and MD Progress Notes for additional information about the patient's course of treatment       Initial Presentation: 44 y o  male presents as a transfer from the ED [facility] to [address] [place] [address] with syncopal episode which started with coughing and trying to use his inhaler  Could not move his R side after the event  He has a persistent headache, unsure if he had CP pre-event  On arrival he is CP free  UDS + benzos  He had TNK administration pre-transfer with return of function on R side and resolution of Paresthesia  PMH:  HTN, HLD, IDDM, hypothyroidism, psychogenic seizures  On exam he has abd distention  RUE, RLE grossly intact  He is admitted to INPATIENT status to Critical Care with stroke-like symptoms - consider CVA, complex migraine, stress induced, psychogenic - tele, freq neuro checks, therapy evals, MRI Brain, CT head, normotension, therapy evals  IDDM - noncompliant - SSI Cover  Seizure - monitor for seizure activity  Hyponatremia - trend lytes  ENRIKE - nocturnal BIPAP        10/19 Neuro Consult - Acute onset R hemiparesis (arm and leg) + R hemianesthesia, could potentially be from ischemia involving L thalamocapsular region  Sx resolved after IV TNK  Has  poorly controlled DM2  There are some unusual aspects to this presentation, however - specifically the syncopal episode prior to symptoms onset and the normal blood pressures upon presentation - freq neuro checks, Tele, post TNK protocol, MRI Brain, TTE, DVT PPX non pharmacologic, improved glycemic control        Date: 10/20  Day 2:   Downgraded to MS level of care  No neuro deficits  Diff includes TNK aborted stroke vs TIA vs inorganic  Has high risk for future stroke with uncontrolled vascular risks  Starting ASA, Plavix, DAPT x 3 weeks then ASA daily  OP holter  MRI negative for stroke    Pt was d/c to home              ED Triage Vitals [10/19/22 7453]   Temperature Pulse Respirations Blood Pressure SpO2   97 5 °F (36 4 °C) 74 17 106/75 95 %       Temp Source Heart Rate Source Patient Position - Orthostatic VS BP Location FiO2 (%)   Tympanic Monitor Lying Right arm --       Pain Score           No Pain              Wt Readings from Last 1 Encounters:   10/20/22 83 2 kg (183 lb 6 8 oz)      Additional Vital Signs:   10/20/22 1453 96 9 °F (36 1 °C) Abnormal  79 18 117/79 95 96 % None (Room air) Lying   10/20/22 1139 96 8 °F (36 °C) Abnormal  75 18 114/68 -- 98 % None (Room air) Lying   10/20/22 0759 -- -- -- -- -- -- None (Room air) --   10/20/22 0704 97 7 °F (36 5 °C) 82 17 119/83 95 92 % None (Room air) Lying   10/20/22 0300 96 7 °F (35 9 °C) Abnormal  68 17 127/70 90 94 % None (Room air) Lying   10/20/22 0200 -- 58 17 119/75 92 94 % -- --   10/20/22 0100 -- 66 16 122/73 89 93 % -- --   10/20/22 0000 -- 72 16 116/71 87 92 % -- --   10/19/22 2330 -- 66 17 -- -- 93 % -- --   10/19/22 2315 -- 70 18 119/71 85 94 % -- --   10/19/22 2300 -- 72 16 -- -- 93 % -- --   10/19/22 2245 -- 70 16 -- -- 93 % -- --   10/19/22 2230 -- 80 22 -- -- 94 % -- --   10/19/22 2215 -- 72 20 -- -- 93 % -- --   10/19/22 2200 -- 72 17 -- -- 93 % -- --   10/19/22 2145 -- 68 18 -- -- 93 % -- --   10/19/22 2130 -- 72 20 -- -- 93 % -- --   10/19/22 2115 -- 66 19 -- -- 93 % -- --   10/19/22 2100 -- 66 18 -- -- 93 % -- --   10/19/22 2045 -- 66 21 -- -- 93 % -- --   10/19/22 2015 -- 66 22 118/64 78 93 % -- --   10/19/22 2000 97 8 °F (36 6 °C) 80 24 Abnormal  -- -- 93 % -- --   10/19/22 1945 -- 84 18 -- -- 91 % -- --   10/19/22 1930 -- 86 20 -- -- 92 % -- --   10/19/22 1915 -- 78 20 120/75 92 93 % -- --   10/19/22 1900 -- 80 18 -- -- 94 % -- --   10/19/22 1715 -- 92 27 Abnormal  108/67 82 95 % -- --   10/19/22 1615 -- 88 23 Abnormal  106/61 78 93 % -- --   10/19/22 1515 -- 86 21 104/75 88 94 % -- --   10/19/22 1500 98 2 °F (36 8 °C) -- -- -- -- -- -- --   10/19/22 1400 -- 84 23 Abnormal  117/61 80 92 % -- --   10/19/22 1300 -- 74 20 103/72 83 92 % -- --   10/19/22 1200 -- 84 17 109/74 86 93 % -- --   10/19/22 1100 97 9 °F (36 6 °C) 80 18 117/75 88 92 % -- --   10/19/22 1000 -- 80 25 Abnormal  127/64 88 94 % -- --   10/19/22 0900 -- 64 18 122/65 83 95 % -- --   10/19/22 0800 -- 72 16 115/71 92 94 % -- --   10/19/22 0700 97 6 °F (36 4 °C) 76 13 111/75 85 95 % -- --   10/19/22 0600 -- 72 16 110/83 91 94 % -- --   10/19/22 0507 97 5 °F (36 4 °C) 74 17 106/75 -- -- -- --   10/19/22 0500 -- 66 16 117/74 90 96 % -- --   10/19/22 0453 97 5 °F (36 4 °C) 74 17 106/75 86 95 % None (Room air) Lying      Pertinent Labs/Diagnostic Test Results:   MRI brain w wo contrast   Final Result by Nallely Sullivan MD (10/20 1438)       No intracranial pathology        Workstation performed: [id number]           CT head wo contrast   Final Result by Rashad Weiss MD (10/19 2047)       No acute intracranial abnormality    Specifically no acute intracranial hemorrhage and no noncontrast CT findings to suggest evolving acute intracranial ischemia            Results from last 7 days   Lab Units 10/18/22  1947   SARS-COV-2   Negative           Results from last 7 days   Lab Units 10/18/22  1947   WBC Thousand/uL 6 64   HEMOGLOBIN g/dL 13 4   HEMATOCRIT % 39 7   PLATELETS Thousands/uL 188                Results from last 7 days   Lab Units 10/19/22  2021 10/18/22  1947   SODIUM mmol/L 133* 132*   POTASSIUM mmol/L 4 1 3 7   CHLORIDE mmol/L 97 98   CO2 mmol/L 28 27   ANION GAP mmol/L 8 7   BUN mg/dL 18 14   CREATININE mg/dL 1 11 1 05   EGFR ml/min/1 73sq m 83 89   CALCIUM mg/dL 8 8 8 7                    Results from last 7 days   Lab Units 10/20/22  1619 10/20/22  1112 10/20/22  0708 10/19/22  1959 10/19/22  0838 10/18/22  1911   POC GLUCOSE mg/dl 152* 202* 239* 400* 174* 234*            Results from last 7 days   Lab Units 10/19/22  2021 10/18/22  1947   GLUCOSE RANDOM mg/dL 437* 241*                Results from last 7 days   Lab Units 10/19/22  0520 10/14/22  1231   HEMOGLOBIN A1C % 10 9* 11 1*   EAG mg/dl 266  --             BETA-HYDROXYBUTYRATE   Date Value Ref Range Status   03/17/2021 2 4 (H) <0 6 mmol/L Final               Results from last 7 days   Lab Units 10/19/22  2021   PH JOSE   7 394   PCO2 JOSE mm Hg 43 4   PO2 JOSE mm Hg 74 4*   HCO3 JOSE mmol/L 25 9   BASE EXC JOSE mmol/L 0 7   O2 CONTENT JOSE ml/dL 19 7   O2 HGB, VENOUS % 93 3*                    Results from last 7 days   Lab Units 10/19/22  0007 10/18/22  1947   HS TNI 0HR ng/L  --  4   HS TNI 2HR ng/L 8  --    HSTNI D2 ng/L 4  --                Results from last 7 days   Lab Units 10/18/22  1947   PROTIME seconds 12 9   INR   0 97   PTT seconds 24                   Results from last 7 days   Lab Units 10/19/22  2021   LACTIC ACID mmol/L 0 6           Results from last 7 days   Lab Units 10/18/22  1947   INFLUENZA A PCR   Negative   INFLUENZA B PCR   Negative   RSV PCR   Negative               Results from last 7 days   Lab Units 10/20/22  0948   AMPH/METH   Negative   BARBITURATE UR   Negative   BENZODIAZEPINE UR   Positive*   COCAINE UR   Negative   METHADONE URINE   Negative   OPIATE UR   Negative   PCP UR   Negative   THC UR   Negative         Medical History        Past Medical History:   Diagnosis Date   • COVID-19 03/17/2021   • Diabetes mellitus ([facility] [address] )       type 2   • Disease of thyroid gland       hypothyroidism   • Hyperlipidemia     • Hypertension     • 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   • Psychiatric disorder       PTSD   Anxiety, depression,    • Psychogenic nonepileptic spells           Present on Admission:  • ENRIKE (obstructive sleep apnea)  • Hypothyroidism  • Stroke-like symptoms        Admitting Diagnosis: Stroke-like symptoms  Age/Sex: 44 y o  male  Admission Orders:  Scheduled Medications:  Abilify  ASA  Lipitor  Klonopin  Plavix  Lovenox sq  flonase  neurontin  lantus and Humalog insulin  Levothyroxine  claritin  Toprol xl   zoloft     Continuous IV Infusions:        PRN Meds:  Tylenol PO x 1 10/19     Tele  POC GLUCOSE AC/HS WITH SSI COVERAGE   Neuro checks Every 1 hour x 4 hours, then every 2 hours x 4, then every 4 hours x 72 hours                 Daily wt  IP CONSULT TO CASE MANAGEMENT  IP CONSULT TO PHYSICAL MEDICINE REHAB  IP CONSULT TO NEUROLOGY        Network Utilization Review Department  ATTENTION: Please call with any questions or concerns to [phone] and carefully listen to the prompts so that you are directed to the right person  All voicemails are confidential   Kayleigh Cisse all requests for admission clinical reviews, approved or denied determinations and any other requests to dedicated fax number below belonging to the campus where the patient is receiving treatment   List of dedicated fax numbers for the Facilities:  [address] DENIALS (Administrative/Medical Necessity) [phone]   [address] (Maternity/NICU/Pediatrics) [phone]   [address] [facility] [address] [place] [address] [phone]   [address] [place] [address] [phone]   [address] [phone]      Ed Sweeney MD   Physician   Hospitalist   Discharge Summary      Signed   Date of Service:  10/20/2022  3:43 PM                 Signed              Show:Clear all  [x]Manual[x]Template[x]Copied    Added by:  [x]Richi Doll MD      []Selwyn for details    [facility]  Discharge- [facility] 1985, 44 y o  male MRN: [id number]  Unit/Bed#: E4 -01 Encounter: [id number]  Primary Care Provider: Alexandr Brooks MD   Date and time admitted to hospital: 10/19/2022  4:34 AM     * Stroke-like symptoms  Assessment & Plan  · Patient presented to [facility] after he had a syncopal episode which was accompanied with right sided weakness  There was concern for possible CVA and the patient was administered TNK  · MRI brain negative for any acute stroke  · 2D Echo showing normal ef, no sig valvular abnormality  · Continue aspirin, statin  · A1c of 10 9, will need close follow up with endocrine regarding BG control  · Lipid panel reviewed, recommend statin 40 lipitor daily  · PT and OT consulted recommending no rehab needs  · Patient will need to follow-up with neurology outpatient in 4-6 weeks, evaluation with Holter monitor  · Continue aspirin, Plavix for 3 weeks, then aspirin 81 mg monotherapy thereafter     Seizure ([address] )  Assessment & Plan  · The patient has a history of seizures- staring spells   Prior work up in 2020 showed 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      Hypothyroidism  Assessment & Plan  · Continue home levothyroxine     ENRIKE (obstructive sleep apnea)  Assessment & Plan  · CPAP qhs     Type 2 diabetes mellitus without complication, with long-term current use of insulin [facility])  Assessment & Plan        Lab Results   Component Value Date     HGBA1C 10 9 (H) 10/19/2022                Recent Labs     10/19/22  0838 10/19/22  1959 10/20/22  0708 10/20/22  1112   POCGLU 174* 400* 239* 202*         Blood Sugar Average: Last 72 hrs:  (P) 101 [id number]      · Continue his outpatient lantus  · Recommend close follow-up with endocrinologist outpatient           Discharging Physician / Practitioner: Vicky Skinner  PCP: Alexandr Brooks MD  Admission Date:       Admission Orders (From admission, onward)              Ordered          10/19/22 0504   Inpatient Admission  Once                          Discharge Date: 10/20/22         Medical Problems                  Resolved Problems  Date Reviewed: 10/20/2022   None                      Consultations During Hospital Stay:  · Neurology     Procedures Performed:   · none     Significant Findings / Test Results:   · CT head wo contrast  ·    · Result Date: 10/19/2022  · Impression: No acute intracranial abnormality  Specifically no acute intracranial hemorrhage and no noncontrast CT findings to suggest evolving acute intracranial ischemia  Workstation performed: [id number]   ·    · MRI brain w wo contrast  ·    · Result Date: 10/20/2022  · Impression: No intracranial pathology  Workstation performed: [id number]   ·       Incidental Findings:   · none      Test Results Pending at Discharge (will require follow up):   · none     Outpatient Tests Requested:  · none     Complications:  none     Reason for Admission: Stroke Like Symptoms     Hospital Course:      Enrique Machado is a 44 y o  male patient who originally presented to the hospital on 10/19/2022 due to right-sided weakness and numbness  Patient was evaluated the ED, received TNK and transferred to [facility] from [place] [address]  He was monitored in the ICU for 1 day, transfer to [facility] floor  His symptoms resolved shortly afterwards after TNK  Echo completed shows no acute abnormality, normal EF  MRI brain was negative for any acute processes  Discussed with neurology, recommend that patient continue aspirin, Plavix for 3 weeks and then aspirin 81 mg thereafter  He will need follow with Neurology outpatient, did suspect that he may have had an aborted stroke after TNK administration  Will need to continue Lipitor 40 mg once daily, have close follow-up with Endocrinology given his A1c is 10 9 for improved blood glucose control    PT and OT evaluated patient, no rehab needs on discharge      Please see above list of diagnoses and related plan for additional information       Condition at Discharge: fair      Discharge Day Visit / Exam:      Subjective:  Patient seen examined today at bedside, reports that his symptoms has resolved, currently at baseline  Able to work with physical therapy without issues  Tolerating diet, denies any dysphagia  Discussed discharge planning and agreeable  Vitals: Blood Pressure: 117/79 (10/20/22 1453)  Pulse: 79 (10/20/22 1453)  Temperature: (!) 96 9 °F (36 1 °C) (10/20/22 1453)  Temp Source: Temporal (10/20/22 1453)  Respirations: 18 (10/20/22 1453)  Height: 5' 5" (165 1 cm) (10/19/22 0900)  Weight - Scale: 83 2 kg (183 lb 6 8 oz) (10/20/22 0552)  SpO2: 96 % (10/20/22 1453)  Exam:   Physical Exam  Vitals and nursing note reviewed  Constitutional:       Appearance: Normal appearance  HENT:      Head: Normocephalic and atraumatic  Eyes:      General: No scleral icterus  Conjunctiva/sclera: Conjunctivae normal    Cardiovascular:      Rate and Rhythm: Normal rate  Pulmonary:      Effort: Pulmonary effort is normal  No respiratory distress  Abdominal:      General: Bowel sounds are normal  There is no distension  Palpations: Abdomen is soft  Musculoskeletal:         General: No swelling  Right lower leg: No edema  Left lower leg: No edema  Skin:     General: Skin is warm and dry  Neurological:      General: No focal deficit present  Mental Status: He is alert  Mental status is at baseline  Psychiatric:         Mood and Affect: Mood normal          Discussion with Family: patient     Discharge instructions/Information to patient and family:   See after visit summary for information provided to patient and family        Provisions for Follow-Up Care:  See after visit summary for information related to follow-up care and any pertinent home health orders        Disposition:      Home     Planned Readmission: none     Discharge Statement:  I spent 35 minutes discharging the patient   This time was spent on the day of discharge  I had direct contact with the patient on the day of discharge  Greater than 50% of the total time was spent examining patient, answering all patient questions, arranging and discussing plan of care with patient as well as directly providing post-discharge instructions    Additional time then spent on discharge activities      Discharge Medications:  See after visit summary for reconciled discharge medications provided to patient and family        ** Please Note: This note has been constructed using a voice recognition system **

## 2022-10-27 ENCOUNTER — TELEPHONE (OUTPATIENT)
Dept: NEUROLOGY | Facility: CLINIC | Age: 37
End: 2022-10-27

## 2022-10-27 NOTE — TELEPHONE ENCOUNTER
Post CVA Discharge Follow Up  Hospitalization: 10/19/22-10/2022    Called patient with no answer  Left a voice message requesting for a call back  Provided the office's phone number

## 2022-10-29 ENCOUNTER — OFFICE VISIT (OUTPATIENT)
Dept: URGENT CARE | Facility: CLINIC | Age: 37
End: 2022-10-29

## 2022-10-29 VITALS
BODY MASS INDEX: 30.49 KG/M2 | TEMPERATURE: 98.6 F | WEIGHT: 183 LBS | OXYGEN SATURATION: 96 % | RESPIRATION RATE: 16 BRPM | HEART RATE: 82 BPM | SYSTOLIC BLOOD PRESSURE: 118 MMHG | HEIGHT: 65 IN | DIASTOLIC BLOOD PRESSURE: 69 MMHG

## 2022-10-29 DIAGNOSIS — L02.91 ABSCESS: Primary | ICD-10-CM

## 2022-10-29 RX ORDER — CEPHALEXIN 500 MG/1
500 CAPSULE ORAL EVERY 12 HOURS SCHEDULED
Qty: 14 CAPSULE | Refills: 0 | Status: SHIPPED | OUTPATIENT
Start: 2022-10-29 | End: 2022-11-05

## 2022-10-29 NOTE — PROGRESS NOTES
[facility]        NAME: Noemy Schroeder is a 44 y o  male  : 1985    MRN: [id number]  DATE: 2022  TIME: 10:18 AM    Assessment and Plan   Abscess [L02 91]  1  Abscess  cephalexin (KEFLEX) 500 mg capsule         Patient Instructions     Take all antibiotics as prescribed  Warm compress to the area throughout the day  Call PCP to schedule a follow-up appt in the next 2-3 days for reevaluation and to ensure resolution of symptoms  Go to the nearest ER for evaluation if any fevers, redness, warmth, discharge, streaking redness, redness that is circumferential, bleeding, pain, signs of infection, new or worsening symptoms, or other concerning symptoms  Chief Complaint     Chief Complaint   Patient presents with   • Rash     Rash/cyst on his mid upper back noticed it 2 days ago         History of Present Illness       40-year-old male presents for evaluation of an abscess to his upper back times 2-3 days  States it started off as a small pimple but thinks it got larger  States it is slightly painful  He denies any itchiness  He denies any injury or trauma to the area  Denies any bug bites, new foods, laundry detergent, soaps, cough or cold-like symptoms or other inciting factors  Denies any other rashes, lesions or abscesses  States he has a history of abscesses, states it feels similar to his previous abscess  States the antibiotics helped in the past   States he has not tried any warm compress to the area or anything for his symptoms  He denies any fevers, chills, drainage or other complaints  Review of Systems   Review of Systems   Constitutional: Negative for activity change, appetite change, chills, fatigue and fever  HENT: Negative for facial swelling, sore throat, trouble swallowing and voice change  Eyes: Negative for itching and visual disturbance  Respiratory: Negative for shortness of breath  Cardiovascular: Negative for chest pain  Gastrointestinal: Negative for abdominal pain, diarrhea, nausea and vomiting  Musculoskeletal: Negative for arthralgias and joint swelling  Skin: Positive for rash  Neurological: Negative for dizziness, seizures, weakness, numbness and headaches  All other systems reviewed and are negative          Current Medications       Current Outpatient Medications:   •  Advair Diskus 500-50 MCG/DOSE inhaler, USE 1 INHALATION TWICE A DAY (RINSE MOUTH AFTER USE), Disp: 180 blister, Rfl: 3  •  albuterol (PROVENTIL HFA,VENTOLIN HFA) 90 mcg/act inhaler, Inhale 2 puffs every 6 (six) hours as needed for wheezing or shortness of breath, Disp: 18 g, Rfl: 3  •  ARIPiprazole (ABILIFY) 20 MG tablet, Take 15 mg by mouth daily, Disp: , Rfl:   •  aspirin (ECOTRIN LOW STRENGTH) 81 mg EC tablet, Take 1 tablet (81 mg total) by mouth daily Do not start before October 21, 2022 , Disp: 30 tablet, Rfl: 0  •  cephalexin (KEFLEX) 500 mg capsule, Take 1 capsule (500 mg total) by mouth every 12 (twelve) hours for 7 days, Disp: 14 capsule, Rfl: 0  •  clonazePAM (KlonoPIN) 1 mg tablet, Take 1 mg by mouth daily  , Disp: , Rfl:   •  clopidogrel (Plavix) 75 mg tablet, Take 1 tablet (75 mg total) by mouth daily for 21 days, Disp: 21 tablet, Rfl: 0  •  Continuous Blood Gluc  (Dexcom G6 ) CASSY, Use 1 Device every 3 (three) months, Disp: 1 each, Rfl: 1  •  Continuous Blood Gluc Sensor (Dexcom G6 Sensor) MISC, uSE ONE SENSOR EVERY EVERY TEN DAYS, Disp: 3 each, Rfl: 5  •  Continuous Blood Gluc Transmit (Dexcom G6 Transmitter) MISC, Use 1 Device in the morning, Disp: 1 each, Rfl: 0  •  insulin glargine (Lantus) 100 units/mL subcutaneous injection, Inject 50 Units under the skin every 12 (twelve) hours Basal insulin as split into 2 doses, take 30 units in the morning and 30 units before bed, Disp: 60 mL, Rfl: 5  •  Insulin Pen Needle (BD Pen Needle Rossana U/F) 32G X 4 MM MISC, Use 4/day, Disp: 100 each, Rfl: 3  •  levothyroxine 25 mcg tablet, Take 2 tablets (50 mcg total) by mouth daily 50, Disp: 90 tablet, Rfl: 1  •  loratadine (CLARITIN) 10 mg tablet, Take 10 mg by mouth daily, Disp: , Rfl:   •  metoprolol succinate (TOPROL-XL) 25 mg 24 hr tablet, Take 1 tablet (25 mg total) by mouth daily, Disp: 90 tablet, Rfl: 1  •  pravastatin (PRAVACHOL) 40 mg tablet, Take 1 tablet (40 mg total) by mouth daily, Disp: 90 tablet, Rfl: 1  •  sertraline (ZOLOFT) 100 mg tablet, Take 200 mg by mouth daily  , Disp: , Rfl:   •  fluticasone (FLONASE) 50 mcg/act nasal spray, 1 spray into each nostril daily (Patient not taking: No sig reported), Disp: 18 2 mL, Rfl: 1  •  gabapentin (Neurontin) 300 mg capsule, Take 1 capsule (300 mg total) by mouth daily at bedtime (Patient not taking: No sig reported), Disp: 30 capsule, Rfl: 0    Current Allergies     Allergies as of 10/29/2022 - Reviewed 10/29/2022   Allergen Reaction Noted   • Lamotrigine GI Intolerance 09/18/2020   • Niacin Rash 02/11/2018   • Simvastatin Other (See Comments) 12/13/2015            The following portions of the patient's history were reviewed and updated as appropriate: allergies, current medications, past family history, past medical history, past social history, past surgical history and problem list      Past Medical History:   Diagnosis Date   • COVID-19 03/17/2021   • Diabetes mellitus ([facility] [address] )     type 2   • Disease of thyroid gland     hypothyroidism   • Hyperlipidemia    • Hypertension    • 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   • Psychiatric disorder     PTSD  Anxiety, depression,    • Psychogenic nonepileptic spells        Past Surgical History:   Procedure Laterality Date   • MOUTH SURGERY  08/2021   • WISDOM TOOTH EXTRACTION         Family History   Problem Relation Age of Onset   • Hyperlipidemia Father    • Heart attack Paternal Grandfather    • Prostate cancer Paternal Grandfather    • Cancer Paternal Grandmother          Medications have been verified          Objective   /69   Pulse 82   Temp 98 6 °F (37 °C)   Resp 16   Ht 5' 5" (1 651 m)   Wt 83 kg (183 lb)   SpO2 96%   BMI 30 45 kg/m²        Physical Exam     Physical Exam  Vitals and nursing note reviewed  Constitutional:       General: He is not in acute distress  Appearance: Normal appearance  He is well-developed  He is not ill-appearing or toxic-appearing  Cardiovascular:      Rate and Rhythm: Normal rate and regular rhythm  Heart sounds: Normal heart sounds  Pulmonary:      Effort: Pulmonary effort is normal       Breath sounds: Normal breath sounds  No wheezing  Musculoskeletal:        Back:    Skin:     Capillary Refill: Capillary refill takes less than 2 seconds  Findings: Rash present  Neurological:      Mental Status: He is alert and oriented to person, place, and time     Psychiatric:         Behavior: Behavior normal

## 2022-10-29 NOTE — PATIENT INSTRUCTIONS
Take all antibiotics as prescribed  Warm compress to the area throughout the day  Call PCP to schedule a follow-up appt in the next 2-3 days for reevaluation and to ensure resolution of symptoms  Go to the nearest ER for evaluation if any fevers, redness, warmth, discharge, streaking redness, redness that is circumferential, bleeding, pain, signs of infection, new or worsening symptoms, or other concerning symptoms

## 2022-10-31 ENCOUNTER — OFFICE VISIT (OUTPATIENT)
Dept: SURGERY | Facility: CLINIC | Age: 37
End: 2022-10-31

## 2022-10-31 VITALS
DIASTOLIC BLOOD PRESSURE: 76 MMHG | RESPIRATION RATE: 18 BRPM | BODY MASS INDEX: 30.66 KG/M2 | HEART RATE: 90 BPM | HEIGHT: 65 IN | TEMPERATURE: 97.7 F | OXYGEN SATURATION: 97 % | SYSTOLIC BLOOD PRESSURE: 122 MMHG | WEIGHT: 184 LBS

## 2022-10-31 DIAGNOSIS — L02.212 BACK ABSCESS: Primary | ICD-10-CM

## 2022-10-31 RX ORDER — INSULIN GLARGINE 100 [IU]/ML
INJECTION, SOLUTION SUBCUTANEOUS
COMMUNITY
Start: 2022-09-06

## 2022-10-31 NOTE — ASSESSMENT & PLAN NOTE
Patient presents with cellulitis and abscess of the upper back after starting antibiotics on Friday  He has history of same problem in 04/22 treated with I&D and antibiotics  He is currently on ASA/Plavix for stroke like symptoms requiring hospitalization 2 weeks ago  On exam there is a wide area of cellulitis with central skin devitalization and purulent discharge suggesting abscess  Consent obtained for I&D  Elliptical incision made over central area of skin breakdown and carried down through scar tissue to unroof the abscess  A very limited dissection/deloculation performed due to bleeding risk while on ASA/Plavix  Loosely packed with 2x2 gauze and covered with island dressing  Agreeable to return tomorrow for packing change/wound care by Dr Lorie Ralph  Questions answered, agreeable to plan

## 2022-10-31 NOTE — PROGRESS NOTES
Consult - General Surgery   Jimenez Lenmaris 44 y o  male MRN: [id number]   Encounter: [id number]    Assessment/Plan    Back abscess  Patient presents with cellulitis and abscess of the upper back after starting antibiotics on Friday  He has history of same problem in 04/22 treated with I&D and antibiotics  He is currently on ASA/Plavix for stroke like symptoms requiring hospitalization 2 weeks ago  On exam there is a wide area of cellulitis with central skin devitalization and purulent discharge suggesting abscess  Consent obtained for I&D  Elliptical incision made over central area of skin breakdown and carried down through scar tissue to unroof the abscess  A very limited dissection/deloculation performed due to bleeding risk while on ASA/Plavix  Loosely packed with 2x2 gauze and covered with island dressing  Agreeable to return tomorrow for packing change/wound care by Dr Evy Gavin  Questions answered, agreeable to plan  Diagnoses and all orders for this visit:    Back abscess    Other orders  -     insulin glargine (LANTUS) 100 units/mL subcutaneous injection; INJECT 28UNITS UNDER THE SKIN ONCE EVERY DAY  -     Incision and Drainage        History of Present Illness   Chief Complaint   Patient presents with   • Abscess     abscess on back     Patient came in today for a infected abscess on his back that he has been dealing with for 4 days  Patient states he is in severe pain and he rates his pain 7/10  Patient states there blood with puss drainage coming out  Patient thinks he suffered from fevers on Saturday night  He is on antibiotics that was given to him by the urgent care in [facility]  Yasemin GONSALEZ MA    Pleasant 45 yo M here for painful abscess on his back  Diagnosed with skin abscess at urgent care Friday and prescribed oral Cephalexin  Reports worsening pain and new drainage since that time  Review of Systems   Skin: Positive for rash and wound         Historical Information   Past Medical History:   Diagnosis Date   • COVID-19 03/17/2021   • Diabetes mellitus ([facility] [address] )     type 2   • Disease of thyroid gland     hypothyroidism   • Hyperlipidemia    • Hypertension    • 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   • Psychiatric disorder     PTSD   Anxiety, depression,    • Psychogenic nonepileptic spells      Past Surgical History:   Procedure Laterality Date   • MOUTH SURGERY  08/2021   • WISDOM TOOTH EXTRACTION       Social History   Social History     Substance and Sexual Activity   Alcohol Use Not Currently     Social History     Substance and Sexual Activity   Drug Use Not Currently   • Types: Marijuana     Social History     Tobacco Use   Smoking Status Never Smoker   Smokeless Tobacco Never Used     Family History   Problem Relation Age of Onset   • Hyperlipidemia Father    • Heart attack Paternal Grandfather    • Prostate cancer Paternal Grandfather    • Cancer Paternal Grandmother        Meds/Allergies     Current Outpatient Medications:   •  Advair Diskus 500-50 MCG/DOSE inhaler, USE 1 INHALATION TWICE A DAY (RINSE MOUTH AFTER USE), Disp: 180 blister, Rfl: 3  •  albuterol (PROVENTIL HFA,VENTOLIN HFA) 90 mcg/act inhaler, Inhale 2 puffs every 6 (six) hours as needed for wheezing or shortness of breath, Disp: 18 g, Rfl: 3  •  ARIPiprazole (ABILIFY) 20 MG tablet, Take 15 mg by mouth daily, Disp: , Rfl:   •  aspirin (ECOTRIN LOW STRENGTH) 81 mg EC tablet, Take 1 tablet (81 mg total) by mouth daily Do not start before October 21, 2022 , Disp: 30 tablet, Rfl: 0  •  cephalexin (KEFLEX) 500 mg capsule, Take 1 capsule (500 mg total) by mouth every 12 (twelve) hours for 7 days, Disp: 14 capsule, Rfl: 0  •  clonazePAM (KlonoPIN) 1 mg tablet, Take 1 mg by mouth daily  , Disp: , Rfl:   •  clopidogrel (Plavix) 75 mg tablet, Take 1 tablet (75 mg total) by mouth daily for 21 days, Disp: 21 tablet, Rfl: 0  •  Continuous Blood Gluc  (Dexcom G6 ) CASSY, Use 1 Device every 3 (three) months, Disp: 1 each, Rfl: 1  •  Continuous Blood Gluc Sensor (Dexcom G6 Sensor) MISC, uSE ONE SENSOR EVERY EVERY TEN DAYS, Disp: 3 each, Rfl: 5  •  Continuous Blood Gluc Transmit (Dexcom G6 Transmitter) MISC, Use 1 Device in the morning, Disp: 1 each, Rfl: 0  •  insulin glargine (Lantus) 100 units/mL subcutaneous injection, Inject 50 Units under the skin every 12 (twelve) hours Basal insulin as split into 2 doses, take 30 units in the morning and 30 units before bed, Disp: 60 mL, Rfl: 5  •  insulin glargine (LANTUS) 100 units/mL subcutaneous injection, INJECT 28UNITS UNDER THE SKIN ONCE EVERY DAY, Disp: , Rfl:   •  Insulin Pen Needle (BD Pen Needle Rossana U/F) 32G X 4 MM MISC, Use 4/day, Disp: 100 each, Rfl: 3  •  levothyroxine 25 mcg tablet, Take 2 tablets (50 mcg total) by mouth daily 50, Disp: 90 tablet, Rfl: 1  •  loratadine (CLARITIN) 10 mg tablet, Take 10 mg by mouth daily, Disp: , Rfl:   •  metoprolol succinate (TOPROL-XL) 25 mg 24 hr tablet, Take 1 tablet (25 mg total) by mouth daily, Disp: 90 tablet, Rfl: 1  •  pravastatin (PRAVACHOL) 40 mg tablet, Take 1 tablet (40 mg total) by mouth daily, Disp: 90 tablet, Rfl: 1  •  sertraline (ZOLOFT) 100 mg tablet, Take 200 mg by mouth daily  , Disp: , Rfl:   •  fluticasone (FLONASE) 50 mcg/act nasal spray, 1 spray into each nostril daily (Patient not taking: No sig reported), Disp: 18 2 mL, Rfl: 1  •  gabapentin (Neurontin) 300 mg capsule, Take 1 capsule (300 mg total) by mouth daily at bedtime (Patient not taking: No sig reported), Disp: 30 capsule, Rfl: 0  Allergies   Allergen Reactions   • Bupropion Anxiety   • Lamotrigine GI Intolerance   • Niacin Rash   • Simvastatin Other (See Comments), Rash and Hives     Elevated liver enzymes  Liver enzyme elevation       The following portions of the patient's history were reviewed and updated as appropriate: allergies, current medications, past family history, past medical history, past social history, past surgical history and problem list     Objective   Current Vitals:   Blood pressure 122/76, pulse 90, temperature 97 7 °F (36 5 °C), temperature source Temporal, resp  rate 18, height 5' 5" (1 651 m), weight 83 5 kg (184 lb), SpO2 97 %  Physical Exam  Constitutional:       General: He is not in acute distress  Appearance: He is well-developed  He is not diaphoretic  HENT:      Head: Normocephalic and atraumatic  Eyes:      Pupils: Pupils are equal, round, and reactive to light  Pulmonary:      Effort: No respiratory distress  Musculoskeletal:         General: Normal range of motion  Cervical back: Normal range of motion  Skin:     General: Skin is warm and dry  Comments: Upper back midline large area of cellulitis at least 15 cm, centrally is an area of skin breakdown with purulent discharge through small sinuses  Tender to touch  Neurological:      Mental Status: He is alert and oriented to person, place, and time  Incision and Drainage    Date/Time: 10/31/2022 1:32 PM  Performed by: Rodolfo Rodriguez PA-C  Authorized by: Rodolfo Rodriguez PA-C   [place] Protocol:  Procedure performed by: (LEXA)  Consent: Verbal consent obtained  Risks and benefits: risks, benefits and alternatives were discussed  Consent given by: patient  Patient understanding: patient states understanding of the procedure being performed      Patient location:  Clinic  Location:     Type:  Abscess and cyst    Size:  5 cm    Location:  Trunk    Trunk location:  Back  Pre-procedure details:     Skin preparation:  Betadine  Anesthesia (see MAR for exact dosages):      Anesthesia method:  Local infiltration    Local anesthetic:  Bupivacaine 0 25% WITH epi  Procedure details:     Complexity:  Complex    Needle aspiration: no      Incision types:  Elliptical    Scalpel blade:  15    Approach:  Open    Incision depth:  Subcutaneous    Wound management:  Probed and deloculated and irrigated with saline    Hemostat:  Good Drainage:  Purulent    Drainage amount: Moderate    Wound treatment:  Packing placed    Packing material: corner of 2x2  Post-procedure details:     Patient tolerance of procedure: Tolerated well, no immediate complications  Comments:      Informed verbal consent obtained  Positioned prone  Prepped with betadine swabs  Using aseptic technique local administered using 10 mL of 0 25% bupivicaine with epinephrine/bicarbonate  Elliptical incision made with #15 scalpel and carried down through scar tissue to unroof abscess  Large cavity not encountered, rather multiple small sinuses noted  Gentle dilation performed with curved hemostat to improve drainage in an attempt to reduce tissue trauma/bleeding while on ASA/Plavix  Pus cultured  Irrigated then gently packed with 2x2 gauze  Good hemostasis without needing suture          Signature:  Leonard Rodriguez  Date: 10/31/2022 Time: 1:31 PM

## 2022-11-01 ENCOUNTER — OFFICE VISIT (OUTPATIENT)
Dept: SURGERY | Facility: CLINIC | Age: 37
End: 2022-11-01

## 2022-11-01 VITALS — TEMPERATURE: 97.2 F

## 2022-11-01 DIAGNOSIS — L02.212 BACK ABSCESS: Primary | ICD-10-CM

## 2022-11-01 NOTE — PATIENT INSTRUCTIONS
Keep current dressing in place  Return tomorrow to have the dressing changed  You will need some help applying a dressing, but probably tomorrow you can start to shower, wash the area with soap and water, pat dry then apply an island dressing or large band aid

## 2022-11-01 NOTE — PROGRESS NOTES
Assessment/Plan:    Back abscess  11/1/22:      Clean viable wound following I & D yesterday  Dressed with silvasorb gel, gauze, island dressing  Follow up 24 hrs for dressing change  10/31/22:  Patient presents with cellulitis and abscess of the upper back after starting antibiotics on Friday  He has history of same problem in 04/22 treated with I&D and antibiotics  He is currently on ASA/Plavix for stroke like symptoms requiring hospitalization 2 weeks ago  On exam there is a wide area of cellulitis with central skin devitalization and purulent discharge suggesting abscess  Consent obtained for I&D  Elliptical incision made over central area of skin breakdown and carried down through scar tissue to unroof the abscess  A very limited dissection/deloculation performed due to bleeding risk while on ASA/Plavix  Loosely packed with 2x2 gauze and covered with island dressing  Agreeable to return tomorrow for packing change/wound care by Dr Sanjuana Villasenor  Questions answered, agreeable to plan  Diagnoses and all orders for this visit:    Back abscess          Subjective:      Patient ID: Piotr Valdez is a 44 y o  male  The patient is a 27-year-old white male presenting in follow-up from incision and drainage of skin abscess, suspected to originate from an infected epidermal inclusion cyst   This was incised and drained 24 hours ago  The wound appears were all but healthy  It was irrigated with wound cleanser, and redressed with SilvaSorb gel and an island dressing  He will follow-up in 24 hours for a dressing change  Hopefully that point he will be able to the remove the dressing, shower, and apply a clean dressing with minimal assistance  G stain reveals Gram-positive cocci, and he is currently taking Keflex        The following portions of the patient's history were reviewed and updated as appropriate: allergies, current medications, past family history, past medical history, past social history, past surgical history and problem list     Review of Systems   Constitutional: Negative for chills and fever  Skin: Positive for wound  Objective:      Temp (!) 97 2 °F (36 2 °C) (Temporal)          Physical Exam  Constitutional:       Appearance: He is not ill-appearing  Cardiovascular:      Rate and Rhythm: Normal rate     Pulmonary:      Effort: Pulmonary effort is normal    Skin:

## 2022-11-01 NOTE — ASSESSMENT & PLAN NOTE
11/1/22:      Clean viable wound following I & D yesterday  Dressed with silvasorb gel, gauze, island dressing  Follow up 24 hrs for dressing change  10/31/22:  Patient presents with cellulitis and abscess of the upper back after starting antibiotics on Friday  He has history of same problem in 04/22 treated with I&D and antibiotics  He is currently on ASA/Plavix for stroke like symptoms requiring hospitalization 2 weeks ago  On exam there is a wide area of cellulitis with central skin devitalization and purulent discharge suggesting abscess  Consent obtained for I&D  Elliptical incision made over central area of skin breakdown and carried down through scar tissue to unroof the abscess  A very limited dissection/deloculation performed due to bleeding risk while on ASA/Plavix  Loosely packed with 2x2 gauze and covered with island dressing  Agreeable to return tomorrow for packing change/wound care by Dr Quiroz President  Questions answered, agreeable to plan

## 2022-11-02 ENCOUNTER — OFFICE VISIT (OUTPATIENT)
Dept: SURGERY | Facility: CLINIC | Age: 37
End: 2022-11-02

## 2022-11-02 VITALS — TEMPERATURE: 97.3 F

## 2022-11-02 DIAGNOSIS — L02.212 BACK ABSCESS: Primary | ICD-10-CM

## 2022-11-02 LAB
BACTERIA WND AEROBE CULT: ABNORMAL
GRAM STN SPEC: ABNORMAL
GRAM STN SPEC: ABNORMAL

## 2022-11-02 NOTE — PATIENT INSTRUCTIONS
Wound care instructions (please change every day)  Remove dressing and clean wound with soap/water  Apply small amount of Neosporin into wound  Push the tip of a piece of 2x2 inch gauze into the wound as packing  Cover with ABD pad and secure with tape  Repeat every day and call with questions or concerns

## 2022-11-02 NOTE — ASSESSMENT & PLAN NOTE
Reports no pain, continues on oral antibiotics  Dressing changed in the office yesterday  On exam the cellulitis is resolving, there is ongoing purulent discharge from the wound  Wound was cleansed and repacked with a quarter of a 2 x 2 gauze and supplies provided for packing on a daily basis at home with follow-up in 1 week in the office  All questions answered and agreeable to plan

## 2022-11-02 NOTE — PROGRESS NOTES
Post-Op Note - General Surgery   Roddy Lowry 44 y o  male MRN: [id number]  Encounter: [id number]    Assessment/Plan    Back abscess  Reports no pain, continues on oral antibiotics  Dressing changed in the office yesterday  On exam the cellulitis is resolving, there is ongoing purulent discharge from the wound  Wound was cleansed and repacked with a quarter of a 2 x 2 gauze and supplies provided for packing on a daily basis at home with follow-up in 1 week in the office  All questions answered and agreeable to plan  Diagnoses and all orders for this visit:    Back abscess        Subjective      Chief Complaint   Patient presents with   • Post-op     packing change, s/p I&D back abscess 10/31/2022     Patient came in today for a packing change, s/p I&D back abscess 10/31/2022  The wound is still draining but patient states he is doing well and doesn't suffer from any pain  No fevers or chills  KRISTIN Edwards 21-year-old male now 2 days status post I&D of a recurrent abscess epidermoid cyst of his upper mid back  Reports no associated pain, ongoing discharge on the drainage  Taking antibiotics as directed  Review of Systems    The following portions of the patient's history were reviewed and updated as appropriate: allergies, current medications, past family history, past medical history, past social history, past surgical history and problem list     Objective      Temperature (!) 97 3 °F (36 3 °C), temperature source Temporal    Physical Exam  Vitals and nursing note reviewed  Constitutional:       General: He is not in acute distress  Appearance: He is well-developed  He is not diaphoretic  HENT:      Head: Normocephalic and atraumatic  Eyes:      Conjunctiva/sclera: Conjunctivae normal       Pupils: Pupils are equal, round, and reactive to light  Pulmonary:      Effort: No respiratory distress  Musculoskeletal:         General: Normal range of motion        Cervical back: Normal range of motion  Skin:     General: Skin is warm and dry  Capillary Refill: Capillary refill takes less than 2 seconds  Comments: Upper mid back incision is clean and open with moderate purulence  Associated cellulitis is resolving  Probed gently to facilitate drainage from multiple draining sinuses  Neurological:      Mental Status: He is alert and oriented to person, place, and time     Psychiatric:         Behavior: Behavior normal          Signature:  Lonnie Rodriguez  Date: 11/2/2022 Time: 2:42 PM

## 2022-11-03 NOTE — TELEPHONE ENCOUNTER
Does not need referral spoke to gurdeep at [facility] 
Mesha Lucero at [facility] ext [phone]  called states he needs insurance referral for 2/17/22 nelly   Dx g47 [address]- [phone]    Fax# [phone]
Yes

## 2022-11-07 ENCOUNTER — TELEPHONE (OUTPATIENT)
Dept: GASTROENTEROLOGY | Facility: CLINIC | Age: 37
End: 2022-11-07

## 2022-11-07 NOTE — TELEPHONE ENCOUNTER
Patient is stating Anna Nurse is denying [employer] [facility] [employer] claim  Patient has gone weeks without Dexcom  Patient had a stroke 10/18  Patient would like to know how to get the Dexcom

## 2022-11-08 NOTE — TELEPHONE ENCOUNTER
Post CVA Discharge Follow Up  Hospitalization: 10/19/22-10/2022    Called patient  Since discharge, he denies experiencing any new or worsening stroke-like symptoms  Patient denies the presence of any residual stroke symptoms following hospitalization and claims he has returned to baseline functioning  He is ambulating independently as well as preforming his own ADLs  Patient manages his own medications, appointments, and affairs  Offered to schedule stroke hospital follow up appointment, patient is agreeable to this  I scheduled appointment, verified mailing address, then mailed appointment to home address on file  Placed patient on the wait list      During this call, we reviewed stroke type, symptoms, personal risk factors and management, medications, and resources  Patient verbalizes understanding  He reports how he received the stroke education booklet while in the hospital     As for risk factors, patient does not check BP at home  He relies on going to the doctor office visits for BP check  He is aware how the goal for BP is to be below 130/80  He is a non smoker  Encouraged patient to follow a low salt / low cholesterol / diabetic friendly diet  Physical activity as tolerated  I addressed all his questions  At the conclusion of the conversation, patient denies having any further questions or concerns

## 2022-11-09 ENCOUNTER — OFFICE VISIT (OUTPATIENT)
Dept: SURGERY | Facility: CLINIC | Age: 37
End: 2022-11-09

## 2022-11-09 VITALS — TEMPERATURE: 97.2 F

## 2022-11-09 DIAGNOSIS — L02.212 BACK ABSCESS: Primary | ICD-10-CM

## 2022-11-09 NOTE — PROGRESS NOTES
Post-Op Note - General Surgery   Linnette Rash 44 y o  male MRN: [id number]  Encounter: [id number]    Assessment/Plan    Back abscess  Doing well 1 5 weeks after I&D/debridement  On exam the wound is open and clean, no active infection, and small residual cyst wall remnant at the base not mature for debridement  I advised continued daily soap scrub and dressing changes, will plan for 2 week follow-up to monitor progress  Questions answered, agreeable to plan  Diagnoses and all orders for this visit:    Back abscess        Subjective      Chief Complaint   Patient presents with   • Post-op     1week wound check , s/p I&D back abscess 10/31/2022     45 yo M here after I&D of recurrent infected/abscessed epidermoid cyst of his back  Doing well without pain  Dressings changed daily  Completed oral antibiotics  Review of Systems   Skin: Positive for wound  The following portions of the patient's history were reviewed and updated as appropriate: allergies, current medications, past family history, past medical history, past social history, past surgical history and problem list     Objective      Temperature (!) 97 2 °F (36 2 °C), temperature source Temporal    Physical Exam  Vitals and nursing note reviewed  Constitutional:       General: He is not in acute distress  Appearance: He is well-developed  He is not diaphoretic  HENT:      Head: Normocephalic and atraumatic  Eyes:      Conjunctiva/sclera: Conjunctivae normal       Pupils: Pupils are equal, round, and reactive to light  Pulmonary:      Effort: No respiratory distress  Musculoskeletal:         General: Normal range of motion  Cervical back: Normal range of motion  Skin:     General: Skin is warm and dry  Capillary Refill: Capillary refill takes less than 2 seconds  Comments: Wound of upper back is clean, open, full thickness  No active purulence or cellulitis   At base there is a remnant cyst wall not yet amenable to debridement  No tenderness  Neurological:      Mental Status: He is alert and oriented to person, place, and time     Psychiatric:         Behavior: Behavior normal          Signature:  Lonnie Rodriguez  Date: 11/9/2022 Time: 11:15 AM

## 2022-11-09 NOTE — ASSESSMENT & PLAN NOTE
Doing well 1 5 weeks after I&D/debridement  On exam the wound is open and clean, no active infection, and small residual cyst wall remnant at the base not mature for debridement  I advised continued daily soap scrub and dressing changes, will plan for 2 week follow-up to monitor progress  Questions answered, agreeable to plan

## 2022-11-23 ENCOUNTER — OFFICE VISIT (OUTPATIENT)
Dept: SURGERY | Facility: CLINIC | Age: 37
End: 2022-11-23

## 2022-11-23 VITALS — TEMPERATURE: 97.4 F

## 2022-11-23 DIAGNOSIS — L02.212 BACK ABSCESS: Primary | ICD-10-CM

## 2022-11-23 NOTE — ASSESSMENT & PLAN NOTE
Doing well 3 weeks post-op  On exam the wound is shrinking and no evidence of residual cyst is noted  Advised continued daily wound care until fully epithealialized  Questions answered, will see back as needed

## 2022-11-23 NOTE — PROGRESS NOTES
Post-Op Note - General Surgery   Primitivo Parada 44 y o  male MRN: [id number]  Encounter: [id number]    Assessment/Plan    Back abscess  Doing well 3 weeks post-op  On exam the wound is shrinking and no evidence of residual cyst is noted  Advised continued daily wound care until fully epithealialized  Questions answered, will see back as needed  Diagnoses and all orders for this visit:    Back abscess        Subjective      Chief Complaint   Patient presents with   • Follow-up     2 week f/u, s/p I&D back abscess 10/31/2022 Pt does have a rash on the area from the bandage but no fever/chills     Pleasant 43 yo M here for wound check  Reports no problems with the wound or wound care  Review of Systems    The following portions of the patient's history were reviewed and updated as appropriate: allergies, current medications, past family history, past medical history, past social history, past surgical history and problem list     Objective      Temperature (!) 97 4 °F (36 3 °C), temperature source Temporal    Physical Exam  Vitals and nursing note reviewed  Constitutional:       General: He is not in acute distress  Appearance: He is well-developed and well-nourished  He is not diaphoretic  HENT:      Head: Normocephalic and atraumatic  Eyes:      Extraocular Movements: EOM normal       Conjunctiva/sclera: Conjunctivae normal       Pupils: Pupils are equal, round, and reactive to light  Pulmonary:      Effort: No respiratory distress  Musculoskeletal:         General: Normal range of motion  Cervical back: Normal range of motion  Skin:     General: Skin is warm and dry  Capillary Refill: Capillary refill takes less than 2 seconds  Comments: Wound is open and clean, about 1 cm across and 1 cm 0 5 cm deep  No signs of residual cyst    Neurological:      Mental Status: He is alert and oriented to person, place, and time     Psychiatric:         Mood and Affect: Mood and affect normal  Behavior: Behavior normal          Signature:  Lonnie Rodriguez PA-C  Date: 11/23/2022 Time: 1:28 PM

## 2022-12-05 ENCOUNTER — PROCEDURE VISIT (OUTPATIENT)
Dept: NEUROLOGY | Facility: CLINIC | Age: 37
End: 2022-12-05

## 2022-12-05 DIAGNOSIS — M79.642 LEFT HAND PAIN: ICD-10-CM

## 2022-12-05 NOTE — PROGRESS NOTES
EMG 1 Limb     Date/Time 12/5/2022 11:11 AM     Performed by  Yasemin Scherer MD     Authorized by Silvano Maravilla, [address]                [facility] AT [address]  [phone]    Electromyography & Nerve Conduction Studies Report          Full Name: Richard Ontiveros Gender: Male  MRN: [id number] YOB: 1985      Visit Date: 12/5/2022 11:44 AM  Age: 44 Years  Examining Physician: Yasemin Scherer MD   Referring Physician: Mark PIERRE  Medical History: 76-year-old male with prior history of diabetes mellitus presents with left hand numbness mainly affecting the pinky and ring finger for the last 3 months  Denies any radicular symptoms  Patient is being evaluated for a focal neuropathy  TEMP 32       Sensory Nerve Conduction Study       Nerve / Sites Rec  Site Onset Lat Peak Lat  Amp Segments Distance Peak Diff Velocity     ms ms µV  cm ms m/s   L Median - Dig II (Antidromic)      Wrist Index 2 6 3 6 25 0 Wrist - Index 13  50      Ref  ?3 5 ? 20 0 Ref  ?50   L Ulnar - Dig V (Antidromic)      Wrist Dig V 2 9 3 8 25 8 Wrist - Dig V 11  38      Ref  ?3 1 ? 17 0 Ref  ?50   L Median, Ulnar - Palmar      Median Palm Wrist 1 2 1 8 10 6 Median Palm - Wrist 8  67      Ref  ?2 2 ? 50 0 Ref  ?50      Ulnar Palm Wrist 0 9 1 7 27 2 Ulnar Palm - Wrist 8  85      Ref  ?2 2 ? 12 0 Ref  ?50        Median Palm - Ulnar Palm  0 2         Ref  ?0 4    L Radial - Superficial (Antidromic)      Forearm Wrist 1 6 2 2 29 8 Forearm - Wrist 10  62      Ref  ?2 9 ? 15 0 Ref  ?50       Motor Nerve Conduction Study       Nerve / Sites Muscle Latency Ref  Amplitude Ref  Segments Distance Lat Diff Velocity Ref  ms ms mV mV  cm ms m/s m/s   L Median - APB      Wrist APB 3 1 ?4 4 12 1 ?4 0 Wrist - APB 7         Elbow APB 7 5  11 8  Elbow - Wrist 22 4 42 50 ?49   L Ulnar - ADM      Wrist ADM 3 0 ?3 3 6 7 ?6 0 Wrist - ADM 7         B  Elbow ADM 6 6  6 6 B Elbow - Wrist 22 3 58 61 ?49      A  Elbow ADM 8 3  6 5  A  Elbow - B  Elbow 10 1 73 58 ?52       F Waves       Nerve F Latency Ref  ms ms   L Median - APB  27 4 ?31 0   L Ulnar - ADM  27 4 ?32 0       EMG Summary Table     Spontaneous MUAP Recruitment   Muscle Nerve Roots IA Fib PSW Fasc H F  Dur  Amp PPP Config Pattern   L  Deltoid Axillary C5-C6 NL None None None None NL NL None NL NL   L  Biceps brachii Musculocut  C5-C6 NL None None None None NL NL None NL NL   L  Triceps brachii Radial C6-C8 NL None None None None NL NL None NL NL   L  Pronator teres Median C6-C7 NL None None None None NL NL None NL NL   L  First dorsal interosseous Ulnar C8-T1 NL None None None None NL NL None NL NL   L  Abductor pollicis brevis Median U7-G1 NL None None None None NL NL None NL NL   L  Cervical paraspinals (low)  - NL None None None None NL NL None NL NL                       Summary      Motor and sensory conduction studies were performed on the left median and ulnar nerves  The distal motor latencies were normal  The motor action potential amplitudes were normal  Motor conduction velocities were normal including conduction velocity of the ulnar nerve across the elbow  The left  median and ulnar F waves were normal     The left median sensory peak latency was prolonged with a normal sensory action potential amplitude and a normal conduction velocity across the wrist   The ulnar sensory peak latency was prolonged with normal sensory action potential amplitude and a slow conduction velocity across the wrist   The median and ulnar palmar evoked response was normal   The superficial radial sensory action potential was normal     Concentric needle EMG was performed on various distal and proximal muscles of the left upper extremity including APB, FDI, pronator teres, deltoid, biceps, triceps and low cervical paraspinal region  There was no evidence of active denervation in any of the muscles tested    The compound motor unit action potentials were of normal configuration with interference patterns being full or full for effort  The risks and benefits of this procedure have been explained to the patient  Impression:        Abnormal study  There is electrophysiologic evidence of a:    1  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

## 2022-12-12 ENCOUNTER — OFFICE VISIT (OUTPATIENT)
Dept: INTERNAL MEDICINE CLINIC | Facility: OTHER | Age: 37
End: 2022-12-12

## 2022-12-12 VITALS
DIASTOLIC BLOOD PRESSURE: 80 MMHG | TEMPERATURE: 98.1 F | HEIGHT: 65 IN | BODY MASS INDEX: 31.49 KG/M2 | HEART RATE: 63 BPM | SYSTOLIC BLOOD PRESSURE: 116 MMHG | WEIGHT: 189 LBS | OXYGEN SATURATION: 98 %

## 2022-12-12 DIAGNOSIS — I63.9 CEREBROVASCULAR ACCIDENT (CVA), UNSPECIFIED MECHANISM (HCC): ICD-10-CM

## 2022-12-12 DIAGNOSIS — Z79.4 TYPE 2 DIABETES MELLITUS WITH HYPERGLYCEMIA, WITH LONG-TERM CURRENT USE OF INSULIN (HCC): ICD-10-CM

## 2022-12-12 DIAGNOSIS — R56.9 SEIZURE (HCC): ICD-10-CM

## 2022-12-12 DIAGNOSIS — E11.9 TYPE 2 DIABETES MELLITUS WITHOUT COMPLICATION, WITH LONG-TERM CURRENT USE OF INSULIN (HCC): Chronic | ICD-10-CM

## 2022-12-12 DIAGNOSIS — L03.211 CELLULITIS OF FACE: Primary | ICD-10-CM

## 2022-12-12 DIAGNOSIS — Z79.4 TYPE 2 DIABETES MELLITUS WITHOUT COMPLICATION, WITH LONG-TERM CURRENT USE OF INSULIN (HCC): Chronic | ICD-10-CM

## 2022-12-12 DIAGNOSIS — K21.9 CHRONIC GERD: Chronic | ICD-10-CM

## 2022-12-12 DIAGNOSIS — E11.65 TYPE 2 DIABETES MELLITUS WITH HYPERGLYCEMIA, WITH LONG-TERM CURRENT USE OF INSULIN (HCC): ICD-10-CM

## 2022-12-12 DIAGNOSIS — F33.9 DEPRESSION, RECURRENT (HCC): ICD-10-CM

## 2022-12-12 DIAGNOSIS — E03.9 HYPOTHYROIDISM, UNSPECIFIED TYPE: Chronic | ICD-10-CM

## 2022-12-12 RX ORDER — SULFAMETHOXAZOLE AND TRIMETHOPRIM 800; 160 MG/1; MG/1
1 TABLET ORAL EVERY 12 HOURS SCHEDULED
Qty: 14 TABLET | Refills: 0 | Status: SHIPPED | OUTPATIENT
Start: 2022-12-12 | End: 2022-12-19

## 2022-12-12 NOTE — ASSESSMENT & PLAN NOTE
Was seen in the hospital   MRI and CT scan of brain was negative  He was told that patient have TIA    Was also seen by neurology

## 2022-12-12 NOTE — PATIENT INSTRUCTIONS

## 2022-12-12 NOTE — ASSESSMENT & PLAN NOTE
Lab Results   Component Value Date    HGBA1C 10 9 (H) 10/19/2022   Still uncontrolled    Being followed by endocrinologist

## 2022-12-12 NOTE — ASSESSMENT & PLAN NOTE
We will start patient on Bactrim DS 1 p o  twice daily  For 1 week  Also advised to use Neosporin ointment 2-3 times per day

## 2022-12-12 NOTE — PROGRESS NOTES
Assessment/Plan:    Cellulitis of face  We will start patient on Bactrim DS 1 p o  twice daily  For 1 week  Also advised to use Neosporin ointment 2-3 times per day  Chronic GERD  Doing well on present regimen    Type 2 diabetes mellitus without complication, with long-term current use of insulin ([facility])    Lab Results   Component Value Date    HGBA1C 10 9 (H) 10/19/2022   Still uncontrolled  Being followed by endocrinologist    Hypothyroidism  Continue with present regimen    Cerebrovascular accident (CVA), unspecified mechanism ([place] [id number] )  Was seen in the hospital   MRI and CT scan of brain was negative  He was told that patient have TIA  Was also seen by neurology       Diagnoses and all orders for this visit:    Cellulitis of face  -     sulfamethoxazole-trimethoprim (BACTRIM DS) 800-160 mg per tablet; Take 1 tablet by mouth every 12 (twelve) hours for 7 days    Cerebrovascular accident (CVA), unspecified mechanism ([place] [id number] )    Type 2 diabetes mellitus with hyperglycemia, with long-term current use of insulin ([facility])    Chronic GERD    Depression, recurrent ([facility])    Seizure ([place] [id number] )    Type 2 diabetes mellitus without complication, with long-term current use of insulin ([facility])    Hypothyroidism, unspecified type               Subjective:          Patient ID: Tammie Gonzalez is a 44 y o  male  Patient came to office with a complaint of redness under left eye and also noticed small pimple  No visual issues  The following portions of the patient's history were reviewed and updated as appropriate: allergies, current medications, past family history, past medical history, past social history, past surgical history and problem list     Review of Systems   Constitutional: Negative for fatigue and fever  HENT: Negative for congestion, ear discharge, ear pain, postnasal drip, sinus pressure, sore throat, tinnitus and trouble swallowing  Eyes: Negative for discharge, itching and visual disturbance     Respiratory: Negative for cough and shortness of breath  Cardiovascular: Negative for chest pain and palpitations  Gastrointestinal: Negative for abdominal pain, diarrhea, nausea and vomiting  Endocrine: Negative for cold intolerance and polyuria  Genitourinary: Negative for difficulty urinating, dysuria and urgency  Musculoskeletal: Negative for arthralgias and neck pain  Skin: Positive for color change  Negative for rash  Allergic/Immunologic: Negative for environmental allergies  Neurological: Negative for dizziness, weakness and headaches  Psychiatric/Behavioral: Negative for agitation and behavioral problems  The patient is not nervous/anxious  Past Medical History:   Diagnosis Date   • COVID-19 03/17/2021   • Diabetes mellitus ([facility] [address] )     type 2   • Disease of thyroid gland     hypothyroidism   • Hyperlipidemia    • Hypertension    • 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   • Psychiatric disorder     PTSD   Anxiety, depression,    • Psychogenic nonepileptic spells          Current Outpatient Medications:   •  Advair Diskus 500-50 MCG/DOSE inhaler, USE 1 INHALATION TWICE A DAY (RINSE MOUTH AFTER USE), Disp: 180 blister, Rfl: 3  •  albuterol (PROVENTIL HFA,VENTOLIN HFA) 90 mcg/act inhaler, Inhale 2 puffs every 6 (six) hours as needed for wheezing or shortness of breath, Disp: 18 g, Rfl: 3  •  ARIPiprazole (ABILIFY) 20 MG tablet, Take 15 mg by mouth daily, Disp: , Rfl:   •  clonazePAM (KlonoPIN) 1 mg tablet, Take 1 mg by mouth daily  , Disp: , Rfl:   •  Continuous Blood Gluc  (Dexcom G6 ) CASSY, Use 1 Device every 3 (three) months, Disp: 1 each, Rfl: 1  •  Continuous Blood Gluc Sensor (Dexcom G6 Sensor) MISC, uSE ONE SENSOR EVERY EVERY TEN DAYS, Disp: 3 each, Rfl: 5  •  Continuous Blood Gluc Transmit (Dexcom G6 Transmitter) MISC, Use 1 Device in the morning, Disp: 1 each, Rfl: 0  •  fluticasone (FLONASE) 50 mcg/act nasal spray, 1 spray into each nostril daily, Disp: 18 2 mL, Rfl: 1  • gabapentin (Neurontin) 300 mg capsule, Take 1 capsule (300 mg total) by mouth daily at bedtime, Disp: 30 capsule, Rfl: 0  •  insulin glargine (Lantus) 100 units/mL subcutaneous injection, Inject 50 Units under the skin every 12 (twelve) hours Basal insulin as split into 2 doses, take 30 units in the morning and 30 units before bed, Disp: 60 mL, Rfl: 5  •  insulin glargine (LANTUS) 100 units/mL subcutaneous injection, INJECT 28UNITS UNDER THE SKIN ONCE EVERY DAY, Disp: , Rfl:   •  Insulin Pen Needle (BD Pen Needle Rossana U/F) 32G X 4 MM MISC, Use 4/day, Disp: 100 each, Rfl: 3  •  levothyroxine 25 mcg tablet, Take 2 tablets (50 mcg total) by mouth daily 50, Disp: 90 tablet, Rfl: 1  •  loratadine (CLARITIN) 10 mg tablet, Take 10 mg by mouth daily, Disp: , Rfl:   •  metoprolol succinate (TOPROL-XL) 25 mg 24 hr tablet, Take 1 tablet (25 mg total) by mouth daily, Disp: 90 tablet, Rfl: 1  •  pravastatin (PRAVACHOL) 40 mg tablet, Take 1 tablet (40 mg total) by mouth daily, Disp: 90 tablet, Rfl: 1  •  sertraline (ZOLOFT) 100 mg tablet, Take 200 mg by mouth daily  , Disp: , Rfl:   •  sulfamethoxazole-trimethoprim (BACTRIM DS) 800-160 mg per tablet, Take 1 tablet by mouth every 12 (twelve) hours for 7 days, Disp: 14 tablet, Rfl: 0  •  aspirin (ECOTRIN LOW STRENGTH) 81 mg EC tablet, Take 1 tablet (81 mg total) by mouth daily Do not start before October 21, 2022   (Patient not taking: Reported on 12/12/2022), Disp: 30 tablet, Rfl: 0  •  clopidogrel (Plavix) 75 mg tablet, Take 1 tablet (75 mg total) by mouth daily for 21 days, Disp: 21 tablet, Rfl: 0    Allergies   Allergen Reactions   • Bupropion Anxiety   • Lamotrigine GI Intolerance   • Niacin Rash   • Simvastatin Other (See Comments), Rash and Hives     Elevated liver enzymes  Liver enzyme elevation       Social History   Past Surgical History:   Procedure Laterality Date   • MOUTH SURGERY  08/2021   • WISDOM TOOTH EXTRACTION       Family History   Problem Relation Age of Onset • Hyperlipidemia Father    • Heart attack Paternal Grandfather    • Prostate cancer Paternal Grandfather    • Cancer Paternal Grandmother        Objective:  /80 (BP Location: Left arm, Patient Position: Sitting, Cuff Size: Adult)   Pulse 63   Temp 98 1 °F (36 7 °C) (Temporal)   Ht 5' 5" (1 651 m)   Wt 85 7 kg (189 lb)   SpO2 98%   BMI 31 45 kg/m²   Body mass index is 31 45 kg/m²  Physical Exam  Constitutional:       Appearance: He is well-developed  He is not ill-appearing or diaphoretic  HENT:      Head: Normocephalic  Right Ear: External ear normal  There is no impacted cerumen  Left Ear: External ear normal  There is no impacted cerumen  Nose: No rhinorrhea  Mouth/Throat:      Pharynx: No posterior oropharyngeal erythema  Eyes:      General: No scleral icterus  Pupils: Pupils are equal, round, and reactive to light  Neck:      Thyroid: No thyromegaly  Vascular: No carotid bruit  Trachea: No tracheal deviation  Cardiovascular:      Rate and Rhythm: Normal rate and regular rhythm  Heart sounds: Normal heart sounds  Pulmonary:      Effort: Pulmonary effort is normal  No respiratory distress  Breath sounds: Normal breath sounds  Chest:      Chest wall: No tenderness  Abdominal:      General: Bowel sounds are normal       Palpations: Abdomen is soft  There is no mass  Tenderness: There is no abdominal tenderness  Musculoskeletal:         General: Normal range of motion  Cervical back: Normal range of motion and neck supple  Right lower leg: No edema  Left lower leg: No edema  Lymphadenopathy:      Cervical: No cervical adenopathy  Skin:     General: Skin is warm  Findings: Erythema present  Comments: Over left cheek small pimple surrounded by hyperemia and small induration noted  No fluctuation  No abscess   Neurological:      Mental Status: He is alert and oriented to person, place, and time        Cranial Nerves: No cranial nerve deficit

## 2022-12-22 ENCOUNTER — APPOINTMENT (EMERGENCY)
Dept: CT IMAGING | Facility: HOSPITAL | Age: 37
End: 2022-12-22

## 2022-12-22 ENCOUNTER — APPOINTMENT (EMERGENCY)
Dept: RADIOLOGY | Facility: HOSPITAL | Age: 37
End: 2022-12-22

## 2022-12-22 ENCOUNTER — HOSPITAL ENCOUNTER (EMERGENCY)
Facility: HOSPITAL | Age: 37
Discharge: HOME | End: 2022-12-22
Attending: EMERGENCY MEDICINE

## 2022-12-22 VITALS
SYSTOLIC BLOOD PRESSURE: 151 MMHG | DIASTOLIC BLOOD PRESSURE: 89 MMHG | HEART RATE: 74 BPM | RESPIRATION RATE: 16 BRPM | BODY MASS INDEX: 31.45 KG/M2 | OXYGEN SATURATION: 97 % | TEMPERATURE: 98.1 F | WEIGHT: 189 LBS
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
## 2022-12-22 DIAGNOSIS — S06.0XAA CONCUSSION: ICD-10-CM
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
## 2023-01-23 ENCOUNTER — OFFICE VISIT (OUTPATIENT)
Dept: ENDOCRINOLOGY | Facility: CLINIC | Age: 38
End: 2023-01-23

## 2023-01-23 VITALS
BODY MASS INDEX: 30.16 KG/M2 | TEMPERATURE: 97.6 F | SYSTOLIC BLOOD PRESSURE: 138 MMHG | RESPIRATION RATE: 16 BRPM | HEIGHT: 65 IN | WEIGHT: 181 LBS | DIASTOLIC BLOOD PRESSURE: 70 MMHG | HEART RATE: 84 BPM | OXYGEN SATURATION: 95 %

## 2023-01-23 DIAGNOSIS — E03.9 HYPOTHYROIDISM, UNSPECIFIED TYPE: Chronic | ICD-10-CM

## 2023-01-23 DIAGNOSIS — E66.9 OBESITY (BMI 30.0-34.9): ICD-10-CM

## 2023-01-23 DIAGNOSIS — E11.9 TYPE 2 DIABETES MELLITUS WITHOUT COMPLICATION, WITH LONG-TERM CURRENT USE OF INSULIN (HCC): ICD-10-CM

## 2023-01-23 DIAGNOSIS — I10 ESSENTIAL HYPERTENSION: Chronic | ICD-10-CM

## 2023-01-23 DIAGNOSIS — E78.5 HYPERLIPIDEMIA, UNSPECIFIED HYPERLIPIDEMIA TYPE: ICD-10-CM

## 2023-01-23 DIAGNOSIS — E11.65 TYPE 2 DIABETES MELLITUS WITH HYPERGLYCEMIA, WITH LONG-TERM CURRENT USE OF INSULIN (HCC): Primary | ICD-10-CM

## 2023-01-23 DIAGNOSIS — Z79.4 TYPE 2 DIABETES MELLITUS WITH HYPERGLYCEMIA, WITH LONG-TERM CURRENT USE OF INSULIN (HCC): Primary | ICD-10-CM

## 2023-01-23 DIAGNOSIS — Z79.4 TYPE 2 DIABETES MELLITUS WITHOUT COMPLICATION, WITH LONG-TERM CURRENT USE OF INSULIN (HCC): ICD-10-CM

## 2023-01-23 RX ORDER — PEN NEEDLE, DIABETIC 32GX 5/32"
NEEDLE, DISPOSABLE MISCELLANEOUS
Qty: 100 EACH | Refills: 3 | Status: SHIPPED | OUTPATIENT
Start: 2023-01-23

## 2023-01-23 RX ORDER — BLOOD-GLUCOSE TRANSMITTER
1 EACH MISCELLANEOUS DAILY
Qty: 1 EACH | Refills: 0 | Status: SHIPPED | OUTPATIENT
Start: 2023-01-23

## 2023-01-23 RX ORDER — INSULIN ASPART 100 [IU]/ML
15 INJECTION, SOLUTION INTRAVENOUS; SUBCUTANEOUS
Qty: 40.5 ML | Refills: 0 | Status: SHIPPED | OUTPATIENT
Start: 2023-01-23 | End: 2023-04-23

## 2023-01-23 NOTE — PATIENT INSTRUCTIONS
Continue Lantus at current dose  NovoLog 15 units 15 minutes before each meal  I ordered Ozempic you will take 0 25 for first 4 weeks and then 0 5 weekly  Please start checking her blood sugars with the fingerstick until you get your sensor,  Please call the office in 2 weeks and asked them to download your report  Call the office if your blood sugars are consistently above 300 or below 80

## 2023-01-23 NOTE — PROGRESS NOTES
Established patient Progress Note      Cc: diabetes    Referring Provider  Nadine Griffith, [address],  [address]     History of Present Illness:   Shay Jack is a 44 y o  male with a history of type 2 diabetes with long term use of insulin since 2010 who presented for follow up he was [facility] [place] [facility] and last visit was in July 2022  Reports complications of stoke  Denies recent illness or hospitalizations  Denies recent severe hypoglycemic or severe hyperglycemic episodes  Denies any issues with his current regimen  home glucose monitoring via Dexcom however he is not using it since December as he lost Transmitter  Current regimen:   Lantus 50 units twice a day  Not on Novolog since his last visit for stroke   Ozempic : not taking as was issue with his insurance  Jardiance, not taking due to thigh infection  Metformin caused him diarrhea        Home blood glucose readings:   Not checking    Hypoglycemic episodes:   H/o of hypoglycemia causing hospitalization or Intervention such as glucagon injection  or ambulance call :no   Hypoglycemia symptoms:none   Treatment of hypoglycemia: none      Diabetes education: YES  Diet: 3 meals per day,  Physically active: No              Opthamology: will follow, is overdue  Podiatry: no      Has hypertension: followed by PCP; on Metoprolol  Has hyperlipidemia: followed by PCP; not taking statin     Thyroid disorders:  hypothyroidism on levothyroxine 50 MCG once daily  History of pancreatitis: no     Patient Active Problem List   Diagnosis   • Anxiety   • PTSD (post-traumatic stress disorder)   • Hyperlipidemia   • Type 2 diabetes mellitus without complication, with long-term current use of insulin ([facility])   • ENRIKE (obstructive sleep apnea)   • Chronic GERD   • Oropharyngeal dysphagia   • COVID   • Bradycardic baseline fetal heart rate   • Essential hypertension   • Moderate persistent asthma   • 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[facility])   • Acute bilateral low back pain with bilateral sciatica   • Hypothyroidism   • Type 2 diabetes mellitus with hyperglycemia, with long-term current use of insulin (HCC)   • Class 1 obesity due to excess calories with serious comorbidity and body mass index (BMI) of 30 0 to 30 9 in adult   • Alopecia   • Acute midline low back pain without sciatica   • COVID-19 vaccination refused   • Seizure [facility])   • Psychogenic nonepileptic spells   • Chronic headache   • Foot pain, left   • Depression, recurrent ([facility])   • Back abscess   • Obesity (BMI 30 0-34  9)   • Left hand pain   • Stroke-like symptoms   • Hyponatremia   • Cerebrovascular accident (CVA), unspecified mechanism ([facility] [address] )   • Cellulitis of face      Past Medical History:   Diagnosis Date   • COVID-19 03/17/2021   • Diabetes mellitus (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   • Psychiatric disorder     PTSD   Anxiety, depression,    • Psychogenic nonepileptic spells       Past Surgical History:   Procedure Laterality Date   • MOUTH SURGERY  08/2021   • WISDOM TOOTH EXTRACTION        Family History   Problem Relation Age of Onset   • Hyperlipidemia Father    • Heart attack Paternal Grandfather    • Prostate cancer Paternal Grandfather    • Cancer Paternal Grandmother      Social History     Tobacco Use   • Smoking status: Never   • Smokeless tobacco: Never   Substance Use Topics   • Alcohol use: Not Currently     Allergies   Allergen Reactions   • Bupropion Anxiety   • Lamotrigine GI Intolerance   • Niacin Rash   • Simvastatin Other (See Comments), Rash and Hives     Elevated liver enzymes  Liver enzyme elevation         Current Outpatient Medications:   •  Advair Diskus 500-50 MCG/DOSE inhaler, USE 1 INHALATION TWICE A DAY (RINSE MOUTH AFTER USE), Disp: 180 blister, Rfl: 3  •  albuterol (PROVENTIL HFA,VENTOLIN HFA) 90 mcg/act inhaler, Inhale 2 puffs every 6 (six) hours as needed for wheezing or shortness of breath, Disp: 18 g, Rfl: 3  •  ARIPiprazole (ABILIFY) 20 MG tablet, Take 15 mg by mouth daily, Disp: , Rfl:   •  aspirin (ECOTRIN LOW STRENGTH) 81 mg EC tablet, Take 1 tablet (81 mg total) by mouth daily Do not start before October 21, 2022   (Patient not taking: Reported on 12/12/2022), Disp: 30 tablet, Rfl: 0  •  clonazePAM (KlonoPIN) 1 mg tablet, Take 1 mg by mouth daily  , Disp: , Rfl:   •  clopidogrel (Plavix) 75 mg tablet, Take 1 tablet (75 mg total) by mouth daily for 21 days, Disp: 21 tablet, Rfl: 0  •  Continuous Blood Gluc  (Dexcom G6 ) CASSY, Use 1 Device every 3 (three) months, Disp: 1 each, Rfl: 1  •  Continuous Blood Gluc Sensor (Dexcom G6 Sensor) MISC, uSE ONE SENSOR EVERY EVERY TEN DAYS, Disp: 3 each, Rfl: 5  •  Continuous Blood Gluc Transmit (Dexcom G6 Transmitter) MISC, Use 1 Device in the morning, Disp: 1 each, Rfl: 0  •  fluticasone (FLONASE) 50 mcg/act nasal spray, 1 spray into each nostril daily, Disp: 18 2 mL, Rfl: 1  •  gabapentin (Neurontin) 300 mg capsule, Take 1 capsule (300 mg total) by mouth daily at bedtime, Disp: 30 capsule, Rfl: 0  •  insulin glargine (Lantus) 100 units/mL subcutaneous injection, Inject 50 Units under the skin every 12 (twelve) hours Basal insulin as split into 2 doses, take 30 units in the morning and 30 units before bed, Disp: 60 mL, Rfl: 5  •  insulin glargine (LANTUS) 100 units/mL subcutaneous injection, INJECT 28UNITS UNDER THE SKIN ONCE EVERY DAY, Disp: , Rfl:   •  Insulin Pen Needle (BD Pen Needle Rossana U/F) 32G X 4 MM MISC, Use 4/day, Disp: 100 each, Rfl: 3  •  levothyroxine 25 mcg tablet, Take 2 tablets (50 mcg total) by mouth daily 50, Disp: 90 tablet, Rfl: 1  •  loratadine (CLARITIN) 10 mg tablet, Take 10 mg by mouth daily, Disp: , Rfl:   •  metoprolol succinate (TOPROL-XL) 25 mg 24 hr tablet, Take 1 tablet (25 mg total) by mouth daily, Disp: 90 tablet, Rfl: 1  •  pravastatin (PRAVACHOL) 40 mg tablet, Take 1 tablet (40 mg total) by mouth daily, Disp: 90 tablet, Rfl: 1  •  sertraline (ZOLOFT) 100 mg tablet, Take 200 mg by mouth daily  , Disp: , Rfl:   Review of Systems   Constitutional: Positive for appetite change, fatigue and unexpected weight change  Negative for activity change, chills, diaphoresis and fever  HENT: Negative for congestion, drooling, ear discharge, ear pain, trouble swallowing and voice change  Eyes: Negative for photophobia, pain, discharge, redness, itching and visual disturbance  Respiratory: Negative for cough, chest tightness, shortness of breath and wheezing  Cardiovascular: Negative for chest pain, palpitations and leg swelling  Gastrointestinal: Negative for abdominal distention, abdominal pain, blood in stool, diarrhea, nausea and vomiting  Endocrine: Negative for cold intolerance, heat intolerance, polydipsia, polyphagia and polyuria  Genitourinary: Negative for dysuria, flank pain, frequency and hematuria  Musculoskeletal: Negative for back pain, gait problem, joint swelling, myalgias, neck pain and neck stiffness  Skin: Negative for color change, pallor, rash and wound  Neurological: Negative for dizziness, tremors, seizures, syncope, speech difficulty, weakness, numbness and headaches  Psychiatric/Behavioral: Positive for sleep disturbance  Negative for agitation  The patient is nervous/anxious  All other systems reviewed and are negative  Physical Exam:  Body mass index is 30 12 kg/m²    /70 (BP Location: Right arm, Patient Position: Sitting, Cuff Size: Standard)   Pulse 84   Temp 97 6 °F (36 4 °C) (Tympanic)   Resp 16   Ht 5' 5" (1 651 m)   Wt 82 1 kg (181 lb)   SpO2 95%   BMI 30 12 kg/m²    Wt Readings from Last 3 Encounters:   01/23/23 82 1 kg (181 lb)   12/22/22 85 7 kg (189 lb)   12/12/22 85 7 kg (189 lb)       GEN: NAD  E/n/m nl facies, hearing intact bilat, tongue midline, lips nl  Eyes: no stare or proptosis, nl lids and conjunctiva, EOMI  Neck: trachea midline, thyroid NT to palpation, nl in size, no nodules or neck masses noted, no cervical LAD  CV; heart reg rate s1s2 nl, no m/r/g appreciated, no JANELLE  Resp: CTAB, good effort  Ab+BS  Neuro: no tremor, 2+ DTRs in BUE  MS: no c/c in digits, moves all 4 ext, nl muscle bulk, gait nl  Skin: warm and dry, no palmar erythema  Psych: nl mood and affect, no gross lapses in memory  Patient's shoes and socks removed  Right Foot/Ankle   Right Foot Inspection  Skin Exam: skin normal  Skin not intact, no dry skin, no warmth, no callus, no erythema, no maceration, no abnormal color, no pre-ulcer, no ulcer and no callus  Toe Exam: No swelling, no tenderness, erythema and  no right toe deformity    Sensory   Vibration: intact  Proprioception: intact  Monofilament testing: intact    Vascular  Capillary refills: < 3 seconds  The right DP pulse is 2+  The right PT pulse is 2+  Left Foot/Ankle  Left Foot Inspection  Skin Exam: skin normal  Skin not intact, no dry skin, no warmth, no erythema, no maceration, normal color, no pre-ulcer, no ulcer and no callus  Toe Exam: No swelling, no tenderness, no erythema and no left toe deformity  Sensory   Vibration: intact  Proprioception: intact  Monofilament testing: intact    Vascular  Capillary refills: < 3 seconds  The left DP pulse is 2+  The left PT pulse is 2+       Assign Risk Category  No deformity present  No loss of protective sensation  No weak pulses  Risk: 0      Labs:   No components found for: HA1C  No components found for: GLU    Lab Results   Component Value Date    CREATININE 1 11 10/19/2022    CREATININE 1 05 10/18/2022    CREATININE 1 00 10/21/2021    BUN 18 10/19/2022     01/03/2015    K 4 1 10/19/2022    CL 97 10/19/2022    CO2 28 10/19/2022     eGFR   Date Value Ref Range Status   10/19/2022 83 ml/min/1 73sq m Final     No components found for: [facility]    Lab Results Component Value Date    HDL 25 (L) 10/19/2022    TRIG 541 (H) 10/19/2022       Lab Results   Component Value Date    ALT 52 10/20/2021    AST 39 10/20/2021    ALKPHOS 71 10/20/2021    BILITOT 0 7 01/03/2015       Lab Results   Component Value Date    FREET4 0 86 10/19/2021       Impression:  1  Type 2 diabetes mellitus with hyperglycemia, with long-term current use of insulin ([facility] [address] )    2  Essential hypertension    3  Obesity (BMI 30 0-34 9)    4  Hypothyroidism, unspecified type    5  Hyperlipidemia, unspecified hyperlipidemia type    6  Type 2 diabetes mellitus without complication, with long-term current use of insulin ([facility] [address] )           Plan:    Jose Leo was seen today for hyperthyroidism and diabetes type 2  Diagnoses and all orders for this visit:    Type 2 diabetes mellitus with hyperglycemia, with long-term current use of insulin ([facility] [address] ):  Poorly controlled with a recent A1c of 10 9%, he is not taking short acting insulin, could not tolerate metformin due to diarrhea, not taking Jardiance due to thigh infection  Made following changes:  Continue Lantus at current dose  I added NovoLog 15 units before each meal   I ordered a new transmitter for Dexcom and he will start using and will call the office in 2 weeks to download the report  counseled on adverse side effects of GLP-1 agonist including nausea, vomiting, pancreatitis, medullary thyroid cancer, gallstones, cholecystitis  No FHx of MEN2  He understood these risks and wished to start therapy  I started Ozempic 0 25 mg once weekly for 4 weeks and then 0 5 mg once weekly  Call the office if blood sugars are consistently above 300 or below 80  Reviewed rule of 15's and treatment of hypoglycemia  Return back in 3 months  Labs as ordered  -     insulin aspart (NovoLOG FlexPen) 100 UNIT/ML injection pen;  Inject 15 Units under the skin 3 (three) times a day with meals  -     semaglutide, 0 25 or 0 5 mg/dose, (Ozempic) 2 mg/1 5 mL injection pen; 0 25 mg once weekly for 4 weeks then 0 5 mg with  -     Insulin Pen Needle (BD Pen Needle Rossana U/F) 32G X 4 MM MISC; Use 4/day  -     Glucometer test strips  -     Glucometer  -     Lancets  -     Hemoglobin A1C; Future  -     Comprehensive metabolic panel; Future  -     Lipid Panel with Direct LDL reflex; Future  -     Vitamin D 25 hydroxy; Future  -     Microalbumin / creatinine urine ratio; Future    Essential hypertension:  Blood pressure at goal   Continue current regimen    -     Microalbumin / creatinine urine ratio; Future    Obesity (BMI 30 0-34  9):  Ozempic 0 25 mg once weekly and will increase to 0 5 mg weekly after 4 weeks    Hypothyroidism, unspecified type: On levothyroxine 25 MCG once daily  We reviewed the proper way to taking levothyroxine which is early in the morning with empty stomach and 1 hour apart from other medications and food  Continue levothyroxine at current dose and complete TSH and free T4      Hyperlipidemia, unspecified hyperlipidemia type:  He stopped taking pravastatin 40  I ordered lipid profile and will discuss regarding the plan  Discussed with the patient and all questioned fully answered  He will call me if any problems arise      Counseled patient on diagnostic results, prognosis, risk and benefit of treatment options, instruction for management, importance of treatment compliance, Risk  factor reduction and impressions      Sydnee Salas MD

## 2023-01-27 ENCOUNTER — RA CDI HCC (OUTPATIENT)
Dept: OTHER | Facility: HOSPITAL | Age: 38
End: 2023-01-27

## 2023-01-27 NOTE — PROGRESS NOTES
[facility]  coding opportunities     I11 0     Chart Reviewed number of suggestions sent to Provider: 1     Patients Insurance     Medicare Insurance: [facility] [address]

## 2023-01-30 ENCOUNTER — OFFICE VISIT (OUTPATIENT)
Dept: INTERNAL MEDICINE CLINIC | Facility: OTHER | Age: 38
End: 2023-01-30

## 2023-01-30 VITALS
HEART RATE: 88 BPM | SYSTOLIC BLOOD PRESSURE: 110 MMHG | OXYGEN SATURATION: 97 % | WEIGHT: 184 LBS | BODY MASS INDEX: 30.66 KG/M2 | TEMPERATURE: 98.2 F | DIASTOLIC BLOOD PRESSURE: 78 MMHG | HEIGHT: 65 IN

## 2023-01-30 DIAGNOSIS — I63.9 CEREBROVASCULAR ACCIDENT (CVA), UNSPECIFIED MECHANISM (HCC): ICD-10-CM

## 2023-01-30 DIAGNOSIS — F43.10 PTSD (POST-TRAUMATIC STRESS DISORDER): ICD-10-CM

## 2023-01-30 DIAGNOSIS — I10 ESSENTIAL HYPERTENSION: Chronic | ICD-10-CM

## 2023-01-30 DIAGNOSIS — Z79.4 TYPE 2 DIABETES MELLITUS WITH HYPERGLYCEMIA, WITH LONG-TERM CURRENT USE OF INSULIN (HCC): ICD-10-CM

## 2023-01-30 DIAGNOSIS — G47.33 OSA (OBSTRUCTIVE SLEEP APNEA): ICD-10-CM

## 2023-01-30 DIAGNOSIS — E11.65 TYPE 2 DIABETES MELLITUS WITH HYPERGLYCEMIA, WITH LONG-TERM CURRENT USE OF INSULIN (HCC): ICD-10-CM

## 2023-01-30 DIAGNOSIS — E11.9 TYPE 2 DIABETES MELLITUS WITHOUT COMPLICATION, WITH LONG-TERM CURRENT USE OF INSULIN (HCC): Primary | Chronic | ICD-10-CM

## 2023-01-30 DIAGNOSIS — F33.9 DEPRESSION, RECURRENT (HCC): ICD-10-CM

## 2023-01-30 DIAGNOSIS — E03.9 HYPOTHYROIDISM, UNSPECIFIED TYPE: Chronic | ICD-10-CM

## 2023-01-30 DIAGNOSIS — E66.9 OBESITY (BMI 30.0-34.9): ICD-10-CM

## 2023-01-30 DIAGNOSIS — Z79.4 TYPE 2 DIABETES MELLITUS WITHOUT COMPLICATION, WITH LONG-TERM CURRENT USE OF INSULIN (HCC): Primary | Chronic | ICD-10-CM

## 2023-01-30 DIAGNOSIS — K21.9 CHRONIC GERD: Chronic | ICD-10-CM

## 2023-01-30 PROBLEM — R13.12 OROPHARYNGEAL DYSPHAGIA: Status: RESOLVED | Noted: 2021-02-01 | Resolved: 2023-01-30

## 2023-01-30 PROBLEM — L02.212 BACK ABSCESS: Status: RESOLVED | Noted: 2022-05-02 | Resolved: 2023-01-30

## 2023-01-30 PROBLEM — E87.1 HYPONATREMIA: Status: RESOLVED | Noted: 2022-10-19 | Resolved: 2023-01-30

## 2023-01-30 PROBLEM — R56.9 SEIZURE (HCC): Status: RESOLVED | Noted: 2021-10-19 | Resolved: 2023-01-30

## 2023-01-30 PROBLEM — L03.211 CELLULITIS OF FACE: Status: RESOLVED | Noted: 2022-12-12 | Resolved: 2023-01-30

## 2023-01-30 PROBLEM — R29.90 STROKE-LIKE SYMPTOMS: Status: RESOLVED | Noted: 2022-10-19 | Resolved: 2023-01-30

## 2023-01-30 LAB — SL AMB POCT HEMOGLOBIN AIC: 13.5 (ref ?–6.5)

## 2023-01-30 RX ORDER — RISPERIDONE 2 MG/1
2 TABLET ORAL
COMMUNITY
Start: 2023-01-26

## 2023-01-30 NOTE — ASSESSMENT & PLAN NOTE
Continue with present dose of levothyroxine    We will check thyroid function test before next visit

## 2023-01-30 NOTE — PROGRESS NOTES
Assessment/Plan:    Chronic GERD  Stable on present regimen  Continue with healthy diet    Type 2 diabetes mellitus without complication, with long-term current use of insulin (HCC)    Lab Results   Component Value Date    HGBA1C 10 9 (H) 10/19/2022   Hemoglobin A1c done in office today is 13 5  Which is very seriously uncontrolled  Patient is also being followed by endocrinologist   Mikey Felderet him to follow-up with them as soon as possible for further adjustment  Hypothyroidism  Continue with present dose of levothyroxine  We will check thyroid function test before next visit    ENRIKE (obstructive sleep apnea)  Continue with CPAP machine  As per patient he is well compliant    Essential hypertension  Blood pressure stable on present regimen    Cerebrovascular accident (CVA), unspecified mechanism ([facility] [address] )  No residual deficit  Being followed by neurology    Depression, recurrent ([facility] )  Being managed by psychiatry       Diagnoses and all orders for this visit:    Type 2 diabetes mellitus without complication, with long-term current use of insulin ([facility] [address] )  -     Basic metabolic panel; Future    Hypothyroidism, unspecified type  -     Basic metabolic panel; Future  -     TSH, 3rd generation with Free T4 reflex; Future    Type 2 diabetes mellitus with hyperglycemia, with long-term current use of insulin (HCC)    ENRIKE (obstructive sleep apnea)    Cerebrovascular accident (CVA), unspecified mechanism (HCC)    PTSD (post-traumatic stress disorder)    Depression, recurrent ([address] )    Obesity (BMI 30 0-34  9)    Chronic GERD    Essential hypertension    Other orders  -     risperiDONE (RisperDAL) 2 mg tablet; 2 mg          BMI Counseling: Body mass index is 30 62 kg/m²  The BMI is above normal  Nutrition recommendations include decreasing portion sizes, encouraging healthy choices of fruits and vegetables and decreasing fast food intake   Exercise recommendations include vigorous physical activity 75 minutes/week and exercising 3-5 times per week  Rationale for BMI follow-up plan is due to patient being overweight or obese  Subjective:          Patient ID: Tiffanei Talbert is a 44 y o  male  Patient is here for follow-up  No blood work prior to this visit  Denied any new complaints  Recently he was seen by endocrinologist       The following portions of the patient's history were reviewed and updated as appropriate: allergies, current medications, past family history, past medical history, past social history, past surgical history and problem list     Review of Systems   Constitutional: Negative for fatigue and fever  HENT: Negative for congestion, ear discharge, ear pain, postnasal drip, sinus pressure, sore throat, tinnitus and trouble swallowing  Eyes: Negative for discharge, itching and visual disturbance  Respiratory: Negative for cough and shortness of breath  Cardiovascular: Negative for chest pain and palpitations  Gastrointestinal: Negative for abdominal pain, diarrhea, nausea and vomiting  Endocrine: Negative for cold intolerance and polyuria  Genitourinary: Negative for difficulty urinating, dysuria and urgency  Musculoskeletal: Negative for arthralgias and neck pain  Skin: Negative for rash  Allergic/Immunologic: Negative for environmental allergies  Neurological: Negative for dizziness, weakness and headaches  Psychiatric/Behavioral: Negative for agitation and behavioral problems  The patient is nervous/anxious  Past Medical History:   Diagnosis Date   • COVID-19 03/17/2021   • Diabetes mellitus ([facility] [address] )     type 2   • Disease of thyroid gland     hypothyroidism   • Hyperlipidemia    • Hypertension    • 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   • Psychiatric disorder     PTSD   Anxiety, depression,    • Psychogenic nonepileptic spells          Current Outpatient Medications:   •  Advair Diskus 500-50 MCG/DOSE inhaler, USE 1 INHALATION TWICE A DAY (RINSE MOUTH AFTER USE), Disp: 180 blister, Rfl: 3  •  albuterol (PROVENTIL HFA,VENTOLIN HFA) 90 mcg/act inhaler, Inhale 2 puffs every 6 (six) hours as needed for wheezing or shortness of breath, Disp: 18 g, Rfl: 3  •  clonazePAM (KlonoPIN) 1 mg tablet, Take 1 mg by mouth daily  , Disp: , Rfl:   •  Continuous Blood Gluc  (Dexcom G6 ) CASSY, Use 1 Device every 3 (three) months, Disp: 1 each, Rfl: 1  •  Continuous Blood Gluc Sensor (Dexcom G6 Sensor) MISC, uSE ONE SENSOR EVERY EVERY TEN DAYS, Disp: 3 each, Rfl: 5  •  Continuous Blood Gluc Transmit (Dexcom G6 Transmitter) MISC, Use 1 Device in the morning, Disp: 1 each, Rfl: 0  •  insulin aspart (NovoLOG FlexPen) 100 UNIT/ML injection pen, Inject 15 Units under the skin 3 (three) times a day with meals, Disp: 40 5 mL, Rfl: 0  •  Insulin Pen Needle (BD Pen Needle Rossana U/F) 32G X 4 MM MISC, Use 4/day, Disp: 100 each, Rfl: 3  •  levothyroxine 25 mcg tablet, Take 2 tablets (50 mcg total) by mouth daily 50, Disp: 90 tablet, Rfl: 1  •  loratadine (CLARITIN) 10 mg tablet, Take 10 mg by mouth daily, Disp: , Rfl:   •  metoprolol succinate (TOPROL-XL) 25 mg 24 hr tablet, Take 1 tablet (25 mg total) by mouth daily, Disp: 90 tablet, Rfl: 1  •  pravastatin (PRAVACHOL) 40 mg tablet, Take 1 tablet (40 mg total) by mouth daily, Disp: 90 tablet, Rfl: 1  •  risperiDONE (RisperDAL) 2 mg tablet, 2 mg, Disp: , Rfl:   •  semaglutide, 0 25 or 0 5 mg/dose, (Ozempic) 2 mg/1 5 mL injection pen, 0 25 mg once weekly for 4 weeks then 0 5 mg with, Disp: 4 5 mL, Rfl: 1  •  sertraline (ZOLOFT) 100 mg tablet, Take 200 mg by mouth daily  , Disp: , Rfl:   •  aspirin (ECOTRIN LOW STRENGTH) 81 mg EC tablet, Take 1 tablet (81 mg total) by mouth daily Do not start before October 21, 2022   (Patient not taking: Reported on 12/12/2022), Disp: 30 tablet, Rfl: 0  •  insulin glargine (Lantus) 100 units/mL subcutaneous injection, Inject 50 Units under the skin every 12 (twelve) hours Basal insulin as split into 2 doses, take 30 units in the morning and 30 units before bed, Disp: 60 mL, Rfl: 5    Allergies   Allergen Reactions   • Bupropion Anxiety   • Lamotrigine GI Intolerance   • Niacin Rash   • Simvastatin Other (See Comments), Rash and Hives     Elevated liver enzymes  Liver enzyme elevation       Social History   Past Surgical History:   Procedure Laterality Date   • MOUTH SURGERY  08/2021   • WISDOM TOOTH EXTRACTION       Family History   Problem Relation Age of Onset   • Hyperlipidemia Father    • Heart attack Paternal Grandfather    • Prostate cancer Paternal Grandfather    • Cancer Paternal Grandmother        Objective:  /78 (BP Location: Left arm, Patient Position: Sitting, Cuff Size: Adult)   Pulse 88   Temp 98 2 °F (36 8 °C) (Temporal)   Ht 5' 5" (1 651 m)   Wt 83 5 kg (184 lb)   SpO2 97%   BMI 30 62 kg/m²   Body mass index is 30 62 kg/m²  Physical Exam  Constitutional:       Appearance: He is well-developed  He is not ill-appearing or diaphoretic  HENT:      Head: Normocephalic  Right Ear: External ear normal       Left Ear: External ear normal       Nose: No rhinorrhea  Mouth/Throat:      Pharynx: No posterior oropharyngeal erythema  Eyes:      General: No scleral icterus  Pupils: Pupils are equal, round, and reactive to light  Neck:      Thyroid: No thyromegaly  Trachea: No tracheal deviation  Cardiovascular:      Rate and Rhythm: Normal rate and regular rhythm  Heart sounds: Normal heart sounds  No murmur heard  Pulmonary:      Effort: Pulmonary effort is normal  No respiratory distress  Breath sounds: Normal breath sounds  Chest:      Chest wall: No tenderness  Abdominal:      General: Bowel sounds are normal       Palpations: Abdomen is soft  There is no mass  Tenderness: There is no abdominal tenderness  Musculoskeletal:         General: Normal range of motion  Cervical back: Normal range of motion and neck supple  Right lower leg: No edema        Left lower leg: No edema  Lymphadenopathy:      Cervical: No cervical adenopathy  Skin:     General: Skin is warm  Neurological:      Mental Status: He is alert and oriented to person, place, and time  Cranial Nerves: No cranial nerve deficit     Psychiatric:         Mood and Affect: Mood normal          Behavior: Behavior normal

## 2023-01-30 NOTE — PATIENT INSTRUCTIONS

## 2023-01-31 ENCOUNTER — TELEPHONE (OUTPATIENT)
Dept: ENDOCRINOLOGY | Facility: CLINIC | Age: 38
End: 2023-01-31

## 2023-01-31 NOTE — TELEPHONE ENCOUNTER
Faxed last 6 month office visit notes to [facility] as requested, once fax confirmation is received will be scanned into chart for documentation

## 2023-02-03 ENCOUNTER — TELEPHONE (OUTPATIENT)
Dept: NEUROLOGY | Facility: CLINIC | Age: 38
End: 2023-02-03

## 2023-02-03 NOTE — TELEPHONE ENCOUNTER
Called pt for reminder of appt with Cheyenne Bagley on 02/10/23, pt's wife answered and verified appt

## 2023-02-10 ENCOUNTER — OFFICE VISIT (OUTPATIENT)
Dept: NEUROLOGY | Facility: CLINIC | Age: 38
End: 2023-02-10

## 2023-02-10 VITALS
WEIGHT: 189.7 LBS | OXYGEN SATURATION: 94 % | SYSTOLIC BLOOD PRESSURE: 120 MMHG | TEMPERATURE: 98.1 F | BODY MASS INDEX: 31.57 KG/M2 | DIASTOLIC BLOOD PRESSURE: 70 MMHG | RESPIRATION RATE: 16 BRPM | HEART RATE: 88 BPM

## 2023-02-10 DIAGNOSIS — E11.9 TYPE 2 DIABETES MELLITUS WITHOUT COMPLICATION, WITH LONG-TERM CURRENT USE OF INSULIN (HCC): Chronic | ICD-10-CM

## 2023-02-10 DIAGNOSIS — F44.5 PSYCHOGENIC NONEPILEPTIC SEIZURE: ICD-10-CM

## 2023-02-10 DIAGNOSIS — R29.90 STROKE-LIKE SYMPTOMS: Primary | ICD-10-CM

## 2023-02-10 DIAGNOSIS — Z79.4 TYPE 2 DIABETES MELLITUS WITHOUT COMPLICATION, WITH LONG-TERM CURRENT USE OF INSULIN (HCC): Chronic | ICD-10-CM

## 2023-02-10 DIAGNOSIS — E78.5 HYPERLIPIDEMIA, UNSPECIFIED HYPERLIPIDEMIA TYPE: ICD-10-CM
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
## 2023-02-10 NOTE — PATIENT INSTRUCTIONS
History of Stroke-like symptoms:  -Complete lipid panel blood work before next visit  I would like the patient to have this blood work completed before next visit so in that way we can go over his current statin medication regimen  We will then see if we have to adjust his current statin dose at this time   -Patient should continue to follow with endocrinology at this time for treatment of his diabetes and his current glycemic medication regimen  He should be continuing to check his blood sugars at home  Will defer hemoglobin A1c lab work to endocrinology as well   -Continue to work on lifestyle modifications like balancing his current diet with healthier alternative options  Getting a lot of lean meat and vegetables will help with this  Patient should be limiting the amount of fruit is due to diabetes   -Educated the patient on a physical exercise regimen   -Should continue to use his CPAP for sleep apnea at night   -Continue with risperidone and Zoloft for anxiety/depression/PTSD  - For ongoing stroke prevention continue: Aspirin 81 mg once daily, pravastatin 40 mg once daily  - Discussed the importance of antiplatelet management with the patient to prevent future strokes  - Recommend to check blood pressure occasionally away from the doctor's office to make sure that those numbers are typically less than 130/80  If they are frequently higher than that, we recommend checking a little more often and to follow up with primary care team   - Will defer to primary care team for monitoring of cholesterol panel and blood sugar numbers with target LDL cholesterol of less than 70 and hemoglobin A1c less than 7%  - Recommend following a low salt, mediterranean diet   - Recommend routine physical exercise as tolerated     We will plan for him to return to the office in 4 months time to see on of the APPs or Dr Lei Toure but would be happy to see him sooner if the need should arise    If he has any symptoms concerning for TIA or stroke including sudden painless loss of vision or double vision, difficulty speaking or swallowing, vertigo/room spinning that does not quickly resolve, or weakness/numbness/loss of coordination affecting 1 side of the face or body he should proceed by ambulance to the nearest emergency room immediately

## 2023-02-13 ENCOUNTER — TELEPHONE (OUTPATIENT)
Dept: NEUROLOGY | Facility: CLINIC | Age: 38
End: 2023-02-13
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
## 2023-02-15 NOTE — TELEPHONE ENCOUNTER
Spoke with the patient and explained that we are mandatory reporting state and need to disclose any reports of a LOC  He states his understanding but is not happy  Will continue to follow up as planned

## 2023-03-22 ENCOUNTER — TELEPHONE (OUTPATIENT)
Dept: NEUROLOGY | Facility: CLINIC | Age: 38
End: 2023-03-22

## 2023-03-22 NOTE — TELEPHONE ENCOUNTER
"Recd vm:    Yes, my name is Ladonna Perera,  my phone number is [phone]  My YOB: 1985  I was wondering if you guys had sent in my paperwork yet for my drivers to be reinstated, since I paid for it, its been over a week and I haven't heard anything at appreciate it  Give me a call back  Thank you  I called patient; he is asking for form scanned to media dated 3/14 be completed \"general neurological form\"; he said krystal moran mailed it to him to be completed; He is aware LOC form was completed in February  Odilia Soto, can you assist?  Thanks for your help    "

## 2023-03-23 ENCOUNTER — TELEPHONE (OUTPATIENT)
Dept: NEUROLOGY | Facility: CLINIC | Age: 38
End: 2023-03-23

## 2023-04-03 ENCOUNTER — OFFICE VISIT (OUTPATIENT)
Dept: URGENT CARE | Facility: CLINIC | Age: 38
End: 2023-04-03

## 2023-04-03 VITALS
SYSTOLIC BLOOD PRESSURE: 118 MMHG | DIASTOLIC BLOOD PRESSURE: 70 MMHG | HEIGHT: 65 IN | OXYGEN SATURATION: 97 % | RESPIRATION RATE: 18 BRPM | WEIGHT: 186 LBS | TEMPERATURE: 98.3 F | HEART RATE: 88 BPM | BODY MASS INDEX: 30.99 KG/M2

## 2023-04-03 DIAGNOSIS — R52 BODY ACHES: ICD-10-CM

## 2023-04-03 DIAGNOSIS — R09.82 POSTNASAL DRIP: ICD-10-CM

## 2023-04-03 DIAGNOSIS — J02.9 SORE THROAT: ICD-10-CM

## 2023-04-03 DIAGNOSIS — J06.9 VIRAL UPPER RESPIRATORY TRACT INFECTION: Primary | ICD-10-CM

## 2023-04-03 DIAGNOSIS — R05.1 ACUTE COUGH: ICD-10-CM

## 2023-04-03 LAB — S PYO AG THROAT QL: NEGATIVE

## 2023-04-03 RX ORDER — BENZONATATE 200 MG/1
200 CAPSULE ORAL 3 TIMES DAILY PRN
Qty: 20 CAPSULE | Refills: 0 | Status: SHIPPED | OUTPATIENT
Start: 2023-04-03

## 2023-04-03 RX ORDER — FLUTICASONE PROPIONATE 50 MCG
1 SPRAY, SUSPENSION (ML) NASAL DAILY
Qty: 18.2 ML | Refills: 0 | Status: SHIPPED | OUTPATIENT
Start: 2023-04-03

## 2023-04-03 NOTE — PATIENT INSTRUCTIONS
Start tessalon perles as prescribed  Start Flonase as prescribed  May take over the counter anti-histamine such as Claritin, Zyrtec, or Allegra  Vitamin D3 2000 IU daily  Vitamin C 1000mg twice per day  Multivitamin daily  Fluids and rest  Over the counter cold medication as needed (EX: Mucinex, tylenol/motrin)  Follow up with PCP in 3-5 days  Proceed to ER if symptoms worsen

## 2023-04-03 NOTE — PROGRESS NOTES
[address] [facility]        NAME: Carl Villanueva is a 44 y o  male  : 1985    MRN: [id number]  DATE: April 3, 2023  TIME: 10:40 AM    Assessment and Plan   Viral upper respiratory tract infection [J06 9]  1  Viral upper respiratory tract infection  benzonatate (TESSALON) 200 MG capsule    fluticasone (FLONASE) 50 mcg/act nasal spray      2  Body aches  Covid/Flu-Office Collect      3  Sore throat  POCT rapid strepA    Throat culture      4  Acute cough  benzonatate (TESSALON) 200 MG capsule      5  Postnasal drip  fluticasone (FLONASE) 50 mcg/act nasal spray        POCT rapid strep: negative    Patient Instructions     Start tessalon perles as prescribed  Start Flonase as prescribed  May take over the counter anti-histamine such as Claritin, Zyrtec, or Allegra  Vitamin D3 2000 IU daily  Vitamin C 1000mg twice per day  Multivitamin daily  Fluids and rest  Over the counter cold medication as needed (EX: Mucinex, tylenol/motrin)  Follow up with PCP in 3-5 days  Proceed to ER if symptoms worsen  Chief Complaint     Chief Complaint   Patient presents with   • Cold Like Symptoms     Started early this morning with body aches, productive cough&nasal congestion  History of Present Illness       Patient is a 43 yo male with significant PMH of DM2, HTN, and ?CVA presenting in the clinic today for cold sx which began this AM  Admits productive cough, rhinorrhea, congestion, body aches, and sore throat  Denies fever, chills, headache, ear pain, chest pain, SOB, abdominal pain, n/v/d, and rash  Denies the use of OTC treatment for symptom management  Positive sick contacts at home  Review of Systems   Review of Systems   Constitutional: Positive for chills and fever  Negative for fatigue  HENT: Positive for congestion, rhinorrhea and sore throat  Negative for ear pain, postnasal drip, sinus pressure and sinus pain  Respiratory: Positive for cough  Negative for shortness of breath  Cardiovascular: Negative for chest pain  Gastrointestinal: Negative for abdominal pain, diarrhea, nausea and vomiting  Musculoskeletal: Positive for myalgias  Skin: Negative for rash  Neurological: Negative for headaches           Current Medications       Current Outpatient Medications:   •  Advair Diskus 500-50 MCG/DOSE inhaler, USE 1 INHALATION TWICE A DAY (RINSE MOUTH AFTER USE), Disp: 180 blister, Rfl: 3  •  albuterol (PROVENTIL HFA,VENTOLIN HFA) 90 mcg/act inhaler, Inhale 2 puffs every 6 (six) hours as needed for wheezing or shortness of breath, Disp: 18 g, Rfl: 3  •  benzonatate (TESSALON) 200 MG capsule, Take 1 capsule (200 mg total) by mouth 3 (three) times a day as needed for cough, Disp: 20 capsule, Rfl: 0  •  fluticasone (FLONASE) 50 mcg/act nasal spray, 1 spray into each nostril daily, Disp: 18 2 mL, Rfl: 0  •  insulin aspart (NovoLOG FlexPen) 100 UNIT/ML injection pen, Inject 15 Units under the skin 3 (three) times a day with meals, Disp: 40 5 mL, Rfl: 0  •  Insulin Pen Needle (BD Pen Needle Rossana U/F) 32G X 4 MM MISC, Use 4/day, Disp: 100 each, Rfl: 3  •  levothyroxine 25 mcg tablet, Take 2 tablets (50 mcg total) by mouth daily 50, Disp: 90 tablet, Rfl: 1  •  loratadine (CLARITIN) 10 mg tablet, Take 10 mg by mouth daily, Disp: , Rfl:   •  metoprolol succinate (TOPROL-XL) 25 mg 24 hr tablet, Take 1 tablet (25 mg total) by mouth daily, Disp: 90 tablet, Rfl: 1  •  pravastatin (PRAVACHOL) 40 mg tablet, Take 1 tablet (40 mg total) by mouth daily, Disp: 90 tablet, Rfl: 1  •  risperiDONE (RisperDAL) 2 mg tablet, 2 mg, Disp: , Rfl:   •  sertraline (ZOLOFT) 100 mg tablet, Take 150 mg by mouth daily, Disp: , Rfl:   •  aspirin (ECOTRIN LOW STRENGTH) 81 mg EC tablet, Take 1 tablet (81 mg total) by mouth daily Do not start before October 21, 2022 , Disp: 30 tablet, Rfl: 0  •  Continuous Blood Gluc  (Dexcom G6 ) CASSY, Use 1 Device every 3 (three) months (Patient not taking: Reported on 4/3/2023), Disp: 1 each, Rfl: 1  •  Continuous Blood Gluc Sensor (Dexcom G6 Sensor) MISC, uSE ONE SENSOR EVERY EVERY TEN DAYS (Patient not taking: Reported on 4/3/2023), Disp: 3 each, Rfl: 5  •  Continuous Blood Gluc Transmit (Dexcom G6 Transmitter) MISC, Use 1 Device in the morning (Patient not taking: Reported on 4/3/2023), Disp: 1 each, Rfl: 0  •  insulin glargine (Lantus) 100 units/mL subcutaneous injection, Inject 50 Units under the skin every 12 (twelve) hours Basal insulin as split into 2 doses, take 30 units in the morning and 30 units before bed, Disp: 60 mL, Rfl: 5  •  semaglutide, 0 25 or 0 5 mg/dose, (Ozempic) 2 mg/1 5 mL injection pen, 0 25 mg once weekly for 4 weeks then 0 5 mg with (Patient not taking: Reported on 4/3/2023), Disp: 4 5 mL, Rfl: 1    Current Allergies     Allergies as of 04/03/2023 - Reviewed 04/03/2023   Allergen Reaction Noted   • Bupropion Anxiety 09/01/2022   • Lamotrigine GI Intolerance 09/18/2020   • Niacin Rash 02/11/2018   • Simvastatin Other (See Comments), Rash, and Hives 12/13/2015            The following portions of the patient's history were reviewed and updated as appropriate: allergies, current medications, past family history, past medical history, past social history, past surgical history and problem list      Past Medical History:   Diagnosis Date   • COVID-19 03/17/2021   • Diabetes mellitus ([facility] [address] )     type 2   • Disease of thyroid gland     hypothyroidism   • Hyperlipidemia    • Hypertension    • 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   • Psychiatric disorder     PTSD   Anxiety, depression,    • Psychogenic nonepileptic spells        Past Surgical History:   Procedure Laterality Date   • MOUTH SURGERY  08/2021   • WISDOM TOOTH EXTRACTION         Family History   Problem Relation Age of Onset   • Hyperlipidemia Father    • Heart attack Paternal Grandfather    • Prostate cancer Paternal Grandfather    • Cancer Paternal Grandmother          Medications have been "verified  Objective   /70   Pulse 88   Temp 98 3 °F (36 8 °C)   Resp 18   Ht 5' 5\" (1 651 m)   Wt 84 4 kg (186 lb)   SpO2 97%   BMI 30 95 kg/m²        Physical Exam     Physical Exam  Vitals reviewed  Constitutional:       General: He is not in acute distress  Appearance: Normal appearance  He is normal weight  He is not ill-appearing  HENT:      Head: Normocephalic  Right Ear: Tympanic membrane, ear canal and external ear normal  No middle ear effusion  There is no impacted cerumen  Tympanic membrane is not erythematous or bulging  Left Ear: Tympanic membrane, ear canal and external ear normal   No middle ear effusion  There is no impacted cerumen  Tympanic membrane is not erythematous or bulging  Nose: Congestion present  No rhinorrhea  Right Sinus: No maxillary sinus tenderness or frontal sinus tenderness  Left Sinus: No maxillary sinus tenderness or frontal sinus tenderness  Mouth/Throat:      Lips: Pink  Mouth: Mucous membranes are moist       Pharynx: Oropharynx is clear  Uvula midline  No pharyngeal swelling, oropharyngeal exudate, posterior oropharyngeal erythema or uvula swelling  Tonsils: No tonsillar exudate or tonsillar abscesses  1+ on the right  1+ on the left  Comments: Postnasal drip present  Eyes:      General:         Right eye: No discharge  Left eye: No discharge  Conjunctiva/sclera: Conjunctivae normal    Cardiovascular:      Rate and Rhythm: Normal rate and regular rhythm  Pulses: Normal pulses  Heart sounds: Normal heart sounds  No murmur heard  No friction rub  No gallop  Pulmonary:      Effort: Pulmonary effort is normal       Breath sounds: Normal breath sounds  No wheezing, rhonchi or rales  Musculoskeletal:      Cervical back: Normal range of motion and neck supple  No tenderness  Lymphadenopathy:      Cervical: No cervical adenopathy  Skin:     General: Skin is warm        Capillary " Refill: Capillary refill takes less than 2 seconds  Neurological:      Mental Status: He is alert     Psychiatric:         Mood and Affect: Mood normal          Behavior: Behavior normal

## 2023-04-03 NOTE — LETTER
April 3, 2023     Patient: Abbi Coley   YOB: 1985   Date of Visit: 4/3/2023       To Whom it May Concern:    Solange Luis was seen in my clinic on 4/3/2023  He may return to work on 4/4/2023 or when fever free for 24 hours without a fever-reducing agent  If you have any questions or concerns, please don't hesitate to call           Sincerely,          Swati Allen PA-C        CC: No Recipients

## 2023-04-04 LAB
FLUAV RNA RESP QL NAA+PROBE: NEGATIVE
FLUBV RNA RESP QL NAA+PROBE: NEGATIVE
SARS-COV-2 RNA RESP QL NAA+PROBE: NEGATIVE

## 2023-04-05 LAB — BACTERIA THROAT CULT: NORMAL

## 2023-04-13 PROBLEM — J06.9 UPPER RESPIRATORY INFECTION, ACUTE: Status: ACTIVE | Noted: 2023-04-13

## 2023-04-26 NOTE — TELEPHONE ENCOUNTER
Last seen by Kathi Gates, 2/10; note documents: -Due to the inability to find an explanation for the patient's loss of consciousness at this time and since the incident was not reported at his inpatient stay in the hospital, I did have to submit the loss of consciousness report form to [employer] to have the patient's driving privileges revoked for the time being  Please advise on status to reinstate license if able, thanks for your help

## 2023-04-26 NOTE — TELEPHONE ENCOUNTER
Pt left VM re: 's License  Transcribed VM:   Hi, my name is Cameron Fishman  My phone number is [phone]  Birth date is [birth year]  I need to have my license reinstated because the 18th of April is when it could be and I thought it was supposed to be in or not  If you could please give me a call back at very much appreciate it  Thank you  Bye  Called back and left a VM with call back #

## 2023-04-26 NOTE — TELEPHONE ENCOUNTER
Ed Campos PA-C  You 2 hours ago (1:52 PM)     SARA Penn,     I'm not sure what the patient is asking  If he needs forms filled out for his license to be reinstated he needs to be seen in the office, if that's the case we can try and get him in sooner  But I need clarification I'm not sure what he is asking for  Italia Rodriguez     I spoke to patient and his wife  They expressed their dissatisfaction on process to release DL however he did agree to schedule visit 4/27  Also asked to speak to a Manager; Jessica beard  Patient said he received paperwork to release license and will bring along to appointment; he said the 6 months were up 4/18      Daniel/Loren: patient scheduled for visit 4/27

## 2023-04-26 NOTE — TELEPHONE ENCOUNTER
Spoke with both patient and wife very thoroughly regarding [employer] forms  They had concerns I was able to address all questions and explain at this time Carmen January is more than happy to see patient tomorrow evaluate him and based on the evaluation we will complete forms, submit to [employer] and ultimately [employer] does make the decision whether the patient will get his license back  Patient and wife understood  Also as a courtesy I will waive fee for forms and refund the prior forms we completed to soon  Patients wife and patient were appreciative and confirmed appt with Carmene January tomorrow 04/27

## 2023-04-27 ENCOUNTER — OFFICE VISIT (OUTPATIENT)
Dept: NEUROLOGY | Facility: CLINIC | Age: 38
End: 2023-04-27

## 2023-04-27 VITALS
HEART RATE: 80 BPM | RESPIRATION RATE: 16 BRPM | DIASTOLIC BLOOD PRESSURE: 72 MMHG | SYSTOLIC BLOOD PRESSURE: 122 MMHG | WEIGHT: 190.4 LBS | OXYGEN SATURATION: 97 % | TEMPERATURE: 98 F | BODY MASS INDEX: 31.68 KG/M2

## 2023-04-27 DIAGNOSIS — R29.90 STROKE-LIKE SYMPTOMS: Primary | ICD-10-CM

## 2023-04-27 NOTE — PATIENT INSTRUCTIONS
Stroke-like symptoms/loss of consciousness:    I had the pleasure of examining Albino Gaming today in the office at [facility] [address] [facility] in [place]  He is presenting today as a follow-up of his most recent strokelike symptoms and loss of consciousness  Today, he is looking for his clearance to be able to drive and to be able to submit his paperwork to [facility]  At this time, patient's license was suspended till 04/18/2023  Patient has reported no recent loss of consciousness at this time, no staring spells, and no new strokelike symptoms at this time  He is doing significantly well from my perspective  On physical exam, patient had cranial nerves II through XII intact, symmetric power/strength bilaterally, 5/5 power/strength, intact light touch sensation throughout, normal coordination and finger-nose-finger testing, 2+ deep tendon reflexes throughout except for the left knee which was 1+  From my perspective, I believe that Albino Gaming would be okay to drive going forward and I have no restrictions from my and from a physical exam perspective or for a neurological perspective     -At this time, he should continue to take aspirin 81 mg once daily and pravastatin 40 mg once daily for secondary stroke prevention   -Encouraged the patient to have a lipid panel which was ordered by endocrinologist completed before next visit with us   -Would like for him to continue to keep an eye on his blood pressure, today's blood pressure was 122/72 in the office  He is doing significantly well   -Encouraged him to continue his medications for depression/anxiety, including his Zoloft and risperidone at this time   -Continue usage CPAP machine for obstructive sleep apnea at this time  We will plan for him to return to the office on an as needed basis to see on of the APPs or Dr Shweta Berrios but would be happy to see him sooner if the need should arise    If he has any symptoms concerning for TIA or stroke including sudden painless loss of vision or double vision, difficulty speaking or swallowing, vertigo/room spinning that does not quickly resolve, or weakness/numbness/loss of coordination affecting 1 side of the face or body he should proceed by ambulance to the nearest emergency room immediately
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
## 2023-04-29 ENCOUNTER — RA CDI HCC (OUTPATIENT)
Dept: OTHER | Facility: HOSPITAL | Age: 38
End: 2023-04-29

## 2023-04-29 NOTE — PROGRESS NOTES
[facility]  coding opportunities       Chart reviewed, no opportunity found:   [address]        Patients Insurance     Medicare Insurance: [employer]

## 2023-05-23 ENCOUNTER — TELEPHONE (OUTPATIENT)
Dept: INTERNAL MEDICINE CLINIC | Age: 38
End: 2023-05-23

## 2023-05-23 NOTE — TELEPHONE ENCOUNTER
Left message for patient to call office back to schedule next appointment with Dr Adilene Castellon and to find out when his last [facility] eye exam was  Please transfer phone call to me      Thank you

## 2023-06-05 ENCOUNTER — OFFICE VISIT (OUTPATIENT)
Dept: URGENT CARE | Facility: CLINIC | Age: 38
End: 2023-06-05
Payer: COMMERCIAL

## 2023-06-05 VITALS
OXYGEN SATURATION: 97 % | SYSTOLIC BLOOD PRESSURE: 117 MMHG | TEMPERATURE: 98.4 F | DIASTOLIC BLOOD PRESSURE: 71 MMHG | HEART RATE: 75 BPM | RESPIRATION RATE: 18 BRPM

## 2023-06-05 DIAGNOSIS — H18.822 CORNEAL ABRASION DUE TO CONTACT LENS, LEFT: Primary | ICD-10-CM

## 2023-06-05 PROCEDURE — 99213 OFFICE O/P EST LOW 20 MIN: CPT

## 2023-06-05 PROCEDURE — S9088 SERVICES PROVIDED IN URGENT: HCPCS

## 2023-06-05 RX ORDER — OFLOXACIN 3 MG/ML
1 SOLUTION/ DROPS OPHTHALMIC 4 TIMES DAILY
Qty: 5 ML | Refills: 0 | Status: SHIPPED | OUTPATIENT
Start: 2023-06-05 | End: 2023-06-12

## 2023-06-05 NOTE — PROGRESS NOTES
[facility]        NAME: Reynaldo Soto is a 43 y o  male  : 1985    MRN: [id number]  DATE: 2023  TIME: 3:16 PM    Assessment and Plan   Corneal abrasion due to contact lens, left [H18 822]  1  Corneal abrasion due to contact lens, left  ofloxacin (OCUFLOX) 0 3 % ophthalmic solution            Patient Instructions     Use the drops as directed  Use cool compresses as tolerated  Do not wear your contacts for the next 7 days  Follow-up with the eye doctor  Follow up with PCP in 3-5 days  Proceed to  ER if symptoms worsen  Chief Complaint     Chief Complaint   Patient presents with   • Eye Pain     Left eye, started yesterday, 8/10 pain         History of Present Illness       Patient is a 38YOM presenting with left eye irritation , redness and sensation that something is in his eye  He states it started yesterday  He does wear contacts  Past medical history is significant for Type 2 diabetes, htn, stroke, anxiety  Eye Pain   Associated symptoms include eye redness and photophobia  Pertinent negatives include no eye discharge or itching  Review of Systems   Review of Systems   Eyes: Positive for photophobia, pain and redness  Negative for discharge and itching  All other systems reviewed and are negative          Current Medications       Current Outpatient Medications:   •  albuterol (PROVENTIL HFA,VENTOLIN HFA) 90 mcg/act inhaler, Inhale 2 puffs every 6 (six) hours as needed for wheezing or shortness of breath, Disp: 18 g, Rfl: 3  •  Insulin Pen Needle (BD Pen Needle Rossana U/F) 32G X 4 MM MISC, Use 4/day, Disp: 100 each, Rfl: 3  •  levothyroxine 25 mcg tablet, Take 2 tablets (50 mcg total) by mouth daily 50, Disp: 90 tablet, Rfl: 1  •  loratadine (CLARITIN) 10 mg tablet, Take 10 mg by mouth daily, Disp: , Rfl:   •  metoprolol succinate (TOPROL-XL) 25 mg 24 hr tablet, Take 1 tablet (25 mg total) by mouth daily, Disp: 90 tablet, Rfl: 1  •  ofloxacin (OCUFLOX) 0 3 % ophthalmic solution, Administer 1 drop into the left eye 4 (four) times a day for 7 days, Disp: 5 mL, Rfl: 0  •  pravastatin (PRAVACHOL) 40 mg tablet, Take 1 tablet (40 mg total) by mouth daily, Disp: 90 tablet, Rfl: 1  •  risperiDONE (RisperDAL) 2 mg tablet, 2 mg, Disp: , Rfl:   •  sertraline (ZOLOFT) 100 mg tablet, Take 150 mg by mouth daily, Disp: , Rfl:   •  aspirin (ECOTRIN LOW STRENGTH) 81 mg EC tablet, Take 1 tablet (81 mg total) by mouth daily Do not start before October 21, 2022 , Disp: 30 tablet, Rfl: 0  •  benzonatate (TESSALON) 200 MG capsule, Take 1 capsule (200 mg total) by mouth 3 (three) times a day as needed for cough (Patient not taking: Reported on 4/13/2023), Disp: 20 capsule, Rfl: 0  •  Continuous Blood Gluc  (Dexcom G6 ) CASSY, Use 1 Device every 3 (three) months (Patient not taking: Reported on 4/3/2023), Disp: 1 each, Rfl: 1  •  Continuous Blood Gluc Sensor (Dexcom G6 Sensor) MISC, uSE ONE SENSOR EVERY EVERY TEN DAYS (Patient not taking: Reported on 4/3/2023), Disp: 3 each, Rfl: 5  •  Continuous Blood Gluc Transmit (Dexcom G6 Transmitter) MISC, Use 1 Device in the morning (Patient not taking: Reported on 4/3/2023), Disp: 1 each, Rfl: 0  •  fluticasone (FLONASE) 50 mcg/act nasal spray, 1 spray into each nostril daily (Patient not taking: Reported on 4/13/2023), Disp: 18 2 mL, Rfl: 0  •  Fluticasone-Salmeterol (Advair Diskus) 500-50 mcg/dose inhaler, Inhale 1 puff 2 (two) times a day Rinse mouth after use (Patient not taking: Reported on 6/5/2023), Disp: 180 blister, Rfl: 3  •  insulin aspart (NovoLOG FlexPen) 100 UNIT/ML injection pen, Inject 15 Units under the skin 3 (three) times a day with meals, Disp: 40 5 mL, Rfl: 0  •  insulin glargine (Lantus) 100 units/mL subcutaneous injection, Inject 50 Units under the skin every 12 (twelve) hours Basal insulin as split into 2 doses, take 30 units in the morning and 30 units before bed, Disp: 60 mL, Rfl: 5  •  semaglutide, 0 25 or 0 5 mg/dose, (Ozempic) 2 mg/1 5 mL injection pen, 0 25 mg once weekly for 4 weeks then 0 5 mg with (Patient not taking: Reported on 4/3/2023), Disp: 4 5 mL, Rfl: 1    Current Allergies     Allergies as of 06/05/2023 - Reviewed 06/05/2023   Allergen Reaction Noted   • Bupropion Anxiety 09/01/2022   • Lamotrigine GI Intolerance 09/18/2020   • Niacin Rash 02/11/2018   • Simvastatin Other (See Comments), Rash, and Hives 12/13/2015            The following portions of the patient's history were reviewed and updated as appropriate: allergies, current medications, past family history, past medical history, past social history, past surgical history and problem list      Past Medical History:   Diagnosis Date   • COVID-19 03/17/2021   • Diabetes mellitus ([facility] [address] )     type 2   • Disease of thyroid gland     hypothyroidism   • Hyperlipidemia    • Hypertension    • 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   • Psychiatric disorder     PTSD  Anxiety, depression,    • Psychogenic nonepileptic spells        Past Surgical History:   Procedure Laterality Date   • MOUTH SURGERY  08/2021   • WISDOM TOOTH EXTRACTION         Family History   Problem Relation Age of Onset   • Hyperlipidemia Father    • Heart attack Paternal Grandfather    • Prostate cancer Paternal Grandfather    • Cancer Paternal Grandmother          Medications have been verified  Objective   /71   Pulse 75   Temp 98 4 °F (36 9 °C)   Resp 18   SpO2 97%        Physical Exam     Physical Exam  Vitals and nursing note reviewed  Constitutional:       General: He is not in acute distress  Appearance: Normal appearance  He is normal weight  He is not ill-appearing or toxic-appearing  Eyes:      Pupils:      Left eye: Corneal abrasion and fluorescein uptake (at 6 o'clock) present  Neurological:      Mental Status: He is alert

## 2023-06-05 NOTE — PATIENT INSTRUCTIONS
Use the drops as directed  Use cool compresses as tolerated  Do not wear your contacts for the next 7 days  Follow-up with the eye doctor

## 2023-06-12 PROBLEM — J06.9 UPPER RESPIRATORY INFECTION, ACUTE: Status: RESOLVED | Noted: 2023-04-13 | Resolved: 2023-06-12

## 2023-07-21 ENCOUNTER — OFFICE VISIT (OUTPATIENT)
Dept: URGENT CARE | Facility: CLINIC | Age: 38
End: 2023-07-21
Payer: COMMERCIAL

## 2023-07-21 VITALS
OXYGEN SATURATION: 96 % | HEART RATE: 86 BPM | RESPIRATION RATE: 18 BRPM | DIASTOLIC BLOOD PRESSURE: 74 MMHG | TEMPERATURE: 98.2 F | HEIGHT: 65 IN | SYSTOLIC BLOOD PRESSURE: 132 MMHG | BODY MASS INDEX: 30.82 KG/M2 | WEIGHT: 185 LBS

## 2023-07-21 DIAGNOSIS — L03.811 ABSCESS OR CELLULITIS OF SCALP: Primary | ICD-10-CM

## 2023-07-21 PROCEDURE — S9088 SERVICES PROVIDED IN URGENT: HCPCS | Performed by: PHYSICIAN ASSISTANT

## 2023-07-21 PROCEDURE — 99213 OFFICE O/P EST LOW 20 MIN: CPT | Performed by: PHYSICIAN ASSISTANT

## 2023-07-21 RX ORDER — CEPHALEXIN 500 MG/1
500 CAPSULE ORAL EVERY 6 HOURS SCHEDULED
Qty: 40 CAPSULE | Refills: 0 | Status: SHIPPED | OUTPATIENT
Start: 2023-07-21 | End: 2023-07-31

## 2023-07-21 NOTE — PROGRESS NOTES
[facility]        NAME: Miles Moyer is a 43 y.o. male  : 1985    MRN: [id number]  DATE: 2023  TIME: 2:48 PM    Assessment and Plan   Abscess or cellulitis of scalp [L03.811]  1. Abscess or cellulitis of scalp  cephalexin (KEFLEX) 500 mg capsule            Patient Instructions       Follow up with PCP in 3-5 days. Proceed to  ER if symptoms worsen. Chief Complaint     Chief Complaint   Patient presents with   • Wound Infection     Started yesterday with a bump on the back of his head. He scratched it. Today it is red, swollen and painful. History of Present Illness       Patient is a 44 y/o/m presenting to [facility] with a painful and swollen lump on the back of head. Patient reports first noticing this yesterday. Pt did retrieve some drainage from site. Patient denies fever, chills or body aches. Review of Systems   Review of Systems   Constitutional: Negative for chills and fever. HENT: Negative for ear pain and sore throat. Eyes: Negative for pain and visual disturbance. Respiratory: Negative for cough and shortness of breath. Cardiovascular: Negative for chest pain and palpitations. Gastrointestinal: Negative for abdominal pain and vomiting. Genitourinary: Negative for dysuria and hematuria. Musculoskeletal: Negative for arthralgias and back pain. Skin: Positive for wound. Negative for color change and rash. Neurological: Negative for seizures and syncope. All other systems reviewed and are negative.         Current Medications       Current Outpatient Medications:   •  albuterol (PROVENTIL HFA,VENTOLIN HFA) 90 mcg/act inhaler, Inhale 2 puffs every 6 (six) hours as needed for wheezing or shortness of breath, Disp: 18 g, Rfl: 3  •  cephalexin (KEFLEX) 500 mg capsule, Take 1 capsule (500 mg total) by mouth every 6 (six) hours for 10 days, Disp: 40 capsule, Rfl: 0  •  Insulin Pen Needle (BD Pen Needle Rossana U/F) 32G X 4 MM MISC, Use 4/day, Disp: 100 each, Rfl: 3  •  levothyroxine 25 mcg tablet, Take 2 tablets (50 mcg total) by mouth daily 50, Disp: 90 tablet, Rfl: 1  •  loratadine (CLARITIN) 10 mg tablet, Take 10 mg by mouth daily, Disp: , Rfl:   •  metoprolol succinate (TOPROL-XL) 25 mg 24 hr tablet, Take 1 tablet (25 mg total) by mouth daily, Disp: 90 tablet, Rfl: 1  •  pravastatin (PRAVACHOL) 40 mg tablet, Take 1 tablet (40 mg total) by mouth daily, Disp: 90 tablet, Rfl: 1  •  risperiDONE (RisperDAL) 2 mg tablet, 2 mg, Disp: , Rfl:   •  sertraline (ZOLOFT) 100 mg tablet, Take 150 mg by mouth daily, Disp: , Rfl:   •  aspirin (ECOTRIN LOW STRENGTH) 81 mg EC tablet, Take 1 tablet (81 mg total) by mouth daily Do not start before October 21, 2022., Disp: 30 tablet, Rfl: 0  •  benzonatate (TESSALON) 200 MG capsule, Take 1 capsule (200 mg total) by mouth 3 (three) times a day as needed for cough (Patient not taking: Reported on 4/13/2023), Disp: 20 capsule, Rfl: 0  •  Continuous Blood Gluc  (Dexcom G6 ) CASSY, Use 1 Device every 3 (three) months (Patient not taking: Reported on 4/3/2023), Disp: 1 each, Rfl: 1  •  Continuous Blood Gluc Sensor (Dexcom G6 Sensor) MISC, uSE ONE SENSOR EVERY EVERY TEN DAYS (Patient not taking: Reported on 4/3/2023), Disp: 3 each, Rfl: 5  •  Continuous Blood Gluc Transmit (Dexcom G6 Transmitter) MISC, Use 1 Device in the morning (Patient not taking: Reported on 4/3/2023), Disp: 1 each, Rfl: 0  •  fluticasone (FLONASE) 50 mcg/act nasal spray, 1 spray into each nostril daily (Patient not taking: Reported on 4/13/2023), Disp: 18.2 mL, Rfl: 0  •  Fluticasone-Salmeterol (Advair Diskus) 500-50 mcg/dose inhaler, Inhale 1 puff 2 (two) times a day Rinse mouth after use (Patient not taking: Reported on 6/5/2023), Disp: 180 blister, Rfl: 3  •  insulin aspart (NovoLOG FlexPen) 100 UNIT/ML injection pen, Inject 15 Units under the skin 3 (three) times a day with meals, Disp: 40.5 mL, Rfl: 0  •  insulin glargine (Lantus) 100 units/mL subcutaneous injection, Inject 50 Units under the skin every 12 (twelve) hours Basal insulin as split into 2 doses, take 30 units in the morning and 30 units before bed, Disp: 60 mL, Rfl: 5  •  semaglutide, 0.25 or 0.5 mg/dose, (Ozempic) 2 mg/1.5 mL injection pen, 0.25 mg once weekly for 4 weeks then 0.5 mg with (Patient not taking: Reported on 4/3/2023), Disp: 4.5 mL, Rfl: 1    Current Allergies     Allergies as of 07/21/2023 - Reviewed 07/21/2023   Allergen Reaction Noted   • Bupropion Anxiety 09/01/2022   • Lamotrigine GI Intolerance 09/18/2020   • Niacin Rash 02/11/2018   • Simvastatin Other (See Comments), Rash, and Hives 12/13/2015            The following portions of the patient's history were reviewed and updated as appropriate: allergies, current medications, past family history, past medical history, past social history, past surgical history and problem list.     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 Anxiety, depression,    • Psychogenic nonepileptic spells        Past Surgical History:   Procedure Laterality Date   • MOUTH SURGERY  08/2021   • WISDOM TOOTH EXTRACTION         Family History   Problem Relation Age of Onset   • Hyperlipidemia Father    • Heart attack Paternal Grandfather    • Prostate cancer Paternal Grandfather    • Cancer Paternal Grandmother          Medications have been verified. Objective   /74   Pulse 86   Temp 98.2 °F (36.8 °C)   Resp 18   Ht 5' 5" (1.651 m)   Wt 83.9 kg (185 lb)   SpO2 96%   BMI 30.79 kg/m²   No LMP for male patient. Physical Exam     Physical Exam  Constitutional:       Appearance: Normal appearance. He is normal weight. HENT:      Head: Normocephalic and atraumatic.         Nose: Nose normal.      Mouth/Throat: Mouth: Mucous membranes are moist.   Eyes:      Extraocular Movements: Extraocular movements intact. Conjunctiva/sclera: Conjunctivae normal.      Pupils: Pupils are equal, round, and reactive to light. Cardiovascular:      Rate and Rhythm: Normal rate. Pulmonary:      Effort: Pulmonary effort is normal.   Musculoskeletal:         General: Normal range of motion. Cervical back: Normal range of motion and neck supple. Skin:     General: Skin is warm and dry. Neurological:      General: No focal deficit present. Mental Status: He is alert and oriented to person, place, and time.    Psychiatric:         Mood and Affect: Mood normal.         Behavior: Behavior normal.

## 2023-07-27 ENCOUNTER — OFFICE VISIT (OUTPATIENT)
Dept: URGENT CARE | Facility: CLINIC | Age: 38
End: 2023-07-27
Payer: COMMERCIAL

## 2023-07-27 VITALS
BODY MASS INDEX: 30.82 KG/M2 | HEART RATE: 60 BPM | SYSTOLIC BLOOD PRESSURE: 118 MMHG | WEIGHT: 185 LBS | TEMPERATURE: 98.1 F | HEIGHT: 65 IN | OXYGEN SATURATION: 97 % | DIASTOLIC BLOOD PRESSURE: 68 MMHG | RESPIRATION RATE: 16 BRPM

## 2023-07-27 DIAGNOSIS — S61.215A LACERATION OF LEFT RING FINGER WITHOUT FOREIGN BODY WITHOUT DAMAGE TO NAIL, INITIAL ENCOUNTER: Primary | ICD-10-CM

## 2023-07-27 PROCEDURE — S9088 SERVICES PROVIDED IN URGENT: HCPCS | Performed by: NURSE PRACTITIONER

## 2023-07-27 PROCEDURE — 12001 RPR S/N/AX/GEN/TRNK 2.5CM/<: CPT | Performed by: NURSE PRACTITIONER

## 2023-07-27 PROCEDURE — 90471 IMMUNIZATION ADMIN: CPT | Performed by: NURSE PRACTITIONER

## 2023-07-27 PROCEDURE — 99213 OFFICE O/P EST LOW 20 MIN: CPT | Performed by: NURSE PRACTITIONER

## 2023-07-27 PROCEDURE — 90715 TDAP VACCINE 7 YRS/> IM: CPT

## 2023-07-27 NOTE — PROGRESS NOTES
[facility]        NAME: Jimbo Cuevas is a 43 y.o. male  : 1985    MRN: [id number]  DATE: 2023  TIME: 10:59 AM    Assessment and Plan   Laceration of left ring finger without foreign body without damage to nail, initial encounter [S61.215A]  1. Laceration of left ring finger without foreign body without damage to nail, initial encounter  Tdap Vaccine greater than or equal to 6yo        Universal Protocol:  Consent: Verbal consent obtained. Risks and benefits: risks, benefits and alternatives were discussed  Consent given by: patient  Time out: Immediately prior to procedure a "time out" was called to verify the correct patient, procedure, equipment, support staff and site/side marked as required. Patient understanding: patient states understanding of the procedure being performed  Patient identity confirmed: verbally with patient    Laceration repair    Date/Time: 2023 10:15 AM    Performed by: Devaughn Snellen, CRNP  Authorized by: Devaughn Snellen, CRNP  Body area: upper extremity  Location details: left ring finger  Laceration length: 0.5 cm  Foreign bodies: no foreign bodies  Tendon involvement: none  Nerve involvement: none  Vascular damage: no  Anesthesia: local infiltration    Anesthesia:  Local Anesthetic: lidocaine 1% without epinephrine    Wound Dehiscence:  Superficial Wound Dehiscence: simple closure      Procedure Details:  Preparation: Patient was prepped and draped in the usual sterile fashion.   Irrigation solution: saline  Irrigation method: syringe  Amount of cleaning: standard  Debridement: none  Degree of undermining: none  Skin closure: 5-0 nylon  Number of sutures: 2  Technique: simple  Approximation: close  Approximation difficulty: simple  Dressing: antibiotic ointment, non-adhesive packing strip and gauze roll  Patient tolerance: patient tolerated the procedure well with no immediate complications            Patient Instructions     Patient Instructions Keep clean and dry. Apply bacitracin and keep covered for the first few days. If you develop any increased pain, redness, swelling, drainage, bleeding, or any new or concerning symptoms please return or proceed to ER. Follow up with pcp in 3-5 days    Please return in 7-10 days for suture removal      Chief Complaint     Chief Complaint   Patient presents with   • Finger Laceration     Cleaning his nails with a knife he slipped and  lacerated his Left ring finger today         History of Present Illness       Hand Pain   The incident occurred 1 to 3 hours ago. The incident occurred at home. Injury mechanism: was cleaning his nails with a knife and the knife slipped and cut his left ring finger. The pain is present in the left fingers. The quality of the pain is described as aching. The pain does not radiate. The pain is mild. The pain has been constant since the incident. Pertinent negatives include no muscle weakness, numbness or tingling. Nothing aggravates the symptoms. He has tried nothing for the symptoms. The treatment provided no relief. Review of Systems   Review of Systems   Constitutional: Negative for chills, diaphoresis, fatigue and fever. Respiratory: Negative. Cardiovascular: Negative. Gastrointestinal: Negative. Musculoskeletal: Negative for arthralgias, back pain, joint swelling and myalgias. Skin: Positive for wound. Negative for rash. Neurological: Negative. Negative for tingling, weakness and numbness.          Current Medications       Current Outpatient Medications:   •  albuterol (PROVENTIL HFA,VENTOLIN HFA) 90 mcg/act inhaler, Inhale 2 puffs every 6 (six) hours as needed for wheezing or shortness of breath, Disp: 18 g, Rfl: 3  •  cephalexin (KEFLEX) 500 mg capsule, Take 1 capsule (500 mg total) by mouth every 6 (six) hours for 10 days, Disp: 40 capsule, Rfl: 0  •  Insulin Pen Needle (BD Pen Needle Rossana U/F) 32G X 4 MM MISC, Use 4/day, Disp: 100 each, Rfl: 3  • levothyroxine 25 mcg tablet, Take 2 tablets (50 mcg total) by mouth daily 50, Disp: 90 tablet, Rfl: 1  •  loratadine (CLARITIN) 10 mg tablet, Take 10 mg by mouth daily, Disp: , Rfl:   •  metoprolol succinate (TOPROL-XL) 25 mg 24 hr tablet, Take 1 tablet (25 mg total) by mouth daily, Disp: 90 tablet, Rfl: 1  •  pravastatin (PRAVACHOL) 40 mg tablet, Take 1 tablet (40 mg total) by mouth daily, Disp: 90 tablet, Rfl: 1  •  risperiDONE (RisperDAL) 2 mg tablet, 2 mg, Disp: , Rfl:   •  sertraline (ZOLOFT) 100 mg tablet, Take 150 mg by mouth daily, Disp: , Rfl:   •  aspirin (ECOTRIN LOW STRENGTH) 81 mg EC tablet, Take 1 tablet (81 mg total) by mouth daily Do not start before October 21, 2022., Disp: 30 tablet, Rfl: 0  •  benzonatate (TESSALON) 200 MG capsule, Take 1 capsule (200 mg total) by mouth 3 (three) times a day as needed for cough (Patient not taking: Reported on 4/13/2023), Disp: 20 capsule, Rfl: 0  •  Continuous Blood Gluc  (Dexcom G6 ) CASSY, Use 1 Device every 3 (three) months (Patient not taking: Reported on 4/3/2023), Disp: 1 each, Rfl: 1  •  Continuous Blood Gluc Sensor (Dexcom G6 Sensor) MISC, uSE ONE SENSOR EVERY EVERY TEN DAYS (Patient not taking: Reported on 4/3/2023), Disp: 3 each, Rfl: 5  •  Continuous Blood Gluc Transmit (Dexcom G6 Transmitter) MISC, Use 1 Device in the morning (Patient not taking: Reported on 4/3/2023), Disp: 1 each, Rfl: 0  •  fluticasone (FLONASE) 50 mcg/act nasal spray, 1 spray into each nostril daily (Patient not taking: Reported on 4/13/2023), Disp: 18.2 mL, Rfl: 0  •  Fluticasone-Salmeterol (Advair Diskus) 500-50 mcg/dose inhaler, Inhale 1 puff 2 (two) times a day Rinse mouth after use (Patient not taking: Reported on 6/5/2023), Disp: 180 blister, Rfl: 3  •  insulin aspart (NovoLOG FlexPen) 100 UNIT/ML injection pen, Inject 15 Units under the skin 3 (three) times a day with meals, Disp: 40.5 mL, Rfl: 0  •  insulin glargine (Lantus) 100 units/mL subcutaneous injection, Inject 50 Units under the skin every 12 (twelve) hours Basal insulin as split into 2 doses, take 30 units in the morning and 30 units before bed, Disp: 60 mL, Rfl: 5  •  semaglutide, 0.25 or 0.5 mg/dose, (Ozempic) 2 mg/1.5 mL injection pen, 0.25 mg once weekly for 4 weeks then 0.5 mg with (Patient not taking: Reported on 4/3/2023), Disp: 4.5 mL, Rfl: 1    Current Allergies     Allergies as of 07/27/2023 - Reviewed 07/27/2023   Allergen Reaction Noted   • Bupropion Anxiety 09/01/2022   • Lamotrigine GI Intolerance 09/18/2020   • Niacin Rash 02/11/2018   • Simvastatin Other (See Comments), Rash, and Hives 12/13/2015            The following portions of the patient's history were reviewed and updated as appropriate: allergies, current medications, past family history, past medical history, past social history, past surgical history and problem list.     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 Anxiety, depression,    • Psychogenic nonepileptic spells        Past Surgical History:   Procedure Laterality Date   • MOUTH SURGERY  08/2021   • WISDOM TOOTH EXTRACTION         Family History   Problem Relation Age of Onset   • Hyperlipidemia Father    • Heart attack Paternal Grandfather    • Prostate cancer Paternal Grandfather    • Cancer Paternal Grandmother          Medications have been verified. Objective   /68   Pulse 60   Temp 98.1 °F (36.7 °C)   Resp 16   Ht 5' 5" (1.651 m)   Wt 83.9 kg (185 lb)   SpO2 97%   BMI 30.79 kg/m²   No LMP for male patient. Physical Exam     Physical Exam  Constitutional:       General: He is not in acute distress. Appearance: Normal appearance. He is not diaphoretic. HENT:      Head: Normocephalic and atraumatic.    Cardiovascular: Rate and Rhythm: Normal rate and regular rhythm. Pulses: Normal pulses. Radial pulses are 2+ on the right side and 2+ on the left side. Heart sounds: Normal heart sounds, S1 normal and S2 normal.   Pulmonary:      Effort: Pulmonary effort is normal.      Breath sounds: Normal breath sounds and air entry. Skin:     General: Skin is warm and dry. Capillary Refill: Capillary refill takes less than 2 seconds. Findings: Laceration (0.5cm v shaped laceration to lateral aspect of left 4th digit. bleeding controlled with pressure. NV intact. full ROM. ) present. Neurological:      Mental Status: He is alert.

## 2023-07-27 NOTE — PATIENT INSTRUCTIONS
Keep clean and dry. Apply bacitracin and keep covered for the first few days. If you develop any increased pain, redness, swelling, drainage, bleeding, or any new or concerning symptoms please return or proceed to ER.  Follow up with pcp in 3-5 days    Please return in 7-10 days for suture removal Physical Therapy  Daily Treatment Note  Date: 2021  Patient Name: Maria Del Rosario Gates  MRN: [id number]     :   2018    Subjective:   General  Response To Previous Treatment: Patient with no complaints from previous session. Family / Caregiver Present: Yes (grandmother)  PT Visit Information  Total # of Visits to Date: 15  Subjective  Subjective: happy and tolerated session well          Treatment Activities:     Treatment goals for outpatient physical therapy session:     ·   kinesio tape to abdomen followed by supported head control Marissa will engage in active sitting /reaching forward  · Marissa will be able to hold her head at midline when placed in her wc with head rest-progressing  · Marissa will tolerated orthotics thru lower extremities to address abnormal foot placement-  · Marissa will be able to actively kick her legs to engage in purposeful play leading to controlling leg movement for possible communication device  Marissa will be able to engage in upright supportive sitting while wearing her hessinger collar- head control    Therapeutic treatment goals for this session:   Dante Muniz will tolerated weight bearing thru open hands with support in sitting  CPT Code 37401/therapeutic activity/ 2 units  Discussed bringing neck orthotic at next appointment  kinesio tape to abdomen for core engagement tolerated well; mom understands how to remove the tape  Side to side weight shift with supported head for weight bearing thru forearms noted that she placed her arms to her side in preparation  Active kicking of large ball in front of her with purpose and intent today while supported in sitting  Grandma to bring neck support to next session; mom to contact [facility] to have activity chair delivered and speak with therapist about special needs for bed  Return in one week  If this is the last visit for physical therapy consider this the discharge note. Assessment:   Conditions Requiring Skilled Therapeutic Intervention  Assessment: active lower extremities; poor head control would like to see her in the neck support for therapy  Prognosis: Good  REQUIRES PT FOLLOW UP: Yes      G-Code:     OutComes Score                                                     Goals:       Plan:             Therapy Time   Individual Concurrent Group Co-treatment   Time In 1300         Time Out 1330         Minutes 30                 Janay Page, PT

## 2023-08-03 ENCOUNTER — OFFICE VISIT (OUTPATIENT)
Dept: URGENT CARE | Facility: CLINIC | Age: 38
End: 2023-08-03
Payer: COMMERCIAL

## 2023-08-03 ENCOUNTER — TELEPHONE (OUTPATIENT)
Dept: NEUROLOGY | Facility: CLINIC | Age: 38
End: 2023-08-03

## 2023-08-03 VITALS
OXYGEN SATURATION: 97 % | DIASTOLIC BLOOD PRESSURE: 70 MMHG | RESPIRATION RATE: 16 BRPM | WEIGHT: 185 LBS | HEART RATE: 61 BPM | BODY MASS INDEX: 30.82 KG/M2 | SYSTOLIC BLOOD PRESSURE: 112 MMHG | HEIGHT: 65 IN

## 2023-08-03 DIAGNOSIS — Z48.02 ENCOUNTER FOR REMOVAL OF SUTURES: Primary | ICD-10-CM

## 2023-08-03 PROCEDURE — 99213 OFFICE O/P EST LOW 20 MIN: CPT | Performed by: NURSE PRACTITIONER

## 2023-08-03 PROCEDURE — S9088 SERVICES PROVIDED IN URGENT: HCPCS | Performed by: NURSE PRACTITIONER

## 2023-08-03 NOTE — PROGRESS NOTES
[facility]        NAME: Derrell Alamo is a 43 y.o. male  : 1985    MRN: [id number]  DATE: August 3, 2023  TIME: 11:34 AM    Assessment and Plan   Encounter for removal of sutures [Z48.02]  1. Encounter for removal of sutures            Suture removal    Date/Time: 8/3/2023 11:10 AM    Performed by: Agustin Stanley  Authorized by: IGNACIO Dillard  Universal Protocol:  Consent: Verbal consent obtained. Risks and benefits: risks, benefits and alternatives were discussed  Consent given by: patient  Time out: Immediately prior to procedure a "time out" was called to verify the correct patient, procedure, equipment, support staff and site/side marked as required. Patient understanding: patient states understanding of the procedure being performed  Patient identity confirmed: verbally with patient        Patient location:  Clinic  Location:     Laterality:  Left    Location:  Upper extremity    Upper extremity location:  Hand    Hand location:  L ring finger  Procedure details: Tools used:  Suture removal kit    Wound appearance:  No sign(s) of infection, good wound healing and clean    Number of sutures removed:  2  Post-procedure details:     Post-removal:  No dressing applied    Patient tolerance of procedure: Tolerated well, no immediate complications        Patient Instructions       Follow up with PCP in 3-5 days. Proceed to  ER if symptoms worsen. Chief Complaint     Chief Complaint   Patient presents with   • Suture / Staple Removal     Patient presents today to have sutures removed from left ring finger. History of Present Illness       Suture / Staple Removal  The sutures were placed 7 to 10 days ago. He tried antibiotic ointment use since the wound repair. The treatment provided significant relief. His temperature was unmeasured prior to arrival. There has been no drainage from the wound. There is no redness present. There is no swelling present.  There is no pain present. He has no difficulty moving the affected extremity or digit. Review of Systems   Review of Systems   Constitutional: Negative for chills, diaphoresis, fatigue and fever. HENT: Negative. Respiratory: Negative. Cardiovascular: Negative. Musculoskeletal: Negative for arthralgias, back pain, joint swelling and myalgias. Skin: Positive for wound. Negative for rash. Neurological: Negative.           Current Medications       Current Outpatient Medications:   •  albuterol (PROVENTIL HFA,VENTOLIN HFA) 90 mcg/act inhaler, Inhale 2 puffs every 6 (six) hours as needed for wheezing or shortness of breath, Disp: 18 g, Rfl: 3  •  aspirin (ECOTRIN LOW STRENGTH) 81 mg EC tablet, Take 1 tablet (81 mg total) by mouth daily Do not start before October 21, 2022., Disp: 30 tablet, Rfl: 0  •  benzonatate (TESSALON) 200 MG capsule, Take 1 capsule (200 mg total) by mouth 3 (three) times a day as needed for cough (Patient not taking: Reported on 4/13/2023), Disp: 20 capsule, Rfl: 0  •  Continuous Blood Gluc  (Dexcom G6 ) CASSY, Use 1 Device every 3 (three) months (Patient not taking: Reported on 4/3/2023), Disp: 1 each, Rfl: 1  •  Continuous Blood Gluc Sensor (Dexcom G6 Sensor) MISC, uSE ONE SENSOR EVERY EVERY TEN DAYS (Patient not taking: Reported on 4/3/2023), Disp: 3 each, Rfl: 5  •  Continuous Blood Gluc Transmit (Dexcom G6 Transmitter) MISC, Use 1 Device in the morning (Patient not taking: Reported on 4/3/2023), Disp: 1 each, Rfl: 0  •  fluticasone (FLONASE) 50 mcg/act nasal spray, 1 spray into each nostril daily (Patient not taking: Reported on 4/13/2023), Disp: 18.2 mL, Rfl: 0  •  Fluticasone-Salmeterol (Advair Diskus) 500-50 mcg/dose inhaler, Inhale 1 puff 2 (two) times a day Rinse mouth after use (Patient not taking: Reported on 6/5/2023), Disp: 180 blister, Rfl: 3  •  insulin aspart (NovoLOG FlexPen) 100 UNIT/ML injection pen, Inject 15 Units under the skin 3 (three) times a day with meals, Disp: 40.5 mL, Rfl: 0  •  insulin glargine (Lantus) 100 units/mL subcutaneous injection, Inject 50 Units under the skin every 12 (twelve) hours Basal insulin as split into 2 doses, take 30 units in the morning and 30 units before bed, Disp: 60 mL, Rfl: 5  •  Insulin Pen Needle (BD Pen Needle Rossana U/F) 32G X 4 MM MISC, Use 4/day, Disp: 100 each, Rfl: 3  •  levothyroxine 25 mcg tablet, Take 2 tablets (50 mcg total) by mouth daily 50, Disp: 90 tablet, Rfl: 1  •  loratadine (CLARITIN) 10 mg tablet, Take 10 mg by mouth daily, Disp: , Rfl:   •  metoprolol succinate (TOPROL-XL) 25 mg 24 hr tablet, Take 1 tablet (25 mg total) by mouth daily, Disp: 90 tablet, Rfl: 1  •  pravastatin (PRAVACHOL) 40 mg tablet, Take 1 tablet (40 mg total) by mouth daily, Disp: 90 tablet, Rfl: 1  •  risperiDONE (RisperDAL) 2 mg tablet, 2 mg, Disp: , Rfl:   •  semaglutide, 0.25 or 0.5 mg/dose, (Ozempic) 2 mg/1.5 mL injection pen, 0.25 mg once weekly for 4 weeks then 0.5 mg with (Patient not taking: Reported on 4/3/2023), Disp: 4.5 mL, Rfl: 1  •  sertraline (ZOLOFT) 100 mg tablet, Take 150 mg by mouth daily, Disp: , Rfl:     Current Allergies     Allergies as of 08/03/2023 - Reviewed 08/03/2023   Allergen Reaction Noted   • Bupropion Anxiety 09/01/2022   • Lamotrigine GI Intolerance 09/18/2020   • Niacin Rash 02/11/2018   • Simvastatin Other (See Comments), Rash, and Hives 12/13/2015            The following portions of the patient's history were reviewed and updated as appropriate: allergies, current medications, past family history, past medical history, past social history, past surgical history and problem list.     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  Anxiety, depression,    • Psychogenic nonepileptic spells Past Surgical History:   Procedure Laterality Date   • MOUTH SURGERY  08/2021   • WISDOM TOOTH EXTRACTION         Family History   Problem Relation Age of Onset   • Hyperlipidemia Father    • Heart attack Paternal Grandfather    • Prostate cancer Paternal Grandfather    • Cancer Paternal Grandmother          Medications have been verified. Objective   /70   Pulse 61   Resp 16   Ht 5' 5" (1.651 m)   Wt 83.9 kg (185 lb)   SpO2 97%   BMI 30.79 kg/m²   No LMP for male patient. Physical Exam     Physical Exam  Constitutional:       General: He is not in acute distress. Appearance: Normal appearance. He is not diaphoretic. Cardiovascular:      Rate and Rhythm: Normal rate and regular rhythm. Heart sounds: Normal heart sounds, S1 normal and S2 normal.   Pulmonary:      Effort: Pulmonary effort is normal.      Breath sounds: Normal breath sounds and air entry. Skin:     General: Skin is warm and dry. Capillary Refill: Capillary refill takes less than 2 seconds. Findings: Laceration (2 sutures in place to left 4th digit. wound healed. no swelling, bleeding, drainage or paim) present. Neurological:      Mental Status: He is alert.

## 2023-08-03 NOTE — TELEPHONE ENCOUNTER
Pt left a VM re: getting license back. Transcribed VM:   Yes, my name, Dulce Mckoy. My birthday is [birth year]. My phone number is [phone]. I'm calling in regards to and I was wondering if you guys could find any information out for me on getting my license back because I can not get a hold of anybody. But I cannot find out when I'm gonna get my license back. If you guys could make a phone call or give me advice on what time not that would be great. Thank you. Rosario. Called back and spoke with pt and informed him that our office doesn't have a direct line to [employer] to have these questions answered. I assured pt that his forms were initially sent to [employer] on 4/28 with a confirmation of receipt. Informed pt that this writer would also send them again (Done). Also provided py with [employer]'s #. He verbalized an understanding.

## 2023-08-11 ENCOUNTER — OFFICE VISIT (OUTPATIENT)
Dept: URGENT CARE | Facility: CLINIC | Age: 38
End: 2023-08-11
Payer: COMMERCIAL

## 2023-08-11 VITALS
SYSTOLIC BLOOD PRESSURE: 126 MMHG | HEART RATE: 86 BPM | HEIGHT: 65 IN | DIASTOLIC BLOOD PRESSURE: 73 MMHG | TEMPERATURE: 98 F | BODY MASS INDEX: 29.89 KG/M2 | RESPIRATION RATE: 16 BRPM | OXYGEN SATURATION: 98 % | WEIGHT: 179.4 LBS

## 2023-08-11 DIAGNOSIS — L02.11 ABSCESS OF NECK: Primary | ICD-10-CM

## 2023-08-11 PROCEDURE — S9088 SERVICES PROVIDED IN URGENT: HCPCS | Performed by: PHYSICIAN ASSISTANT

## 2023-08-11 PROCEDURE — 99213 OFFICE O/P EST LOW 20 MIN: CPT | Performed by: PHYSICIAN ASSISTANT

## 2023-08-11 RX ORDER — SULFAMETHOXAZOLE AND TRIMETHOPRIM 800; 160 MG/1; MG/1
1 TABLET ORAL EVERY 12 HOURS SCHEDULED
Qty: 14 TABLET | Refills: 0 | Status: SHIPPED | OUTPATIENT
Start: 2023-08-11 | End: 2023-08-18

## 2023-08-11 NOTE — PATIENT INSTRUCTIONS
Take antibiotic as directed until completed  Warm moist compresses to area a couple times a day  Motrin and/or Tylenol as needed for pain  Follow up with PCP in 3-5 days. Proceed to  ER if symptoms worsen. Abscess   AMBULATORY CARE:   An abscess  is an area under the skin where pus (infected fluid) collects. An abscess is often caused by bacteria, fungi or other germs that get into an open wound. You can get an abscess anywhere on your body. Common signs and symptoms of an abscess: You may have a swollen mass that is red and painful. Pus may leak out of the mass. The pus will be white or yellow and may smell bad. You may have redness and pain days before the mass appears. You may have a fever and chills if the infection spreads. Seek immediate care if:   The area around your abscess becomes very painful, warm, or has red streaks. You have a fever and chills. Your heart is beating faster than usual.    You feel faint or confused. Call your doctor if:   Your abscess gets bigger or does not get better. Your abscess returns. You have questions or concerns about your condition or care. Treatment for an abscess: Your healthcare provider may need to make a cut in the abscess to allow the pus to drain. You may need surgery to remove your abscess. You may  need any of the following:  Antibiotics  help treat a bacterial infection. Acetaminophen  decreases pain and fever. It is available without a doctor's order. Ask how much to take and how often to take it. Follow directions. Read the labels of all other medicines you are using to see if they also contain acetaminophen, or ask your doctor or pharmacist. Acetaminophen can cause liver damage if not taken correctly. NSAIDs , such as ibuprofen, help decrease swelling, pain, and fever. This medicine is available with or without a doctor's order. NSAIDs can cause stomach bleeding or kidney problems in certain people.  If you take blood thinner medicine, always ask your healthcare provider if NSAIDs are safe for you. Always read the medicine label and follow directions. Take your medicine as directed. Contact your healthcare provider if you think your medicine is not helping or if you have side effects. Tell your provider if you are allergic to any medicine. Keep a list of the medicines, vitamins, and herbs you take. Include the amounts, and when and why you take them. Bring the list or the pill bottles to follow-up visits. Carry your medicine list with you in case of an emergency. Self-care:   Apply a warm compress to your abscess. This will help it open and drain. Wet a washcloth in warm, but not hot, water. Apply the compress for 10 minutes. Repeat this 4 times each day. Do not  press on an abscess or try to open it with a needle. You may push the bacteria deeper or into your blood. Do not share your clothes, towels, or sheets with anyone. This can spread the infection to others. Wash your hands often. This can help prevent the spread of germs. Use soap and water or an alcohol-based hand rub. Care for your wound after it is drained:   Care for your wound as directed. If your healthcare provider says it is okay, carefully remove the bandage and gauze packing. You may need to soak the gauze to get it out of your wound. Clean your wound and the area around it as directed. Dry the area and put on new, clean bandages. Change your bandages when they get wet or dirty. Ask your healthcare provider how to change the gauze in your wound. Keep track of how many pieces of gauze are placed inside the wound. Do not put too much packing in the wound. Do not pack the gauze too tightly in your wound. Follow up with your healthcare provider in 1 to 3 days: You may need to have your packing removed or your bandage changed. Write down your questions so you remember to ask them during your visits.   © Copyright Sae Gupta 2022 Information is for End User's use only and may not be sold, redistributed or otherwise used for commercial purposes. The above information is an  only. It is not intended as medical advice for individual conditions or treatments. Talk to your doctor, nurse or pharmacist before following any medical regimen to see if it is safe and effective for you.

## 2023-08-11 NOTE — PROGRESS NOTES
[facility]        NAME: Dorcas Sands is a 43 y.o. male  : 1985    MRN: [id number]  DATE: 2023  TIME: 3:41 PM    Assessment and Plan   Abscess of neck [L02.11]  1. Abscess of neck  sulfamethoxazole-trimethoprim (BACTRIM DS) 800-160 mg per tablet            Patient Instructions     Take antibiotic as directed until completed  Warm moist compresses to area a couple times a day  Motrin and/or Tylenol as needed for pain  Follow up with PCP in 3-5 days. Proceed to  ER if symptoms worsen. Chief Complaint     Chief Complaint   Patient presents with   • Mass     Lump on the back of his neck noticed it 2 weeks ago,hurts to touch, red          History of Present Illness       43-year-old male presents with painful bump on the back of his neck. Noticed it 2 days ago. Reports when he touches it it is very tender. Denies any drainage from it. No fevers or chills. Rash  This is a new problem. The current episode started in the past 7 days. The problem has been gradually worsening since onset. Location: Posterior neck. The problem is mild. The rash is characterized by pain, redness and swelling. He was exposed to nothing. The rash first occurred at home. Pertinent negatives include no cough, fever or itching. Past treatments include nothing. The treatment provided no relief. There were no sick contacts. Review of Systems   Review of Systems   Constitutional: Negative. Negative for fever. Respiratory: Negative. Negative for cough. Cardiovascular: Negative. Gastrointestinal: Negative. Musculoskeletal: Negative. Skin: Positive for rash. Negative for itching. Neurological: Negative.           Current Medications       Current Outpatient Medications:   •  albuterol (PROVENTIL HFA,VENTOLIN HFA) 90 mcg/act inhaler, Inhale 2 puffs every 6 (six) hours as needed for wheezing or shortness of breath, Disp: 18 g, Rfl: 3  •  Insulin Pen Needle (BD Pen Needle Rossana U/F) 32G X 4 MM MISC, Use 4/day, Disp: 100 each, Rfl: 3  •  levothyroxine 25 mcg tablet, Take 2 tablets (50 mcg total) by mouth daily 50, Disp: 90 tablet, Rfl: 1  •  loratadine (CLARITIN) 10 mg tablet, Take 10 mg by mouth daily, Disp: , Rfl:   •  metoprolol succinate (TOPROL-XL) 25 mg 24 hr tablet, Take 1 tablet (25 mg total) by mouth daily, Disp: 90 tablet, Rfl: 1  •  pravastatin (PRAVACHOL) 40 mg tablet, Take 1 tablet (40 mg total) by mouth daily, Disp: 90 tablet, Rfl: 1  •  risperiDONE (RisperDAL) 2 mg tablet, 2 mg, Disp: , Rfl:   •  sertraline (ZOLOFT) 100 mg tablet, Take 150 mg by mouth daily, Disp: , Rfl:   •  sulfamethoxazole-trimethoprim (BACTRIM DS) 800-160 mg per tablet, Take 1 tablet by mouth every 12 (twelve) hours for 7 days, Disp: 14 tablet, Rfl: 0  •  aspirin (ECOTRIN LOW STRENGTH) 81 mg EC tablet, Take 1 tablet (81 mg total) by mouth daily Do not start before October 21, 2022., Disp: 30 tablet, Rfl: 0  •  benzonatate (TESSALON) 200 MG capsule, Take 1 capsule (200 mg total) by mouth 3 (three) times a day as needed for cough (Patient not taking: Reported on 4/13/2023), Disp: 20 capsule, Rfl: 0  •  Continuous Blood Gluc  (Dexcom G6 ) CASSY, Use 1 Device every 3 (three) months (Patient not taking: Reported on 4/3/2023), Disp: 1 each, Rfl: 1  •  Continuous Blood Gluc Sensor (Dexcom G6 Sensor) MISC, uSE ONE SENSOR EVERY EVERY TEN DAYS (Patient not taking: Reported on 4/3/2023), Disp: 3 each, Rfl: 5  •  Continuous Blood Gluc Transmit (Dexcom G6 Transmitter) MISC, Use 1 Device in the morning (Patient not taking: Reported on 4/3/2023), Disp: 1 each, Rfl: 0  •  fluticasone (FLONASE) 50 mcg/act nasal spray, 1 spray into each nostril daily (Patient not taking: Reported on 4/13/2023), Disp: 18.2 mL, Rfl: 0  •  Fluticasone-Salmeterol (Advair Diskus) 500-50 mcg/dose inhaler, Inhale 1 puff 2 (two) times a day Rinse mouth after use (Patient not taking: Reported on 6/5/2023), Disp: 180 blister, Rfl: 3  •  insulin aspart (NovoLOG FlexPen) 100 UNIT/ML injection pen, Inject 15 Units under the skin 3 (three) times a day with meals, Disp: 40.5 mL, Rfl: 0  •  insulin glargine (Lantus) 100 units/mL subcutaneous injection, Inject 50 Units under the skin every 12 (twelve) hours Basal insulin as split into 2 doses, take 30 units in the morning and 30 units before bed, Disp: 60 mL, Rfl: 5  •  semaglutide, 0.25 or 0.5 mg/dose, (Ozempic) 2 mg/1.5 mL injection pen, 0.25 mg once weekly for 4 weeks then 0.5 mg with (Patient not taking: Reported on 4/3/2023), Disp: 4.5 mL, Rfl: 1    Current Allergies     Allergies as of 08/11/2023 - Reviewed 08/11/2023   Allergen Reaction Noted   • Bupropion Anxiety 09/01/2022   • Lamotrigine GI Intolerance 09/18/2020   • Niacin Rash 02/11/2018   • Simvastatin Other (See Comments), Rash, and Hives 12/13/2015            The following portions of the patient's history were reviewed and updated as appropriate: allergies, current medications, past family history, past medical history, past social history, past surgical history and problem list.     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 Anxiety, depression,    • Psychogenic nonepileptic spells        Past Surgical History:   Procedure Laterality Date   • MOUTH SURGERY  08/2021   • WISDOM TOOTH EXTRACTION         Family History   Problem Relation Age of Onset   • Hyperlipidemia Father    • Heart attack Paternal Grandfather    • Prostate cancer Paternal Grandfather    • Cancer Paternal Grandmother          Medications have been verified. Objective   /73   Pulse 86   Temp 98 °F (36.7 °C)   Resp 16   Ht 5' 5" (1.651 m)   Wt 81.4 kg (179 lb 6.4 oz)   SpO2 98%   BMI 29.85 kg/m²   No LMP for male patient.        Physical Exam Physical Exam  Vitals and nursing note reviewed. Constitutional:       General: He is not in acute distress. Appearance: Normal appearance. He is well-developed. HENT:      Head: Normocephalic and atraumatic. Right Ear: External ear normal.      Left Ear: External ear normal.      Nose: Nose normal.   Eyes:      General:         Right eye: No discharge. Left eye: No discharge. Conjunctiva/sclera: Conjunctivae normal.   Cardiovascular:      Rate and Rhythm: Normal rate and regular rhythm. Pulmonary:      Effort: Pulmonary effort is normal. No respiratory distress. Musculoskeletal:         General: Normal range of motion. Cervical back: Normal range of motion and neck supple. Skin:     General: Skin is warm and dry. Findings: Abscess present. Neurological:      Mental Status: He is alert and oriented to person, place, and time.    Psychiatric:         Mood and Affect: Mood normal.         Behavior: Behavior normal.

## 2023-08-21 NOTE — TELEPHONE ENCOUNTER
Patient left voicemail stating he got additional paperwork to complete from [employer]. Call returned to patient, [phone]. Spoke to patient, states is is not home at the moment but will call office back with the names of the forms needing completion. Awaiting call.

## 2023-08-22 NOTE — TELEPHONE ENCOUNTER
Patient left follow up voicemail. [employer] forms needing to be completed are  - General neurological form and  - Loss of Consciousness form. Call returned to patient, acknowledged voicemail received and will forward to provider. Lori Bowen - agreeable to form completion? Yue - please assist if agreeable.

## 2023-08-22 NOTE — TELEPHONE ENCOUNTER
Per patient, these were additional forms [employer] was looking for in an attempt for more information.

## 2023-08-25 NOTE — TELEPHONE ENCOUNTER
Forms completed, scanned into patient chart and faxed to [facility]. Patient is aware and requested a copy of the forms.  Sent via [employer]

## 2023-09-11 ENCOUNTER — OFFICE VISIT (OUTPATIENT)
Dept: INTERNAL MEDICINE CLINIC | Age: 38
End: 2023-09-11
Payer: COMMERCIAL

## 2023-09-11 VITALS
BODY MASS INDEX: 29.99 KG/M2 | SYSTOLIC BLOOD PRESSURE: 122 MMHG | TEMPERATURE: 97.8 F | HEART RATE: 70 BPM | OXYGEN SATURATION: 97 % | DIASTOLIC BLOOD PRESSURE: 70 MMHG | WEIGHT: 180 LBS | HEIGHT: 65 IN

## 2023-09-11 DIAGNOSIS — I10 ESSENTIAL HYPERTENSION: Chronic | ICD-10-CM

## 2023-09-11 DIAGNOSIS — J01.00 ACUTE MAXILLARY SINUSITIS, RECURRENCE NOT SPECIFIED: Primary | ICD-10-CM

## 2023-09-11 DIAGNOSIS — Z79.4 TYPE 2 DIABETES MELLITUS WITHOUT COMPLICATION, WITH LONG-TERM CURRENT USE OF INSULIN (HCC): Chronic | ICD-10-CM

## 2023-09-11 DIAGNOSIS — N48.1 BALANITIS: ICD-10-CM

## 2023-09-11 DIAGNOSIS — E11.9 TYPE 2 DIABETES MELLITUS WITHOUT COMPLICATION, WITH LONG-TERM CURRENT USE OF INSULIN (HCC): Chronic | ICD-10-CM

## 2023-09-11 LAB
SARS-COV-2 AG UPPER RESP QL IA: NEGATIVE
SL AMB POCT HEMOGLOBIN AIC: 13.8 (ref ?–6.5)
VALID CONTROL: NORMAL

## 2023-09-11 PROCEDURE — 99214 OFFICE O/P EST MOD 30 MIN: CPT

## 2023-09-11 PROCEDURE — 83036 HEMOGLOBIN GLYCOSYLATED A1C: CPT

## 2023-09-11 PROCEDURE — 87811 SARS-COV-2 COVID19 W/OPTIC: CPT

## 2023-09-11 RX ORDER — AZITHROMYCIN 250 MG/1
TABLET, FILM COATED ORAL
Qty: 6 TABLET | Refills: 0 | Status: SHIPPED | OUTPATIENT
Start: 2023-09-11 | End: 2023-09-16

## 2023-09-11 RX ORDER — INSULIN GLARGINE 100 [IU]/ML
70 INJECTION, SOLUTION SUBCUTANEOUS EVERY 12 HOURS SCHEDULED
Qty: 42 ML | Refills: 2 | Status: SHIPPED | OUTPATIENT
Start: 2023-09-11 | End: 2023-12-10

## 2023-09-11 RX ORDER — FLUTICASONE PROPIONATE 50 MCG
1 SPRAY, SUSPENSION (ML) NASAL DAILY
Qty: 18.2 ML | Refills: 0 | Status: SHIPPED | OUTPATIENT
Start: 2023-09-11

## 2023-09-11 RX ORDER — CLOTRIMAZOLE 1 %
CREAM (GRAM) TOPICAL 2 TIMES DAILY
Qty: 12 G | Refills: 1 | Status: SHIPPED | OUTPATIENT
Start: 2023-09-11 | End: 2023-09-18

## 2023-09-11 RX ORDER — CLOTRIMAZOLE 1 %
CREAM (GRAM) TOPICAL 2 TIMES DAILY
Status: CANCELLED | OUTPATIENT
Start: 2023-09-11

## 2023-09-11 NOTE — PATIENT INSTRUCTIONS
We have made changes to your diabetes medications.   -We have increased your insulin LANTUS to 70 units injected q12 hours, in the morning and at night.   -We have added a new medication called Jessica Ricarda, to be taken once daily.  -Please see the Endocrinologist for further management of your diabetes. Your Hemoglobin A1c was elevated today, which tells us your diabetes is uncontrolled. -Check your blood sugars using fingersticks and keep a log of your fasting sugars in the morning and whenever you check with the fingersticks to help guide further management of your diabetes. For your upper respiratory/sinus infection:  -We have prescribed flonase spray  -Your in-clinic covid test was negative  -We have given you 5 days of an antibiotic called azithromycin    Apply the clotrimazole cream to the area surrounding your penis twice daily for 7 days, and then use as needed.

## 2023-09-11 NOTE — PROGRESS NOTES
[address] Visit Note  [place] 43 y.o. male   MRN: [id number]    Assessment and Plan      1. Acute maxillary sinusitis, recurrence not specified  -     Patient has congestion, rhinorrhea, maxillary tenderness with increase in mucus production in the setting of uncontrolled diabetes. Will proceed with Z-Darian and OTC Flonase for further symptom management.  - POCT Rapid Covid Ag  -     fluticasone (FLONASE) 50 mcg/act nasal spray; 1 spray into each nostril daily  -     azithromycin (Zithromax) 250 mg tablet; Take 2 tablets (500 mg total) by mouth daily for 1 day, THEN 1 tablet (250 mg total) daily for 4 days. 2. Type 2 diabetes mellitus without complication, with long-term current use of insulin (Formerly [facility])  -    A1c 13.8 in office today, previously 13.5 back in April. Patient not taking all medications as prescribed and is not checking blood sugars at home with fingersticks or CGM. Discussed with patient importance of adequate blood sugar control including to prevent microvascular complications, development of other chronic conditions, and given he has had multiple infections. - Increased lantus to 70u q12H, no short-acting coverage for now, started Januvia 100mg qd. Instructed patient to call [address] [place] [address] office and make appt as soon as possible. We will schedule 4-week diabetes f/u in case he is not able to see them before then, and if so he can cancel appt and proceed with Endo f/u. - Discussed importance of checking sugars, especially fasting, and keeping log.   - POCT hemoglobin A1c 13.8 in office day  -     Ambulatory Referral to Endocrinology; Future  -     sitaGLIPtin (JANUVIA) 100 mg tablet; Take 1 tablet (100 mg total) by mouth daily  -    Future; urine microalbuminuria screening   - insulin glargine (Lantus) 100 units/mL subcutaneous injection; Inject 70 Units under the skin every 12 (twelve) hours 70 units q12 hours, morning and night    3.  Essential hypertension   -  /70 in the office, appears controlled with current regimen. Continue TOPROL-XL 25mg qd.     4. Balanitis  -     clotrimazole (LOTRIMIN) 1 % cream; Apply topically 2 (two) times a day for 7 days Apply 2 times a day to the area surrounding the head of the penis. Follow up in our clinic in 6 weeks for further diabetes evaluation unless you are able to see the endocrinologist by then. Subjective   HISTORY OF PRESENT ILLNESS:  Kaylynn Bonds is a 43 y.o. male with a past medical history of T2DM with insulin use, hypothyroidism, ENRIKE, asthma who presents to clinic today for evaluation of acute symptoms and discussion of type 2 diabetes condition. Patient reports last night he started to experience sinus pain and congestion with a pressure sensation, worse on the right side. This is associated with increased mucus production, worse on the right side. He denies fevers or chills. Denies sore throat, postnasal drip, ear pain. He has had no dry or productive cough. He denies sick contacts or any known exposures to respiratory illnesses or COVID-19. His asthma has been well controlled with daily inhalers, and patient reports no need to use rescue inhalers in the past month or especially in the past 24 hours. He denies any shortness of breath at rest or with exertion. He also states that acutely he has had intermittent erythema and white discharge around the head of his penis. Denies swelling. He first noticed this a few weeks ago, reports it resolved briefly, and has since returned for the past 2 weeks. He is sexually active with his wife only, who he reports was recently treated for a yeast infection. Denies pain or burning sensation with urination or ejaculation. There is no blood in the urine or semen. Patient was also evaluated for type 2 diabetes management given he was overdue for hemoglobin A1c and microalbumin screening. He was unable to provide a urine sample.   In office A1c was 13.8 which is increased from April 2023 when it was 13.5. Patient is not checking his blood sugars with fingerstick or CGM. He is not taking Ozempic due to lack of insurance coverage. He reports he has been taking 60 units of Lantus in the morning and 60 units of Lantus at night. He has not been taking his short acting insulin because he forgets to take it throughout the day. He previously was on metformin but this was stopped due to GI side effects. At his previous appointment he was instructed to follow-up with endocrinology at the [facility], but he reports he has not been able to make an appointment with them. He denies polyuria, polydipsia, changes in vision, pain in his extremities. Review of Systems - Negative other than stated above    Objective     Vitals:    09/11/23 0910   BP: 122/70   BP Location: Left arm   Patient Position: Sitting   Cuff Size: Standard   Pulse: 70   Temp: 97.8 °F (36.6 °C)   TempSrc: Temporal   SpO2: 97%   Weight: 81.6 kg (180 lb)   Height: 5' 5" (1.651 m)       Physical Exam:  General: No apparent distress, resting comfortably   Head: Normocephalic, atraumatic  Eyes: Anicteric, no conjunctival erythema  ENT: External ear normal, no nasal discharge. Maxillary tenderness bilaterally, worse on the right than the left. Mild. Hypertrophic turbinates appreciated. No pharyngeal erythema. Neck: Trachea midline, no visible lymphadenopathy or goiter. No palpable cervical, submandibular lymphadenopathy. Respiratory: Lungs clear to auscultation bilaterally. No wheezes. Non-labored respirations, symmetric thorax expansion  Cardiovascular: Regular rate and rhythm. Normal heart sounds. No murmurs. Extremities appear well-perfused  Abdomen: Non-distended  Extremities: Moves extremities spontaneously, no peripheral edema  Genitourinary: Erythema appreciated around the head of the penis. No overt discharge noted. No penile swelling or testicular enlargement.   Skin: No visible rashes, wounds, or jaundice  Neuro: A&O x 3, no gross focal deficits, no aphasia    History     Current Outpatient Medications:   •  albuterol (PROVENTIL HFA,VENTOLIN HFA) 90 mcg/act inhaler, Inhale 2 puffs every 6 (six) hours as needed for wheezing or shortness of breath, Disp: 18 g, Rfl: 3  •  aspirin (ECOTRIN LOW STRENGTH) 81 mg EC tablet, Take 1 tablet (81 mg total) by mouth daily Do not start before October 21, 2022., Disp: 30 tablet, Rfl: 0  •  azithromycin (Zithromax) 250 mg tablet, Take 2 tablets (500 mg total) by mouth daily for 1 day, THEN 1 tablet (250 mg total) daily for 4 days. , Disp: 6 tablet, Rfl: 0  •  clotrimazole (LOTRIMIN) 1 % cream, Apply topically 2 (two) times a day for 7 days Apply 2 times a day to the area surrounding the head of the penis. , Disp: 12 g, Rfl: 1  •  fluticasone (FLONASE) 50 mcg/act nasal spray, 1 spray into each nostril daily, Disp: 18.2 mL, Rfl: 0  •  insulin aspart (NovoLOG FlexPen) 100 UNIT/ML injection pen, Inject 15 Units under the skin 3 (three) times a day with meals, Disp: 40.5 mL, Rfl: 0  •  insulin glargine (Lantus) 100 units/mL subcutaneous injection, Inject 70 Units under the skin every 12 (twelve) hours 70 units q12 hours, morning and night, Disp: 42 mL, Rfl: 2  •  Insulin Pen Needle (BD Pen Needle Rossana U/F) 32G X 4 MM MISC, Use 4/day, Disp: 100 each, Rfl: 3  •  levothyroxine 25 mcg tablet, Take 2 tablets (50 mcg total) by mouth daily 50, Disp: 90 tablet, Rfl: 1  •  loratadine (CLARITIN) 10 mg tablet, Take 10 mg by mouth daily, Disp: , Rfl:   •  metoprolol succinate (TOPROL-XL) 25 mg 24 hr tablet, Take 1 tablet (25 mg total) by mouth daily, Disp: 90 tablet, Rfl: 1  •  pravastatin (PRAVACHOL) 40 mg tablet, Take 1 tablet (40 mg total) by mouth daily, Disp: 90 tablet, Rfl: 1  •  risperiDONE (RisperDAL) 2 mg tablet, 2 mg, Disp: , Rfl:   •  sertraline (ZOLOFT) 100 mg tablet, Take 200 mg by mouth daily, Disp: , Rfl:   •  sitaGLIPtin (JANUVIA) 100 mg tablet, Take 1 tablet (100 mg total) by mouth daily, Disp: 30 tablet, Rfl: 3  •  benzonatate (TESSALON) 200 MG capsule, Take 1 capsule (200 mg total) by mouth 3 (three) times a day as needed for cough (Patient not taking: Reported on 4/13/2023), Disp: 20 capsule, Rfl: 0  •  Continuous Blood Gluc  (Dexcom G6 ) CASSY, Use 1 Device every 3 (three) months (Patient not taking: Reported on 4/3/2023), Disp: 1 each, Rfl: 1  •  Continuous Blood Gluc Sensor (Dexcom G6 Sensor) MISC, uSE ONE SENSOR EVERY EVERY TEN DAYS (Patient not taking: Reported on 4/3/2023), Disp: 3 each, Rfl: 5  •  Continuous Blood Gluc Transmit (Dexcom G6 Transmitter) MISC, Use 1 Device in the morning (Patient not taking: Reported on 4/3/2023), Disp: 1 each, Rfl: 0  •  Fluticasone-Salmeterol (Advair Diskus) 500-50 mcg/dose inhaler, Inhale 1 puff 2 (two) times a day Rinse mouth after use (Patient not taking: Reported on 6/5/2023), Disp: 180 blister, Rfl: 3  •  semaglutide, 0.25 or 0.5 mg/dose, (Ozempic) 2 mg/1.5 mL injection pen, 0.25 mg once weekly for 4 weeks then 0.5 mg with (Patient not taking: Reported on 4/3/2023), Disp: 4.5 mL, Rfl: 1  Allergies   Allergen Reactions   • Bupropion Anxiety   • Lamotrigine GI Intolerance   • Niacin Rash   • Simvastatin Other (See Comments), Rash and Hives     Elevated liver enzymes  Liver enzyme elevation      Past Medical History:   Diagnosis Date   • COVID-19 03/17/2021   • Diabetes mellitus ([address])     type 2   • Disease of thyroid gland     hypothyroidism   • Hyperlipidemia    • Hypertension    • 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  Anxiety, depression,    • Psychogenic nonepileptic spells      Past Surgical History:   Procedure Laterality Date   • MOUTH SURGERY  08/2021   • WISDOM TOOTH EXTRACTION       Social History     Socioeconomic History   • Marital status: /Civil Union     Spouse name: Not on file   • Number of children: Not on file   • Years of education: Not on file • Highest education level: Not on file   Occupational History   • Not on file   Tobacco Use   • Smoking status: Never   • Smokeless tobacco: Never   Vaping Use   • Vaping Use: Never used   Substance and Sexual Activity   • Alcohol use: Not Currently   • Drug use: Not Currently     Types: Marijuana   • Sexual activity: Yes     Partners: Female     Birth control/protection: None     Comment:    Other Topics Concern   • Not on file   Social History Narrative   • Not on file     Social Determinants of Health     Financial Resource Strain: Not on file   Food Insecurity: No Food Insecurity (10/19/2022)    Hunger Vital Sign    • Worried About Running Out of Food in the Last Year: Never true    • Ran Out of Food in the Last Year: Never true   Transportation Needs: No Transportation Needs (10/19/2022)    PRAPARE - Transportation    • Lack of Transportation (Medical): No    • Lack of Transportation (Non-Medical): No   Physical Activity: Not on file   Stress: Not on file   Social Connections: Not on file   Intimate Partner Violence: Not on file   Housing Stability: Low Risk  (10/19/2022)    Housing Stability Vital Sign    • Unable to Pay for Housing in the Last Year: No    • Number of Places Lived in the Last Year: 1    • Unstable Housing in the Last Year: No     Family History   Problem Relation Age of Onset   • Hyperlipidemia Father    • Heart attack Paternal Grandfather    • Prostate cancer Paternal Grandfather    • Cancer Paternal Grandmother        DO [facility] PGY-1    PLEASE NOTE:  This encounter was completed utilizing the [employer] Direct Speech Voice Recognition Software. Grammatical errors, random word insertions, pronoun errors and incomplete sentences are occasional consequences of the system due to software limitations, ambient noise and hardware issues. These may be missed by proof reading prior to affixing electronic signature.  Any questions or concerns about the content, text or information contained within the body of this dictation should be directly addressed to the physician for clarification. Please do not hesitate to call me directly if you have any any questions or concerns.

## 2023-09-12 ENCOUNTER — TELEPHONE (OUTPATIENT)
Dept: ENDOCRINOLOGY | Facility: CLINIC | Age: 38
End: 2023-09-12

## 2023-10-02 ENCOUNTER — RA CDI HCC (OUTPATIENT)
Dept: OTHER | Facility: HOSPITAL | Age: 38
End: 2023-10-02

## 2023-10-02 NOTE — PROGRESS NOTES
[address] coding opportunities       Chart reviewed, no opportunity found: [address] Review     Patients Insurance     Medicare Insurance: [employer]

## 2023-10-17 ENCOUNTER — TELEPHONE (OUTPATIENT)
Dept: ENDOCRINOLOGY | Facility: CLINIC | Age: 38
End: 2023-10-17

## 2023-10-20 ENCOUNTER — TELEPHONE (OUTPATIENT)
Dept: ENDOCRINOLOGY | Facility: CLINIC | Age: 38
End: 2023-10-20

## 2023-10-31 ENCOUNTER — OFFICE VISIT (OUTPATIENT)
Dept: URGENT CARE | Facility: CLINIC | Age: 38
End: 2023-10-31
Payer: COMMERCIAL

## 2023-10-31 VITALS
OXYGEN SATURATION: 99 % | TEMPERATURE: 98.9 F | DIASTOLIC BLOOD PRESSURE: 79 MMHG | SYSTOLIC BLOOD PRESSURE: 125 MMHG | HEART RATE: 102 BPM | RESPIRATION RATE: 18 BRPM

## 2023-10-31 DIAGNOSIS — L02.212 ABSCESS OF BACK: Primary | ICD-10-CM

## 2023-10-31 PROCEDURE — 99213 OFFICE O/P EST LOW 20 MIN: CPT

## 2023-10-31 PROCEDURE — S9088 SERVICES PROVIDED IN URGENT: HCPCS

## 2023-10-31 RX ORDER — DOXYCYCLINE 100 MG/1
100 CAPSULE ORAL 2 TIMES DAILY
Qty: 16 CAPSULE | Refills: 0 | Status: SHIPPED | OUTPATIENT
Start: 2023-10-31 | End: 2023-11-08

## 2023-10-31 NOTE — PROGRESS NOTES
[facility]        NAME: Delio Finn is a 43 y.o. male  : 1985    MRN: [id number]  DATE: 2023  TIME: 6:50 PM    Assessment and Plan   Abscess of back [L02.212]  1. Abscess of back  doxycycline monohydrate (MONODOX) 100 mg capsule            Patient Instructions       Follow up with PCP in 3-5 days. Proceed to  ER if symptoms worsen. Chief Complaint     Chief Complaint   Patient presents with    Rash     Patient has a rash of his right sided low back that started about 4 days ago, became large and more painful about 2 days ago. He states he thinks he picked a scab          History of Present Illness       44 y/o M presents for rash x 5 days. Pt admits he picked a scab on the Right lower back. In the following days, increasing redness, swelling, and warmth. Rash  Pertinent negatives include no fever. Review of Systems   Review of Systems   Constitutional:  Negative for chills and fever. Skin:  Positive for rash and wound. Current Medications       Current Outpatient Medications:     doxycycline monohydrate (MONODOX) 100 mg capsule, Take 1 capsule (100 mg total) by mouth 2 (two) times a day for 8 days, Disp: 16 capsule, Rfl: 0    albuterol (PROVENTIL HFA,VENTOLIN HFA) 90 mcg/act inhaler, Inhale 2 puffs every 6 (six) hours as needed for wheezing or shortness of breath, Disp: 18 g, Rfl: 3    aspirin (ECOTRIN LOW STRENGTH) 81 mg EC tablet, Take 1 tablet (81 mg total) by mouth daily Do not start before 2022., Disp: 30 tablet, Rfl: 0    benzonatate (TESSALON) 200 MG capsule, Take 1 capsule (200 mg total) by mouth 3 (three) times a day as needed for cough (Patient not taking: Reported on 2023), Disp: 20 capsule, Rfl: 0    clotrimazole (LOTRIMIN) 1 % cream, Apply topically 2 (two) times a day for 7 days Apply 2 times a day to the area surrounding the head of the penis. , Disp: 12 g, Rfl: 1    Continuous Blood Gluc  (Dexcom G6 ) CASSY, Use 1 Device every 3 (three) months (Patient not taking: Reported on 4/3/2023), Disp: 1 each, Rfl: 1    Continuous Blood Gluc Sensor (Dexcom G6 Sensor) MISC, uSE ONE SENSOR EVERY EVERY TEN DAYS (Patient not taking: Reported on 4/3/2023), Disp: 3 each, Rfl: 5    Continuous Blood Gluc Transmit (Dexcom G6 Transmitter) MISC, Use 1 Device in the morning (Patient not taking: Reported on 4/3/2023), Disp: 1 each, Rfl: 0    fluticasone (FLONASE) 50 mcg/act nasal spray, 1 spray into each nostril daily, Disp: 18.2 mL, Rfl: 0    Fluticasone-Salmeterol (Advair Diskus) 500-50 mcg/dose inhaler, Inhale 1 puff 2 (two) times a day Rinse mouth after use (Patient not taking: Reported on 6/5/2023), Disp: 180 blister, Rfl: 3    insulin aspart (NovoLOG FlexPen) 100 UNIT/ML injection pen, Inject 15 Units under the skin 3 (three) times a day with meals, Disp: 40.5 mL, Rfl: 0    insulin glargine (Lantus) 100 units/mL subcutaneous injection, Inject 70 Units under the skin every 12 (twelve) hours 70 units q12 hours, morning and night, Disp: 42 mL, Rfl: 2    Insulin Pen Needle (BD Pen Needle Rossana U/F) 32G X 4 MM MISC, Use 4/day, Disp: 100 each, Rfl: 3    levothyroxine 25 mcg tablet, Take 2 tablets (50 mcg total) by mouth daily 50, Disp: 90 tablet, Rfl: 1    loratadine (CLARITIN) 10 mg tablet, Take 10 mg by mouth daily, Disp: , Rfl:     metoprolol succinate (TOPROL-XL) 25 mg 24 hr tablet, Take 1 tablet (25 mg total) by mouth daily, Disp: 90 tablet, Rfl: 1    pravastatin (PRAVACHOL) 40 mg tablet, Take 1 tablet (40 mg total) by mouth daily, Disp: 90 tablet, Rfl: 1    risperiDONE (RisperDAL) 2 mg tablet, 2 mg, Disp: , Rfl:     semaglutide, 0.25 or 0.5 mg/dose, (Ozempic) 2 mg/1.5 mL injection pen, 0.25 mg once weekly for 4 weeks then 0.5 mg with (Patient not taking: Reported on 4/3/2023), Disp: 4.5 mL, Rfl: 1    sertraline (ZOLOFT) 100 mg tablet, Take 200 mg by mouth daily, Disp: , Rfl:     sitaGLIPtin (JANUVIA) 100 mg tablet, Take 1 tablet (100 mg total) by mouth daily, Disp: 30 tablet, Rfl: 3    Current Allergies     Allergies as of 10/31/2023 - Reviewed 10/31/2023   Allergen Reaction Noted    Bupropion Anxiety 09/01/2022    Lamotrigine GI Intolerance 09/18/2020    Niacin Rash 02/11/2018    Simvastatin Other (See Comments), Rash, and Hives 12/13/2015            The following portions of the patient's history were reviewed and updated as appropriate: allergies, current medications, past family history, past medical history, past social history, past surgical history and problem list.     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 Anxiety, depression,     Psychogenic nonepileptic spells        Past Surgical History:   Procedure Laterality Date    MOUTH SURGERY  08/2021    WISDOM TOOTH EXTRACTION         Family History   Problem Relation Age of Onset    Hyperlipidemia Father     Heart attack Paternal Grandfather     Prostate cancer Paternal Grandfather     Cancer Paternal Grandmother          Medications have been verified. Objective   /79   Pulse 102   Temp 98.9 °F (37.2 °C)   Resp 18   SpO2 99%   No LMP for male patient. Physical Exam     Physical Exam  Vitals and nursing note reviewed. Constitutional:       General: He is not in acute distress. Appearance: He is not toxic-appearing. HENT:      Head: Normocephalic and atraumatic. Eyes:      Conjunctiva/sclera: Conjunctivae normal.   Pulmonary:      Effort: Pulmonary effort is normal.   Skin:     Comments: Abscess on right lower back  TTP  No discharge  Pea sized   No fluctuance    Neurological:      Mental Status: He is alert.    Psychiatric:         Mood and Affect: Mood normal.         Behavior: Behavior normal.

## 2023-11-10 ENCOUNTER — TELEPHONE (OUTPATIENT)
Dept: INTERNAL MEDICINE CLINIC | Age: 38
End: 2023-11-10

## 2023-12-06 ENCOUNTER — OFFICE VISIT (OUTPATIENT)
Dept: URGENT CARE | Facility: CLINIC | Age: 38
End: 2023-12-06
Payer: COMMERCIAL

## 2023-12-06 VITALS
RESPIRATION RATE: 20 BRPM | DIASTOLIC BLOOD PRESSURE: 84 MMHG | HEART RATE: 84 BPM | OXYGEN SATURATION: 95 % | TEMPERATURE: 98.9 F | SYSTOLIC BLOOD PRESSURE: 133 MMHG

## 2023-12-06 DIAGNOSIS — L01.00 IMPETIGO: Primary | ICD-10-CM

## 2023-12-06 PROCEDURE — 99213 OFFICE O/P EST LOW 20 MIN: CPT | Performed by: FAMILY MEDICINE

## 2023-12-06 PROCEDURE — S9088 SERVICES PROVIDED IN URGENT: HCPCS | Performed by: FAMILY MEDICINE

## 2023-12-06 RX ORDER — SULFAMETHOXAZOLE AND TRIMETHOPRIM 800; 160 MG/1; MG/1
1 TABLET ORAL EVERY 12 HOURS SCHEDULED
Qty: 6 TABLET | Refills: 0 | Status: SHIPPED | OUTPATIENT
Start: 2023-12-06 | End: 2023-12-09

## 2023-12-06 NOTE — PROGRESS NOTES
[facility]        NAME: Shaw Chavez is a 43 y.o. male  : 1985    MRN: [id number]  DATE: 2023  TIME: 1:17 PM    Assessment and Plan   Impetigo [L01.00]  1. Impetigo  sulfamethoxazole-trimethoprim (BACTRIM DS) 800-160 mg per tablet            Patient Instructions       Follow up with PCP in 3-5 days. Proceed to  ER if symptoms worsen. Chief Complaint     Chief Complaint   Patient presents with    Rash     Patient popped a pimple above his left eyebrow 2 days ago and he believes it is getting infected          History of Present Illness       62-year-old male with 3-day history of rash on his forehead. He did note some pain and drainage. Denies any burning or itching sensation. Review of Systems   Review of Systems   Constitutional: Negative. HENT: Negative. Eyes: Negative. Respiratory: Negative. Cardiovascular: Negative. Gastrointestinal: Negative. Genitourinary: Negative. Skin:  Positive for rash. Allergic/Immunologic: Negative. Neurological: Negative. Hematological: Negative. Psychiatric/Behavioral: Negative.            Current Medications       Current Outpatient Medications:     sulfamethoxazole-trimethoprim (BACTRIM DS) 800-160 mg per tablet, Take 1 tablet by mouth every 12 (twelve) hours for 3 days, Disp: 6 tablet, Rfl: 0    albuterol (PROVENTIL HFA,VENTOLIN HFA) 90 mcg/act inhaler, Inhale 2 puffs every 6 (six) hours as needed for wheezing or shortness of breath, Disp: 18 g, Rfl: 3    aspirin (ECOTRIN LOW STRENGTH) 81 mg EC tablet, Take 1 tablet (81 mg total) by mouth daily Do not start before 2022., Disp: 30 tablet, Rfl: 0    benzonatate (TESSALON) 200 MG capsule, Take 1 capsule (200 mg total) by mouth 3 (three) times a day as needed for cough (Patient not taking: Reported on 2023), Disp: 20 capsule, Rfl: 0    clotrimazole (LOTRIMIN) 1 % cream, Apply topically 2 (two) times a day for 7 days Apply 2 times a day to the area surrounding the head of the penis. , Disp: 12 g, Rfl: 1    Continuous Blood Gluc  (Dexcom G6 ) CASSY, Use 1 Device every 3 (three) months (Patient not taking: Reported on 4/3/2023), Disp: 1 each, Rfl: 1    Continuous Blood Gluc Sensor (Dexcom G6 Sensor) MISC, uSE ONE SENSOR EVERY EVERY TEN DAYS (Patient not taking: Reported on 4/3/2023), Disp: 3 each, Rfl: 5    Continuous Blood Gluc Transmit (Dexcom G6 Transmitter) MISC, Use 1 Device in the morning (Patient not taking: Reported on 4/3/2023), Disp: 1 each, Rfl: 0    fluticasone (FLONASE) 50 mcg/act nasal spray, 1 spray into each nostril daily, Disp: 18.2 mL, Rfl: 0    Fluticasone-Salmeterol (Advair Diskus) 500-50 mcg/dose inhaler, Inhale 1 puff 2 (two) times a day Rinse mouth after use (Patient not taking: Reported on 6/5/2023), Disp: 180 blister, Rfl: 3    insulin aspart (NovoLOG FlexPen) 100 UNIT/ML injection pen, Inject 15 Units under the skin 3 (three) times a day with meals, Disp: 40.5 mL, Rfl: 0    insulin glargine (Lantus) 100 units/mL subcutaneous injection, Inject 70 Units under the skin every 12 (twelve) hours 70 units q12 hours, morning and night, Disp: 42 mL, Rfl: 2    Insulin Pen Needle (BD Pen Needle Rossana U/F) 32G X 4 MM MISC, Use 4/day, Disp: 100 each, Rfl: 3    levothyroxine 25 mcg tablet, Take 2 tablets (50 mcg total) by mouth daily 50, Disp: 90 tablet, Rfl: 1    loratadine (CLARITIN) 10 mg tablet, Take 10 mg by mouth daily, Disp: , Rfl:     metoprolol succinate (TOPROL-XL) 25 mg 24 hr tablet, Take 1 tablet (25 mg total) by mouth daily, Disp: 90 tablet, Rfl: 1    pravastatin (PRAVACHOL) 40 mg tablet, Take 1 tablet (40 mg total) by mouth daily, Disp: 90 tablet, Rfl: 1    risperiDONE (RisperDAL) 2 mg tablet, 2 mg, Disp: , Rfl:     semaglutide, 0.25 or 0.5 mg/dose, (Ozempic) 2 mg/1.5 mL injection pen, 0.25 mg once weekly for 4 weeks then 0.5 mg with (Patient not taking: Reported on 4/3/2023), Disp: 4.5 mL, Rfl: 1    sertraline (ZOLOFT) 100 mg tablet, Take 200 mg by mouth daily, Disp: , Rfl:     sitaGLIPtin (JANUVIA) 100 mg tablet, Take 1 tablet (100 mg total) by mouth daily, Disp: 30 tablet, Rfl: 3    Current Allergies     Allergies as of 12/06/2023 - Reviewed 10/31/2023   Allergen Reaction Noted    Bupropion Anxiety 09/01/2022    Lamotrigine GI Intolerance 09/18/2020    Niacin Rash 02/11/2018    Simvastatin Other (See Comments), Rash, and Hives 12/13/2015            The following portions of the patient's history were reviewed and updated as appropriate: allergies, current medications, past family history, past medical history, past social history, past surgical history and problem list.     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 Anxiety, depression,     Psychogenic nonepileptic spells        Past Surgical History:   Procedure Laterality Date    MOUTH SURGERY  08/2021    WISDOM TOOTH EXTRACTION         Family History   Problem Relation Age of Onset    Hyperlipidemia Father     Heart attack Paternal Grandfather     Prostate cancer Paternal Grandfather     Cancer Paternal Grandmother          Medications have been verified. Objective   /84   Pulse 84   Temp 98.9 °F (37.2 °C)   Resp 20   SpO2 95%   No LMP for male patient. Physical Exam     Physical Exam  Constitutional:       Appearance: He is well-developed. HENT:      Head: Normocephalic. Eyes:      Pupils: Pupils are equal, round, and reactive to light. Pulmonary:      Effort: Pulmonary effort is normal.   Musculoskeletal:         General: Normal range of motion. Cervical back: Normal range of motion. Skin:     General: Skin is warm. Findings: Lesion present.           Neurological:      Mental Status: He is alert and oriented to person, place, and time.

## 2024-01-06 ENCOUNTER — OFFICE VISIT (OUTPATIENT)
Dept: URGENT CARE | Facility: CLINIC | Age: 39
End: 2024-01-06
Payer: COMMERCIAL

## 2024-01-06 VITALS
RESPIRATION RATE: 18 BRPM | OXYGEN SATURATION: 98 % | HEART RATE: 84 BPM | DIASTOLIC BLOOD PRESSURE: 77 MMHG | TEMPERATURE: 97.2 F | SYSTOLIC BLOOD PRESSURE: 124 MMHG

## 2024-01-06 DIAGNOSIS — L02.211 ABSCESS OF SKIN OF ABDOMEN: Primary | ICD-10-CM

## 2024-01-06 PROCEDURE — S9088 SERVICES PROVIDED IN URGENT: HCPCS | Performed by: PHYSICIAN ASSISTANT

## 2024-01-06 PROCEDURE — 99213 OFFICE O/P EST LOW 20 MIN: CPT | Performed by: PHYSICIAN ASSISTANT

## 2024-01-06 RX ORDER — CEPHALEXIN 500 MG/1
500 CAPSULE ORAL EVERY 6 HOURS SCHEDULED
Qty: 28 CAPSULE | Refills: 0 | Status: SHIPPED | OUTPATIENT
Start: 2024-01-06 | End: 2024-01-13

## 2024-01-06 NOTE — PROGRESS NOTES
[facility]        NAME: Dillon Baxter is a 38 y.o. male  : 1985    MRN: [id number]  DATE: 2024  TIME: 1:45 PM    Assessment and Plan   Abscess of skin of abdomen [L02.211]  1. Abscess of skin of abdomen  cephalexin (KEFLEX) 500 mg capsule    Ambulatory Referral to General Surgery            Patient Instructions     Take antibiotic as directed  Apply warm compresses and try to avoid wearing clothes that rub on the area such as jeans  Follow up with general surgeon - they will call to schedule appt   Follow up with PCP in 3-5 days.  Proceed to  ER if symptoms worsen.    Chief Complaint     Chief Complaint   Patient presents with    Bump on Right Side of Lower Abdomen     Pt reports that he developed a dime sized bump on right side of lower abdomen which has been irritated by belt. Pt reports that yesterday, discomfort and tenderness of area increased and bump has increased in size.          History of Present Illness       HPI  37 y/o male presents for evaluation of right sided abdominal lump which appeared a few days ago.  He denies onset associated with lifting/cough/sneeze.  He states the area is tender to palpation and hurts with flexion or something rubbing against it.  He denies pain with lifting/straining.  He denies n/v/d/constipation.    Review of Systems   Review of Systems   Constitutional:  Negative for appetite change, chills and fever.   HENT:  Negative for ear pain and sore throat.    Eyes:  Negative for pain and visual disturbance.   Respiratory:  Negative for cough and shortness of breath.    Cardiovascular:  Negative for chest pain and palpitations.   Gastrointestinal:  Negative for abdominal pain, constipation, diarrhea, nausea and vomiting.   Genitourinary:  Negative for dysuria and hematuria.   Musculoskeletal:  Negative for arthralgias and back pain.   Skin:  Positive for wound. Negative for color change and rash.   Neurological:  Negative for seizures and syncope.    All other systems reviewed and are negative.        Current Medications       Current Outpatient Medications:     cephalexin (KEFLEX) 500 mg capsule, Take 1 capsule (500 mg total) by mouth every 6 (six) hours for 7 days, Disp: 28 capsule, Rfl: 0    insulin aspart (NovoLOG FlexPen) 100 UNIT/ML injection pen, Inject 15 Units under the skin 3 (three) times a day with meals, Disp: 40.5 mL, Rfl: 0    insulin glargine (Lantus) 100 units/mL subcutaneous injection, Inject 70 Units under the skin every 12 (twelve) hours 70 units q12 hours, morning and night (Patient taking differently: Inject 60 Units under the skin every 12 (twelve) hours 70 units q12 hours, morning and night), Disp: 42 mL, Rfl: 2    levothyroxine 25 mcg tablet, Take 2 tablets (50 mcg total) by mouth daily 50, Disp: 90 tablet, Rfl: 1    metoprolol succinate (TOPROL-XL) 25 mg 24 hr tablet, Take 1 tablet (25 mg total) by mouth daily, Disp: 90 tablet, Rfl: 1    pravastatin (PRAVACHOL) 40 mg tablet, Take 1 tablet (40 mg total) by mouth daily, Disp: 90 tablet, Rfl: 1    risperiDONE (RisperDAL) 2 mg tablet, 2 mg, Disp: , Rfl:     sertraline (ZOLOFT) 100 mg tablet, Take 200 mg by mouth daily, Disp: , Rfl:     albuterol (PROVENTIL HFA,VENTOLIN HFA) 90 mcg/act inhaler, Inhale 2 puffs every 6 (six) hours as needed for wheezing or shortness of breath, Disp: 18 g, Rfl: 3    aspirin (ECOTRIN LOW STRENGTH) 81 mg EC tablet, Take 1 tablet (81 mg total) by mouth daily Do not start before October 21, 2022., Disp: 30 tablet, Rfl: 0    benzonatate (TESSALON) 200 MG capsule, Take 1 capsule (200 mg total) by mouth 3 (three) times a day as needed for cough (Patient not taking: Reported on 4/13/2023), Disp: 20 capsule, Rfl: 0    clotrimazole (LOTRIMIN) 1 % cream, Apply topically 2 (two) times a day for 7 days Apply 2 times a day to the area surrounding the head of the penis., Disp: 12 g, Rfl: 1    Continuous Blood Gluc  (Dexcom G6 ) CASSY, Use 1 Device every 3  (three) months (Patient not taking: Reported on 4/3/2023), Disp: 1 each, Rfl: 1    Continuous Blood Gluc Sensor (Dexcom G6 Sensor) MISC, uSE ONE SENSOR EVERY EVERY TEN DAYS (Patient not taking: Reported on 4/3/2023), Disp: 3 each, Rfl: 5    Continuous Blood Gluc Transmit (Dexcom G6 Transmitter) MISC, Use 1 Device in the morning (Patient not taking: Reported on 4/3/2023), Disp: 1 each, Rfl: 0    fluticasone (FLONASE) 50 mcg/act nasal spray, 1 spray into each nostril daily, Disp: 18.2 mL, Rfl: 0    Fluticasone-Salmeterol (Advair Diskus) 500-50 mcg/dose inhaler, Inhale 1 puff 2 (two) times a day Rinse mouth after use (Patient not taking: Reported on 6/5/2023), Disp: 180 blister, Rfl: 3    Insulin Pen Needle (BD Pen Needle Rossana U/F) 32G X 4 MM MISC, Use 4/day, Disp: 100 each, Rfl: 3    loratadine (CLARITIN) 10 mg tablet, Take 10 mg by mouth daily, Disp: , Rfl:     semaglutide, 0.25 or 0.5 mg/dose, (Ozempic) 2 mg/1.5 mL injection pen, 0.25 mg once weekly for 4 weeks then 0.5 mg with (Patient not taking: Reported on 4/3/2023), Disp: 4.5 mL, Rfl: 1    sitaGLIPtin (JANUVIA) 100 mg tablet, Take 1 tablet (100 mg total) by mouth daily (Patient not taking: Reported on 1/6/2024), Disp: 30 tablet, Rfl: 3    Current Allergies     Allergies as of 01/06/2024 - Reviewed 01/06/2024   Allergen Reaction Noted    Bupropion Anxiety 09/01/2022    Lamotrigine GI Intolerance 09/18/2020    Niacin Rash 02/11/2018    Simvastatin Other (See Comments), Rash, and Hives 12/13/2015            The following portions of the patient's history were reviewed and updated as appropriate: allergies, current medications, past family history, past medical history, past social history, past surgical history and problem list.     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 Anxiety, depression,     Psychogenic nonepileptic spells        Past Surgical History:   Procedure Laterality Date    MOUTH SURGERY  08/2021    WISDOM TOOTH EXTRACTION         Family History   Problem Relation Age of Onset    Hyperlipidemia Father     Heart attack Paternal Grandfather     Prostate cancer Paternal Grandfather     Cancer Paternal Grandmother          Medications have been verified.        Objective   /77   Pulse 84   Temp (!) 97.2 °F (36.2 °C)   Resp 18   SpO2 98%   No LMP for male patient.       Physical Exam     Physical Exam  Vitals and nursing note reviewed.   Constitutional:       Appearance: Normal appearance. He is not ill-appearing.   HENT:      Head: Normocephalic and atraumatic.      Nose: Nose normal.   Cardiovascular:      Rate and Rhythm: Normal rate and regular rhythm.      Pulses: Normal pulses.      Heart sounds: Normal heart sounds.   Pulmonary:      Effort: Pulmonary effort is normal.      Breath sounds: Normal breath sounds.   Abdominal:      General: Bowel sounds are normal. There is no distension.      Comments: Along the belt line in the RLQ is an erythematous, indurated area measuring 4 x 1 cm with associated tenderness.  There is no fluctuance and no active drainage.     Skin:     General: Skin is warm and dry.   Neurological:      Mental Status: He is alert and oriented to person, place, and time.   Psychiatric:         Mood and Affect: Mood normal.         Behavior: Behavior normal.

## 2024-01-06 NOTE — PATIENT INSTRUCTIONS
Take antibiotic as directed  Apply warm compresses and try to avoid wearing clothes that rub on the area such as jeans  Follow up with general surgeon - they will call to schedule appt   Follow up with PCP in 3-5 days.  Proceed to  ER if symptoms worsen.

## 2024-01-09 ENCOUNTER — HOSPITAL ENCOUNTER (EMERGENCY)
Facility: HOSPITAL | Age: 39
Discharge: HOME | End: 2024-01-09
Attending: EMERGENCY MEDICINE | Admitting: EMERGENCY MEDICINE
Payer: COMMERCIAL

## 2024-01-09 VITALS
HEART RATE: 88 BPM | OXYGEN SATURATION: 94 % | RESPIRATION RATE: 16 BRPM | DIASTOLIC BLOOD PRESSURE: 80 MMHG | TEMPERATURE: 97.7 F | SYSTOLIC BLOOD PRESSURE: 128 MMHG

## 2024-01-09 DIAGNOSIS — L02.211 ABSCESS OF SKIN OF ABDOMEN: Primary | ICD-10-CM

## 2024-01-09 PROCEDURE — 87070 CULTURE OTHR SPECIMN AEROBIC: CPT

## 2024-01-09 PROCEDURE — 99283 EMERGENCY DEPT VISIT LOW MDM: CPT

## 2024-01-09 PROCEDURE — 87186 SC STD MICRODIL/AGAR DIL: CPT

## 2024-01-09 PROCEDURE — 87205 SMEAR GRAM STAIN: CPT

## 2024-01-09 PROCEDURE — 10061 I&D ABSCESS COMP/MULTIPLE: CPT

## 2024-01-09 PROCEDURE — 87147 CULTURE TYPE IMMUNOLOGIC: CPT

## 2024-01-09 PROCEDURE — 99284 EMERGENCY DEPT VISIT MOD MDM: CPT

## 2024-01-09 RX ORDER — LIDOCAINE HYDROCHLORIDE AND EPINEPHRINE 10; 10 MG/ML; UG/ML
10 INJECTION, SOLUTION INFILTRATION; PERINEURAL ONCE
Status: COMPLETED | OUTPATIENT
Start: 2024-01-09 | End: 2024-01-09

## 2024-01-09 RX ORDER — SULFAMETHOXAZOLE AND TRIMETHOPRIM 800; 160 MG/1; MG/1
1 TABLET ORAL 2 TIMES DAILY
Qty: 10 TABLET | Refills: 0 | Status: SHIPPED | OUTPATIENT
Start: 2024-01-09 | End: 2024-01-14

## 2024-01-09 RX ORDER — SULFAMETHOXAZOLE AND TRIMETHOPRIM 800; 160 MG/1; MG/1
1 TABLET ORAL ONCE
Status: COMPLETED | OUTPATIENT
Start: 2024-01-09 | End: 2024-01-09

## 2024-01-09 RX ADMIN — LIDOCAINE HYDROCHLORIDE,EPINEPHRINE BITARTRATE 10 ML: 10; .01 INJECTION, SOLUTION INFILTRATION; PERINEURAL at 15:48

## 2024-01-09 RX ADMIN — SULFAMETHOXAZOLE AND TRIMETHOPRIM 1 TABLET: 800; 160 TABLET ORAL at 15:48

## 2024-01-09 NOTE — ED PROVIDER NOTES
History  Chief Complaint   Patient presents with    Abscess     Right Lower waist line abscess, was started on abx 2 days ago, but now its getting bigger.     The patient is a 38-year-old male with PMH of HTN, HLD, and T2DM on insulin presenting for evaluation of right lower abdomen abscess.  The patient started on 1/4/2024 (5 days PTA) with irritation to the right lower abdomen where his waistband/belt sits.  It started as a small dime sized area of firmness.  He went to urgent care 2 days later given the discomfort/pain and he was started on Keflex 4 times daily.  He has been taking the medication as directed, however he notes the swelling has increased as well as the pain.  He does note he is color blind so he is uncertain if there have been color changes.  He denies associated fevers or chills.  He notes 1 prior abscess to the middle of his back.  He denies history of MRSA.  He notes his sugars currently run from the 200s to 300s.  He is on long-acting at night and 70/30 3 times daily with meals.  He notes compliance with his insulin regimen and has been working closely with family medicine to improve his sugars.  He denies breaks in skin or cuts prior to this area of swelling and pain.      History provided by:  Patient   used: No    Abscess  Associated symptoms: no fever, no nausea and no vomiting        Prior to Admission Medications   Prescriptions Last Dose Informant Patient Reported? Taking?   Continuous Blood Gluc  (Dexcom G6 ) CASSY  Self No No   Sig: Use 1 Device every 3 (three) months   Patient not taking: Reported on 4/3/2023   Continuous Blood Gluc Sensor (Dexcom G6 Sensor) MISC  Self No No   Sig: uSE ONE SENSOR EVERY EVERY TEN DAYS   Patient not taking: Reported on 4/3/2023   Continuous Blood Gluc Transmit (Dexcom G6 Transmitter) MISC  Self No No   Sig: Use 1 Device in the morning   Patient not taking: Reported on 4/3/2023   Fluticasone-Salmeterol (Advair Diskus)  500-50 mcg/dose inhaler   No No   Sig: Inhale 1 puff 2 (two) times a day Rinse mouth after use   Patient not taking: Reported on 2023   Insulin Pen Needle (BD Pen Needle Rossana U/F) 32G X 4 MM MISC  Self No No   Sig: Use 4/day   albuterol (PROVENTIL HFA,VENTOLIN HFA) 90 mcg/act inhaler  Self No No   Sig: Inhale 2 puffs every 6 (six) hours as needed for wheezing or shortness of breath   aspirin (ECOTRIN LOW STRENGTH) 81 mg EC tablet  Self No No   Sig: Take 1 tablet (81 mg total) by mouth daily Do not start before 2022.   benzonatate (TESSALON) 200 MG capsule  Self No No   Sig: Take 1 capsule (200 mg total) by mouth 3 (three) times a day as needed for cough   Patient not taking: Reported on 2023   cephalexin (KEFLEX) 500 mg capsule   No No   Sig: Take 1 capsule (500 mg total) by mouth every 6 (six) hours for 7 days   clotrimazole (LOTRIMIN) 1 % cream   No No   Sig: Apply topically 2 (two) times a day for 7 days Apply 2 times a day to the area surrounding the head of the penis.   fluticasone (FLONASE) 50 mcg/act nasal spray   No No   Si spray into each nostril daily   insulin aspart (NovoLOG FlexPen) 100 UNIT/ML injection pen  Self No No   Sig: Inject 15 Units under the skin 3 (three) times a day with meals   insulin glargine (Lantus) 100 units/mL subcutaneous injection  Self No No   Sig: Inject 70 Units under the skin every 12 (twelve) hours 70 units q12 hours, morning and night   Patient taking differently: Inject 60 Units under the skin every 12 (twelve) hours 70 units q12 hours, morning and night   levothyroxine 25 mcg tablet  Self No No   Sig: Take 2 tablets (50 mcg total) by mouth daily 50   loratadine (CLARITIN) 10 mg tablet  Self Yes No   Sig: Take 10 mg by mouth daily   metoprolol succinate (TOPROL-XL) 25 mg 24 hr tablet  Self No No   Sig: Take 1 tablet (25 mg total) by mouth daily   pravastatin (PRAVACHOL) 40 mg tablet  Self No No   Sig: Take 1 tablet (40 mg total) by mouth daily    risperiDONE (RisperDAL) 2 mg tablet  Self Yes No   Si mg   semaglutide, 0.25 or 0.5 mg/dose, (Ozempic) 2 mg/1.5 mL injection pen  Self No No   Si.25 mg once weekly for 4 weeks then 0.5 mg with   Patient not taking: Reported on 4/3/2023   sertraline (ZOLOFT) 100 mg tablet  Self Yes No   Sig: Take 200 mg by mouth daily   sitaGLIPtin (JANUVIA) 100 mg tablet   No No   Sig: Take 1 tablet (100 mg total) by mouth daily   Patient not taking: Reported on 2024      Facility-Administered Medications: None       Past Medical History:   Diagnosis Date    COVID-19 2021    Diabetes mellitus (HCC)     type 2    Disease of thyroid gland     hypothyroidism    Hyperlipidemia     Hypertension     Post-COVID-19 condition 2021    Psychiatric disorder     PTSD. Anxiety, depression,     Psychogenic nonepileptic spells        Past Surgical History:   Procedure Laterality Date    MOUTH SURGERY  2021    WISDOM TOOTH EXTRACTION         Family History   Problem Relation Age of Onset    Hyperlipidemia Father     Heart attack Paternal Grandfather     Prostate cancer Paternal Grandfather     Cancer Paternal Grandmother      I have reviewed and agree with the history as documented.    E-Cigarette/Vaping    E-Cigarette Use Never User      E-Cigarette/Vaping Substances    Nicotine No     THC No     CBD No     Flavoring No     Other No      Social History     Tobacco Use    Smoking status: Never    Smokeless tobacco: Never   Vaping Use    Vaping status: Never Used   Substance Use Topics    Alcohol use: Not Currently    Drug use: Not Currently     Types: Marijuana       Review of Systems   Constitutional:  Negative for chills and fever.   Respiratory:  Negative for cough and shortness of breath.    Cardiovascular:  Negative for chest pain.   Gastrointestinal:  Positive for abdominal pain (Right lower abdomen at waistline). Negative for nausea and vomiting.   Musculoskeletal:  Negative for back pain and gait problem.   Skin:   "Positive for wound (Area of swelling and pain concerning for abscess per patient; \" I think it needs to be popped\"). Negative for color change (Uncertain of skin changes as he is colorblind), pallor and rash.   All other systems reviewed and are negative.      Physical Exam  Physical Exam  Vitals and nursing note reviewed.   Constitutional:       General: He is not in acute distress.     Appearance: Normal appearance. He is well-developed. He is not ill-appearing, toxic-appearing or diaphoretic.   HENT:      Head: Normocephalic and atraumatic.      Jaw: There is normal jaw occlusion.      Nose: Nose normal.      Mouth/Throat:      Lips: Pink.      Mouth: Mucous membranes are moist.   Eyes:      Extraocular Movements: Extraocular movements intact.      Conjunctiva/sclera: Conjunctivae normal.   Cardiovascular:      Rate and Rhythm: Normal rate.      Pulses:           Radial pulses are 2+ on the right side and 2+ on the left side.   Pulmonary:      Effort: Pulmonary effort is normal. No tachypnea or respiratory distress.   Abdominal:      General: There is no distension.      Palpations: Abdomen is soft.      Tenderness: There is abdominal tenderness (Localized area marked above). There is no guarding or rebound.       Genitourinary:     Comments: No suprapubic pain, no erythema or warmth or swelling. Pt defers formal scrotal and penis exam.  Musculoskeletal:         General: Normal range of motion.      Cervical back: Neck supple.   Skin:     General: Skin is warm and dry.      Capillary Refill: Capillary refill takes less than 2 seconds.      Findings: Abscess (see abdominal documentation) present. No rash.   Neurological:      General: No focal deficit present.      Mental Status: He is alert and oriented to person, place, and time. Mental status is at baseline.         Vital Signs  ED Triage Vitals [01/09/24 1509]   Temperature Pulse Respirations Blood Pressure SpO2   97.7 °F (36.5 °C) 88 16 128/80 94 %      Temp " "Source Heart Rate Source Patient Position - Orthostatic VS BP Location FiO2 (%)   Temporal Monitor Sitting Right arm --      Pain Score       7           Vitals:    01/09/24 1509   BP: 128/80   Pulse: 88   Patient Position - Orthostatic VS: Sitting         Visual Acuity      ED Medications  Medications   sulfamethoxazole-trimethoprim (BACTRIM DS) 800-160 mg per tablet 1 tablet (1 tablet Oral Given 1/9/24 1548)   lidocaine-epinephrine (XYLOCAINE/EPINEPHRINE) 1 %-1:100,000 injection 10 mL (10 mL Infiltration Given by Other 1/9/24 1548)       Diagnostic Studies  Results Reviewed       Procedure Component Value Units Date/Time    Wound culture and Gram stain [[id number]]     Lab Status: No result Specimen: Wound                    No orders to display              Procedures  Incision and drain    Date/Time: 1/9/2024 4:14 PM    Performed by: IGNACIO Gonzalez  Authorized by: IGNACIO Gonzalez  Universal Protocol:  Consent: Verbal consent obtained.  Risks and benefits: risks, benefits and alternatives were discussed  Consent given by: patient  Time out: Immediately prior to procedure a \"time out\" was called to verify the correct patient, procedure, equipment, support staff and site/side marked as required.  Timeout called at: 1/9/2024 4:00 PM.  Patient understanding: patient states understanding of the procedure being performed  Patient identity confirmed: verbally with patient    Patient location:  ED  Location:     Type:  Abscess    Size:  3 by 2 cm    Location:  Trunk    Trunk location:  Abdomen (R lower abdomen)  Pre-procedure details:     Skin preparation:  Betadine and Chloraprep  Anesthesia (see MAR for exact dosages):     Anesthesia method:  Local infiltration    Local anesthetic:  Lidocaine 1% WITH epi  Procedure details:     Complexity:  Simple    Needle aspiration: no      Incision types:  Single straight    Scalpel blade:  11    Approach:  Puncture    Incision depth:  Subcutaneous    Wound management:  " "Probed and deloculated and irrigated with saline    Irrigation with saline:  250    Drainage:  Purulent and bloody    Drainage amount:  Moderate    Wound treatment:  Wound left open and packing placed    Packing materials:  1/2 in gauze    Amount 1/2\":  2  Post-procedure details:     Patient tolerance of procedure:  Tolerated well, no immediate complications           ED Course  ED Course as of 01/09/24 1619   Tue Jan 09, 2024   1520 Triage VS WNL and stable   1523 Tdap 7/27/23                               SBIRT 22yo+      Flowsheet Row Most Recent Value   Initial Alcohol Screen: US AUDIT-C     1. How often do you have a drink containing alcohol? 0 Filed at: 01/09/2024 1544   2. How many drinks containing alcohol do you have on a typical day you are drinking?  0 Filed at: 01/09/2024 1544   3a. Male UNDER 65: How often do you have five or more drinks on one occasion? 0 Filed at: 01/09/2024 1544   3b. FEMALE Any Age, or MALE 65+: How often do you have 4 or more drinks on one occassion? 0 Filed at: 01/09/2024 1544   Audit-C Score 0 Filed at: 01/09/2024 1544   MARCO ANTONIO: How many times in the past year have you...    Used an illegal drug or used a prescription medication for non-medical reasons? Never Filed at: 01/09/2024 1544                      Medical Decision Making  DDx including but not limited to: Wound check, cellulitis, abscess    Area of erythema, warmth, tenderness, and swelling with induration to right lower abdomen at waistband.  Ultrasound used with focal collection of fluid measuring approximately 3 cm x 2 cm.  Straight incision made with spontaneous drainage of purulent fluid.  Patient tolerated I&D without complication.  Given his, will continue patient on Keflex every 6 hours.  Will add on Bactrim twice daily and send off wound culture as he has a history of recurrent abscesses.  Patient aware that his elevated blood sugars are a contributing factor to skin and soft tissue infection.  He notes he will " continue to monitor sugars and work with primary care for better glucose management.  He denies systemic symptoms of fevers or chills.  He is otherwise in his normal state of health.  Patient follow-up in 2 days for wound check and/or return to the ER if new or worsening features.  He is in agreement with plan and has no further questions at this time.    Problems Addressed:  Abscess of skin of abdomen: acute illness or injury    Amount and/or Complexity of Data Reviewed  Labs: ordered.    Risk  OTC drugs.  Prescription drug management.             Disposition  Final diagnoses:   Abscess of skin of abdomen     Time reflects when diagnosis was documented in both MDM as applicable and the Disposition within this note       Time User Action Codes Description Comment    1/9/2024  4:18 PM Shugars, Gissel Add [L02.211] Abdominal wall abscess     1/9/2024  4:18 PM Shugars, Gissel Add [L02.211] Abscess of skin of abdomen     1/9/2024  4:18 PM Shugars, Gissel Modify [L02.211] Abscess of skin of abdomen     1/9/2024  4:18 PM Shugars, Gissel Remove [L02.211] Abdominal wall abscess           ED Disposition       ED Disposition   Discharge    Condition   Stable    Date/Time   Tue Jan 9, 2024 1618    Comment   Dillon Baxter discharge to home/self care.                   Follow-up Information       Follow up With Specialties Details Why Contact Info Additional Information    Adan Nixon MD Internal Medicine Schedule an appointment as soon as possible for a visit in 2 days For wound re-check [address] [address]  [phone]        [facility] Emergency Department Emergency Medicine Go to  If symptoms worsen [address] [facility] [id number] [phone] [facility] Emergency Department, [address] [place], [place] [address]            Patient's Medications   Discharge Prescriptions    SULFAMETHOXAZOLE-TRIMETHOPRIM  (BACTRIM DS) 800-160 MG PER TABLET    Take 1 tablet by mouth 2 (two) times a day for 5 days smx-tmp DS (BACTRIM) 800-160 mg tabs (1tab q12 D10)       Start Date: 1/9/2024  End Date: 1/14/2024       Order Dose: 1 tablet       Quantity: 10 tablet    Refills: 0       No discharge procedures on file.    PDMP Review         Value Time User    PDMP Reviewed  Yes 10/20/2022  3:27 PM Richi Casey MD            ED Provider  Electronically Signed by             IGNACIO Gonzalez  01/09/24 4553

## 2024-01-09 NOTE — DISCHARGE INSTRUCTIONS
Take the prescribed antibiotics as directed for the full duration of its course. Return to the Emergency Department sooner if increased pain, swelling, redness, fever, vomiting.  Packing removal and wound check in 2 days.

## 2024-01-10 ENCOUNTER — VBI (OUTPATIENT)
Dept: INTERNAL MEDICINE CLINIC | Age: 39
End: 2024-01-10

## 2024-01-10 NOTE — TELEPHONE ENCOUNTER
01/10/24 10:07 AM    Patient contacted post ED visit, first outreach attempt made. Message was left for patient to return a call to the [facility] Department at [facility]: Phone [phone].    Thank you.  [facility] Maldonado  [facility]

## 2024-01-11 ENCOUNTER — OFFICE VISIT (OUTPATIENT)
Dept: URGENT CARE | Facility: CLINIC | Age: 39
End: 2024-01-11
Payer: COMMERCIAL

## 2024-01-11 VITALS
HEART RATE: 88 BPM | RESPIRATION RATE: 20 BRPM | DIASTOLIC BLOOD PRESSURE: 104 MMHG | OXYGEN SATURATION: 95 % | SYSTOLIC BLOOD PRESSURE: 135 MMHG | TEMPERATURE: 98.2 F

## 2024-01-11 DIAGNOSIS — Z51.89 VISIT FOR WOUND CHECK: ICD-10-CM

## 2024-01-11 DIAGNOSIS — L02.211 ABSCESS OF SKIN OF ABDOMEN: Primary | ICD-10-CM

## 2024-01-11 LAB
BACTERIA WND AEROBE CULT: ABNORMAL
GRAM STN SPEC: ABNORMAL

## 2024-01-11 PROCEDURE — S9088 SERVICES PROVIDED IN URGENT: HCPCS

## 2024-01-11 PROCEDURE — 10060 I&D ABSCESS SIMPLE/SINGLE: CPT

## 2024-01-11 PROCEDURE — 99213 OFFICE O/P EST LOW 20 MIN: CPT

## 2024-01-11 NOTE — TELEPHONE ENCOUNTER
01/11/24 11:49 AM    Patient contacted post ED visit, second outreach attempt made. Message was left for patient to return a call to the [facility] Department at [facility]: Phone [phone].    Thank you.  [facility] Maldonado  [facility]

## 2024-01-11 NOTE — PATIENT INSTRUCTIONS
Wound care performed today - packing removed and new packing placed.  Dressing change performed.    Continue current wound care regimen - you're doing a great job!    Continue medications as previously prescribed.    You need to call and schedule an appointment with general surgery for further evaluation.    Go to the ED for any worsening symptoms.

## 2024-01-11 NOTE — PROGRESS NOTES
[facility]        NAME: Dillon Baxter is a 38 y.o. male  : 1985    MRN: [id number]  DATE: 2024  TIME: 4:08 PM    Assessment and Plan   Abscess of skin of abdomen [L02.211]  1. Abscess of skin of abdomen        2. Visit for wound check  Incision and drain          Discussion with patient regarding high /104 in clinic. States he has been compliant with taking Metoprolol daily. Denies headaches, dizziness, and lightheadedness. Recommended PCP follow-up, he acknowledges.      Patient Instructions     Wound care performed today - packing removed and new packing placed.  Dressing change performed.    Continue current wound care regimen - you're doing a great job!    Continue medications as previously prescribed.    You need to call and schedule an appointment with general surgery for further evaluation.    Go to the ED for any worsening symptoms.       Chief Complaint     Chief Complaint   Patient presents with    Wound Check     Patient is here for a wound check of his abdomen. He had packing placed within his wound on  in the ED         History of Present Illness       Patient is a 38-year-old male who presents for follow-up for wound check after I&D of abdominal abscess in the ED on 24. States he has been changing the outer dressing daily. For the first 24 hours there was a moderate amount of bloody drainage however this has resolved. He has not noticed any pus drainage. Swelling and pain are both resolving. Is unsure of redness or red line streaking as he is color blind. Denies fevers, chills, and body aches. Has been taking cephalexin and Bactrim as prescribed. Has not heard from general surgery - plans to call to try and schedule appointment.         Review of Systems   Review of Systems   Constitutional:  Negative for chills, diaphoresis, fatigue and fever.   Respiratory:  Negative for chest tightness and shortness of breath.    Cardiovascular:  Negative for chest pain  and palpitations.   Gastrointestinal:  Negative for abdominal distention, abdominal pain, blood in stool, constipation, diarrhea, nausea and vomiting.   Skin:  Positive for wound (abscess to RLQ). Negative for rash.   Neurological:  Negative for dizziness, weakness, light-headedness and headaches.         Current Medications       Current Outpatient Medications:     albuterol (PROVENTIL HFA,VENTOLIN HFA) 90 mcg/act inhaler, Inhale 2 puffs every 6 (six) hours as needed for wheezing or shortness of breath, Disp: 18 g, Rfl: 3    aspirin (ECOTRIN LOW STRENGTH) 81 mg EC tablet, Take 1 tablet (81 mg total) by mouth daily Do not start before October 21, 2022., Disp: 30 tablet, Rfl: 0    benzonatate (TESSALON) 200 MG capsule, Take 1 capsule (200 mg total) by mouth 3 (three) times a day as needed for cough (Patient not taking: Reported on 4/13/2023), Disp: 20 capsule, Rfl: 0    cephalexin (KEFLEX) 500 mg capsule, Take 1 capsule (500 mg total) by mouth every 6 (six) hours for 7 days, Disp: 28 capsule, Rfl: 0    clotrimazole (LOTRIMIN) 1 % cream, Apply topically 2 (two) times a day for 7 days Apply 2 times a day to the area surrounding the head of the penis., Disp: 12 g, Rfl: 1    Continuous Blood Gluc  (Dexcom G6 ) CASSY, Use 1 Device every 3 (three) months (Patient not taking: Reported on 4/3/2023), Disp: 1 each, Rfl: 1    Continuous Blood Gluc Sensor (Dexcom G6 Sensor) MISC, uSE ONE SENSOR EVERY EVERY TEN DAYS (Patient not taking: Reported on 4/3/2023), Disp: 3 each, Rfl: 5    Continuous Blood Gluc Transmit (Dexcom G6 Transmitter) MISC, Use 1 Device in the morning (Patient not taking: Reported on 4/3/2023), Disp: 1 each, Rfl: 0    fluticasone (FLONASE) 50 mcg/act nasal spray, 1 spray into each nostril daily, Disp: 18.2 mL, Rfl: 0    Fluticasone-Salmeterol (Advair Diskus) 500-50 mcg/dose inhaler, Inhale 1 puff 2 (two) times a day Rinse mouth after use (Patient not taking: Reported on 6/5/2023), Disp: 180  blister, Rfl: 3    insulin aspart (NovoLOG FlexPen) 100 UNIT/ML injection pen, Inject 15 Units under the skin 3 (three) times a day with meals, Disp: 40.5 mL, Rfl: 0    insulin glargine (Lantus) 100 units/mL subcutaneous injection, Inject 70 Units under the skin every 12 (twelve) hours 70 units q12 hours, morning and night (Patient taking differently: Inject 60 Units under the skin every 12 (twelve) hours 70 units q12 hours, morning and night), Disp: 42 mL, Rfl: 2    Insulin Pen Needle (BD Pen Needle Rossana U/F) 32G X 4 MM MISC, Use 4/day, Disp: 100 each, Rfl: 3    levothyroxine 25 mcg tablet, Take 2 tablets (50 mcg total) by mouth daily 50, Disp: 90 tablet, Rfl: 1    loratadine (CLARITIN) 10 mg tablet, Take 10 mg by mouth daily, Disp: , Rfl:     metoprolol succinate (TOPROL-XL) 25 mg 24 hr tablet, Take 1 tablet (25 mg total) by mouth daily, Disp: 90 tablet, Rfl: 1    pravastatin (PRAVACHOL) 40 mg tablet, Take 1 tablet (40 mg total) by mouth daily, Disp: 90 tablet, Rfl: 1    risperiDONE (RisperDAL) 2 mg tablet, 2 mg, Disp: , Rfl:     semaglutide, 0.25 or 0.5 mg/dose, (Ozempic) 2 mg/1.5 mL injection pen, 0.25 mg once weekly for 4 weeks then 0.5 mg with (Patient not taking: Reported on 4/3/2023), Disp: 4.5 mL, Rfl: 1    sertraline (ZOLOFT) 100 mg tablet, Take 200 mg by mouth daily, Disp: , Rfl:     sitaGLIPtin (JANUVIA) 100 mg tablet, Take 1 tablet (100 mg total) by mouth daily (Patient not taking: Reported on 1/6/2024), Disp: 30 tablet, Rfl: 3    sulfamethoxazole-trimethoprim (BACTRIM DS) 800-160 mg per tablet, Take 1 tablet by mouth 2 (two) times a day for 5 days smx-tmp DS (BACTRIM) 800-160 mg tabs (1tab q12 D10), Disp: 10 tablet, Rfl: 0    Current Allergies     Allergies as of 01/11/2024 - Reviewed 01/11/2024   Allergen Reaction Noted    Bupropion Anxiety 09/01/2022    Lamotrigine GI Intolerance 09/18/2020    Niacin Rash 02/11/2018    Simvastatin Other (See Comments), Rash, and Hives 12/13/2015            The  following portions of the patient's history were reviewed and updated as appropriate: allergies, current medications, past family history, past medical history, past social history, past surgical history and problem list.     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 Anxiety, depression,     Psychogenic nonepileptic spells        Past Surgical History:   Procedure Laterality Date    MOUTH SURGERY  08/2021    WISDOM TOOTH EXTRACTION         Family History   Problem Relation Age of Onset    Hyperlipidemia Father     Heart attack Paternal Grandfather     Prostate cancer Paternal Grandfather     Cancer Paternal Grandmother          Medications have been verified.        Objective   BP (!) 135/104   Pulse 88   Temp 98.2 °F (36.8 °C)   Resp 20   SpO2 95%        Physical Exam     Physical Exam  Vitals and nursing note reviewed.   Constitutional:       General: He is not in acute distress.     Appearance: He is not ill-appearing or diaphoretic.   HENT:      Head: Normocephalic.      Mouth/Throat:      Mouth: Mucous membranes are moist.   Cardiovascular:      Rate and Rhythm: Normal rate.   Pulmonary:      Effort: Pulmonary effort is normal.   Abdominal:      Palpations: Abdomen is soft.      Tenderness: There is abdominal tenderness (localized to area of healing abscess).       Musculoskeletal:         General: Normal range of motion.      Cervical back: Normal range of motion and neck supple.   Skin:     General: Skin is warm and dry.      Capillary Refill: Capillary refill takes less than 2 seconds.   Neurological:      Mental Status: He is alert and oriented to person, place, and time.           Incision and drain    Date/Time: 1/11/2024 2:40 PM    Performed by: IGNACIO Dominguez  Authorized by: Yadira RAMSAY  "IGNACIO [place]  Universal Protocol:  Consent: Verbal consent obtained.  Risks and benefits: risks, benefits and alternatives were discussed  Consent given by: patient  Time out: Immediately prior to procedure a \"time out\" was called to verify the correct patient, procedure, equipment, support staff and site/side marked as required.  Timeout called at: 1/11/2024 2:35 PM.  Patient understanding: patient states understanding of the procedure being performed  Patient identity confirmed: verbally with patient    Patient location:  Clinic  Location:     Type:  Abscess    Location:  Trunk    Trunk location:  Abdomen  Anesthesia (see MAR for exact dosages):     Anesthesia method:  None  Procedure details:     Approach:  Open    Incision depth:  Subcutaneous    Techniques: packing removed.    Drainage:  Bloody (with packing removal)    Drainage amount:  Scant    Wound treatment:  Wound left open and packing placed    Packing materials:  1/4 in iodoform gauze  Post-procedure details:     Patient tolerance of procedure:  Tolerated well, no immediate complications  Comments:      Dry dressing of ABD pad and paper tape placed over wound.            "

## 2024-01-14 ENCOUNTER — OFFICE VISIT (OUTPATIENT)
Dept: URGENT CARE | Facility: CLINIC | Age: 39
End: 2024-01-14
Payer: COMMERCIAL

## 2024-01-14 VITALS
SYSTOLIC BLOOD PRESSURE: 116 MMHG | DIASTOLIC BLOOD PRESSURE: 70 MMHG | RESPIRATION RATE: 18 BRPM | OXYGEN SATURATION: 97 % | HEART RATE: 93 BPM | TEMPERATURE: 98 F

## 2024-01-14 DIAGNOSIS — L02.211 ABSCESS OF SKIN OF ABDOMEN: Primary | ICD-10-CM

## 2024-01-14 PROCEDURE — S9088 SERVICES PROVIDED IN URGENT: HCPCS | Performed by: FAMILY MEDICINE

## 2024-01-14 PROCEDURE — 99213 OFFICE O/P EST LOW 20 MIN: CPT | Performed by: FAMILY MEDICINE

## 2024-01-14 NOTE — PROGRESS NOTES
[facility]        NAME: Dillon Baxter is a 38 y.o. male  : 1985    MRN: [id number]  DATE: 2024  TIME: 5:55 PM    Assessment and Plan   Abscess of skin of abdomen [L02.211]  1. Abscess of skin of abdomen              Patient Instructions       Follow up with PCP in 3-5 days.  Proceed to  ER if symptoms worsen.    Chief Complaint     Chief Complaint   Patient presents with    Wound Check     Pt is presenting for a wound check and dressing change.          History of Present Illness       38-year-old male presenting for wound check and dressing change of his right abdomen.        Review of Systems   Review of Systems   Constitutional: Negative.    HENT: Negative.     Eyes: Negative.    Respiratory: Negative.     Cardiovascular: Negative.    Gastrointestinal: Negative.    Genitourinary: Negative.    Skin:  Positive for wound.   Allergic/Immunologic: Negative.    Neurological: Negative.    Hematological: Negative.    Psychiatric/Behavioral: Negative.           Current Medications       Current Outpatient Medications:     albuterol (PROVENTIL HFA,VENTOLIN HFA) 90 mcg/act inhaler, Inhale 2 puffs every 6 (six) hours as needed for wheezing or shortness of breath, Disp: 18 g, Rfl: 3    aspirin (ECOTRIN LOW STRENGTH) 81 mg EC tablet, Take 1 tablet (81 mg total) by mouth daily Do not start before 2022., Disp: 30 tablet, Rfl: 0    benzonatate (TESSALON) 200 MG capsule, Take 1 capsule (200 mg total) by mouth 3 (three) times a day as needed for cough (Patient not taking: Reported on 2023), Disp: 20 capsule, Rfl: 0    clotrimazole (LOTRIMIN) 1 % cream, Apply topically 2 (two) times a day for 7 days Apply 2 times a day to the area surrounding the head of the penis., Disp: 12 g, Rfl: 1    Continuous Blood Gluc  (Dexcom G6 ) CASSY, Use 1 Device every 3 (three) months (Patient not taking: Reported on 4/3/2023), Disp: 1 each, Rfl: 1    Continuous Blood Gluc Sensor (Dexcom  G6 Sensor) MISC, uSE ONE SENSOR EVERY EVERY TEN DAYS (Patient not taking: Reported on 4/3/2023), Disp: 3 each, Rfl: 5    Continuous Blood Gluc Transmit (Dexcom G6 Transmitter) MISC, Use 1 Device in the morning (Patient not taking: Reported on 4/3/2023), Disp: 1 each, Rfl: 0    fluticasone (FLONASE) 50 mcg/act nasal spray, 1 spray into each nostril daily, Disp: 18.2 mL, Rfl: 0    Fluticasone-Salmeterol (Advair Diskus) 500-50 mcg/dose inhaler, Inhale 1 puff 2 (two) times a day Rinse mouth after use (Patient not taking: Reported on 6/5/2023), Disp: 180 blister, Rfl: 3    insulin aspart (NovoLOG FlexPen) 100 UNIT/ML injection pen, Inject 15 Units under the skin 3 (three) times a day with meals, Disp: 40.5 mL, Rfl: 0    insulin glargine (Lantus) 100 units/mL subcutaneous injection, Inject 70 Units under the skin every 12 (twelve) hours 70 units q12 hours, morning and night (Patient taking differently: Inject 60 Units under the skin every 12 (twelve) hours 70 units q12 hours, morning and night), Disp: 42 mL, Rfl: 2    Insulin Pen Needle (BD Pen Needle Rossana U/F) 32G X 4 MM MISC, Use 4/day, Disp: 100 each, Rfl: 3    levothyroxine 25 mcg tablet, Take 2 tablets (50 mcg total) by mouth daily 50, Disp: 90 tablet, Rfl: 1    loratadine (CLARITIN) 10 mg tablet, Take 10 mg by mouth daily, Disp: , Rfl:     metoprolol succinate (TOPROL-XL) 25 mg 24 hr tablet, Take 1 tablet (25 mg total) by mouth daily, Disp: 90 tablet, Rfl: 1    pravastatin (PRAVACHOL) 40 mg tablet, Take 1 tablet (40 mg total) by mouth daily, Disp: 90 tablet, Rfl: 1    risperiDONE (RisperDAL) 2 mg tablet, 2 mg, Disp: , Rfl:     semaglutide, 0.25 or 0.5 mg/dose, (Ozempic) 2 mg/1.5 mL injection pen, 0.25 mg once weekly for 4 weeks then 0.5 mg with (Patient not taking: Reported on 4/3/2023), Disp: 4.5 mL, Rfl: 1    sertraline (ZOLOFT) 100 mg tablet, Take 200 mg by mouth daily, Disp: , Rfl:     sitaGLIPtin (JANUVIA) 100 mg tablet, Take 1 tablet (100 mg total) by mouth  daily (Patient not taking: Reported on 1/6/2024), Disp: 30 tablet, Rfl: 3    sulfamethoxazole-trimethoprim (BACTRIM DS) 800-160 mg per tablet, Take 1 tablet by mouth 2 (two) times a day for 5 days smx-tmp DS (BACTRIM) 800-160 mg tabs (1tab q12 D10), Disp: 10 tablet, Rfl: 0    Current Allergies     Allergies as of 01/14/2024 - Reviewed 01/14/2024   Allergen Reaction Noted    Bupropion Anxiety 09/01/2022    Lamotrigine GI Intolerance 09/18/2020    Niacin Rash 02/11/2018    Simvastatin Other (See Comments), Rash, and Hives 12/13/2015            The following portions of the patient's history were reviewed and updated as appropriate: allergies, current medications, past family history, past medical history, past social history, past surgical history and problem list.     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 Anxiety, depression,     Psychogenic nonepileptic spells        Past Surgical History:   Procedure Laterality Date    MOUTH SURGERY  08/2021    WISDOM TOOTH EXTRACTION         Family History   Problem Relation Age of Onset    Hyperlipidemia Father     Heart attack Paternal Grandfather     Prostate cancer Paternal Grandfather     Cancer Paternal Grandmother          Medications have been verified.        Objective   /70   Pulse 93   Temp 98 °F (36.7 °C)   Resp 18   SpO2 97%   No LMP for male patient.       Physical Exam     Physical Exam  Constitutional:       Appearance: He is well-developed.   HENT:      Head: Normocephalic.   Eyes:      Pupils: Pupils are equal, round, and reactive to light.   Pulmonary:      Effort: Pulmonary effort is normal.   Musculoskeletal:         General: Normal range of motion.      Cervical back: Normal range of motion.   Skin:     General: Skin is warm.       Findings: Wound present.          Neurological:      Mental Status: He is alert.

## 2024-01-17 ENCOUNTER — CONSULT (OUTPATIENT)
Dept: SURGERY | Facility: CLINIC | Age: 39
End: 2024-01-17
Payer: COMMERCIAL

## 2024-01-17 VITALS
RESPIRATION RATE: 18 BRPM | WEIGHT: 180.6 LBS | OXYGEN SATURATION: 96 % | TEMPERATURE: 98.6 F | HEART RATE: 92 BPM | DIASTOLIC BLOOD PRESSURE: 82 MMHG | SYSTOLIC BLOOD PRESSURE: 116 MMHG | HEIGHT: 65 IN | BODY MASS INDEX: 30.09 KG/M2

## 2024-01-17 DIAGNOSIS — L02.211 ABSCESS OF SKIN OF ABDOMEN: ICD-10-CM

## 2024-01-17 PROCEDURE — 99203 OFFICE O/P NEW LOW 30 MIN: CPT | Performed by: SURGERY

## 2024-01-17 RX ORDER — INSULIN ASPART 100 [IU]/ML
INJECTION, SUSPENSION SUBCUTANEOUS
COMMUNITY
Start: 2023-12-14

## 2024-01-17 RX ORDER — BUSPIRONE HYDROCHLORIDE 10 MG/1
5 TABLET ORAL
COMMUNITY
Start: 2023-09-15

## 2024-01-17 RX ORDER — BLOOD-GLUCOSE SENSOR
EACH MISCELLANEOUS
COMMUNITY

## 2024-01-17 NOTE — PROGRESS NOTES
Assessment/Plan:    Abscess of skin of abdomen  38-year-old male with an abdominal wall abscess in the right groin.    Plan:  He is doing well and has completed his antibiotic course.  Packing is out, and the wound is slowly improving.  We discussed etiologies of this include epidermal inclusion cyst that became secondarily infected, and worsened by his diabetes.  He can continue with a dry dressing over top of this until the skin is completely closed, but signs of infection have dramatically improved.  We discussed that he does not need any surgical intervention at this time, however, should this return, we would plan for excision of the cyst cavity.  To return to clinic as needed.     Diagnoses and all orders for this visit:    Abscess of skin of abdomen  -     Ambulatory Referral to General Surgery    Other orders  -     insulin aspart protamine-insulin aspart (NovoLOG 70/30 FlexPen ReliOn) 100 Units/mL injection pen; INJECT 20 UNITS UNDER THE SKIN BEFORE MEALS FOR DIABETES **DISCARD PEN AFTER 14 DAYS OF USE**  -     busPIRone (BUSPAR) 10 mg tablet; 5 mg  -     Continuous Blood Gluc Sensor (FreeStyle Catie 3 Sensor) MISC; Use          Subjective:      Patient ID: Dillon Baxter is a 38 y.o. male.    38-year-old male presents with an abscess of his right lower quadrant abdominal wall.  He states that in early January he developed a small lump in this area that was painful, and warm to the touch.  He subsequently caught this small lump in his belt buckle and it became significantly larger, and painful.  He presented to the emergency department, for incision and drainage and packing placement.  Given his diabetes he was treated with antibiotics at that time.  He had his packing periodically change, and has completed his course of antibiotics.  Packing was removed yesterday.  He denies any fevers, chills, chest pain, or shortness of breath.  He has some minimal pain over the site, but has had limited  drainage.        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  He   Patient Active Problem List    Diagnosis Date Noted    Abscess of skin of abdomen 01/14/2024    Impetigo 12/06/2023    Cerebrovascular accident (CVA), unspecified mechanism (HCC) 12/12/2022    Stroke-like symptoms 10/19/2022    Left hand pain 08/29/2022    Obesity (BMI 30.0-34.9) 06/17/2022    Depression, recurrent (HCC) 01/03/2022    Foot pain, left 11/04/2021    Chronic headache 10/21/2021    Psychogenic nonepileptic spells 10/19/2021    COVID-19 vaccination refused 10/05/2021    Alopecia 08/10/2021    Acute midline low back pain without sciatica 08/10/2021    Type 2 diabetes mellitus with hyperglycemia, with long-term current use of insulin ([facility]) 06/23/2021    Class 1 obesity due to excess calories with serious comorbidity and body mass index (BMI) of 30.0 to 30.9 in adult 06/23/2021    Hypothyroidism 05/28/2021    Acute bilateral low back pain with bilateral sciatica 05/04/2021    Moderate persistent asthma 04/28/2021    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[facility]) 04/28/2021    Essential hypertension 04/12/2021    Bradycardic baseline fetal heart rate 03/19/2021    COVID 03/17/2021    Chronic GERD 02/01/2021    ENRIKE (obstructive sleep apnea) 10/12/2020    Anxiety 09/22/2020    PTSD (post-traumatic stress disorder) 09/22/2020    Hyperlipidemia 09/22/2020    Type 2 diabetes mellitus without complication, with long-term current use of insulin ([facility]) 09/22/2020     He  has a past surgical history that includes [place] tooth extraction and Mouth surgery (08/2021).  His family history includes Cancer in his paternal grandmother; Heart attack in his paternal grandfather; Hyperlipidemia  in his father; Prostate cancer in his paternal grandfather.  He  reports that he has never smoked. He has never used smokeless tobacco. He reports that he does not currently use alcohol. He reports that he does not currently use drugs after having used the following drugs: Marijuana.  Current Outpatient Medications   Medication Sig Dispense Refill    albuterol (PROVENTIL HFA,VENTOLIN HFA) 90 mcg/act inhaler Inhale 2 puffs every 6 (six) hours as needed for wheezing or shortness of breath 18 g 3    busPIRone (BUSPAR) 10 mg tablet 5 mg      Continuous Blood Gluc Sensor (FreeStyle Catie 3 Sensor) MISC Use      fluticasone (FLONASE) 50 mcg/act nasal spray 1 spray into each nostril daily 18.2 mL 0    insulin aspart protamine-insulin aspart (NovoLOG 70/30 FlexPen ReliOn) 100 Units/mL injection pen INJECT 20 UNITS UNDER THE SKIN BEFORE MEALS FOR DIABETES **DISCARD PEN AFTER 14 DAYS OF USE**      Insulin Pen Needle (BD Pen Needle Rossana U/F) 32G X 4 MM MISC Use 4/day 100 each 3    levothyroxine 25 mcg tablet Take 2 tablets (50 mcg total) by mouth daily 50 90 tablet 1    metoprolol succinate (TOPROL-XL) 25 mg 24 hr tablet Take 1 tablet (25 mg total) by mouth daily 90 tablet 1    pravastatin (PRAVACHOL) 40 mg tablet Take 1 tablet (40 mg total) by mouth daily 90 tablet 1    risperiDONE (RisperDAL) 2 mg tablet 2 mg      sertraline (ZOLOFT) 100 mg tablet Take 200 mg by mouth daily      aspirin (ECOTRIN LOW STRENGTH) 81 mg EC tablet Take 1 tablet (81 mg total) by mouth daily Do not start before October 21, 2022. 30 tablet 0    benzonatate (TESSALON) 200 MG capsule Take 1 capsule (200 mg total) by mouth 3 (three) times a day as needed for cough (Patient not taking: Reported on 4/13/2023) 20 capsule 0    clotrimazole (LOTRIMIN) 1 % cream Apply topically 2 (two) times a day for 7 days Apply 2 times a day to the area surrounding the head of the penis. 12 g 1    Continuous Blood Gluc  (Dexcom G6 ) CASSY Use 1 Device every  3 (three) months (Patient not taking: Reported on 4/3/2023) 1 each 1    Continuous Blood Gluc Sensor (Dexcom G6 Sensor) MISC uSE ONE SENSOR EVERY EVERY TEN DAYS (Patient not taking: Reported on 4/3/2023) 3 each 5    Continuous Blood Gluc Transmit (Dexcom G6 Transmitter) MISC Use 1 Device in the morning (Patient not taking: Reported on 4/3/2023) 1 each 0    Fluticasone-Salmeterol (Advair Diskus) 500-50 mcg/dose inhaler Inhale 1 puff 2 (two) times a day Rinse mouth after use (Patient not taking: Reported on 6/5/2023) 180 blister 3    insulin aspart (NovoLOG FlexPen) 100 UNIT/ML injection pen Inject 15 Units under the skin 3 (three) times a day with meals 40.5 mL 0    insulin glargine (Lantus) 100 units/mL subcutaneous injection Inject 70 Units under the skin every 12 (twelve) hours 70 units q12 hours, morning and night (Patient taking differently: Inject 60 Units under the skin every 12 (twelve) hours 70 units q12 hours, morning and night) 42 mL 2    loratadine (CLARITIN) 10 mg tablet Take 10 mg by mouth daily (Patient not taking: Reported on 1/17/2024)      semaglutide, 0.25 or 0.5 mg/dose, (Ozempic) 2 mg/1.5 mL injection pen 0.25 mg once weekly for 4 weeks then 0.5 mg with (Patient not taking: Reported on 4/3/2023) 4.5 mL 1    sitaGLIPtin (JANUVIA) 100 mg tablet Take 1 tablet (100 mg total) by mouth daily (Patient not taking: Reported on 1/6/2024) 30 tablet 3     No current facility-administered medications for this visit.     Current Outpatient Medications on File Prior to Visit   Medication Sig    albuterol (PROVENTIL HFA,VENTOLIN HFA) 90 mcg/act inhaler Inhale 2 puffs every 6 (six) hours as needed for wheezing or shortness of breath    busPIRone (BUSPAR) 10 mg tablet 5 mg    Continuous Blood Gluc Sensor (FreeStyle Catie 3 Sensor) MISC Use    fluticasone (FLONASE) 50 mcg/act nasal spray 1 spray into each nostril daily    insulin aspart protamine-insulin aspart (NovoLOG 70/30 FlexPen ReliOn) 100 Units/mL injection  pen INJECT 20 UNITS UNDER THE SKIN BEFORE MEALS FOR DIABETES **DISCARD PEN AFTER 14 DAYS OF USE**    Insulin Pen Needle (BD Pen Needle Rossana U/F) 32G X 4 MM MISC Use 4/day    levothyroxine 25 mcg tablet Take 2 tablets (50 mcg total) by mouth daily 50    metoprolol succinate (TOPROL-XL) 25 mg 24 hr tablet Take 1 tablet (25 mg total) by mouth daily    pravastatin (PRAVACHOL) 40 mg tablet Take 1 tablet (40 mg total) by mouth daily    risperiDONE (RisperDAL) 2 mg tablet 2 mg    sertraline (ZOLOFT) 100 mg tablet Take 200 mg by mouth daily    aspirin (ECOTRIN LOW STRENGTH) 81 mg EC tablet Take 1 tablet (81 mg total) by mouth daily Do not start before October 21, 2022.    benzonatate (TESSALON) 200 MG capsule Take 1 capsule (200 mg total) by mouth 3 (three) times a day as needed for cough (Patient not taking: Reported on 4/13/2023)    clotrimazole (LOTRIMIN) 1 % cream Apply topically 2 (two) times a day for 7 days Apply 2 times a day to the area surrounding the head of the penis.    Continuous Blood Gluc  (Dexcom G6 ) CASSY Use 1 Device every 3 (three) months (Patient not taking: Reported on 4/3/2023)    Continuous Blood Gluc Sensor (Dexcom G6 Sensor) MISC uSE ONE SENSOR EVERY EVERY TEN DAYS (Patient not taking: Reported on 4/3/2023)    Continuous Blood Gluc Transmit (Dexcom G6 Transmitter) MISC Use 1 Device in the morning (Patient not taking: Reported on 4/3/2023)    Fluticasone-Salmeterol (Advair Diskus) 500-50 mcg/dose inhaler Inhale 1 puff 2 (two) times a day Rinse mouth after use (Patient not taking: Reported on 6/5/2023)    insulin aspart (NovoLOG FlexPen) 100 UNIT/ML injection pen Inject 15 Units under the skin 3 (three) times a day with meals    insulin glargine (Lantus) 100 units/mL subcutaneous injection Inject 70 Units under the skin every 12 (twelve) hours 70 units q12 hours, morning and night (Patient taking differently: Inject 60 Units under the skin every 12 (twelve) hours 70 units q12 hours,  "morning and night)    loratadine (CLARITIN) 10 mg tablet Take 10 mg by mouth daily (Patient not taking: Reported on 1/17/2024)    semaglutide, 0.25 or 0.5 mg/dose, (Ozempic) 2 mg/1.5 mL injection pen 0.25 mg once weekly for 4 weeks then 0.5 mg with (Patient not taking: Reported on 4/3/2023)    sitaGLIPtin (JANUVIA) 100 mg tablet Take 1 tablet (100 mg total) by mouth daily (Patient not taking: Reported on 1/6/2024)    [DISCONTINUED] atorvastatin (LIPITOR) 40 mg tablet Take 1 tablet (40 mg total) by mouth every evening     No current facility-administered medications on file prior to visit.     He is allergic to bupropion, lamotrigine, niacin, and simvastatin..    Review of Systems   Constitutional:  Negative for appetite change, chills, diaphoresis and fever.   HENT:  Negative for nosebleeds and trouble swallowing.    Eyes: Negative.    Respiratory:  Negative for cough, shortness of breath and wheezing.    Cardiovascular:  Negative for chest pain, palpitations and leg swelling.   Gastrointestinal:  Negative for abdominal distention, abdominal pain, nausea and vomiting.   Genitourinary:  Negative for difficulty urinating, flank pain and frequency.   Musculoskeletal:  Negative for arthralgias, joint swelling and myalgias.   Skin:  Positive for wound. Negative for pallor and rash.   Neurological:  Negative for dizziness, facial asymmetry and speech difficulty.   Hematological:  Does not bruise/bleed easily.   Psychiatric/Behavioral:  Negative for agitation and confusion.    All other systems reviewed and are negative.        Objective:      /82 (BP Location: Left arm, Patient Position: Sitting, Cuff Size: Standard)   Pulse 92   Temp 98.6 °F (37 °C) (Temporal)   Resp 18   Ht 5' 5\" (1.651 m)   Wt 81.9 kg (180 lb 9.6 oz)   SpO2 96%   BMI 30.05 kg/m²          Physical Exam  Vitals and nursing note reviewed.   Constitutional:       General: He is not in acute distress.     Appearance: Normal appearance. He is " not toxic-appearing.   HENT:      Head: Normocephalic and atraumatic.      Mouth/Throat:      Mouth: Mucous membranes are moist.   Eyes:      Extraocular Movements: Extraocular movements intact.      Pupils: Pupils are equal, round, and reactive to light.   Cardiovascular:      Rate and Rhythm: Normal rate and regular rhythm.      Pulses: Normal pulses.   Pulmonary:      Effort: Pulmonary effort is normal. No respiratory distress.      Breath sounds: Normal breath sounds. No wheezing.   Abdominal:      General: There is no distension.      Palpations: Abdomen is soft. There is no mass.      Tenderness: There is no abdominal tenderness. There is no guarding or rebound.      Hernia: No hernia is present.   Musculoskeletal:         General: No swelling or deformity. Normal range of motion.      Cervical back: Normal range of motion and neck supple.      Right lower leg: No edema.      Left lower leg: No edema.   Skin:     General: Skin is warm and dry.      Coloration: Skin is not jaundiced.      Findings: Lesion present.      Comments: Small 1 cm in transverse incision in the right groin.  1 cm with surrounding erythema, with no underlying fluctuance.  Cavity is open, with no active drainage.   Neurological:      General: No focal deficit present.      Mental Status: He is alert and oriented to person, place, and time.   Psychiatric:         Mood and Affect: Mood normal.         Behavior: Behavior normal.

## 2024-01-17 NOTE — ASSESSMENT & PLAN NOTE
38-year-old male with an abdominal wall abscess in the right groin.    Plan:  He is doing well and has completed his antibiotic course.  Packing is out, and the wound is slowly improving.  We discussed etiologies of this include epidermal inclusion cyst that became secondarily infected, and worsened by his diabetes.  He can continue with a dry dressing over top of this until the skin is completely closed, but signs of infection have dramatically improved.  We discussed that he does not need any surgical intervention at this time, however, should this return, we would plan for excision of the cyst cavity.  To return to clinic as needed.

## 2024-02-04 PROBLEM — L01.00 IMPETIGO: Status: RESOLVED | Noted: 2023-12-06 | Resolved: 2024-02-04

## 2024-03-11 ENCOUNTER — OFFICE VISIT (OUTPATIENT)
Dept: URGENT CARE | Facility: CLINIC | Age: 39
End: 2024-03-11

## 2024-03-11 VITALS
WEIGHT: 185 LBS | OXYGEN SATURATION: 98 % | DIASTOLIC BLOOD PRESSURE: 89 MMHG | SYSTOLIC BLOOD PRESSURE: 136 MMHG | BODY MASS INDEX: 30.82 KG/M2 | TEMPERATURE: 97.7 F | RESPIRATION RATE: 20 BRPM | HEIGHT: 65 IN | HEART RATE: 118 BPM

## 2024-03-11 DIAGNOSIS — H04.551 OBSTRUCTION OF RIGHT TEAR DUCT: Primary | ICD-10-CM

## 2024-03-11 RX ORDER — ASPIRIN 81 MG/1
81 TABLET, CHEWABLE ORAL DAILY
COMMUNITY

## 2024-03-11 NOTE — PATIENT INSTRUCTIONS
Apply warm compresses and rub in circular motion.  If symptoms persists follow up with PCP.  Resume contacts when asymptomatic for 48 hours   Follow up with PCP in 3-5 days.  Proceed to  ER if symptoms worsen.    If tests have been performed at [facility], our office will contact you with results if changes need to be made to the care plan discussed with you at the visit.  You can review your full results on [facility] MyChart.

## 2024-03-11 NOTE — PROGRESS NOTES
"  [facility]        NAME: Dillon Baxter is a 38 y.o. male  : 1985    MRN: [id number]  DATE: 2024  TIME: 4:36 PM    Assessment and Plan   Obstruction of right tear duct [H04.551]  1. Obstruction of right tear duct              Patient Instructions     Apply warm compresses and rub in circular motion.  If symptoms persists follow up with PCP.  Resume contacts when asymptomatic for 48 hours   Follow up with PCP in 3-5 days.  Proceed to  ER if symptoms worsen.    If tests have been performed at [facility], our office will contact you with results if changes need to be made to the care plan discussed with you at the visit.  [facility]    Chief Complaint     Chief Complaint   Patient presents with    Eye Problem     Patient presents with right eye infraorbital edema stating \"I think my tearduct on the right is blocked\"  also reports crust in left eye this morning.  Eye issues began about 3 days ago.  Wears contacts threw them out.  Denies vision changes         History of Present Illness       HPI  37 y/o male presents for evaluation of right eye irritation and left eye drainage.  He states about 3 days ago he noted right infraorbital edema and tenderness with mild discharge.  He denies pain, blurring vision, diplopia, photophobia.  He d/c'd contacts 3 days ago and mucous resolved.   This morning he noted some crusting in the left eye but denies redness/pain/visual changes.    Review of Systems   Review of Systems   Constitutional:  Negative for chills and fever.   HENT:  Negative for ear pain and sore throat.    Eyes:  Positive for discharge and redness. Negative for photophobia, pain, itching and visual disturbance.   Respiratory:  Negative for cough and shortness of breath.    Cardiovascular:  Negative for chest pain and palpitations.   Gastrointestinal:  Negative for abdominal pain and vomiting.   Genitourinary:  Negative for dysuria and " hematuria.   Musculoskeletal:  Negative for arthralgias and back pain.   Skin:  Negative for color change and rash.   Neurological:  Negative for seizures and syncope.   All other systems reviewed and are negative.        Current Medications       Current Outpatient Medications:     albuterol (PROVENTIL HFA,VENTOLIN HFA) 90 mcg/act inhaler, Inhale 2 puffs every 6 (six) hours as needed for wheezing or shortness of breath, Disp: 18 g, Rfl: 3    aspirin 81 mg chewable tablet, Chew 81 mg daily, Disp: , Rfl:     busPIRone (BUSPAR) 10 mg tablet, 5 mg, Disp: , Rfl:     Continuous Blood Gluc Sensor (FreeStyle Catie 3 Sensor) MISC, Use, Disp: , Rfl:     Insulin Pen Needle (BD Pen Needle Rossana U/F) 32G X 4 MM MISC, Use 4/day, Disp: 100 each, Rfl: 3    levothyroxine 25 mcg tablet, Take 2 tablets (50 mcg total) by mouth daily 50, Disp: 90 tablet, Rfl: 1    metoprolol succinate (TOPROL-XL) 25 mg 24 hr tablet, Take 1 tablet (25 mg total) by mouth daily, Disp: 90 tablet, Rfl: 1    pravastatin (PRAVACHOL) 40 mg tablet, Take 1 tablet (40 mg total) by mouth daily, Disp: 90 tablet, Rfl: 1    risperiDONE (RisperDAL) 2 mg tablet, 4 mg, Disp: , Rfl:     sertraline (ZOLOFT) 100 mg tablet, Take 200 mg by mouth daily, Disp: , Rfl:     aspirin (ECOTRIN LOW STRENGTH) 81 mg EC tablet, Take 1 tablet (81 mg total) by mouth daily Do not start before October 21, 2022., Disp: 30 tablet, Rfl: 0    benzonatate (TESSALON) 200 MG capsule, Take 1 capsule (200 mg total) by mouth 3 (three) times a day as needed for cough (Patient not taking: Reported on 4/13/2023), Disp: 20 capsule, Rfl: 0    clotrimazole (LOTRIMIN) 1 % cream, Apply topically 2 (two) times a day for 7 days Apply 2 times a day to the area surrounding the head of the penis., Disp: 12 g, Rfl: 1    Continuous Blood Gluc  (Dexcom G6 ) CASSY, Use 1 Device every 3 (three) months (Patient not taking: Reported on 4/3/2023), Disp: 1 each, Rfl: 1    Continuous Blood Gluc Sensor (Dexcom  G6 Sensor) MISC, uSE ONE SENSOR EVERY EVERY TEN DAYS (Patient not taking: Reported on 4/3/2023), Disp: 3 each, Rfl: 5    Continuous Blood Gluc Transmit (Dexcom G6 Transmitter) MISC, Use 1 Device in the morning (Patient not taking: Reported on 4/3/2023), Disp: 1 each, Rfl: 0    fluticasone (FLONASE) 50 mcg/act nasal spray, 1 spray into each nostril daily (Patient not taking: Reported on 3/11/2024), Disp: 18.2 mL, Rfl: 0    Fluticasone-Salmeterol (Advair Diskus) 500-50 mcg/dose inhaler, Inhale 1 puff 2 (two) times a day Rinse mouth after use (Patient not taking: Reported on 6/5/2023), Disp: 180 blister, Rfl: 3    insulin aspart (NovoLOG FlexPen) 100 UNIT/ML injection pen, Inject 15 Units under the skin 3 (three) times a day with meals, Disp: 40.5 mL, Rfl: 0    insulin aspart protamine-insulin aspart (NovoLOG 70/30 FlexPen ReliOn) 100 Units/mL injection pen, INJECT 20 UNITS UNDER THE SKIN BEFORE MEALS FOR DIABETES **DISCARD PEN AFTER 14 DAYS OF USE**, Disp: , Rfl:     insulin glargine (Lantus) 100 units/mL subcutaneous injection, Inject 70 Units under the skin every 12 (twelve) hours 70 units q12 hours, morning and night (Patient taking differently: Inject 60 Units under the skin every 12 (twelve) hours 70 units q12 hours, morning and night), Disp: 42 mL, Rfl: 2    loratadine (CLARITIN) 10 mg tablet, Take 10 mg by mouth daily (Patient not taking: Reported on 1/17/2024), Disp: , Rfl:     semaglutide, 0.25 or 0.5 mg/dose, (Ozempic) 2 mg/1.5 mL injection pen, 0.25 mg once weekly for 4 weeks then 0.5 mg with (Patient not taking: Reported on 4/3/2023), Disp: 4.5 mL, Rfl: 1    sitaGLIPtin (JANUVIA) 100 mg tablet, Take 1 tablet (100 mg total) by mouth daily (Patient not taking: Reported on 1/6/2024), Disp: 30 tablet, Rfl: 3    Current Allergies     Allergies as of 03/11/2024 - Reviewed 03/11/2024   Allergen Reaction Noted    Bupropion Anxiety 09/01/2022    Lamotrigine GI Intolerance 09/18/2020    Niacin Rash 02/11/2018     "Simvastatin Other (See Comments), Rash, and Hives 12/13/2015            The following portions of the patient's history were reviewed and updated as appropriate: allergies, current medications, past family history, past medical history, past social history, past surgical history and problem list.     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 Anxiety, depression,     Psychogenic nonepileptic spells        Past Surgical History:   Procedure Laterality Date    MOUTH SURGERY  08/2021    WISDOM TOOTH EXTRACTION         Family History   Problem Relation Age of Onset    Hyperlipidemia Father     Heart attack Paternal Grandfather     Prostate cancer Paternal Grandfather     Cancer Paternal Grandmother          Medications have been verified.        Objective   /89   Pulse (!) 118   Temp 97.7 °F (36.5 °C) (Tympanic)   Resp 20   Ht 5' 5\" (1.651 m)   Wt 83.9 kg (185 lb)   SpO2 98%   BMI 30.79 kg/m²   No LMP for male patient.       Physical Exam     Physical Exam  Vitals and nursing note reviewed.   Constitutional:       Appearance: Normal appearance. He is not ill-appearing.   HENT:      Head: Normocephalic and atraumatic.      Nose: Nose normal.   Eyes:      Extraocular Movements: Extraocular movements intact.      Conjunctiva/sclera: Conjunctivae normal.      Pupils: Pupils are equal, round, and reactive to light.      Comments: Right eye:  no foreign body visualized, no discharge.    Infraorbital tenderness - mild with no swelling visualized  No edema or erythema of upper or lower eye lid    Left eye:  no discharge, no conjunctival erythema   Cardiovascular:      Rate and Rhythm: Normal rate and regular rhythm.      Pulses: Normal pulses.      Heart sounds: Normal heart sounds.   Pulmonary:      Effort: " Pulmonary effort is normal.      Breath sounds: Normal breath sounds.   Skin:     General: Skin is warm and dry.   Neurological:      Mental Status: He is alert and oriented to person, place, and time.   Psychiatric:         Mood and Affect: Mood normal.         Behavior: Behavior normal.

## 2024-03-17 ENCOUNTER — OFFICE VISIT (OUTPATIENT)
Dept: URGENT CARE | Facility: CLINIC | Age: 39
End: 2024-03-17
Payer: COMMERCIAL

## 2024-03-17 VITALS
WEIGHT: 181.4 LBS | BODY MASS INDEX: 30.19 KG/M2 | DIASTOLIC BLOOD PRESSURE: 96 MMHG | SYSTOLIC BLOOD PRESSURE: 146 MMHG | OXYGEN SATURATION: 96 % | TEMPERATURE: 98 F | HEART RATE: 117 BPM | RESPIRATION RATE: 18 BRPM

## 2024-03-17 DIAGNOSIS — L02.91 ABSCESS: Primary | ICD-10-CM

## 2024-03-17 PROCEDURE — S9088 SERVICES PROVIDED IN URGENT: HCPCS

## 2024-03-17 PROCEDURE — 99213 OFFICE O/P EST LOW 20 MIN: CPT

## 2024-03-17 RX ORDER — DOXYCYCLINE 100 MG/1
100 CAPSULE ORAL 2 TIMES DAILY
Qty: 20 CAPSULE | Refills: 0 | Status: SHIPPED | OUTPATIENT
Start: 2024-03-17 | End: 2024-03-27

## 2024-03-17 NOTE — PROGRESS NOTES
"  [facility]        NAME: Dillon Baxter is a 38 y.o. male  : 1985    MRN: [id number]  DATE: 2024  TIME: 5:10 PM    Assessment and Plan   Abscess [L02.91]  1. Abscess  doxycycline monohydrate (MONODOX) 100 mg capsule            Patient Instructions       Follow up with PCP in 3-5 days.  Proceed to  ER if symptoms worsen.    If tests have been performed at [facility], our office will contact you with results if changes need to be made to the care plan discussed with you at the visit.  You can review your full results on [facility] MyChart.    Chief Complaint     Chief Complaint   Patient presents with    Possible Abscess on Waist Line     Pt reports pimple like bump on left side of waist line increased in size over weekend. Pt reports pus and blood drained from site when it was popped.          History of Present Illness       38-year-old male presents for potential abscess in the left side at the belt line x 10 days.  Patient admits over this time to increasing redness, swelling.  Patient was she was \"playing with it today \"when he popped it and purulent/bloody material was expressed.  Is painful.        Review of Systems   Review of Systems   Skin:  Positive for wound.         Current Medications       Current Outpatient Medications:     albuterol (PROVENTIL HFA,VENTOLIN HFA) 90 mcg/act inhaler, Inhale 2 puffs every 6 (six) hours as needed for wheezing or shortness of breath, Disp: 18 g, Rfl: 3    aspirin 81 mg chewable tablet, Chew 81 mg daily, Disp: , Rfl:     busPIRone (BUSPAR) 10 mg tablet, 5 mg, Disp: , Rfl:     doxycycline monohydrate (MONODOX) 100 mg capsule, Take 1 capsule (100 mg total) by mouth 2 (two) times a day for 10 days, Disp: 20 capsule, Rfl: 0    insulin aspart protamine-insulin aspart (NovoLOG 70/30 FlexPen ReliOn) 100 Units/mL injection pen, INJECT 20 UNITS UNDER THE SKIN BEFORE MEALS FOR DIABETES **DISCARD PEN AFTER 14 DAYS OF USE**, Disp: , Rfl:     Insulin Pen Needle " (BD Pen Needle Rossana U/F) 32G X 4 MM MISC, Use 4/day, Disp: 100 each, Rfl: 3    metoprolol succinate (TOPROL-XL) 25 mg 24 hr tablet, Take 1 tablet (25 mg total) by mouth daily, Disp: 90 tablet, Rfl: 1    pravastatin (PRAVACHOL) 40 mg tablet, Take 1 tablet (40 mg total) by mouth daily, Disp: 90 tablet, Rfl: 1    risperiDONE (RisperDAL) 2 mg tablet, 4 mg, Disp: , Rfl:     sertraline (ZOLOFT) 100 mg tablet, Take 200 mg by mouth daily, Disp: , Rfl:     aspirin (ECOTRIN LOW STRENGTH) 81 mg EC tablet, Take 1 tablet (81 mg total) by mouth daily Do not start before October 21, 2022., Disp: 30 tablet, Rfl: 0    benzonatate (TESSALON) 200 MG capsule, Take 1 capsule (200 mg total) by mouth 3 (three) times a day as needed for cough (Patient not taking: Reported on 4/13/2023), Disp: 20 capsule, Rfl: 0    clotrimazole (LOTRIMIN) 1 % cream, Apply topically 2 (two) times a day for 7 days Apply 2 times a day to the area surrounding the head of the penis., Disp: 12 g, Rfl: 1    Continuous Blood Gluc  (Dexcom G6 ) CASSY, Use 1 Device every 3 (three) months (Patient not taking: Reported on 4/3/2023), Disp: 1 each, Rfl: 1    Continuous Blood Gluc Sensor (Dexcom G6 Sensor) MISC, uSE ONE SENSOR EVERY EVERY TEN DAYS (Patient not taking: Reported on 4/3/2023), Disp: 3 each, Rfl: 5    Continuous Blood Gluc Sensor (FreeStyle Catie 3 Sensor) MISC, Use, Disp: , Rfl:     Continuous Blood Gluc Transmit (Dexcom G6 Transmitter) MISC, Use 1 Device in the morning (Patient not taking: Reported on 4/3/2023), Disp: 1 each, Rfl: 0    fluticasone (FLONASE) 50 mcg/act nasal spray, 1 spray into each nostril daily (Patient not taking: Reported on 3/11/2024), Disp: 18.2 mL, Rfl: 0    Fluticasone-Salmeterol (Advair Diskus) 500-50 mcg/dose inhaler, Inhale 1 puff 2 (two) times a day Rinse mouth after use (Patient not taking: Reported on 6/5/2023), Disp: 180 blister, Rfl: 3    insulin aspart (NovoLOG FlexPen) 100 UNIT/ML injection pen, Inject 15  Units under the skin 3 (three) times a day with meals, Disp: 40.5 mL, Rfl: 0    insulin glargine (Lantus) 100 units/mL subcutaneous injection, Inject 70 Units under the skin every 12 (twelve) hours 70 units q12 hours, morning and night (Patient taking differently: Inject 60 Units under the skin every 12 (twelve) hours 70 units q12 hours, morning and night), Disp: 42 mL, Rfl: 2    levothyroxine 25 mcg tablet, Take 2 tablets (50 mcg total) by mouth daily 50, Disp: 90 tablet, Rfl: 1    loratadine (CLARITIN) 10 mg tablet, Take 10 mg by mouth daily (Patient not taking: Reported on 1/17/2024), Disp: , Rfl:     semaglutide, 0.25 or 0.5 mg/dose, (Ozempic) 2 mg/1.5 mL injection pen, 0.25 mg once weekly for 4 weeks then 0.5 mg with (Patient not taking: Reported on 4/3/2023), Disp: 4.5 mL, Rfl: 1    sitaGLIPtin (JANUVIA) 100 mg tablet, Take 1 tablet (100 mg total) by mouth daily (Patient not taking: Reported on 1/6/2024), Disp: 30 tablet, Rfl: 3    Current Allergies     Allergies as of 03/17/2024 - Reviewed 03/17/2024   Allergen Reaction Noted    Bupropion Anxiety 09/01/2022    Lamotrigine GI Intolerance 09/18/2020    Niacin Rash 02/11/2018    Simvastatin Other (See Comments), Rash, and Hives 12/13/2015            The following portions of the patient's history were reviewed and updated as appropriate: allergies, current medications, past family history, past medical history, past social history, past surgical history and problem list.     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 Anxiety, depression,     Psychogenic nonepileptic spells        Past Surgical History:   Procedure Laterality Date    MOUTH SURGERY  08/2021    WISDOM TOOTH EXTRACTION         Family History   Problem Relation Age of Onset    Hyperlipidemia  Father     Heart attack Paternal Grandfather     Prostate cancer Paternal Grandfather     Cancer Paternal Grandmother          Medications have been verified.        Objective   /96   Pulse (!) 117 Comment: Pt reports anxiety  Temp 98 °F (36.7 °C)   Resp 18   Wt 82.3 kg (181 lb 6.4 oz)   SpO2 96%   BMI 30.19 kg/m²   No LMP for male patient.       Physical Exam     Physical Exam  Vitals and nursing note reviewed.   Constitutional:       General: He is not in acute distress.     Appearance: He is not toxic-appearing.   HENT:      Head: Normocephalic and atraumatic.   Eyes:      Conjunctiva/sclera: Conjunctivae normal.   Pulmonary:      Effort: Pulmonary effort is normal.   Skin:     Comments: Aranza sized abscess on left side of beltline.  Surrounding erythema  Warm to touch  No discharge or purulence  Crusted over   Neurological:      Mental Status: He is alert.   Psychiatric:         Mood and Affect: Mood normal.         Behavior: Behavior normal.

## 2024-03-31 ENCOUNTER — APPOINTMENT (EMERGENCY)
Dept: CT IMAGING | Facility: HOSPITAL | Age: 39
DRG: 603 | End: 2024-03-31
Payer: COMMERCIAL

## 2024-03-31 ENCOUNTER — HOSPITAL ENCOUNTER (INPATIENT)
Facility: HOSPITAL | Age: 39
LOS: 1 days | Discharge: HOME | DRG: 603 | End: 2024-04-02
Attending: EMERGENCY MEDICINE | Admitting: INTERNAL MEDICINE
Payer: COMMERCIAL

## 2024-03-31 DIAGNOSIS — L02.211 ABSCESS OF SKIN OF ABDOMEN: ICD-10-CM

## 2024-03-31 DIAGNOSIS — Z79.4 TYPE 2 DIABETES MELLITUS WITHOUT COMPLICATION, WITH LONG-TERM CURRENT USE OF INSULIN (HCC): Chronic | ICD-10-CM

## 2024-03-31 DIAGNOSIS — L03.311 ABDOMINAL WALL CELLULITIS: Primary | ICD-10-CM

## 2024-03-31 DIAGNOSIS — E11.9 TYPE 2 DIABETES MELLITUS WITHOUT COMPLICATION, WITH LONG-TERM CURRENT USE OF INSULIN (HCC): Chronic | ICD-10-CM

## 2024-03-31 DIAGNOSIS — R73.9 HYPERGLYCEMIA: ICD-10-CM

## 2024-03-31 DIAGNOSIS — R00.0 SINUS TACHYCARDIA: ICD-10-CM

## 2024-03-31 LAB
2HR DELTA HS TROPONIN: 1 NG/L
ALBUMIN SERPL BCP-MCNC: 4.2 G/DL (ref 3.5–5)
ALP SERPL-CCNC: 95 U/L (ref 34–104)
ALT SERPL W P-5'-P-CCNC: 53 U/L (ref 7–52)
ANION GAP SERPL CALCULATED.3IONS-SCNC: 9 MMOL/L (ref 4–13)
APTT PPP: 24 SECONDS (ref 23–37)
AST SERPL W P-5'-P-CCNC: 33 U/L (ref 13–39)
B-OH-BUTYR SERPL-MCNC: 0.06 MMOL/L (ref 0.02–0.27)
BASE EXCESS BLDA CALC-SCNC: -4 MMOL/L (ref -2–3)
BASOPHILS # BLD AUTO: 0.03 THOUSANDS/ÂΜL (ref 0–0.1)
BASOPHILS NFR BLD AUTO: 0 % (ref 0–1)
BILIRUB SERPL-MCNC: 0.99 MG/DL (ref 0.2–1)
BILIRUB UR QL STRIP: NEGATIVE
BUN SERPL-MCNC: 10 MG/DL (ref 5–25)
CA-I BLD-SCNC: 1.17 MMOL/L (ref 1.12–1.32)
CALCIUM SERPL-MCNC: 8.8 MG/DL (ref 8.4–10.2)
CARDIAC TROPONIN I PNL SERPL HS: 3 NG/L
CARDIAC TROPONIN I PNL SERPL HS: 4 NG/L
CHLORIDE SERPL-SCNC: 97 MMOL/L (ref 96–108)
CLARITY UR: CLEAR
CO2 SERPL-SCNC: 23 MMOL/L (ref 21–32)
COLOR UR: YELLOW
CREAT SERPL-MCNC: 1.07 MG/DL (ref 0.6–1.3)
EOSINOPHIL # BLD AUTO: 0.08 THOUSAND/ÂΜL (ref 0–0.61)
EOSINOPHIL NFR BLD AUTO: 1 % (ref 0–6)
ERYTHROCYTE [DISTWIDTH] IN BLOOD BY AUTOMATED COUNT: 12.7 % (ref 11.6–15.1)
FLUAV RNA RESP QL NAA+PROBE: NEGATIVE
FLUBV RNA RESP QL NAA+PROBE: NEGATIVE
GFR SERPL CREATININE-BSD FRML MDRD: 86 ML/MIN/1.73SQ M
GLUCOSE SERPL-MCNC: 366 MG/DL (ref 65–140)
GLUCOSE SERPL-MCNC: 374 MG/DL (ref 65–140)
GLUCOSE SERPL-MCNC: 395 MG/DL (ref 65–140)
GLUCOSE UR STRIP-MCNC: ABNORMAL MG/DL
HCO3 BLDA-SCNC: 20.1 MMOL/L (ref 24–30)
HCT VFR BLD AUTO: 44.6 % (ref 36.5–49.3)
HCT VFR BLD CALC: 41 % (ref 36.5–49.3)
HGB BLD-MCNC: 14.8 G/DL (ref 12–17)
HGB BLDA-MCNC: 13.9 G/DL (ref 12–17)
HGB UR QL STRIP.AUTO: NEGATIVE
IMM GRANULOCYTES # BLD AUTO: 0.05 THOUSAND/UL (ref 0–0.2)
IMM GRANULOCYTES NFR BLD AUTO: 1 % (ref 0–2)
INR PPP: 0.95 (ref 0.84–1.19)
KETONES UR STRIP-MCNC: NEGATIVE MG/DL
LACTATE SERPL-SCNC: 1.6 MMOL/L (ref 0.5–2)
LACTATE SERPL-SCNC: 2.8 MMOL/L (ref 0.5–2)
LEUKOCYTE ESTERASE UR QL STRIP: NEGATIVE
LYMPHOCYTES # BLD AUTO: 0.88 THOUSANDS/ÂΜL (ref 0.6–4.47)
LYMPHOCYTES NFR BLD AUTO: 11 % (ref 14–44)
MCH RBC QN AUTO: 27.5 PG (ref 26.8–34.3)
MCHC RBC AUTO-ENTMCNC: 33.2 G/DL (ref 31.4–37.4)
MCV RBC AUTO: 83 FL (ref 82–98)
MONOCYTES # BLD AUTO: 0.47 THOUSAND/ÂΜL (ref 0.17–1.22)
MONOCYTES NFR BLD AUTO: 6 % (ref 4–12)
NEUTROPHILS # BLD AUTO: 6.24 THOUSANDS/ÂΜL (ref 1.85–7.62)
NEUTS SEG NFR BLD AUTO: 81 % (ref 43–75)
NITRITE UR QL STRIP: NEGATIVE
NRBC BLD AUTO-RTO: 0 /100 WBCS
PCO2 BLD: 21 MMOL/L (ref 21–32)
PCO2 BLD: 31.9 MM HG (ref 42–50)
PH BLD: 7.41 [PH] (ref 7.3–7.4)
PH UR STRIP.AUTO: 6 [PH]
PLATELET # BLD AUTO: 188 THOUSANDS/UL (ref 149–390)
PMV BLD AUTO: 9.2 FL (ref 8.9–12.7)
PO2 BLD: 41 MM HG (ref 35–45)
POTASSIUM BLD-SCNC: 3.8 MMOL/L (ref 3.5–5.3)
POTASSIUM SERPL-SCNC: 4 MMOL/L (ref 3.5–5.3)
PROCALCITONIN SERPL-MCNC: 0.5 NG/ML
PROT SERPL-MCNC: 7.2 G/DL (ref 6.4–8.4)
PROT UR STRIP-MCNC: NEGATIVE MG/DL
PROTHROMBIN TIME: 13.1 SECONDS (ref 11.6–14.5)
RBC # BLD AUTO: 5.38 MILLION/UL (ref 3.88–5.62)
RSV RNA RESP QL NAA+PROBE: NEGATIVE
SAO2 % BLD FROM PO2: 78 % (ref 60–85)
SARS-COV-2 RNA RESP QL NAA+PROBE: NEGATIVE
SODIUM BLD-SCNC: 132 MMOL/L (ref 136–145)
SODIUM SERPL-SCNC: 129 MMOL/L (ref 135–147)
SP GR UR STRIP.AUTO: <1.005 (ref 1–1.03)
SPECIMEN SOURCE: ABNORMAL
UROBILINOGEN UR STRIP-ACNC: <2 MG/DL
WBC # BLD AUTO: 7.75 THOUSAND/UL (ref 4.31–10.16)

## 2024-03-31 PROCEDURE — 74177 CT ABD & PELVIS W/CONTRAST: CPT

## 2024-03-31 PROCEDURE — 96366 THER/PROPH/DIAG IV INF ADDON: CPT

## 2024-03-31 PROCEDURE — 93005 ELECTROCARDIOGRAM TRACING: CPT

## 2024-03-31 PROCEDURE — 87040 BLOOD CULTURE FOR BACTERIA: CPT | Performed by: EMERGENCY MEDICINE

## 2024-03-31 PROCEDURE — 96361 HYDRATE IV INFUSION ADD-ON: CPT

## 2024-03-31 PROCEDURE — 82947 ASSAY GLUCOSE BLOOD QUANT: CPT

## 2024-03-31 PROCEDURE — 99284 EMERGENCY DEPT VISIT MOD MDM: CPT

## 2024-03-31 PROCEDURE — 82010 KETONE BODYS QUAN: CPT | Performed by: EMERGENCY MEDICINE

## 2024-03-31 PROCEDURE — 96365 THER/PROPH/DIAG IV INF INIT: CPT

## 2024-03-31 PROCEDURE — 0241U HB NFCT DS VIR RESP RNA 4 TRGT: CPT | Performed by: EMERGENCY MEDICINE

## 2024-03-31 PROCEDURE — 99285 EMERGENCY DEPT VISIT HI MDM: CPT | Performed by: EMERGENCY MEDICINE

## 2024-03-31 PROCEDURE — 85730 THROMBOPLASTIN TIME PARTIAL: CPT | Performed by: EMERGENCY MEDICINE

## 2024-03-31 PROCEDURE — 96375 TX/PRO/DX INJ NEW DRUG ADDON: CPT

## 2024-03-31 PROCEDURE — 84145 PROCALCITONIN (PCT): CPT | Performed by: EMERGENCY MEDICINE

## 2024-03-31 PROCEDURE — 84484 ASSAY OF TROPONIN QUANT: CPT | Performed by: EMERGENCY MEDICINE

## 2024-03-31 PROCEDURE — 82948 REAGENT STRIP/BLOOD GLUCOSE: CPT

## 2024-03-31 PROCEDURE — 83605 ASSAY OF LACTIC ACID: CPT | Performed by: EMERGENCY MEDICINE

## 2024-03-31 PROCEDURE — 84295 ASSAY OF SERUM SODIUM: CPT

## 2024-03-31 PROCEDURE — 96367 TX/PROPH/DG ADDL SEQ IV INF: CPT

## 2024-03-31 PROCEDURE — 85610 PROTHROMBIN TIME: CPT | Performed by: EMERGENCY MEDICINE

## 2024-03-31 PROCEDURE — 84132 ASSAY OF SERUM POTASSIUM: CPT

## 2024-03-31 PROCEDURE — 85025 COMPLETE CBC W/AUTO DIFF WBC: CPT | Performed by: EMERGENCY MEDICINE

## 2024-03-31 PROCEDURE — 82330 ASSAY OF CALCIUM: CPT

## 2024-03-31 PROCEDURE — 82803 BLOOD GASES ANY COMBINATION: CPT

## 2024-03-31 PROCEDURE — 80053 COMPREHEN METABOLIC PANEL: CPT | Performed by: EMERGENCY MEDICINE

## 2024-03-31 PROCEDURE — 85014 HEMATOCRIT: CPT

## 2024-03-31 PROCEDURE — 36415 COLL VENOUS BLD VENIPUNCTURE: CPT | Performed by: EMERGENCY MEDICINE

## 2024-03-31 RX ORDER — KETOROLAC TROMETHAMINE 30 MG/ML
15 INJECTION, SOLUTION INTRAMUSCULAR; INTRAVENOUS ONCE
Status: COMPLETED | OUTPATIENT
Start: 2024-03-31 | End: 2024-03-31

## 2024-03-31 RX ORDER — CEFEPIME HYDROCHLORIDE 2 G/50ML
2000 INJECTION, SOLUTION INTRAVENOUS ONCE
Status: COMPLETED | OUTPATIENT
Start: 2024-03-31 | End: 2024-03-31

## 2024-03-31 RX ADMIN — IOHEXOL 100 ML: 350 INJECTION, SOLUTION INTRAVENOUS at 21:55

## 2024-03-31 RX ADMIN — KETOROLAC TROMETHAMINE 15 MG: 30 INJECTION, SOLUTION INTRAMUSCULAR; INTRAVENOUS at 20:53

## 2024-03-31 RX ADMIN — VANCOMYCIN HYDROCHLORIDE 1250 MG: 5 INJECTION, POWDER, LYOPHILIZED, FOR SOLUTION INTRAVENOUS at 22:30

## 2024-03-31 RX ADMIN — CEFEPIME HYDROCHLORIDE 2000 MG: 2 INJECTION, SOLUTION INTRAVENOUS at 21:29

## 2024-03-31 RX ADMIN — SODIUM CHLORIDE 1000 ML: 0.9 INJECTION, SOLUTION INTRAVENOUS at 20:52

## 2024-04-01 PROBLEM — L03.311 ABDOMINAL WALL CELLULITIS: Status: ACTIVE | Noted: 2024-04-01

## 2024-04-01 PROBLEM — E87.20 LACTIC ACIDOSIS: Status: ACTIVE | Noted: 2024-04-01

## 2024-04-01 PROBLEM — R00.0 SINUS TACHYCARDIA: Status: ACTIVE | Noted: 2024-04-01

## 2024-04-01 LAB
ANION GAP SERPL CALCULATED.3IONS-SCNC: 8 MMOL/L (ref 4–13)
BUN SERPL-MCNC: 10 MG/DL (ref 5–25)
CALCIUM SERPL-MCNC: 7.6 MG/DL (ref 8.4–10.2)
CHLORIDE SERPL-SCNC: 102 MMOL/L (ref 96–108)
CO2 SERPL-SCNC: 23 MMOL/L (ref 21–32)
CREAT SERPL-MCNC: 0.93 MG/DL (ref 0.6–1.3)
ERYTHROCYTE [DISTWIDTH] IN BLOOD BY AUTOMATED COUNT: 12.9 % (ref 11.6–15.1)
GFR SERPL CREATININE-BSD FRML MDRD: 103 ML/MIN/1.73SQ M
GLUCOSE SERPL-MCNC: 185 MG/DL (ref 65–140)
GLUCOSE SERPL-MCNC: 202 MG/DL (ref 65–140)
GLUCOSE SERPL-MCNC: 208 MG/DL (ref 65–140)
GLUCOSE SERPL-MCNC: 209 MG/DL (ref 65–140)
GLUCOSE SERPL-MCNC: 217 MG/DL (ref 65–140)
GLUCOSE SERPL-MCNC: 250 MG/DL (ref 65–140)
GLUCOSE SERPL-MCNC: 266 MG/DL (ref 65–140)
HCT VFR BLD AUTO: 38 % (ref 36.5–49.3)
HGB BLD-MCNC: 13 G/DL (ref 12–17)
MAGNESIUM SERPL-MCNC: 1.7 MG/DL (ref 1.9–2.7)
MCH RBC QN AUTO: 28.4 PG (ref 26.8–34.3)
MCHC RBC AUTO-ENTMCNC: 34.2 G/DL (ref 31.4–37.4)
MCV RBC AUTO: 83 FL (ref 82–98)
PHOSPHATE SERPL-MCNC: 1.9 MG/DL (ref 2.7–4.5)
PLATELET # BLD AUTO: 158 THOUSANDS/UL (ref 149–390)
PMV BLD AUTO: 9.5 FL (ref 8.9–12.7)
POTASSIUM SERPL-SCNC: 3.6 MMOL/L (ref 3.5–5.3)
PROCALCITONIN SERPL-MCNC: 0.6 NG/ML
RBC # BLD AUTO: 4.58 MILLION/UL (ref 3.88–5.62)
SODIUM SERPL-SCNC: 133 MMOL/L (ref 135–147)
WBC # BLD AUTO: 6.26 THOUSAND/UL (ref 4.31–10.16)

## 2024-04-01 PROCEDURE — 94762 N-INVAS EAR/PLS OXIMTRY CONT: CPT

## 2024-04-01 PROCEDURE — 99223 1ST HOSP IP/OBS HIGH 75: CPT | Performed by: INTERNAL MEDICINE

## 2024-04-01 PROCEDURE — 82948 REAGENT STRIP/BLOOD GLUCOSE: CPT

## 2024-04-01 PROCEDURE — 94760 N-INVAS EAR/PLS OXIMETRY 1: CPT

## 2024-04-01 PROCEDURE — 84100 ASSAY OF PHOSPHORUS: CPT | Performed by: INTERNAL MEDICINE

## 2024-04-01 PROCEDURE — 83735 ASSAY OF MAGNESIUM: CPT | Performed by: INTERNAL MEDICINE

## 2024-04-01 PROCEDURE — 94660 CPAP INITIATION&MGMT: CPT

## 2024-04-01 PROCEDURE — 80048 BASIC METABOLIC PNL TOTAL CA: CPT | Performed by: INTERNAL MEDICINE

## 2024-04-01 PROCEDURE — 85027 COMPLETE CBC AUTOMATED: CPT | Performed by: INTERNAL MEDICINE

## 2024-04-01 PROCEDURE — 94002 VENT MGMT INPAT INIT DAY: CPT

## 2024-04-01 PROCEDURE — 87081 CULTURE SCREEN ONLY: CPT | Performed by: INTERNAL MEDICINE

## 2024-04-01 PROCEDURE — 84145 PROCALCITONIN (PCT): CPT | Performed by: INTERNAL MEDICINE

## 2024-04-01 RX ORDER — METOPROLOL SUCCINATE 50 MG/1
50 TABLET, EXTENDED RELEASE ORAL DAILY
Status: DISCONTINUED | OUTPATIENT
Start: 2024-04-01 | End: 2024-04-02 | Stop reason: HOSPADM

## 2024-04-01 RX ORDER — INSULIN GLARGINE 100 [IU]/ML
40 INJECTION, SOLUTION SUBCUTANEOUS EVERY 12 HOURS SCHEDULED
Status: DISCONTINUED | OUTPATIENT
Start: 2024-04-01 | End: 2024-04-02 | Stop reason: HOSPADM

## 2024-04-01 RX ORDER — INSULIN ASPART 100 [IU]/ML
10 INJECTION, SUSPENSION SUBCUTANEOUS
Status: DISCONTINUED | OUTPATIENT
Start: 2024-04-01 | End: 2024-04-02 | Stop reason: HOSPADM

## 2024-04-01 RX ORDER — LIDOCAINE 50 MG/G
1 PATCH TOPICAL DAILY
Status: DISCONTINUED | OUTPATIENT
Start: 2024-04-01 | End: 2024-04-02 | Stop reason: HOSPADM

## 2024-04-01 RX ORDER — DULOXETIN HYDROCHLORIDE 20 MG/1
40 CAPSULE, DELAYED RELEASE ORAL DAILY
COMMUNITY

## 2024-04-01 RX ORDER — ENOXAPARIN SODIUM 100 MG/ML
40 INJECTION SUBCUTANEOUS DAILY
Status: DISCONTINUED | OUTPATIENT
Start: 2024-04-01 | End: 2024-04-02 | Stop reason: HOSPADM

## 2024-04-01 RX ORDER — PRAVASTATIN SODIUM 40 MG
40 TABLET ORAL DAILY
Status: DISCONTINUED | OUTPATIENT
Start: 2024-04-01 | End: 2024-04-02 | Stop reason: HOSPADM

## 2024-04-01 RX ORDER — FLUTICASONE FUROATE AND VILANTEROL 200; 25 UG/1; UG/1
1 POWDER RESPIRATORY (INHALATION)
Status: DISCONTINUED | OUTPATIENT
Start: 2024-04-01 | End: 2024-04-02 | Stop reason: HOSPADM

## 2024-04-01 RX ORDER — RISPERIDONE 1 MG/1
4 TABLET ORAL
Status: DISCONTINUED | OUTPATIENT
Start: 2024-04-01 | End: 2024-04-02 | Stop reason: HOSPADM

## 2024-04-01 RX ORDER — SODIUM CHLORIDE, SODIUM GLUCONATE, SODIUM ACETATE, POTASSIUM CHLORIDE, MAGNESIUM CHLORIDE, SODIUM PHOSPHATE, DIBASIC, AND POTASSIUM PHOSPHATE .53; .5; .37; .037; .03; .012; .00082 G/100ML; G/100ML; G/100ML; G/100ML; G/100ML; G/100ML; G/100ML
1000 INJECTION, SOLUTION INTRAVENOUS ONCE
Status: COMPLETED | OUTPATIENT
Start: 2024-04-01 | End: 2024-04-01

## 2024-04-01 RX ORDER — MAGNESIUM SULFATE HEPTAHYDRATE 40 MG/ML
2 INJECTION, SOLUTION INTRAVENOUS ONCE
Status: COMPLETED | OUTPATIENT
Start: 2024-04-01 | End: 2024-04-01

## 2024-04-01 RX ORDER — ACETAMINOPHEN 325 MG/1
650 TABLET ORAL EVERY 6 HOURS PRN
Status: DISCONTINUED | OUTPATIENT
Start: 2024-04-01 | End: 2024-04-02 | Stop reason: HOSPADM

## 2024-04-01 RX ORDER — SODIUM CHLORIDE, SODIUM GLUCONATE, SODIUM ACETATE, POTASSIUM CHLORIDE, MAGNESIUM CHLORIDE, SODIUM PHOSPHATE, DIBASIC, AND POTASSIUM PHOSPHATE .53; .5; .37; .037; .03; .012; .00082 G/100ML; G/100ML; G/100ML; G/100ML; G/100ML; G/100ML; G/100ML
125 INJECTION, SOLUTION INTRAVENOUS CONTINUOUS
Status: DISCONTINUED | OUTPATIENT
Start: 2024-04-01 | End: 2024-04-01

## 2024-04-01 RX ORDER — BUSPIRONE HYDROCHLORIDE 5 MG/1
5 TABLET ORAL 2 TIMES DAILY
Status: DISCONTINUED | OUTPATIENT
Start: 2024-04-01 | End: 2024-04-02 | Stop reason: HOSPADM

## 2024-04-01 RX ORDER — ALBUTEROL SULFATE 90 UG/1
2 AEROSOL, METERED RESPIRATORY (INHALATION) EVERY 6 HOURS PRN
Status: DISCONTINUED | OUTPATIENT
Start: 2024-04-01 | End: 2024-04-02 | Stop reason: HOSPADM

## 2024-04-01 RX ORDER — INSULIN LISPRO 100 [IU]/ML
1-6 INJECTION, SOLUTION INTRAVENOUS; SUBCUTANEOUS
Status: DISCONTINUED | OUTPATIENT
Start: 2024-04-01 | End: 2024-04-02 | Stop reason: HOSPADM

## 2024-04-01 RX ORDER — LEVOTHYROXINE SODIUM 0.05 MG/1
50 TABLET ORAL
Status: DISCONTINUED | OUTPATIENT
Start: 2024-04-01 | End: 2024-04-02 | Stop reason: HOSPADM

## 2024-04-01 RX ORDER — MAGNESIUM HYDROXIDE/ALUMINUM HYDROXICE/SIMETHICONE 120; 1200; 1200 MG/30ML; MG/30ML; MG/30ML
30 SUSPENSION ORAL EVERY 6 HOURS PRN
Status: DISCONTINUED | OUTPATIENT
Start: 2024-04-01 | End: 2024-04-02 | Stop reason: HOSPADM

## 2024-04-01 RX ORDER — ONDANSETRON 2 MG/ML
4 INJECTION INTRAMUSCULAR; INTRAVENOUS EVERY 6 HOURS PRN
Status: DISCONTINUED | OUTPATIENT
Start: 2024-04-01 | End: 2024-04-02 | Stop reason: HOSPADM

## 2024-04-01 RX ORDER — CEFEPIME HYDROCHLORIDE 2 G/50ML
2000 INJECTION, SOLUTION INTRAVENOUS EVERY 12 HOURS
Status: DISCONTINUED | OUTPATIENT
Start: 2024-04-01 | End: 2024-04-01

## 2024-04-01 RX ORDER — METHOCARBAMOL 500 MG/1
500 TABLET, FILM COATED ORAL EVERY 6 HOURS PRN
Status: DISCONTINUED | OUTPATIENT
Start: 2024-04-01 | End: 2024-04-02 | Stop reason: HOSPADM

## 2024-04-01 RX ORDER — DULOXETIN HYDROCHLORIDE 20 MG/1
40 CAPSULE, DELAYED RELEASE ORAL DAILY
Status: DISCONTINUED | OUTPATIENT
Start: 2024-04-01 | End: 2024-04-02 | Stop reason: HOSPADM

## 2024-04-01 RX ORDER — SENNOSIDES 8.6 MG
1 TABLET ORAL
Status: DISCONTINUED | OUTPATIENT
Start: 2024-04-01 | End: 2024-04-02 | Stop reason: HOSPADM

## 2024-04-01 RX ORDER — VANCOMYCIN HYDROCHLORIDE 1 G/200ML
15 INJECTION, SOLUTION INTRAVENOUS EVERY 12 HOURS
Status: DISCONTINUED | OUTPATIENT
Start: 2024-04-01 | End: 2024-04-02

## 2024-04-01 RX ORDER — LORATADINE 10 MG/1
10 TABLET ORAL DAILY
Status: DISCONTINUED | OUTPATIENT
Start: 2024-04-01 | End: 2024-04-02 | Stop reason: HOSPADM

## 2024-04-01 RX ADMIN — SERTRALINE HYDROCHLORIDE 150 MG: 50 TABLET ORAL at 08:52

## 2024-04-01 RX ADMIN — LEVOTHYROXINE SODIUM 50 MCG: 50 TABLET ORAL at 06:04

## 2024-04-01 RX ADMIN — INSULIN ASPART 10 UNITS: 100 INJECTION, SUSPENSION SUBCUTANEOUS at 17:43

## 2024-04-01 RX ADMIN — RISPERIDONE 4 MG: 2 TABLET, FILM COATED ORAL at 01:26

## 2024-04-01 RX ADMIN — INSULIN GLARGINE 40 UNITS: 100 INJECTION, SOLUTION SUBCUTANEOUS at 08:47

## 2024-04-01 RX ADMIN — RISPERIDONE 4 MG: 2 TABLET, FILM COATED ORAL at 21:40

## 2024-04-01 RX ADMIN — INSULIN GLARGINE 40 UNITS: 100 INJECTION, SOLUTION SUBCUTANEOUS at 01:31

## 2024-04-01 RX ADMIN — SODIUM CHLORIDE, SODIUM GLUCONATE, SODIUM ACETATE, POTASSIUM CHLORIDE, MAGNESIUM CHLORIDE, SODIUM PHOSPHATE, DIBASIC, AND POTASSIUM PHOSPHATE 1000 ML: .53; .5; .37; .037; .03; .012; .00082 INJECTION, SOLUTION INTRAVENOUS at 00:46

## 2024-04-01 RX ADMIN — DULOXETINE HYDROCHLORIDE 40 MG: 20 CAPSULE, DELAYED RELEASE ORAL at 08:52

## 2024-04-01 RX ADMIN — LORATADINE 10 MG: 10 TABLET ORAL at 08:52

## 2024-04-01 RX ADMIN — INSULIN LISPRO 2 UNITS: 100 INJECTION, SOLUTION INTRAVENOUS; SUBCUTANEOUS at 17:42

## 2024-04-01 RX ADMIN — ENOXAPARIN SODIUM 40 MG: 40 INJECTION SUBCUTANEOUS at 08:55

## 2024-04-01 RX ADMIN — METOPROLOL SUCCINATE 50 MG: 50 TABLET, EXTENDED RELEASE ORAL at 08:52

## 2024-04-01 RX ADMIN — VANCOMYCIN HYDROCHLORIDE 1000 MG: 1 INJECTION, SOLUTION INTRAVENOUS at 17:44

## 2024-04-01 RX ADMIN — ASPIRIN 81 MG: 81 TABLET, COATED ORAL at 08:51

## 2024-04-01 RX ADMIN — INSULIN LISPRO 3 UNITS: 100 INJECTION, SOLUTION INTRAVENOUS; SUBCUTANEOUS at 21:41

## 2024-04-01 RX ADMIN — BUSPIRONE HYDROCHLORIDE 5 MG: 5 TABLET ORAL at 21:39

## 2024-04-01 RX ADMIN — POTASSIUM PHOSPHATE, MONOBASIC POTASSIUM PHOSPHATE, DIBASIC 12 MMOL: 224; 236 INJECTION, SOLUTION, CONCENTRATE INTRAVENOUS at 11:38

## 2024-04-01 RX ADMIN — METHOCARBAMOL 500 MG: 500 TABLET ORAL at 01:27

## 2024-04-01 RX ADMIN — VANCOMYCIN HYDROCHLORIDE 1000 MG: 1 INJECTION, SOLUTION INTRAVENOUS at 05:51

## 2024-04-01 RX ADMIN — INSULIN LISPRO 2 UNITS: 100 INJECTION, SOLUTION INTRAVENOUS; SUBCUTANEOUS at 01:31

## 2024-04-01 RX ADMIN — INSULIN LISPRO 3 UNITS: 100 INJECTION, SOLUTION INTRAVENOUS; SUBCUTANEOUS at 12:38

## 2024-04-01 RX ADMIN — MAGNESIUM SULFATE HEPTAHYDRATE 2 G: 2 INJECTION, SOLUTION INTRAVENOUS at 10:33

## 2024-04-01 RX ADMIN — FLUTICASONE FUROATE AND VILANTEROL TRIFENATATE 1 PUFF: 200; 25 POWDER RESPIRATORY (INHALATION) at 10:46

## 2024-04-01 RX ADMIN — BUSPIRONE HYDROCHLORIDE 5 MG: 5 TABLET ORAL at 08:52

## 2024-04-01 RX ADMIN — INSULIN ASPART 10 UNITS: 100 INJECTION, SUSPENSION SUBCUTANEOUS at 08:47

## 2024-04-01 RX ADMIN — CEFEPIME HYDROCHLORIDE 2000 MG: 2 INJECTION, SOLUTION INTRAVENOUS at 10:28

## 2024-04-01 RX ADMIN — LIDOCAINE 1 PATCH: 50 PATCH TOPICAL at 01:28

## 2024-04-01 RX ADMIN — SODIUM CHLORIDE, SODIUM GLUCONATE, SODIUM ACETATE, POTASSIUM CHLORIDE, MAGNESIUM CHLORIDE, SODIUM PHOSPHATE, DIBASIC, AND POTASSIUM PHOSPHATE 125 ML/HR: .53; .5; .37; .037; .03; .012; .00082 INJECTION, SOLUTION INTRAVENOUS at 02:03

## 2024-04-01 RX ADMIN — INSULIN GLARGINE 40 UNITS: 100 INJECTION, SOLUTION SUBCUTANEOUS at 21:41

## 2024-04-01 RX ADMIN — LIDOCAINE 1 PATCH: 50 PATCH TOPICAL at 10:46

## 2024-04-01 RX ADMIN — PRAVASTATIN SODIUM 40 MG: 40 TABLET ORAL at 08:51

## 2024-04-01 NOTE — H&P
"Counts include 234 beds at the [facility]  H&P  Name: Dillon Baxter 38 y.o. male I MRN: [id number]  Unit/Bed#: [facility] I Date of Admission: 3/31/2024   Date of Service: 4/1/2024 I Hospital Day: 0      Assessment/Plan   * Abdominal wall cellulitis  Assessment & Plan  Presents with ongoing LLQ abdominal wall erythema and swelling despite completing oral doxycycline 1 week ago  CT A/P: \"Focal skin thickening and subcutaneous fat stranding/fluid in the left lower abdomen anteriorly (axial image 145, series 2), suspicious for localized skin infection. Measures approximately 2 cm in size. No discrete involvement of the deep soft tissues in this region.\"  Due to myalgias and malaise, continue IV antibiotics of cefepime and vancomycin  IV fluids  Blood cultures, pending    Lactic acidosis  Assessment & Plan  Initial LA at 2.8 - improved to 1.4 s/p IVF  Multifactorial in setting of dehydration from limited oral intake and diuretic effect from hyperglycemia   Continue IVF as pt examined dry still     Sinus tachycardia  Assessment & Plan  Presents with sinus tachycardia   Multifactorial in setting of infection and dehydration from hyperglycemia  Continue IVF   If fails to improve with IVF and treatment of infection - consider CTA PE study, though no hypoxia or reports of dyspnea     Type 2 diabetes mellitus with hyperglycemia, with long-term current use of insulin (HCC)  Assessment & Plan  Lab Results   Component Value Date    HGBA1C 13.8 (A) 09/11/2023       Recent Labs     03/31/24 2049 04/01/24  0122   POCGLU 395* 217*       Blood Sugar Average: Last 72 hrs:  (P) 306  Home regimen: Lantus 60 units BID, NovoLog 70/30 15 to 20 units BID AC  Decrease Lantus slightly to 40 units BID and NovoLog 70/30 to 10 units with SSI AC/HS due to reports of decreased oral intake  Goal blood sugar 140-160     Cerebrovascular accident (CVA), unspecified mechanism (HCC)  Assessment & Plan  Hx of CVA in 10/2022 - presented with syncope and " "right sided weakness, given TNK, MRI negative for stroke  D/C on DAPT x3 weeks and then ASA 81mg daily   Continue ASA and statin   No residual deficits     Essential hypertension  Assessment & Plan  Home regimen: metoprolol 50mg daily   Continue     Hypothyroidism  Assessment & Plan  Continue home synthroid 50mcg daily     Psychogenic nonepileptic spells  Assessment & Plan  Hx of seizures described as staring spells that were determined to be nonepileptic in nature     ENRIKE (obstructive sleep apnea)  Assessment & Plan  Continue CPAP HS     PTSD (post-traumatic stress disorder)  Assessment & Plan  Home regimen: Duloxetine 40 Mg daily and Risperdal 4 Mg at bedtime  Continue    Anxiety  Assessment & Plan  Continue home BuSpar 5 Mg BID         VTE Pharmacologic Prophylaxis: VTE Score: 7 High Risk (Score >/= 5) - Pharmacological DVT Prophylaxis Ordered: enoxaparin (Lovenox). Sequential Compression Devices Ordered.  Code Status: Level 1 - Full Code   Discussion with family: Patient declined call to .     Anticipated Length of Stay: Patient will be admitted on an inpatient basis with an anticipated length of stay of greater than 2 midnights secondary to abdominal wall cellulitis .    Chief Complaint: \"I had back pain and muscle aches\"    History of Present Illness:  Dillon Baxter is a 38 y.o. male with a PMH of CVA on ASA, HTN, ENRIKE on CPAP, hypothyroidism, anxiety, and PTSD who presents with right mid back pain and ongoing erythema of LLQ abdominal infection. Infection has been ongoing since 3/17. Completed course of doxy 1 week ago with slightly improvement in symptoms but not resolution.  Yesterday he developed myalgias, malaise, anorexia, and right mid lumbar back pain.  No fevers or chills.  No chest pain or dyspnea.  No abdominal pain, nausea, vomiting, or change in bowel habits.  Reports his blood sugars have been uncontrolled since onset.    Review of Systems:  Review of Systems   Constitutional:  " Positive for appetite change. Negative for chills and fever.        Positive for malaise   Respiratory:  Negative for cough and shortness of breath.    Cardiovascular:  Negative for chest pain and leg swelling.   Gastrointestinal:  Negative for abdominal pain, constipation, diarrhea, nausea and vomiting.   Musculoskeletal:  Positive for back pain and myalgias. Negative for gait problem.   Skin:  Positive for wound.   All other systems reviewed and are negative.      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 Anxiety, depression,     Psychogenic nonepileptic spells        Past Surgical History:   Procedure Laterality Date    MOUTH SURGERY  08/2021    WISDOM TOOTH EXTRACTION         Meds/Allergies:  Prior to Admission medications    Medication Sig Start Date End Date Taking? Authorizing Provider   albuterol (PROVENTIL HFA,VENTOLIN HFA) 90 mcg/act inhaler Inhale 2 puffs every 6 (six) hours as needed for wheezing or shortness of breath 11/4/21  Yes Fred Sanchez MD   aspirin (ECOTRIN LOW STRENGTH) 81 mg EC tablet Take 1 tablet (81 mg total) by mouth daily Do not start before October 21, 2022. 10/21/22 4/1/24 Yes Richi Casey MD   busPIRone (BUSPAR) 5 mg tablet Take 5 mg by mouth 2 (two) times a day 9/15/23  Yes Historical Provider, MD   DULoxetine (CYMBALTA) 20 mg capsule Take 40 mg by mouth daily   Yes Historical Provider, MD   Fluticasone-Salmeterol (Advair Diskus) 500-50 mcg/dose inhaler Inhale 1 puff 2 (two) times a day Rinse mouth after use 4/13/23 4/1/24 Yes IGNACIO Baca   insulin aspart protamine-insulin aspart (NovoLOG 70/30 FlexPen ReliOn) 100 Units/mL injection pen 15-20 morning and HS 12/14/23  Yes Historical Provider, MD   levothyroxine 25 mcg tablet  Take 2 tablets (50 mcg total) by mouth daily 50 10/14/22  Yes Adan Nixon MD   loratadine (CLARITIN) 10 mg tablet Take 10 mg by mouth daily   Yes Historical Provider, MD   metoprolol succinate (TOPROL-XL) 25 mg 24 hr tablet Take 1 tablet (25 mg total) by mouth daily  Patient taking differently: Take 50 mg by mouth daily 10/17/22  Yes Adan Nixon MD   pravastatin (PRAVACHOL) 40 mg tablet Take 1 tablet (40 mg total) by mouth daily 10/17/22  Yes Adan Nixon MD   risperiDONE (RisperDAL) 2 mg tablet Take 4 mg by mouth daily at bedtime 1/26/23  Yes Historical Provider, MD   sertraline (ZOLOFT) 100 mg tablet Take 150 mg by mouth daily 4/7/21  Yes Historical Provider, MD   clotrimazole (LOTRIMIN) 1 % cream Apply topically 2 (two) times a day for 7 days Apply 2 times a day to the area surrounding the head of the penis. 9/11/23 9/18/23  Sonia Thomas DO   Continuous Blood Gluc  (Dexcom G6 ) CASSY Use 1 Device every 3 (three) months  Patient not taking: Reported on 4/3/2023 10/20/22   Vasile Wilson PA-C   Continuous Blood Gluc Sensor (Dexcom G6 Sensor) MISC uSE ONE SENSOR EVERY EVERY TEN DAYS  Patient not taking: Reported on 4/3/2023 10/20/22   Vasile Wilson PA-C   Continuous Blood Gluc Sensor (FreeStyle Catie 3 Sensor) MISC Use    Historical Provider, MD   Continuous Blood Gluc Transmit (Dexcom G6 Transmitter) MISC Use 1 Device in the morning  Patient not taking: Reported on 4/3/2023 1/23/23   Tiffani Mojica MD   fluticasone (FLONASE) 50 mcg/act nasal spray 1 spray into each nostril daily  Patient not taking: Reported on 3/11/2024 9/11/23   Sonia Thomas DO   insulin glargine (Lantus) 100 units/mL subcutaneous injection Inject 70 Units under the skin every 12 (twelve) hours 70 units q12 hours, morning and night  Patient taking differently: Inject 60 Units under the skin every 12 (twelve) hours 60 units q12 hours, morning and night 9/11/23 1/6/24  Sonia Thomas, DO   Insulin Pen Needle (BD  Pen Needle Rossana U/F) 32G X 4 MM MISC Use 4/day 1/23/23   Tiffani Mojica MD   aspirin 81 mg chewable tablet Chew 81 mg daily  4/1/24  Historical Provider, MD   atorvastatin (LIPITOR) 40 mg tablet Take 1 tablet (40 mg total) by mouth every evening 10/20/22 10/20/22  Richi Casey MD   benzonatate (TESSALON) 200 MG capsule Take 1 capsule (200 mg total) by mouth 3 (three) times a day as needed for cough  Patient not taking: Reported on 4/13/2023 4/3/23 4/1/24  Swati Allen PA-C   insulin aspart (NovoLOG FlexPen) 100 UNIT/ML injection pen Inject 15 Units under the skin 3 (three) times a day with meals 1/23/23 4/1/24  Tiffani Mojica MD   semaglutide, 0.25 or 0.5 mg/dose, (Ozempic) 2 mg/1.5 mL injection pen 0.25 mg once weekly for 4 weeks then 0.5 mg with  Patient not taking: Reported on 4/3/2023 1/23/23 4/1/24  Tiffani Mojica MD   sitaGLIPtin (JANUVIA) 100 mg tablet Take 1 tablet (100 mg total) by mouth daily  Patient not taking: Reported on 1/6/2024 9/11/23 4/1/24  Sonia Thomas DO     I have reviewed home medications with patient personally.    Allergies:   Allergies   Allergen Reactions    Bupropion Anxiety    Lamotrigine GI Intolerance    Niacin Rash    Simvastatin Other (See Comments), Rash and Hives     Elevated liver enzymes  Liver enzyme elevation       Social History:  Marital Status: /Civil Union   Occupation: not assessed  Patient Pre-hospital Living Situation: Home, With other family member: father  Patient Pre-hospital Level of Mobility: walks  Patient Pre-hospital Diet Restrictions: none   Substance Use History:   Social History     Substance and Sexual Activity   Alcohol Use Not Currently     Social History     Tobacco Use   Smoking Status Never   Smokeless Tobacco Never     Social History     Substance and Sexual Activity   Drug Use Not Currently    Types: Marijuana       Family History:  Family History   Problem Relation Age of Onset    Hyperlipidemia Father     Heart attack Paternal Grandfather      "Prostate cancer Paternal Grandfather     Cancer Paternal Grandmother        Physical Exam:     Vitals:   Blood Pressure: 133/69 (04/01/24 0143)  Pulse: (!) 121 (04/01/24 0143)  Temperature: 98.4 °F (36.9 °C) (04/01/24 0143)  Temp Source: Oral (04/01/24 0143)  Respirations: 20 (04/01/24 0143)  Height: 5' 5\" (165.1 cm) (04/01/24 0143)  Weight - Scale: 82.7 kg (182 lb 5.1 oz) (04/01/24 0143)  SpO2: 94 % (04/01/24 0000)    Physical Exam  Vitals and nursing note reviewed.   Constitutional:       General: He is not in acute distress.     Appearance: Normal appearance. He is not ill-appearing.      Comments: Pleasant and conversational   HENT:      Head: Normocephalic.      Nose: Nose normal.      Mouth/Throat:      Mouth: Mucous membranes are moist.   Eyes:      Extraocular Movements: Extraocular movements intact.      Conjunctiva/sclera: Conjunctivae normal.   Cardiovascular:      Rate and Rhythm: Regular rhythm. Tachycardia present.      Pulses: Normal pulses.      Heart sounds: No murmur heard.  Pulmonary:      Effort: Pulmonary effort is normal. No respiratory distress.      Breath sounds: Normal breath sounds. No wheezing, rhonchi or rales.   Abdominal:      General: Abdomen is flat.      Palpations: Abdomen is soft.      Tenderness: There is no abdominal tenderness. There is no guarding or rebound.   Musculoskeletal:         General: Normal range of motion.      Cervical back: Normal range of motion.      Right lower leg: No edema.      Left lower leg: No edema.      Comments: Right lumbar paraspinal musculature tenderness. No midline tenderness.    Skin:     General: Skin is warm and dry.      Comments: Wound to LLQ abdomen with surrounding erythema. No fluctuance or induration.    Neurological:      General: No focal deficit present.      Mental Status: He is alert and oriented to person, place, and time.   Psychiatric:         Mood and Affect: Mood normal.         Thought Content: Thought content normal.      "     Additional Data:     Lab Results:  Results from last 7 days   Lab Units 03/31/24 2108 03/31/24 2050   WBC Thousand/uL  --  7.75   HEMOGLOBIN g/dL  --  14.8   I STAT HEMOGLOBIN g/dl 13.9  --    HEMATOCRIT %  --  44.6   HEMATOCRIT, ISTAT % 41  --    PLATELETS Thousands/uL  --  188   NEUTROS PCT %  --  81*   LYMPHS PCT %  --  11*   MONOS PCT %  --  6   EOS PCT %  --  1     Results from last 7 days   Lab Units 03/31/24 2108 03/31/24 2050   SODIUM mmol/L  --  129*   POTASSIUM mmol/L  --  4.0   CHLORIDE mmol/L  --  97   CO2 mmol/L  --  23   CO2, I-STAT mmol/L 21  --    BUN mg/dL  --  10   CREATININE mg/dL  --  1.07   ANION GAP mmol/L  --  9   CALCIUM mg/dL  --  8.8   ALBUMIN g/dL  --  4.2   TOTAL BILIRUBIN mg/dL  --  0.99   ALK PHOS U/L  --  95   ALT U/L  --  53*   AST U/L  --  33   GLUCOSE RANDOM mg/dL  --  374*     Results from last 7 days   Lab Units 03/31/24 2050   INR  0.95     Results from last 7 days   Lab Units 04/01/24  0122 03/31/24  2049   POC GLUCOSE mg/dl 217* 395*         Results from last 7 days   Lab Units 03/31/24 2252 03/31/24 2050   LACTIC ACID mmol/L 1.6 2.8*   PROCALCITONIN ng/ml  --  0.50*       Lines/Drains:  Invasive Devices       Peripheral Intravenous Line  Duration             Peripheral IV 03/31/24 Right Antecubital <1 day    Peripheral IV 04/01/24 Dorsal (posterior);Right Hand <1 day                        Imaging: Reviewed radiology reports from this admission including: abdominal/pelvic CT  CT abdomen pelvis with contrast   Final Result by Jeb Wolfe DO (03/31 2354)      Focal skin thickening and subcutaneous fat stranding/fluid in the left lower abdomen anteriorly (axial image 145, series 2), suspicious for localized skin infection. Measures approximately 2 cm in size. No discrete involvement of the deep soft tissues in    this region.      Fatty infiltration of the liver is suspected.  In the setting of abdominal pain and/or elevated liver function tests, consider  steatohepatitis.      Other findings as above.                  Workstation performed: [id number]             EKG and Other Studies Reviewed on Admission:   EKG:  Sinus tachy with PVCs. QTC slightly prolonged at 476ms.    ** Please Note: This note has been constructed using a voice recognition system. **

## 2024-04-01 NOTE — UTILIZATION REVIEW
Initial Clinical Review    Admission: Date/Time/Statement:   Admission Orders (From admission, onward)       Ordered        04/01/24 0004  INPATIENT ADMISSION  Once                          Orders Placed This Encounter   Procedures    INPATIENT ADMISSION     Standing Status:   Standing     Number of Occurrences:   1     Order Specific Question:   Level of Care     Answer:   Med Surg [16]     Order Specific Question:   Estimated length of stay     Answer:   More than 2 Midnights     Order Specific Question:   Certification     Answer:   I certify that inpatient services are medically necessary for this patient for a duration of greater than two midnights. See H&P and MD Progress Notes for additional information about the patient's course of treatment.     ED Arrival Information       Expected   -    Arrival   3/31/2024 20:22    Acuity   Urgent              Means of arrival   Walk-In    Escorted by   Self    Service   Hospitalist    Admission type   Emergency              Arrival complaint   abscess, fever             Chief Complaint   Patient presents with    Headache     Reports pressure in head and generalized body aches.       Initial Presentation: 38 y.o. male  male to ED via walk in from home.    Admitted to inpatient with Dx: abdominal wall cellulitis/lactic acidosis.  Presented to ED with pain and non healing abdominal infection/abscess despite course of doxycycline.  + chills and body aches.  Initially looked like pimple,  able to express pus and went to Urgent care  on 3/17/24,  started on Doxycycline.   Last 2 days hyperglycemic form 250 - 350.  Intake poor.    PMHx: CVA on ASA, HTN, ENRIKE on CPAP, hypothyroidism, anxiety, and PTSD . On exam: tachycardia. Appears dry.    Tenderness, small healing wound LLQ with surrounding erythema.   Procalcitonin 0.50.  lactic acid 2.8.  wbc 7.75.   na 129.  Glucose 374.   Imaging shows localized skin infection in Left lower abdomen on ct abdomen. Ecg prolonged qtc of 479.   ED treatment:  started on cefepime and vancomycin.    Plan includes continue IV cefepime and vancomycin.   Continue IVF.  Follow cultures.   .adjust home insulin:  Lantus decreased from 60 to 40 units BID, Novolog 70/30 from 15 - 20 to 10 units with SSI.  Goal blood sugar 140-160     Date: 4/1/24    Day 2:  lactic acidosis resolved as decreased to 1.6 last night.  On exam:  tachycardia.   Right lumbar paraspinal musculature tenderness.   Wound to LLQ abdomen with surrounding erythema.  Glucose 202, 250.  Procalcitonin 0.60.  Mg 1.7.     To continue antibiotics.  ID consulted.     4/1/24 per ID - patient with skin and soft tissue infection, failure of outpatient therapy vs slow to resolve infection in poorly controled diabetic.   Doubt gram negative organism.    If worsens will need I&D.   Plan is dc cefepime.  Continue vancomycin.   Optimize glucose control.       Patient has crossed 3 midnights and requires ongoing care    4/2/2024 .  Patient presents with  improvement   On exam:  tachycardic.    Redness left lower abdomen.cellulitis   Abnormal labs or imaging - glucose 194.   No MRSA isolated from MRSA culture.   Wbc 5.32.    Diagnosis/Plan    abdominal wall cellulitis.    Continue vancomycin.  Continued basal bolus insulin, SSI.     ED Triage Vitals   Temperature Pulse Respirations Blood Pressure SpO2   03/31/24 2030 03/31/24 2032 03/31/24 2030 03/31/24 2030 03/31/24 2030   98.1 °F (36.7 °C) (!) 127 18 152/83 98 %      Temp Source Heart Rate Source Patient Position - Orthostatic VS BP Location FiO2 (%)   03/31/24 2030 03/31/24 2030 -- 03/31/24 2030 --   Oral Monitor  Right arm       Pain Score       03/31/24 2030       5          Wt Readings from Last 1 Encounters:   04/01/24 82.7 kg (182 lb 5.1 oz)     Additional Vital Signs:   04/02/24 09:53:08 98.8 °F (37.1 °C) -- 20 98/59 72 -- -- -- --   04/02/24 0746 -- -- -- -- -- -- None (Room air) -- --   04/02/24 07:18:47 97.5 °F (36.4 °C) -- 16 109/68 82 -- -- -- --    04/02/24 0351 -- -- -- -- -- 95 % CPAP Nasal mask --   04/02/24 01:43:57 97.7 °F (36.5 °C) -- -- -- -- -- -- -- --   04/01/24 2223 -- -- -- -- -- 95 % CPAP Nasal mask --   04/01/24 20:12:44 98.2 °F (36.8 °C) 94 18 130/93 105 93 % None (Room air)       04/01/24 0753 98.2 °F (36.8 °C) 106 Abnormal  20 -- -- 93 % None (Room air) --   04/01/24 0557 98.8 °F (37.1 °C) 111 Abnormal  18 -- -- 94 % CPAP --   04/01/24 0241 -- -- -- -- -- 94 % -- Nasal mask   04/01/24 0143 98.4 °F (36.9 °C) 121 Abnormal  20 133/69 -- -- -- --   04/01/24 0037 98.4 °F (36.9 °C) 121 Abnormal  20 -- -- -- -- --   04/01/24 0000 -- 121 Abnormal  20 133/69 90 94 % -- --   03/31/24 2330 -- 124 Abnormal  18 138/88 105 94 % -- --   03/31/24 2300 -- 124 Abnormal  20 125/77 95 94 % -- --   03/31/24 2230 -- 123 Abnormal  20 133/79 101 94 % -- --   03/31/24 2130 -- 124 Abnormal  20 132/76 95 95 %       Pertinent Labs/Diagnostic Test Results:   CT abdomen pelvis with contrast   Final Result by Jeb Wolfe DO (03/31 2352)      Focal skin thickening and subcutaneous fat stranding/fluid in the left lower abdomen anteriorly (axial image 145, series 2), suspicious for localized skin infection. Measures approximately 2 cm in size. No discrete involvement of the deep soft tissues in    this region.      Fatty infiltration of the liver is suspected.  In the setting of abdominal pain and/or elevated liver function tests, consider steatohepatitis.      Other findings as above.                  Workstation performed: [id number]           3/31/24 ecg Interpretation: abnormal    Rate:     ECG rate:  130     ECG rate assessment: tachycardic    Rhythm:     Rhythm: sinus tachycardia    Ectopy:     Ectopy: PVCs      PVCs:  Infrequent   QRS:     QRS axis:  Normal     QRS intervals:  Normal   Conduction:     Conduction: normal    ST segments:     ST segments:  Normal   T waves:     T waves: normal    Other findings:     Other findings: prolonged qTc interval       Other findings comment:  Qtc 479     Results from last 7 days   Lab Units 03/31/24 2043   SARS-COV-2  Negative     Results from last 7 days   Lab Units 04/02/24  0430 04/01/24 0200 03/31/24 2108 03/31/24 2050   WBC Thousand/uL 5.32 6.26  --  7.75   HEMOGLOBIN g/dL 13.0 13.0  --  14.8   I STAT HEMOGLOBIN g/dl  --   --  13.9  --    HEMATOCRIT % 39.0 38.0  --  44.6   HEMATOCRIT, ISTAT %  --   --  41  --    PLATELETS Thousands/uL 162 158  --  188   NEUTROS ABS Thousands/µL  --   --   --  6.24     Results from last 7 days   Lab Units 04/02/24 0430 04/01/24 0200 03/31/24 2108 03/31/24 2050   SODIUM mmol/L 138 133*  --  129*   POTASSIUM mmol/L 3.9 3.6  --  4.0   CHLORIDE mmol/L 108 102  --  97   CO2 mmol/L 25 23  --  23   CO2, I-STAT mmol/L  --   --  21  --    ANION GAP mmol/L 5 8  --  9   BUN mg/dL 10 10  --  10   CREATININE mg/dL 0.85 0.93  --  1.07   EGFR ml/min/1.73sq m 109 103  --  86   CALCIUM mg/dL 8.3* 7.6*  --  8.8   CALCIUM, IONIZED, ISTAT mmol/L  --   --  1.17  --    MAGNESIUM mg/dL  --  1.7*  --   --    PHOSPHORUS mg/dL  --  1.9*  --   --      Results from last 7 days   Lab Units 03/31/24 2050   AST U/L 33   ALT U/L 53*   ALK PHOS U/L 95   TOTAL PROTEIN g/dL 7.2   ALBUMIN g/dL 4.2   TOTAL BILIRUBIN mg/dL 0.99     Results from last 7 days   Lab Units 04/02/24  0754 04/01/24  2110 04/01/24  1645 04/01/24  1230 04/01/24  0834 04/01/24  0602 04/01/24  0122 03/31/24 2049   POC GLUCOSE mg/dl 194* 266* 209* 250* 202* 185* 217* 395*     Results from last 7 days   Lab Units 04/02/24  0430 04/01/24  0200 03/31/24 2050   GLUCOSE RANDOM mg/dL 152* 208* 374*     Beta- Hydroxybutyrate   Date Value Ref Range Status   03/31/2024 0.06 0.02 - 0.27 mmol/L Final     BETA-HYDROXYBUTYRATE   Date Value Ref Range Status   03/17/2021 2.4 (H) <0.6 mmol/L Final      Results from last 7 days   Lab Units 03/31/24 2108   PH, JOSE I-STAT  7.408*   PCO2, JOSE ISTAT mm HG 31.9*   PO2, JOSE ISTAT mm HG 41.0   HCO3, JOSE ISTAT mmol/L  20.1*   I STAT BASE EXC mmol/L -4*   I STAT O2 SAT % 78     Results from last 7 days   Lab Units 03/31/24  2301 03/31/24 2050   HS TNI 0HR ng/L  --  3   HS TNI 2HR ng/L 4  --    HSTNI D2 ng/L 1  --      Results from last 7 days   Lab Units 03/31/24 2050   PROTIME seconds 13.1   INR  0.95   PTT seconds 24     Results from last 7 days   Lab Units 04/01/24  0200 03/31/24 2050   PROCALCITONIN ng/ml 0.60* 0.50*     Results from last 7 days   Lab Units 03/31/24  2252 03/31/24 2050   LACTIC ACID mmol/L 1.6 2.8*     Results from last 7 days   Lab Units 03/31/24 2121   CLARITY UA  Clear   COLOR UA  Yellow   SPEC GRAV UA  <1.005*   PH UA  6.0   GLUCOSE UA mg/dl 1000 (1%)*   KETONES UA mg/dl Negative   BLOOD UA  Negative   PROTEIN UA mg/dl Negative   NITRITE UA  Negative   BILIRUBIN UA  Negative   UROBILINOGEN UA (BE) mg/dl <2.0   LEUKOCYTES UA  Negative     Results from last 7 days   Lab Units 03/31/24 2043   INFLUENZA A PCR  Negative   INFLUENZA B PCR  Negative   RSV PCR  Negative     Results from last 7 days   Lab Units 03/31/24 2051 03/31/24 2050   BLOOD CULTURE  No Growth at 24 hrs. No Growth at 24 hrs.       ED Treatment:   Medication Administration from 03/31/2024 2022 to 04/01/2024 0030         Date/Time Order Dose Route Action Comments     03/31/2024 2053 EDT ketorolac (TORADOL) injection 15 mg 15 mg Intravenous Given --     03/31/2024 2052 EDT sodium chloride 0.9 % bolus 1,000 mL 1,000 mL Intravenous New Bag --     03/31/2024 2129 EDT cefepime (MAXIPIME) IVPB (premix in dextrose) 2,000 mg 50 mL 2,000 mg Intravenous New Bag --     03/31/2024 2230 EDT vancomycin (VANCOCIN) 1250 mg in sodium chloride 0.9% 250 mL IVPB 1,250 mg Intravenous New Bag --          Past Medical History:   Diagnosis Date    COVID-19 03/17/2021    Diabetes mellitus (HCC)     type 2    Disease of thyroid gland     hypothyroidism    Hyperlipidemia     Hypertension     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  Anxiety, depression,     Psychogenic nonepileptic spells      Present on Admission:   Anxiety   PTSD (post-traumatic stress disorder)   Essential hypertension   Hypothyroidism   Cerebrovascular accident (CVA), unspecified mechanism (HCC)   Psychogenic nonepileptic spells   ENRIKE (obstructive sleep apnea)      Admitting Diagnosis: Sinus tachycardia [R00.0]  Hyperglycemia [R73.9]  Abdominal wall cellulitis [L03.311]  Headache [R51.9]  Age/Sex: 38 y.o. male  Admission Orders:  Scheduled Medications:  aspirin, 81 mg, Oral, Daily  busPIRone, 5 mg, Oral, BID  DULoxetine, 40 mg, Oral, Daily  enoxaparin, 40 mg, Subcutaneous, Daily  fluticasone-vilanterol, 1 puff, Inhalation, Daily  insulin aspart protamine-insulin aspart, 10 Units, Subcutaneous, BID AC  insulin glargine, 40 Units, Subcutaneous, Q12H JOSE  insulin lispro, 1-6 Units, Subcutaneous, TID AC  insulin lispro, 1-6 Units, Subcutaneous, HS  levothyroxine, 50 mcg, Oral, Early Morning  lidocaine, 1 patch, Topical, Daily  loratadine, 10 mg, Oral, Daily  metoprolol succinate, 50 mg, Oral, Daily  potassium phosphate, 12 mmol, Intravenous, Once 1138 on 4/1/24  pravastatin, 40 mg, Oral, Daily  risperiDONE, 4 mg, Oral, HS  sertraline, 150 mg, Oral, Daily  vancomycin, 15 mg/kg (Adjusted), Intravenous, Q12H    cefepime (MAXIPIME) IVPB (premix in dextrose) 2,000 mg 50 mL  Dose: 2,000 mg  Freq: Every 12 hours Route: IV  Last Dose: 2,000 mg (04/01/24 1028)  Start: 04/01/24 1000 End: 04/01/24 1115   magnesium sulfate 2 g/50 mL IVPB (premix) 2 g  Dose: 2 g  Freq: Once Route: IV  Last Dose: 2 g (04/01/24 1033)  Start: 04/01/24 1000 End: 04/01/24 1233   multi-electrolyte (PLASMALYTE-A/ISOLYTE-S PH 7.4) IV solution 1,000 mL  Dose: 1,000 mL  Freq: Once Route: IV  Start: 04/01/24 0015 End: 04/01/24 0046     Continuous IV Infusions:  multi-electrolyte (PLASMALYTE-A/ISOLYTE-S PH 7.4) IV solution  Rate: 125 mL/hr Dose: 125 mL/hr  Freq: Continuous Route: IV  Last Dose: 125 mL/hr (04/01/24  0203)  Start: 04/01/24 0100 End: 04/01/24 1023      PRN Meds:  acetaminophen, 650 mg, Oral, Q6H PRN  albuterol, 2 puff, Inhalation, Q6H PRN  aluminum-magnesium hydroxide-simethicone, 30 mL, Oral, Q6H PRN  methocarbamol, 500 mg, Oral, Q6H PRN x 1 4/1  ondansetron, 4 mg, Intravenous, Q6H PRN  senna, 1 tablet, Oral, HS PRN          IP CONSULT TO INFECTIOUS DISEASES    Network Utilization Review Department  ATTENTION: Please call with any questions or concerns to [phone] and carefully listen to the prompts so that you are directed to the right person. All voicemails are confidential.   For Discharge needs, contact Care Management DC Support Team at [phone] opt. [phone]  Send all requests for admission clinical reviews, approved or denied determinations and any other requests to dedicated fax number below belonging to the campus where the patient is receiving treatment. List of dedicated fax numbers for the Facilities:  FACILITY NAME UR FAX NUMBER   ADMISSION DENIALS (Administrative/Medical Necessity) [phone]   DISCHARGE SUPPORT TEAM (NETWORK) [phone]   PARENT CHILD HEALTH (Maternity/NICU/Pediatrics) [phone]   [facility] [phone]   [facility] [phone]   [facility] [phone]   [facility] [phone]   [facility] [phone]   [facility] [phone]   [facility] [phone]   [facility] [phone]   [facility] [phone]   [facility] [phone]   [facility] [phone]   [facility] [phone]

## 2024-04-01 NOTE — PROGRESS NOTES
Dillon Baxter is a 38 y.o. male who is currently ordered Vancomycin IV with management by the Pharmacy Consult service.  Relevant clinical data and objective / subjective history reviewed.  Vancomycin Assessment:  Indication and Goal AUC/Trough: Soft tissue (goal -600, trough >10)  Clinical Status:  Initial dosing  Micro:     Renal Function:  SCr: 1.07 mg/dL  CrCl: 91.8 mL/min  Renal replacement: Not on dialysis  Days of Therapy: 1  Current Dose: 1250mg load in ED  Vancomycin Plan:  New Dosinmg q12h, starting at 0600  Estimated AUC: 461 mcg*hr/mL  Estimated Trough: 12.9 mcg/mL  Next Level: 0600 on   Renal Function Monitoring: Daily BMP and UOP  Pharmacy will continue to follow closely for s/sx of nephrotoxicity, infusion reactions and appropriateness of therapy.  BMP and CBC will be ordered per protocol. We will continue to follow the patient’s culture results and clinical progress daily.    Willian Gallardo, Pharmacist

## 2024-04-01 NOTE — ASSESSMENT & PLAN NOTE
Presents with sinus tachycardia   Multifactorial in setting of infection and dehydration from hyperglycemia  Continue IVF   If fails to improve with IVF and treatment of infection - consider CTA PE study, though no hypoxia or reports of dyspnea

## 2024-04-01 NOTE — PLAN OF CARE
Problem: PAIN - ADULT  Goal: Verbalizes/displays adequate comfort level or baseline comfort level  Description: Interventions:  - Encourage patient to monitor pain and request assistance  - Assess pain using appropriate pain scale  - Administer analgesics based on type and severity of pain and evaluate response  - Implement non-pharmacological measures as appropriate and evaluate response  - Consider cultural and social influences on pain and pain management  - Notify physician/advanced practitioner if interventions unsuccessful or patient reports new pain  Outcome: Progressing     Problem: INFECTION - ADULT  Goal: Absence or prevention of progression during hospitalization  Description: INTERVENTIONS:  - Assess and monitor for signs and symptoms of infection  - Monitor lab/diagnostic results  - Monitor all insertion sites, i.e. indwelling lines, tubes, and drains  - Monitor endotracheal if appropriate and nasal secretions for changes in amount and color  - [place] appropriate cooling/warming therapies per order  - Administer medications as ordered  - Instruct and encourage patient and family to use good hand hygiene technique  - Identify and instruct in appropriate isolation precautions for identified infection/condition  Outcome: Progressing  Goal: Absence of fever/infection during neutropenic period  Description: INTERVENTIONS:  - Monitor WBC    Outcome: Progressing

## 2024-04-01 NOTE — ED PROVIDER NOTES
History  Chief Complaint   Patient presents with    Headache     Reports pressure in head and generalized body aches.     38 year old male presents for evaluation of pain and nonhealing infection of his abdomen associated with hyperglycemia, body aches and chills.  Patient states he had developed an abscess of the left lower abdomen which initially looked like a pimple.  Similar lesion previously on the other side which he had attributed to rubbing from his belt.  He was able to express a small amount of purulence from the site and went to urgent care where he was given a course of doxycycline.  He completed the antibiotic course yesterday, but has not had resolution of the infection.  He states he began having difficulty with glucose control over the past 2 days and states that his levels have been ranging from 250s-350s.  He uses insulin for his diabetes management and last gave himself 60 units of long acting insulin at 10 am this morning.  He states he did not use his short acting insulin as his appetite has been poor and he has not been eating.  Patient denies fevers, but has had chills today.  No diaphoresis.  No cough, congestion, nausea, vomiting or diarrhea.        Headache      Prior to Admission Medications   Prescriptions Last Dose Informant Patient Reported? Taking?   Continuous Blood Gluc  (Dexcom G6 ) CASSY  Self No No   Sig: Use 1 Device every 3 (three) months   Patient not taking: Reported on 4/3/2023   Continuous Blood Gluc Sensor (Dexcom G6 Sensor) MISC  Self No No   Sig: uSE ONE SENSOR EVERY EVERY TEN DAYS   Patient not taking: Reported on 4/3/2023   Continuous Blood Gluc Sensor (FreeStyle Catie 3 Sensor) MISC   Yes No   Sig: Use   Continuous Blood Gluc Transmit (Dexcom G6 Transmitter) MISC  Self No No   Sig: Use 1 Device in the morning   Patient not taking: Reported on 4/3/2023   Fluticasone-Salmeterol (Advair Diskus) 500-50 mcg/dose inhaler   No No   Sig: Inhale 1 puff 2 (two)  times a day Rinse mouth after use   Patient not taking: Reported on 2023   Insulin Pen Needle (BD Pen Needle Rossana U/F) 32G X 4 MM MISC  Self No No   Sig: Use 4/day   albuterol (PROVENTIL HFA,VENTOLIN HFA) 90 mcg/act inhaler  Self No No   Sig: Inhale 2 puffs every 6 (six) hours as needed for wheezing or shortness of breath   aspirin (ECOTRIN LOW STRENGTH) 81 mg EC tablet  Self No No   Sig: Take 1 tablet (81 mg total) by mouth daily Do not start before 2022.   aspirin 81 mg chewable tablet   Yes No   Sig: Chew 81 mg daily   benzonatate (TESSALON) 200 MG capsule  Self No No   Sig: Take 1 capsule (200 mg total) by mouth 3 (three) times a day as needed for cough   Patient not taking: Reported on 2023   busPIRone (BUSPAR) 10 mg tablet   Yes No   Si mg   clotrimazole (LOTRIMIN) 1 % cream   No No   Sig: Apply topically 2 (two) times a day for 7 days Apply 2 times a day to the area surrounding the head of the penis.   fluticasone (FLONASE) 50 mcg/act nasal spray   No No   Si spray into each nostril daily   Patient not taking: Reported on 3/11/2024   insulin aspart (NovoLOG FlexPen) 100 UNIT/ML injection pen  Self No No   Sig: Inject 15 Units under the skin 3 (three) times a day with meals   insulin aspart protamine-insulin aspart (NovoLOG 70/30 FlexPen ReliOn) 100 Units/mL injection pen   Yes No   Sig: INJECT 20 UNITS UNDER THE SKIN BEFORE MEALS FOR DIABETES **DISCARD PEN AFTER 14 DAYS OF USE**   insulin glargine (Lantus) 100 units/mL subcutaneous injection  Self No No   Sig: Inject 70 Units under the skin every 12 (twelve) hours 70 units q12 hours, morning and night   Patient taking differently: Inject 60 Units under the skin every 12 (twelve) hours 70 units q12 hours, morning and night   levothyroxine 25 mcg tablet  Self No No   Sig: Take 2 tablets (50 mcg total) by mouth daily 50   loratadine (CLARITIN) 10 mg tablet  Self Yes No   Sig: Take 10 mg by mouth daily   Patient not taking: Reported  on 2024   metoprolol succinate (TOPROL-XL) 25 mg 24 hr tablet  Self No No   Sig: Take 1 tablet (25 mg total) by mouth daily   pravastatin (PRAVACHOL) 40 mg tablet  Self No No   Sig: Take 1 tablet (40 mg total) by mouth daily   risperiDONE (RisperDAL) 2 mg tablet  Self Yes No   Si mg   semaglutide, 0.25 or 0.5 mg/dose, (Ozempic) 2 mg/1.5 mL injection pen  Self No No   Si.25 mg once weekly for 4 weeks then 0.5 mg with   Patient not taking: Reported on 4/3/2023   sertraline (ZOLOFT) 100 mg tablet  Self Yes No   Sig: Take 200 mg by mouth daily   sitaGLIPtin (JANUVIA) 100 mg tablet   No No   Sig: Take 1 tablet (100 mg total) by mouth daily   Patient not taking: Reported on 2024      Facility-Administered Medications: None       Past Medical History:   Diagnosis Date    COVID-19 2021    Diabetes mellitus (HCC)     type 2    Disease of thyroid gland     hypothyroidism    Hyperlipidemia     Hypertension     Post-COVID-19 condition 2021    Psychiatric disorder     PTSD. Anxiety, depression,     Psychogenic nonepileptic spells        Past Surgical History:   Procedure Laterality Date    MOUTH SURGERY  2021    WISDOM TOOTH EXTRACTION         Family History   Problem Relation Age of Onset    Hyperlipidemia Father     Heart attack Paternal Grandfather     Prostate cancer Paternal Grandfather     Cancer Paternal Grandmother      I have reviewed and agree with the history as documented.    E-Cigarette/Vaping    E-Cigarette Use Never User      E-Cigarette/Vaping Substances    Nicotine No     THC No     CBD No     Flavoring No     Other No      Social History     Tobacco Use    Smoking status: Never    Smokeless tobacco: Never   Vaping Use    Vaping status: Never Used   Substance Use Topics    Alcohol use: Not Currently    Drug use: Not Currently     Types: Marijuana       Review of Systems   Neurological:  Positive for headaches.       Physical Exam  Physical Exam  Vitals and nursing note reviewed.    Cardiovascular:      Rate and Rhythm: Regular rhythm. Tachycardia present.      Pulses: Normal pulses.   Pulmonary:      Effort: Pulmonary effort is normal. No respiratory distress.   Abdominal:      General: There is no distension.      Palpations: Abdomen is soft.       Skin:     General: Skin is warm and dry.   Neurological:      Mental Status: He is alert.         Vital Signs  ED Triage Vitals   Temperature Pulse Respirations Blood Pressure SpO2   03/31/24 2030 03/31/24 2032 03/31/24 2030 03/31/24 2030 03/31/24 2030   98.1 °F (36.7 °C) (!) 127 18 152/83 98 %      Temp Source Heart Rate Source Patient Position - Orthostatic VS BP Location FiO2 (%)   03/31/24 2030 03/31/24 2030 -- 03/31/24 2030 --   Oral Monitor  Right arm       Pain Score       03/31/24 2030       5           Vitals:    03/31/24 2130 03/31/24 2230 03/31/24 2300 03/31/24 2330   BP: 132/76 133/79 125/77 138/88   Pulse: (!) 124 (!) 123 (!) 124 (!) 124         Visual Acuity      ED Medications  Medications   multi-electrolyte (ISOLYTE-S PH 7.4) bolus 1,000 mL (has no administration in time range)   ketorolac (TORADOL) injection 15 mg (15 mg Intravenous Given 3/31/24 2053)   sodium chloride 0.9 % bolus 1,000 mL (1,000 mL Intravenous New Bag 3/31/24 2052)   cefepime (MAXIPIME) IVPB (premix in dextrose) 2,000 mg 50 mL (0 mg Intravenous Stopped 3/31/24 2159)   vancomycin (VANCOCIN) 1250 mg in sodium chloride 0.9% 250 mL IVPB (1,250 mg Intravenous New Bag 3/31/24 2230)   iohexol (OMNIPAQUE) 350 MG/ML injection (MULTI-DOSE) 100 mL (100 mL Intravenous Given 3/31/24 2155)       Diagnostic Studies  Results Reviewed       Procedure Component Value Units Date/Time    HS Troponin I 2hr [[id number]]  (Normal) Collected: 03/31/24 2301    Lab Status: Final result Specimen: Blood from Arm, Right Updated: 03/31/24 2330     hs TnI 2hr 4 ng/L      Delta 2hr hsTnI 1 ng/L     Lactic acid 2 Hours [[id number]]  (Normal) Collected: 03/31/24 2331    Lab Status: Final result  Specimen: Blood from Arm, Right Updated: 03/31/24 2317     LACTIC ACID 1.6 mmol/L     Narrative:      Result may be elevated if tourniquet was used during collection.    FLU/RSV/COVID - if FLU/RSV clinically relevant [[id number]]  (Normal) Collected: 03/31/24 2043    Lab Status: Final result Specimen: Nares from Nose Updated: 03/31/24 2142     SARS-CoV-2 Negative     INFLUENZA A PCR Negative     INFLUENZA B PCR Negative     RSV PCR Negative    Narrative:      FOR PEDIATRIC PATIENTS - copy/paste COVID Guidelines URL to browser: [email or web address]    SARS-CoV-2 assay is a Nucleic Acid Amplification assay intended for the  qualitative detection of nucleic acid from SARS-CoV-2 in nasopharyngeal  swabs. Results are for the presumptive identification of SARS-CoV-2 RNA.    Positive results are indicative of infection with SARS-CoV-2, the virus  causing COVID-19, but do not rule out bacterial infection or co-infection  with other viruses. Laboratories within the [place] and its  territories are required to report all positive results to the appropriate  public health authorities. Negative results do not preclude SARS-CoV-2  infection and should not be used as the sole basis for treatment or other  patient management decisions. Negative results must be combined with  clinical observations, patient history, and epidemiological information.  This test has not been FDA cleared or approved.    This test has been authorized by FDA under an Emergency Use Authorization  (EUA). This test is only authorized for the duration of time the  declaration that circumstances exist justifying the authorization of the  emergency use of an in vitro diagnostic tests for detection of SARS-CoV-2  virus and/or diagnosis of COVID-19 infection under section 564(b)(1) of  the Act, 21 U.S.C. 360bbb-3(b)(1), unless the authorization is terminated  or revoked sooner. The test has been validated  but independent review by FDA  and CLIA is pending.    Test performed using [employer] GeneXpert: This RT-PCR assay targets N2,  a region unique to SARS-CoV-2. A conserved region in the E-gene was chosen  for pan-Sarbecovirus detection which includes SARS-CoV-2.    According to CMS-2020-01-R, this platform meets the definition of high-throughput technology.    UA w Reflex to Microscopic w Reflex to Culture [[id number]]  (Abnormal) Collected: 03/31/24 2121    Lab Status: Final result Specimen: Urine, Other Updated: 03/31/24 2127     Color, UA Yellow     Clarity, UA Clear     Specific Gravity, UA <1.005     pH, UA 6.0     Leukocytes, UA Negative     Nitrite, UA Negative     Protein, UA Negative mg/dl      Glucose, UA 1000 (1%) mg/dl      Ketones, UA Negative mg/dl      Urobilinogen, UA <2.0 mg/dl      Bilirubin, UA Negative     Occult Blood, UA Negative    Procalcitonin [[id number]]  (Abnormal) Collected: 03/31/24 2050    Lab Status: Final result Specimen: Blood from Arm, Right Updated: 03/31/24 2125     Procalcitonin 0.50 ng/ml     HS Troponin 0hr (reflex protocol) [[id number]]  (Normal) Collected: 03/31/24 2050    Lab Status: Final result Specimen: Blood from Arm, Right Updated: 03/31/24 2122     hs TnI 0hr 3 ng/L     Lactic acid [[id number]]  (Abnormal) Collected: 03/31/24 2050    Lab Status: Final result Specimen: Blood from Arm, Right Updated: 03/31/24 2121     LACTIC ACID 2.8 mmol/L     Narrative:      Result may be elevated if tourniquet was used during collection.    Protime-INR [[id number]]  (Normal) Collected: 03/31/24 2050    Lab Status: Final result Specimen: Blood from Arm, Right Updated: 03/31/24 2118     Protime 13.1 seconds      INR 0.95    APTT [[id number]]  (Normal) Collected: 03/31/24 2050    Lab Status: Final result Specimen: Blood from Arm, Right Updated: 03/31/24 2118     PTT 24 seconds     Comprehensive metabolic panel [[id number]]  (Abnormal) Collected: 03/31/24 2050    Lab Status: Final result Specimen:  Blood from Arm, Right Updated: 03/31/24 2117     Sodium 129 mmol/L      Potassium 4.0 mmol/L      Chloride 97 mmol/L      CO2 23 mmol/L      ANION GAP 9 mmol/L      BUN 10 mg/dL      Creatinine 1.07 mg/dL      Glucose 374 mg/dL      Calcium 8.8 mg/dL      AST 33 U/L      ALT 53 U/L      Alkaline Phosphatase 95 U/L      Total Protein 7.2 g/dL      Albumin 4.2 g/dL      Total Bilirubin 0.99 mg/dL      eGFR 86 ml/min/1.73sq m     Narrative:      National Kidney Disease Foundation guidelines for Chronic Kidney Disease (CKD):     Stage 1 with normal or high GFR (GFR > 90 mL/min/1.73 square meters)    Stage 2 Mild CKD (GFR = 60-89 mL/min/1.73 square meters)    Stage 3A Moderate CKD (GFR = 45-59 mL/min/1.73 square meters)    Stage 3B Moderate CKD (GFR = 30-44 mL/min/1.73 square meters)    Stage 4 Severe CKD (GFR = 15-29 mL/min/1.73 square meters)    Stage 5 End Stage CKD (GFR <15 mL/min/1.73 square meters)  Note: GFR calculation is accurate only with a steady state creatinine    Beta Hydroxybutyrate [[id number]]  (Normal) Collected: 03/31/24 2103    Lab Status: Final result Specimen: Blood Updated: 03/31/24 2116     Beta- Hydroxybutyrate 0.06 mmol/L     POCT Blood Gas (CG8+) [[id number]]  (Abnormal) Collected: 03/31/24 2108    Lab Status: Final result Specimen: Venous Updated: 03/31/24 2110     ph, Severiano ISTAT 7.408     pCO2, Severiano i-STAT 31.9 mm HG      pO2, Severiano i-STAT 41.0 mm HG      BE, i-STAT -4 mmol/L      HCO3, Severiano i-STAT 20.1 mmol/L      CO2, i-STAT 21 mmol/L      O2 Sat, i-STAT 78 %      SODIUM, I-STAT 132 mmol/l      Potassium, i-STAT 3.8 mmol/L      Calcium, Ionized i-STAT 1.17 mmol/L      Hct, i-STAT 41 %      Hgb, i-STAT 13.9 g/dl      Glucose, i-STAT 366 mg/dl      Specimen Type VENOUS    CBC and differential [[id number]]  (Abnormal) Collected: 03/31/24 2050    Lab Status: Final result Specimen: Blood from Arm, Right Updated: 03/31/24 2100     WBC 7.75 Thousand/uL      RBC 5.38 Million/uL      Hemoglobin 14.8 g/dL       Hematocrit 44.6 %      MCV 83 fL      MCH 27.5 pg      MCHC 33.2 g/dL      RDW 12.7 %      MPV 9.2 fL      Platelets 188 Thousands/uL      nRBC 0 /100 WBCs      Neutrophils Relative 81 %      Immature Grans % 1 %      Lymphocytes Relative 11 %      Monocytes Relative 6 %      Eosinophils Relative 1 %      Basophils Relative 0 %      Neutrophils Absolute 6.24 Thousands/µL      Absolute Immature Grans 0.05 Thousand/uL      Absolute Lymphocytes 0.88 Thousands/µL      Absolute Monocytes 0.47 Thousand/µL      Eosinophils Absolute 0.08 Thousand/µL      Basophils Absolute 0.03 Thousands/µL     Blood culture #1 [[id number]] Collected: 03/31/24 2051    Lab Status: In process Specimen: Blood from Hand, Left Updated: 03/31/24 2058    Blood culture #2 [[id number]] Collected: 03/31/24 2050    Lab Status: In process Specimen: Blood from Arm, Right Updated: 03/31/24 2058    Fingerstick Glucose (POCT) [[id number]]  (Abnormal) Collected: 03/31/24 2049    Lab Status: Final result Specimen: Blood Updated: 03/31/24 2051     POC Glucose 395 mg/dl                    CT abdomen pelvis with contrast   Final Result by Jeb Wolfe DO (03/31 2354)      Focal skin thickening and subcutaneous fat stranding/fluid in the left lower abdomen anteriorly (axial image 145, series 2), suspicious for localized skin infection. Measures approximately 2 cm in size. No discrete involvement of the deep soft tissues in    this region.      Fatty infiltration of the liver is suspected.  In the setting of abdominal pain and/or elevated liver function tests, consider steatohepatitis.      Other findings as above.                  Workstation performed: [id number]                    Procedures  ECG 12 Lead Documentation Only    Date/Time: 3/31/2024 8:54 PM    Performed by: Shirley Martinez MD  Authorized by: Shirley Martinez MD    Indications / Diagnosis:  Tachycardia  ECG reviewed by me, the ED Provider: yes    Patient location:   ED  Previous ECG:     Previous ECG:  Compared to current    Comparison ECG info:  10/18/22 normal ekg    Similarity:  Changes noted  Interpretation:     Interpretation: abnormal    Rate:     ECG rate:  130    ECG rate assessment: tachycardic    Rhythm:     Rhythm: sinus tachycardia    Ectopy:     Ectopy: PVCs      PVCs:  Infrequent  QRS:     QRS axis:  Normal    QRS intervals:  Normal  Conduction:     Conduction: normal    ST segments:     ST segments:  Normal  T waves:     T waves: normal    Other findings:     Other findings: prolonged qTc interval      Other findings comment:  Qtc 479           ED Course  ED Course as of 04/01/24 0004   Sun Mar 31, 2024   2124 Sodium(!): 129  133 when corrected for hyperglycemia                            Initial Sepsis Screening       Row Name 04/01/24 0004                Is the patient's history suggestive of a new or worsening infection? Yes (Proceed)  -EE        Suspected source of infection soft tissue  -EE        Indicate SIRS criteria Tachycardia > 90 bpm  -EE        Are two or more of the above signs & symptoms of infection both present and new to the patient? No  -EE                  User Key  (r) = Recorded By, (t) = Taken By, (c) = Cosigned By      Initials Name Provider Type    EE Shirley Martinez MD Physician                                  Medical Decision Making  38 year old male presents for evaluation of lower abdominal abscess/cellulitis that has not resolved after course of doxycycline.  Poor glucose control for 2 days with onset of systemic symptoms today.  Only current sirs criteria is tachycardia.  Elevated procal.  Patient started on cef/vanc.  CT without signs of abscess or deeper infection.  Patient admitted for further evaluation and management.    Amount and/or Complexity of Data Reviewed  Labs: ordered. Decision-making details documented in ED Course.  Radiology: ordered.    Risk  Prescription drug management.  Decision regarding  hospitalization.             Disposition  Final diagnoses:   Hyperglycemia   Abdominal wall cellulitis   Sinus tachycardia     Time reflects when diagnosis was documented in both MDM as applicable and the Disposition within this note       Time User Action Codes Description Comment    3/31/2024 11:05 PM Michelle, Shirley J Add [R73.9] Hyperglycemia     3/31/2024 11:58 PM Michelle, Shirley J Add [L03.311] Abdominal wall cellulitis     3/31/2024 11:58 PM Michelle, Shirley J Add [R00.0] Sinus tachycardia     3/31/2024 11:58 PM Michelle, Shirley J Modify [R73.9] Hyperglycemia     3/31/2024 11:58 PM Michelle, Shirley J Modify [L03.311] Abdominal wall cellulitis           ED Disposition       ED Disposition   Admit    Condition   Stable    Date/Time   Mon Apr 1, 2024 12:04 AM    Comment   Case was discussed with ELIZABETH and the patient's admission status was agreed to be Admission Status: inpatient status to the service of Dr. Ceja .               Follow-up Information    None         Patient's Medications   Discharge Prescriptions    No medications on file       No discharge procedures on file.    PDMP Review         Value Time User    PDMP Reviewed  Yes 10/20/2022  3:27 PM Richi Casey MD            ED Provider  Electronically Signed by             Shirley Martinez MD  04/01/24 0004       Shirley Martinez MD  04/01/24 0004

## 2024-04-01 NOTE — ASSESSMENT & PLAN NOTE
"Presents with ongoing LLQ abdominal wall erythema and swelling despite completing oral doxycycline 1 week ago  CT A/P: \"Focal skin thickening and subcutaneous fat stranding/fluid in the left lower abdomen anteriorly (axial image 145, series 2), suspicious for localized skin infection. Measures approximately 2 cm in size. No discrete involvement of the deep soft tissues in this region.\"  Due to myalgias and malaise, continue IV antibiotics of cefepime and vancomycin  IV fluids  Blood cultures, pending  "

## 2024-04-01 NOTE — CASE MANAGEMENT
Case Management Assessment & Discharge Planning Note    Patient name Dillon Baxter  Location /-01 MRN [id number]  : 1985 Date 2024       Current Admission Date: 3/31/2024  Current Admission Diagnosis:Abdominal wall cellulitis   Patient Active Problem List    Diagnosis Date Noted    Abdominal wall cellulitis 2024    Lactic acidosis 2024    Sinus tachycardia 2024    Abscess of skin of abdomen 2024    Cerebrovascular accident (CVA), unspecified mechanism (HCC) 2022    Stroke-like symptoms 10/19/2022    Left hand pain 2022    Obesity (BMI 30.0-34.9) 2022    Depression, recurrent (HCC) 2022    Foot pain, left 2021    Chronic headache 10/21/2021    Psychogenic nonepileptic spells 10/19/2021    COVID-19 vaccination refused 10/05/2021    Alopecia 08/10/2021    Acute midline low back pain without sciatica 08/10/2021    Type 2 diabetes mellitus with hyperglycemia, with long-term current use of insulin ([facility]) 2021    Class 1 obesity due to excess calories with serious comorbidity and body mass index (BMI) of 30.0 to 30.9 in adult 2021    Hypothyroidism 2021    Acute bilateral low back pain with bilateral sciatica 2021    Moderate persistent asthma 2021    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[facility]) 2021    Essential hypertension 2021    Bradycardic baseline fetal heart rate 2021    COVID 2021    Chronic GERD 2021    ENRIKE (obstructive sleep apnea) 10/12/2020    Anxiety 2020    PTSD (post-traumatic stress disorder) 2020    Hyperlipidemia 2020    Type 2 diabetes mellitus without complication, with long-term current use of insulin ([facility]) 2020      LOS (days): 0  Geometric Mean LOS (GMLOS) (days):   Days to GMLOS:     OBJECTIVE:    Risk of Unplanned Readmission Score: 13.87         Current admission status: Inpatient       Preferred Pharmacy:   [facility]  [place], [place] - [address]  [address] [address]  Phone: [phone] Fax: [phone]    [id number] - [place] - [address]  [address]  [place] [address]  Phone: [phone] Fax: [phone]    [id number] - [place] - [address]  [address]  [place] [address]  Phone: [phone] Fax: [phone]    Primary Care Provider: Adan Nixon MD    Primary Insurance: [facility] RE[facility]  Secondary Insurance:  FOR LIFE    ASSESSMENT:  Active Health Care Proxies       Sahil Jonny Health Care Representative - Father   Primary Phone: [phone] (Home)                 Advance Directives  Does patient have a Health Care POA?: No  Was patient offered paperwork?: Yes (declined)  Does patient currently have a Health Care decision maker?: Yes, please see Health Care Proxy section  Does patient have Advance Directives?: No  Was patient offered paperwork?: Yes (declined)    Readmission Root Cause  30 Day Readmission: No    Patient Information  Admitted from:: Home  Mental Status: Alert  During Assessment patient was accompanied by: Not accompanied during assessment  Assessment information provided by:: Patient  Primary Caregiver: Self  Support Systems: Parent  Living Arrangements: Lives w/ Parent(s)  Is patient a ?: Yes  [facility] with [facility]: Yes    Activities of Daily Living Prior to Admission  Functional Status: Independent  Completes ADLs independently?: Yes  Ambulates independently?: Yes  Does patient use assisted devices?: No  Does patient currently own DME?: No  Does patient have a history of Outpatient Therapy (PT/OT)?: No  Does the patient have a history of Short-Term Rehab?: No  Does patient have a history of HHC?: No  Does patient currently have HHC?: No    Patient Information Continued  Does patient have prescription coverage?: Yes  Does patient receive dialysis  treatments?: No  Does patient have a history of substance abuse?: No  Does patient have a history of Mental Health Diagnosis?: No    Means of Transportation  Means of Transport to Appts:: Drives Self      Social Determinants of Health (SDOH)      Flowsheet Row Most Recent Value   Housing Stability    In the last 12 months, was there a time when you were not able to pay the mortgage or rent on time? N   In the last 12 months, how many places have you lived? 1   In the last 12 months, was there a time when you did not have a steady place to sleep or slept in a shelter (including now)? N   Transportation Needs    In the past 12 months, has lack of transportation kept you from medical appointments or from getting medications? no   In the past 12 months, has lack of transportation kept you from meetings, work, or from getting things needed for daily living? No   Food Insecurity    Within the past 12 months, you worried that your food would run out before you got the money to buy more. Never true   Within the past 12 months, the food you bought just didn't last and you didn't have money to get more. Never true   Utilities    In the past 12 months has the electric, gas, oil, or water company threatened to shut off services in your home? No            DISCHARGE DETAILS:    Discharge planning discussed with:: patient  Freedom of Choice: Yes  Comments - Freedom of Choice: no anticipated dc needs  CM contacted family/caregiver?: No- see comments (reports being in contact with his family)  Were Treatment Team discharge recommendations reviewed with patient/caregiver?: Yes  Did patient/caregiver verbalize understanding of patient care needs?: Yes  Were patient/caregiver advised of the risks associated with not following Treatment Team discharge recommendations?: Yes    Requested Home Health Care         Is the patient interested in HHC at discharge?: No    DME Referral Provided  Referral made for DME?: No    Treatment Team  Recommendation: Home  Discharge Destination Plan:: Home  Transport at Discharge : Family     Additional Comments: Met with pt to discuss the role of CM and to discuss any help pt may need prior to dc. Pt lives with his father Jonny. Pt performed ADL's indptly pta, no use of DME. No hx of HHC or rehab. No hx of D&A treatment. Pt reports hx of Mental Health; [facility] [place]. Pt drives. Pt's father will transport home at dc.

## 2024-04-01 NOTE — PLAN OF CARE
Problem: PAIN - ADULT  Goal: Verbalizes/displays adequate comfort level or baseline comfort level  Description: Interventions:  - Encourage patient to monitor pain and request assistance  - Assess pain using appropriate pain scale  - Administer analgesics based on type and severity of pain and evaluate response  - Implement non-pharmacological measures as appropriate and evaluate response  - Consider cultural and social influences on pain and pain management  - Notify physician/advanced practitioner if interventions unsuccessful or patient reports new pain  Outcome: Progressing     Problem: INFECTION - ADULT  Goal: Absence or prevention of progression during hospitalization  Description: INTERVENTIONS:  - Assess and monitor for signs and symptoms of infection  - Monitor lab/diagnostic results  - Monitor all insertion sites, i.e. indwelling lines, tubes, and drains  - Monitor endotracheal if appropriate and nasal secretions for changes in amount and color  - [place] appropriate cooling/warming therapies per order  - Administer medications as ordered  - Instruct and encourage patient and family to use good hand hygiene technique  - Identify and instruct in appropriate isolation precautions for identified infection/condition  Outcome: Progressing  Goal: Absence of fever/infection during neutropenic period  Description: INTERVENTIONS:  - Monitor WBC    Outcome: Progressing     Problem: SAFETY ADULT  Goal: Patient will remain free of falls  Description: INTERVENTIONS:  - Educate patient/family on patient safety including physical limitations  - Instruct patient to call for assistance with activity   - Consult OT/PT to assist with strengthening/mobility   - Keep Call bell within reach  - Keep bed low and locked with side rails adjusted as appropriate  - Keep care items and personal belongings within reach  - Initiate and maintain comfort rounds  - Make Fall Risk Sign visible to staff  - Offer Toileting every 2 Hours,  in advance of need  - Initiate/Maintain bed alarm  - Obtain necessary fall risk management equipment: socks  - Apply yellow socks and bracelet for high fall risk patients  - Consider moving patient to room near nurses station  Outcome: Progressing  Goal: Maintain or return to baseline ADL function  Description: INTERVENTIONS:  -  Assess patient's ability to carry out ADLs; assess patient's baseline for ADL function and identify physical deficits which impact ability to perform ADLs (bathing, care of mouth/teeth, toileting, grooming, dressing, etc.)  - Assess/evaluate cause of self-care deficits   - Assess range of motion  - Assess patient's mobility; develop plan if impaired  - Assess patient's need for assistive devices and provide as appropriate  - Encourage maximum independence but intervene and supervise when necessary  - Involve family in performance of ADLs  - Assess for home care needs following discharge   - Consider OT consult to assist with ADL evaluation and planning for discharge  - Provide patient education as appropriate  Outcome: Progressing  Goal: Maintains/Returns to pre admission functional level  Description: INTERVENTIONS:  - Perform AM-PAC 6 Click Basic Mobility/ Daily Activity assessment daily.  - Set and communicate daily mobility goal to care team and patient/family/caregiver.   - Collaborate with rehabilitation services on mobility goals if consulted  - Perform Range of Motion 3 times a day.  - Reposition patient every 2 hours.  - Dangle patient 3 times a day  - Stand patient 3 times a day  - Ambulate patient 3 times a day  - Out of bed to chair 3 times a day   - Out of bed for meals 3 times a day  - Out of bed for toileting  - Record patient progress and toleration of activity level   Outcome: Progressing     Problem: DISCHARGE PLANNING  Goal: Discharge to home or other facility with appropriate resources  Description: INTERVENTIONS:  - Identify barriers to discharge w/patient and  caregiver  - Arrange for needed discharge resources and transportation as appropriate  - Identify discharge learning needs (meds, wound care, etc.)  - Arrange for interpretive services to assist at discharge as needed  - Refer to Case Management Department for coordinating discharge planning if the patient needs post-hospital services based on physician/advanced practitioner order or complex needs related to functional status, cognitive ability, or social support system  Outcome: Progressing     Problem: Knowledge Deficit  Goal: Patient/family/caregiver demonstrates understanding of disease process, treatment plan, medications, and discharge instructions  Description: Complete learning assessment and assess knowledge base.  Interventions:  - Provide teaching at level of understanding  - Provide teaching via preferred learning methods  Outcome: Progressing     Problem: NEUROSENSORY - ADULT  Goal: Achieves stable or improved neurological status  Description: INTERVENTIONS  - Monitor and report changes in neurological status  - Monitor vital signs such as temperature, blood pressure, glucose, and any other labs ordered   - Initiate measures to prevent increased intracranial pressure  - Monitor for seizure activity and implement precautions if appropriate      Outcome: Progressing  Goal: Remains free of injury related to seizures activity  Description: INTERVENTIONS  - Maintain airway, patient safety  and administer oxygen as ordered  - Monitor patient for seizure activity, document and report duration and description of seizure to physician/advanced practitioner  - If seizure occurs,  ensure patient safety during seizure  - Reorient patient post seizure  - Seizure pads on all 4 side rails  - Instruct patient/family to notify RN of any seizure activity including if an aura is experienced  - Instruct patient/family to call for assistance with activity based on nursing assessment  - Administer anti-seizure medications if  ordered    Outcome: Progressing  Goal: Achieves maximal functionality and self care  Description: INTERVENTIONS  - Monitor swallowing and airway patency with patient fatigue and changes in neurological status  - Encourage and assist patient to increase activity and self care.   - Encourage visually impaired, hearing impaired and aphasic patients to use assistive/communication devices  Outcome: Progressing     Problem: CARDIOVASCULAR - ADULT  Goal: Maintains optimal cardiac output and hemodynamic stability  Description: INTERVENTIONS:  - Monitor I/O, vital signs and rhythm  - Monitor for S/S and trends of decreased cardiac output  - Administer and titrate ordered vasoactive medications to optimize hemodynamic stability  - Assess quality of pulses, skin color and temperature  - Assess for signs of decreased coronary artery perfusion  - Instruct patient to report change in severity of symptoms  Outcome: Progressing  Goal: Absence of cardiac dysrhythmias or at baseline rhythm  Description: INTERVENTIONS:  - Continuous cardiac monitoring, vital signs, obtain 12 lead EKG if ordered  - Administer antiarrhythmic and heart rate control medications as ordered  - Monitor electrolytes and administer replacement therapy as ordered  Outcome: Progressing     Problem: METABOLIC, FLUID AND ELECTROLYTES - ADULT  Goal: Electrolytes maintained within normal limits  Description: INTERVENTIONS:  - Monitor labs and assess patient for signs and symptoms of electrolyte imbalances  - Administer electrolyte replacement as ordered  - Monitor response to electrolyte replacements, including repeat lab results as appropriate  - Instruct patient on fluid and nutrition as appropriate  Outcome: Progressing  Goal: Fluid balance maintained  Description: INTERVENTIONS:  - Monitor labs   - Monitor I/O and WT  - Instruct patient on fluid and nutrition as appropriate  - Assess for signs & symptoms of volume excess or deficit  Outcome: Progressing  Goal:  Glucose maintained within target range  Description: INTERVENTIONS:  - Monitor Blood Glucose as ordered  - Assess for signs and symptoms of hyperglycemia and hypoglycemia  - Administer ordered medications to maintain glucose within target range  - Assess nutritional intake and initiate nutrition service referral as needed  Outcome: Progressing     Problem: SKIN/TISSUE INTEGRITY - ADULT  Goal: Skin Integrity remains intact(Skin Breakdown Prevention)  Description: Assess:  -Perform Kyrie assessment every shift  -Clean and moisturize skin every day  -Inspect skin when repositioning, toileting, and assisting with ADLS  -Assess extremities for adequate circulation and sensation     Bed Management:  -Have minimal linens on bed & keep smooth, unwrinkled  -Change linens as needed when moist or perspiring  -Avoid sitting or lying in one position for more than 2 hours while in bed      Toileting:  -Offer bedside commode  -Assess for incontinence every shift  -Use incontinent care products after each incontinent episode such as foam    Activity:  -Mobilize patient 3 times a day  -Encourage activity and walks on unit  -Encourage or provide ROM exercises   -Turn and reposition patient every 2 Hours  -Use appropriate equipment to lift or move patient in bed  -Instruct/ Assist with weight shifting every 120 minutes when out of bed in chair  -Consider limitation of chair time 2 hour intervals    Skin Care:  -Avoid use of baby powder, tape, friction and shearing, hot water or constrictive clothing  -Relieve pressure over bony prominences using wedges  -Do not massage red bony areas    Next Steps:  -Teach patient strategies to minimize risks such as falls   -Consider consults to  interdisciplinary teams such as Pt/OT  Outcome: Progressing  Goal: Incision(s), wounds(s) or drain site(s) healing without S/S of infection  Description: INTERVENTIONS  - Assess and document dressing, incision, wound bed, drain sites and surrounding  tissue  - Provide patient and family education  - Perform skin care/dressing changes as ordered  Outcome: Progressing  Goal: Pressure injury heals and does not worsen  Description: Interventions:  - Implement low air loss mattress or specialty surface (Criteria met)  - Apply silicone foam dressing  - Instruct/assist with weight shifting every 120 minutes when in chair   - Limit chair time to 2 hour intervals  - Use special pressure reducing interventions such as waffle cushion when in chair   - Apply fecal or urinary incontinence containment device   - Perform passive or active ROM every 8 hours  - Turn and reposition patient & offload bony prominences every 2 hours   - Utilize friction reducing device or surface for transfers   - Consider consults to  interdisciplinary teams such as Pt/OT  - Use incontinent care products after each incontinent episode such as foam  - Consider nutrition services referral as needed  Outcome: Progressing

## 2024-04-01 NOTE — CONSULTS
"Consultation - Infectious Disease   Dillon Baxter 38 y.o. male MRN: [id number]  Unit/Bed#: -01 Encounter: [id number]      Assessment/Plan     Assessment:  38-year-old man with abdominal wall skin and soft tissue infection, diabetes, hypothyroidism, \"failure\" of outpatient therapy    Plan:  1) SSTI, \"failure\" of outpatient therapy - Fortunately, patient with improvement since beginning intravenous antibiotics. Low suspicion for gram-negative organism in the setting, will narrow to vancomycin alone. Unclear if patient's prehospital course represents true failure of doxycycline, or rather a slow to resolve infection in this patient with poorly controlled type 2 diabetes. Regardless, given diagnostic uncertainty will presume failure of doxycycline and plan for alternative course of antibiotics with similar spectrum coverage to vancomycin at the time of discharge. Presently, do not feel surgical intervention is warranted given small size of abscess noted on CT scan, however if patient's clinical improvement stalls or his condition worsens, would have low threshold for evaluation for likely bedside incision and drainage. If patient's improvement continues over the next 24 to 36 hours, would plan for transition to oral antibiotics and discharge home to complete an additional 10 days of therapy.    ===> Will DISCONTINUE CEFEPIME, CONTINUE VANCOMYCIN, and follow patient closely for toxicity as well as clinical improvement while on this agent.  ===> Low threshold for surgical intervention, but no need for same presently.     2) T2DM - Optimize BG control in the setting of active infection.     3) Hypothyroidism - Continue o/p LT4 dosing.       ===> Discussed w/ 1' team   ===> Will follow     History of Present Illness   Physician Requesting Consult: Ben Novoa MD  HPI:  Briefly, this 38-year-old man with type 2 diabetes as well as hypothyroidism presented to the [facility] emergency department on 3/31 " complaining of persisting symptoms from a left lower abdominal wall abscess. He was seen in urgent care about a week prior and prescribed a course of doxycycline, and states that though he noted some improvement in his symptoms they failed to resolve completely, prompting him to present to the emergency department. He reports poor appetite antecedent hospitalization, however reports that this has resolved since being admitted and receiving broad-spectrum antibiotics directed against potentially resistant pathogens (vancomycin, cefepime). Workup on admission is notable for normal white blood cell count (7750), as well as unremarkable creatinine (1.07). CT scan shows a small (2 cm) collection in the area of above-mentioned abdominal lesion, and on physical exam there is a resolving area of erythema, and a small scab about 2 cm wide, with no surrounding tenderness, nor calor. Review of available records within the [facility]'s EMR shows no history of extensively drug-resistant organisms.    Inpatient consult to Infectious Diseases  Consult performed by: Lucio Wu MD  Consult ordered by: Ben Novoa MD          Review of Systems  A complete review of systems was done and is negative except as per the HPI.    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 Anxiety, depression,     Psychogenic nonepileptic spells      Past Surgical History:   Procedure Laterality Date    MOUTH SURGERY  08/2021    WISDOM TOOTH EXTRACTION       Social History   Social History     Substance and Sexual Activity   Alcohol Use Not Currently     Social History     Substance and Sexual Activity   Drug Use Not Currently    Types: Marijuana     E-Cigarette/Vaping    E-Cigarette Use Never User      E-Cigarette/Vaping  Substances    Nicotine No     THC No     CBD No     Flavoring No     Other No      Social History     Tobacco Use   Smoking Status Never   Smokeless Tobacco Never     Family History: non-contributory    Meds/Allergies   all current active meds have been reviewed    Allergies   Allergen Reactions    Bupropion Anxiety    Lamotrigine GI Intolerance    Niacin Rash    Simvastatin Other (See Comments), Rash and Hives     Elevated liver enzymes  Liver enzyme elevation       Objective       Intake/Output Summary (Last 24 hours) at 4/1/2024 1024  Last data filed at 4/1/2024 0556  Gross per 24 hour   Intake 1890 ml   Output --   Net 1890 ml       Invasive Devices:   Peripheral IV 03/31/24 Right Antecubital (Active)   Site Assessment Clean;Dry;Intact 04/01/24 0141   Dressing Type Transparent 04/01/24 0141   Line Status Flushed;Infusing 04/01/24 0141   Dressing Status Clean;Dry;Intact 04/01/24 0141       Peripheral IV 04/01/24 Dorsal (posterior);Right Hand (Active)       Physical Exam  Gen: AA, NAD, VS reviewed  ENT: MMM  CV: No m/r/g, S1, S2  Pulm: Lungs CTAB, no respiratory distress  ABD: S/NT/ND  Skin: No rash on exposed skin, abdominal wall lesion per HPI  Psych: Oriented, nl. affect    Lab Results: I have personally reviewed pertinent labs.  Imaging Studies: I have personally reviewed pertinent reports.   and I have personally reviewed pertinent films in PACS  EKG, Pathology, and Other Studies: I have personally reviewed pertinent reports.

## 2024-04-01 NOTE — ASSESSMENT & PLAN NOTE
Lab Results   Component Value Date    HGBA1C 13.8 (A) 09/11/2023       Recent Labs     03/31/24 2049 04/01/24  0122   POCGLU 395* 217*       Blood Sugar Average: Last 72 hrs:  (P) 306  Home regimen: Lantus 60 units BID, NovoLog 70/30 15 to 20 units BID AC  Decrease Lantus slightly to 40 units BID and NovoLog 70/30 to 10 units with SSI AC/HS due to reports of decreased oral intake  Goal blood sugar 140-160

## 2024-04-01 NOTE — ASSESSMENT & PLAN NOTE
Hx of CVA in 10/2022 - presented with syncope and right sided weakness, given TNK, MRI negative for stroke  D/C on DAPT x3 weeks and then ASA 81mg daily   Continue ASA and statin   No residual deficits

## 2024-04-01 NOTE — ASSESSMENT & PLAN NOTE
Initial LA at 2.8 - improved to 1.4 s/p IVF  Multifactorial in setting of dehydration from limited oral intake and diuretic effect from hyperglycemia   Continue IVF as pt examined dry still

## 2024-04-02 ENCOUNTER — TELEPHONE (OUTPATIENT)
Dept: INTERNAL MEDICINE CLINIC | Age: 39
End: 2024-04-02

## 2024-04-02 VITALS
TEMPERATURE: 97.8 F | HEIGHT: 65 IN | BODY MASS INDEX: 30.38 KG/M2 | OXYGEN SATURATION: 95 % | RESPIRATION RATE: 18 BRPM | HEART RATE: 94 BPM | WEIGHT: 182.32 LBS | SYSTOLIC BLOOD PRESSURE: 96 MMHG | DIASTOLIC BLOOD PRESSURE: 63 MMHG

## 2024-04-02 PROBLEM — R00.0 SINUS TACHYCARDIA: Status: RESOLVED | Noted: 2024-04-01 | Resolved: 2024-04-02

## 2024-04-02 PROBLEM — E87.20 LACTIC ACIDOSIS: Status: RESOLVED | Noted: 2024-04-01 | Resolved: 2024-04-02

## 2024-04-02 LAB
ANION GAP SERPL CALCULATED.3IONS-SCNC: 5 MMOL/L (ref 4–13)
BUN SERPL-MCNC: 10 MG/DL (ref 5–25)
CALCIUM SERPL-MCNC: 8.3 MG/DL (ref 8.4–10.2)
CHLORIDE SERPL-SCNC: 108 MMOL/L (ref 96–108)
CO2 SERPL-SCNC: 25 MMOL/L (ref 21–32)
CREAT SERPL-MCNC: 0.85 MG/DL (ref 0.6–1.3)
ERYTHROCYTE [DISTWIDTH] IN BLOOD BY AUTOMATED COUNT: 12.9 % (ref 11.6–15.1)
GFR SERPL CREATININE-BSD FRML MDRD: 109 ML/MIN/1.73SQ M
GLUCOSE SERPL-MCNC: 152 MG/DL (ref 65–140)
GLUCOSE SERPL-MCNC: 194 MG/DL (ref 65–140)
GLUCOSE SERPL-MCNC: 264 MG/DL (ref 65–140)
HCT VFR BLD AUTO: 39 % (ref 36.5–49.3)
HGB BLD-MCNC: 13 G/DL (ref 12–17)
MCH RBC QN AUTO: 28.1 PG (ref 26.8–34.3)
MCHC RBC AUTO-ENTMCNC: 33.3 G/DL (ref 31.4–37.4)
MCV RBC AUTO: 84 FL (ref 82–98)
MRSA NOSE QL CULT: NORMAL
PLATELET # BLD AUTO: 162 THOUSANDS/UL (ref 149–390)
PMV BLD AUTO: 9 FL (ref 8.9–12.7)
POTASSIUM SERPL-SCNC: 3.9 MMOL/L (ref 3.5–5.3)
RBC # BLD AUTO: 4.63 MILLION/UL (ref 3.88–5.62)
SODIUM SERPL-SCNC: 138 MMOL/L (ref 135–147)
VANCOMYCIN SERPL-MCNC: 7.7 UG/ML (ref 10–20)
WBC # BLD AUTO: 5.32 THOUSAND/UL (ref 4.31–10.16)

## 2024-04-02 PROCEDURE — 80048 BASIC METABOLIC PNL TOTAL CA: CPT | Performed by: INTERNAL MEDICINE

## 2024-04-02 PROCEDURE — 80202 ASSAY OF VANCOMYCIN: CPT | Performed by: PHYSICIAN ASSISTANT

## 2024-04-02 PROCEDURE — 82948 REAGENT STRIP/BLOOD GLUCOSE: CPT

## 2024-04-02 PROCEDURE — 94760 N-INVAS EAR/PLS OXIMETRY 1: CPT

## 2024-04-02 PROCEDURE — 99239 HOSP IP/OBS DSCHRG MGMT >30: CPT | Performed by: PHYSICIAN ASSISTANT

## 2024-04-02 PROCEDURE — 85027 COMPLETE CBC AUTOMATED: CPT | Performed by: INTERNAL MEDICINE

## 2024-04-02 RX ORDER — SULFAMETHOXAZOLE AND TRIMETHOPRIM 800; 160 MG/1; MG/1
1 TABLET ORAL EVERY 12 HOURS SCHEDULED
Qty: 20 TABLET | Refills: 0 | Status: SHIPPED | OUTPATIENT
Start: 2024-04-02 | End: 2024-04-12

## 2024-04-02 RX ORDER — METOPROLOL SUCCINATE 25 MG/1
50 TABLET, EXTENDED RELEASE ORAL DAILY
Start: 2024-04-02

## 2024-04-02 RX ORDER — INSULIN GLARGINE 100 [IU]/ML
60 INJECTION, SOLUTION SUBCUTANEOUS EVERY 12 HOURS SCHEDULED
Start: 2024-04-02 | End: 2024-07-01

## 2024-04-02 RX ORDER — CEFADROXIL 1000 MG/1
1 TABLET ORAL 2 TIMES DAILY
Qty: 20 TABLET | Refills: 0 | Status: SHIPPED | OUTPATIENT
Start: 2024-04-02 | End: 2024-04-12

## 2024-04-02 RX ORDER — VANCOMYCIN HYDROCHLORIDE 1 G/200ML
15 INJECTION, SOLUTION INTRAVENOUS EVERY 8 HOURS
Status: DISCONTINUED | OUTPATIENT
Start: 2024-04-02 | End: 2024-04-02 | Stop reason: HOSPADM

## 2024-04-02 RX ADMIN — LEVOTHYROXINE SODIUM 50 MCG: 50 TABLET ORAL at 06:04

## 2024-04-02 RX ADMIN — SERTRALINE HYDROCHLORIDE 150 MG: 50 TABLET ORAL at 08:15

## 2024-04-02 RX ADMIN — ASPIRIN 81 MG: 81 TABLET, COATED ORAL at 08:16

## 2024-04-02 RX ADMIN — VANCOMYCIN HYDROCHLORIDE 1000 MG: 1 INJECTION, SOLUTION INTRAVENOUS at 06:09

## 2024-04-02 RX ADMIN — INSULIN ASPART 10 UNITS: 100 INJECTION, SUSPENSION SUBCUTANEOUS at 08:12

## 2024-04-02 RX ADMIN — DULOXETINE HYDROCHLORIDE 40 MG: 20 CAPSULE, DELAYED RELEASE ORAL at 08:15

## 2024-04-02 RX ADMIN — PRAVASTATIN SODIUM 40 MG: 40 TABLET ORAL at 08:16

## 2024-04-02 RX ADMIN — BUSPIRONE HYDROCHLORIDE 5 MG: 5 TABLET ORAL at 08:15

## 2024-04-02 RX ADMIN — INSULIN GLARGINE 40 UNITS: 100 INJECTION, SOLUTION SUBCUTANEOUS at 08:12

## 2024-04-02 RX ADMIN — INSULIN LISPRO 2 UNITS: 100 INJECTION, SOLUTION INTRAVENOUS; SUBCUTANEOUS at 08:11

## 2024-04-02 RX ADMIN — INSULIN LISPRO 3 UNITS: 100 INJECTION, SOLUTION INTRAVENOUS; SUBCUTANEOUS at 11:28

## 2024-04-02 RX ADMIN — LORATADINE 10 MG: 10 TABLET ORAL at 08:16

## 2024-04-02 RX ADMIN — ENOXAPARIN SODIUM 40 MG: 40 INJECTION SUBCUTANEOUS at 08:14

## 2024-04-02 NOTE — PLAN OF CARE
Problem: PAIN - ADULT  Goal: Verbalizes/displays adequate comfort level or baseline comfort level  Description: Interventions:  - Encourage patient to monitor pain and request assistance  - Assess pain using appropriate pain scale  - Administer analgesics based on type and severity of pain and evaluate response  - Implement non-pharmacological measures as appropriate and evaluate response  - Consider cultural and social influences on pain and pain management  - Notify physician/advanced practitioner if interventions unsuccessful or patient reports new pain  Outcome: Progressing     Problem: INFECTION - ADULT  Goal: Absence or prevention of progression during hospitalization  Description: INTERVENTIONS:  - Assess and monitor for signs and symptoms of infection  - Monitor lab/diagnostic results  - Monitor all insertion sites, i.e. indwelling lines, tubes, and drains  - Monitor endotracheal if appropriate and nasal secretions for changes in amount and color  - Reyno appropriate cooling/warming therapies per order  - Administer medications as ordered  - Instruct and encourage patient and family to use good hand hygiene technique  - Identify and instruct in appropriate isolation precautions for identified infection/condition  Outcome: Progressing  Goal: Absence of fever/infection during neutropenic period  Description: INTERVENTIONS:  - Monitor WBC    Outcome: Progressing     Problem: SAFETY ADULT  Goal: Patient will remain free of falls  Description: INTERVENTIONS:  - Educate patient/family on patient safety including physical limitations  - Instruct patient to call for assistance with activity   - Consult OT/PT to assist with strengthening/mobility   - Keep Call bell within reach  - Keep bed low and locked with side rails adjusted as appropriate  - Keep care items and personal belongings within reach  - Initiate and maintain comfort rounds  - Make Fall Risk Sign visible to staff  - Offer Toileting every 2 Hours,  in advance of need  - Initiate/Maintain bed alarm  - Obtain necessary fall risk management equipment: socks  - Apply yellow socks and bracelet for high fall risk patients  - Consider moving patient to room near nurses station  Outcome: Progressing  Goal: Maintain or return to baseline ADL function  Description: INTERVENTIONS:  -  Assess patient's ability to carry out ADLs; assess patient's baseline for ADL function and identify physical deficits which impact ability to perform ADLs (bathing, care of mouth/teeth, toileting, grooming, dressing, etc.)  - Assess/evaluate cause of self-care deficits   - Assess range of motion  - Assess patient's mobility; develop plan if impaired  - Assess patient's need for assistive devices and provide as appropriate  - Encourage maximum independence but intervene and supervise when necessary  - Involve family in performance of ADLs  - Assess for home care needs following discharge   - Consider OT consult to assist with ADL evaluation and planning for discharge  - Provide patient education as appropriate  Outcome: Progressing  Goal: Maintains/Returns to pre admission functional level  Description: INTERVENTIONS:  - Perform AM-PAC 6 Click Basic Mobility/ Daily Activity assessment daily.  - Set and communicate daily mobility goal to care team and patient/family/caregiver.   - Collaborate with rehabilitation services on mobility goals if consulted  - Perform Range of Motion 3 times a day.  - Reposition patient every 2 hours.  - Dangle patient 3 times a day  - Stand patient 3 times a day  - Ambulate patient 3 times a day  - Out of bed to chair 3 times a day   - Out of bed for meals 3 times a day  - Out of bed for toileting  - Record patient progress and toleration of activity level   Outcome: Progressing     Problem: DISCHARGE PLANNING  Goal: Discharge to home or other facility with appropriate resources  Description: INTERVENTIONS:  - Identify barriers to discharge w/patient and  caregiver  - Arrange for needed discharge resources and transportation as appropriate  - Identify discharge learning needs (meds, wound care, etc.)  - Arrange for interpretive services to assist at discharge as needed  - Refer to Case Management Department for coordinating discharge planning if the patient needs post-hospital services based on physician/advanced practitioner order or complex needs related to functional status, cognitive ability, or social support system  Outcome: Progressing     Problem: Knowledge Deficit  Goal: Patient/family/caregiver demonstrates understanding of disease process, treatment plan, medications, and discharge instructions  Description: Complete learning assessment and assess knowledge base.  Interventions:  - Provide teaching at level of understanding  - Provide teaching via preferred learning methods  Outcome: Progressing     Problem: NEUROSENSORY - ADULT  Goal: Achieves stable or improved neurological status  Description: INTERVENTIONS  - Monitor and report changes in neurological status  - Monitor vital signs such as temperature, blood pressure, glucose, and any other labs ordered   - Initiate measures to prevent increased intracranial pressure  - Monitor for seizure activity and implement precautions if appropriate      Outcome: Progressing  Goal: Remains free of injury related to seizures activity  Description: INTERVENTIONS  - Maintain airway, patient safety  and administer oxygen as ordered  - Monitor patient for seizure activity, document and report duration and description of seizure to physician/advanced practitioner  - If seizure occurs,  ensure patient safety during seizure  - Reorient patient post seizure  - Seizure pads on all 4 side rails  - Instruct patient/family to notify RN of any seizure activity including if an aura is experienced  - Instruct patient/family to call for assistance with activity based on nursing assessment  - Administer anti-seizure medications if  ordered    Outcome: Progressing  Goal: Achieves maximal functionality and self care  Description: INTERVENTIONS  - Monitor swallowing and airway patency with patient fatigue and changes in neurological status  - Encourage and assist patient to increase activity and self care.   - Encourage visually impaired, hearing impaired and aphasic patients to use assistive/communication devices  Outcome: Progressing     Problem: CARDIOVASCULAR - ADULT  Goal: Maintains optimal cardiac output and hemodynamic stability  Description: INTERVENTIONS:  - Monitor I/O, vital signs and rhythm  - Monitor for S/S and trends of decreased cardiac output  - Administer and titrate ordered vasoactive medications to optimize hemodynamic stability  - Assess quality of pulses, skin color and temperature  - Assess for signs of decreased coronary artery perfusion  - Instruct patient to report change in severity of symptoms  Outcome: Progressing  Goal: Absence of cardiac dysrhythmias or at baseline rhythm  Description: INTERVENTIONS:  - Continuous cardiac monitoring, vital signs, obtain 12 lead EKG if ordered  - Administer antiarrhythmic and heart rate control medications as ordered  - Monitor electrolytes and administer replacement therapy as ordered  Outcome: Progressing     Problem: METABOLIC, FLUID AND ELECTROLYTES - ADULT  Goal: Electrolytes maintained within normal limits  Description: INTERVENTIONS:  - Monitor labs and assess patient for signs and symptoms of electrolyte imbalances  - Administer electrolyte replacement as ordered  - Monitor response to electrolyte replacements, including repeat lab results as appropriate  - Instruct patient on fluid and nutrition as appropriate  Outcome: Progressing  Goal: Fluid balance maintained  Description: INTERVENTIONS:  - Monitor labs   - Monitor I/O and WT  - Instruct patient on fluid and nutrition as appropriate  - Assess for signs & symptoms of volume excess or deficit  Outcome: Progressing  Goal:  Glucose maintained within target range  Description: INTERVENTIONS:  - Monitor Blood Glucose as ordered  - Assess for signs and symptoms of hyperglycemia and hypoglycemia  - Administer ordered medications to maintain glucose within target range  - Assess nutritional intake and initiate nutrition service referral as needed  Outcome: Progressing     Problem: SKIN/TISSUE INTEGRITY - ADULT  Goal: Skin Integrity remains intact(Skin Breakdown Prevention)  Description: Assess:  -Perform Kyrie assessment every shift  -Clean and moisturize skin every day  -Inspect skin when repositioning, toileting, and assisting with ADLS  -Assess extremities for adequate circulation and sensation     Bed Management:  -Have minimal linens on bed & keep smooth, unwrinkled  -Change linens as needed when moist or perspiring  -Avoid sitting or lying in one position for more than 2 hours while in bed      Toileting:  -Offer bedside commode  -Assess for incontinence every shift  -Use incontinent care products after each incontinent episode such as foam    Activity:  -Mobilize patient 3 times a day  -Encourage activity and walks on unit  -Encourage or provide ROM exercises   -Turn and reposition patient every 2 Hours  -Use appropriate equipment to lift or move patient in bed  -Instruct/ Assist with weight shifting every 120 minutes when out of bed in chair  -Consider limitation of chair time 2 hour intervals    Skin Care:  -Avoid use of baby powder, tape, friction and shearing, hot water or constrictive clothing  -Relieve pressure over bony prominences using wedges  -Do not massage red bony areas    Next Steps:  -Teach patient strategies to minimize risks such as falls   -Consider consults to  interdisciplinary teams such as Pt/OT  Outcome: Progressing  Goal: Incision(s), wounds(s) or drain site(s) healing without S/S of infection  Description: INTERVENTIONS  - Assess and document dressing, incision, wound bed, drain sites and surrounding  tissue  - Provide patient and family education  - Perform skin care/dressing changes as ordered  Outcome: Progressing  Goal: Pressure injury heals and does not worsen  Description: Interventions:  - Implement low air loss mattress or specialty surface (Criteria met)  - Apply silicone foam dressing  - Instruct/assist with weight shifting every 120 minutes when in chair   - Limit chair time to 2 hour intervals  - Use special pressure reducing interventions such as waffle cushion when in chair   - Apply fecal or urinary incontinence containment device   - Perform passive or active ROM every 8 hours  - Turn and reposition patient & offload bony prominences every 2 hours   - Utilize friction reducing device or surface for transfers   - Consider consults to  interdisciplinary teams such as Pt/OT  - Use incontinent care products after each incontinent episode such as foam  - Consider nutrition services referral as needed  Outcome: Progressing

## 2024-04-02 NOTE — UTILIZATION REVIEW
NOTIFICATION OF INPATIENT ADMISSION   AUTHORIZATION REQUEST   SERVICING FACILITY:   [place] [address]  Tax ID: [id number]  NPI: [id number] ATTENDING PROVIDER:  Attending Name and NPI#: Mere Paige Md [[id number]]  Address: [address] [place] [address]  Phone: [phone]   ADMISSION INFORMATION:  Place of Service: Inpatient [facility]  Place of Service Code: 21  Inpatient Admission Date/Time: 4/1/24 12:04 AM  Discharge Date/Time: No discharge date for patient encounter.  Admitting Diagnosis Code/Description:  Sinus tachycardia [R00.0]  Hyperglycemia [R73.9]  Abdominal wall cellulitis [L03.311]  Headache [R51.9]     UTILIZATION REVIEW CONTACT:  Skye Grimes Utilization   Network Utilization Review Department  Phone: [phone]  Fax [phone]  Email: [email or web address]  Contact for approvals/pending authorizations, clinical reviews, and discharge.     PHYSICIAN ADVISORY SERVICES:  Medical Necessity Denial & Eefm-rg-Mqkl Review  Phone: [phone]  Fax: [phone]  Email: [email or web address]     DISCHARGE SUPPORT TEAM:  For Patients Discharge Needs & Updates  Phone: [phone] opt. 2 Fax: [phone]  Email: [email or web address]

## 2024-04-02 NOTE — UTILIZATION REVIEW
NOTIFICATION OF INPATIENT ADMISSION   AUTHORIZATION REQUEST   SERVICING FACILITY:   [place] [address]  Tax ID: [id number]  NPI: [id number] ATTENDING PROVIDER:  Attending Name and NPI#: Mere Paige Md [[id number]]  Address: [address] [place] [address]  Phone: [phone]   ADMISSION INFORMATION:  Place of Service: Inpatient [facility]  Place of Service Code: 21  Inpatient Admission Date/Time: 4/1/24 12:04 AM  Discharge Date/Time: No discharge date for patient encounter.  Admitting Diagnosis Code/Description:  Sinus tachycardia [R00.0]  Hyperglycemia [R73.9]  Abdominal wall cellulitis [L03.311]  Headache [R51.9]     UTILIZATION REVIEW CONTACT:  Skye Grimes Utilization   Network Utilization Review Department  Phone: [phone]  Fax [phone]  Email: [email or web address]  Contact for approvals/pending authorizations, clinical reviews, and discharge.     PHYSICIAN ADVISORY SERVICES:  Medical Necessity Denial & Jwon-mr-Sijx Review  Phone: [phone]  Fax: [phone]  Email: [email or web address]     DISCHARGE SUPPORT TEAM:  For Patients Discharge Needs & Updates  Phone: [phone] opt. 2 Fax: [phone]  Email: [email or web address]

## 2024-04-02 NOTE — PROGRESS NOTES
Dillon Baxter is a 38 y.o. male who is currently ordered Vancomycin IV with management by the Pharmacy Consult service.  Relevant clinical data and objective / subjective history reviewed.  Vancomycin Assessment:  Indication and Goal AUC/Trough: Soft tissue (goal -600, trough >10)  Clinical Status: stable  Micro:     Renal Function:  SCr: 0.85 mg/dL  CrCl: 115.5 mL/min  Renal replacement: Not on dialysis  Days of Therapy: 3  Current Dose: 1000mg every 12 hours  Vancomycin Plan: trough resulted 7.7, which is subtherapeutic. Thus, dose changed as below.  New Dosinmg every 8 hours  Estimated AUC: 531 mcg*hr/mL  Estimated Trough: 15.8 mcg/mL  Next Level: 4/3/24 at 0530  Renal Function Monitoring: Daily BMP and UOP  Pharmacy will continue to follow closely for s/sx of nephrotoxicity, infusion reactions and appropriateness of therapy.  BMP and CBC will be ordered per protocol. We will continue to follow the patient’s culture results and clinical progress daily.    Petros Rangel, Pharmacist, PharmD, BCPS

## 2024-04-02 NOTE — DISCHARGE SUMMARY
"[facility]  Discharge- Dillon Baxter 1985, 38 y.o. male MRN: [id number]  Unit/Bed#: MS Sow Encounter: [id number]  Primary Care Provider: Adan Nixon MD   Date and time admitted to hospital: 3/31/2024  8:28 PM    * Abdominal wall cellulitis  Assessment & Plan  Presents with ongoing LLQ abdominal wall erythema and swelling despite completing oral doxycycline 1 week ago  CT A/P: \"Focal skin thickening and subcutaneous fat stranding/fluid in the left lower abdomen anteriorly (axial image 145, series 2), suspicious for localized skin infection. Measures approximately 2 cm in size. No discrete involvement of the deep soft tissues in this region.\"  Blood cultures negative x 2 after 24 hours  Discussed with ID -stable for discharge home on Bactrim/Duricef for additional 10 days    Cerebrovascular accident (CVA), unspecified mechanism (HCC)  Assessment & Plan  Hx of CVA in 10/2022 - presented with syncope and right sided weakness, given TNK, MRI negative for stroke  D/C on DAPT x3 weeks and then ASA 81mg daily   Continue ASA and statin   No residual deficits     Psychogenic nonepileptic spells  Assessment & Plan  Hx of seizures described as staring spells that were determined to be nonepileptic in nature     Type 2 diabetes mellitus with hyperglycemia, with long-term current use of insulin (HCC)  Assessment & Plan  Lab Results   Component Value Date    HGBA1C 13.8 (A) 09/11/2023       Recent Labs     04/01/24  1645 04/01/24  2110 04/02/24  0754 04/02/24  1117   POCGLU 209* 266* 194* 264*         Blood Sugar Average: Last 72 hrs:  (P) [id number]  Home regimen: Lantus 60 units BID, NovoLog 70/30 15 to 20 units BID AC  Continue home regimen on discharge    Hypothyroidism  Assessment & Plan  Continue home synthroid 50mcg daily     Essential hypertension  Assessment & Plan  Home regimen: metoprolol 50mg daily   Continue     ENRIKE (obstructive sleep apnea)  Assessment & " Plan  Continue CPAP HS     PTSD (post-traumatic stress disorder)  Assessment & Plan  Home regimen: Duloxetine 40 Mg daily and Risperdal 4 Mg at bedtime  Continue    Anxiety  Assessment & Plan  Continue home BuSpar 5 Mg BID      Medical Problems       Resolved Problems  Date Reviewed: 4/2/2024            Resolved    Lactic acidosis 4/2/2024     Resolved by  Cherrie Edwards PA-C    Sinus tachycardia 4/2/2024     Resolved by  Cherrie Edwards PA-C        Discharging Physician / Practitioner: Cherrie Edwards PA-C  PCP: Adan Nixon MD  Admission Date:   Admission Orders (From admission, onward)       Ordered        04/01/24 0004  INPATIENT ADMISSION  Once                          Discharge Date: 04/02/24    Consultations During Hospital Stay:  Infectious disease    Procedures Performed:   None    Significant Findings / Test Results:   CT abdomen/pelvis: Focal skin thickening and subcutaneous fat stranding/fluid in the left lower abdomen anteriorly (axial image 145, series 2), suspicious for localized skin infection. Measures approximately 2 cm in size. No discrete involvement of the deep soft tissues in this region.  Blood cultures negative x 2 after 24 hours    Incidental Findings:   None    Test Results Pending at Discharge (will require follow up):   None     Outpatient Tests Requested:  None    Complications: None    Reason for Admission: Abdominal wall cellulitis    Hospital Course:   Dillon Baxter is a 38 y.o. male patient who originally presented to the hospital on 3/31/2024 due to redness.    Past medical history significant for ENRIKE, PTSD, anxiety, hypertension, hypothyroidism, type 2 diabetes, CVA.  Patient presented to the emergency department with left lower abdomen redness and swelling.  Previously had been on doxycycline.  Patient was started on IV antibiotics and infectious disease was consulted.  Blood cultures were negative to date.  Clinically patient appeared well, with improving evidence  "of cellulitis.  Discussed with ID on day of discharge, recommended discharge on Bactrim and Duricef for additional 10-day course.  On day of discharge patient was afebrile, hemodynamically stable and verbalized understanding for requested outpatient follow-up.    Please see above list of diagnoses and related plan for additional information.     Condition at Discharge: stable    Discharge Day Visit / Exam:   Subjective: Feels well, eager for discharge.  Vitals: Blood Pressure: 97/61 (04/02/24 1419)  Pulse: 94 (04/01/24 2012)  Temperature: 98.9 °F (37.2 °C) (04/02/24 1419)  Temp Source: Oral (04/01/24 2012)  Respirations: 18 (04/02/24 1419)  Height: 5' 5\" (165.1 cm) (04/01/24 0143)  Weight - Scale: 82.7 kg (182 lb 5.1 oz) (04/01/24 0143)  SpO2: 95 % (04/02/24 0351)  Exam:   Physical Exam  Vitals and nursing note reviewed.   Constitutional:       General: He is not in acute distress.     Appearance: He is well-developed.      Comments: No acute distress   HENT:      Head: Normocephalic and atraumatic.   Eyes:      General: No scleral icterus.     Extraocular Movements: Extraocular movements intact.      Conjunctiva/sclera: Conjunctivae normal.   Cardiovascular:      Rate and Rhythm: Normal rate and regular rhythm.      Heart sounds: No murmur heard.  Pulmonary:      Effort: Pulmonary effort is normal. No respiratory distress.      Breath sounds: Normal breath sounds. No wheezing, rhonchi or rales.   Abdominal:      General: Bowel sounds are normal.      Palpations: Abdomen is soft.      Tenderness: There is no abdominal tenderness. There is no guarding or rebound.   Musculoskeletal:         General: No swelling.      Cervical back: Normal range of motion.      Comments: Able to move upper/lower extremities bilaterally, no edema   Skin:     General: Skin is warm and dry.      Capillary Refill: Capillary refill takes less than 2 seconds.      Comments: Area of erythema left lower quadrant of abdomen with dried scab.  No " fluctuance.  Nontender.   Neurological:      Mental Status: He is alert and oriented to person, place, and time.   Psychiatric:         Mood and Affect: Mood normal.         Speech: Speech normal.         Behavior: Behavior normal.          Discussion with Family: Patient declined call to .     Discharge instructions/Information to patient and family:   See after visit summary for information provided to patient and family.      Provisions for Follow-Up Care:  See after visit summary for information related to follow-up care and any pertinent home health orders.      Mobility at time of Discharge:   Basic Mobility Inpatient Raw Score: 24  -HLM Goal: 8: Walk 250 feet or more  -HLM Achieved: 1: Laying in bed  Not updated in system prior to discharge.  Patient independent.     Disposition:   Home    Planned Readmission: None     Discharge Statement:  I spent 50 minutes discharging the patient. This time was spent on the day of discharge. I had direct contact with the patient on the day of discharge. Greater than 50% of the total time was spent examining patient, answering all patient questions, arranging and discussing plan of care with patient as well as directly providing post-discharge instructions.  Additional time then spent on discharge activities.    Discharge Medications:  See after visit summary for reconciled discharge medications provided to patient and/or family.      **Please Note: This note may have been constructed using a voice recognition system**

## 2024-04-02 NOTE — PLAN OF CARE
Problem: PAIN - ADULT  Goal: Verbalizes/displays adequate comfort level or baseline comfort level  Description: Interventions:  - Encourage patient to monitor pain and request assistance  - Assess pain using appropriate pain scale  - Administer analgesics based on type and severity of pain and evaluate response  - Implement non-pharmacological measures as appropriate and evaluate response  - Consider cultural and social influences on pain and pain management  - Notify physician/advanced practitioner if interventions unsuccessful or patient reports new pain  Outcome: Progressing     Problem: INFECTION - ADULT  Goal: Absence or prevention of progression during hospitalization  Description: INTERVENTIONS:  - Assess and monitor for signs and symptoms of infection  - Monitor lab/diagnostic results  - Monitor all insertion sites, i.e. indwelling lines, tubes, and drains  - Monitor endotracheal if appropriate and nasal secretions for changes in amount and color  - Burns appropriate cooling/warming therapies per order  - Administer medications as ordered  - Instruct and encourage patient and family to use good hand hygiene technique  - Identify and instruct in appropriate isolation precautions for identified infection/condition  Outcome: Progressing  Goal: Absence of fever/infection during neutropenic period  Description: INTERVENTIONS:  - Monitor WBC    Outcome: Progressing     Problem: SAFETY ADULT  Goal: Patient will remain free of falls  Description: INTERVENTIONS:  - Educate patient/family on patient safety including physical limitations  - Instruct patient to call for assistance with activity   - Consult OT/PT to assist with strengthening/mobility   - Keep Call bell within reach  - Keep bed low and locked with side rails adjusted as appropriate  - Keep care items and personal belongings within reach  - Initiate and maintain comfort rounds  - Make Fall Risk Sign visible to staff  - Offer Toileting every x Hours,  in advance of need  - Initiate/Maintain xalarm  - Obtain necessary fall risk management equipment: x  - Apply yellow socks and bracelet for high fall risk patients  - Consider moving patient to room near nurses station  Outcome: Progressing  Goal: Maintain or return to baseline ADL function  Description: INTERVENTIONS:  -  Assess patient's ability to carry out ADLs; assess patient's baseline for ADL function and identify physical deficits which impact ability to perform ADLs (bathing, care of mouth/teeth, toileting, grooming, dressing, etc.)  - Assess/evaluate cause of self-care deficits   - Assess range of motion  - Assess patient's mobility; develop plan if impaired  - Assess patient's need for assistive devices and provide as appropriate  - Encourage maximum independence but intervene and supervise when necessary  - Involve family in performance of ADLs  - Assess for home care needs following discharge   - Consider OT consult to assist with ADL evaluation and planning for discharge  - Provide patient education as appropriate  Outcome: Progressing  Goal: Maintains/Returns to pre admission functional level  Description: INTERVENTIONS:  - Perform AM-PAC 6 Click Basic Mobility/ Daily Activity assessment daily.  - Set and communicate daily mobility goal to care team and patient/family/caregiver.   - Collaborate with rehabilitation services on mobility goals if consulted  - Perform Range of Motion x times a day.  - Reposition patient every x hours.  - Dangle patient x times a day  - Stand patient x times a day  - Ambulate patient x times a day  - Out of bed to chair x times a day   - Out of bed for meals xxxx times a day  - Out of bed for toileting  - Record patient progress and toleration of activity level   Outcome: Progressing     Problem: DISCHARGE PLANNING  Goal: Discharge to home or other facility with appropriate resources  Description: INTERVENTIONS:  - Identify barriers to discharge w/patient and caregiver  -  Arrange for needed discharge resources and transportation as appropriate  - Identify discharge learning needs (meds, wound care, etc.)  - Arrange for interpretive services to assist at discharge as needed  - Refer to Case Management Department for coordinating discharge planning if the patient needs post-hospital services based on physician/advanced practitioner order or complex needs related to functional status, cognitive ability, or social support system  Outcome: Progressing     Problem: Knowledge Deficit  Goal: Patient/family/caregiver demonstrates understanding of disease process, treatment plan, medications, and discharge instructions  Description: Complete learning assessment and assess knowledge base.  Interventions:  - Provide teaching at level of understanding  - Provide teaching via preferred learning methods  Outcome: Progressing     Problem: NEUROSENSORY - ADULT  Goal: Achieves stable or improved neurological status  Description: INTERVENTIONS  - Monitor and report changes in neurological status  - Monitor vital signs such as temperature, blood pressure, glucose, and any other labs ordered   - Initiate measures to prevent increased intracranial pressure  - Monitor for seizure activity and implement precautions if appropriate      Outcome: Progressing  Goal: Remains free of injury related to seizures activity  Description: INTERVENTIONS  - Maintain airway, patient safety  and administer oxygen as ordered  - Monitor patient for seizure activity, document and report duration and description of seizure to physician/advanced practitioner  - If seizure occurs,  ensure patient safety during seizure  - Reorient patient post seizure  - Seizure pads on all 4 side rails  - Instruct patient/family to notify RN of any seizure activity including if an aura is experienced  - Instruct patient/family to call for assistance with activity based on nursing assessment  - Administer anti-seizure medications if  ordered    Outcome: Progressing  Goal: Achieves maximal functionality and self care  Description: INTERVENTIONS  - Monitor swallowing and airway patency with patient fatigue and changes in neurological status  - Encourage and assist patient to increase activity and self care.   - Encourage visually impaired, hearing impaired and aphasic patients to use assistive/communication devices  Outcome: Progressing     Problem: CARDIOVASCULAR - ADULT  Goal: Maintains optimal cardiac output and hemodynamic stability  Description: INTERVENTIONS:  - Monitor I/O, vital signs and rhythm  - Monitor for S/S and trends of decreased cardiac output  - Administer and titrate ordered vasoactive medications to optimize hemodynamic stability  - Assess quality of pulses, skin color and temperature  - Assess for signs of decreased coronary artery perfusion  - Instruct patient to report change in severity of symptoms  Outcome: Progressing  Goal: Absence of cardiac dysrhythmias or at baseline rhythm  Description: INTERVENTIONS:  - Continuous cardiac monitoring, vital signs, obtain 12 lead EKG if ordered  - Administer antiarrhythmic and heart rate control medications as ordered  - Monitor electrolytes and administer replacement therapy as ordered  Outcome: Progressing     Problem: METABOLIC, FLUID AND ELECTROLYTES - ADULT  Goal: Electrolytes maintained within normal limits  Description: INTERVENTIONS:  - Monitor labs and assess patient for signs and symptoms of electrolyte imbalances  - Administer electrolyte replacement as ordered  - Monitor response to electrolyte replacements, including repeat lab results as appropriate  - Instruct patient on fluid and nutrition as appropriate  Outcome: Progressing  Goal: Fluid balance maintained  Description: INTERVENTIONS:  - Monitor labs   - Monitor I/O and WT  - Instruct patient on fluid and nutrition as appropriate  - Assess for signs & symptoms of volume excess or deficit  Outcome: Progressing  Goal:  Glucose maintained within target range  Description: INTERVENTIONS:  - Monitor Blood Glucose as ordered  - Assess for signs and symptoms of hyperglycemia and hypoglycemia  - Administer ordered medications to maintain glucose within target range  - Assess nutritional intake and initiate nutrition service referral as needed  Outcome: Progressing     Problem: SKIN/TISSUE INTEGRITY - ADULT  Goal: Skin Integrity remains intact(Skin Breakdown Prevention)  Description: Assess:  -Perform Kyrie assessment every shift  -Clean and moisturize skin every day  -Inspect skin when repositioning, toileting, and assisting with ADLS  -Assess extremities for adequate circulation and sensation     Bed Management:  -Have minimal linens on bed & keep smooth, unwrinkled  -Change linens as needed when moist or perspiring  -Avoid sitting or lying in one position for more than 2 hours while in bed      Toileting:  -Offer bedside commode  -Assess for incontinence every shift  -Use incontinent care products after each incontinent episode such as foam    Activity:  -Mobilize patient 3 times a day  -Encourage activity and walks on unit  -Encourage or provide ROM exercises   -Turn and reposition patient every 2 Hours  -Use appropriate equipment to lift or move patient in bed  -Instruct/ Assist with weight shifting every 120 minutes when out of bed in chair  -Consider limitation of chair time 2 hour intervals    Skin Care:  -Avoid use of baby powder, tape, friction and shearing, hot water or constrictive clothing  -Relieve pressure over bony prominences using wedges  -Do not massage red bony areas    Next Steps:  -Teach patient strategies to minimize risks such as falls   -Consider consults to  interdisciplinary teams such as Pt/OT  Outcome: Progressing  Goal: Incision(s), wounds(s) or drain site(s) healing without S/S of infection  Description: INTERVENTIONS  - Assess and document dressing, incision, wound bed, drain sites and surrounding  tissue  - Provide patient and family education  - Perform skin care/dressing changes as ordered  Outcome: Progressing  Goal: Pressure injury heals and does not worsen  Description: Interventions:  - Implement low air loss mattress or specialty surface (Criteria met)  - Apply silicone foam dressing  - Instruct/assist with weight shifting every 120 minutes when in chair   - Limit chair time to 2 hour intervals  - Use special pressure reducing interventions such as waffle cushion when in chair   - Apply fecal or urinary incontinence containment device   - Perform passive or active ROM every 8 hours  - Turn and reposition patient & offload bony prominences every 2 hours   - Utilize friction reducing device or surface for transfers   - Consider consults to  interdisciplinary teams such as Pt/OT  - Use incontinent care products after each incontinent episode such as foam  - Consider nutrition services referral as needed  Outcome: Progressing

## 2024-04-02 NOTE — ASSESSMENT & PLAN NOTE
Lab Results   Component Value Date    HGBA1C 13.8 (A) 09/11/2023       Recent Labs     04/01/24  1645 04/01/24  2110 04/02/24  0754 04/02/24  1117   POCGLU 209* 266* 194* 264*         Blood Sugar Average: Last 72 hrs:  (P) [id number]  Home regimen: Lantus 60 units BID, NovoLog 70/30 15 to 20 units BID AC  Continue home regimen on discharge

## 2024-04-02 NOTE — PROGRESS NOTES
Progress Note - Infectious Disease   Dillon Baxter 38 y.o. male MRN: [id number]  Unit/Bed#: -01 Encounter: [id number]    Assessment:  ***    Plan:  ***    Subjective/Objective   Chief Complaint: ***    Subjective: ***    Objective: ***    Temp:  [97.4 °F (36.3 °C)-98.8 °F (37.1 °C)] 98.8 °F (37.1 °C)  HR:  [94] 94  Resp:  [16-20] 20  BP: ()/(59-93) 98/59  SpO2:  [93 %-95 %] 95 %  Temp (24hrs), Av.9 °F (36.6 °C), Min:97.4 °F (36.3 °C), Max:98.8 °F (37.1 °C)  Current: Temperature: 98.8 °F (37.1 °C)    Physical Exam: {Exam, Complete:40577}    Invasive Devices       Peripheral Intravenous Line  Duration             Peripheral IV 24 Right Antecubital 1 day    Peripheral IV 24 Dorsal (posterior);Right Hand 1 day                    Lab, Imaging and other studies: {Results Review Statement:61340}     Stable.

## 2024-04-02 NOTE — ASSESSMENT & PLAN NOTE
"Presents with ongoing LLQ abdominal wall erythema and swelling despite completing oral doxycycline 1 week ago  CT A/P: \"Focal skin thickening and subcutaneous fat stranding/fluid in the left lower abdomen anteriorly (axial image 145, series 2), suspicious for localized skin infection. Measures approximately 2 cm in size. No discrete involvement of the deep soft tissues in this region.\"  Blood cultures negative x 2 after 24 hours  Discussed with ID -stable for discharge home on Bactrim/Duricef for additional 10 days  "

## 2024-04-03 ENCOUNTER — TRANSITIONAL CARE MANAGEMENT (OUTPATIENT)
Dept: INTERNAL MEDICINE CLINIC | Age: 39
End: 2024-04-03

## 2024-04-03 ENCOUNTER — TELEPHONE (OUTPATIENT)
Dept: INTERNAL MEDICINE CLINIC | Age: 39
End: 2024-04-03

## 2024-04-05 ENCOUNTER — TELEPHONE (OUTPATIENT)
Dept: INTERNAL MEDICINE CLINIC | Facility: OTHER | Age: 39
End: 2024-04-05

## 2024-04-05 LAB
ATRIAL RATE: 130 BPM
P AXIS: 48 DEGREES
PR INTERVAL: 132 MS
QRS AXIS: 47 DEGREES
QRSD INTERVAL: 84 MS
QT INTERVAL: 326 MS
QTC INTERVAL: 479 MS
T WAVE AXIS: 15 DEGREES
VENTRICULAR RATE: 130 BPM

## 2024-04-05 PROCEDURE — 93010 ELECTROCARDIOGRAM REPORT: CPT | Performed by: INTERNAL MEDICINE

## 2024-04-06 LAB
BACTERIA BLD CULT: NORMAL
BACTERIA BLD CULT: NORMAL

## 2024-06-25 ENCOUNTER — VBI (OUTPATIENT)
Dept: ADMINISTRATIVE | Facility: OTHER | Age: 39
End: 2024-06-25

## 2024-06-25 NOTE — TELEPHONE ENCOUNTER
06/25/24 10:20 AM     Chart reviewed for Diabetic Eye Exam was/were not submitted to the patient's insurance.     Jamaica Lea MA   [facility]

## 2024-07-12 ENCOUNTER — VBI (OUTPATIENT)
Dept: ADMINISTRATIVE | Facility: OTHER | Age: 39
End: 2024-07-12

## 2024-07-12 NOTE — TELEPHONE ENCOUNTER
07/12/24 10:08 AM     Chart reviewed for Diabetic Eye Exam was/were not submitted to the patient's insurance.     Jamaica Lea MA   [facility]

## 2024-09-13 ENCOUNTER — VBI (OUTPATIENT)
Dept: ADMINISTRATIVE | Facility: OTHER | Age: 39
End: 2024-09-13

## 2024-09-13 NOTE — TELEPHONE ENCOUNTER
09/13/24 10:04 AM     Chart reviewed for Diabetic Eye Exam was/were not submitted to the patient's insurance.     Jamaica Lea MA   [facility]

## 2024-12-08 ENCOUNTER — OFFICE VISIT (OUTPATIENT)
Dept: URGENT CARE | Facility: CLINIC | Age: 39
End: 2024-12-08
Payer: COMMERCIAL

## 2024-12-08 ENCOUNTER — APPOINTMENT (OUTPATIENT)
Dept: RADIOLOGY | Facility: CLINIC | Age: 39
End: 2024-12-08
Payer: COMMERCIAL

## 2024-12-08 VITALS
RESPIRATION RATE: 20 BRPM | DIASTOLIC BLOOD PRESSURE: 74 MMHG | OXYGEN SATURATION: 95 % | SYSTOLIC BLOOD PRESSURE: 109 MMHG | HEART RATE: 108 BPM | TEMPERATURE: 98.4 F

## 2024-12-08 DIAGNOSIS — J06.9 ACUTE URI: Primary | ICD-10-CM

## 2024-12-08 DIAGNOSIS — R05.1 ACUTE COUGH: ICD-10-CM

## 2024-12-08 LAB
SARS-COV-2 AG UPPER RESP QL IA: NEGATIVE
VALID CONTROL: NORMAL

## 2024-12-08 PROCEDURE — S9088 SERVICES PROVIDED IN URGENT: HCPCS | Performed by: PHYSICIAN ASSISTANT

## 2024-12-08 PROCEDURE — 71046 X-RAY EXAM CHEST 2 VIEWS: CPT

## 2024-12-08 PROCEDURE — 87636 SARSCOV2 & INF A&B AMP PRB: CPT | Performed by: PHYSICIAN ASSISTANT

## 2024-12-08 PROCEDURE — 87811 SARS-COV-2 COVID19 W/OPTIC: CPT | Performed by: PHYSICIAN ASSISTANT

## 2024-12-08 PROCEDURE — 99213 OFFICE O/P EST LOW 20 MIN: CPT | Performed by: PHYSICIAN ASSISTANT

## 2024-12-08 RX ORDER — METHYLPREDNISOLONE 4 MG/1
TABLET ORAL
Qty: 1 EACH | Refills: 0 | Status: SHIPPED | OUTPATIENT
Start: 2024-12-08

## 2024-12-08 NOTE — PROGRESS NOTES
[facility]        NAME: Dillon Baxter is a 39 y.o. male  : 1985    MRN: [id number]  DATE: 2024  TIME: 6:52 PM    Assessment and Plan   Acute URI [J06.9]  1. Acute URI  amoxicillin-clavulanate (AUGMENTIN) 875-125 mg per tablet    methylPREDNISolone 4 MG tablet therapy pack      2. Acute cough  Poct Covid 19 Rapid Antigen Test    XR chest pa and lateral    Covid/Flu- Office Collect Normal    amoxicillin-clavulanate (AUGMENTIN) 875-125 mg per tablet    methylPREDNISolone 4 MG tablet therapy pack        Patient requested COVID-19 test    Rapid COVID-19- negative  COVID and flu sent out.     Chest x-ray- congestion . Pending radiology report.     Patient Instructions     Patient was educated rapid COVID-19 was negative.  Patient was told COVID and flu will be sent out.    Patient was prescribed antibiotics and steroids for upper respiratory infection.  Patient was told to eat on antibiotics.  Patient was told to not take any anti-inflammatories while on steroids.  Any chest pain or shortness of breath go to ED.    COVID and flu were sent out    Follow-up with PCP over next few days.    Follow up with PCP in 3-5 days.  Proceed to  ER if symptoms worsen.    If tests have been performed at [facility], our office will contact you with results if changes need to be made to the care plan discussed with you at the visit.  You can review your full results on [facility].    Chief Complaint     Chief Complaint   Patient presents with    flu like symptoms     Pt has had a headache, cough, nasal congestion, fatigue starting yesterday. He was with his father who was recently dx with the flu          History of Present Illness       Patient is a 39-year-old male who presents today to the urgent care complaining of headache, cough, nasal congestion and fatigue for 1 day.  Admits history of asthma.  Patient reports he was unable to use his inhaler because he could not take a deep breath.  Allergies  were reviewed in the chart.  Admits history of asthma.  Denies history of diabetes.        Review of Systems   Review of Systems   Constitutional:  Positive for fever.   HENT:  Positive for congestion, sinus pressure and sinus pain.    Respiratory:  Positive for cough and shortness of breath.    Cardiovascular: Negative.    Neurological:  Positive for headaches.   Psychiatric/Behavioral: Negative.           Current Medications       Current Outpatient Medications:     amoxicillin-clavulanate (AUGMENTIN) 875-125 mg per tablet, Take 1 tablet by mouth every 12 (twelve) hours for 7 days, Disp: 14 tablet, Rfl: 0    busPIRone (BUSPAR) 5 mg tablet, Take 5 mg by mouth 2 (two) times a day (Patient taking differently: Take 10 mg by mouth 2 (two) times a day), Disp: , Rfl:     methylPREDNISolone 4 MG tablet therapy pack, Use as directed on package, Disp: 1 each, Rfl: 0    semaglutide, 2 mg/dose, (Ozempic) 8 mg/ mL injection pen, Inject 2 mg under the skin every 7 days, Disp: , Rfl:     albuterol (PROVENTIL HFA,VENTOLIN HFA) 90 mcg/act inhaler, Inhale 2 puffs every 6 (six) hours as needed for wheezing or shortness of breath, Disp: 18 g, Rfl: 3    aspirin (ECOTRIN LOW STRENGTH) 81 mg EC tablet, Take 1 tablet (81 mg total) by mouth daily Do not start before October 21, 2022., Disp: 30 tablet, Rfl: 0    Continuous Blood Gluc  (Dexcom G6 ) CASSY, Use 1 Device every 3 (three) months (Patient not taking: Reported on 4/3/2023), Disp: 1 each, Rfl: 1    Continuous Blood Gluc Sensor (Dexcom G6 Sensor) MISC, uSE ONE SENSOR EVERY EVERY TEN DAYS (Patient not taking: Reported on 4/3/2023), Disp: 3 each, Rfl: 5    Continuous Blood Gluc Sensor (FreeStyle Catie 3 Sensor) MISC, Use, Disp: , Rfl:     Continuous Blood Gluc Transmit (Dexcom G6 Transmitter) MISC, Use 1 Device in the morning (Patient not taking: Reported on 4/3/2023), Disp: 1 each, Rfl: 0    DULoxetine (CYMBALTA) 20 mg capsule, Take 40 mg by mouth daily, Disp: , Rfl:      Fluticasone-Salmeterol (Advair Diskus) 500-50 mcg/dose inhaler, Inhale 1 puff 2 (two) times a day Rinse mouth after use, Disp: 180 blister, Rfl: 3    insulin aspart protamine-insulin aspart (NovoLOG 70/30 FlexPen ReliOn) 100 Units/mL injection pen, 15-20 morning and HS, Disp: , Rfl:     insulin glargine (Lantus) 100 units/mL subcutaneous injection, Inject 60 Units under the skin every 12 (twelve) hours 60 units q12 hours, morning and night, Disp: , Rfl:     Insulin Pen Needle (BD Pen Needle Rossana U/F) 32G X 4 MM MISC, Use 4/day, Disp: 100 each, Rfl: 3    levothyroxine 25 mcg tablet, Take 2 tablets (50 mcg total) by mouth daily 50, Disp: 90 tablet, Rfl: 1    loratadine (CLARITIN) 10 mg tablet, Take 10 mg by mouth daily, Disp: , Rfl:     metoprolol succinate (TOPROL-XL) 25 mg 24 hr tablet, Take 2 tablets (50 mg total) by mouth daily, Disp: , Rfl:     pravastatin (PRAVACHOL) 40 mg tablet, Take 1 tablet (40 mg total) by mouth daily, Disp: 90 tablet, Rfl: 1    risperiDONE (RisperDAL) 2 mg tablet, Take 4 mg by mouth daily at bedtime, Disp: , Rfl:     sertraline (ZOLOFT) 100 mg tablet, Take 150 mg by mouth daily, Disp: , Rfl:     Current Allergies     Allergies as of 12/08/2024 - Reviewed 12/08/2024   Allergen Reaction Noted    Bupropion Anxiety 09/01/2022    Lamotrigine GI Intolerance 09/18/2020    Niacin Rash 02/11/2018    Simvastatin Other (See Comments), Rash, and Hives 12/13/2015            The following portions of the patient's history were reviewed and updated as appropriate: allergies, current medications, past family history, past medical history, past social history, past surgical history and problem list.     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 Anxiety, depression,     Psychogenic nonepileptic  spells        Past Surgical History:   Procedure Laterality Date    MOUTH SURGERY  08/2021    WISDOM TOOTH EXTRACTION         Family History   Problem Relation Age of Onset    Hyperlipidemia Father     Heart attack Paternal Grandfather     Prostate cancer Paternal Grandfather     Cancer Paternal Grandmother          Medications have been verified.        Objective   /74   Pulse (!) 108   Temp 98.4 °F (36.9 °C)   Resp 20   SpO2 95%   No LMP for male patient.       Physical Exam     Physical Exam  Vitals and nursing note reviewed.   Constitutional:       Appearance: He is normal weight.   HENT:      Head: Normocephalic.      Comments: No pressure over frontal and maxillary sinus.     Right Ear: Tympanic membrane, ear canal and external ear normal.      Left Ear: Tympanic membrane, ear canal and external ear normal.      Mouth/Throat:      Mouth: Mucous membranes are moist.      Pharynx: Posterior oropharyngeal erythema present.   Eyes:      Pupils: Pupils are equal, round, and reactive to light.   Cardiovascular:      Rate and Rhythm: Normal rate and regular rhythm.      Heart sounds: Normal heart sounds.   Pulmonary:      Breath sounds: Normal breath sounds. No wheezing.   Neurological:      General: No focal deficit present.      Mental Status: He is alert and oriented to person, place, and time.   Psychiatric:         Mood and Affect: Mood normal.         Behavior: Behavior normal.

## 2024-12-08 NOTE — PATIENT INSTRUCTIONS
Patient was educated rapid COVID-19 was negative.  Patient was told COVID and flu will be sent out.    Patient was prescribed antibiotics and steroids for upper respiratory infection.  Patient was told to eat on antibiotics.  Patient was told to not take any anti-inflammatories while on steroids.  Any chest pain or shortness of breath go to ED.    COVID and flu were sent out    Follow-up with PCP over next few days.

## 2024-12-08 NOTE — LETTER
December 8, 2024     Patient: Dillon Baxter   YOB: 1985   Date of Visit: 12/8/2024       To Whom it May Concern:    Dillon Baxter was seen in my clinic on 12/8/2024. May return back to work once fever free for 24 hours    If you have any questions or concerns, please don't hesitate to call.         Sincerely,          Sofia Patel PA-C        CC: No Recipients

## 2024-12-09 LAB
FLUAV RNA RESP QL NAA+PROBE: POSITIVE
FLUBV RNA RESP QL NAA+PROBE: NEGATIVE
SARS-COV-2 RNA RESP QL NAA+PROBE: NEGATIVE
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
## 2025-02-24 ENCOUNTER — OFFICE VISIT (OUTPATIENT)
Dept: URGENT CARE | Facility: CLINIC | Age: 40
End: 2025-02-24
Payer: COMMERCIAL

## 2025-02-24 VITALS
HEART RATE: 113 BPM | DIASTOLIC BLOOD PRESSURE: 74 MMHG | RESPIRATION RATE: 18 BRPM | SYSTOLIC BLOOD PRESSURE: 123 MMHG | TEMPERATURE: 98.7 F | OXYGEN SATURATION: 95 %

## 2025-02-24 DIAGNOSIS — J01.40 ACUTE NON-RECURRENT PANSINUSITIS: Primary | ICD-10-CM

## 2025-02-24 PROCEDURE — 99213 OFFICE O/P EST LOW 20 MIN: CPT

## 2025-02-24 PROCEDURE — S9088 SERVICES PROVIDED IN URGENT: HCPCS

## 2025-02-24 NOTE — PROGRESS NOTES
[facility]        NAME: Dillon Baxter is a 39 y.o. male  : 1985    MRN: [id number]  DATE: 2025  TIME: 5:11 PM    Assessment and Plan   Acute non-recurrent pansinusitis [J01.40]  1. Acute non-recurrent pansinusitis  amoxicillin-clavulanate (AUGMENTIN) 875-125 mg per tablet            Patient Instructions     Take Augmentin as prescribed.    For nasal/sinus congestion you can try steam, warm compresses, saline nasal spray, Neti pot, nasal steroid (Flonase, Nasocort), or nasal decongestant (Afrin - for 3 days only).    You can try a decongestant (Sudafed) if > 6 years of age and no history of high blood pressure.    For cough you can take an over-the-counter expectorant such as plain Robitussion or Mucinex. A spoonful of honey at bedtime may also be helpful.    For cold symptoms with high blood pressure take Coricidin cough/cold.     For sore throat you can use Cepacol lozenges, do warm salt water gargles, drink warm water with lemon or herbal teas, or use an over-the-counter throat spray (Chloraseptic).    You can take ibuprofen/Motrin and acetaminophen/Tylenol as needed for pain, fever, body aches. Do not take ibuprofen/Motrin/Advil if you have a history of heart disease, bleeding ulcers, or if you take blood thinners.     Drink plenty of fluids to stay hydrated. Airborne or Emergen-C for extra vitamin C and zinc.    Follow up with your PCP in 3-5 days for persistent symptoms.    Go to the ER if symptoms worsen.     If tests are performed, our office will contact you with results only if changes need to made to the care plan discussed with you at the visit. You can review your full results on [facility].      Chief Complaint     Chief Complaint   Patient presents with    Cold Like Symptoms     Patient started yesterday with sore throat, congestion and sinus pressure          History of Present Illness       39-year-old male presenting with nasal congestion, PND, scratchy  throat, and sinus pressure x 1 week.  Patient states he started feeling better a few days ago but woke up today feeling worse.  No known fevers or GI symptoms.  Little to no relief with Mucinex.        Review of Systems   Review of Systems   Constitutional:  Negative for chills and fever.   HENT:  Positive for congestion, postnasal drip, sinus pressure and sore throat (scratchy). Negative for ear pain.    Eyes:  Negative for discharge and redness.   Respiratory:  Negative for cough, shortness of breath and wheezing.    Cardiovascular:  Negative for chest pain and palpitations.   Gastrointestinal:  Negative for abdominal pain, diarrhea, nausea and vomiting.   Skin:  Negative for rash.   Neurological:  Negative for dizziness, light-headedness and headaches.         Current Medications       Current Outpatient Medications:     amoxicillin-clavulanate (AUGMENTIN) 875-125 mg per tablet, Take 1 tablet by mouth every 12 (twelve) hours for 7 days, Disp: 14 tablet, Rfl: 0    albuterol (PROVENTIL HFA,VENTOLIN HFA) 90 mcg/act inhaler, Inhale 2 puffs every 6 (six) hours as needed for wheezing or shortness of breath, Disp: 18 g, Rfl: 3    aspirin (ECOTRIN LOW STRENGTH) 81 mg EC tablet, Take 1 tablet (81 mg total) by mouth daily Do not start before October 21, 2022., Disp: 30 tablet, Rfl: 0    busPIRone (BUSPAR) 5 mg tablet, Take 5 mg by mouth 2 (two) times a day (Patient taking differently: Take 10 mg by mouth 2 (two) times a day), Disp: , Rfl:     Continuous Blood Gluc  (Dexcom G6 ) CASSY, Use 1 Device every 3 (three) months (Patient not taking: Reported on 4/3/2023), Disp: 1 each, Rfl: 1    Continuous Blood Gluc Sensor (Dexcom G6 Sensor) MISC, uSE ONE SENSOR EVERY EVERY TEN DAYS (Patient not taking: Reported on 4/3/2023), Disp: 3 each, Rfl: 5    Continuous Blood Gluc Sensor (FreeStyle Catie 3 Sensor) MISC, Use, Disp: , Rfl:     Continuous Blood Gluc Transmit (Dexcom G6 Transmitter) MISC, Use 1 Device in the  morning (Patient not taking: Reported on 4/3/2023), Disp: 1 each, Rfl: 0    DULoxetine (CYMBALTA) 20 mg capsule, Take 40 mg by mouth daily, Disp: , Rfl:     Fluticasone-Salmeterol (Advair Diskus) 500-50 mcg/dose inhaler, Inhale 1 puff 2 (two) times a day Rinse mouth after use, Disp: 180 blister, Rfl: 3    insulin aspart protamine-insulin aspart (NovoLOG 70/30 FlexPen ReliOn) 100 Units/mL injection pen, 15-20 morning and HS, Disp: , Rfl:     insulin glargine (Lantus) 100 units/mL subcutaneous injection, Inject 60 Units under the skin every 12 (twelve) hours 60 units q12 hours, morning and night, Disp: , Rfl:     Insulin Pen Needle (BD Pen Needle Rossana U/F) 32G X 4 MM MISC, Use 4/day, Disp: 100 each, Rfl: 3    levothyroxine 25 mcg tablet, Take 2 tablets (50 mcg total) by mouth daily 50, Disp: 90 tablet, Rfl: 1    loratadine (CLARITIN) 10 mg tablet, Take 10 mg by mouth daily, Disp: , Rfl:     methylPREDNISolone 4 MG tablet therapy pack, Use as directed on package, Disp: 1 each, Rfl: 0    metoprolol succinate (TOPROL-XL) 25 mg 24 hr tablet, Take 2 tablets (50 mg total) by mouth daily, Disp: , Rfl:     pravastatin (PRAVACHOL) 40 mg tablet, Take 1 tablet (40 mg total) by mouth daily, Disp: 90 tablet, Rfl: 1    risperiDONE (RisperDAL) 2 mg tablet, Take 4 mg by mouth daily at bedtime, Disp: , Rfl:     semaglutide, 2 mg/dose, (Ozempic) 8 mg/ mL injection pen, Inject 2 mg under the skin every 7 days, Disp: , Rfl:     sertraline (ZOLOFT) 100 mg tablet, Take 150 mg by mouth daily, Disp: , Rfl:     Current Allergies     Allergies as of 02/24/2025 - Reviewed 02/24/2025   Allergen Reaction Noted    Bupropion Anxiety 09/01/2022    Lamotrigine GI Intolerance 09/18/2020    Niacin Rash 02/11/2018    Simvastatin Other (See Comments), Rash, and Hives 12/13/2015            The following portions of the patient's history were reviewed and updated as appropriate: allergies, current medications, past family history, past medical history,  past social history, past surgical history and problem list.     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 Anxiety, depression,     Psychogenic nonepileptic spells        Past Surgical History:   Procedure Laterality Date    MOUTH SURGERY  08/2021    WISDOM TOOTH EXTRACTION         Family History   Problem Relation Age of Onset    Hyperlipidemia Father     Heart attack Paternal Grandfather     Prostate cancer Paternal Grandfather     Cancer Paternal Grandmother          Medications have been verified.        Objective   /74   Pulse (!) 113   Temp 98.7 °F (37.1 °C)   Resp 18   SpO2 95%        Physical Exam     Physical Exam  Vitals and nursing note reviewed.   Constitutional:       General: He is not in acute distress.     Appearance: He is not ill-appearing or diaphoretic.   HENT:      Head: Normocephalic and atraumatic.      Right Ear: Tympanic membrane, ear canal and external ear normal.      Left Ear: Tympanic membrane, ear canal and external ear normal.      Nose: Congestion present.      Right Sinus: Maxillary sinus tenderness and frontal sinus tenderness present.      Left Sinus: Maxillary sinus tenderness and frontal sinus tenderness present.      Mouth/Throat:      Mouth: Mucous membranes are moist.      Pharynx: Oropharynx is clear.   Eyes:      Conjunctiva/sclera: Conjunctivae normal.   Cardiovascular:      Rate and Rhythm: Normal rate and regular rhythm.      Pulses: Normal pulses.      Heart sounds: Normal heart sounds.   Pulmonary:      Effort: Pulmonary effort is normal.      Breath sounds: Normal breath sounds. No wheezing, rhonchi or rales.   Musculoskeletal:         General: Normal range of motion.      Cervical back: Normal range of motion and neck supple.   Skin:     General:  Skin is warm and dry.      Capillary Refill: Capillary refill takes less than 2 seconds.   Neurological:      Mental Status: He is alert and oriented to person, place, and time.

## 2025-02-24 NOTE — PATIENT INSTRUCTIONS
Take Augmentin as prescribed.    For nasal/sinus congestion you can try steam, warm compresses, saline nasal spray, Neti pot, nasal steroid (Flonase, Nasocort), or nasal decongestant (Afrin - for 3 days only).    You can try a decongestant (Sudafed) if > 6 years of age and no history of high blood pressure.    For cough you can take an over-the-counter expectorant such as plain Robitussion or Mucinex. A spoonful of honey at bedtime may also be helpful.    For cold symptoms with high blood pressure take Coricidin cough/cold.     For sore throat you can use Cepacol lozenges, do warm salt water gargles, drink warm water with lemon or herbal teas, or use an over-the-counter throat spray (Chloraseptic).    You can take ibuprofen/Motrin and acetaminophen/Tylenol as needed for pain, fever, body aches. Do not take ibuprofen/Motrin/Advil if you have a history of heart disease, bleeding ulcers, or if you take blood thinners.     Drink plenty of fluids to stay hydrated. Airborne or Emergen-C for extra vitamin C and zinc.    Follow up with your PCP in 3-5 days for persistent symptoms.    Go to the ER if symptoms worsen.

## 2025-03-24 ENCOUNTER — OFFICE VISIT (OUTPATIENT)
Dept: URGENT CARE | Facility: CLINIC | Age: 40
End: 2025-03-24
Payer: COMMERCIAL

## 2025-03-24 VITALS
HEART RATE: 100 BPM | TEMPERATURE: 97.7 F | SYSTOLIC BLOOD PRESSURE: 134 MMHG | OXYGEN SATURATION: 96 % | RESPIRATION RATE: 18 BRPM | DIASTOLIC BLOOD PRESSURE: 70 MMHG

## 2025-03-24 DIAGNOSIS — L08.9 LOCAL INFECTION OF THE SKIN AND SUBCUTANEOUS TISSUE, UNSPECIFIED: Primary | ICD-10-CM

## 2025-03-24 PROCEDURE — 99213 OFFICE O/P EST LOW 20 MIN: CPT

## 2025-03-24 PROCEDURE — S9088 SERVICES PROVIDED IN URGENT: HCPCS

## 2025-03-24 RX ORDER — CEPHALEXIN 500 MG/1
500 CAPSULE ORAL EVERY 8 HOURS SCHEDULED
Qty: 21 CAPSULE | Refills: 0 | Status: SHIPPED | OUTPATIENT
Start: 2025-03-24 | End: 2025-03-31

## 2025-03-24 NOTE — PROGRESS NOTES
Name: Dillon Baxter      : 1985      MRN: [id number]  Encounter Provider: Freda Lizarraga PA-C  Encounter Date: 3/24/2025   Encounter department: [facility]  :  Assessment & Plan  Local infection of the skin and subcutaneous tissue, unspecified    Orders:    cephalexin (KEFLEX) 500 mg capsule; Take 1 capsule (500 mg total) by mouth every 8 (eight) hours for 7 days    Keflex sent for localized skin infection near the eye. Pt knows return precautions and briefly discussed acne management OTC.      History of Present Illness   HPI  Dillon Baxter is a 39 y.o. male who presents because he has had a pimple under his left eye for the past few days. Patient states he woke up today and the pimple popped. Patient now has under eye swelling and soreness. He is concerned for how close the sore is to his eye.          Review of Systems   Constitutional:  Negative for fever.   Skin:  Positive for rash.          Objective   /70   Pulse 100   Temp 97.7 °F (36.5 °C)   Resp 18   SpO2 96%      Physical Exam  Constitutional:       Appearance: Normal appearance.   HENT:      Head: Normocephalic and atraumatic.      Nose: Nose normal.   Eyes:      Conjunctiva/sclera: Conjunctivae normal.      Pupils: Pupils are equal, round, and reactive to light.   Cardiovascular:      Rate and Rhythm: Normal rate.   Pulmonary:      Effort: Pulmonary effort is normal.   Skin:     General: Skin is warm and dry.      Findings: Rash present.      Comments: Inflamed and red large pimple under left eye. Some swelling around the area.   Neurological:      Mental Status: He is alert.

## 2025-05-07 ENCOUNTER — VBI (OUTPATIENT)
Dept: ADMINISTRATIVE | Facility: OTHER | Age: 40
End: 2025-05-07

## 2025-05-07 NOTE — TELEPHONE ENCOUNTER
05/07/25 7:55 AM     Chart reviewed for Diabetic Eye Exam was/were not submitted to the patient's insurance.     Jamaica Lea MA   [facility]

## 2025-05-19 ENCOUNTER — OFFICE VISIT (OUTPATIENT)
Dept: URGENT CARE | Facility: CLINIC | Age: 40
End: 2025-05-19
Payer: COMMERCIAL

## 2025-05-19 VITALS
WEIGHT: 170 LBS | OXYGEN SATURATION: 96 % | SYSTOLIC BLOOD PRESSURE: 94 MMHG | HEART RATE: 102 BPM | BODY MASS INDEX: 28.29 KG/M2 | DIASTOLIC BLOOD PRESSURE: 74 MMHG | TEMPERATURE: 98.4 F | RESPIRATION RATE: 16 BRPM

## 2025-05-19 DIAGNOSIS — Z91.199: ICD-10-CM

## 2025-05-19 DIAGNOSIS — F41.9 ANXIETY: ICD-10-CM

## 2025-05-19 DIAGNOSIS — J01.90 ACUTE NON-RECURRENT SINUSITIS, UNSPECIFIED LOCATION: Primary | ICD-10-CM

## 2025-05-19 PROCEDURE — S9088 SERVICES PROVIDED IN URGENT: HCPCS

## 2025-05-19 PROCEDURE — 99213 OFFICE O/P EST LOW 20 MIN: CPT

## 2025-05-19 RX ORDER — FLUTICASONE PROPIONATE 50 MCG
1 SPRAY, SUSPENSION (ML) NASAL DAILY
Qty: 9.9 ML | Refills: 0 | Status: SHIPPED | OUTPATIENT
Start: 2025-05-19

## 2025-05-19 RX ORDER — AZITHROMYCIN 250 MG/1
TABLET, FILM COATED ORAL
Qty: 6 TABLET | Refills: 0 | Status: SHIPPED | OUTPATIENT
Start: 2025-05-19 | End: 2025-05-23

## 2025-05-19 RX ORDER — GUAIFENESIN 600 MG/1
1200 TABLET, EXTENDED RELEASE ORAL EVERY 12 HOURS SCHEDULED
Qty: 30 TABLET | Refills: 0 | Status: SHIPPED | OUTPATIENT
Start: 2025-05-19

## 2025-05-19 RX ORDER — BUSPIRONE HYDROCHLORIDE 5 MG/1
5 TABLET ORAL 2 TIMES DAILY
Qty: 20 TABLET | Refills: 0 | Status: SHIPPED | OUTPATIENT
Start: 2025-05-19

## 2025-05-19 RX ORDER — BENZONATATE 200 MG/1
200 CAPSULE ORAL 3 TIMES DAILY PRN
Qty: 20 CAPSULE | Refills: 0 | Status: SHIPPED | OUTPATIENT
Start: 2025-05-19

## 2025-05-19 NOTE — PROGRESS NOTES
Name: Dillon Baxter      : 1985      MRN: [id number]  Encounter Provider: Freda Lizarraga PA-C  Encounter Date: 2025   Encounter department: [facility]  :  Assessment & Plan  Acute non-recurrent sinusitis, unspecified location    Orders:    azithromycin (ZITHROMAX) 250 mg tablet; Take 2 tablets today then 1 tablet daily x 4 days    benzonatate (TESSALON) 200 MG capsule; Take 1 capsule (200 mg total) by mouth 3 (three) times a day as needed for cough    guaiFENesin (MUCINEX) 600 mg 12 hr tablet; Take 2 tablets (1,200 mg total) by mouth every 12 (twelve) hours    fluticasone (FLONASE) 50 mcg/act nasal spray; 1 spray into each nostril daily      Concern for developing sinus infection.  Will send antibiotics, Flonase, and Mucinex for mucus management and infection management.  Also sent Tessalon Perles for bothersome cough being caused by postnasal drip.  Anxiety    Orders:    busPIRone (BUSPAR) 5 mg tablet; Take 1 tablet (5 mg total) by mouth in the morning and 1 tablet (5 mg total) before bedtime.    Ambulatory Referral to Family Practice; Future    Problem with continuity of care    Orders:    busPIRone (BUSPAR) 5 mg tablet; Take 1 tablet (5 mg total) by mouth in the morning and 1 tablet (5 mg total) before bedtime.    Ambulatory Referral to Family Practice; Future    Had long conversation with patient about difficulty with visits with primary care [facility].  Patient knows he needs to get scheduled with them for continuity of care but I have also given a referral to a [facility] primary care provider if needed due to need to maintain his anxiety medication prescription on a regular basis.    History of Present Illness   HPI  Dillon Baxter is a 39 y.o. male who presents with symptoms since Saturday with cough with clear/green phlegm. Nasal congestion/runny nose. Low grade 99.3, muscle fatigue. States post nasal drip. Denies b/l ear pain.           Review of  Systems   Constitutional:  Positive for fatigue and fever.   HENT:  Positive for congestion and rhinorrhea.    Respiratory:  Positive for cough.           Objective   BP 94/74   Pulse 102   Temp 98.4 °F (36.9 °C)   Resp 16   Wt 77.1 kg (170 lb)   SpO2 96%   BMI 28.29 kg/m²      Physical Exam  Constitutional:       Appearance: He is ill-appearing.   HENT:      Head: Normocephalic and atraumatic.      Right Ear: Tympanic membrane and ear canal normal.      Left Ear: Tympanic membrane and ear canal normal.      Nose: Congestion present.      Mouth/Throat:      Mouth: Mucous membranes are moist.     Cardiovascular:      Rate and Rhythm: Normal rate.   Pulmonary:      Effort: Pulmonary effort is normal.      Breath sounds: Normal breath sounds.     Neurological:      Mental Status: He is alert.

## 2025-05-21 NOTE — ASSESSMENT & PLAN NOTE
Orders:    busPIRone (BUSPAR) 5 mg tablet; Take 1 tablet (5 mg total) by mouth in the morning and 1 tablet (5 mg total) before bedtime.    Ambulatory Referral to Family Practice; Future

## 2025-07-15 ENCOUNTER — VBI (OUTPATIENT)
Dept: ADMINISTRATIVE | Facility: OTHER | Age: 40
End: 2025-07-15